# Patient Record
Sex: MALE | Race: WHITE | NOT HISPANIC OR LATINO | Employment: OTHER | URBAN - METROPOLITAN AREA
[De-identification: names, ages, dates, MRNs, and addresses within clinical notes are randomized per-mention and may not be internally consistent; named-entity substitution may affect disease eponyms.]

---

## 2017-08-30 ENCOUNTER — ALLSCRIPTS OFFICE VISIT (OUTPATIENT)
Dept: OTHER | Facility: OTHER | Age: 82
End: 2017-08-30

## 2018-01-13 VITALS
HEART RATE: 72 BPM | WEIGHT: 165 LBS | DIASTOLIC BLOOD PRESSURE: 48 MMHG | HEIGHT: 65 IN | SYSTOLIC BLOOD PRESSURE: 138 MMHG | BODY MASS INDEX: 27.49 KG/M2

## 2018-05-08 RX ORDER — MECLIZINE HYDROCHLORIDE 25 MG/1
TABLET ORAL
Status: ON HOLD | COMMUNITY
End: 2021-08-26 | Stop reason: CLARIF

## 2018-05-08 RX ORDER — FUROSEMIDE 20 MG/1
1 TABLET ORAL DAILY PRN
Status: ON HOLD | COMMUNITY
Start: 2014-05-28 | End: 2021-10-27 | Stop reason: SDUPTHER

## 2018-05-08 RX ORDER — LANSOPRAZOLE 30 MG/1
CAPSULE, DELAYED RELEASE ORAL
Status: ON HOLD | COMMUNITY
Start: 2014-05-28 | End: 2019-04-24 | Stop reason: CLARIF

## 2018-05-08 RX ORDER — MULTIVITAMIN WITH IRON
TABLET ORAL
COMMUNITY

## 2018-05-08 RX ORDER — FENOFIBRIC ACID 45 MG/1
CAPSULE, DELAYED RELEASE ORAL
COMMUNITY
Start: 2013-09-30

## 2018-05-08 RX ORDER — ATORVASTATIN CALCIUM 20 MG/1
1 TABLET, FILM COATED ORAL DAILY
COMMUNITY

## 2018-05-08 RX ORDER — POTASSIUM CHLORIDE 750 MG/1
1 CAPSULE, EXTENDED RELEASE ORAL DAILY
Status: ON HOLD | COMMUNITY
Start: 2017-08-30 | End: 2019-04-24 | Stop reason: CLARIF

## 2018-05-08 RX ORDER — NITROGLYCERIN 0.4 MG/1
1 TABLET SUBLINGUAL
COMMUNITY
End: 2020-06-03 | Stop reason: SDUPTHER

## 2018-05-08 RX ORDER — VALSARTAN 80 MG/1
1 TABLET ORAL DAILY
Status: ON HOLD | COMMUNITY
End: 2019-04-24 | Stop reason: CLARIF

## 2018-05-09 ENCOUNTER — OFFICE VISIT (OUTPATIENT)
Dept: CARDIOLOGY CLINIC | Facility: CLINIC | Age: 83
End: 2018-05-09
Payer: COMMERCIAL

## 2018-05-09 VITALS
BODY MASS INDEX: 27.64 KG/M2 | SYSTOLIC BLOOD PRESSURE: 130 MMHG | DIASTOLIC BLOOD PRESSURE: 56 MMHG | HEIGHT: 66 IN | HEART RATE: 40 BPM | WEIGHT: 172 LBS

## 2018-05-09 DIAGNOSIS — I25.10 CORONARY ARTERY DISEASE INVOLVING NATIVE CORONARY ARTERY OF NATIVE HEART WITHOUT ANGINA PECTORIS: Primary | ICD-10-CM

## 2018-05-09 DIAGNOSIS — I10 ESSENTIAL HYPERTENSION: ICD-10-CM

## 2018-05-09 DIAGNOSIS — R60.0 BILATERAL LEG EDEMA: ICD-10-CM

## 2018-05-09 DIAGNOSIS — I49.8 VENTRICULAR BIGEMINY: ICD-10-CM

## 2018-05-09 PROCEDURE — 99213 OFFICE O/P EST LOW 20 MIN: CPT | Performed by: INTERNAL MEDICINE

## 2018-05-09 NOTE — PROGRESS NOTES
Cardiology Follow Up    Tahmina Monson  10/11/1924  [id number]  [address] [facility] [id number] [address] [facility] [place], [facility] [phone]    1  Coronary artery disease involving native coronary artery of native heart without angina pectoris     2  Essential hypertension     3  Bilateral leg edema     4  Ventricular bigeminy           Discussion/Summary: All of his assessed cardiac problems appear to be stable  I have reviewed his medications and made no changes  No cardiac testing is ordered  RTO 6 months  I WILL DECREASE HIS ASA TO 81 MG DAILY  Interval History: He has seen in the ER on 12/26/2017 for atypical CP  He was not admitted  He has had problems with nose bleeds  He denies any recent CP, SOB, palpitations, dizziness  He has chronic LE edema and he takes Lasix prn  He has CAD with LAD stenting in 2002  Patient Active Problem List   Diagnosis    Coronary artery disease involving native coronary artery of native heart without angina pectoris    Essential hypertension    Bilateral leg edema    Ventricular bigeminy     Past Medical History:   Diagnosis Date    Cataract     Coronary atherosclerosis of native coronary artery     Diverticulitis of colon     Essential hypertension, benign     Hyperlipidemia     Peptic ulcer      Social History     Social History    Marital status: /Civil Union     Spouse name: N/A    Number of children: N/A    Years of education: N/A     Occupational History    Not on file       Social History Main Topics    Smoking status: Never Smoker    Smokeless tobacco: Never Used    Alcohol use No    Drug use: No    Sexual activity: Not on file     Other Topics Concern    Not on file     Social History Narrative    No narrative on file      Family History   Problem Relation Age of Onset    Heart attack Brother      Past Surgical History:   Procedure Laterality Date  CORONARY ANGIOPLASTY      CORONARY STENT PLACEMENT      TONSILLECTOMY         Current Outpatient Prescriptions:     allopurinol (ZYLOPRIM) 300 mg tablet, Take 1 tablet by mouth daily, Disp: , Rfl:     aspirin 325 mg tablet, Take 1 tablet by mouth daily, Disp: , Rfl:     atorvastatin (LIPITOR) 20 mg tablet, Take 1 tablet by mouth daily, Disp: , Rfl:     Cholecalciferol (CVS VITAMIN D) 2000 units CAPS, Take by mouth, Disp: , Rfl:     Choline Fenofibrate (FENOFIBRIC ACID) 45 MG CPDR, Take by mouth, Disp: , Rfl:     furosemide (LASIX) 20 mg tablet, Take 1 tablet by mouth daily as needed, Disp: , Rfl:     metoprolol tartrate (LOPRESSOR) 50 mg tablet, Take 1 tablet by mouth 2 (two) times a day, Disp: , Rfl:     Multiple Vitamins tablet, Take by mouth, Disp: , Rfl:     nitroglycerin (NITROSTAT) 0 4 mg SL tablet, Place 1 tablet under the tongue every 5 (five) minutes as needed, Disp: , Rfl:     potassium chloride (MICRO-K) 10 MEQ CR capsule, Take 1 capsule by mouth daily, Disp: , Rfl:     valsartan (DIOVAN) 80 mg tablet, Take 1 tablet by mouth daily, Disp: , Rfl:     lansoprazole (PREVACID) 30 mg capsule, Take by mouth, Disp: , Rfl:     meclizine (ANTIVERT) 25 mg tablet, Take by mouth, Disp: , Rfl:   Allergies   Allergen Reactions    Penicillins      Patient denies allergic reaction - said that is his wife-not him     Vitals:    05/09/18 1436   BP: 130/56   BP Location: Left arm   Pulse: (!) 40   Weight: 78 kg (172 lb)   Height: 5' 6" (1 676 m)     Weight (last 2 days)     Date/Time   Weight    05/09/18 1436  78 (172)             Blood pressure 130/56, pulse (!) 40, height 5' 6" (1 676 m), weight 78 kg (172 lb)  , Body mass index is 27 76 kg/m²  Labs:  No visits with results within 6 Month(s) from this visit  Latest known visit with results is:   No results found for any previous visit  Imaging: No results found      Review of Systems:  Review of Systems   Constitutional: Negative for diaphoresis, fatigue, fever and unexpected weight change  HENT: Negative  Respiratory: Negative for cough, shortness of breath and wheezing  Cardiovascular: Negative for chest pain, palpitations and leg swelling  Gastrointestinal: Negative for abdominal pain, diarrhea and nausea  Musculoskeletal: Negative for gait problem and myalgias  Skin: Negative for rash  Neurological: Negative for dizziness and numbness  Psychiatric/Behavioral: Negative  Physical Exam:  Physical Exam   Constitutional: He is oriented to person, place, and time  He appears well-developed and well-nourished  HENT:   Head: Normocephalic and atraumatic  Eyes: Pupils are equal, round, and reactive to light  Neck: Normal range of motion  Neck supple  No JVD present  Cardiovascular: Regular rhythm, S1 normal, S2 normal and normal pulses  Pulses:       Carotid pulses are 2+ on the right side, and 2+ on the left side  Pulmonary/Chest: Effort normal and breath sounds normal  He has no wheezes  He has no rales  Abdominal: Soft  Bowel sounds are normal  There is no tenderness  Musculoskeletal: Normal range of motion  He exhibits no edema or tenderness  Neurological: He is alert and oriented to person, place, and time  He has normal reflexes  No cranial nerve deficit  Skin: Skin is warm  Psychiatric: He has a normal mood and affect

## 2018-08-13 ENCOUNTER — APPOINTMENT (EMERGENCY)
Dept: CT IMAGING | Facility: HOSPITAL | Age: 83
End: 2018-08-13
Payer: COMMERCIAL

## 2018-08-13 ENCOUNTER — HOSPITAL ENCOUNTER (EMERGENCY)
Facility: HOSPITAL | Age: 83
Discharge: HOME | End: 2018-08-13
Attending: EMERGENCY MEDICINE | Admitting: EMERGENCY MEDICINE
Payer: COMMERCIAL

## 2018-08-13 VITALS
HEART RATE: 64 BPM | OXYGEN SATURATION: 94 % | DIASTOLIC BLOOD PRESSURE: 48 MMHG | TEMPERATURE: 98.1 F | RESPIRATION RATE: 18 BRPM | SYSTOLIC BLOOD PRESSURE: 140 MMHG

## 2018-08-13 DIAGNOSIS — R42 DIZZINESS: ICD-10-CM

## 2018-08-13 DIAGNOSIS — R42 VERTIGO: Primary | ICD-10-CM

## 2018-08-13 LAB
ALBUMIN SERPL BCP-MCNC: 4.1 G/DL (ref 3.5–5.7)
ALP SERPL-CCNC: 43 U/L (ref 55–165)
ALT SERPL W P-5'-P-CCNC: 12 U/L (ref 7–52)
ANION GAP SERPL CALCULATED.3IONS-SCNC: 9 MMOL/L (ref 4–13)
AST SERPL W P-5'-P-CCNC: 19 U/L (ref 13–39)
BACTERIA UR QL AUTO: ABNORMAL /HPF
BASOPHILS # BLD AUTO: 0.1 THOUSANDS/ΜL (ref 0–0.1)
BASOPHILS NFR BLD AUTO: 1 % (ref 0–1)
BILIRUB SERPL-MCNC: 0.7 MG/DL (ref 0.2–1)
BILIRUB UR QL STRIP: NEGATIVE
BUN SERPL-MCNC: 40 MG/DL (ref 7–25)
CALCIUM SERPL-MCNC: 8.5 MG/DL (ref 8.6–10.5)
CHLORIDE SERPL-SCNC: 104 MMOL/L (ref 98–107)
CLARITY UR: CLEAR
CO2 SERPL-SCNC: 20 MMOL/L (ref 21–31)
COLOR UR: YELLOW
CREAT SERPL-MCNC: 1.53 MG/DL (ref 0.7–1.3)
EOSINOPHIL # BLD AUTO: 0 THOUSAND/ΜL (ref 0–0.61)
EOSINOPHIL NFR BLD AUTO: 1 % (ref 0–6)
ERYTHROCYTE [DISTWIDTH] IN BLOOD BY AUTOMATED COUNT: 17 % (ref 11.6–15.1)
GFR SERPL CREATININE-BSD FRML MDRD: 39 ML/MIN/1.73SQ M
GLUCOSE SERPL-MCNC: 104 MG/DL (ref 65–140)
GLUCOSE UR STRIP-MCNC: NEGATIVE MG/DL
HCT VFR BLD AUTO: 38.8 % (ref 42–52)
HGB BLD-MCNC: 12.6 G/DL (ref 12–17)
HGB UR QL STRIP.AUTO: ABNORMAL
HOLD SPECIMEN: NORMAL
KETONES UR STRIP-MCNC: NEGATIVE MG/DL
LEUKOCYTE ESTERASE UR QL STRIP: NEGATIVE
LYMPHOCYTES # BLD AUTO: 1.3 THOUSANDS/ΜL (ref 0.6–4.47)
LYMPHOCYTES NFR BLD AUTO: 12 % (ref 14–44)
MCH RBC QN AUTO: 31.1 PG (ref 26.8–34.3)
MCHC RBC AUTO-ENTMCNC: 32.5 G/DL (ref 31.4–37.4)
MCV RBC AUTO: 96 FL (ref 82–98)
MONOCYTES # BLD AUTO: 0.6 THOUSAND/ΜL (ref 0.17–1.22)
MONOCYTES NFR BLD AUTO: 6 % (ref 4–12)
NEUTROPHILS # BLD AUTO: 8.6 THOUSANDS/ΜL (ref 1.85–7.62)
NEUTS SEG NFR BLD AUTO: 81 % (ref 43–75)
NITRITE UR QL STRIP: NEGATIVE
NON-SQ EPI CELLS URNS QL MICRO: ABNORMAL /HPF
NRBC BLD AUTO-RTO: 0 /100 WBCS
PH UR STRIP.AUTO: 6 [PH] (ref 5–8)
PLATELET # BLD AUTO: 166 THOUSANDS/UL (ref 149–390)
PMV BLD AUTO: 9.5 FL (ref 8.9–12.7)
POTASSIUM SERPL-SCNC: 4.7 MMOL/L (ref 3.5–5.5)
PROT SERPL-MCNC: 6.6 G/DL (ref 6.4–8.9)
PROT UR STRIP-MCNC: NEGATIVE MG/DL
RBC # BLD AUTO: 4.05 MILLION/UL (ref 3.88–5.62)
RBC #/AREA URNS AUTO: ABNORMAL /HPF
SODIUM SERPL-SCNC: 133 MMOL/L (ref 134–143)
SP GR UR STRIP.AUTO: 1.01 (ref 1–1.03)
TROPONIN I SERPL-MCNC: <0.03 NG/ML
UROBILINOGEN UR QL STRIP.AUTO: 0.2 E.U./DL
WBC # BLD AUTO: 10.6 THOUSAND/UL (ref 4.31–10.16)
WBC #/AREA URNS AUTO: ABNORMAL /HPF

## 2018-08-13 PROCEDURE — 81001 URINALYSIS AUTO W/SCOPE: CPT | Performed by: EMERGENCY MEDICINE

## 2018-08-13 PROCEDURE — 93005 ELECTROCARDIOGRAM TRACING: CPT

## 2018-08-13 PROCEDURE — 84484 ASSAY OF TROPONIN QUANT: CPT | Performed by: EMERGENCY MEDICINE

## 2018-08-13 PROCEDURE — 80053 COMPREHEN METABOLIC PANEL: CPT | Performed by: EMERGENCY MEDICINE

## 2018-08-13 PROCEDURE — 85025 COMPLETE CBC W/AUTO DIFF WBC: CPT | Performed by: EMERGENCY MEDICINE

## 2018-08-13 PROCEDURE — 36415 COLL VENOUS BLD VENIPUNCTURE: CPT | Performed by: EMERGENCY MEDICINE

## 2018-08-13 PROCEDURE — 81003 URINALYSIS AUTO W/O SCOPE: CPT | Performed by: EMERGENCY MEDICINE

## 2018-08-13 PROCEDURE — 70450 CT HEAD/BRAIN W/O DYE: CPT

## 2018-08-13 PROCEDURE — 99285 EMERGENCY DEPT VISIT HI MDM: CPT

## 2018-08-13 RX ORDER — MECLIZINE HYDROCHLORIDE 25 MG/1
25 TABLET ORAL ONCE
Status: COMPLETED | OUTPATIENT
Start: 2018-08-13 | End: 2018-08-13

## 2018-08-13 RX ORDER — LOSARTAN POTASSIUM 50 MG/1
50 TABLET ORAL DAILY
Status: ON HOLD | COMMUNITY
End: 2019-04-24 | Stop reason: CLARIF

## 2018-08-13 RX ORDER — MECLIZINE HCL 12.5 MG/1
12.5 TABLET ORAL 3 TIMES DAILY PRN
Qty: 15 TABLET | Refills: 0 | Status: SHIPPED | OUTPATIENT
Start: 2018-08-13 | End: 2018-08-18

## 2018-08-13 RX ADMIN — MECLIZINE HYDROCHLORIDE 25 MG: 25 TABLET ORAL at 21:02

## 2018-08-14 LAB
ATRIAL RATE: 77 BPM
P AXIS: 43 DEGREES
PR INTERVAL: 192 MS
QRS AXIS: -7 DEGREES
QRSD INTERVAL: 88 MS
QT INTERVAL: 400 MS
QTC INTERVAL: 452 MS
T WAVE AXIS: 57 DEGREES
VENTRICULAR RATE: 77 BPM

## 2018-08-14 NOTE — DISCHARGE INSTRUCTIONS
Dizziness   WHAT YOU NEED TO KNOW:   Dizziness is a feeling of being off balance or unsteady  Common causes of dizziness are an inner ear fluid imbalance or a lack of oxygen in your blood  Dizziness may be acute (lasts 3 days or less) or chronic (lasts longer than 3 days)  You may have dizzy spells that last from seconds to a few hours  DISCHARGE INSTRUCTIONS:   Return to the emergency department if:   · You have a headache and a stiff neck  · You have shaking chills and a fever  · You vomit over and over with no relief  · Your vomit or bowel movements are red or black  · You have pain in your chest, back, or abdomen  · You have numbness, especially in your face, arms, or legs  · You have trouble moving your arms or legs  · You are confused  Contact your healthcare provider if:   · You have a fever  · Your symptoms do not get better with treatment  · You have questions or concerns about your condition or care  Manage your symptoms:   · Do not drive  or operate heavy machinery when you are dizzy  · Get up slowly  from sitting or lying down  · Drink plenty of liquids  Liquids help prevent dehydration  Ask how much liquid to drink each day and which liquids are best for you  Follow up with your healthcare provider as directed:  Write down your questions so you remember to ask them during your visits  © 2017 [address] Information is for End User's use only and may not be sold, redistributed or otherwise used for commercial purposes  All illustrations and images included in CareNotes® are the copyrighted property of A D [employer] M , Inc  or [employer]  The above information is an  only  It is not intended as medical advice for individual conditions or treatments  Talk to your doctor, nurse or pharmacist before following any medical regimen to see if it is safe and effective for you     ~~~    Rest  Use medication   If not better after 1 day from dizzy feelings, then you should go into office to see your Family Doctor for a recheck and advice  If you feel worse, return to the ER  Also, ask your regular Family Doctor by phone tomorrow whether he wants you to stop (change) your new recent medication that he started

## 2018-08-14 NOTE — ED PROVIDER NOTES
History  Chief Complaint   Patient presents with    Weakness - Generalized     Since this morning  Pt started cozaar 1 week ago  Dizziness today  No trauma  No pains anywhere  No chest symptoms or SOB  No belly or back pains  No focal neuro symptoms  Has has same in past and was given medicine for dizziness by [facility] Doctor  Did not call or see Family Doctor today  History provided by:  Patient      Prior to Admission Medications   Prescriptions Last Dose Informant Patient Reported? Taking?    Cholecalciferol ([employer]S VITAMIN D) 2000 units CAPS  Self Yes Yes   Sig: Take by mouth   Choline Fenofibrate (FENOFIBRIC ACID) 45 MG CPDR  Self Yes Yes   Sig: Take by mouth   Multiple Vitamins tablet  Self Yes Yes   Sig: Take by mouth   allopurinol (ZYLOPRIM) 300 mg tablet  Self Yes Yes   Sig: Take 1 tablet by mouth daily   aspirin 325 mg tablet  Self Yes Yes   Sig: Take 1 tablet by mouth daily   atorvastatin (LIPITOR) 20 mg tablet  Self Yes Yes   Sig: Take 1 tablet by mouth daily   furosemide (LASIX) 20 mg tablet  Self Yes Yes   Sig: Take 1 tablet by mouth daily as needed   lansoprazole (PREVACID) 30 mg capsule  Self Yes Yes   Sig: Take by mouth   losartan (COZAAR) 50 mg tablet   Yes Yes   Sig: Take 50 mg by mouth daily   meclizine (ANTIVERT) 25 mg tablet  Self Yes Yes   Sig: Take by mouth   metoprolol tartrate (LOPRESSOR) 50 mg tablet  Self Yes Yes   Sig: Take 1 tablet by mouth 2 (two) times a day   nitroglycerin (NITROSTAT) 0 4 mg SL tablet  Self Yes Yes   Sig: Place 1 tablet under the tongue every 5 (five) minutes as needed   potassium chloride (MICRO-K) 10 MEQ CR capsule  Self Yes Yes   Sig: Take 1 capsule by mouth daily   valsartan (DIOVAN) 80 mg tablet  Self Yes Yes   Sig: Take 1 tablet by mouth daily      Facility-Administered Medications: None       Past Medical History:   Diagnosis Date    Cataract     Coronary atherosclerosis of native coronary artery     Diverticulitis of colon     Essential hypertension, benign     Hyperlipidemia     Peptic ulcer        Past Surgical History:   Procedure Laterality Date    CORONARY ANGIOPLASTY      CORONARY STENT PLACEMENT      TONSILLECTOMY         Family History   Problem Relation Age of Onset    Heart attack Brother      I have reviewed and agree with the history as documented  Social History   Substance Use Topics    Smoking status: Never Smoker    Smokeless tobacco: Never Used    Alcohol use No        Review of Systems   Constitutional: Negative for chills and fever  HENT: Negative for dental problem, facial swelling and sore throat  Eyes: Negative for visual disturbance  Respiratory: Negative for cough, choking, chest tightness and shortness of breath  Cardiovascular: Negative for chest pain and palpitations  Gastrointestinal: Negative for abdominal pain, nausea and vomiting  Endocrine: Negative for polydipsia  Genitourinary: Negative for difficulty urinating and hematuria  Musculoskeletal: Negative for back pain, neck pain and neck stiffness  Neurological: Positive for dizziness and light-headedness  Negative for seizures, syncope, facial asymmetry and headaches  Psychiatric/Behavioral: Negative for confusion  Physical Exam  Physical Exam   Constitutional: He is oriented to person, place, and time  He appears well-developed  No distress  HENT:   Head: Normocephalic and atraumatic  Eyes: Conjunctivae and EOM are normal  Pupils are equal, round, and reactive to light  Neck: Normal range of motion  Neck supple  Cardiovascular: Normal rate, regular rhythm and normal heart sounds  Pulmonary/Chest: Effort normal and breath sounds normal    Abdominal: Soft  There is no tenderness  Musculoskeletal: Normal range of motion  Neurological: He is alert and oriented to person, place, and time  He displays normal reflexes  No cranial nerve deficit  Skin: Skin is warm and dry  No rash noted  He is not diaphoretic  Vital Signs  ED Triage Vitals [08/13/18 2001]   Temperature Pulse Respirations Blood Pressure SpO2   99 6 °F (37 6 °C) 74 18 166/72 94 %      Temp Source Heart Rate Source Patient Position - Orthostatic VS BP Location FiO2 (%)   Tympanic Monitor Sitting Left arm --      Pain Score       No Pain           Vitals:    08/13/18 2001 08/13/18 2214   BP: 166/72 (!) 140/48   Pulse: 74 64   Patient Position - Orthostatic VS: Sitting        Visual Acuity      ED Medications  Medications   meclizine (ANTIVERT) tablet 25 mg (25 mg Oral Given 8/13/18 2102)       Diagnostic Studies  Results Reviewed     Procedure Component Value Units Date/Time    [place] draw [[id number]] Collected:  08/13/18 2052    Lab Status:  Final result Specimen:  Blood Updated:  08/13/18 2201    Narrative: The following orders were created for panel order [place] draw  Procedure                               Abnormality         Status                     ---------                               -----------         ------                     Palomino Ortega Top on WECP[[id number]]                            Final result               Gold top on OIOU[[id number]]                                  Final result               Green / Black tube on MMGV[[id number]]                        Final result                 Please view results for these tests on the individual orders      Urine Microscopic [[id number]]  (Abnormal) Collected:  08/13/18 2029    Lab Status:  Final result Specimen:  Urine from Urine, Clean Catch Updated:  08/13/18 2118     RBC, UA 4-10 (A) /hpf      WBC, UA 0-1 (A) /hpf      Epithelial Cells None Seen /hpf      Bacteria, UA None Seen /hpf     Troponin I [[id number]]  (Normal) Collected:  08/13/18 2035    Lab Status:  Final result Specimen:  Blood from Arm, Left Updated:  08/13/18 2117     Troponin I <0 03 ng/mL     Comprehensive metabolic panel [[id number]]  (Abnormal) Collected:  08/13/18 2035    Lab Status:  Final result Specimen:  Blood from Arm, Left Updated:  08/13/18 2117     Sodium 133 (L) mmol/L      Potassium 4 7 mmol/L      Chloride 104 mmol/L      CO2 20 (L) mmol/L      Anion Gap 9 mmol/L      BUN 40 (H) mg/dL      Creatinine 1 53 (H) mg/dL      Glucose 104 mg/dL      Calcium 8 5 (L) mg/dL      AST 19 U/L      ALT 12 U/L      Alkaline Phosphatase 43 (L) U/L      Total Protein 6 6 g/dL      Albumin 4 1 g/dL      Total Bilirubin 0 70 mg/dL      eGFR 39 ml/min/1 73sq m     Narrative:         National Kidney Disease Education Program recommendations are as follows:  GFR calculation is accurate only with a steady state creatinine  Chronic Kidney disease less than 60 ml/min/1 73 sq  meters  Kidney failure less than 15 ml/min/1 73 sq  meters      CBC and differential [[id number]]  (Abnormal) Collected:  08/13/18 2035    Lab Status:  Final result Specimen:  Blood from Arm, Left Updated:  08/13/18 2051     WBC 10 60 (H) Thousand/uL      RBC 4 05 Million/uL      Hemoglobin 12 6 g/dL      Hematocrit 38 8 (L) %      MCV 96 fL      MCH 31 1 pg      MCHC 32 5 g/dL      RDW 17 0 (H) %      MPV 9 5 fL      Platelets 874 Thousands/uL      nRBC 0 /100 WBCs      Neutrophils Relative 81 (H) %      Lymphocytes Relative 12 (L) %      Monocytes Relative 6 %      Eosinophils Relative 1 %      Basophils Relative 1 %      Neutrophils Absolute 8 60 (H) Thousands/µL      Lymphocytes Absolute 1 30 Thousands/µL      Monocytes Absolute 0 60 Thousand/µL      Eosinophils Absolute 0 00 Thousand/µL      Basophils Absolute 0 10 Thousands/µL     UA w Reflex to Microscopic w Reflex to Culture [[id number]]  (Abnormal) Collected:  08/13/18 2029    Lab Status:  Final result Specimen:  Urine from Urine, Clean Catch Updated:  08/13/18 2050     Color, UA Yellow     Clarity, UA Clear     Specific Gravity, UA 1 015     pH, UA 6 0     Leukocytes, UA Negative     Nitrite, UA Negative     Protein, UA Negative mg/dl      Glucose, UA Negative mg/dl      Ketones, UA Negative mg/dl      Urobilinogen, UA 0 2 E U /dl      Bilirubin, UA Negative     Blood, UA 1+ (A)                 CT head without contrast   Final Result by Miryam Santana (08/13 2135)   No acute intracranial findings  If symptoms persist or if further imaging   is clinically indicated, consider follow-up CT or MRI  Low attenuation in the white matter bilaterally, age indeterminate  This   is most commonly from small vessel ischemic disease         Signed by JOHN Perkins  Procedures  ECG 12 Lead Documentation  Date/Time: 8/13/2018 8:47 PM  Performed by: Willard Smoker by: Luis Mccormick     Indications / Diagnosis:  Dizziness  ECG reviewed by me, the ED Provider: yes    Patient location:  ED  Previous ECG:     Previous ECG:  Unavailable    Comparison to cardiac monitor: Yes    Interpretation:     Interpretation: non-specific    Rate:     ECG rate:  77    ECG rate assessment: normal    Rhythm:     Rhythm: sinus rhythm    Ectopy:     Ectopy: PVCs    QRS:     QRS axis:  Normal  Conduction:     Conduction: normal    ST segments:     ST segments:  Non-specific  Comments:      No acute ischemia evident now           Phone Contacts  ED Phone Contact    ED Course                               MDM  CritCare Time    Disposition  Final diagnoses:   Vertigo   Dizziness     Time reflects when diagnosis was documented in both MDM as applicable and the Disposition within this note     Time User Action Codes Description Comment    8/13/2018 10:21 PM Jordyn Siad Add [R42] Vertigo     8/13/2018 10:21 PM Jordyn Siad Add [R42] Dizziness       ED Disposition     ED Disposition Condition Comment    Discharge  Karthikeyan Deem Ondrasik discharge to home/self care      Condition at discharge: Stable        Follow-up Information    None         Discharge Medication List as of 8/13/2018 10:24 PM      START taking these medications    Details   !! meclizine (ANTIVERT) 12 5 MG tablet Take 1 tablet (12 5 mg total) by mouth 3 (three) times a day as needed for dizziness for up to 5 days, Starting Mon 8/13/2018, Until Sat 8/18/2018, Print       !! - Potential duplicate medications found  Please discuss with provider  CONTINUE these medications which have NOT CHANGED    Details   allopurinol (ZYLOPRIM) 300 mg tablet Take 1 tablet by mouth daily, Historical Med      aspirin 325 mg tablet Take 1 tablet by mouth daily, Historical Med      atorvastatin (LIPITOR) 20 mg tablet Take 1 tablet by mouth daily, Historical Med      Cholecalciferol (CVS VITAMIN D) 2000 units CAPS Take by mouth, Historical Med      Choline Fenofibrate (FENOFIBRIC ACID) 45 MG CPDR Take by mouth, Starting Mon 9/30/2013, Historical Med      furosemide (LASIX) 20 mg tablet Take 1 tablet by mouth daily as needed, Starting Wed 5/28/2014, Historical Med      lansoprazole (PREVACID) 30 mg capsule Take by mouth, Starting Wed 5/28/2014, Historical Med      losartan (COZAAR) 50 mg tablet Take 50 mg by mouth daily, Historical Med      !! meclizine (ANTIVERT) 25 mg tablet Take by mouth, Historical Med      metoprolol tartrate (LOPRESSOR) 50 mg tablet Take 1 tablet by mouth 2 (two) times a day, Historical Med      Multiple Vitamins tablet Take by mouth, Historical Med      nitroglycerin (NITROSTAT) 0 4 mg SL tablet Place 1 tablet under the tongue every 5 (five) minutes as needed, Historical Med      potassium chloride (MICRO-K) 10 MEQ CR capsule Take 1 capsule by mouth daily, Starting Wed 8/30/2017, Historical Med      valsartan (DIOVAN) 80 mg tablet Take 1 tablet by mouth daily, Historical Med       !! - Potential duplicate medications found  Please discuss with provider  No discharge procedures on file      ED Provider  Electronically Signed by           Luis Ybarra MD  08/13/18 240 [place] Rakesh Perkins MD  08/13/18 3778

## 2018-09-24 ENCOUNTER — APPOINTMENT (OUTPATIENT)
Dept: LAB | Facility: HOSPITAL | Age: 83
End: 2018-09-24
Attending: INTERNAL MEDICINE
Payer: COMMERCIAL

## 2018-09-24 ENCOUNTER — TRANSCRIBE ORDERS (OUTPATIENT)
Dept: ADMINISTRATIVE | Facility: HOSPITAL | Age: 83
End: 2018-09-24

## 2018-09-24 DIAGNOSIS — N18.30 STAGE 3 CHRONIC KIDNEY DISEASE (HCC): ICD-10-CM

## 2018-09-24 DIAGNOSIS — N18.30 STAGE 3 CHRONIC KIDNEY DISEASE (HCC): Primary | ICD-10-CM

## 2018-09-24 LAB
ANION GAP SERPL CALCULATED.3IONS-SCNC: 9 MMOL/L (ref 4–13)
BUN SERPL-MCNC: 30 MG/DL (ref 7–25)
CALCIUM SERPL-MCNC: 9 MG/DL (ref 8.6–10.5)
CHLORIDE SERPL-SCNC: 102 MMOL/L (ref 98–107)
CO2 SERPL-SCNC: 20 MMOL/L (ref 21–31)
CREAT SERPL-MCNC: 1.22 MG/DL (ref 0.7–1.3)
GFR SERPL CREATININE-BSD FRML MDRD: 51 ML/MIN/1.73SQ M
GLUCOSE SERPL-MCNC: 93 MG/DL (ref 65–140)
POTASSIUM SERPL-SCNC: 5 MMOL/L (ref 3.5–5.5)
SODIUM SERPL-SCNC: 131 MMOL/L (ref 134–143)

## 2018-09-24 PROCEDURE — 36415 COLL VENOUS BLD VENIPUNCTURE: CPT

## 2018-09-24 PROCEDURE — 80048 BASIC METABOLIC PNL TOTAL CA: CPT

## 2019-04-01 ENCOUNTER — APPOINTMENT (OUTPATIENT)
Dept: LAB | Facility: HOSPITAL | Age: 84
End: 2019-04-01
Attending: INTERNAL MEDICINE
Payer: COMMERCIAL

## 2019-04-01 ENCOUNTER — TRANSCRIBE ORDERS (OUTPATIENT)
Dept: ADMINISTRATIVE | Facility: HOSPITAL | Age: 84
End: 2019-04-01

## 2019-04-01 DIAGNOSIS — M10.9 GOUT, UNSPECIFIED CAUSE, UNSPECIFIED CHRONICITY, UNSPECIFIED SITE: ICD-10-CM

## 2019-04-01 DIAGNOSIS — I25.10 ATHEROSCLEROSIS OF NATIVE CORONARY ARTERY, ANGINA PRESENCE UNSPECIFIED, UNSPECIFIED WHETHER NATIVE OR TRANSPLANTED HEART: ICD-10-CM

## 2019-04-01 DIAGNOSIS — Z79.01 LONG TERM (CURRENT) USE OF ANTICOAGULANTS: ICD-10-CM

## 2019-04-01 DIAGNOSIS — Z79.01 LONG TERM (CURRENT) USE OF ANTICOAGULANTS: Primary | ICD-10-CM

## 2019-04-01 LAB
ALBUMIN SERPL BCP-MCNC: 4.2 G/DL (ref 3.5–5.7)
ALP SERPL-CCNC: 70 U/L (ref 55–165)
ALT SERPL W P-5'-P-CCNC: 11 U/L (ref 7–52)
ANION GAP SERPL CALCULATED.3IONS-SCNC: 6 MMOL/L (ref 4–13)
AST SERPL W P-5'-P-CCNC: 21 U/L (ref 13–39)
BASOPHILS # BLD AUTO: 0 THOUSANDS/ΜL (ref 0–0.1)
BASOPHILS NFR BLD AUTO: 1 % (ref 0–1)
BILIRUB SERPL-MCNC: 0.6 MG/DL (ref 0.2–1)
BUN SERPL-MCNC: 29 MG/DL (ref 7–25)
CALCIUM SERPL-MCNC: 9.2 MG/DL (ref 8.6–10.5)
CHLORIDE SERPL-SCNC: 100 MMOL/L (ref 98–107)
CHOLEST SERPL-MCNC: 157 MG/DL (ref 0–200)
CO2 SERPL-SCNC: 27 MMOL/L (ref 21–31)
CREAT SERPL-MCNC: 1.24 MG/DL (ref 0.7–1.3)
EOSINOPHIL # BLD AUTO: 0.1 THOUSAND/ΜL (ref 0–0.61)
EOSINOPHIL NFR BLD AUTO: 1 % (ref 0–6)
ERYTHROCYTE [DISTWIDTH] IN BLOOD BY AUTOMATED COUNT: 15.2 % (ref 11.6–15.1)
GFR SERPL CREATININE-BSD FRML MDRD: 49 ML/MIN/1.73SQ M
GLUCOSE SERPL-MCNC: 132 MG/DL (ref 65–140)
HCT VFR BLD AUTO: 39.8 % (ref 42–52)
HDLC SERPL-MCNC: 35 MG/DL (ref 40–60)
HGB BLD-MCNC: 13.3 G/DL (ref 12–17)
LDLC SERPL CALC-MCNC: 74 MG/DL (ref 0–100)
LYMPHOCYTES # BLD AUTO: 1.6 THOUSANDS/ΜL (ref 0.6–4.47)
LYMPHOCYTES NFR BLD AUTO: 18 % (ref 14–44)
MCH RBC QN AUTO: 31.7 PG (ref 26.8–34.3)
MCHC RBC AUTO-ENTMCNC: 33.5 G/DL (ref 31.4–37.4)
MCV RBC AUTO: 95 FL (ref 82–98)
MONOCYTES # BLD AUTO: 0.7 THOUSAND/ΜL (ref 0.17–1.22)
MONOCYTES NFR BLD AUTO: 8 % (ref 4–12)
NEUTROPHILS # BLD AUTO: 6.4 THOUSANDS/ΜL (ref 1.85–7.62)
NEUTS SEG NFR BLD AUTO: 73 % (ref 43–75)
NONHDLC SERPL-MCNC: 122 MG/DL
PLATELET # BLD AUTO: 213 THOUSANDS/UL (ref 149–390)
PMV BLD AUTO: 9.1 FL (ref 8.9–12.7)
POTASSIUM SERPL-SCNC: 4.4 MMOL/L (ref 3.5–5.5)
PROT SERPL-MCNC: 7.1 G/DL (ref 6.4–8.9)
RBC # BLD AUTO: 4.2 MILLION/UL (ref 3.88–5.62)
SODIUM SERPL-SCNC: 133 MMOL/L (ref 134–143)
TRIGL SERPL-MCNC: 242 MG/DL (ref 44–166)
URATE SERPL-MCNC: 4.4 MG/DL (ref 2.3–7.6)
WBC # BLD AUTO: 8.8 THOUSAND/UL (ref 4.31–10.16)

## 2019-04-01 PROCEDURE — 85025 COMPLETE CBC W/AUTO DIFF WBC: CPT

## 2019-04-01 PROCEDURE — 80053 COMPREHEN METABOLIC PANEL: CPT

## 2019-04-01 PROCEDURE — 84550 ASSAY OF BLOOD/URIC ACID: CPT

## 2019-04-01 PROCEDURE — 80061 LIPID PANEL: CPT

## 2019-04-01 PROCEDURE — 36415 COLL VENOUS BLD VENIPUNCTURE: CPT

## 2019-04-24 ENCOUNTER — HOSPITAL ENCOUNTER (EMERGENCY)
Facility: HOSPITAL | Age: 84
End: 2019-04-24
Attending: EMERGENCY MEDICINE | Admitting: EMERGENCY MEDICINE
Payer: COMMERCIAL

## 2019-04-24 ENCOUNTER — APPOINTMENT (EMERGENCY)
Dept: RADIOLOGY | Facility: HOSPITAL | Age: 84
End: 2019-04-24
Payer: COMMERCIAL

## 2019-04-24 ENCOUNTER — HOSPITAL ENCOUNTER (OUTPATIENT)
Facility: HOSPITAL | Age: 84
Setting detail: OBSERVATION
LOS: 1 days | Discharge: HOME | End: 2019-04-25
Attending: HOSPITALIST | Admitting: HOSPITALIST
Payer: COMMERCIAL

## 2019-04-24 VITALS
BODY MASS INDEX: 27.64 KG/M2 | DIASTOLIC BLOOD PRESSURE: 69 MMHG | TEMPERATURE: 99.9 F | OXYGEN SATURATION: 100 % | WEIGHT: 171.96 LBS | SYSTOLIC BLOOD PRESSURE: 161 MMHG | HEIGHT: 66 IN | RESPIRATION RATE: 18 BRPM | HEART RATE: 66 BPM

## 2019-04-24 DIAGNOSIS — R07.9 CHEST PAIN: ICD-10-CM

## 2019-04-24 DIAGNOSIS — I10 ESSENTIAL HYPERTENSION: Chronic | ICD-10-CM

## 2019-04-24 DIAGNOSIS — R07.9 CHEST PAIN: Primary | ICD-10-CM

## 2019-04-24 DIAGNOSIS — I25.10 CORONARY ARTERY DISEASE INVOLVING NATIVE CORONARY ARTERY OF NATIVE HEART WITHOUT ANGINA PECTORIS: Primary | ICD-10-CM

## 2019-04-24 PROBLEM — K21.9 GERD (GASTROESOPHAGEAL REFLUX DISEASE): Chronic | Status: ACTIVE | Noted: 2019-04-24

## 2019-04-24 PROBLEM — K27.9 PEPTIC ULCER: Status: ACTIVE | Noted: 2019-04-24

## 2019-04-24 PROBLEM — K27.9 PEPTIC ULCER: Chronic | Status: ACTIVE | Noted: 2019-04-24

## 2019-04-24 PROBLEM — N18.30 CHRONIC RENAL INSUFFICIENCY, STAGE III (MODERATE) (HCC): Status: ACTIVE | Noted: 2017-08-03

## 2019-04-24 PROBLEM — R60.0 BILATERAL LEG EDEMA: Chronic | Status: ACTIVE | Noted: 2018-05-09

## 2019-04-24 PROBLEM — M10.9 GOUT: Status: ACTIVE | Noted: 2017-05-10

## 2019-04-24 LAB
ALBUMIN SERPL BCP-MCNC: 4.1 G/DL (ref 3.5–5.7)
ALP SERPL-CCNC: 56 U/L (ref 55–165)
ALT SERPL W P-5'-P-CCNC: 10 U/L (ref 7–52)
ANION GAP SERPL CALCULATED.3IONS-SCNC: 9 MMOL/L (ref 4–13)
AST SERPL W P-5'-P-CCNC: 19 U/L (ref 13–39)
ATRIAL RATE: 73 BPM
BASOPHILS # BLD AUTO: 0.1 THOUSANDS/ΜL (ref 0–0.1)
BASOPHILS NFR BLD AUTO: 1 % (ref 0–1)
BILIRUB SERPL-MCNC: 0.6 MG/DL (ref 0.2–1)
BNP SERPL-MCNC: 55 PG/ML (ref 1–100)
BUN SERPL-MCNC: 24 MG/DL (ref 7–25)
CALCIUM SERPL-MCNC: 8.7 MG/DL (ref 8.6–10.5)
CHLORIDE SERPL-SCNC: 99 MMOL/L (ref 98–107)
CO2 SERPL-SCNC: 22 MMOL/L (ref 21–31)
CREAT SERPL-MCNC: 1.15 MG/DL (ref 0.7–1.3)
EOSINOPHIL # BLD AUTO: 0.1 THOUSAND/ΜL (ref 0–0.61)
EOSINOPHIL NFR BLD AUTO: 1 % (ref 0–6)
ERYTHROCYTE [DISTWIDTH] IN BLOOD BY AUTOMATED COUNT: 14.8 % (ref 11.6–15.1)
GFR SERPL CREATININE-BSD FRML MDRD: 54 ML/MIN/1.73SQ M
GLUCOSE SERPL-MCNC: 97 MG/DL (ref 65–140)
HCT VFR BLD AUTO: 37.4 % (ref 42–52)
HGB BLD-MCNC: 12.8 G/DL (ref 12–17)
LYMPHOCYTES # BLD AUTO: 1.5 THOUSANDS/ΜL (ref 0.6–4.47)
LYMPHOCYTES NFR BLD AUTO: 15 % (ref 14–44)
MCH RBC QN AUTO: 31.7 PG (ref 26.8–34.3)
MCHC RBC AUTO-ENTMCNC: 34.2 G/DL (ref 31.4–37.4)
MCV RBC AUTO: 93 FL (ref 82–98)
MONOCYTES # BLD AUTO: 0.8 THOUSAND/ΜL (ref 0.17–1.22)
MONOCYTES NFR BLD AUTO: 8 % (ref 4–12)
NEUTROPHILS # BLD AUTO: 7.7 THOUSANDS/ΜL (ref 1.85–7.62)
NEUTS SEG NFR BLD AUTO: 76 % (ref 43–75)
P AXIS: 37 DEGREES
PLATELET # BLD AUTO: 212 THOUSANDS/UL (ref 149–390)
PMV BLD AUTO: 8.5 FL (ref 8.9–12.7)
POTASSIUM SERPL-SCNC: 4.2 MMOL/L (ref 3.5–5.5)
PR INTERVAL: 196 MS
PROT SERPL-MCNC: 6.6 G/DL (ref 6.4–8.9)
QRS AXIS: -8 DEGREES
QRSD INTERVAL: 88 MS
QT INTERVAL: 404 MS
QTC INTERVAL: 445 MS
RBC # BLD AUTO: 4.04 MILLION/UL (ref 3.88–5.62)
SODIUM SERPL-SCNC: 130 MMOL/L (ref 134–143)
T WAVE AXIS: 49 DEGREES
TROPONIN I SERPL-MCNC: <0.03 NG/ML
VENTRICULAR RATE: 73 BPM
WBC # BLD AUTO: 10 THOUSAND/UL (ref 4.31–10.16)

## 2019-04-24 PROCEDURE — 84484 ASSAY OF TROPONIN QUANT: CPT | Performed by: EMERGENCY MEDICINE

## 2019-04-24 PROCEDURE — 84484 ASSAY OF TROPONIN QUANT: CPT | Performed by: PHYSICIAN ASSISTANT

## 2019-04-24 PROCEDURE — 71046 X-RAY EXAM CHEST 2 VIEWS: CPT

## 2019-04-24 PROCEDURE — 99285 EMERGENCY DEPT VISIT HI MDM: CPT

## 2019-04-24 PROCEDURE — 93010 ELECTROCARDIOGRAM REPORT: CPT | Performed by: INTERNAL MEDICINE

## 2019-04-24 PROCEDURE — 80053 COMPREHEN METABOLIC PANEL: CPT | Performed by: EMERGENCY MEDICINE

## 2019-04-24 PROCEDURE — 99220 PR INITIAL OBSERVATION CARE/DAY 70 MINUTES: CPT | Performed by: PHYSICIAN ASSISTANT

## 2019-04-24 PROCEDURE — 83880 ASSAY OF NATRIURETIC PEPTIDE: CPT | Performed by: EMERGENCY MEDICINE

## 2019-04-24 PROCEDURE — 93005 ELECTROCARDIOGRAM TRACING: CPT

## 2019-04-24 PROCEDURE — 85025 COMPLETE CBC W/AUTO DIFF WBC: CPT | Performed by: EMERGENCY MEDICINE

## 2019-04-24 PROCEDURE — 36415 COLL VENOUS BLD VENIPUNCTURE: CPT | Performed by: EMERGENCY MEDICINE

## 2019-04-24 RX ORDER — ALLOPURINOL 300 MG/1
300 TABLET ORAL DAILY
Status: DISCONTINUED | OUTPATIENT
Start: 2019-04-25 | End: 2019-04-25 | Stop reason: HOSPADM

## 2019-04-24 RX ORDER — MELATONIN
2000 DAILY
Status: DISCONTINUED | OUTPATIENT
Start: 2019-04-25 | End: 2019-04-25 | Stop reason: HOSPADM

## 2019-04-24 RX ORDER — ASPIRIN 81 MG/1
324 TABLET, CHEWABLE ORAL ONCE
Status: COMPLETED | OUTPATIENT
Start: 2019-04-24 | End: 2019-04-24

## 2019-04-24 RX ORDER — METOPROLOL TARTRATE 50 MG/1
50 TABLET, FILM COATED ORAL 2 TIMES DAILY
Status: DISCONTINUED | OUTPATIENT
Start: 2019-04-24 | End: 2019-04-25 | Stop reason: HOSPADM

## 2019-04-24 RX ORDER — MULTIVITAMIN/IRON/FOLIC ACID 18MG-0.4MG
1 TABLET ORAL DAILY
Status: DISCONTINUED | OUTPATIENT
Start: 2019-04-25 | End: 2019-04-25 | Stop reason: HOSPADM

## 2019-04-24 RX ORDER — ASPIRIN 81 MG/1
81 TABLET, CHEWABLE ORAL DAILY
Status: DISCONTINUED | OUTPATIENT
Start: 2019-04-25 | End: 2019-04-25 | Stop reason: HOSPADM

## 2019-04-24 RX ORDER — ONDANSETRON 2 MG/ML
4 INJECTION INTRAMUSCULAR; INTRAVENOUS EVERY 6 HOURS PRN
Status: DISCONTINUED | OUTPATIENT
Start: 2019-04-24 | End: 2019-04-25 | Stop reason: HOSPADM

## 2019-04-24 RX ORDER — FENOFIBRATE 48 MG/1
48 TABLET, COATED ORAL DAILY
Status: DISCONTINUED | OUTPATIENT
Start: 2019-04-25 | End: 2019-04-25 | Stop reason: HOSPADM

## 2019-04-24 RX ORDER — FUROSEMIDE 20 MG/1
20 TABLET ORAL DAILY PRN
Status: DISCONTINUED | OUTPATIENT
Start: 2019-04-24 | End: 2019-04-25 | Stop reason: HOSPADM

## 2019-04-24 RX ORDER — ATORVASTATIN CALCIUM 20 MG/1
20 TABLET, FILM COATED ORAL DAILY
Status: DISCONTINUED | OUTPATIENT
Start: 2019-04-25 | End: 2019-04-25 | Stop reason: HOSPADM

## 2019-04-24 RX ORDER — IRBESARTAN 75 MG/1
150 TABLET ORAL DAILY
COMMUNITY

## 2019-04-24 RX ORDER — ACETAMINOPHEN 325 MG/1
650 TABLET ORAL EVERY 6 HOURS PRN
Status: DISCONTINUED | OUTPATIENT
Start: 2019-04-24 | End: 2019-04-25 | Stop reason: HOSPADM

## 2019-04-24 RX ORDER — NITROGLYCERIN 0.4 MG/1
0.4 TABLET SUBLINGUAL
Status: DISCONTINUED | OUTPATIENT
Start: 2019-04-24 | End: 2019-04-25 | Stop reason: HOSPADM

## 2019-04-24 RX ORDER — LOSARTAN POTASSIUM 50 MG/1
50 TABLET ORAL DAILY
Status: DISCONTINUED | OUTPATIENT
Start: 2019-04-25 | End: 2019-04-25 | Stop reason: HOSPADM

## 2019-04-24 RX ADMIN — ASPIRIN 81 MG 324 MG: 81 TABLET ORAL at 16:04

## 2019-04-24 RX ADMIN — METOPROLOL TARTRATE 50 MG: 50 TABLET, FILM COATED ORAL at 21:41

## 2019-04-25 ENCOUNTER — APPOINTMENT (OUTPATIENT)
Dept: NON INVASIVE DIAGNOSTICS | Facility: HOSPITAL | Age: 84
End: 2019-04-25
Payer: COMMERCIAL

## 2019-04-25 VITALS
TEMPERATURE: 97.7 F | HEART RATE: 61 BPM | BODY MASS INDEX: 27.02 KG/M2 | WEIGHT: 168.1 LBS | SYSTOLIC BLOOD PRESSURE: 163 MMHG | OXYGEN SATURATION: 97 % | DIASTOLIC BLOOD PRESSURE: 78 MMHG | HEIGHT: 66 IN | RESPIRATION RATE: 18 BRPM

## 2019-04-25 LAB
ANION GAP SERPL CALCULATED.3IONS-SCNC: 6 MMOL/L (ref 4–13)
BUN SERPL-MCNC: 20 MG/DL (ref 7–25)
CALCIUM SERPL-MCNC: 8.4 MG/DL (ref 8.6–10.5)
CHLORIDE SERPL-SCNC: 102 MMOL/L (ref 98–107)
CHOLEST SERPL-MCNC: 136 MG/DL (ref 0–200)
CO2 SERPL-SCNC: 22 MMOL/L (ref 21–31)
CREAT SERPL-MCNC: 0.99 MG/DL (ref 0.7–1.3)
GFR SERPL CREATININE-BSD FRML MDRD: 65 ML/MIN/1.73SQ M
GLUCOSE P FAST SERPL-MCNC: 94 MG/DL (ref 65–99)
GLUCOSE SERPL-MCNC: 94 MG/DL (ref 65–99)
HDLC SERPL-MCNC: 28 MG/DL (ref 40–60)
LDLC SERPL CALC-MCNC: 76 MG/DL (ref 0–100)
MAGNESIUM SERPL-MCNC: 2 MG/DL (ref 1.9–2.7)
NONHDLC SERPL-MCNC: 108 MG/DL
POTASSIUM SERPL-SCNC: 3.9 MMOL/L (ref 3.5–5.5)
SODIUM SERPL-SCNC: 130 MMOL/L (ref 134–143)
TRIGL SERPL-MCNC: 162 MG/DL (ref 44–166)

## 2019-04-25 PROCEDURE — G8978 MOBILITY CURRENT STATUS: HCPCS

## 2019-04-25 PROCEDURE — 99214 OFFICE O/P EST MOD 30 MIN: CPT | Performed by: PHYSICIAN ASSISTANT

## 2019-04-25 PROCEDURE — 97163 PT EVAL HIGH COMPLEX 45 MIN: CPT

## 2019-04-25 PROCEDURE — 80048 BASIC METABOLIC PNL TOTAL CA: CPT | Performed by: PHYSICIAN ASSISTANT

## 2019-04-25 PROCEDURE — 80061 LIPID PANEL: CPT | Performed by: PHYSICIAN ASSISTANT

## 2019-04-25 PROCEDURE — 99217 PR OBSERVATION CARE DISCHARGE MANAGEMENT: CPT | Performed by: INTERNAL MEDICINE

## 2019-04-25 PROCEDURE — 97116 GAIT TRAINING THERAPY: CPT

## 2019-04-25 PROCEDURE — G8979 MOBILITY GOAL STATUS: HCPCS

## 2019-04-25 PROCEDURE — 83735 ASSAY OF MAGNESIUM: CPT | Performed by: PHYSICIAN ASSISTANT

## 2019-04-25 PROCEDURE — 93306 TTE W/DOPPLER COMPLETE: CPT | Performed by: INTERNAL MEDICINE

## 2019-04-25 PROCEDURE — 93306 TTE W/DOPPLER COMPLETE: CPT

## 2019-04-25 RX ADMIN — ENOXAPARIN SODIUM 40 MG: 40 INJECTION SUBCUTANEOUS at 08:47

## 2019-04-25 RX ADMIN — METOPROLOL TARTRATE 50 MG: 50 TABLET, FILM COATED ORAL at 08:48

## 2019-04-25 RX ADMIN — FENOFIBRATE 48 MG: 48 TABLET, FILM COATED ORAL at 08:47

## 2019-04-25 RX ADMIN — ALLOPURINOL 300 MG: 300 TABLET ORAL at 08:45

## 2019-04-25 RX ADMIN — ASPIRIN 81 MG 81 MG: 81 TABLET ORAL at 08:46

## 2019-04-25 RX ADMIN — Medication 1 TABLET: at 08:49

## 2019-04-25 RX ADMIN — ATORVASTATIN CALCIUM 20 MG: 20 TABLET, FILM COATED ORAL at 08:46

## 2019-04-25 RX ADMIN — VITAMIN D, TAB 1000IU (100/BT) 2000 UNITS: 25 TAB at 08:46

## 2019-04-25 RX ADMIN — LOSARTAN POTASSIUM 50 MG: 50 TABLET, FILM COATED ORAL at 08:48

## 2019-05-15 ENCOUNTER — OFFICE VISIT (OUTPATIENT)
Dept: CARDIOLOGY CLINIC | Facility: CLINIC | Age: 84
End: 2019-05-15
Payer: COMMERCIAL

## 2019-05-15 VITALS
WEIGHT: 168 LBS | SYSTOLIC BLOOD PRESSURE: 168 MMHG | BODY MASS INDEX: 27 KG/M2 | HEART RATE: 68 BPM | DIASTOLIC BLOOD PRESSURE: 70 MMHG | HEIGHT: 66 IN

## 2019-05-15 DIAGNOSIS — I49.8 VENTRICULAR BIGEMINY: ICD-10-CM

## 2019-05-15 DIAGNOSIS — I10 ESSENTIAL HYPERTENSION: Chronic | ICD-10-CM

## 2019-05-15 DIAGNOSIS — R07.89 ATYPICAL CHEST PAIN: ICD-10-CM

## 2019-05-15 DIAGNOSIS — I25.10 CORONARY ARTERY DISEASE INVOLVING NATIVE CORONARY ARTERY OF NATIVE HEART WITHOUT ANGINA PECTORIS: Primary | Chronic | ICD-10-CM

## 2019-05-15 PROBLEM — R07.9 CHEST PAIN: Status: RESOLVED | Noted: 2019-04-24 | Resolved: 2019-05-15

## 2019-05-15 PROCEDURE — 99213 OFFICE O/P EST LOW 20 MIN: CPT | Performed by: INTERNAL MEDICINE

## 2019-06-13 ENCOUNTER — HOSPITAL ENCOUNTER (EMERGENCY)
Facility: HOSPITAL | Age: 84
Discharge: HOME | End: 2019-06-13
Attending: EMERGENCY MEDICINE | Admitting: EMERGENCY MEDICINE
Payer: COMMERCIAL

## 2019-06-13 VITALS
HEART RATE: 68 BPM | HEIGHT: 66 IN | BODY MASS INDEX: 27 KG/M2 | DIASTOLIC BLOOD PRESSURE: 47 MMHG | RESPIRATION RATE: 16 BRPM | TEMPERATURE: 98.3 F | OXYGEN SATURATION: 99 % | WEIGHT: 168 LBS | SYSTOLIC BLOOD PRESSURE: 142 MMHG

## 2019-06-13 DIAGNOSIS — I10 ESSENTIAL HYPERTENSION: Primary | ICD-10-CM

## 2019-06-13 DIAGNOSIS — R04.0 EPISTAXIS NOT DUE TO TRAUMA: ICD-10-CM

## 2019-06-13 LAB
ATRIAL RATE: 70 BPM
P AXIS: 50 DEGREES
PR INTERVAL: 210 MS
QRS AXIS: -16 DEGREES
QRSD INTERVAL: 86 MS
QT INTERVAL: 414 MS
QTC INTERVAL: 447 MS
T WAVE AXIS: 62 DEGREES
VENTRICULAR RATE: 70 BPM

## 2019-06-13 PROCEDURE — 93005 ELECTROCARDIOGRAM TRACING: CPT

## 2019-06-13 PROCEDURE — 93010 ELECTROCARDIOGRAM REPORT: CPT | Performed by: INTERNAL MEDICINE

## 2019-06-13 PROCEDURE — 99283 EMERGENCY DEPT VISIT LOW MDM: CPT

## 2019-06-19 PROCEDURE — 93010 ELECTROCARDIOGRAM REPORT: CPT | Performed by: INTERNAL MEDICINE

## 2019-09-18 ENCOUNTER — OFFICE VISIT (OUTPATIENT)
Dept: CARDIOLOGY CLINIC | Facility: CLINIC | Age: 84
End: 2019-09-18
Payer: COMMERCIAL

## 2019-09-18 VITALS
SYSTOLIC BLOOD PRESSURE: 160 MMHG | BODY MASS INDEX: 25.71 KG/M2 | DIASTOLIC BLOOD PRESSURE: 78 MMHG | HEART RATE: 60 BPM | HEIGHT: 66 IN | WEIGHT: 160 LBS

## 2019-09-18 DIAGNOSIS — I10 ESSENTIAL HYPERTENSION: Chronic | ICD-10-CM

## 2019-09-18 DIAGNOSIS — R07.89 ATYPICAL CHEST PAIN: ICD-10-CM

## 2019-09-18 DIAGNOSIS — I49.8 VENTRICULAR BIGEMINY: ICD-10-CM

## 2019-09-18 DIAGNOSIS — I25.10 CORONARY ARTERY DISEASE INVOLVING NATIVE CORONARY ARTERY OF NATIVE HEART WITHOUT ANGINA PECTORIS: Primary | Chronic | ICD-10-CM

## 2019-09-18 PROBLEM — N18.30 CHRONIC RENAL INSUFFICIENCY, STAGE III (MODERATE) (HCC): Status: RESOLVED | Noted: 2017-08-03 | Resolved: 2019-09-18

## 2019-09-18 PROCEDURE — 99214 OFFICE O/P EST MOD 30 MIN: CPT | Performed by: INTERNAL MEDICINE

## 2019-09-18 RX ORDER — OFLOXACIN 3 MG/ML
SOLUTION/ DROPS OPHTHALMIC
COMMUNITY

## 2019-09-18 RX ORDER — PREDNISOLONE ACETATE 10 MG/ML
SUSPENSION/ DROPS OPHTHALMIC
COMMUNITY

## 2019-09-18 NOTE — PROGRESS NOTES
Cardiology Follow Up    Oral Saldivar    10/11/1924  [id number]  [facility] [place] [address] [phone]    1  Coronary artery disease involving native coronary artery of native heart without angina pectoris     2  Essential hypertension     3  Ventricular bigeminy     4  Atypical chest pain           Discussion/Summary: All of his assessed cardiac problems are stable  I have reviewed his medications and made no changes  No cardiac testing is ordered  RTO 9 months  Interval History: He has not had any cardiac problems since his last OV  He denies CP, SOB  He is monitoring his BP daily and SBPs are running around 130 mmHg  He has known CAD with LAD stenting in 2002  Patient Active Problem List   Diagnosis    Coronary artery disease involving native coronary artery of native heart without angina pectoris    Essential hypertension    Bilateral leg edema    Ventricular bigeminy    Gout    Vitamin D deficiency    Atypical chest pain     Past Medical History:   Diagnosis Date    Cataract     Coronary atherosclerosis of native coronary artery     Diverticulitis of colon     Essential hypertension, benign     Hyperlipidemia     Peptic ulcer      Social History     Socioeconomic History    Marital status:       Spouse name: Not on file    Number of children: Not on file    Years of education: Not on file    Highest education level: Not on file   Occupational History    Not on file   Social Needs    Financial resource strain: Not on file    Food insecurity:     Worry: Not on file     Inability: Not on file    Transportation needs:     Medical: Not on file     Non-medical: Not on file   Tobacco Use    Smoking status: Never Smoker    Smokeless tobacco: Never Used   Substance and Sexual Activity    Alcohol use: Not Currently    Drug use: No    Sexual activity: Not Currently   Lifestyle    Physical activity:     Days per week: Not on file     Minutes per session: Not on file    Stress: Not on file   Relationships    Social connections:     Talks on phone: Not on file     Gets together: Not on file     Attends [employer] service: Not on file     Active member of club or organization: Not on file     Attends meetings of clubs or organizations: Not on file     Relationship status: Not on file    Intimate partner violence:     Fear of current or ex partner: Not on file     Emotionally abused: Not on file     Physically abused: Not on file     Forced sexual activity: Not on file   Other Topics Concern    Not on file   Social History Narrative    Not on file      Family History   Problem Relation Age of Onset    Heart attack Brother     Heart disease Mother     Cancer Father      Past Surgical History:   Procedure Laterality Date    CATARACT EXTRACTION Right     CORONARY ANGIOPLASTY      CORONARY STENT PLACEMENT      TONSILLECTOMY         Current Outpatient Medications:     allopurinol (ZYLOPRIM) 300 mg tablet, Take 1 tablet by mouth daily, Disp: , Rfl:     aspirin 81 MG tablet, Take 1 tablet by mouth daily, Disp: , Rfl:     atorvastatin (LIPITOR) 20 mg tablet, Take 1 tablet by mouth daily, Disp: , Rfl:     Cholecalciferol (CVS VITAMIN D) 2000 units CAPS, Take by mouth, Disp: , Rfl:     Choline Fenofibrate (FENOFIBRIC ACID) 45 MG CPDR, Take by mouth, Disp: , Rfl:     irbesartan (AVAPRO) 75 mg tablet, Take 150 mg by mouth daily, Disp: , Rfl:     metoprolol tartrate (LOPRESSOR) 25 mg tablet, Take 25 mg by mouth 2 (two) times a day , Disp: , Rfl:     Multiple Vitamins tablet, Take by mouth, Disp: , Rfl:     ofloxacin (OCUFLOX) 0 3 % ophthalmic solution, ofloxacin 0 3 % eye drops, Disp: , Rfl:     pneumococcal 23-valent polysaccharide vaccine (PNEUMOVAX 23), Pneumovax 23 25 mcg/0 5 mL injection syringe, Disp: , Rfl:     prednisoLONE acetate (PRED FORTE) 1 % ophthalmic suspension, prednisolone acetate 1 % eye drops,suspension, Disp: , Rfl:     furosemide (LASIX) 20 mg tablet, Take 1 tablet by mouth daily as needed, Disp: , Rfl:     nitroglycerin (NITROSTAT) 0 4 mg SL tablet, Place 1 tablet under the tongue every 5 (five) minutes as needed, Disp: , Rfl:   No Known Allergies  Vitals:    09/18/19 1330   BP: 160/78   BP Location: Left arm   Cuff Size: Standard   Pulse: 60   Weight: 72 6 kg (160 lb)   Height: 5' 6" (1 676 m)     Weight (last 2 days)     Date/Time   Weight    09/18/19 1330   72 6 (160)             Blood pressure 160/78, pulse 60, height 5' 6" (1 676 m), weight 72 6 kg (160 lb)  , Body mass index is 25 82 kg/m²      Labs:  Admission on 06/13/2019, Discharged on 06/13/2019   Component Date Value    Ventricular Rate 06/13/2019 70     Atrial Rate 06/13/2019 70     AR Interval 06/13/2019 210     QRSD Interval 06/13/2019 86     QT Interval 06/13/2019 414     QTC Interval 06/13/2019 447     P Axis 06/13/2019 50     QRS Axis 06/13/2019 -16     T Wave Axis 06/13/2019 62     Ventricular Rate 06/13/2019 70     Atrial Rate 06/13/2019 70     AR Interval 06/13/2019 210     QRSD Interval 06/13/2019 86     QT Interval 06/13/2019 414     QTC Interval 06/13/2019 447     P Axis 06/13/2019 50     QRS Axis 06/13/2019 -16     T Wave [place] 06/13/2019 62    Admission on 04/24/2019, Discharged on 04/25/2019   Component Date Value    Troponin I 04/24/2019 <0 03     Sodium 04/25/2019 130*    Potassium 04/25/2019 3 9     Chloride 04/25/2019 102     CO2 04/25/2019 22     ANION GAP 04/25/2019 6     BUN 04/25/2019 20     Creatinine 04/25/2019 0 99     Glucose 04/25/2019 94     Glucose, Fasting 04/25/2019 94     Calcium 04/25/2019 8 4*    eGFR 04/25/2019 65     Magnesium 04/25/2019 2 0     Cholesterol 04/25/2019 136     Triglycerides 04/25/2019 162     HDL, Direct 04/25/2019 28*    LDL Calculated 04/25/2019 76     Non-HDL-Chol (CHOL-HDL) 04/25/2019 108 Admission on 04/24/2019, Discharged on 04/24/2019   Component Date Value    WBC 04/24/2019 10 00     RBC 04/24/2019 4 04     Hemoglobin 04/24/2019 12 8     Hematocrit 04/24/2019 37 4*    MCV 04/24/2019 93     MCH 04/24/2019 31 7     MCHC 04/24/2019 34 2     RDW 04/24/2019 14 8     MPV 04/24/2019 8 5*    Platelets 69/82/7598 212     Neutrophils Relative 04/24/2019 76*    Lymphocytes Relative 04/24/2019 15     Monocytes Relative 04/24/2019 8     Eosinophils Relative 04/24/2019 1     Basophils Relative 04/24/2019 1     Neutrophils Absolute 04/24/2019 7 70*    Lymphocytes Absolute 04/24/2019 1 50     Monocytes Absolute 04/24/2019 0 80     Eosinophils Absolute 04/24/2019 0 10     Basophils Absolute 04/24/2019 0 10     Sodium 04/24/2019 130*    Potassium 04/24/2019 4 2     Chloride 04/24/2019 99     CO2 04/24/2019 22     ANION GAP 04/24/2019 9     BUN 04/24/2019 24     Creatinine 04/24/2019 1 15     Glucose 04/24/2019 97     Calcium 04/24/2019 8 7     AST 04/24/2019 19     ALT 04/24/2019 10     Alkaline Phosphatase 04/24/2019 56     Total Protein 04/24/2019 6 6     Albumin 04/24/2019 4 1     Total Bilirubin 04/24/2019 0 60     eGFR 04/24/2019 54     Troponin I 04/24/2019 <0 03     BNP 04/24/2019 55     Troponin I 04/24/2019 <0 03     Ventricular Rate 04/24/2019 73     Atrial Rate 04/24/2019 73     [place] Interval 04/24/2019 196     QRSD Interval 04/24/2019 88     QT Interval 04/24/2019 404     QTC Interval 04/24/2019 445     P Axis 04/24/2019 37     QRS Axis 04/24/2019 -8     T Wave [place] 04/24/2019 49    Appointment on 04/01/2019   Component Date Value    Sodium 04/01/2019 133*    Potassium 04/01/2019 4 4     Chloride 04/01/2019 100     CO2 04/01/2019 27     ANION GAP 04/01/2019 6     BUN 04/01/2019 29*    Creatinine 04/01/2019 1 24     Glucose 04/01/2019 132     Calcium 04/01/2019 9 2     AST 04/01/2019 21     ALT 04/01/2019 11     Alkaline Phosphatase 04/01/2019 70     Total Protein 04/01/2019 7 1     Albumin 04/01/2019 4 2     Total Bilirubin 04/01/2019 0 60     eGFR 04/01/2019 49     Cholesterol 04/01/2019 157     Triglycerides 04/01/2019 242*    HDL, Direct 04/01/2019 35*    LDL Calculated 04/01/2019 74     Non-HDL-Chol (CHOL-HDL) 04/01/2019 122     WBC 04/01/2019 8 80     RBC 04/01/2019 4 20     Hemoglobin 04/01/2019 13 3     Hematocrit 04/01/2019 39 8*    MCV 04/01/2019 95     MCH 04/01/2019 31 7     MCHC 04/01/2019 33 5     RDW 04/01/2019 15 2*    MPV 04/01/2019 9 1     Platelets 34/30/5548 213     Neutrophils Relative 04/01/2019 73     Lymphocytes Relative 04/01/2019 18     Monocytes Relative 04/01/2019 8     Eosinophils Relative 04/01/2019 1     Basophils Relative 04/01/2019 1     Neutrophils Absolute 04/01/2019 6 40     Lymphocytes Absolute 04/01/2019 1 60     Monocytes Absolute 04/01/2019 0 70     Eosinophils Absolute 04/01/2019 0 10     Basophils Absolute 04/01/2019 0 00     Uric Acid 04/01/2019 4 4      Imaging: No results found  Review of Systems:  Review of Systems   Constitutional: Negative for diaphoresis, fatigue, fever and unexpected weight change  HENT: Negative  Respiratory: Negative for cough, shortness of breath and wheezing  Cardiovascular: Negative for chest pain, palpitations and leg swelling  Gastrointestinal: Negative for abdominal pain, diarrhea and nausea  Musculoskeletal: Negative for gait problem and myalgias  Skin: Negative for rash  Neurological: Negative for dizziness and numbness  Psychiatric/Behavioral: Negative  Physical Exam:  Physical Exam   Constitutional: He is oriented to person, place, and time  He appears well-developed and well-nourished  HENT:   Head: Normocephalic and atraumatic  Eyes: Pupils are equal, round, and reactive to light  Neck: Normal range of motion  Neck supple  No JVD present  Cardiovascular: Regular rhythm, S1 normal, S2 normal and normal pulses  Pulses:       Carotid pulses are 2+ on the right side, and 2+ on the left side  Pulmonary/Chest: Effort normal and breath sounds normal  He has no wheezes  He has no rales  Abdominal: Soft  Bowel sounds are normal  There is no tenderness  Musculoskeletal: Normal range of motion  He exhibits no edema or tenderness  Neurological: He is alert and oriented to person, place, and time  He has normal reflexes  No cranial nerve deficit  Skin: Skin is warm  Psychiatric: He has a normal mood and affect

## 2019-10-14 ENCOUNTER — TRANSCRIBE ORDERS (OUTPATIENT)
Dept: URGENT CARE | Facility: HOSPITAL | Age: 84
End: 2019-10-14

## 2019-10-14 ENCOUNTER — APPOINTMENT (OUTPATIENT)
Dept: LAB | Facility: HOSPITAL | Age: 84
End: 2019-10-14
Attending: INTERNAL MEDICINE
Payer: COMMERCIAL

## 2019-10-14 DIAGNOSIS — N18.30 KIDNEY DISEASE, CHRONIC, STAGE III (MODERATE, EGFR 30-59 ML/MIN) (HCC): ICD-10-CM

## 2019-10-14 DIAGNOSIS — I10 ESSENTIAL HYPERTENSION, MALIGNANT: ICD-10-CM

## 2019-10-14 DIAGNOSIS — I10 ESSENTIAL HYPERTENSION, MALIGNANT: Primary | ICD-10-CM

## 2019-10-14 LAB
ANION GAP SERPL CALCULATED.3IONS-SCNC: 4 MMOL/L (ref 4–13)
BUN SERPL-MCNC: 30 MG/DL (ref 5–25)
CALCIUM SERPL-MCNC: 8.9 MG/DL (ref 8.3–10.1)
CHLORIDE SERPL-SCNC: 103 MMOL/L (ref 100–108)
CO2 SERPL-SCNC: 25 MMOL/L (ref 21–32)
CREAT SERPL-MCNC: 1.13 MG/DL (ref 0.6–1.3)
GFR SERPL CREATININE-BSD FRML MDRD: 55 ML/MIN/1.73SQ M
GLUCOSE SERPL-MCNC: 82 MG/DL (ref 65–140)
POTASSIUM SERPL-SCNC: 4.8 MMOL/L (ref 3.5–5.3)
SODIUM SERPL-SCNC: 132 MMOL/L (ref 136–145)

## 2019-10-14 PROCEDURE — 36415 COLL VENOUS BLD VENIPUNCTURE: CPT

## 2019-10-14 PROCEDURE — 80048 BASIC METABOLIC PNL TOTAL CA: CPT

## 2019-11-09 ENCOUNTER — HOSPITAL ENCOUNTER (EMERGENCY)
Facility: HOSPITAL | Age: 84
Discharge: HOME | End: 2019-11-09
Attending: EMERGENCY MEDICINE
Payer: COMMERCIAL

## 2019-11-09 VITALS
RESPIRATION RATE: 20 BRPM | SYSTOLIC BLOOD PRESSURE: 198 MMHG | BODY MASS INDEX: 26.33 KG/M2 | WEIGHT: 158 LBS | HEIGHT: 65 IN | DIASTOLIC BLOOD PRESSURE: 80 MMHG | OXYGEN SATURATION: 99 % | TEMPERATURE: 98.8 F | HEART RATE: 97 BPM

## 2019-11-09 DIAGNOSIS — K62.5 RECTAL BLEEDING: Primary | ICD-10-CM

## 2019-11-09 LAB
ALBUMIN SERPL BCP-MCNC: 4.3 G/DL (ref 3.5–5.7)
ALP SERPL-CCNC: 48 U/L (ref 55–165)
ALT SERPL W P-5'-P-CCNC: 11 U/L (ref 7–52)
ANION GAP SERPL CALCULATED.3IONS-SCNC: 7 MMOL/L (ref 4–13)
AST SERPL W P-5'-P-CCNC: 25 U/L (ref 13–39)
BASOPHILS # BLD AUTO: 0.1 THOUSANDS/ΜL (ref 0–0.1)
BASOPHILS NFR BLD AUTO: 1 % (ref 0–2)
BILIRUB SERPL-MCNC: 0.7 MG/DL (ref 0.2–1)
BUN SERPL-MCNC: 30 MG/DL (ref 7–25)
CALCIUM SERPL-MCNC: 9.1 MG/DL (ref 8.6–10.5)
CHLORIDE SERPL-SCNC: 95 MMOL/L (ref 98–107)
CO2 SERPL-SCNC: 25 MMOL/L (ref 21–31)
CREAT SERPL-MCNC: 1.25 MG/DL (ref 0.7–1.3)
EOSINOPHIL # BLD AUTO: 0 THOUSAND/ΜL (ref 0–0.61)
EOSINOPHIL NFR BLD AUTO: 0 % (ref 0–5)
ERYTHROCYTE [DISTWIDTH] IN BLOOD BY AUTOMATED COUNT: 14.7 % (ref 11.5–14.5)
GFR SERPL CREATININE-BSD FRML MDRD: 49 ML/MIN/1.73SQ M
GLUCOSE SERPL-MCNC: 99 MG/DL (ref 65–99)
HCT VFR BLD AUTO: 38.5 % (ref 42–47)
HGB BLD-MCNC: 12.9 G/DL (ref 14–18)
LYMPHOCYTES # BLD AUTO: 0.8 THOUSANDS/ΜL (ref 0.6–4.47)
LYMPHOCYTES NFR BLD AUTO: 6 % (ref 21–51)
MCH RBC QN AUTO: 31.7 PG (ref 26–34)
MCHC RBC AUTO-ENTMCNC: 33.4 G/DL (ref 31–37)
MCV RBC AUTO: 95 FL (ref 81–99)
MONOCYTES # BLD AUTO: 0.8 THOUSAND/ΜL (ref 0.17–1.22)
MONOCYTES NFR BLD AUTO: 6 % (ref 2–12)
NEUTROPHILS # BLD AUTO: 11.9 THOUSANDS/ΜL (ref 1.4–6.5)
NEUTS SEG NFR BLD AUTO: 87 % (ref 42–75)
PLATELET # BLD AUTO: 190 THOUSANDS/UL (ref 149–390)
PMV BLD AUTO: 7.7 FL (ref 8.6–11.7)
POTASSIUM SERPL-SCNC: 4.8 MMOL/L (ref 3.5–5.5)
PROT SERPL-MCNC: 6.7 G/DL (ref 6.4–8.9)
RBC # BLD AUTO: 4.07 MILLION/UL (ref 4.3–5.9)
SODIUM SERPL-SCNC: 127 MMOL/L (ref 134–143)
WBC # BLD AUTO: 13.7 THOUSAND/UL (ref 4.8–10.8)

## 2019-11-09 PROCEDURE — 99284 EMERGENCY DEPT VISIT MOD MDM: CPT | Performed by: EMERGENCY MEDICINE

## 2019-11-09 PROCEDURE — 80053 COMPREHEN METABOLIC PANEL: CPT | Performed by: EMERGENCY MEDICINE

## 2019-11-09 PROCEDURE — 36415 COLL VENOUS BLD VENIPUNCTURE: CPT | Performed by: EMERGENCY MEDICINE

## 2019-11-09 PROCEDURE — 85025 COMPLETE CBC W/AUTO DIFF WBC: CPT | Performed by: EMERGENCY MEDICINE

## 2019-11-09 PROCEDURE — 99284 EMERGENCY DEPT VISIT MOD MDM: CPT

## 2019-11-10 NOTE — ED PROVIDER NOTES
History  Chief Complaint   Patient presents with    Rectal Bleeding     states constipated today, strained for BM  had bleeding after bm but doesnt feel like its from his hemorrhoids  This is a 79-year-old man with chief complaint bright red blood per rectum  Patient states that he has some hemorrhoids but otherwise not have bowel issues  He has had some polyps in the past   His last colonoscopy was at least 7 years ago  He states that he was straining heavily for bowel movement around 2:00 p m  Shortly afterwards he noticed a little bit of blood on the toilet paper  He reports that at around 7:00 p m  He had a larger amount of blood on his toilet paper when he had another bowel movement  He reports the bleeding has essentially stopped  He has no abdominal pain  Denies any chest pains, shortness of breath, presyncope, or weakness  Prior to Admission Medications   Prescriptions Last Dose Informant Patient Reported? Taking?    Cholecalciferol (CVS VITAMIN D) 2000 units CAPS  Self Yes Yes   Sig: Take by mouth   Choline Fenofibrate (FENOFIBRIC ACID) 45 MG CPDR  Self Yes Yes   Sig: Take by mouth   Multiple Vitamins tablet  Self Yes Yes   Sig: Take by mouth   allopurinol (ZYLOPRIM) 300 mg tablet  Self Yes Yes   Sig: Take 1 tablet by mouth daily   aspirin 81 MG tablet  Self Yes Yes   Sig: Take 1 tablet by mouth daily   atorvastatin (LIPITOR) 20 mg tablet  Self Yes Yes   Sig: Take 1 tablet by mouth daily   furosemide (LASIX) 20 mg tablet  Self Yes Yes   Sig: Take 1 tablet by mouth daily as needed   irbesartan (AVAPRO) 75 mg tablet  Self Yes Yes   Sig: Take 150 mg by mouth daily    metoprolol tartrate (LOPRESSOR) 25 mg tablet  Self Yes Yes   Sig: Take 25 mg by mouth 2 (two) times a day    nitroglycerin (NITROSTAT) 0 4 mg SL tablet  Self Yes Yes   Sig: Place 1 tablet under the tongue every 5 (five) minutes as needed   ofloxacin (OCUFLOX) 0 3 % ophthalmic solution   Yes No   Sig: ofloxacin 0 3 % eye drops pneumococcal 23-valent polysaccharide vaccine (PNEUMOVAX 23)  Self Yes No   Sig: Pneumovax 23 25 mcg/0 5 mL injection syringe   prednisoLONE acetate (PRED FORTE) 1 % ophthalmic suspension   Yes No   Sig: prednisolone acetate 1 % eye drops,suspension      Facility-Administered Medications: None       Past Medical History:   Diagnosis Date    Cataract     Coronary atherosclerosis of native coronary artery     Diverticulitis of colon     Essential hypertension, benign     Hyperlipidemia     Peptic ulcer        Past Surgical History:   Procedure Laterality Date    CATARACT EXTRACTION Right     CORONARY ANGIOPLASTY      CORONARY STENT PLACEMENT      TONSILLECTOMY         Family History   Problem Relation Age of Onset    Heart attack Brother     Heart disease Mother     Cancer Father      I have reviewed and agree with the history as documented  Social History     Tobacco Use    Smoking status: Never Smoker    Smokeless tobacco: Never Used   Substance Use Topics    Alcohol use: Not Currently    Drug use: No        Review of Systems   Constitutional: Negative for activity change, fatigue, fever and unexpected weight change  Eyes: Negative for visual disturbance  Respiratory: Negative for shortness of breath and wheezing  Cardiovascular: Negative for chest pain, palpitations and leg swelling  Gastrointestinal: Positive for blood in stool  Negative for abdominal pain, nausea and vomiting  Endocrine: Negative for polyuria  Genitourinary: Negative for dysuria  Skin: Negative for rash  Physical Exam  Physical Exam   Constitutional: He is oriented to person, place, and time  He appears well-developed and well-nourished  No distress  HENT:   Head: Normocephalic and atraumatic  Eyes: Pupils are equal, round, and reactive to light  Conjunctivae and EOM are normal    Neck: Normal range of motion  Neck supple  Cardiovascular: Normal rate, regular rhythm and normal heart sounds  Pulmonary/Chest: Effort normal and breath sounds normal  No respiratory distress  Abdominal: Soft  Bowel sounds are normal    She upper and present for rectal examination  Large hemorrhoid with some small abrasions noted  No fissures appreciated  No blood during the examination  No stool in the rectal vault  Musculoskeletal: Normal range of motion  Neurological: He is alert and oriented to person, place, and time  Skin: Skin is warm and dry  Psychiatric: He has a normal mood and affect   His behavior is normal  Judgment and thought content normal        Vital Signs  ED Triage Vitals [11/09/19 1957]   Temperature Pulse Respirations Blood Pressure SpO2   98 8 °F (37 1 °C) 97 20 (!) 198/80 99 %      Temp src Heart Rate Source Patient Position - Orthostatic VS BP Location FiO2 (%)   -- -- -- -- --      Pain Score       --           Vitals:    11/09/19 1957   BP: (!) 198/80   Pulse: 97         Visual Acuity      ED Medications  Medications - No data to display    Diagnostic Studies  Results Reviewed     Procedure Component Value Units Date/Time    Comprehensive metabolic panel [[id number]]  (Abnormal) Collected:  11/09/19 2050    Lab Status:  Final result Specimen:  Blood from Arm, Left Updated:  11/09/19 2109     Sodium 127 mmol/L      Potassium 4 8 mmol/L      Chloride 95 mmol/L      CO2 25 mmol/L      ANION GAP 7 mmol/L      BUN 30 mg/dL      Creatinine 1 25 mg/dL      Glucose 99 mg/dL      Calcium 9 1 mg/dL      AST 25 U/L      ALT 11 U/L      Alkaline Phosphatase 48 U/L      Total Protein 6 7 g/dL      Albumin 4 3 g/dL      Total Bilirubin 0 70 mg/dL      eGFR 49 ml/min/1 73sq m     Narrative:       Asad guidelines for Chronic Kidney Disease (CKD):     Stage 1 with normal or high GFR (GFR > 90 mL/min/1 73 square meters)    Stage 2 Mild CKD (GFR = 60-89 mL/min/1 73 square meters)    Stage 3A Moderate CKD (GFR = 45-59 mL/min/1 73 square meters)    Stage 3B Moderate CKD (GFR = 30-44 mL/min/1 73 square meters)    Stage 4 Severe CKD (GFR = 15-29 mL/min/1 73 square meters)    Stage 5 End Stage CKD (GFR <15 mL/min/1 73 square meters)  Note: GFR calculation is accurate only with a steady state creatinine    CBC and differential [[id number]]  (Abnormal) Collected:  11/09/19 2050    Lab Status:  Final result Specimen:  Blood from Arm, Left Updated:  11/09/19 2101     WBC 13 70 Thousand/uL      RBC 4 07 Million/uL      Hemoglobin 12 9 g/dL      Hematocrit 38 5 %      MCV 95 fL      MCH 31 7 pg      MCHC 33 4 g/dL      RDW 14 7 %      MPV 7 7 fL      Platelets 872 Thousands/uL      Neutrophils Relative 87 %      Lymphocytes Relative 6 %      Monocytes Relative 6 %      Eosinophils Relative 0 %      Basophils Relative 1 %      Neutrophils Absolute 11 90 Thousands/µL      Lymphocytes Absolute 0 80 Thousands/µL      Monocytes Absolute 0 80 Thousand/µL      Eosinophils Absolute 0 00 Thousand/µL      Basophils Absolute 0 10 Thousands/µL                  No orders to display              Procedures  Procedures       ED Course  ED Course as of Nov 09 2123   Sat Nov 09, 2019 2113 Hemoglobin(!): 12 9   2114 Hemoglobin(!): 12 9   2114 Hemoglobin(!): 12 9                               MDM  Number of Diagnoses or Management Options  Rectal bleeding: new and requires workup  Diagnosis management comments: This is a 25-year-old man with some bright red blood per rectum  Patient is not on significant blood thinners  Visual exam of the anus did not demonstrate any bright red blood  There were some mild abrasions on hemorrhoid which may explain the bleeding  Patient's anemia was at his baseline levels when comparing the most recent CBCs  Patient has no signs or symptoms of anemia  Patient states he otherwise feels well  Patient discharged home  Discussed warning signs and symptoms with the patient as well as when to return to the emergency department versus follow up with [facility]   Patient states understanding and agreement with the plan  Amount and/or Complexity of Data Reviewed  Clinical lab tests: ordered and reviewed    Risk of Complications, Morbidity, and/or Mortality  Presenting problems: high        Disposition  Final diagnoses:   Rectal bleeding     Time reflects when diagnosis was documented in both MDM as applicable and the Disposition within this note     Time User Action Codes Description Comment    11/9/2019  9:19 PM Toribio Beebe Add [K62 5] Rectal bleeding       ED Disposition     ED Disposition Condition Date/Time Comment    Discharge Stable Sat Nov 9, 2019  9:19 PM Noé Saenz Sr  discharge to home/self care  Follow-up Information     Follow up With Specialties Details Why Contact Info    Dalton Devine MD Internal Medicine In 1 day  [address] [facility] [id number] [phone]            Patient's Medications   Discharge Prescriptions    No medications on file     No discharge procedures on file      ED Provider  Electronically Signed by           Abdi Franklin MD  11/17/19 [id number]

## 2019-12-18 ENCOUNTER — TRANSCRIBE ORDERS (OUTPATIENT)
Dept: ADMINISTRATIVE | Facility: HOSPITAL | Age: 84
End: 2019-12-18

## 2019-12-18 ENCOUNTER — APPOINTMENT (OUTPATIENT)
Dept: LAB | Facility: HOSPITAL | Age: 84
End: 2019-12-18
Attending: INTERNAL MEDICINE
Payer: COMMERCIAL

## 2019-12-18 DIAGNOSIS — K62.5 RECTAL BLEED: ICD-10-CM

## 2019-12-18 DIAGNOSIS — K62.5 RECTAL BLEED: Primary | ICD-10-CM

## 2019-12-18 LAB
ALBUMIN SERPL BCP-MCNC: 3.6 G/DL (ref 3.5–5)
ALP SERPL-CCNC: 64 U/L (ref 46–116)
ALT SERPL W P-5'-P-CCNC: 22 U/L (ref 12–78)
ANION GAP SERPL CALCULATED.3IONS-SCNC: 6 MMOL/L (ref 4–13)
AST SERPL W P-5'-P-CCNC: 23 U/L (ref 5–45)
BASOPHILS # BLD AUTO: 0.04 THOUSANDS/ΜL (ref 0–0.1)
BASOPHILS NFR BLD AUTO: 1 % (ref 0–1)
BILIRUB SERPL-MCNC: 0.58 MG/DL (ref 0.2–1)
BUN SERPL-MCNC: 41 MG/DL (ref 5–25)
CALCIUM SERPL-MCNC: 8.9 MG/DL (ref 8.3–10.1)
CHLORIDE SERPL-SCNC: 104 MMOL/L (ref 100–108)
CO2 SERPL-SCNC: 24 MMOL/L (ref 21–32)
CREAT SERPL-MCNC: 1.6 MG/DL (ref 0.6–1.3)
EOSINOPHIL # BLD AUTO: 0.11 THOUSAND/ΜL (ref 0–0.61)
EOSINOPHIL NFR BLD AUTO: 1 % (ref 0–6)
ERYTHROCYTE [DISTWIDTH] IN BLOOD BY AUTOMATED COUNT: 14.1 % (ref 11.6–15.1)
FERRITIN SERPL-MCNC: 194 NG/ML (ref 8–388)
GFR SERPL CREATININE-BSD FRML MDRD: 36 ML/MIN/1.73SQ M
GLUCOSE SERPL-MCNC: 93 MG/DL (ref 65–140)
HCT VFR BLD AUTO: 36.2 % (ref 36.5–49.3)
HGB BLD-MCNC: 11.5 G/DL (ref 12–17)
IMM GRANULOCYTES # BLD AUTO: 0.04 THOUSAND/UL (ref 0–0.2)
IMM GRANULOCYTES NFR BLD AUTO: 1 % (ref 0–2)
IRON SATN MFR SERPL: 25 %
IRON SERPL-MCNC: 78 UG/DL (ref 65–175)
LYMPHOCYTES # BLD AUTO: 1.43 THOUSANDS/ΜL (ref 0.6–4.47)
LYMPHOCYTES NFR BLD AUTO: 16 % (ref 14–44)
MCH RBC QN AUTO: 31.2 PG (ref 26.8–34.3)
MCHC RBC AUTO-ENTMCNC: 31.8 G/DL (ref 31.4–37.4)
MCV RBC AUTO: 98 FL (ref 82–98)
MONOCYTES # BLD AUTO: 0.65 THOUSAND/ΜL (ref 0.17–1.22)
MONOCYTES NFR BLD AUTO: 7 % (ref 4–12)
NEUTROPHILS # BLD AUTO: 6.54 THOUSANDS/ΜL (ref 1.85–7.62)
NEUTS SEG NFR BLD AUTO: 74 % (ref 43–75)
NRBC BLD AUTO-RTO: 0 /100 WBCS
PLATELET # BLD AUTO: 188 THOUSANDS/UL (ref 149–390)
PMV BLD AUTO: 10.9 FL (ref 8.9–12.7)
POTASSIUM SERPL-SCNC: 4.8 MMOL/L (ref 3.5–5.3)
PROT SERPL-MCNC: 6.9 G/DL (ref 6.4–8.2)
RBC # BLD AUTO: 3.69 MILLION/UL (ref 3.88–5.62)
SODIUM SERPL-SCNC: 134 MMOL/L (ref 136–145)
TIBC SERPL-MCNC: 318 UG/DL (ref 250–450)
WBC # BLD AUTO: 8.81 THOUSAND/UL (ref 4.31–10.16)

## 2019-12-18 PROCEDURE — 83550 IRON BINDING TEST: CPT

## 2019-12-18 PROCEDURE — 36415 COLL VENOUS BLD VENIPUNCTURE: CPT

## 2019-12-18 PROCEDURE — 83540 ASSAY OF IRON: CPT

## 2019-12-18 PROCEDURE — 82747 ASSAY OF FOLIC ACID RBC: CPT

## 2019-12-18 PROCEDURE — 80053 COMPREHEN METABOLIC PANEL: CPT

## 2019-12-18 PROCEDURE — 85025 COMPLETE CBC W/AUTO DIFF WBC: CPT

## 2019-12-18 PROCEDURE — 82728 ASSAY OF FERRITIN: CPT

## 2020-04-16 ENCOUNTER — TRANSCRIBE ORDERS (OUTPATIENT)
Dept: URGENT CARE | Facility: CLINIC | Age: 85
End: 2020-04-16

## 2020-04-16 ENCOUNTER — APPOINTMENT (OUTPATIENT)
Dept: LAB | Facility: CLINIC | Age: 85
End: 2020-04-16
Payer: COMMERCIAL

## 2020-04-16 DIAGNOSIS — M10.9 GOUT, UNSPECIFIED CAUSE, UNSPECIFIED CHRONICITY, UNSPECIFIED SITE: ICD-10-CM

## 2020-04-16 DIAGNOSIS — N18.30 CHRONIC KIDNEY DISEASE, STAGE 3 (MODERATE): ICD-10-CM

## 2020-04-16 DIAGNOSIS — E78.2 MIXED HYPERLIPIDEMIA: ICD-10-CM

## 2020-04-16 DIAGNOSIS — E55.9 VITAMIN D DEFICIENCY: ICD-10-CM

## 2020-04-16 DIAGNOSIS — N18.30 CHRONIC KIDNEY DISEASE, STAGE 3 (MODERATE): Primary | ICD-10-CM

## 2020-04-16 LAB
25(OH)D3 SERPL-MCNC: 34.1 NG/ML (ref 30–100)
ANION GAP SERPL CALCULATED.3IONS-SCNC: 6 MMOL/L (ref 4–13)
BUN SERPL-MCNC: 25 MG/DL (ref 5–25)
CALCIUM SERPL-MCNC: 8.7 MG/DL (ref 8.3–10.1)
CHLORIDE SERPL-SCNC: 99 MMOL/L (ref 100–108)
CHOLEST SERPL-MCNC: 123 MG/DL (ref 50–200)
CO2 SERPL-SCNC: 24 MMOL/L (ref 21–32)
CREAT SERPL-MCNC: 1.19 MG/DL (ref 0.6–1.3)
GFR SERPL CREATININE-BSD FRML MDRD: 52 ML/MIN/1.73SQ M
GLUCOSE P FAST SERPL-MCNC: 100 MG/DL (ref 65–99)
HDLC SERPL-MCNC: 35 MG/DL
LDLC SERPL CALC-MCNC: 62 MG/DL (ref 0–100)
NONHDLC SERPL-MCNC: 88 MG/DL
POTASSIUM SERPL-SCNC: 4.6 MMOL/L (ref 3.5–5.3)
SODIUM SERPL-SCNC: 129 MMOL/L (ref 136–145)
TRIGL SERPL-MCNC: 130 MG/DL
URATE SERPL-MCNC: 3.3 MG/DL (ref 4.2–8)

## 2020-04-16 PROCEDURE — 80061 LIPID PANEL: CPT

## 2020-04-16 PROCEDURE — 80048 BASIC METABOLIC PNL TOTAL CA: CPT

## 2020-04-16 PROCEDURE — 82306 VITAMIN D 25 HYDROXY: CPT

## 2020-04-16 PROCEDURE — 84550 ASSAY OF BLOOD/URIC ACID: CPT

## 2020-04-16 PROCEDURE — 36415 COLL VENOUS BLD VENIPUNCTURE: CPT

## 2020-06-03 ENCOUNTER — OFFICE VISIT (OUTPATIENT)
Dept: CARDIOLOGY CLINIC | Facility: CLINIC | Age: 85
End: 2020-06-03
Payer: COMMERCIAL

## 2020-06-03 VITALS
TEMPERATURE: 97.3 F | HEART RATE: 64 BPM | BODY MASS INDEX: 24.83 KG/M2 | DIASTOLIC BLOOD PRESSURE: 60 MMHG | HEIGHT: 65 IN | SYSTOLIC BLOOD PRESSURE: 142 MMHG | WEIGHT: 149 LBS

## 2020-06-03 DIAGNOSIS — I49.8 VENTRICULAR BIGEMINY: ICD-10-CM

## 2020-06-03 DIAGNOSIS — I25.10 CORONARY ARTERY DISEASE INVOLVING NATIVE CORONARY ARTERY OF NATIVE HEART WITHOUT ANGINA PECTORIS: Primary | Chronic | ICD-10-CM

## 2020-06-03 DIAGNOSIS — I10 ESSENTIAL HYPERTENSION: Chronic | ICD-10-CM

## 2020-06-03 PROBLEM — R07.89 ATYPICAL CHEST PAIN: Status: RESOLVED | Noted: 2019-05-15 | Resolved: 2020-06-03

## 2020-06-03 PROCEDURE — 99213 OFFICE O/P EST LOW 20 MIN: CPT | Performed by: INTERNAL MEDICINE

## 2020-06-03 RX ORDER — POTASSIUM CHLORIDE 1.5 G/1.77G
20 POWDER, FOR SOLUTION ORAL AS NEEDED
COMMUNITY

## 2020-06-03 RX ORDER — NITROGLYCERIN 0.4 MG/1
0.4 TABLET SUBLINGUAL
Qty: 25 TABLET | Refills: 3 | Status: SHIPPED | OUTPATIENT
Start: 2020-06-03

## 2020-09-09 ENCOUNTER — OFFICE VISIT (OUTPATIENT)
Dept: URGENT CARE | Facility: CLINIC | Age: 85
End: 2020-09-09
Payer: COMMERCIAL

## 2020-09-09 VITALS
RESPIRATION RATE: 18 BRPM | HEART RATE: 75 BPM | HEIGHT: 66 IN | SYSTOLIC BLOOD PRESSURE: 168 MMHG | DIASTOLIC BLOOD PRESSURE: 70 MMHG | TEMPERATURE: 98.5 F | BODY MASS INDEX: 23.95 KG/M2 | WEIGHT: 149 LBS | OXYGEN SATURATION: 98 %

## 2020-09-09 DIAGNOSIS — B35.6 TINEA CRURIS: Primary | ICD-10-CM

## 2020-09-09 PROCEDURE — 99213 OFFICE O/P EST LOW 20 MIN: CPT | Performed by: NURSE PRACTITIONER

## 2020-09-09 PROCEDURE — S9083 URGENT CARE CENTER GLOBAL: HCPCS | Performed by: NURSE PRACTITIONER

## 2020-09-09 RX ORDER — NYSTATIN 100000 [USP'U]/G
POWDER TOPICAL 2 TIMES DAILY
Qty: 15 G | Refills: 0 | Status: SHIPPED | OUTPATIENT
Start: 2020-09-09 | End: 2021-10-21

## 2020-09-09 NOTE — PROGRESS NOTES
[facility] [facility]        NAME: Norma Negrete  is a 80 y o  male  : 10/11/1924    MRN: [id number]  DATE: 2020  TIME: 6:04 PM    Assessment and Plan   Tinea cruris [B35 6]  1  Tinea cruris  nystatin (MYCOSTATIN) powder         Patient Instructions     Patient Instructions   Start nystatin powder as prescribed  Avoid scratching area  Keep site dry  Follow up with PCP if no improvement  Go to ER with worsening symptoms  Chief Complaint     Chief Complaint   Patient presents with    Rash     Groin area X 1 week         History of Present Illness   Geovanna Stacy Sr  presents to the clinic c/o    This is a 51-year-old male here today with complaints of rash  He states rash in his groin region  He states the rash on the right side is moist and red  He denies any fever spikes or chills  He denies any itching  Does some heavy irritation or site  Sepsis in the past   He has been treated with nystatin cream in past   He states he has enough at home and tried several times  He has not been using it consistently  Review of Systems   Review of Systems   Constitutional: Negative  Respiratory: Negative  Cardiovascular: Negative  Skin: Positive for rash  Psychiatric/Behavioral: Negative            Current Medications     Long-Term Medications   Medication Sig Dispense Refill    allopurinol (ZYLOPRIM) 300 mg tablet Take 1 tablet by mouth daily      aspirin 81 MG tablet Take 1 tablet by mouth see administration instructions Take 1 tablet Mon-Wed & Fri      atorvastatin (LIPITOR) 20 mg tablet Take 1 tablet by mouth daily      Cholecalciferol (CVS VITAMIN D) 2000 units CAPS Take by mouth      Choline Fenofibrate (FENOFIBRIC ACID) 45 MG CPDR Take by mouth      furosemide (LASIX) 20 mg tablet Take 1 tablet by mouth daily as needed      irbesartan (AVAPRO) 75 mg tablet Take 150 mg by mouth daily       metoprolol tartrate (LOPRESSOR) 25 mg tablet Take 25 mg by mouth 2 (two) times a day       nitroglycerin (Nitrostat) 0 4 mg SL tablet Place 1 tablet (0 4 mg total) under the tongue every 5 (five) minutes as needed for chest pain 25 tablet 3    prednisoLONE acetate (PRED FORTE) 1 % ophthalmic suspension prednisolone acetate 1 % eye drops,suspension      Multiple Vitamins tablet Take by mouth      nystatin (MYCOSTATIN) powder Apply topically 2 (two) times a day 15 g 0    [DISCONTINUED] meclizine (ANTIVERT) 25 mg tablet Take by mouth         Current Allergies     Allergies as of 09/09/2020    (No Known Allergies)            The following portions of the patient's history were reviewed and updated as appropriate: allergies, current medications, past family history, past medical history, past social history, past surgical history and problem list     Objective   /70   Pulse 75   Temp 98 5 °F (36 9 °C) (Temporal)   Resp 18   Ht 5' 6" (1 676 m)   Wt 67 6 kg (149 lb)   SpO2 98%   BMI 24 05 kg/m²        Physical Exam     Physical Exam  Vitals signs and nursing note reviewed  Constitutional:       Appearance: Normal appearance  Cardiovascular:      Rate and Rhythm: Normal rate and regular rhythm  Pulmonary:      Effort: Pulmonary effort is normal       Breath sounds: Normal breath sounds  Skin:     Comments: Bilateral groins has erythemic moist rash  Right groin rash is worse  No warmth around site  No drainage  exam accompanied by LIZ Reynoso    Neurological:      Mental Status: He is alert and oriented to person, place, and time  Psychiatric:         Mood and Affect: Mood normal          Behavior: Behavior normal          Thought Content:  Thought content normal          Judgment: Judgment normal

## 2020-09-09 NOTE — PATIENT INSTRUCTIONS
Start nystatin powder as prescribed  Avoid scratching area  Keep site dry  Follow up with PCP if no improvement  Go to ER with worsening symptoms

## 2020-09-16 ENCOUNTER — HOSPITAL ENCOUNTER (INPATIENT)
Facility: HOSPITAL | Age: 85
LOS: 3 days | Discharge: HOME HEALTH | DRG: 313 | End: 2020-09-19
Attending: EMERGENCY MEDICINE | Admitting: HOSPITALIST
Payer: COMMERCIAL

## 2020-09-16 ENCOUNTER — APPOINTMENT (EMERGENCY)
Dept: RADIOLOGY | Facility: HOSPITAL | Age: 85
DRG: 313 | End: 2020-09-16
Payer: COMMERCIAL

## 2020-09-16 DIAGNOSIS — E87.1 HYPONATREMIA: ICD-10-CM

## 2020-09-16 DIAGNOSIS — E83.42 HYPOMAGNESEMIA: ICD-10-CM

## 2020-09-16 DIAGNOSIS — R07.9 CHEST PAIN: Primary | ICD-10-CM

## 2020-09-16 DIAGNOSIS — M1A.09X0 CHRONIC GOUT OF MULTIPLE SITES, UNSPECIFIED CAUSE: ICD-10-CM

## 2020-09-16 PROBLEM — E78.2 MIXED HYPERLIPIDEMIA: Status: ACTIVE | Noted: 2020-09-16

## 2020-09-16 PROBLEM — R07.89 OTHER CHEST PAIN: Status: ACTIVE | Noted: 2019-04-24

## 2020-09-16 LAB
ALBUMIN SERPL BCP-MCNC: 4.2 G/DL (ref 3.5–5.7)
ALP SERPL-CCNC: 47 U/L (ref 55–165)
ALT SERPL W P-5'-P-CCNC: 18 U/L (ref 7–52)
ANION GAP SERPL CALCULATED.3IONS-SCNC: 9 MMOL/L (ref 4–13)
AST SERPL W P-5'-P-CCNC: 43 U/L (ref 13–39)
BASOPHILS # BLD AUTO: 0 THOUSANDS/ΜL (ref 0–0.1)
BASOPHILS NFR BLD AUTO: 1 % (ref 0–2)
BILIRUB SERPL-MCNC: 0.9 MG/DL (ref 0.2–1)
BUN SERPL-MCNC: 23 MG/DL (ref 7–25)
CALCIUM SERPL-MCNC: 8.7 MG/DL (ref 8.6–10.5)
CHLORIDE SERPL-SCNC: 90 MMOL/L (ref 98–107)
CO2 SERPL-SCNC: 21 MMOL/L (ref 21–31)
CREAT SERPL-MCNC: 1 MG/DL (ref 0.7–1.3)
EOSINOPHIL # BLD AUTO: 0 THOUSAND/ΜL (ref 0–0.61)
EOSINOPHIL NFR BLD AUTO: 0 % (ref 0–5)
ERYTHROCYTE [DISTWIDTH] IN BLOOD BY AUTOMATED COUNT: 14.5 % (ref 11.5–14.5)
GFR SERPL CREATININE-BSD FRML MDRD: 64 ML/MIN/1.73SQ M
GLUCOSE SERPL-MCNC: 107 MG/DL (ref 65–99)
HCT VFR BLD AUTO: 33.5 % (ref 42–47)
HGB BLD-MCNC: 11.7 G/DL (ref 14–18)
LYMPHOCYTES # BLD AUTO: 0.9 THOUSANDS/ΜL (ref 0.6–4.47)
LYMPHOCYTES NFR BLD AUTO: 10 % (ref 21–51)
MAGNESIUM SERPL-MCNC: 1.7 MG/DL (ref 1.9–2.7)
MCH RBC QN AUTO: 33.2 PG (ref 26–34)
MCHC RBC AUTO-ENTMCNC: 35 G/DL (ref 31–37)
MCV RBC AUTO: 95 FL (ref 81–99)
MONOCYTES # BLD AUTO: 0.6 THOUSAND/ΜL (ref 0.17–1.22)
MONOCYTES NFR BLD AUTO: 7 % (ref 2–12)
NEUTROPHILS # BLD AUTO: 7.5 THOUSANDS/ΜL (ref 1.4–6.5)
NEUTS SEG NFR BLD AUTO: 83 % (ref 42–75)
PLATELET # BLD AUTO: 206 THOUSANDS/UL (ref 149–390)
PMV BLD AUTO: 8 FL (ref 8.6–11.7)
POTASSIUM SERPL-SCNC: 4.3 MMOL/L (ref 3.5–5.5)
PROT SERPL-MCNC: 6.4 G/DL (ref 6.4–8.9)
RBC # BLD AUTO: 3.53 MILLION/UL (ref 4.3–5.9)
SODIUM SERPL-SCNC: 120 MMOL/L (ref 134–143)
TROPONIN I SERPL-MCNC: <0.03 NG/ML
WBC # BLD AUTO: 9.1 THOUSAND/UL (ref 4.8–10.8)

## 2020-09-16 PROCEDURE — 84484 ASSAY OF TROPONIN QUANT: CPT | Performed by: EMERGENCY MEDICINE

## 2020-09-16 PROCEDURE — 93005 ELECTROCARDIOGRAM TRACING: CPT

## 2020-09-16 PROCEDURE — 99285 EMERGENCY DEPT VISIT HI MDM: CPT

## 2020-09-16 PROCEDURE — 85025 COMPLETE CBC W/AUTO DIFF WBC: CPT | Performed by: EMERGENCY MEDICINE

## 2020-09-16 PROCEDURE — 36415 COLL VENOUS BLD VENIPUNCTURE: CPT | Performed by: EMERGENCY MEDICINE

## 2020-09-16 PROCEDURE — 80053 COMPREHEN METABOLIC PANEL: CPT | Performed by: EMERGENCY MEDICINE

## 2020-09-16 PROCEDURE — 71045 X-RAY EXAM CHEST 1 VIEW: CPT

## 2020-09-16 PROCEDURE — 83735 ASSAY OF MAGNESIUM: CPT | Performed by: EMERGENCY MEDICINE

## 2020-09-16 PROCEDURE — 99285 EMERGENCY DEPT VISIT HI MDM: CPT | Performed by: EMERGENCY MEDICINE

## 2020-09-16 RX ORDER — ASPIRIN 81 MG/1
162 TABLET, CHEWABLE ORAL ONCE
Status: COMPLETED | OUTPATIENT
Start: 2020-09-16 | End: 2020-09-16

## 2020-09-16 RX ORDER — SODIUM CHLORIDE 9 MG/ML
3 INJECTION INTRAVENOUS
Status: DISCONTINUED | OUTPATIENT
Start: 2020-09-16 | End: 2020-09-19 | Stop reason: HOSPADM

## 2020-09-16 RX ORDER — MAGNESIUM SULFATE HEPTAHYDRATE 40 MG/ML
2 INJECTION, SOLUTION INTRAVENOUS ONCE
Status: COMPLETED | OUTPATIENT
Start: 2020-09-16 | End: 2020-09-17

## 2020-09-16 RX ADMIN — MAGNESIUM SULFATE HEPTAHYDRATE 2 G: 40 INJECTION, SOLUTION INTRAVENOUS at 23:32

## 2020-09-16 RX ADMIN — ASPIRIN 81 MG 162 MG: 81 TABLET ORAL at 23:33

## 2020-09-17 ENCOUNTER — APPOINTMENT (INPATIENT)
Dept: CT IMAGING | Facility: HOSPITAL | Age: 85
DRG: 313 | End: 2020-09-17
Payer: COMMERCIAL

## 2020-09-17 PROBLEM — M1A.09X0 CHRONIC GOUT OF MULTIPLE SITES: Status: ACTIVE | Noted: 2017-05-10

## 2020-09-17 PROBLEM — K59.00 ACUTE CONSTIPATION: Status: ACTIVE | Noted: 2020-09-17

## 2020-09-17 PROBLEM — E86.0 ACUTE DEHYDRATION: Status: ACTIVE | Noted: 2020-09-17

## 2020-09-17 LAB
ANION GAP SERPL CALCULATED.3IONS-SCNC: 4 MMOL/L (ref 4–13)
ANION GAP SERPL CALCULATED.3IONS-SCNC: 7 MMOL/L (ref 4–13)
ANION GAP SERPL CALCULATED.3IONS-SCNC: 9 MMOL/L (ref 4–13)
ATRIAL RATE: 66 BPM
ATRIAL RATE: 74 BPM
ATRIAL RATE: 80 BPM
ATRIAL RATE: 81 BPM
BASOPHILS # BLD AUTO: 0 THOUSANDS/ΜL (ref 0–0.1)
BASOPHILS NFR BLD AUTO: 0 % (ref 0–2)
BUN SERPL-MCNC: 15 MG/DL (ref 7–25)
BUN SERPL-MCNC: 18 MG/DL (ref 7–25)
BUN SERPL-MCNC: 19 MG/DL (ref 7–25)
BUN SERPL-MCNC: 19 MG/DL (ref 7–25)
BUN SERPL-MCNC: 20 MG/DL (ref 7–25)
CALCIUM SERPL-MCNC: 7.9 MG/DL (ref 8.6–10.5)
CALCIUM SERPL-MCNC: 7.9 MG/DL (ref 8.6–10.5)
CALCIUM SERPL-MCNC: 8.1 MG/DL (ref 8.6–10.5)
CALCIUM SERPL-MCNC: 8.5 MG/DL (ref 8.6–10.5)
CALCIUM SERPL-MCNC: 8.6 MG/DL (ref 8.6–10.5)
CHLORIDE SERPL-SCNC: 89 MMOL/L (ref 98–107)
CHLORIDE SERPL-SCNC: 90 MMOL/L (ref 98–107)
CHLORIDE SERPL-SCNC: 91 MMOL/L (ref 98–107)
CHOLEST SERPL-MCNC: 107 MG/DL (ref 0–200)
CO2 SERPL-SCNC: 22 MMOL/L (ref 21–31)
CO2 SERPL-SCNC: 23 MMOL/L (ref 21–31)
CO2 SERPL-SCNC: 25 MMOL/L (ref 21–31)
CO2 SERPL-SCNC: 25 MMOL/L (ref 21–31)
CO2 SERPL-SCNC: 26 MMOL/L (ref 21–31)
CREAT SERPL-MCNC: 0.9 MG/DL (ref 0.7–1.3)
CREAT SERPL-MCNC: 0.91 MG/DL (ref 0.7–1.3)
CREAT SERPL-MCNC: 0.92 MG/DL (ref 0.7–1.3)
CREAT SERPL-MCNC: 1.06 MG/DL (ref 0.7–1.3)
CREAT SERPL-MCNC: 1.06 MG/DL (ref 0.7–1.3)
CREAT UR-MCNC: 22.1 MG/DL
EOSINOPHIL # BLD AUTO: 0.1 THOUSAND/ΜL (ref 0–0.61)
EOSINOPHIL NFR BLD AUTO: 1 % (ref 0–5)
ERYTHROCYTE [DISTWIDTH] IN BLOOD BY AUTOMATED COUNT: 14.4 % (ref 11.5–14.5)
GFR SERPL CREATININE-BSD FRML MDRD: 59 ML/MIN/1.73SQ M
GFR SERPL CREATININE-BSD FRML MDRD: 59 ML/MIN/1.73SQ M
GFR SERPL CREATININE-BSD FRML MDRD: 70 ML/MIN/1.73SQ M
GFR SERPL CREATININE-BSD FRML MDRD: 71 ML/MIN/1.73SQ M
GFR SERPL CREATININE-BSD FRML MDRD: 72 ML/MIN/1.73SQ M
GLUCOSE SERPL-MCNC: 105 MG/DL (ref 65–99)
GLUCOSE SERPL-MCNC: 109 MG/DL (ref 65–99)
GLUCOSE SERPL-MCNC: 116 MG/DL (ref 65–99)
GLUCOSE SERPL-MCNC: 117 MG/DL (ref 65–99)
GLUCOSE SERPL-MCNC: 97 MG/DL (ref 65–99)
HCT VFR BLD AUTO: 34.4 % (ref 42–47)
HDLC SERPL-MCNC: 45 MG/DL
HGB BLD-MCNC: 12.3 G/DL (ref 14–18)
LDLC SERPL CALC-MCNC: 46 MG/DL (ref 0–100)
LYMPHOCYTES # BLD AUTO: 1.5 THOUSANDS/ΜL (ref 0.6–4.47)
LYMPHOCYTES NFR BLD AUTO: 19 % (ref 21–51)
MAGNESIUM SERPL-MCNC: 2.3 MG/DL (ref 1.9–2.7)
MCH RBC QN AUTO: 33.7 PG (ref 26–34)
MCHC RBC AUTO-ENTMCNC: 35.7 G/DL (ref 31–37)
MCV RBC AUTO: 94 FL (ref 81–99)
MONOCYTES # BLD AUTO: 0.7 THOUSAND/ΜL (ref 0.17–1.22)
MONOCYTES NFR BLD AUTO: 8 % (ref 2–12)
NEUTROPHILS # BLD AUTO: 5.7 THOUSANDS/ΜL (ref 1.4–6.5)
NEUTS SEG NFR BLD AUTO: 72 % (ref 42–75)
OSMOLALITY UR: 302 MMOL/KG
P AXIS: 65 DEGREES
P AXIS: 67 DEGREES
P AXIS: 70 DEGREES
P AXIS: 72 DEGREES
PLATELET # BLD AUTO: 193 THOUSANDS/UL (ref 149–390)
PLATELET # BLD AUTO: 198 THOUSANDS/UL (ref 149–390)
PMV BLD AUTO: 8.4 FL (ref 8.6–11.7)
POTASSIUM SERPL-SCNC: 3.8 MMOL/L (ref 3.5–5.5)
POTASSIUM SERPL-SCNC: 3.8 MMOL/L (ref 3.5–5.5)
POTASSIUM SERPL-SCNC: 3.9 MMOL/L (ref 3.5–5.5)
POTASSIUM SERPL-SCNC: 4 MMOL/L (ref 3.5–5.5)
POTASSIUM SERPL-SCNC: 4.6 MMOL/L (ref 3.5–5.5)
PR INTERVAL: 192 MS
PR INTERVAL: 194 MS
PR INTERVAL: 196 MS
PR INTERVAL: 214 MS
QRS AXIS: 18 DEGREES
QRS AXIS: 24 DEGREES
QRS AXIS: 43 DEGREES
QRS AXIS: 44 DEGREES
QRSD INTERVAL: 84 MS
QRSD INTERVAL: 86 MS
QRSD INTERVAL: 86 MS
QRSD INTERVAL: 88 MS
QT INTERVAL: 386 MS
QT INTERVAL: 402 MS
QT INTERVAL: 430 MS
QT INTERVAL: 436 MS
QTC INTERVAL: 448 MS
QTC INTERVAL: 457 MS
QTC INTERVAL: 463 MS
QTC INTERVAL: 477 MS
RBC # BLD AUTO: 3.65 MILLION/UL (ref 4.3–5.9)
SODIUM 24H UR-SCNC: 70 MOL/L
SODIUM SERPL-SCNC: 118 MMOL/L (ref 134–143)
SODIUM SERPL-SCNC: 119 MMOL/L (ref 134–143)
SODIUM SERPL-SCNC: 120 MMOL/L (ref 134–143)
SODIUM SERPL-SCNC: 121 MMOL/L (ref 134–143)
SODIUM SERPL-SCNC: 121 MMOL/L (ref 134–143)
T WAVE AXIS: 66 DEGREES
T WAVE AXIS: 70 DEGREES
T WAVE AXIS: 75 DEGREES
T WAVE AXIS: 94 DEGREES
TRIGL SERPL-MCNC: 78 MG/DL (ref 44–166)
TROPONIN I SERPL-MCNC: <0.03 NG/ML
TROPONIN I SERPL-MCNC: <0.03 NG/ML
TSH SERPL DL<=0.05 MIU/L-ACNC: 2.72 UIU/ML (ref 0.45–5.33)
VENTRICULAR RATE: 66 BPM
VENTRICULAR RATE: 74 BPM
VENTRICULAR RATE: 80 BPM
VENTRICULAR RATE: 81 BPM
WBC # BLD AUTO: 8 THOUSAND/UL (ref 4.8–10.8)

## 2020-09-17 PROCEDURE — 80048 BASIC METABOLIC PNL TOTAL CA: CPT | Performed by: NURSE PRACTITIONER

## 2020-09-17 PROCEDURE — 85025 COMPLETE CBC W/AUTO DIFF WBC: CPT | Performed by: NURSE PRACTITIONER

## 2020-09-17 PROCEDURE — G1004 CDSM NDSC: HCPCS

## 2020-09-17 PROCEDURE — 80048 BASIC METABOLIC PNL TOTAL CA: CPT | Performed by: INTERNAL MEDICINE

## 2020-09-17 PROCEDURE — 85049 AUTOMATED PLATELET COUNT: CPT | Performed by: NURSE PRACTITIONER

## 2020-09-17 PROCEDURE — 99223 1ST HOSP IP/OBS HIGH 75: CPT | Performed by: HOSPITALIST

## 2020-09-17 PROCEDURE — 93005 ELECTROCARDIOGRAM TRACING: CPT

## 2020-09-17 PROCEDURE — 99223 1ST HOSP IP/OBS HIGH 75: CPT | Performed by: NURSE PRACTITIONER

## 2020-09-17 PROCEDURE — 83935 ASSAY OF URINE OSMOLALITY: CPT | Performed by: NURSE PRACTITIONER

## 2020-09-17 PROCEDURE — 83735 ASSAY OF MAGNESIUM: CPT | Performed by: NURSE PRACTITIONER

## 2020-09-17 PROCEDURE — 80061 LIPID PANEL: CPT | Performed by: NURSE PRACTITIONER

## 2020-09-17 PROCEDURE — 71250 CT THORAX DX C-: CPT

## 2020-09-17 PROCEDURE — 84443 ASSAY THYROID STIM HORMONE: CPT | Performed by: NURSE PRACTITIONER

## 2020-09-17 PROCEDURE — 84484 ASSAY OF TROPONIN QUANT: CPT | Performed by: NURSE PRACTITIONER

## 2020-09-17 PROCEDURE — 82570 ASSAY OF URINE CREATININE: CPT | Performed by: NURSE PRACTITIONER

## 2020-09-17 PROCEDURE — 93010 ELECTROCARDIOGRAM REPORT: CPT | Performed by: INTERNAL MEDICINE

## 2020-09-17 PROCEDURE — 84300 ASSAY OF URINE SODIUM: CPT | Performed by: NURSE PRACTITIONER

## 2020-09-17 RX ORDER — DEXTROSE AND SODIUM CHLORIDE 5; .45 G/100ML; G/100ML
100 INJECTION, SOLUTION INTRAVENOUS CONTINUOUS
Status: DISCONTINUED | OUTPATIENT
Start: 2020-09-17 | End: 2020-09-17

## 2020-09-17 RX ORDER — SODIUM CHLORIDE 9 MG/ML
100 INJECTION, SOLUTION INTRAVENOUS CONTINUOUS
Status: DISCONTINUED | OUTPATIENT
Start: 2020-09-17 | End: 2020-09-17

## 2020-09-17 RX ORDER — LOSARTAN POTASSIUM 50 MG/1
50 TABLET ORAL DAILY
Status: DISCONTINUED | OUTPATIENT
Start: 2020-09-17 | End: 2020-09-19 | Stop reason: HOSPADM

## 2020-09-17 RX ORDER — ALLOPURINOL 300 MG/1
300 TABLET ORAL DAILY
Status: DISCONTINUED | OUTPATIENT
Start: 2020-09-17 | End: 2020-09-19 | Stop reason: HOSPADM

## 2020-09-17 RX ORDER — ASPIRIN 81 MG/1
81 TABLET, CHEWABLE ORAL 3 TIMES WEEKLY
Status: DISCONTINUED | OUTPATIENT
Start: 2020-09-17 | End: 2020-09-19 | Stop reason: HOSPADM

## 2020-09-17 RX ORDER — ATORVASTATIN CALCIUM 20 MG/1
20 TABLET, FILM COATED ORAL DAILY
Status: DISCONTINUED | OUTPATIENT
Start: 2020-09-17 | End: 2020-09-19 | Stop reason: HOSPADM

## 2020-09-17 RX ORDER — DOCUSATE SODIUM 100 MG/1
100 CAPSULE, LIQUID FILLED ORAL 2 TIMES DAILY
Status: DISCONTINUED | OUTPATIENT
Start: 2020-09-17 | End: 2020-09-19 | Stop reason: HOSPADM

## 2020-09-17 RX ORDER — FENOFIBRATE 48 MG/1
48 TABLET, COATED ORAL DAILY
Status: DISCONTINUED | OUTPATIENT
Start: 2020-09-17 | End: 2020-09-19 | Stop reason: HOSPADM

## 2020-09-17 RX ORDER — ONDANSETRON 2 MG/ML
4 INJECTION INTRAMUSCULAR; INTRAVENOUS EVERY 6 HOURS PRN
Status: DISCONTINUED | OUTPATIENT
Start: 2020-09-17 | End: 2020-09-19 | Stop reason: HOSPADM

## 2020-09-17 RX ORDER — POTASSIUM CHLORIDE 20 MEQ/1
20 TABLET, EXTENDED RELEASE ORAL ONCE
Status: COMPLETED | OUTPATIENT
Start: 2020-09-17 | End: 2020-09-17

## 2020-09-17 RX ORDER — NYSTATIN 100000 [USP'U]/G
POWDER TOPICAL 2 TIMES DAILY
Status: DISCONTINUED | OUTPATIENT
Start: 2020-09-17 | End: 2020-09-19 | Stop reason: HOSPADM

## 2020-09-17 RX ORDER — MELATONIN
2000 DAILY
Status: DISCONTINUED | OUTPATIENT
Start: 2020-09-17 | End: 2020-09-19 | Stop reason: HOSPADM

## 2020-09-17 RX ORDER — NITROGLYCERIN 0.4 MG/1
0.4 TABLET SUBLINGUAL
Status: DISCONTINUED | OUTPATIENT
Start: 2020-09-17 | End: 2020-09-19 | Stop reason: HOSPADM

## 2020-09-17 RX ADMIN — GLYCERIN, HYPROMELLOSE, POLYETHYLENE GLYCOL 1 DROP: .2; .2; 1 LIQUID OPHTHALMIC at 20:01

## 2020-09-17 RX ADMIN — ATORVASTATIN CALCIUM 20 MG: 20 TABLET, FILM COATED ORAL at 18:00

## 2020-09-17 RX ADMIN — DOCUSATE SODIUM 100 MG: 100 CAPSULE, LIQUID FILLED ORAL at 08:55

## 2020-09-17 RX ADMIN — TOLVAPTAN 15 MG: 30 TABLET ORAL at 18:00

## 2020-09-17 RX ADMIN — METOPROLOL TARTRATE 25 MG: 25 TABLET, FILM COATED ORAL at 08:55

## 2020-09-17 RX ADMIN — ALLOPURINOL 300 MG: 300 TABLET ORAL at 08:55

## 2020-09-17 RX ADMIN — SODIUM CHLORIDE 100 ML/HR: 0.9 INJECTION, SOLUTION INTRAVENOUS at 08:57

## 2020-09-17 RX ADMIN — FENOFIBRATE 48 MG: 48 TABLET, FILM COATED ORAL at 08:54

## 2020-09-17 RX ADMIN — GLYCERIN, HYPROMELLOSE, POLYETHYLENE GLYCOL 1 DROP: .2; .2; 1 LIQUID OPHTHALMIC at 11:04

## 2020-09-17 RX ADMIN — NYSTATIN: 100000 POWDER TOPICAL at 18:27

## 2020-09-17 RX ADMIN — LOSARTAN POTASSIUM 50 MG: 50 TABLET, FILM COATED ORAL at 08:55

## 2020-09-17 RX ADMIN — METOPROLOL TARTRATE 25 MG: 25 TABLET, FILM COATED ORAL at 18:00

## 2020-09-17 RX ADMIN — ASPIRIN 81 MG 81 MG: 81 TABLET ORAL at 02:14

## 2020-09-17 RX ADMIN — VITAMIN D 2000 UNITS: 25 TAB ORAL at 08:55

## 2020-09-17 RX ADMIN — ENOXAPARIN SODIUM 40 MG: 40 INJECTION SUBCUTANEOUS at 08:56

## 2020-09-17 RX ADMIN — DEXTROSE AND SODIUM CHLORIDE 100 ML/HR: 5; .45 INJECTION, SOLUTION INTRAVENOUS at 01:15

## 2020-09-17 RX ADMIN — NYSTATIN: 100000 POWDER TOPICAL at 08:55

## 2020-09-17 RX ADMIN — DOCUSATE SODIUM 100 MG: 100 CAPSULE, LIQUID FILLED ORAL at 18:00

## 2020-09-17 RX ADMIN — POTASSIUM CHLORIDE 20 MEQ: 1500 TABLET, EXTENDED RELEASE ORAL at 18:00

## 2020-09-17 NOTE — ED PROCEDURE NOTE
PROCEDURE  ECG 12 Lead Documentation Only    Date/Time: 9/16/2020 10:21 PM  Performed by: Radha Conrad MD  Authorized by: Radha Conrad MD     Indications / Diagnosis:  Cp   ECG reviewed by me, the ED Provider: yes    Patient location:  ED  Previous ECG:     Comparison to cardiac monitor: Yes    Interpretation:     Interpretation: non-specific    Rate:     ECG rate:  81    ECG rate assessment: normal    Rhythm:     Rhythm: sinus rhythm    Ectopy:     Ectopy: PVCs    QRS:     QRS axis:  Normal    QRS intervals:  Normal  Conduction:     Conduction: normal    ST segments:     ST segments:  Non-specific  T waves:     T waves: non-specific           Radha Conrad MD  09/16/20 2347

## 2020-09-17 NOTE — NURSING NOTE
Patient admitted to ICU Bed 3 from ER  Patient transported via litter accompanied by ER RN  Transported on cardiac monitoring devices and room air  Patient denying any chest pain, chest discomfort, or SOB  Asessment as noted  Hypertensive on arrival; [facility] CRNP aware of same

## 2020-09-17 NOTE — ASSESSMENT & PLAN NOTE
· Right-sided chest pain at the 2nd intercostal space  · Patient states moderate sharp twinges  · Would come and go for approximately 1 hour  · Then remained constant at 9:15 p m  On 09/16/2020  · Trend troponins:  0 03-0 03-  · DEDRA = 5  · Check lipids  · P r n   Nitroglycerin  · EKGs

## 2020-09-17 NOTE — ASSESSMENT & PLAN NOTE
· With a history of ventricular bigeminy and LAD stent in 2002  · Patient does follow with Dr Corazon Felix and saw him last on 06/03/2020  · Continue metoprolol, Avapro, aspirin, fenofibrate, Lipitor

## 2020-09-17 NOTE — ASSESSMENT & PLAN NOTE
· Patient states he is at his baseline  · Patient has p r n  20 mg Lasix    · If patient takes a Lasix patient would take a 20 mEq potassium

## 2020-09-17 NOTE — H&P
H&P- Tami Khan   10/11/1924, 80 y o  male MRN: [id number]    Unit/Bed#: ICU 03 Encounter: [id number]    Primary Care Provider: Iftikhar Fabian MD   Date and time admitted to hospital: 9/16/2020 10:11 PM        * Other chest pain  Assessment & Plan  · Right-sided chest pain at the 2nd intercostal space  · Patient states moderate sharp twinges  · Would come and go for approximately 1 hour  · Then remained constant at 9:15 p m  On 09/16/2020  · Trend troponins:  0 03-0 03-  · DEDRA = 5  · Check lipids  · P r n  Nitroglycerin  · EKGs    Acute dehydration  Assessment & Plan  · Patient states he does follow a low-sodium diet  · Patient also states that he has not had any increased amount of water as he is fearful that it will cause him to have lower extremity edema    Chronic hyponatremia  Assessment & Plan  · Patient usually runs between 127-134  · On admission, 09/16/2020 patient was: 120  · Start patient on D5 half normal saline  · Monitor BMP every 3 hours  · Check urine sodium, creatinine, Osmo  · Neuro checks q 2 hours  · Fall precautions    Hypomagnesemia  Assessment & Plan  · Level was found to be 1 7 on admission  · Patient received 2 g IV bolus  · Check labs in the a m  Coronary artery disease involving native coronary artery of native heart without angina pectoris  Assessment & Plan  · With a history of ventricular bigeminy and LAD stent in 2002  · Patient does follow with Dr Karen Granados and saw him last on 06/03/2020  · Continue metoprolol, Avapro, aspirin, fenofibrate, Lipitor    Essential hypertension  Assessment & Plan  · Continue metoprolol and Cozaar    Chronic gout of multiple sites  Assessment & Plan  · Continue allopurinol    Bilateral leg edema  Assessment & Plan  · Patient states he is at his baseline  · Patient has p r n  20 mg Lasix    · If patient takes a Lasix patient would take a 20 mEq potassium    Mixed hyperlipidemia  Assessment & Plan  · Continue Lipitor and fenofibrate    Stage 3 chronic kidney disease ([facility] [address] )  Assessment & Plan  · Currently at his baseline of 1 0  · Attempt to avoid nephrotoxic medications  · Monitor labs    Acute constipation  Assessment & Plan  · Patient states he has not had a bowel movement since Sunday, 09/13/2020  · Patient believes that it is because he is dehydrated  · Will order the patient Colace  · Monitor patient      VTE Prophylaxis: Enoxaparin (Lovenox)  Code Status:  Full code  Discussion with family:  Discussed with the patient    Anticipated Length of Stay:  Patient will be admitted on an Inpatient basis with an anticipated length of stay of  > 2 midnights  Justification for Hospital Stay:  Need for monitoring of cardiac enzymes, sodium level, magnesium level    Total Time for Visit, including Counseling / Coordination of Care: 1 hour  Greater than 50% of this total time spent on direct patient counseling and coordination of care  Chief Complaint:   Right-sided chest pain    History of Present Illness:    Krupa Vela Sr  is a 80 y o  male who presents with right-sided chest pain  This patient stated that at 8:15 p m  On 09/16/2020 he noticed he was having some right-sided chest pains that would coming go he stated that it was a very dull sensation but continued for about 1 hour  He states that around 9:15 p m  On 09/16/2020 the pain became more constant and was actually sharp  He denied any shortness of breath, diaphoresis, or radiation of pain  Upon speaking to the patient in the intensive care unit, the patient did state that he has been on a low-salt diet secondary to his bilateral lower extremity edema issues he also states that he has cut back on the amount of water he drinks in order to prevent any lower extremity edema  Patient does take Lasix for the lower extremity edema and he will take a potassium supplement on the days he takes his Lasix      Patient does have a past medical history of coronary artery disease with LAD stent in 2002, hypertension, hyperlipidemia, gout, chronic kidney disease stage 3, bilateral lower extremity edema, peptic ulcer, and ventricular bigeminy  During assessment in the intensive care unit, patient states that his chest pain has resolved completely since arriving in the in emergency department  In the emergency department the patient was given magnesium 2 g IV, aspirin 162  Review of Systems:    Review of Systems   All other systems reviewed and are negative  Past Medical and Surgical History:     Past Medical History:   Diagnosis Date    Arthritis     Cataract     Coronary artery disease     Coronary atherosclerosis of native coronary artery     Diverticulitis of colon     Essential hypertension, benign     Hyperlipidemia     Hypertension     Peptic ulcer     Renal disorder        Past Surgical History:   Procedure Laterality Date    CATARACT EXTRACTION Right     CORONARY ANGIOPLASTY      CORONARY STENT PLACEMENT      EYE SURGERY      cataract sx    SKIN BIOPSY      TONSILLECTOMY         Meds/Allergies:    Prior to Admission medications    Medication Sig Start Date End Date Taking?  Authorizing Provider   allopurinol (ZYLOPRIM) 300 mg tablet Take 1 tablet by mouth daily   Yes Historical Provider, MD   aspirin 81 MG tablet Take 1 tablet by mouth see administration instructions Take 1 tablet Mon-Wed & Fri   Yes Historical Provider, MD   Choline Fenofibrate (FENOFIBRIC ACID) 45 MG CPDR Take by mouth 9/30/13  Yes Historical Provider, MD   irbesartan (AVAPRO) 75 mg tablet Take 150 mg by mouth daily    Yes Historical Provider, MD   metoprolol tartrate (LOPRESSOR) 25 mg tablet Take 25 mg by mouth 2 (two) times a day    Yes Historical Provider, MD   Multiple Vitamins tablet Take by mouth   Yes Historical Provider, MD   atorvastatin (LIPITOR) 20 mg tablet Take 1 tablet by mouth daily    Historical Provider, MD   Cholecalciferol ([employer] VITAMIN D) 2000 units CAPS Take by mouth    Historical Provider, MD   furosemide (LASIX) 20 mg tablet Take 1 tablet by mouth daily as needed 5/28/14   Historical Provider, MD   nitroglycerin (Nitrostat) 0 4 mg SL tablet Place 1 tablet (0 4 mg total) under the tongue every 5 (five) minutes as needed for chest pain 6/3/20   Shala Godinez MD   nystatin (MYCOSTATIN) powder Apply topically 2 (two) times a day  Patient not taking: Reported on 9/17/2020 9/9/20   ANAM Meeks   ofloxacin (OCUFLOX) 0 3 % ophthalmic solution ofloxacin 0 3 % eye drops    Historical Provider, MD   pneumococcal 23-valent polysaccharide vaccine (PNEUMOVAX 23) Pneumovax 23 25 mcg/0 5 mL injection syringe    Historical Provider, MD   potassium chloride (KLOR-CON) 20 mEq packet Take 20 mEq by mouth as needed Take 1 tablet when taking Furosemide    Historical Provider, MD   prednisoLONE acetate (PRED FORTE) 1 % ophthalmic suspension prednisolone acetate 1 % eye drops,suspension    Historical Provider, MD   meclizine (ANTIVERT) 25 mg tablet Take by mouth  4/24/19  Historical Provider, MD     all medications and allergies reviewed    Allergies: No Known Allergies    Social History:     Marital Status:     Occupation:  Retired  Patient Pre-hospital Living Situation:  At home  Patient Pre-hospital Level of Mobility:  Functional  Patient Pre-hospital Diet Restrictions:  Low sodium and fluid restriction  Substance Use History:   Social History     Substance and Sexual Activity   Alcohol Use Not Currently     Social History     Tobacco Use   Smoking Status Former Smoker    Types: Pipe   Smokeless Tobacco Never Used     Social History     Substance and Sexual Activity   Drug Use No       Family History:  I have reviewed the patient's family history    Physical Exam:     Vitals:   Blood Pressure: 159/67 (09/17/20 0300)  Pulse: 70 (09/17/20 0300)  Temperature: (!) 97 4 °F (36 3 °C) (09/17/20 0007)  Temp Source: Temporal (09/17/20 0007)  Respirations: 18 (09/17/20 0300)  Height: 5' 6" (167 6 cm) (09/17/20 0007)  Weight - Scale: 62 4 kg (137 lb 9 1 oz) (09/17/20 0007)  SpO2: 98 % (09/17/20 0300)    Physical Exam  Constitutional:       General: He is awake  Appearance: Normal appearance  HENT:      Head: Normocephalic and atraumatic  Right Ear: Decreased hearing noted  Left Ear: Decreased hearing noted  Nose: Nose normal       Mouth/Throat:      Mouth: Mucous membranes are dry  Comments: Missing teeth  Eyes:      Extraocular Movements: Extraocular movements intact  Conjunctiva/sclera: Conjunctivae normal    Neck:      Musculoskeletal: Normal range of motion  Cardiovascular:      Rate and Rhythm: Normal rate and regular rhythm  Pulses: Normal pulses  Heart sounds: Normal heart sounds  Comments: No lower extremity edema at this time  Pulmonary:      Effort: Pulmonary effort is normal       Breath sounds: Normal breath sounds  Chest:       Abdominal:      General: Bowel sounds are normal    Musculoskeletal: Normal range of motion  Skin:     General: Skin is warm  Neurological:      Mental Status: He is alert and oriented to person, place, and time  Psychiatric:         Attention and Perception: Attention normal          Mood and Affect: Mood normal          Speech: Speech normal          Behavior: Behavior normal  Behavior is cooperative  Additional Data:     Lab Results: I have personally reviewed pertinent reports        Results from last 7 days   Lab Units 09/17/20  0205 09/16/20  2227   WBC Thousand/uL  --  9 10   HEMOGLOBIN g/dL  --  11 7*   HEMATOCRIT %  --  33 5*   PLATELETS Thousands/uL 193 206   NEUTROS PCT %  --  83*   LYMPHS PCT %  --  10*   MONOS PCT %  --  7   EOS PCT %  --  0     Results from last 7 days   Lab Units 09/16/20  2227   SODIUM mmol/L 120*   POTASSIUM mmol/L 4 3   CHLORIDE mmol/L 90*   CO2 mmol/L 21   BUN mg/dL 23   CREATININE mg/dL 1 00   ANION GAP mmol/L 9   CALCIUM mg/dL 8 7   ALBUMIN g/dL 4 2   TOTAL BILIRUBIN mg/dL 0 90   ALK PHOS U/L 47*   ALT U/L 18   AST U/L 43*   GLUCOSE RANDOM mg/dL 107*                       Imaging: I have personally reviewed pertinent reports  X-ray chest 1 view portable   ED Interpretation by Bernice Cordoba MD (09/16 2309)   INDICATION:   Chest Pain      COMPARISON:  4 2019     EXAM PERFORMED/VIEWS:  AP CHEST         FINDINGS:     Cardiomediastinal silhouette appears unremarkable      The lungs are clear  No pneumothorax or pleural effusion      Osseous structures appear within normal limits for patient age      IMPRESSION:     No acute abnormality in the chest             EKG, Pathology, and Other Studies Reviewed on Admission:   · EKG:  Normal sinus rhythm, heart rate 74    Epic / Care Everywhere Records Reviewed: Yes    ** Please Note: This note has been constructed using a voice recognition system   **

## 2020-09-17 NOTE — UTILIZATION REVIEW
Initial Clinical Review    Admission: Date/Time/Statement:   Admission Orders (From admission, onward)     Ordered        09/16/20 2331  Inpatient Admission  Once                   Orders Placed This Encounter   Procedures    Inpatient Admission     Standing Status:   Standing     Number of Occurrences:   1     Order Specific Question:   Admitting Physician     Answer:   Shady Reaves [[id number]]     Order Specific Question:   Level of Care     Answer:   Critical Care [15]     Order Specific Question:   Estimated length of stay     Answer:   More than 2 Midnights     Order Specific Question:   Certification     Answer:   I certify that inpatient services are medically necessary for this patient for a duration of greater than two midnights  See H&P and MD Progress Notes for additional information about the patient's course of treatment  ED Arrival Information     Expected Arrival Acuity Means of Arrival Escorted By Service Admission Type    - 9/16/2020 22:09 Emergent [facility] Emergency    Arrival Complaint    rt side chest pain        Chief Complaint   Patient presents with    Chest Pain     @ 2015 tonight , rt sided , dull, while washing dishes      Assessment/Plan:   89 yom ambulatory to er from home c/o R sided chest pain  Hx coronary artery disease with LAD stent in 2002, hypertension, hyperlipidemia, gout, chronic kidney disease stage 3, bilateral lower extremity edema, peptic ulcer, and ventricular bigeminy  Presents hypertensive with s/s as above  Admission work-up showing hyponatremia, hypomagnesemia, R PTX  Admitted to inpatient status for chest pain 2nd PTX with electrolyte abnormalities  Started on ivf, electrolyte repletion, renal consulted  9/17  Patient appears to be volume depleted, states that he has not had any increased amount of water as he is fearful that it will cause him to have lower extremity edema  IVF maintained  BMP Q 8 hour checks     Per renal:  Hyponatremia  ? Possibly hypovolemic  States he only drinks about 24 ounces a day  Denies taking lasix recently  Will stop hypotonic solution and institute NSS @ 100 mL/hr  Hold off on fluid restriction for now   ? Encourage protein intake, treat pain and nausea accordingly  ? Urine osmolality, urine sodium, and TSH + T4 pending at this time  ?  BMP q8h for hyponatremia per best practice advisory    ED Triage Vitals   Temperature Pulse Respirations Blood Pressure SpO2   09/16/20 2217 09/16/20 2217 09/16/20 2217 09/16/20 2217 09/16/20 2217   98 1 °F (36 7 °C) 80 18 (!) 193/79 98 %      Temp Source Heart Rate Source Patient Position - Orthostatic VS BP Location FiO2 (%)   09/16/20 2217 09/16/20 2217 09/17/20 0045 09/16/20 2217 --   Temporal Monitor Lying Left arm       Pain Score       09/17/20 0311       No Pain          Wt Readings from Last 1 Encounters:   09/17/20 62 3 kg (137 lb 5 6 oz)     Additional Vital Signs:   09/17/20 0045      75   17   165/77   111   94 %   None (Room air)   Lying    09/17/20 0030      73   19   185/79Abnormal     114   98 %   None (Room air)       09/17/20 0015      71   10Abnormal     185/79Abnormal     113   99 %   None (Room air)       09/17/20 0007   97 4 °F (36 3 °C)Abnormal                           09/16/20 2300      69   11Abnormal     133/63   90   98 %   None (Room air)       09/16/20 2217   98 1 °F (36 7 °C)   80   18   193/79Abnormal        98 %            Pertinent Labs/Diagnostic Test Results:   Results from last 7 days   Lab Units 09/17/20  0552 09/17/20  0205 09/16/20  2227   WBC Thousand/uL 8 00  --  9 10   HEMOGLOBIN g/dL 12 3*  --  11 7*   HEMATOCRIT % 34 4*  --  33 5*   PLATELETS Thousands/uL 198 193 206   NEUTROS ABS Thousands/µL 5 70  --  7 50*     Results from last 7 days   Lab Units 09/17/20  0552 09/17/20  0548 09/16/20  2227   SODIUM mmol/L  --  121*  121* 120*   POTASSIUM mmol/L  --  3 8  3 8 4 3   CHLORIDE mmol/L  --  91*  90* 90* CO2 mmol/L  --  23  22 21   ANION GAP mmol/L  --  7  9 9   BUN mg/dL  --  19  19 23   CREATININE mg/dL  --  0 91  0 90 1 00   EGFR ml/min/1 73sq m  --  71  72 64   CALCIUM mg/dL  --  8 6  8 5* 8 7   MAGNESIUM mg/dL 2 3  --  1 7*     Results from last 7 days   Lab Units 09/16/20  2227   AST U/L 43*   ALT U/L 18   ALK PHOS U/L 47*   TOTAL PROTEIN g/dL 6 4   ALBUMIN g/dL 4 2   TOTAL BILIRUBIN mg/dL 0 90     Results from last 7 days   Lab Units 09/17/20  0548 09/16/20  2227   GLUCOSE RANDOM mg/dL 117*  116* 107*     Results from last 7 days   Lab Units 09/17/20  0549 09/17/20  0204 09/16/20  2227   TROPONIN I ng/mL <0 03 <0 03 <0 03     9/16  Cxr= Suspected development of small right apical pneumothorax    Confirmation via dual-energy chest x-ray or CT should be considered  No additional acute cardiopulmonary disease    ED Treatment:   Medication Administration from 09/16/2020 2209 to 09/17/2020 0006       Date/Time Order Dose Route Action     09/16/2020 2332 magnesium sulfate 2 g/50 mL IVPB (premix) 2 g 2 g Intravenous New Bag     09/16/2020 2333 aspirin chewable tablet 162 mg 162 mg Oral Given        Past Medical History:   Diagnosis Date    Arthritis     Cataract     Coronary artery disease     Coronary atherosclerosis of native coronary artery     Diverticulitis of colon     Essential hypertension, benign     Hyperlipidemia     Hypertension     Peptic ulcer     Renal disorder      Present on Admission:   Other chest pain   Coronary artery disease involving native coronary artery of native heart without angina pectoris   Essential hypertension   Bilateral leg edema   Chronic gout of multiple sites   Hypomagnesemia   Chronic hyponatremia   Mixed hyperlipidemia   Stage 3 chronic kidney disease (HCC)   Acute dehydration   Acute constipation    Admitting Diagnosis: Hypomagnesemia [E83 42]  Hyponatremia [E87 1]  Chest pain [R07 9]  Age/Sex: 80 y o  male  Admission Orders:  Nursing dysphagia assessment  ekg with serial troponin  Neuro checks  Pt/ot eval & tx  scd  Consult renal    Scheduled Medications:  allopurinol, 300 mg, Oral, Daily  aspirin, 81 mg, Oral, Once per day on Mon Wed Fri  atorvastatin, 20 mg, Oral, Daily  cholecalciferol, 2,000 Units, Oral, Daily  docusate sodium, 100 mg, Oral, BID  enoxaparin, 40 mg, Subcutaneous, Daily  fenofibrate, 48 mg, Oral, Daily  losartan, 50 mg, Oral, Daily  metoprolol tartrate, 25 mg, Oral, BID  nystatin, Topical, BID    Continuous IV Infusions:  sodium chloride, 100 mL/hr, Intravenous, Continuous    PRN Meds:  nitroglycerin, 0 4 mg, Sublingual, Q5 Min PRN  ondansetron, 4 mg, Intravenous, Q6H PRN  sodium chloride (PF), 3 mL, Intravenous, Q1H PRN    Network Utilization Review Department  [email or web address]  ATTENTION: Please call with any questions or concerns to [phone] and carefully listen to the prompts so that you are directed to the right person  All voicemails are confidential   Lamin Munoz all requests for admission clinical reviews, approved or denied determinations and any other requests to dedicated fax number below belonging to the campus where the patient is receiving treatment   List of dedicated fax numbers for the Facilities:  [address] [place] [address] DENIALS (Administrative/Medical Necessity) [phone]   [address]  (Maternity/NICU/Pediatrics) [phone]   [facility] [phone]   Jamas Piggs [phone]   Regla Dress [phone]   [address]  [phone]   [address] [phone]   [facility]  [phone]   [address] [facility] [address]  [address] [facility] [address] [facility] [phone]

## 2020-09-17 NOTE — ASSESSMENT & PLAN NOTE
· Patient states he has not had a bowel movement since Sunday, 09/13/2020  · Patient believes that it is because he is dehydrated  · Will order the patient Colace  · Monitor patient

## 2020-09-17 NOTE — ASSESSMENT & PLAN NOTE
· Patient states he does follow a low-sodium diet  · Patient also states that he has not had any increased amount of water as he is fearful that it will cause him to have lower extremity edema

## 2020-09-17 NOTE — QUICK NOTE
Renal quick note:  Sodium decreased from 121 mmol/L -> 119 mmol/L despite IVF  Urine osmolality noted to be elevated  Discussed plan of care with Dr Freya Meigs  Will provide 15 mg Tolvaptan now  Do not fluid restrict  Stop IVF  Check BMP and urine osmolality 2 hours after administration  -KRISTEL Espino

## 2020-09-17 NOTE — ASSESSMENT & PLAN NOTE
· Patient usually runs between 127-134  · On admission, 09/16/2020 patient was: 120  · Start patient on D5 half normal saline  · Monitor BMP every 3 hours  · Check urine sodium, creatinine, Osmo  · Neuro checks q 2 hours  · Fall precautions

## 2020-09-17 NOTE — CONSULTS
Mita Ray y o  male MRN: [id number]  Unit/Bed#: ICU 03 Encounter: [id number]        Assessment and Plan:    1  Hyponatremia  · Possibly hypovolemic  States he only drinks about 24 ounces a day  Denies taking lasix recently  Will stop hypotonic solution and institute NSS @ 100 mL/hr  Hold off on fluid restriction for now  · Encourage protein intake, treat pain and nausea accordingly  · Urine osmolality, urine sodium, and TSH + T4 pending at this time  · BMP q8h for hyponatremia per best practice advisory  2  Right Apical Pneumothorax  · Likely etiology of chest pain on arrival  Further management per primary team  Raghav Suzy kathryn thus far on room air  Denies further chest pain this morning  3  Hypertension associated with CKD 3  · BP is elevated this AM  Can consider increasing losartan to 100 mg daily if BP does not improve  4  Stage 3 Chronic Kidney Disease with baseline creatinine around 1 0-1 2 mg/dL  5  Hypomagnesemia  · Received IV replacement per primary team  6  Bilateral lower extremity edema  · Hold lasix for now      HPI:    George Orourke Sr  is a 80 y o  male with an active problem list significant for CAD s/p stent in 2002, hypertension, ventricular bigeminy, chronic mild hyponatremia, CKD 3a, who presented to [facility] [place] [address] [place] [address] ER 9/16 with chief complaint of right-sided chest pain  Initial labs significant for sodium 120 mmol/L, magnesium 1 7 mg/dL  Troponins negative  Chest x-ray significant for suspected small right apical pneumothorax  He was admitted to the intensive care unit and initiated on hypotonic solution which is now discontinued  Urine sodium/osmolality is pending at this time  A renal consultation was requested for hyponatremia  Upon discussion with the patient, he relays HPI as above  He states he has not been eating or drinking much the last few weeks  He drinks about 24 ounces per day   He has lasix on home med list for lower extremity edema but denies taking this recently  He denies nausea, vomiting, chronic pain  He denies party drugs  He denies any history of lung issues including cancer, COPD, etc  He is a former smoker who quit many years ago  From a sodium perspective, appears he does have chronic hyponatremia with sodium ranging from 130-134 mmol/L  Unclear etiology and no previous urine studies  The medical record, including Care Everywhere and media tabs were reviewed  Reason for Consult: Hyponatremia    Review of Systems:  A complete 10-point review of systems was performed  Aside from what was mentioned in the HPI, it is otherwise negative        Historical Information   Past Medical History:   Diagnosis Date    Arthritis     Cataract     Coronary artery disease     Coronary atherosclerosis of native coronary artery     Diverticulitis of colon     Essential hypertension, benign     Hyperlipidemia     Hypertension     Peptic ulcer     Renal disorder      Past Surgical History:   Procedure Laterality Date    CATARACT EXTRACTION Right     CORONARY ANGIOPLASTY      CORONARY STENT PLACEMENT      EYE SURGERY      cataract sx    SKIN BIOPSY      TONSILLECTOMY       Social History   Social History     Substance and Sexual Activity   Alcohol Use Not Currently     Social History     Substance and Sexual Activity   Drug Use No     Social History     Tobacco Use   Smoking Status Former Smoker    Types: Pipe   Smokeless Tobacco Never Used       Family History:   Family History   Problem Relation Age of Onset    Heart attack Brother     Heart disease Mother     Cancer Father        Medications:  Pertinent medications were reviewed  Current Facility-Administered Medications   Medication Dose Route Frequency Provider Last Rate    allopurinol  300 mg Oral Daily ANAM Song      aspirin  81 mg Oral Once per day on Mon Wed Fri ANAM Song      atorvastatin  20 mg Oral Daily ANAM Brooks  cholecalciferol  2,000 Units Oral Daily Nataliya A Meckes, CRNP      docusate sodium  100 mg Oral BID Nataliya A Meckes, CRNP      enoxaparin  40 mg Subcutaneous Daily Nataliya A Meckes, CRNP      fenofibrate  48 mg Oral Daily Nataliya A Meckes, CRNP      losartan  50 mg Oral Daily Nataliya A Meckes, CRNP      metoprolol tartrate  25 mg Oral BID Gatha Mech, CRNP      nitroglycerin  0 4 mg Sublingual Q5 Min PRN Nataliya A Meckes, CRNP      nystatin   Topical BID Nataliya A Meckes, CRNP      ondansetron  4 mg Intravenous Q6H PRN Nataliya A Meckes, CRNP      sodium chloride (PF)  3 mL Intravenous Q1H PRN Nataliya A Meckes, CRNP      sodium chloride  100 mL/hr Intravenous Continuous Eric Varvarelis,  mL/hr (09/17/20 0857)         No Known Allergies      Vitals:   BP (!) 189/82 (BP Location: Left arm)   Pulse 80   Temp 98 °F (36 7 °C) (Temporal)   Resp 16   Ht 5' 6" (1 676 m)   Wt 62 3 kg (137 lb 5 6 oz)   SpO2 97%   BMI 22 17 kg/m²   Body mass index is 22 17 kg/m²  SpO2: 97 %,   SpO2 Activity: At Rest,   O2 Device: None (Room air)      Intake/Output Summary (Last 24 hours) at 9/17/2020 0905  Last data filed at 9/17/2020 0858  Gross per 24 hour   Intake 771 66 ml   Output 1700 ml   Net -928 34 ml     Invasive Devices     Peripheral Intravenous Line            Peripheral IV 09/16/20 Right Antecubital less than 1 day                Physical Exam:  General: conscious, cooperative, in no acute distress  Eyes: conjunctivae pink, anicteric sclerae  ENT: lips and mucous membranes moist  Neck: supple, no JVD, no masses  Chest: essentially clear breath sounds bilateral, no crackles, ronchus or wheezings  CVS: S1 & S2, normal rate, regular rhythm  Abdomen: soft, non-tender, non-distended, normoactive bowel sounds  Extremities: mild pitting pedal edema  Skin: no rash  Neuro: awake, alert, oriented      Diagnostic Data:  Lab: I have personally reviewed pertinent lab results  ,   CBC:  Results from last 7 days   Lab Units 09/17/20  0552   WBC Thousand/uL 8 00   HEMOGLOBIN g/dL 12 3*   HEMATOCRIT % 34 4*   PLATELETS Thousands/uL 198      CMP:   Lab Results   Component Value Date    SODIUM 121 (L) 09/17/2020    SODIUM 121 (L) 09/17/2020    K 3 8 09/17/2020    K 3 8 09/17/2020    CL 90 (L) 09/17/2020    CL 91 (L) 09/17/2020    CO2 22 09/17/2020    CO2 23 09/17/2020    BUN 19 09/17/2020    BUN 19 09/17/2020    CREATININE 0 90 09/17/2020    CREATININE 0 91 09/17/2020    CALCIUM 8 5 (L) 09/17/2020    CALCIUM 8 6 09/17/2020    AST 43 (H) 09/16/2020    ALT 18 09/16/2020    ALKPHOS 47 (L) 09/16/2020    EGFR 72 09/17/2020    EGFR 71 09/17/2020   ,   PT/INR: No results found for: PT, INR,   Magnesium: No components found for: MAG,  Phosphorous: No results found for: PHOS    Microbiology:  @LABRCNTIP,(urinecx:7)@        ANAM Randhawa    Portions of the record may have been created with voice recognition software  Occasional wrong word or "sound a like" substitutions may have occurred due to the inherent limitations of voice recognition software  Read the chart carefully and recognize, using context, where substitutions have occurred

## 2020-09-17 NOTE — ED PROVIDER NOTES
History  Chief Complaint   Patient presents with    Chest Pain     @ 2015 tonight , rt sided , dull, while washing dishes       80-YEAR-OLD MALE  PMH: CAD    Chief complaint: CP    PAIN WAS RATED AS MODERATE  DESCRIBED AS SHARP  Pain lasted about an hour and has resolved since this time    PATIENT HAS NO SHORTNESS OF BREATH  NO   COMPLAINTS OF DIAPHORESIS    HE DENIES ANY RADIATION OF PAIN TO THE JAW NECK OR THE BACK  INTERVENTIONS: NONE      PATIENT DENIES ANY COUGH, NO FEVERS OR CHILLS  NO URI SYMPTOMS      VTE  RISK FACTORS:  NONE  NO HISTORY OF PE OR DVT  NO LONG CAR RIDES OR PLANE RIDES  NO IMMOBILIZATION  NO RECENT SURGERY OR TRAUMA  NO HEMOPTYSIS  OTHER ASSOCIATED SYMPTOMS:  NO ABDOMINAL PAIN  NO NAUSEA OR VOMITING  NO STOOL CHANGES  No other complaints          History provided by:  Patient  Chest Pain   Pain location:  R chest  Pain quality: sharp    Pain radiates to:  Does not radiate  Pain radiates to the back: no    Pain severity:  No pain  Progression:  Resolved  Chronicity:  New  Relieved by:  Nothing  Worsened by:  Nothing tried  Ineffective treatments:  None tried  Associated symptoms: no abdominal pain, no altered mental status, no back pain, no cough, no diaphoresis, no dizziness, no dysphagia, no fatigue, no fever, no headache, no lower extremity edema, no nausea, no near-syncope, no palpitations, no shortness of breath, no syncope and not vomiting        Prior to Admission Medications   Prescriptions Last Dose Informant Patient Reported? Taking?    Cholecalciferol ([employer] VITAMIN D) 2000 units CAPS  Self Yes No   Sig: Take by mouth   Choline Fenofibrate (FENOFIBRIC ACID) 45 MG CPDR  Self Yes No   Sig: Take by mouth   Multiple Vitamins tablet  Self Yes No   Sig: Take by mouth   allopurinol (ZYLOPRIM) 300 mg tablet  Self Yes No   Sig: Take 1 tablet by mouth daily   aspirin 81 MG tablet  Self Yes No   Sig: Take 1 tablet by mouth see administration instructions Take 1 tablet Mon-Wed & Fri   atorvastatin (LIPITOR) 20 mg tablet  Self Yes No   Sig: Take 1 tablet by mouth daily   furosemide (LASIX) 20 mg tablet  Self Yes No   Sig: Take 1 tablet by mouth daily as needed   irbesartan (AVAPRO) 75 mg tablet  Self Yes No   Sig: Take 150 mg by mouth daily    metoprolol tartrate (LOPRESSOR) 25 mg tablet  Self Yes No   Sig: Take 25 mg by mouth 2 (two) times a day    nitroglycerin (Nitrostat) 0 4 mg SL tablet   No No   Sig: Place 1 tablet (0 4 mg total) under the tongue every 5 (five) minutes as needed for chest pain   nystatin (MYCOSTATIN) powder   No No   Sig: Apply topically 2 (two) times a day   ofloxacin (OCUFLOX) 0 3 % ophthalmic solution  Self Yes No   Sig: ofloxacin 0 3 % eye drops   pneumococcal 23-valent polysaccharide vaccine (PNEUMOVAX 23)  Self Yes No   Sig: Pneumovax 23 25 mcg/0 5 mL injection syringe   potassium chloride (KLOR-CON) 20 mEq packet   Yes No   Sig: Take 20 mEq by mouth as needed Take 1 tablet when taking Furosemide   prednisoLONE acetate (PRED FORTE) 1 % ophthalmic suspension  Self Yes No   Sig: prednisolone acetate 1 % eye drops,suspension      Facility-Administered Medications: None       Past Medical History:   Diagnosis Date    Cataract     Coronary artery disease     Coronary atherosclerosis of native coronary artery     Diverticulitis of colon     Essential hypertension, benign     Hyperlipidemia     Hypertension     Peptic ulcer        Past Surgical History:   Procedure Laterality Date    CATARACT EXTRACTION Right     CORONARY ANGIOPLASTY      CORONARY STENT PLACEMENT      TONSILLECTOMY         Family History   Problem Relation Age of Onset    Heart attack Brother     Heart disease Mother     Cancer Father      I have reviewed and agree with the history as documented      E-Cigarette/Vaping     E-Cigarette/Vaping Substances     Social History     Tobacco Use    Smoking status: Never Smoker    Smokeless tobacco: Never Used   Substance Use Topics    Alcohol use: Not Currently    Drug use: No       Review of Systems   Constitutional: Negative for chills, diaphoresis, fatigue and fever  HENT: Negative for trouble swallowing  Respiratory: Negative for cough, shortness of breath, wheezing and stridor  Cardiovascular: Positive for chest pain  Negative for palpitations, leg swelling, syncope and near-syncope  Gastrointestinal: Negative for abdominal pain, blood in stool, nausea and vomiting  Genitourinary: Negative for difficulty urinating, dysuria, flank pain and frequency  Musculoskeletal: Negative for arthralgias, back pain, gait problem, joint swelling, myalgias, neck pain and neck stiffness  Skin: Negative for rash and wound  Neurological: Negative for dizziness, light-headedness and headaches  All other systems reviewed and are negative  Physical Exam  Physical Exam  Constitutional:       General: He is not in acute distress  Appearance: He is well-developed  He is not ill-appearing, toxic-appearing or diaphoretic  HENT:      Head: Normocephalic and atraumatic  Nose: Nose normal       Mouth/Throat:      Pharynx: No oropharyngeal exudate  Eyes:      General: No scleral icterus  Right eye: No discharge  Left eye: No discharge  Conjunctiva/sclera: Conjunctivae normal       Pupils: Pupils are equal, round, and reactive to light  Neck:      Musculoskeletal: Normal range of motion and neck supple  Vascular: No JVD  Trachea: No tracheal deviation  Cardiovascular:      Rate and Rhythm: Normal rate and regular rhythm  Heart sounds: Normal heart sounds  Heart sounds not distant  No murmur  No friction rub  No gallop  Pulmonary:      Effort: Pulmonary effort is normal  No tachypnea, accessory muscle usage or respiratory distress  Breath sounds: Normal breath sounds  No stridor  No decreased breath sounds, wheezing, rhonchi or rales  Chest:      Chest wall: No tenderness     Abdominal: General: Bowel sounds are normal  There is no distension  Palpations: Abdomen is soft  There is no mass  Tenderness: There is no abdominal tenderness  There is no guarding or rebound  Hernia: No hernia is present  Musculoskeletal: Normal range of motion  General: No tenderness or deformity  Right lower leg: He exhibits no tenderness  No edema  Left lower leg: He exhibits no tenderness  No edema  Lymphadenopathy:      Cervical: No cervical adenopathy  Skin:     General: Skin is warm  Capillary Refill: Capillary refill takes less than 2 seconds  Coloration: Skin is not cyanotic or pale  Findings: No ecchymosis, erythema or rash  Neurological:      Mental Status: He is alert and oriented to person, place, and time  Cranial Nerves: No cranial nerve deficit  Sensory: No sensory deficit  Motor: No weakness or abnormal muscle tone  Coordination: Coordination normal    Psychiatric:         Mood and Affect: Mood normal  Mood is not anxious  Behavior: Behavior normal  Behavior is not agitated  Thought Content:  Thought content normal          Judgment: Judgment normal          Vital Signs  ED Triage Vitals [09/16/20 2217]   Temperature Pulse Respirations Blood Pressure SpO2   98 1 °F (36 7 °C) 80 18 (!) 193/79 98 %      Temp Source Heart Rate Source Patient Position - Orthostatic VS BP Location FiO2 (%)   Temporal Monitor -- Left arm --      Pain Score       --           Vitals:    09/16/20 2217 09/16/20 2300   BP: (!) 193/79 133/63   Pulse: 80 69         Visual Acuity      ED Medications  Medications   sodium chloride (PF) 0 9 % injection 3 mL (has no administration in time range)   magnesium sulfate 2 g/50 mL IVPB (premix) 2 g (2 g Intravenous New Bag 9/16/20 2332)   aspirin chewable tablet 162 mg (162 mg Oral Given 9/16/20 2333)       Diagnostic Studies  Results Reviewed     Procedure Component Value Units Date/Time    Magnesium [[id number]]  (Abnormal) Collected:  09/16/20 2227    Lab Status:  Final result Specimen:  Blood from Arm, Left Updated:  09/16/20 2254     Magnesium 1 7 mg/dL     Comprehensive metabolic panel [[id number]]  (Abnormal) Collected:  09/16/20 2227    Lab Status:  Final result Specimen:  Blood from Arm, Left Updated:  09/16/20 2254     Sodium 120 mmol/L      Potassium 4 3 mmol/L      Chloride 90 mmol/L      CO2 21 mmol/L      ANION GAP 9 mmol/L      BUN 23 mg/dL      Creatinine 1 00 mg/dL      Glucose 107 mg/dL      Calcium 8 7 mg/dL      AST 43 U/L      ALT 18 U/L      Alkaline Phosphatase 47 U/L      Total Protein 6 4 g/dL      Albumin 4 2 g/dL      Total Bilirubin 0 90 mg/dL      eGFR 64 ml/min/1 73sq m     Narrative:       [facility] guidelines for Chronic Kidney Disease (CKD):     Stage 1 with normal or high GFR (GFR > 90 mL/min/1 73 square meters)    Stage 2 Mild CKD (GFR = 60-89 mL/min/1 73 square meters)    Stage 3A Moderate CKD (GFR = 45-59 mL/min/1 73 square meters)    Stage 3B Moderate CKD (GFR = 30-44 mL/min/1 73 square meters)    Stage 4 Severe CKD (GFR = 15-29 mL/min/1 73 square meters)    Stage 5 End Stage CKD (GFR <15 mL/min/1 73 square meters)  Note: GFR calculation is accurate only with a steady state creatinine    Troponin I [[id number]]  (Normal) Collected:  09/16/20 2227    Lab Status:  Final result Specimen:  Blood from Arm, Left Updated:  09/16/20 2252     Troponin I <0 03 ng/mL     CBC and differential [[id number]]  (Abnormal) Collected:  09/16/20 2227    Lab Status:  Final result Specimen:  Blood from Arm, Left Updated:  09/16/20 2244     WBC 9 10 Thousand/uL      RBC 3 53 Million/uL      Hemoglobin 11 7 g/dL      Hematocrit 33 5 %      MCV 95 fL      MCH 33 2 pg      MCHC 35 0 g/dL      RDW 14 5 %      MPV 8 0 fL      Platelets 996 Thousands/uL      Neutrophils Relative 83 %      Lymphocytes Relative 10 %      Monocytes Relative 7 %      Eosinophils Relative 0 % Basophils Relative 1 %      Neutrophils Absolute 7 50 Thousands/µL      Lymphocytes Absolute 0 90 Thousands/µL      Monocytes Absolute 0 60 Thousand/µL      Eosinophils Absolute 0 00 Thousand/µL      Basophils Absolute 0 00 Thousands/µL                  X-ray chest 1 view portable   ED Interpretation by Yordy Chappell MD (09/16 2309)   INDICATION:   Chest Pain      COMPARISON:  4 2019     EXAM PERFORMED/VIEWS:  [facility] CHEST         FINDINGS:     Cardiomediastinal silhouette appears unremarkable      The lungs are clear    No pneumothorax or pleural effusion      Osseous structures appear within normal limits for patient age      IMPRESSION:     No acute abnormality in the chest                  Procedures  Procedures         ED Course  ED Course as of Sep 16 2338   Wed Sep 16, 2020   2307 Comprehensive metabolic panel(!)   8882 Magnesium(!): 1 7   2307 Troponin I: <0 03   2307 WBC: 9 10   2307 Hemoglobin(!): 11 7   2307 HCT(!): 33 5   2307 Platelet Count: 775   2307 Neutrophils %(!): 83   2307 Lymphocytes Relative(!): 10   2307 Monocytes Relative: 7   2307 Eosinophils: 0   2314 Pt understands results of work up   He is willing to stay for CP obs      2327 Dw Shavonne Tyson  Wants pt in ICU for NA monitoring   Wants full admission       2329 Bp normalized  Nitro paste discontinued               HEART Risk Score      Most Recent Value   Heart Score Risk Calculator   History  2 Filed at: 09/16/2020 2314   ECG  1 Filed at: 09/16/2020 2314   Age  2 Filed at: 09/16/2020 2314   Risk Factors  2 Filed at: 09/16/2020 2314   Troponin  0 Filed at: 09/16/2020 2314   HEART Score  7 Filed at: 09/16/2020 2314                                  MDM    Disposition  Final diagnoses:   Chest pain   Hypomagnesemia   Hyponatremia     Time reflects when diagnosis was documented in both MDM as applicable and the Disposition within this note     Time User Action Codes Description Comment    9/16/2020 11:29 PM Marycruz Moore Add [R07 9] Chest pain 9/16/2020 11:30 PM Guerrero Calvo Add [E83 42] Hypomagnesemia     9/16/2020 11:30 PM Masters Lubna Add [E87 1] Hyponatremia       ED Disposition     ED Disposition Condition Date/Time Comment    Admit Stable Wed Sep 16, 2020 11:29 PM Case was discussed with Sharri Meadows and the patient's admission status was agreed to be Admission Status: observation status to the service of Dr Flor Husbands   Follow-up Information    None         Patient's Medications   Discharge Prescriptions    No medications on file     No discharge procedures on file      PDMP Review     None          ED Provider  Electronically Signed by           Marisol Lakhani MD  09/16/20 3776

## 2020-09-17 NOTE — ED PROCEDURE NOTE
PROCEDURE  ECG 12 Lead Documentation Only    Date/Time: 9/16/2020 11:21 PM  Performed by: Alejandro Villalobos MD  Authorized by: Alejandro Villalobos MD     Indications / Diagnosis:  Cp   ECG reviewed by me, the ED Provider: yes    Patient location:  ED  Interpretation:     Interpretation: normal    Rate:     ECG rate:  74    ECG rate assessment: normal    Rhythm:     Rhythm: sinus rhythm    Ectopy:     Ectopy: none    QRS:     QRS axis:  Normal    QRS intervals:  Normal  Conduction:     Conduction: normal    ST segments:     ST segments:  Non-specific  T waves:     T waves: non-specific           Alejandro Villalobos MD  09/16/20 2348

## 2020-09-17 NOTE — CASE MANAGEMENT
Evaluated the pt at the bedside with son Shaniqua Snowden [employer] [facility]) present  Pt was admitted to the hospital for CP  Pt states he lives alone in a 2 story home with  [address]   Pt reports his bedroom and bath are on the first floor  Pt ambulates with a cane  Pt has a walker and WC at home  Pt indicated his son and daughter live a few blocks away and check on him daily  Son cooks some meals and  they both provide transportation  Pt states he gets his medications from [facility]  Pt PCP is Dr Radha Minor  Pt will need PT/OT evaluation for any care needs  Son will transport home upon discharge  Patient/caregiver received discharge checklist   Content reviewed  Patient/caregiver encouraged to participate in discharge plan of care prior to discharge home  CM reviewed d/c planning process including the following: identifying help at home, patient preference for d/c planning needs, availability of treatment team to discuss questions or concerns patient and/or family may have regarding understanding medications and recognizing signs and symptoms once discharged  CM also encouraged patient to follow up with all recommended appointments after discharge  Patient advised of importance for patient and family to participate in managing patients medical well being

## 2020-09-18 PROBLEM — J93.83 OTHER PNEUMOTHORAX: Status: ACTIVE | Noted: 2020-09-18

## 2020-09-18 LAB
ANION GAP SERPL CALCULATED.3IONS-SCNC: 10 MMOL/L (ref 4–13)
ANION GAP SERPL CALCULATED.3IONS-SCNC: 6 MMOL/L (ref 4–13)
ANION GAP SERPL CALCULATED.3IONS-SCNC: 7 MMOL/L (ref 4–13)
BASOPHILS # BLD AUTO: 0.1 THOUSANDS/ΜL (ref 0–0.1)
BASOPHILS NFR BLD AUTO: 1 % (ref 0–2)
BUN SERPL-MCNC: 19 MG/DL (ref 7–25)
BUN SERPL-MCNC: 20 MG/DL (ref 7–25)
BUN SERPL-MCNC: 23 MG/DL (ref 7–25)
CALCIUM SERPL-MCNC: 8.5 MG/DL (ref 8.6–10.5)
CALCIUM SERPL-MCNC: 8.5 MG/DL (ref 8.6–10.5)
CALCIUM SERPL-MCNC: 8.6 MG/DL (ref 8.6–10.5)
CHLORIDE SERPL-SCNC: 91 MMOL/L (ref 98–107)
CHLORIDE SERPL-SCNC: 92 MMOL/L (ref 98–107)
CHLORIDE SERPL-SCNC: 95 MMOL/L (ref 98–107)
CO2 SERPL-SCNC: 22 MMOL/L (ref 21–31)
CO2 SERPL-SCNC: 23 MMOL/L (ref 21–31)
CO2 SERPL-SCNC: 24 MMOL/L (ref 21–31)
CREAT SERPL-MCNC: 0.92 MG/DL (ref 0.7–1.3)
CREAT SERPL-MCNC: 0.94 MG/DL (ref 0.7–1.3)
CREAT SERPL-MCNC: 0.98 MG/DL (ref 0.7–1.3)
EOSINOPHIL # BLD AUTO: 0 THOUSAND/ΜL (ref 0–0.61)
EOSINOPHIL NFR BLD AUTO: 0 % (ref 0–5)
ERYTHROCYTE [DISTWIDTH] IN BLOOD BY AUTOMATED COUNT: 14.6 % (ref 11.5–14.5)
GFR SERPL CREATININE-BSD FRML MDRD: 65 ML/MIN/1.73SQ M
GFR SERPL CREATININE-BSD FRML MDRD: 69 ML/MIN/1.73SQ M
GFR SERPL CREATININE-BSD FRML MDRD: 70 ML/MIN/1.73SQ M
GLUCOSE SERPL-MCNC: 119 MG/DL (ref 65–99)
GLUCOSE SERPL-MCNC: 75 MG/DL (ref 65–99)
GLUCOSE SERPL-MCNC: 90 MG/DL (ref 65–99)
HCT VFR BLD AUTO: 37 % (ref 42–47)
HGB BLD-MCNC: 12.5 G/DL (ref 14–18)
LYMPHOCYTES # BLD AUTO: 1.7 THOUSANDS/ΜL (ref 0.6–4.47)
LYMPHOCYTES NFR BLD AUTO: 19 % (ref 21–51)
MAGNESIUM SERPL-MCNC: 2.1 MG/DL (ref 1.9–2.7)
MCH RBC QN AUTO: 32.6 PG (ref 26–34)
MCHC RBC AUTO-ENTMCNC: 33.9 G/DL (ref 31–37)
MCV RBC AUTO: 96 FL (ref 81–99)
MONOCYTES # BLD AUTO: 0.7 THOUSAND/ΜL (ref 0.17–1.22)
MONOCYTES NFR BLD AUTO: 8 % (ref 2–12)
NEUTROPHILS # BLD AUTO: 6.4 THOUSANDS/ΜL (ref 1.4–6.5)
NEUTS SEG NFR BLD AUTO: 72 % (ref 42–75)
PHOSPHATE SERPL-MCNC: 1.7 MG/DL (ref 3–5.5)
PLATELET # BLD AUTO: 229 THOUSANDS/UL (ref 149–390)
PMV BLD AUTO: 8.8 FL (ref 8.6–11.7)
POTASSIUM SERPL-SCNC: 3.6 MMOL/L (ref 3.5–5.5)
POTASSIUM SERPL-SCNC: 4.1 MMOL/L (ref 3.5–5.5)
POTASSIUM SERPL-SCNC: 4.3 MMOL/L (ref 3.5–5.5)
RBC # BLD AUTO: 3.85 MILLION/UL (ref 4.3–5.9)
SODIUM SERPL-SCNC: 122 MMOL/L (ref 134–143)
SODIUM SERPL-SCNC: 123 MMOL/L (ref 134–143)
SODIUM SERPL-SCNC: 125 MMOL/L (ref 134–143)
WBC # BLD AUTO: 9 THOUSAND/UL (ref 4.8–10.8)

## 2020-09-18 PROCEDURE — 99232 SBSQ HOSP IP/OBS MODERATE 35: CPT | Performed by: NURSE PRACTITIONER

## 2020-09-18 PROCEDURE — 85025 COMPLETE CBC W/AUTO DIFF WBC: CPT | Performed by: HOSPITALIST

## 2020-09-18 PROCEDURE — 97163 PT EVAL HIGH COMPLEX 45 MIN: CPT

## 2020-09-18 PROCEDURE — 83735 ASSAY OF MAGNESIUM: CPT | Performed by: NURSE PRACTITIONER

## 2020-09-18 PROCEDURE — 84100 ASSAY OF PHOSPHORUS: CPT | Performed by: NURSE PRACTITIONER

## 2020-09-18 PROCEDURE — 97166 OT EVAL MOD COMPLEX 45 MIN: CPT

## 2020-09-18 PROCEDURE — 80048 BASIC METABOLIC PNL TOTAL CA: CPT | Performed by: INTERNAL MEDICINE

## 2020-09-18 PROCEDURE — 80048 BASIC METABOLIC PNL TOTAL CA: CPT | Performed by: NURSE PRACTITIONER

## 2020-09-18 PROCEDURE — 99232 SBSQ HOSP IP/OBS MODERATE 35: CPT | Performed by: HOSPITALIST

## 2020-09-18 RX ADMIN — DOCUSATE SODIUM 100 MG: 100 CAPSULE, LIQUID FILLED ORAL at 17:17

## 2020-09-18 RX ADMIN — LOSARTAN POTASSIUM 50 MG: 50 TABLET, FILM COATED ORAL at 08:45

## 2020-09-18 RX ADMIN — METOPROLOL TARTRATE 25 MG: 25 TABLET, FILM COATED ORAL at 17:17

## 2020-09-18 RX ADMIN — METOPROLOL TARTRATE 25 MG: 25 TABLET, FILM COATED ORAL at 08:45

## 2020-09-18 RX ADMIN — GLYCERIN, HYPROMELLOSE, POLYETHYLENE GLYCOL 1 DROP: .2; .2; 1 LIQUID OPHTHALMIC at 20:20

## 2020-09-18 RX ADMIN — ALLOPURINOL 300 MG: 300 TABLET ORAL at 08:46

## 2020-09-18 RX ADMIN — DIBASIC SODIUM PHOSPHATE, MONOBASIC POTASSIUM PHOSPHATE AND MONOBASIC SODIUM PHOSPHATE 1 TABLET: 852; 155; 130 TABLET ORAL at 13:13

## 2020-09-18 RX ADMIN — ASPIRIN 81 MG 81 MG: 81 TABLET ORAL at 08:50

## 2020-09-18 RX ADMIN — GLYCERIN, HYPROMELLOSE, POLYETHYLENE GLYCOL 1 DROP: .2; .2; 1 LIQUID OPHTHALMIC at 13:21

## 2020-09-18 RX ADMIN — FENOFIBRATE 48 MG: 48 TABLET, FILM COATED ORAL at 08:46

## 2020-09-18 RX ADMIN — TOLVAPTAN 15 MG: 30 TABLET ORAL at 18:07

## 2020-09-18 RX ADMIN — TOLVAPTAN 15 MG: 30 TABLET ORAL at 08:46

## 2020-09-18 RX ADMIN — VITAMIN D 2000 UNITS: 25 TAB ORAL at 08:45

## 2020-09-18 RX ADMIN — ATORVASTATIN CALCIUM 20 MG: 20 TABLET, FILM COATED ORAL at 17:17

## 2020-09-18 RX ADMIN — ENOXAPARIN SODIUM 40 MG: 40 INJECTION SUBCUTANEOUS at 08:46

## 2020-09-18 RX ADMIN — NYSTATIN: 100000 POWDER TOPICAL at 08:46

## 2020-09-18 RX ADMIN — DOCUSATE SODIUM 100 MG: 100 CAPSULE, LIQUID FILLED ORAL at 08:45

## 2020-09-18 RX ADMIN — DIBASIC SODIUM PHOSPHATE, MONOBASIC POTASSIUM PHOSPHATE AND MONOBASIC SODIUM PHOSPHATE 1 TABLET: 852; 155; 130 TABLET ORAL at 17:17

## 2020-09-18 RX ADMIN — NYSTATIN: 100000 POWDER TOPICAL at 17:17

## 2020-09-18 NOTE — ASSESSMENT & PLAN NOTE
· No significant improvement with IV fluids  · Tolvaptan started by Nephrology  · Of note, this is a chronic condition, patient is otherwise asymptomatic  · Discharge planning once cleared by Nephrology

## 2020-09-18 NOTE — ASSESSMENT & PLAN NOTE
· Stable  · Continue current cardiac based medications which include aspirin, metoprolol, losartan, fenofibrate, and Lipitor

## 2020-09-18 NOTE — NURSING NOTE
Patient pleasant and talkative, denies pain or discomfort  Complex physical assessment as noted, see chart for details  Verbalizing needs  Call bell in reach

## 2020-09-18 NOTE — PROGRESS NOTES
Progress Note - Dionisio Bustamante   10/11/1924, 80 y o  male MRN: [id number]    Unit/Bed#: ICU 03 Encounter: [id number]    Primary Care Provider: Miguel Valencia MD   Date and time admitted to hospital: 9/16/2020 10:11 PM        * Other chest pain  Assessment & Plan  · Resolved  · Atypical - ACS ruled out  · Initial concerns included possibility of right sided apical pneumothorax  · Initial chest x-ray was concerning for a pneumothorax, however confirmatory CT scan testing ruled this out  · ? Anxiety related  · No further inpatient workup needed at this time    Other pneumothorax  Assessment & Plan  · See the outline above    Chronic hyponatremia  Assessment & Plan  · No significant improvement with IV fluids  · Tolvaptan started by Nephrology  · Of note, this is a chronic condition, patient is otherwise asymptomatic  · Discharge planning once cleared by Nephrology    Essential hypertension  Assessment & Plan  · Continue metoprolol and Cozaar    Coronary artery disease involving native coronary artery of native heart without angina pectoris  Assessment & Plan  · Stable  · Continue current cardiac based medications which include aspirin, metoprolol, losartan, fenofibrate, and Lipitor    Bilateral leg edema  Assessment & Plan  · Stable  · Will defer Lasix initiation to nephrology    Stage 3 chronic kidney disease ([facility] [address] )  Assessment & Plan  · Creatinine is stable and at baseline  · Avoid nephrotoxins  · Continue to monitor    Mixed hyperlipidemia  Assessment & Plan  · Continue Lipitor and fenofibrate    Chronic gout of multiple sites  Assessment & Plan  · Continue allopurinol        VTE Prophylaxis:  Enoxaparin (Lovenox)    Patient Centered Rounds: I have performed bedside rounds with nursing staff today      Discussions with Specialists or Other Care Team Provider:  Nephrology, case management, nursing, pharmacy  Education and Discussions with Family / Patient:  The patient's son was bedside at the time of my evaluation, all questions answered to his satisfaction    Current Length of Stay: 2 day(s)    Current Patient Status: Inpatient   Certification Statement: The patient will continue to require additional inpatient hospital stay due to The need for continued management of hyponatremia    Discharge Plan:  Discharge planning will commence once the patient has been cleared by Nephrology and once we have a PT OT evaluation recommendations    Code Status: Level 3 - DNAR and DNI    Subjective:   Patient seen and examined, lying in bed, no new complaints no distress  He specifically denies any chest pain and or shortness of breath  Objective:     Vitals:   Temp (24hrs), Av 6 °F (36 4 °C), Min:97 °F (36 1 °C), Max:97 9 °F (36 6 °C)    Temp:  [97 °F (36 1 °C)-97 9 °F (36 6 °C)] 97 8 °F (36 6 °C)  HR:  [60-84] 67  Resp:  [10-35] 18  BP: ()/(51-94) 122/58  SpO2:  [96 %-100 %] 98 %  Body mass index is 21 28 kg/m²  Input and Output Summary (last 24 hours): Intake/Output Summary (Last 24 hours) at 2020 1650  Last data filed at 2020 2658  Gross per 24 hour   Intake 1356 67 ml   Output 3475 ml   Net -2118 33 ml       Physical Exam:   Physical Exam  Vitals signs and nursing note reviewed  Constitutional:       General: He is not in acute distress  Appearance: Normal appearance  He is not ill-appearing  HENT:      Head: Normocephalic and atraumatic  Ears:      Comments: Hard of hearing     Nose: Nose normal    Eyes:      Extraocular Movements: Extraocular movements intact  Pupils: Pupils are equal, round, and reactive to light  Neck:      Musculoskeletal: Normal range of motion and neck supple  No neck rigidity or muscular tenderness  Cardiovascular:      Rate and Rhythm: Normal rate and regular rhythm  Pulses: Normal pulses  Heart sounds: Normal heart sounds  No murmur  No friction rub  No gallop      Pulmonary:      Effort: Pulmonary effort is normal       Breath sounds: Normal breath sounds  Abdominal:      General: There is no distension  Palpations: Abdomen is soft  There is no mass  Tenderness: There is no abdominal tenderness  There is no guarding or rebound  Musculoskeletal: Normal range of motion  General: No swelling or tenderness  Right lower leg: No edema  Left lower leg: No edema  Skin:     General: Skin is warm  Capillary Refill: Capillary refill takes less than 2 seconds  Findings: No erythema or rash  Neurological:      General: No focal deficit present  Mental Status: He is alert and oriented to person, place, and time  Mental status is at baseline  Psychiatric:         Mood and Affect: Mood normal          Behavior: Behavior normal          Additional Data:     Labs:    Results from last 7 days   Lab Units 09/18/20  0636   WBC Thousand/uL 9 00   HEMOGLOBIN g/dL 12 5*   HEMATOCRIT % 37 0*   PLATELETS Thousands/uL 229   NEUTROS PCT % 72   LYMPHS PCT % 19*   MONOS PCT % 8   EOS PCT % 0     Results from last 7 days   Lab Units 09/18/20  0025  09/16/20  2227   SODIUM mmol/L 122*   < > 120*   POTASSIUM mmol/L 4 3   < > 4 3   CHLORIDE mmol/L 92*   < > 90*   CO2 mmol/L 23   < > 21   BUN mg/dL 20   < > 23   CREATININE mg/dL 0 98   < > 1 00   CALCIUM mg/dL 8 5*   < > 8 7   ALK PHOS U/L  --   --  47*   ALT U/L  --   --  18   AST U/L  --   --  43*    < > = values in this interval not displayed  * I Have Reviewed All Lab Data Listed Above  * Additional Pertinent Lab Tests Reviewed:  [facility]  Reviewed    Imaging:  Imaging Reports Reviewed Today Include:  CT chest-no pneumothorax    Recent Cultures (last 7 days):           Last 24 Hours Medication List:   Current Facility-Administered Medications   Medication Dose Route Frequency Provider Last Rate    allopurinol  300 mg Oral Daily ANAM Song      aspirin  81 mg Oral Once per day on Mon Wed Fri Denny Leyden A Mecilda, CRNP      atorvastatin  20 mg Oral Daily Hemasavannahalmas Holstein, CRNP      cholecalciferol  2,000 Units Oral Daily Nataliya Go, CRNP      docusate sodium  100 mg Oral BID Nataliya Go, CRNP      enoxaparin  40 mg Subcutaneous Daily Nataliya KRISTEL Christiekes, CRNP      fenofibrate  48 mg Oral Daily Nataliya Go, CRNP      glycerin-hypromellose-  1 drop Both Eyes Q3H PRN Susan Andrews MD      losartan  50 mg Oral Daily Nataliya Go, CRNP      metoprolol tartrate  25 mg Oral BID Hnana Go, CRNP      nitroglycerin  0 4 mg Sublingual Q5 Min PRN Nataliya Go, CRNP      nystatin   Topical BID Nataliya Go, CRNP      ondansetron  4 mg Intravenous Q6H PRN Nataliya Go, CRNP      sodium chloride (PF)  3 mL Intravenous Q1H PRN aNtaliya Go, CRNP      tolvaptan  15 mg Oral Once Joshua Mills DO          Today, Patient Was Seen By: Susan Andrews MD    ** Please Note: Dictation voice to text software may have been used in the creation of this document   **

## 2020-09-18 NOTE — PROGRESS NOTES
NEPHROLOGY PROGRESS NOTE   Saint Cherry Sr  80 y o  male MRN: [id number]  Unit/Bed#: ICU 03 Encounter: [id number]    Assessment/Plan:    79 yo male with CKD 3, CAD admitted 9/17 for chest pain, work-up negative, and hyponatremia which did not improve with IVF  Urine osmolality elevated  Received 1 dose Tolvaptan yesterday with slight improvement in sodium and received second dose this AM  BMP pending  1  Acute on Chronic Hyponatremia  · Examines euvolemic  Sodium decreased with IVF and urine osmolality elevated  Given Tolvaptan 15 mg yesterday evening with increase in sodium from 119 mmol/L -> 122 mmol/L  Second dose 15 mg Tolvaptan given this AM and a repeat BMP is pending  No fluid restriction indicated at this time  Encourage protein intake  2  Possible SIADH  · Elevated urine osmolality noted  Denies pain, nausea  CT chest was significant for small lung nodules  3  Hypertension associated with CKD 3  · Blood pressure controlled on current regimen  4  Chest pain- resolved  · Cardiac work-up negative  Ptx not seen on CT scan  5  Stage 2 Chronic Kidney Disease with baseline creatinine around 1 1-1 2 mg/dL   6  Hypophosphatemia  · Will provide oral supplement   7  Bilateral Leg Edema  · Mostly pedal  Lasix appropriately on hold at this time  ROS  States he is constipated  A complete 10 point review of systems have been performed and are otherwise negative         Historical Information   Past Medical History:   Diagnosis Date    Arthritis     Cataract     Coronary artery disease     Coronary atherosclerosis of native coronary artery     Diverticulitis of colon     Essential hypertension, benign     Hyperlipidemia     Hypertension     Peptic ulcer     Renal disorder      Past Surgical History:   Procedure Laterality Date    CATARACT EXTRACTION Right     CORONARY ANGIOPLASTY      CORONARY STENT PLACEMENT      EYE SURGERY      cataract sx    SKIN BIOPSY      TONSILLECTOMY       Social History   Social History     Substance and Sexual Activity   Alcohol Use Not Currently     Social History     Substance and Sexual Activity   Drug Use No     Social History     Tobacco Use   Smoking Status Former Smoker    Types: Pipe   Smokeless Tobacco Never Used       Family History:   Family History   Problem Relation Age of Onset    Heart attack Brother     Heart disease Mother     Cancer Father        Medications:  Pertinent medications were reviewed  Current Facility-Administered Medications   Medication Dose Route Frequency Provider Last Rate    allopurinol  300 mg Oral Daily Natlaiya A Donavan, CRNP      aspirin  81 mg Oral Once per day on Mon Wed Fri Nataliyamiranda Christiekes, CRNP      atorvastatin  20 mg Oral Daily ANAM Valdez      cholecalciferol  2,000 Units Oral Daily Nataliya A Meckes, CRNP      docusate sodium  100 mg Oral BID Nataliya A Meckes, CRNP      enoxaparin  40 mg Subcutaneous Daily Nataliya A Meckes, CRNP      fenofibrate  48 mg Oral Daily Nataliya A Meckes, CRNP      glycerin-hypromellose-  1 drop Both Eyes Q3H PRN Karthikeyan Kidd MD      losartan  50 mg Oral Daily Nataliya A Meckes, CRNP      metoprolol tartrate  25 mg Oral BID Gerda Half Meckes, CRNP      nitroglycerin  0 4 mg Sublingual Q5 Min PRN Nataliya A Meckes, CRNP      nystatin   Topical BID Nataliya A Meckes, CRNP      ondansetron  4 mg Intravenous Q6H PRN Nataliya A Meckes, CRNP      sodium chloride (PF)  3 mL Intravenous Q1H PRN Nataliya A Meckes, CRNP           No Known Allergies      Vitals:   /76 (BP Location: Right arm)   Pulse 61   Temp 97 8 °F (36 6 °C) (Tympanic)   Resp 22   Ht 5' 6" (1 676 m)   Wt 59 8 kg (131 lb 13 4 oz)   SpO2 97%   BMI 21 28 kg/m²   Body mass index is 21 28 kg/m²    SpO2: 97 %,   SpO2 Activity: At Rest,   O2 Device: None (Room air)      Intake/Output Summary (Last 24 hours) at 9/18/2020 1148  Last data filed at 9/18/2020 1001  Gross per 24 hour   Intake 1205 ml Output 3400 ml   Net -2195 ml     Invasive Devices     Peripheral Intravenous Line            Peripheral IV 09/16/20 Right Antecubital 1 day                Physical Exam  General: conscious, cooperative, in no acute distress  Eyes: conjunctivae pink, anicteric sclerae  ENT: lips and mucous membranes moist  Neck: supple, no JVD, no masses  Chest: essentially clear breath sounds bilateral, no crackles, ronchus or wheezings  CVS: S1 & S2, normal rate, regular rhythm  Abdomen: soft, non-tender, non-distended, normoactive bowel sounds  Extremities: no edema of both legs  Pedal edema   Skin: no rash  Neuro: awake, alert, oriented      Diagnostic Data:  Lab: I have personally reviewed pertinent lab results  ,   CBC:  Results from last 7 days   Lab Units 09/18/20  0636   WBC Thousand/uL 9 00   HEMOGLOBIN g/dL 12 5*   HEMATOCRIT % 37 0*   PLATELETS Thousands/uL 229      CMP:   Lab Results   Component Value Date    SODIUM 122 (L) 09/18/2020    K 4 3 09/18/2020    CL 92 (L) 09/18/2020    CO2 23 09/18/2020    BUN 20 09/18/2020    CREATININE 0 98 09/18/2020    CALCIUM 8 5 (L) 09/18/2020    EGFR 65 09/18/2020   ,   PT/INR: No results found for: PT, INR,   Magnesium: No components found for: MAG,  Phosphorous:   Lab Results   Component Value Date    PHOS 1 7 (L) 09/18/2020       Microbiology:  @LABRCNTIP,(urinecx:7)@        ANAM Burleson    Portions of the record may have been created with voice recognition software  Occasional wrong word or "sound a like" substitutions may have occurred due to the inherent limitations of voice recognition software  Read the chart carefully and recognize, using context, where substitutions have occurred

## 2020-09-18 NOTE — OCCUPATIONAL THERAPY NOTE
Occupational Therapy Evaluation      George Orourke Sr     9/18/2020    Principal Problem:    Other chest pain  Active Problems:    Coronary artery disease involving native coronary artery of native heart without angina pectoris    Essential hypertension    Bilateral leg edema    Stage 3 chronic kidney disease (HCC)    Chronic gout of multiple sites    Hypomagnesemia    Chronic hyponatremia    Mixed hyperlipidemia    Acute dehydration    Acute constipation    Other pneumothorax      Past Medical History:   Diagnosis Date    Arthritis     Cataract     Coronary artery disease     Coronary atherosclerosis of native coronary artery     Diverticulitis of colon     Essential hypertension, benign     Hyperlipidemia     Hypertension     Peptic ulcer     Renal disorder        Past Surgical History:   Procedure Laterality Date    CATARACT EXTRACTION Right     CORONARY ANGIOPLASTY      CORONARY STENT PLACEMENT      EYE SURGERY      cataract sx    SKIN BIOPSY      TONSILLECTOMY          09/18/20 0827   OT Last Visit   OT Visit Date 09/18/20   Note Type   Note type Eval only   Restrictions/Precautions   Weight Bearing Precautions Per Order No   Other Precautions Fall Risk;Telemetry   Pain Assessment   Pain Assessment Tool Pain Assessment not indicated - pt denies pain   Pain Score No Pain   Home Living   Type of [address] [place] [address] Two level;Bed/bath upstairs;1/2 bath on main level;Stairs to enter with rails  (7+5 DORIAN, FF to 2nd floor full bath)   Bathroom Shower/Tub Tub only   Bathroom Toilet Standard   Bathroom Accessibility Accessible   Home Equipment Cane   Prior Function   Level of [place] Independent with ADLs and functional mobility; Needs assistance with IADLs   Lives With Alone   Receives Help From Family   ADL Assistance Independent   IADLs Needs assistance  (son and DIL live 3 blocks away)   Falls in the last 6 months 0   Vocational Retired   ADL   UB Bathing Assistance 5  Supervision/Setup LB Bathing Assistance 5  Supervision/Setup   UB Dressing Assistance 5  Supervision/Setup   LB Dressing Assistance 4  Minimal Assistance   Bed Mobility   Supine to Sit 6  Modified independent   Additional items HOB elevated; Bedrails   Transfers   Sit to Stand 5  Supervision   Additional items Assist x 1;Bedrails; Increased time required;Verbal cues   Stand to Sit 5  Supervision   Additional items Assist x 1; Armrests; Increased time required;Verbal cues   Functional Mobility   Functional Mobility 5  Supervision   Additional Comments 70ft   Additional items SPC   Balance   Static Sitting Good   Dynamic Sitting Fair +   Static Standing Fair   Dynamic Standing Fair -   Ambulatory Fair -   Activity Tolerance   Activity Tolerance Patient limited by fatigue   Nurse Made Aware yes, RN Sallie   RUE Assessment   RUE Assessment X  (AROM WFL)   RUE Strength   RUE Overall Strength Deficits  (3+/5)   LUE Assessment   LUE Assessment X  (AROM WFL)   LUE Strength   LUE Overall Strength Deficits  (3+/5)   Hand Function   Gross Motor Coordination Functional   Fine Motor Coordination Functional   Cognition   Overall Cognitive Status WFL   Arousal/Participation Alert; Cooperative   Attention Within functional limits   Orientation Level Oriented X4   Memory Within functional limits   Following Commands Follows one step commands without difficulty   Comments Mini-Cog assessment performed today  Version 1 was given  Patient able to recall 3/3 words  Patient able to draw a normal clock with the correct time  Total score of test was 5/5 indicating no further screening of cognition is required  Assessment   Limitation Decreased ADL status; Decreased UE strength;Decreased endurance;Decreased self-care trans;Decreased Safe judgement during ADL   Prognosis Good   Assessment Pt is a 80 y o  male seen for OT evaluation s/p admit to [address] [place] [address] on 9/16/2020 w/ Other chest pain   Comorbidities affecting pt's functional performance at time of assessment include: CAD, HTN, CKD, Hyperlipidemia, Chronic hyponatremia  Personal factors affecting pt at time of IE include:steps to enter environment, difficulty performing ADLS and difficulty performing IADLS   Prior to admission, pt was Mod I with ADLs  Upon evaluation: the following deficits impact occupational performance: decreased strength, decreased balance and decreased tolerance  Pt to benefit from continued skilled OT tx while in the hospital to address deficits as defined above and maximize level of functional independence w ADL's and functional mobility  Occupational Performance areas to address include: bathing/shower, toilet hygiene, dressing, functional mobility and clothing management  From OT standpoint, recommendation at time of d/c would be Home OT  Goals   Patient Goals To return home   Plan   Treatment Interventions ADL retraining;Functional transfer training;UE strengthening/ROM; Endurance training;Patient/family training;Neuromuscular reeducation; Energy conservation   Goal Expiration Date 09/28/20   OT Treatment Day 0   OT Frequency 3-5x/wk     GOALS:    Pt will achieve the following within specified time frame: STG  Pt will achieve the following goals within 5 days    *ADL transfers with (I) for inc'd independence with ADLs/purposeful tasks    *UB ADL with (I) for inc'd independence with self cares    *LB ADL with (I) using AE prn for inc'd independence with self cares    *Toileting with (I) for clothing management and hygiene for return to PLOF with personal care    *Increase static stand balance to F+ and dyn stand balance to F for inc'd safety with standing purposeful tasks    *Increase stand tolerance x5 m for inc'd tolerance with standing purposeful tasks    *Participate in 10m UE therex to increase overall stamina/activity tolerance for purposeful tasks    *Bed mobility- (I) for inc'd independence to manage own comfort and initiate EOB & OOB purposeful tasks    Pt will achieve the following within specified time frame: LTG  Pt will achieve the following goals within 10 days    *Increase static stand balance to G- and dyn stand balance to F+ for inc'd safety with standing purposeful tasks    *Increase stand tolerance x10 m for inc'd tolerance with standing purposeful tasks      Consuelo Schrader, MS, OTR/L

## 2020-09-18 NOTE — PLAN OF CARE
Problem: OCCUPATIONAL THERAPY ADULT  Goal: Performs self-care activities at highest level of function for planned discharge setting  See evaluation for individualized goals  Description: Treatment Interventions: ADL retraining, Functional transfer training, UE strengthening/ROM, Endurance training, Patient/family training, Neuromuscular reeducation, Energy conservation     See flowsheet documentation for full assessment, interventions and recommendations  Note: Limitation: Decreased ADL status, Decreased UE strength, Decreased endurance, Decreased self-care trans, Decreased Safe judgement during ADL  Prognosis: Good  Assessment: Pt is a 80 y o  male seen for OT evaluation s/p admit to [address] [place] [address] on 9/16/2020 w/ Other chest pain  Comorbidities affecting pt's functional performance at time of assessment include: CAD, HTN, CKD, Hyperlipidemia, Chronic hyponatremia  Personal factors affecting pt at time of IE include:steps to enter environment, difficulty performing ADLS and difficulty performing IADLS   Prior to admission, pt was Mod I with ADLs  Upon evaluation: the following deficits impact occupational performance: decreased strength, decreased balance and decreased tolerance  Pt to benefit from continued skilled OT tx while in the hospital to address deficits as defined above and maximize level of functional independence w ADL's and functional mobility  Occupational Performance areas to address include: bathing/shower, toilet hygiene, dressing, functional mobility and clothing management  From OT standpoint, recommendation at time of d/c would be Home OT       Consuelo Goldman MS, OTR/L

## 2020-09-18 NOTE — CASE MANAGEMENT
Discussed the POC with the rounding team   Pt will most likely be discharged on Saturday  A post acute care recommendation was made by your care team for [facility]  Discussed [place] of Choice with patient  List of agencies given to patient via in person  patient aware the list is custom filtered for them by preference  and that [facility] post acute providers are designated  Pt indicated he would like [facility] for services  A referral was made for same  Son will transport home

## 2020-09-18 NOTE — NURSING NOTE
Monitoring patient's sodium levels every 2 hours since administering [place]o x3  No improvement in level is note; instead a drop in sodium level is noted  Caitlin Saha made aware of same

## 2020-09-18 NOTE — PHYSICAL THERAPY NOTE
Physical Therapy Evaluation     Patient's Name: Joshua Power Sr  Admitting Diagnosis  Hypomagnesemia [E83 42]  Hyponatremia [E87 1]  Chest pain [R07 9]    Problem List  Patient Active Problem List   Diagnosis    Coronary artery disease involving native coronary artery of native heart without angina pectoris    Essential hypertension    Bilateral leg edema    Ventricular bigeminy    Stage 3 chronic kidney disease (HCC)    Chronic gout of multiple sites    Vitamin D deficiency    Other chest pain    Atypical chest pain    Hypomagnesemia    Chronic hyponatremia    Mixed hyperlipidemia    Acute dehydration    Acute constipation    Other pneumothorax       Past Medical History  Past Medical History:   Diagnosis Date    Arthritis     Cataract     Coronary artery disease     Coronary atherosclerosis of native coronary artery     Diverticulitis of colon     Essential hypertension, benign     Hyperlipidemia     Hypertension     Peptic ulcer     Renal disorder        Past Surgical History  Past Surgical History:   Procedure Laterality Date    CATARACT EXTRACTION Right     CORONARY ANGIOPLASTY      CORONARY STENT PLACEMENT      EYE SURGERY      cataract sx    SKIN BIOPSY      TONSILLECTOMY            09/18/20 0809   PT Last Visit   PT Visit Date 09/18/20   Note Type   Note type Eval/Treat   Pain Assessment   Pain Assessment Tool Pain Assessment not indicated - pt denies pain   Pain Score No Pain   Home Living   Type of [address] [place] [address] Two level;Bed/bath upstairs;1/2 bath on main level;Stairs to enter with rails  (7+5 DORIAN, FF to 2nd floor full bath)   Bathroom Shower/Tub Tub only   Bathroom Toilet Standard   Bathroom Accessibility Accessible   Home Equipment Cane   Prior Function   Level of [place] Independent with ADLs and functional mobility; Needs assistance with IADLs   Lives With Alone   Receives Help From Family  (son lives 3 blocks away)   ADL Assistance Independent IADLs Needs assistance  (son and DIL live 3 blocks away)   Falls in the last 6 months 0   Vocational Retired   Restrictions/Precautions   Wells Madyson Bearing Precautions Per Order No   Other Precautions Fall Risk;Telemetry   General   Family/Caregiver Present Yes  (son)   Cognition   Overall Cognitive Status WFL   Attention Within functional limits   Orientation Level Oriented X4   Memory Within functional limits   Following Commands Follows all commands and directions without difficulty   Comments pt agreeable to PT session   RLE Assessment   RLE Assessment WFL  (grossly 4-/5 throughout)   LLE Assessment   LLE Assessment WFL  (grossly 4-/5 throughout)   Coordination   Movements are Fluid and Coordinated 1   Sensation WFL   Light Touch   RLE Light Touch Grossly intact   LLE Light Touch Grossly intact   Bed Mobility   Supine to Sit 6  Modified independent   Additional items Assist x 1;HOB elevated; Increased time required   Transfers   Sit to Stand 5  Supervision   Additional items Assist x 1;Bedrails; Increased time required;Verbal cues   Stand to Sit 5  Supervision   Additional items Assist x 1; Increased time required;Verbal cues   Ambulation/Elevation   Gait pattern Decreased foot clearance; Short stride   Gait Assistance 5  Supervision   Additional items Assist x 1;Verbal cues; Tactile cues   Assistive Device Straight cane   Distance 70 ft   Stair Management Assistance Not tested   Balance   Static Sitting Good   Dynamic Sitting Fair +   Static Standing Fair   Dynamic Standing Fair -   Ambulatory Fair -   Endurance Deficit   Endurance Deficit Yes   Activity Tolerance   Activity Tolerance Patient limited by fatigue   Nurse Made Aware Yes, RN Sallie verbalized pt appropriate for PT session   Assessment   Prognosis Good   Problem List Decreased strength;Decreased endurance; Impaired balance;Decreased mobility; Decreased safety awareness; Impaired hearing   Assessment Pt is 80 y o  male seen for PT evaluation on 9/18/2020 s/p admit [address] on 9/16/2020 w/ Other chest pain  PT consulted to assess pt's functional mobility and d/c needs  Order placed for PT eval and tx, w/ up w/ A order  PT performed at least 2 patient identifiers during session: Name and wristband  Comorbidities affecting pt's physical performance at time of assessment include: hyponatremia, HTN, CAD, bilateral leg edema, CKD stage 3, mixed HLD, chronic gout  PTA, pt was independent w/ all functional mobility w/ SPC, ambulates community distances and elevations, ambulates household distances, has 7+5 DORIAN, lives alone in 2 level home c bedroom on 1st floor c 1/2 bath + pt sponge bathes and retired  Personal factors affecting pt at time of IE include: lives in 2 story house, ambulating w/ assistive device, stairs to enter home, inability to navigate level surfaces w/o external assistance, decreased initiation and engagement, unable to perform physical activity, limited insight into impairments and inability to perform IADLs  Please find objective findings from PT assessment regarding body systems outlined above with impairments and limitations including weakness, impaired balance, decreased endurance, decreased activity tolerance, decreased functional mobility tolerance, decreased safety awareness and fall risk, as well as mobility assessment (need for cueing for mobility technique)  The following objective measures performed on IE also reveal limitations: Barthel Index: 55/100  Pt's clinical presentation is currently unstable/unpredictable seen in pt's presentation of need for input for task focus and mobility technique, on telemetry monitoring and ongoing medical assessment  Pt to benefit from continued PT tx to address deficits as defined above and maximize level of functional independent mobility and consistency   From PT/mobility standpoint, recommendation at time of d/c would be Home PT with family support pending progress in order to facilitate return to PLOF  Barriers to Discharge Inaccessible home environment;Decreased caregiver support  (pt lives alone in [address], however sleeps on 1st floor)   Goals   Patient Goals "to go home"   STG Expiration Date 09/28/20   Short Term Goal #1 In 7-10 days: Increase bilateral LE strength 1/2 grade to facilitate independent mobility, Perform all bed mobility tasks modified independent to decrease caregiver burden, Perform all transfers modified independent to improve independence, Ambulate > 150 ft  with least restrictive assistive device modified independent w/o LOB and w/ normalized gait pattern 100% of the time, Navigate 7+5 stairs with close S with unilateral handrail to facilitate return to previous living environment, Increase all balance 1/2 grade to decrease risk for falls, Complete exercise program independently and Tolerate 4 hr OOB to faciliate upright tolerance   PT Treatment Day 0   Plan   Treatment/Interventions Functional transfer training;LE strengthening/ROM; Elevations; Therapeutic exercise; Endurance training;Patient/family training;Equipment eval/education;Gait training;Spoke to nursing;Spoke to case management;OT;Family   PT Frequency   (3-5x/wk)   Recommendation   PT Discharge Recommendation Home with skilled therapy; Return to previous environment with social support  (+ continued son support thru phone calls/check ins)   Equipment Recommended Other (Comment);Cane;Walker  (life alert system discussed c son)   PT - OK to Discharge Yes  (when medically cleared)   Modified Greenup Scale   Modified Greenup Scale 2   Barthel Index   Feeding 10   Bathing 0   Grooming Score 5   Dressing Score 5   Bladder Score 10   Bowels Score 10   Toilet Use Score 5   Transfers (Bed/Chair) Score 10   Mobility (Level Surface) Score 0   Stairs Score 0   Barthel Index Score 55         Benny Ansari, PT

## 2020-09-18 NOTE — ASSESSMENT & PLAN NOTE
· Resolved  · Atypical - ACS ruled out  · Initial concerns included possibility of right sided apical pneumothorax  · Initial chest x-ray was concerning for a pneumothorax, however confirmatory CT scan testing ruled this out  · ?   Anxiety related  · No further inpatient workup needed at this time

## 2020-09-19 VITALS
WEIGHT: 134.48 LBS | HEART RATE: 80 BPM | SYSTOLIC BLOOD PRESSURE: 122 MMHG | BODY MASS INDEX: 21.61 KG/M2 | TEMPERATURE: 96.8 F | RESPIRATION RATE: 17 BRPM | HEIGHT: 66 IN | DIASTOLIC BLOOD PRESSURE: 56 MMHG | OXYGEN SATURATION: 96 %

## 2020-09-19 LAB
ANION GAP SERPL CALCULATED.3IONS-SCNC: 6 MMOL/L (ref 4–13)
BASOPHILS # BLD AUTO: 0.1 THOUSANDS/ΜL (ref 0–0.1)
BASOPHILS NFR BLD AUTO: 1 % (ref 0–2)
BUN SERPL-MCNC: 23 MG/DL (ref 7–25)
CALCIUM SERPL-MCNC: 8.6 MG/DL (ref 8.6–10.5)
CHLORIDE SERPL-SCNC: 100 MMOL/L (ref 98–107)
CO2 SERPL-SCNC: 24 MMOL/L (ref 21–31)
CREAT SERPL-MCNC: 0.93 MG/DL (ref 0.7–1.3)
EOSINOPHIL # BLD AUTO: 0.1 THOUSAND/ΜL (ref 0–0.61)
EOSINOPHIL NFR BLD AUTO: 1 % (ref 0–5)
ERYTHROCYTE [DISTWIDTH] IN BLOOD BY AUTOMATED COUNT: 14.5 % (ref 11.5–14.5)
GFR SERPL CREATININE-BSD FRML MDRD: 70 ML/MIN/1.73SQ M
GLUCOSE SERPL-MCNC: 91 MG/DL (ref 65–99)
HCT VFR BLD AUTO: 34.1 % (ref 42–47)
HGB BLD-MCNC: 11.9 G/DL (ref 14–18)
LYMPHOCYTES # BLD AUTO: 1.4 THOUSANDS/ΜL (ref 0.6–4.47)
LYMPHOCYTES NFR BLD AUTO: 20 % (ref 21–51)
MCH RBC QN AUTO: 33 PG (ref 26–34)
MCHC RBC AUTO-ENTMCNC: 34.8 G/DL (ref 31–37)
MCV RBC AUTO: 95 FL (ref 81–99)
MONOCYTES # BLD AUTO: 0.6 THOUSAND/ΜL (ref 0.17–1.22)
MONOCYTES NFR BLD AUTO: 8 % (ref 2–12)
NEUTROPHILS # BLD AUTO: 5.2 THOUSANDS/ΜL (ref 1.4–6.5)
NEUTS SEG NFR BLD AUTO: 71 % (ref 42–75)
PLATELET # BLD AUTO: 216 THOUSANDS/UL (ref 149–390)
PMV BLD AUTO: 8.4 FL (ref 8.6–11.7)
POTASSIUM SERPL-SCNC: 4 MMOL/L (ref 3.5–5.5)
RBC # BLD AUTO: 3.6 MILLION/UL (ref 4.3–5.9)
SODIUM SERPL-SCNC: 130 MMOL/L (ref 134–143)
WBC # BLD AUTO: 7.3 THOUSAND/UL (ref 4.8–10.8)

## 2020-09-19 PROCEDURE — 99231 SBSQ HOSP IP/OBS SF/LOW 25: CPT | Performed by: INTERNAL MEDICINE

## 2020-09-19 PROCEDURE — 99239 HOSP IP/OBS DSCHRG MGMT >30: CPT | Performed by: HOSPITALIST

## 2020-09-19 PROCEDURE — 85025 COMPLETE CBC W/AUTO DIFF WBC: CPT | Performed by: HOSPITALIST

## 2020-09-19 PROCEDURE — 80048 BASIC METABOLIC PNL TOTAL CA: CPT | Performed by: HOSPITALIST

## 2020-09-19 RX ADMIN — GLYCERIN, HYPROMELLOSE, POLYETHYLENE GLYCOL 1 DROP: .2; .2; 1 LIQUID OPHTHALMIC at 08:22

## 2020-09-19 RX ADMIN — ALLOPURINOL 300 MG: 300 TABLET ORAL at 08:20

## 2020-09-19 RX ADMIN — FENOFIBRATE 48 MG: 48 TABLET, FILM COATED ORAL at 08:21

## 2020-09-19 RX ADMIN — ENOXAPARIN SODIUM 40 MG: 40 INJECTION SUBCUTANEOUS at 08:21

## 2020-09-19 RX ADMIN — NYSTATIN 1 APPLICATION: 100000 POWDER TOPICAL at 08:21

## 2020-09-19 RX ADMIN — DOCUSATE SODIUM 100 MG: 100 CAPSULE, LIQUID FILLED ORAL at 08:20

## 2020-09-19 RX ADMIN — VITAMIN D 2000 UNITS: 25 TAB ORAL at 08:21

## 2020-09-19 RX ADMIN — DIBASIC SODIUM PHOSPHATE, MONOBASIC POTASSIUM PHOSPHATE AND MONOBASIC SODIUM PHOSPHATE 1 TABLET: 852; 155; 130 TABLET ORAL at 08:20

## 2020-09-19 NOTE — NURSING NOTE
Order received for discharge home  Pt and his son made aware of same  IV removed and covered with dsd  Discharge instructions reviewed with patient and his son  Questions addressed  Copy of same provided  Pt to be transported to [facility] for discharge home

## 2020-09-19 NOTE — DISCHARGE INSTRUCTIONS
Chest Pain, Ambulatory Care   GENERAL INFORMATION:   Chest pain  can be caused by a range of conditions, from not serious to life-threatening  It may be caused by a heart attack or a blood clot in your lungs  Sometimes chest pain or pressure is caused by poor blood flow to your heart (angina)  Infection, inflammation, or a fracture in the bones or cartilage in your chest can cause pain or discomfort  Chest pain can also be a symptom of a digestive problem, such as acid reflux or a stomach ulcer  Common symptoms include the following:   · Fever or sweating     · Nausea or vomiting     · Shortness of breath     · Discomfort or pressure that spreads from your chest to your back, jaw, or arm     · A racing or slow heartbeat     · Feeling weak, tired, or faint  Seek immediate care for the following symptoms:   · Any of the following signs of a heart attack:      ¨ Squeezing, pressure, or pain in your chest that lasts longer than 5 minutes or returns    ¨ Discomfort or pain in your back, neck, jaw, stomach, or arm     ¨ Trouble breathing     ¨ Nausea or vomiting    ¨ Lightheadedness or a sudden cold sweat, especially with trouble breathing         · Chest discomfort that gets worse, even with medicine    · Coughing or vomiting blood    · Black or bloody bowel movements     · Vomiting that does not stop, or pain when you swallow  Treatment for chest pain  may include medicine to treat your symptoms while he determines the cause of your chest pain  You may also need any of the following:  · Antiplatelets , such as aspirin, help prevent blood clots  Take your antiplatelet medicine exactly as directed  These medicines make it more likely for you to bleed or bruise  If you are told to take aspirin, do not take acetaminophen or ibuprofen instead  · Prescription pain medicine  may be given  Ask how to take this medicine safely  Do not smoke: If you smoke, it is never too late to quit   Smoking increases your risk for a heart attack and other heart and lung conditions  Ask your healthcare provider for information about how to stop smoking if you need help  Follow up with your healthcare provider as directed: You may need more tests  You may be referred to a specialist, such as a cardiologist or gastroenterologist  Write down your questions so you remember to ask them during your visits  CARE AGREEMENT:   You have the right to help plan your care  Learn about your health condition and how it may be treated  Discuss treatment options with your caregivers to decide what care you want to receive  You always have the right to refuse treatment  The above information is an  only  It is not intended as medical advice for individual conditions or treatments  Talk to your doctor, nurse or pharmacist before following any medical regimen to see if it is safe and effective for you  © 2014 [address] is for End User's use only and may not be sold, redistributed or otherwise used for commercial purposes  All illustrations and images included in CareNotes® are the copyrighted property of A D [employer] M [employer]  or Lawrence Singh  Hyponatremia   WHAT YOU NEED TO KNOW:   Hyponatremia occurs when the amount of sodium (salt) in your blood is lower than normal  Sodium is an electrolyte (mineral) that helps your muscles, heart, and digestive system work properly  It helps control blood pressure and fluid balance  DISCHARGE INSTRUCTIONS:   Follow up with your healthcare provider as directed: You may need to return for more tests  Write down your questions so you remember to ask them during your visits  Nutrition:  You may need to increase your intake of sodium  Foods that are high in sodium include milk, packaged snacks such as pretzels, or processed meats (angel, sausage, and ham)  Ask your dietitian to help you create a meal plan that is right for you  Liquids:   Follow your healthcare provider's advice if you need to limit the amount of liquid you drink  Ask how much liquid to drink each day and which liquids are best for you  You may be asked to drink liquids that have water, sugar, and salt, such as juices, milk, or sports drinks  These liquids help your body hold in fluid and prevent dehydration  Contact your healthcare provider if:   · You have muscle cramps or twitching  · You feel very weak or tired  · You have nausea or are vomiting  · You have questions or concerns about your condition or care  Return to the emergency department if:   · You have a seizure  · You have an irregular heartbeat  · You have trouble breathing  · You cannot move your arms and legs  · You are confused or cannot think clearly  © 2017 [address]  Information is for End User's use only and may not be sold, redistributed or otherwise used for commercial purposes  All illustrations and images included in CareNotes® are the copyrighted property of [employer]  or Lawrence Singh  The above information is an  only  It is not intended as medical advice for individual conditions or treatments  Talk to your doctor, nurse or pharmacist before following any medical regimen to see if it is safe and effective for you

## 2020-09-19 NOTE — PROGRESS NOTES
Renal Quick Note  Patient's serum sodium is 130 today  I spoke with Dr Lars Kern about his post hospital care  I spoke with patient and son in the room They will limit fluids to 5 glasses/cups a day, call if there is a problem over the weekend  They will follow up at our office next week  Lab slip placed for a bmp on Monday

## 2020-09-19 NOTE — DISCHARGE INSTR - AVS FIRST PAGE
Dear Rubin Perkins ,     It was our pleasure to care for you here at [facility]  It is our hope that we were always able to exceed the expected standards for your care during your stay  You were hospitalized due to chest pain  You were cared for on the ICU floor by Jagdish Castillo MD with the [facility] who covers for your primary care physician (PCP), Haylie Sierra MD, while you were hospitalized  You were also seen by Dr Luis Orozco from [facility]  If you have any questions or concerns related to this hospitalization, you may contact us at [phone]  For follow up as well as any medication refills, we recommend that you follow up with your primary care physician  A registered nurse will reach out to you by phone within a few days after your discharge to answer any additional questions that you may have after going home  However, at this time we provide for you here, the most important instructions / recommendations at discharge:     · Notable Medication Adjustments -   · No new medication adjustments at time of discharge  · Testing Required after Discharge -   · Please ensure that your primary care provider and or Dr Luis Orozco monitor your sodium levels as an outpatient  · Sodium level should be monitored via blood work within the next 2-3 days  · Important follow up information -   · Please follow-up with your primary care provider and nephrology as outlined in this discharge package  · Other Instructions -   · Please follow-up with the visiting nurses that have been assigned to you by case management  · Please review this entire after visit summary as additional general instructions including medication list, appointments, activity, diet, any pertinent wound care, and other additional recommendations from your care team that may be provided for you        Sincerely,     Jagdish Castillo MD

## 2020-09-19 NOTE — NURSING NOTE
Awake, alert, and oriented x4  Denies pain, sob, n/v, dizziness, or lightheadedness  Assessment as noted in computer charting  Assisted to side of bed to brush teeth and for breakfast  VSS  Safety in place

## 2020-09-19 NOTE — ASSESSMENT & PLAN NOTE
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

## 2020-09-19 NOTE — DISCHARGE SUMMARY
Discharge- Stanislav Ring Sr  10/11/1924, 80 y o  male MRN: [id number]    Unit/Bed#: ICU 03 Encounter: [id number]    Primary Care Provider: Durga May MD   Date and time admitted to hospital: 9/16/2020 10:11 PM        * Other chest pain  Assessment & Plan  · Resolved  · Atypical - ACS ruled out  · Initial concerns included possibility of right sided apical pneumothorax  · Initial chest x-ray was concerning for a pneumothorax, however confirmatory CT scan testing ruled this out  · ? 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  · Status post therapy with tolvaptan  · Of note, this is a chronic condition, patient is otherwise asymptomatic  · DC home    Essential hypertension  Assessment & Plan  · Continue metoprolol and Cozaar    Coronary artery disease involving native coronary artery of native heart without angina pectoris  Assessment & Plan  · Stable  · DC home on pre-admit meds at pre-admit dosages which include aspirin, metoprolol, losartan, fenofibrate, and Lipitor    Bilateral leg edema  Assessment & Plan  · Stable  · Outpatient follow-up with PCP and Nephrology  · Patient has p r n   Lasix available to him at home    Stage 3 chronic kidney disease (HCC)  Assessment & Plan  · Creatinine is stable and at baseline  · Avoid nephrotoxins  · Continue to monitor    Mixed hyperlipidemia  Assessment & Plan  · Continue Lipitor and fenofibrate    Hypomagnesemia  Assessment & Plan  · Level was found to be 1 7 on admission  · Resolved with repletion    Chronic gout of multiple sites  Assessment & Plan  · Continue allopurinol          Discharging Physician / Practitioner: Eros Pacheco MD  PCP: Yandel Hernandez MD  Admission Date:   Admission Orders (From admission, onward)     Ordered        09/16/20 2331  Inpatient Admission  Once                   Discharge Date: 09/19/20    Resolved Problems  Date Reviewed: 9/17/2020    None          Consultations During Hospital Stay:  · Nephrology    Procedures Performed:   · None    Significant Findings / Test Results:   · Chest x-ray-Suspected development of small right apical pneumothorax   Confirmation via dual-energy chest x-ray or CT should be considered   No additional acute cardiopulmonary disease   · CT chest-1   No pneumothorax  2   Small lung nodules  Based on current Fleischner Society 2017 Guidelines on incidental pulmonary nodule, no routine follow-up is needed if the patient is considered low risk for lung cancer   If the patient is considered high risk for lung cancer, 12 month follow-up non-contrast chest CT is recommended  Considerations related to the patient's age and/or comorbidities may be used to alter these recommendations        Incidental Findings:   · None     Test Results Pending at Discharge (will require follow up): · None     Outpatient Tests Requested:  · None    Complications:     None    Reason for Admission:  Chest pain    Hospital Course:     Norma Negrete  is a 80 y o  male patient who originally presented to the hospital on 9/16/2020 due to chest pain  Please refer to the initial history and physical examination completed by Jazmine Ford the initial presenting features and complaints  In brief, the patient is a 51-year-old male, who is remarkably alert awake oriented x3, and independent in his activities of daily living, and is a patient that was admitted from the emergency room after he came in complaining of chest pain  He was also found to have an acute on chronic hyponatremia  He was admitted to the ICU    He ruled out for the possibility of an acute coronary event with 3 sets of troponins that were normal   The initial chest x-ray was concerning for the possibility of a small right apical pneumothorax  A follow-up CT scan of the chest ruled out pneumothorax  His chest pain was deemed to be possibly secondary to anxiety and or with a musculoskeletal component  Patient has a chronic hyponatremia with a baseline sodium ranging between 126 and 130  Patient's sodium levels were low at about 120 when he came in  He had no response to normal saline  A nephrology consultation was obtained  Patient was treated with tolvaptan  Day of discharge ultimately his sodium was up to 130  He was discharged home on all of his pre-admission medications at the preadmission dosages, and was told to follow-up with his PCP and Nephrology as an outpatient  His son was bedside at the time of my discharge evaluation, and collectively the patient and his son verbalized understanding  Patient will need repeat outpatient BMP testing within the next few days  This will be ordered by his primary care provider  Additionally, the patient was seen by PT and OT, they felt that the patient would be a good candidate to go home with some physical therapy  Case management set up visiting nurses  Please see above list of diagnoses and related plan for additional information  Condition at Discharge: good     Discharge Day Visit / Exam:     Subjective:  Patient seen and examined, no complaints no distress is excited about going home  Vitals: Blood Pressure: 97/53 (09/19/20 0600)  Pulse: 62 (09/19/20 0600)  Temperature: 97 5 °F (36 4 °C) (09/19/20 0400)  Temp Source: Tympanic (09/19/20 0400)  Respirations: 14 (09/19/20 0600)  Height: 5' 6" (167 6 cm) (09/17/20 0007)  Weight - Scale: 61 kg (134 lb 7 7 oz) (09/19/20 0530)  SpO2: 96 % (09/19/20 0600)  Exam:   Physical Exam  Vitals signs and nursing note reviewed     Constitutional:       General: He is not in acute distress  Appearance: Normal appearance  He is not ill-appearing  HENT:      Head: Normocephalic and atraumatic  Nose: Nose normal    Eyes:      Extraocular Movements: Extraocular movements intact  Pupils: Pupils are equal, round, and reactive to light  Neck:      Musculoskeletal: Normal range of motion and neck supple  No neck rigidity or muscular tenderness  Cardiovascular:      Rate and Rhythm: Normal rate and regular rhythm  Pulses: Normal pulses  Heart sounds: Normal heart sounds  No murmur  No friction rub  No gallop  Pulmonary:      Effort: Pulmonary effort is normal       Breath sounds: Normal breath sounds  Abdominal:      General: There is no distension  Palpations: Abdomen is soft  There is no mass  Tenderness: There is no abdominal tenderness  There is no guarding or rebound  Musculoskeletal: Normal range of motion  General: No swelling or tenderness  Right lower leg: No edema  Left lower leg: No edema  Skin:     General: Skin is warm  Capillary Refill: Capillary refill takes less than 2 seconds  Findings: No erythema or rash  Neurological:      General: No focal deficit present  Mental Status: He is alert and oriented to person, place, and time  Mental status is at baseline  Psychiatric:         Mood and Affect: Mood normal          Behavior: Behavior normal          Discussion with Family:  Patient's son was bedside at the time of my discharge evaluation, all questions answered to his satisfaction    Discharge instructions/Information to patient and family:   See after visit summary for information provided to patient and family  Provisions for Follow-Up Care:  See after visit summary for information related to follow-up care and any pertinent home health orders        Disposition:     Home with VNA Services (Reminder: Complete face to face encounter)    For Discharges to [facility]: · Not Applicable to this Patient - Not Applicable to this Patient    Planned Readmission:    None     Discharge Statement:  I spent 40 minutes discharging the patient  This time was spent on the day of discharge  I had direct contact with the patient on the day of discharge  Greater than 50% of the total time was spent examining patient, answering all patient questions, arranging and discussing plan of care with patient as well as directly providing post-discharge instructions  Additional time then spent on discharge activities  Discharge Medications:  See after visit summary for reconciled discharge medications provided to patient and family        ** Please Note: This note has been constructed using a voice recognition system **

## 2020-09-19 NOTE — ASSESSMENT & PLAN NOTE
· Stable  · Outpatient follow-up with PCP and Nephrology  · Patient has p r n   Lasix available to him at home

## 2020-09-19 NOTE — NURSING NOTE
Notified hospitalist of pts urine output of 2 liters since 7pm  Pt received tolvaptin at 1800   Will recheck labs in am

## 2020-09-19 NOTE — ASSESSMENT & PLAN NOTE
· Stable  · DC home on pre-admit meds at pre-admit dosages which include aspirin, metoprolol, losartan, fenofibrate, and Lipitor

## 2020-09-19 NOTE — CASE MANAGEMENT
D/c order written, I met with the pt and son in the room, pt has been accepted by [facility], [facility] was made aware of the d/c for today, pt lives alone and the son stated he is safe alone, "in Basket " message was sent to nephrology for a follow up appointment, son was reminded to [facility] on Monday to get a follow up appointment with his pcp, pt and son deny any additional d/c needs, son will transport the pt home, pt and son in agreement with the d/c an d/c plan home with [facility]

## 2020-09-21 ENCOUNTER — TRANSCRIBE ORDERS (OUTPATIENT)
Dept: URGENT CARE | Facility: CLINIC | Age: 85
End: 2020-09-21

## 2020-09-21 ENCOUNTER — TELEPHONE (OUTPATIENT)
Dept: NEPHROLOGY | Facility: CLINIC | Age: 85
End: 2020-09-21

## 2020-09-21 ENCOUNTER — APPOINTMENT (OUTPATIENT)
Dept: LAB | Facility: CLINIC | Age: 85
End: 2020-09-21
Payer: COMMERCIAL

## 2020-09-21 DIAGNOSIS — E87.1 HYPONATREMIA: ICD-10-CM

## 2020-09-21 DIAGNOSIS — E87.1 HYPONATREMIA: Primary | ICD-10-CM

## 2020-09-21 LAB
ANION GAP SERPL CALCULATED.3IONS-SCNC: 6 MMOL/L (ref 4–13)
BUN SERPL-MCNC: 40 MG/DL (ref 5–25)
CALCIUM SERPL-MCNC: 8.9 MG/DL (ref 8.3–10.1)
CHLORIDE SERPL-SCNC: 99 MMOL/L (ref 100–108)
CO2 SERPL-SCNC: 26 MMOL/L (ref 21–32)
CREAT SERPL-MCNC: 1.18 MG/DL (ref 0.6–1.3)
GFR SERPL CREATININE-BSD FRML MDRD: 52 ML/MIN/1.73SQ M
GLUCOSE P FAST SERPL-MCNC: 77 MG/DL (ref 65–99)
POTASSIUM SERPL-SCNC: 4.3 MMOL/L (ref 3.5–5.3)
SODIUM SERPL-SCNC: 131 MMOL/L (ref 136–145)

## 2020-09-21 PROCEDURE — 36415 COLL VENOUS BLD VENIPUNCTURE: CPT

## 2020-09-21 PROCEDURE — 80048 BASIC METABOLIC PNL TOTAL CA: CPT

## 2020-09-21 NOTE — TELEPHONE ENCOUNTER
----- Message from Francheska Allen RN sent at 9/19/2020  8:50 AM EDT -----  Pt is being d/c today and he needs a follow up appointment in 2 days, please call the pt with an appointment, thanks [facility]

## 2020-09-23 ENCOUNTER — OFFICE VISIT (OUTPATIENT)
Dept: NEPHROLOGY | Facility: CLINIC | Age: 85
End: 2020-09-23
Payer: COMMERCIAL

## 2020-09-23 VITALS
OXYGEN SATURATION: 99 % | TEMPERATURE: 97.4 F | HEIGHT: 66 IN | DIASTOLIC BLOOD PRESSURE: 54 MMHG | SYSTOLIC BLOOD PRESSURE: 100 MMHG | HEART RATE: 46 BPM | WEIGHT: 137 LBS | BODY MASS INDEX: 22.02 KG/M2

## 2020-09-23 DIAGNOSIS — E87.1 CHRONIC HYPONATREMIA: ICD-10-CM

## 2020-09-23 DIAGNOSIS — E22.2 SIADH (SYNDROME OF INAPPROPRIATE ADH PRODUCTION) (HCC): ICD-10-CM

## 2020-09-23 DIAGNOSIS — N18.30 STAGE 3 CHRONIC KIDNEY DISEASE (HCC): Primary | ICD-10-CM

## 2020-09-23 PROBLEM — J93.83 OTHER PNEUMOTHORAX: Status: RESOLVED | Noted: 2020-09-18 | Resolved: 2020-09-23

## 2020-09-23 PROCEDURE — 99213 OFFICE O/P EST LOW 20 MIN: CPT | Performed by: NURSE PRACTITIONER

## 2020-09-23 NOTE — PROGRESS NOTES
Nephrology   Office Follow-Up  Arpit Callahan[age] y o  male MRN: [id number]    Encounter: [id number]        Jesus Bravo was seen today for follow-up in the [facility] office  Diagnoses and all orders for this visit:    1  Stage 3 [facility]  · Record review indicates baseline creatinine around 1 1-1 2 mg/dL  He is currently within this baseline  He will continue to avoid NSAIDs and nephrotoxins  2  Chronic hyponatremia  · With recent hospitalization noted to have acute on chronic hyponatremia  Urine osmolality elevated  Received Tolvaptan x 3 doses with improvement  Most recent sodium around 131 mmol/L which is stable  · 50 ounce fluid restriction and repeat labs in 2 months  Encouraged protein intake which will help with free water excretion  Lasix prn for edema   -     Basic metabolic panel; Future  3  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[facility] )  · As evidenced by chronic hyponatremia and elevated urine sodium  There was questionable ptx noted on x-ray during hospital stay which was ruled out by CT scan  Some pulmonary nodules noted  No nausea, vomiting  No chronic pain  Continue 50 ounce fluid restriction per day  4  Hypomagnesemia-resolved      HPI: Marii Rodriguez  is a 80 y o  male with an active problem list significant for CKD 3a, hypertension, CAD s/p stent in 2002, hypertension, chronic mild hyponatremia who is here for hospital follow-up  Josh Callejas was hospitalized at [facility] from 9/16-9/19/20, initially presenting with chest pain, found to be hyponatremic with sodium level around 119 mmol/L  He received Tolvaptan x 3 with improvement of serum sodium to 130 mmol/L on day of discharge  Upon discussion with Josh Callejas he states he has been feeling well  No further incidences of chest pain  No nausea, vomiting, diarrhea, dysuria, urgency, frequency, dizziness, dyspnea  Has been fluid restricting  Have discussed attempting to increase protein intake   He will have labs in about 2 months and RTO in four months  The medical record, including Care Everywhere and media tabs were reviewed  ROS:   All the systems were reviewed and were negative except as documented on the HPI  Allergies: Patient has no known allergies      Medications:   Current Outpatient Medications:     allopurinol (ZYLOPRIM) 300 mg tablet, Take 1 tablet by mouth daily, Disp: , Rfl:     aspirin 81 MG tablet, Take 1 tablet by mouth see administration instructions Take 1 tablet Mon-Wed & Fri, Disp: , Rfl:     atorvastatin (LIPITOR) 20 mg tablet, Take 1 tablet by mouth daily, Disp: , Rfl:     Cholecalciferol (CVS VITAMIN D) 2000 units CAPS, Take by mouth, Disp: , Rfl:     Choline Fenofibrate (FENOFIBRIC ACID) 45 MG CPDR, Take by mouth, Disp: , Rfl:     furosemide (LASIX) 20 mg tablet, Take 1 tablet by mouth daily as needed, Disp: , Rfl:     irbesartan (AVAPRO) 75 mg tablet, Take 150 mg by mouth daily , Disp: , Rfl:     metoprolol tartrate (LOPRESSOR) 25 mg tablet, Take 25 mg by mouth 2 (two) times a day , Disp: , Rfl:     Multiple Vitamins tablet, Take by mouth, Disp: , Rfl:     nitroglycerin (Nitrostat) 0 4 mg SL tablet, Place 1 tablet (0 4 mg total) under the tongue every 5 (five) minutes as needed for chest pain, Disp: 25 tablet, Rfl: 3    nystatin (MYCOSTATIN) powder, Apply topically 2 (two) times a day, Disp: 15 g, Rfl: 0    ofloxacin (OCUFLOX) 0 3 % ophthalmic solution, ofloxacin 0 3 % eye drops, Disp: , Rfl:     pneumococcal 23-valent polysaccharide vaccine (PNEUMOVAX 23), Pneumovax 23 25 mcg/0 5 mL injection syringe, Disp: , Rfl:     potassium chloride (KLOR-CON) 20 mEq packet, Take 20 mEq by mouth as needed Take 1 tablet when taking Furosemide, Disp: , Rfl:     prednisoLONE acetate (PRED FORTE) 1 % ophthalmic suspension, prednisolone acetate 1 % eye drops,suspension, Disp: , Rfl:     Past Medical History:   Diagnosis Date    Arthritis     Cataract     Coronary artery disease     Coronary atherosclerosis of native coronary artery     Diverticulitis of colon     Essential hypertension, benign     Hyperlipidemia     Hypertension     Peptic ulcer     Renal disorder      Past Surgical History:   Procedure Laterality Date    CATARACT EXTRACTION Right     CORONARY ANGIOPLASTY      CORONARY STENT PLACEMENT      EYE SURGERY      cataract sx    SKIN BIOPSY      TONSILLECTOMY       Family History   Problem Relation Age of Onset    Heart attack Brother     Heart disease Mother     Cancer Father       reports that he has quit smoking  His smoking use included pipe  He has never used smokeless tobacco  He reports previous alcohol use  He reports that he does not use drugs  Physical Exam:   Vitals:    09/23/20 1047   BP: 100/54   BP Location: Left arm   Patient Position: Sitting   Cuff Size: Standard   Pulse: (!) 46   Temp: (!) 97 4 °F (36 3 °C)   TempSrc: Temporal   SpO2: 99%   Weight: 62 1 kg (137 lb)   Height: 5' 6" (1 676 m)     Body mass index is 22 11 kg/m²  General: conscious, cooperative, in no acute distress  Eyes: conjunctivae pink, anicteric sclerae  ENT: lips and mucous membranes moist  Neck: no masses, flat neck veins  Chest: clear breath sounds bilateral, no crackles, ronchus or wheezings  CVS: S1 & S2, kerry cardic rate, regular rhythm  Abdomen: soft, non-tender, non-distended, normoactive bowel sounds  Extremities: trace edema of both legs, especially feet  Skin: no rash  Neuro: awake, alert, oriented      Diagnostic Data:  Lab: I have personally reviewed pertinent lab results  ,   CBC:  Results from last 7 days   Lab Units 09/19/20  0529   WBC Thousand/uL 7 30   HEMOGLOBIN g/dL 11 9*   HEMATOCRIT % 34 1*   PLATELETS Thousands/uL 216      CMP: No results found for: SODIUM, K, CL, CO2, ANIONGAP, BUN, CREATININE, GLUCOSE, CALCIUM, AST, ALT, ALKPHOS, PROT, BILITOT, EGFR,   PT/INR: No results found for: PT, INR,   Magnesium: No components found for: MAG,  Phosphorous: No results found for: PHOS    Discussion:    Patient Instructions   50 ounce fluid restriction per day    Try to increase the protein in your diet (eggs, yogurt, peanut butter, meat)    Get blood work done in about 2 months    Continue lasix as needed      Portions of the record may have been created with voice recognition software  Occasional wrong word or "sound a like" substitutions may have occurred due to the inherent limitations of voice recognition software  Read the chart carefully and recognize, using context, where substitutions have occurred  If you have any questions, please contact the dictating provider

## 2020-09-23 NOTE — PATIENT INSTRUCTIONS
50 ounce fluid restriction per day    Try to increase the protein in your diet (eggs, yogurt, peanut butter, meat)    Get blood work done in about 2 months    Continue lasix as needed

## 2020-10-21 ENCOUNTER — TRANSCRIBE ORDERS (OUTPATIENT)
Dept: URGENT CARE | Facility: CLINIC | Age: 85
End: 2020-10-21

## 2020-10-21 ENCOUNTER — APPOINTMENT (OUTPATIENT)
Dept: LAB | Facility: CLINIC | Age: 85
End: 2020-10-21
Payer: COMMERCIAL

## 2020-10-21 DIAGNOSIS — E11.22 CKD STAGE 3 SECONDARY TO DIABETES (HCC): Primary | ICD-10-CM

## 2020-10-21 DIAGNOSIS — N18.30 CKD STAGE 3 SECONDARY TO DIABETES (HCC): Primary | ICD-10-CM

## 2020-10-21 DIAGNOSIS — E11.22 CKD STAGE 3 SECONDARY TO DIABETES (HCC): ICD-10-CM

## 2020-10-21 DIAGNOSIS — N18.30 CKD STAGE 3 SECONDARY TO DIABETES (HCC): ICD-10-CM

## 2020-10-21 LAB
ANION GAP SERPL CALCULATED.3IONS-SCNC: 6 MMOL/L (ref 4–13)
BUN SERPL-MCNC: 36 MG/DL (ref 5–25)
CALCIUM SERPL-MCNC: 8.2 MG/DL (ref 8.3–10.1)
CHLORIDE SERPL-SCNC: 101 MMOL/L (ref 100–108)
CO2 SERPL-SCNC: 24 MMOL/L (ref 21–32)
CREAT SERPL-MCNC: 1.01 MG/DL (ref 0.6–1.3)
GFR SERPL CREATININE-BSD FRML MDRD: 62 ML/MIN/1.73SQ M
GLUCOSE P FAST SERPL-MCNC: 88 MG/DL (ref 65–99)
POTASSIUM SERPL-SCNC: 4.6 MMOL/L (ref 3.5–5.3)
SODIUM SERPL-SCNC: 131 MMOL/L (ref 136–145)

## 2020-10-21 PROCEDURE — 36415 COLL VENOUS BLD VENIPUNCTURE: CPT

## 2020-10-21 PROCEDURE — 80048 BASIC METABOLIC PNL TOTAL CA: CPT

## 2020-11-02 ENCOUNTER — APPOINTMENT (OUTPATIENT)
Dept: RADIOLOGY | Facility: CLINIC | Age: 85
End: 2020-11-02
Payer: COMMERCIAL

## 2020-11-02 ENCOUNTER — TRANSCRIBE ORDERS (OUTPATIENT)
Dept: URGENT CARE | Facility: CLINIC | Age: 85
End: 2020-11-02

## 2020-11-02 DIAGNOSIS — R20.0 NUMBNESS OF LEFT HAND: ICD-10-CM

## 2020-11-02 DIAGNOSIS — R20.0 NUMBNESS OF LEFT HAND: Primary | ICD-10-CM

## 2020-11-02 PROCEDURE — 72050 X-RAY EXAM NECK SPINE 4/5VWS: CPT

## 2020-11-23 ENCOUNTER — LAB (OUTPATIENT)
Dept: LAB | Facility: CLINIC | Age: 85
End: 2020-11-23
Payer: COMMERCIAL

## 2020-11-23 DIAGNOSIS — E87.1 CHRONIC HYPONATREMIA: ICD-10-CM

## 2020-11-23 LAB
ANION GAP SERPL CALCULATED.3IONS-SCNC: 6 MMOL/L (ref 4–13)
BUN SERPL-MCNC: 39 MG/DL (ref 5–25)
CALCIUM SERPL-MCNC: 8.8 MG/DL (ref 8.3–10.1)
CHLORIDE SERPL-SCNC: 103 MMOL/L (ref 100–108)
CO2 SERPL-SCNC: 25 MMOL/L (ref 21–32)
CREAT SERPL-MCNC: 1.03 MG/DL (ref 0.6–1.3)
GFR SERPL CREATININE-BSD FRML MDRD: 61 ML/MIN/1.73SQ M
GLUCOSE P FAST SERPL-MCNC: 85 MG/DL (ref 65–99)
POTASSIUM SERPL-SCNC: 4.2 MMOL/L (ref 3.5–5.3)
SODIUM SERPL-SCNC: 134 MMOL/L (ref 136–145)

## 2020-11-23 PROCEDURE — 80048 BASIC METABOLIC PNL TOTAL CA: CPT

## 2020-11-23 PROCEDURE — 36415 COLL VENOUS BLD VENIPUNCTURE: CPT

## 2020-11-24 ENCOUNTER — TELEPHONE (OUTPATIENT)
Dept: NEPHROLOGY | Facility: CLINIC | Age: 85
End: 2020-11-24

## 2021-02-06 ENCOUNTER — APPOINTMENT (EMERGENCY)
Dept: CT IMAGING | Facility: HOSPITAL | Age: 86
End: 2021-02-06
Payer: COMMERCIAL

## 2021-02-06 ENCOUNTER — HOSPITAL ENCOUNTER (EMERGENCY)
Facility: HOSPITAL | Age: 86
Discharge: HOME | End: 2021-02-06
Attending: EMERGENCY MEDICINE
Payer: COMMERCIAL

## 2021-02-06 ENCOUNTER — APPOINTMENT (EMERGENCY)
Dept: RADIOLOGY | Facility: HOSPITAL | Age: 86
End: 2021-02-06
Payer: COMMERCIAL

## 2021-02-06 VITALS
BODY MASS INDEX: 24.34 KG/M2 | TEMPERATURE: 97.6 F | RESPIRATION RATE: 21 BRPM | WEIGHT: 150.79 LBS | HEART RATE: 79 BPM | DIASTOLIC BLOOD PRESSURE: 75 MMHG | SYSTOLIC BLOOD PRESSURE: 170 MMHG | OXYGEN SATURATION: 100 %

## 2021-02-06 DIAGNOSIS — S40.022A ARM CONTUSION, LEFT, INITIAL ENCOUNTER: ICD-10-CM

## 2021-02-06 DIAGNOSIS — S46.002A INJURY OF LEFT ROTATOR CUFF, INITIAL ENCOUNTER: ICD-10-CM

## 2021-02-06 DIAGNOSIS — S41.112A SKIN TEAR OF LEFT UPPER ARM WITHOUT COMPLICATION, INITIAL ENCOUNTER: Primary | ICD-10-CM

## 2021-02-06 PROCEDURE — 93005 ELECTROCARDIOGRAM TRACING: CPT

## 2021-02-06 PROCEDURE — 71045 X-RAY EXAM CHEST 1 VIEW: CPT

## 2021-02-06 PROCEDURE — 99285 EMERGENCY DEPT VISIT HI MDM: CPT

## 2021-02-06 PROCEDURE — 72125 CT NECK SPINE W/O DYE: CPT

## 2021-02-06 PROCEDURE — 73060 X-RAY EXAM OF HUMERUS: CPT

## 2021-02-06 PROCEDURE — 70450 CT HEAD/BRAIN W/O DYE: CPT

## 2021-02-06 PROCEDURE — 99285 EMERGENCY DEPT VISIT HI MDM: CPT | Performed by: EMERGENCY MEDICINE

## 2021-02-06 NOTE — DISCHARGE INSTRUCTIONS
Return to the ER with any new, concerning, worsening issues, especially if there are any signs of infection, such as redness, pus, swelling, fever or chills  Ice to left shoulder bruise 4 to 6 times a day for 10-15 minutes of time  Use Tylenol as needed for pain    Wear sling to the left upper extremity for the next few days to rest your arm  Consider a re-evaluation with your doctor in 2-3 days  Return to the ER for worsening issues such as intractable headache, vomiting, change in mental status, significant neck pain or upper extremity weakness  Change dressing after 24-48 hours daily with some mild soap and water and then some bacitracin ointment and a clean dressing

## 2021-02-06 NOTE — ED PROVIDER NOTES
Emergency Department Trauma Note  Earl Liu Sr  80 y o  male MRN: [id number]  Unit/Bed#: TR 05/TR 05 Encounter: [id number]      Trauma Alert: Trauma Acuity: Trauma Evaluation  Model of Arrival: Mode of Arrival: ALS via Trauma Squad Name and Number: Aurelio Townsend Team: Current Providers  Attending Provider: Jasmyn Ramos DO  Registered Nurse: Salome Bhagat RN  Consultants: None      History of Present Illness     Chief Complaint:   Chief Complaint   Patient presents with    Trauma     pt presents with fall after tripping on a rug     HPI:  Ramez Alvares  is a 80 y o  male who presents with fall status post tripping over rug in his home  Patient is on Eliquis and fell on his left shoulder  Patient does not believe he hit his head, but is not 100% sure  Patient denies headache but notes a moderate amount of pain to the proximal humerus with ecchymosis and hematoma  Mechanism:Details of Incident: Patient fell over a throw rug while wearing sandals Injury Date: 02/06/21 Injury Time: 1030      HPI  Review of Systems   Constitutional: Positive for activity change  HENT: Negative for congestion  Eyes: Negative for visual disturbance  Respiratory: Negative for chest tightness  Cardiovascular: Positive for chest pain  Musculoskeletal: Positive for arthralgias, joint swelling, myalgias and neck stiffness  Negative for gait problem and neck pain  Neurological: Negative for dizziness, syncope, weakness and headaches  Psychiatric/Behavioral: Negative for behavioral problems  Historical Information     Immunizations: There is no immunization history on file for this patient      Past Medical History:   Diagnosis Date    Arthritis     Cataract     Coronary artery disease     Coronary atherosclerosis of native coronary artery     Diverticulitis of colon     Essential hypertension, benign     Hyperlipidemia     Hypertension     Peptic ulcer     Renal disorder Family History   Problem Relation Age of Onset    Heart attack Brother     Heart disease Mother     Cancer Father      Past Surgical History:   Procedure Laterality Date    CATARACT EXTRACTION Right     CORONARY ANGIOPLASTY      CORONARY STENT PLACEMENT      EYE SURGERY      cataract sx    SKIN BIOPSY      TONSILLECTOMY       Social History     Tobacco Use    Smoking status: Former Smoker     Types: Pipe    Smokeless tobacco: Never Used   Substance Use Topics    Alcohol use: Not Currently    Drug use: No     E-Cigarette/Vaping    E-Cigarette Use Never User      E-Cigarette/Vaping Substances    Nicotine No     THC No     CBD No     Flavoring No     Other No     Unknown No        Family History: non-contributory    Meds/Allergies   Prior to Admission Medications   Prescriptions Last Dose Informant Patient Reported? Taking?    Cholecalciferol ([employer]S VITAMIN D) 2000 units CAPS  Self Yes No   Sig: Take by mouth   Choline Fenofibrate (FENOFIBRIC ACID) 45 MG CPDR  Self Yes No   Sig: Take by mouth   Multiple Vitamins tablet  Self Yes No   Sig: Take by mouth   allopurinol (ZYLOPRIM) 300 mg tablet  Self Yes No   Sig: Take 1 tablet by mouth daily   aspirin 81 MG tablet  Self Yes No   Sig: Take 1 tablet by mouth see administration instructions Take 1 tablet Mon-Wed & Fri   atorvastatin (LIPITOR) 20 mg tablet  Self Yes No   Sig: Take 1 tablet by mouth daily   furosemide (LASIX) 20 mg tablet  Self Yes No   Sig: Take 1 tablet by mouth daily as needed   irbesartan (AVAPRO) 75 mg tablet  Self Yes No   Sig: Take 150 mg by mouth daily    metoprolol tartrate (LOPRESSOR) 25 mg tablet  Self Yes No   Sig: Take 25 mg by mouth 2 (two) times a day    nitroglycerin (Nitrostat) 0 4 mg SL tablet  Self No No   Sig: Place 1 tablet (0 4 mg total) under the tongue every 5 (five) minutes as needed for chest pain   nystatin (MYCOSTATIN) powder  Self No No   Sig: Apply topically 2 (two) times a day   ofloxacin (OCUFLOX) 0 3 % ophthalmic solution  Self Yes No   Sig: ofloxacin 0 3 % eye drops   pneumococcal 23-valent polysaccharide vaccine (PNEUMOVAX 23)  Self Yes No   Sig: Pneumovax 23 25 mcg/0 5 mL injection syringe   potassium chloride (KLOR-CON) 20 mEq packet  Self Yes No   Sig: Take 20 mEq by mouth as needed Take 1 tablet when taking Furosemide   prednisoLONE acetate (PRED FORTE) 1 % ophthalmic suspension  Self Yes No   Sig: prednisolone acetate 1 % eye drops,suspension      Facility-Administered Medications: None       No Known Allergies    PHYSICAL EXAM    PE limited by:  Nothing    Objective   Vitals:   First set: Temperature: 97 6 °F (36 4 °C) (02/06/21 1205)  Pulse: 83 (02/06/21 1205)  Respirations: 18 (02/06/21 1205)  Blood Pressure: (!) 202/103 (02/06/21 1205)  SpO2: 99 % (02/06/21 1205)    Primary Survey:   (A) Airway:  Patent  (B) Breathing:  Breath sounds equal bilaterally  (C) Circulation: Pulses:   normal  (D) Disabliity:  GCS Total:  15  (E) Expose:  Completed    Secondary Survey: (Click on Physical Exam tab above)  Physical Exam  Constitutional:       General: He is not in acute distress  Appearance: Normal appearance  HENT:      Head: Normocephalic  Nose:      Comments: Right nare is within normal limits, however the left nare has some dried blood in it  There is no active bleeding and no septal hematoma noted  Mouth/Throat:      Mouth: Mucous membranes are moist       Comments: No oropharyngeal bleeding noted  Eyes:      Extraocular Movements: Extraocular movements intact  Neck:      Musculoskeletal: No muscular tenderness  Comments: Patient complains of mild neck stiffness but does not have any midline spinous process tenderness to palpation  Will image the patient due to possibility of distracting injury  Cardiovascular:      Rate and Rhythm: Normal rate and regular rhythm  Pulses: Normal pulses  Heart sounds: Normal heart sounds     Pulmonary:      Effort: Pulmonary effort is normal       Breath sounds: Normal breath sounds  Abdominal:      General: Bowel sounds are normal    Musculoskeletal:         General: Swelling and tenderness present  No deformity  Comments: Patient has decreased ability to abduct and flex at the shoulder  Positive drop-arm test is noted  There is a potential rotator cuff or deltoid injury present  Skin:     General: Skin is warm and dry  Capillary Refill: Capillary refill takes less than 2 seconds  Coloration: Skin is pale  Findings: Bruising present  Comments: There is ecchymosis with hematoma to the proximal humerus anteriorly  I do not appreciate any definitive fracture or dislocation  There is a skin tear noted on the left elbow which is currently bandaged  Examination of the skin tear shows no suturable laceration and a 1 x 1 cm flap of skin is hanging from the edge of the wound  There is intact dermis at the skin tear  No hematoma   Neurological:      General: No focal deficit present  Mental Status: He is alert and oriented to person, place, and time  Mental status is at baseline  Psychiatric:         Mood and Affect: Mood normal          Cervical spine cleared by clinical criteria? 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 which is multifactorial   This should be correlated with any acute neurologic signs    MRI could be considered if there is any worsening symptomatology  This was discussed with Dr Medina Kaufman at 1:00 PM      Workstation performed: [id number]         XR chest 1 view portable   Final Result by Nuha Parmar MD (02/06 1229)      No acute cardiopulmonary disease  Workstation performed: [id number]         XR humerus LEFT   Final Result by Nuha Parmar MD (02/06 1231)      No acute osseous abnormality  Workstation performed: [id number]               Procedures  ECG 12 Lead Documentation Only    Date/Time: 2/6/2021 12:13 PM  Performed by: Micheline Jones DO  Authorized by: Micheline Jones DO     ECG reviewed by me, the ED Provider: yes    Patient location:  ED  Comments:      EKG shows a sinus rhythm at 80 per with normal axis and no definitive acute ST or T-wave changes  There is likely nonspecific T-wave flattening in 1 and aVL, however there is a lot of baseline artifact             ED Course  ED Course as of Feb 06 1859   Sat Feb 06, 2021   1316 Patient's tetanus shot was 9 years ago  [id number] Discussed case with Radiology who notes that the changes of his cervical spine appear to be most likely due to a chronic condition  The patient shows no evidence of significant neck pain nor any weakness of the upper extremities  1318 The wound was cleansed by minutes of with some saline gauze and a Xeroform gauze with 4 x 4 dressings were placed and taped over the skin tear                MDM        Disposition  [facility] Transfer: No  Final diagnoses:   Arm contusion, left, initial encounter   Skin tear of left upper arm without complication, initial encounter   Injury of left rotator cuff, initial encounter     Time reflects when diagnosis was documented in both MDM as applicable and the Disposition within this note     Time User Action Codes Description Comment    2/6/2021  1:15 PM Rebecca Harden Add [S41 575A] Arm contusion, left, initial encounter     2/6/2021  1:16 PM Dereck Thi Garrett Add [T26 800Z] Skin tear of left upper arm without complication, initial encounter     2/6/2021  1:49 PM Thi Harden Add [S46 002A] Injury of left rotator cuff, initial encounter     2/6/2021  1:49 PM Denise Cintron Modify [S40 022A] Arm contusion, left, initial encounter     2/6/2021  1:49 PM Denise Saida Modify [Y79 552J] Skin tear of left upper arm without complication, initial encounter       ED Disposition     ED Disposition Condition Date/Time Comment    Discharge Stable Sat Feb 6, 2021  1:15 PM Klaudia Cabello Sr  discharge to home/self care              Follow-up Information     Follow up With Specialties Details Why Contact Info    Tomi Whittington, DO Orthopedic Surgery In 2 days  [address]  [address] [facility]  [phone]          Discharge Medication List as of 2/6/2021  1:56 PM      CONTINUE these medications which have NOT CHANGED    Details   allopurinol (ZYLOPRIM) 300 mg tablet Take 1 tablet by mouth daily, Historical Med      aspirin 81 MG tablet Take 1 tablet by mouth see administration instructions Take 1 tablet Mon-Wed & Fri, Historical Med      atorvastatin (LIPITOR) 20 mg tablet Take 1 tablet by mouth daily, Historical Med      Cholecalciferol (CVS VITAMIN D) 2000 units CAPS Take by mouth, Historical Med      Choline Fenofibrate (FENOFIBRIC ACID) 45 MG CPDR Take by mouth, Starting Mon 9/30/2013, Historical Med      furosemide (LASIX) 20 mg tablet Take 1 tablet by mouth daily as needed, Starting Wed 5/28/2014, Historical Med      irbesartan (AVAPRO) 75 mg tablet Take 150 mg by mouth daily , Historical Med      metoprolol tartrate (LOPRESSOR) 25 mg tablet Take 25 mg by mouth 2 (two) times a day , Historical Med      Multiple Vitamins tablet Take by mouth, Historical Med      nitroglycerin (Nitrostat) 0 4 mg SL tablet Place 1 tablet (0 4 mg total) under the tongue every 5 (five) minutes as needed for chest pain, Starting Wed 6/3/2020, Normal      nystatin (MYCOSTATIN) powder Apply topically 2 (two) times a day, Starting Wed 9/9/2020, Normal      ofloxacin (OCUFLOX) 0 3 % ophthalmic solution ofloxacin 0 3 % eye drops, Historical Med      pneumococcal 23-valent polysaccharide vaccine (PNEUMOVAX 23) Pneumovax 23 25 mcg/0 5 mL injection syringe, Historical Med      potassium chloride (KLOR-CON) 20 mEq packet Take 20 mEq by mouth as needed Take 1 tablet when taking Furosemide, Historical Med      prednisoLONE acetate (PRED FORTE) 1 % ophthalmic suspension prednisolone acetate 1 % eye drops,suspension, Historical Med           No discharge procedures on file      PDMP Review     None          ED Provider  Electronically Signed by         Narcisa Sutton DO  02/06/21 2192

## 2021-02-07 LAB
ATRIAL RATE: 80 BPM
P AXIS: 79 DEGREES
PR INTERVAL: 180 MS
QRS AXIS: 29 DEGREES
QRSD INTERVAL: 78 MS
QT INTERVAL: 408 MS
QTC INTERVAL: 470 MS
T WAVE AXIS: 73 DEGREES
VENTRICULAR RATE: 80 BPM

## 2021-02-07 PROCEDURE — 93010 ELECTROCARDIOGRAM REPORT: CPT | Performed by: INTERNAL MEDICINE

## 2021-02-08 ENCOUNTER — OFFICE VISIT (OUTPATIENT)
Dept: URGENT CARE | Facility: CLINIC | Age: 86
End: 2021-02-08
Payer: COMMERCIAL

## 2021-02-08 ENCOUNTER — OFFICE VISIT (OUTPATIENT)
Dept: OBGYN CLINIC | Facility: CLINIC | Age: 86
End: 2021-02-08
Payer: COMMERCIAL

## 2021-02-08 VITALS
WEIGHT: 150.79 LBS | BODY MASS INDEX: 24.23 KG/M2 | SYSTOLIC BLOOD PRESSURE: 127 MMHG | HEART RATE: 72 BPM | HEIGHT: 66 IN | DIASTOLIC BLOOD PRESSURE: 53 MMHG

## 2021-02-08 VITALS
RESPIRATION RATE: 20 BRPM | HEART RATE: 68 BPM | SYSTOLIC BLOOD PRESSURE: 136 MMHG | DIASTOLIC BLOOD PRESSURE: 65 MMHG | TEMPERATURE: 97.3 F | OXYGEN SATURATION: 97 %

## 2021-02-08 DIAGNOSIS — S40.012A CONTUSION OF LEFT SHOULDER, INITIAL ENCOUNTER: Primary | ICD-10-CM

## 2021-02-08 DIAGNOSIS — S50.312D: Primary | ICD-10-CM

## 2021-02-08 PROCEDURE — 99203 OFFICE O/P NEW LOW 30 MIN: CPT | Performed by: ORTHOPAEDIC SURGERY

## 2021-02-08 PROCEDURE — 99213 OFFICE O/P EST LOW 20 MIN: CPT | Performed by: NURSE PRACTITIONER

## 2021-02-08 PROCEDURE — S9083 URGENT CARE CENTER GLOBAL: HCPCS | Performed by: NURSE PRACTITIONER

## 2021-02-08 NOTE — PATIENT INSTRUCTIONS
Dressing removed and wound clean  As we discussed apply clean dressing daily  Apply triple antibiotic, nonstick and clean dressing  Monitor for signs of infection including redness, , streaking, fevers or increased swelling  Follow-up PCP for recheck in 3-5 days    Go to the ER with any worsening

## 2021-02-08 NOTE — PROGRESS NOTES
ASSESSMENT/PLAN:    Problem List Items Addressed This Visit     None      Visit Diagnoses     Contusion of left shoulder, initial encounter    -  Primary          80 YOM presented to office with acute left shoulder pain following a fall from tripping on a rug that occurred on 02/06/2021  Images taken at the ER the same day revealed no signs of fracture or bony injury  Exam and symptoms consistent with a diagnosis of left shoulder contusion  The patient was given instructions for simple home exercises to strengthen the rotator cuff muscles and improve range of motion  The patient will continue to wear the arm sling for stability  He will follow up with the ortho office in 4 weeks for recheck  Return in about 4 weeks (around 3/8/2021) for Recheck       _____________________________________________________  CHIEF COMPLAINT:  Chief Complaint   Patient presents with    Left Shoulder - Pain         SUBJECTIVE:  Socorro Castro  is a 80 y o  male who presents to the office for acute left shoulder pain  On 02/06/2021 the patient tripped over a rug in his home from standing height and fell onto his left shoulder  He experienced immediate pain and swelling  The patient does take Eliquis  He denies hitting his head or losing consciousness  Images taken in the ER the same day revealed no signs of fracture or other bony injury to the left shoulder  The patient was placed in an arm sling and told to follow up with outpatient orthopedics for further management  Today the patient continues to complain of pain in the shoulder with swelling and hematoma formation  He denies numbness or tingling of the left upper extremity  Denies fever or chills       The following portions of the patient's history were reviewed and updated as appropriate: allergies, current medications, past family history, past medical history, past social history, past surgical history and problem list     PAST MEDICAL HISTORY:  Past Medical History: Diagnosis Date    Arthritis     Cataract     Coronary artery disease     Coronary atherosclerosis of native coronary artery     Diverticulitis of colon     Essential hypertension, benign     Hyperlipidemia     Hypertension     Peptic ulcer     Renal disorder        PAST SURGICAL HISTORY:  Past Surgical History:   Procedure Laterality Date    CATARACT EXTRACTION Right     CORONARY ANGIOPLASTY      CORONARY STENT PLACEMENT      EYE SURGERY      cataract sx    SKIN BIOPSY      TONSILLECTOMY         FAMILY HISTORY:  Family History   Problem Relation Age of Onset    Heart attack Brother     Heart disease Mother     Cancer Father        SOCIAL HISTORY:  Social History     Tobacco Use    Smoking status: Former Smoker     Types: Pipe    Smokeless tobacco: Never Used   Substance Use Topics    Alcohol use: Not Currently    Drug use: No       MEDICATIONS:    Current Outpatient Medications:     allopurinol (ZYLOPRIM) 300 mg tablet, Take 1 tablet by mouth daily, Disp: , Rfl:     aspirin 81 MG tablet, Take 1 tablet by mouth see administration instructions Take 1 tablet Mon-Wed & Fri, Disp: , Rfl:     atorvastatin (LIPITOR) 20 mg tablet, Take 1 tablet by mouth daily, Disp: , Rfl:     Cholecalciferol (CVS VITAMIN D) 2000 units CAPS, Take by mouth, Disp: , Rfl:     Choline Fenofibrate (FENOFIBRIC ACID) 45 MG CPDR, Take by mouth, Disp: , Rfl:     furosemide (LASIX) 20 mg tablet, Take 1 tablet by mouth daily as needed, Disp: , Rfl:     irbesartan (AVAPRO) 75 mg tablet, Take 150 mg by mouth daily , Disp: , Rfl:     metoprolol tartrate (LOPRESSOR) 25 mg tablet, Take 25 mg by mouth 2 (two) times a day , Disp: , Rfl:     Multiple Vitamins tablet, Take by mouth, Disp: , Rfl:     nitroglycerin (Nitrostat) 0 4 mg SL tablet, Place 1 tablet (0 4 mg total) under the tongue every 5 (five) minutes as needed for chest pain, Disp: 25 tablet, Rfl: 3    nystatin (MYCOSTATIN) powder, Apply topically 2 (two) times a day, Disp: 15 g, Rfl: 0    ofloxacin (OCUFLOX) 0 3 % ophthalmic solution, ofloxacin 0 3 % eye drops, Disp: , Rfl:     pneumococcal 23-valent polysaccharide vaccine (PNEUMOVAX 23), Pneumovax 23 25 mcg/0 5 mL injection syringe, Disp: , Rfl:     potassium chloride (KLOR-CON) 20 mEq packet, Take 20 mEq by mouth as needed Take 1 tablet when taking Furosemide, Disp: , Rfl:     prednisoLONE acetate (PRED FORTE) 1 % ophthalmic suspension, prednisolone acetate 1 % eye drops,suspension, Disp: , Rfl:     ALLERGIES:  No Known Allergies    ROS:  Review of Systems     Constitutional: Negative for fatigue, fever or loss of appetite  HENT: Negative  Respiratory: Negative for shortness of breath, dyspnea  Cardiovascular: Negative for chest pain/tightness  Gastrointestinal: Negative for abdominal pain, N/V  Endocrine: Negative for cold/heat intolerance, unexplained weight loss/gain  Genitourinary: Negative for flank pain, dysuria, hematuria  Musculoskeletal: Positive for arthralgia   Skin: Negative for rash  Neurological: Negative for numbness or tingling  Psychiatric/Behavioral: Negative for agitation  _____________________________________________________  PHYSICAL EXAMINATION:    Blood pressure 127/53, pulse 72, height 5' 6" (1 676 m), weight 68 4 kg (150 lb 12 7 oz)  Constitutional: Oriented to person, place, and time  Appears well-developed and well-nourished  No distress  HENT:   Head: Normocephalic  Eyes: Conjunctivae are normal  Right eye exhibits no discharge  Left eye exhibits no discharge  No scleral icterus  Cardiovascular: Normal rate  Pulmonary/Chest: Effort normal    Neurological: Alert and oriented to person, place, and time  Skin: Skin is warm and dry  No rash noted  Not diaphoretic  No erythema  No pallor  Psychiatric: Normal mood and affect   Behavior is normal  Judgment and thought content normal       MUSCULOSKELETAL EXAMINATION:   Physical Exam  Ortho Exam    Left Shoulder- Ecchymosis and hematoma formation seen on the anterior left proximal humerus  Palpable tenderness to the glenohumeral joint  Weakness with active ROM, 0-90 degrees flexion and abduction  Palpable humeral head and ROM weakness indicative of rotator cuff degeneration  Neurologically intact to light touch  Skin warm and dry  Objective:  BP Readings from Last 1 Encounters:   02/08/21 127/53      Wt Readings from Last 1 Encounters:   02/08/21 68 4 kg (150 lb 12 7 oz)        BMI:   Estimated body mass index is 24 34 kg/m² as calculated from the following:    Height as of this encounter: 5' 6" (1 676 m)  Weight as of this encounter: 68 4 kg (150 lb 12 7 oz)  Radiographs:  _____________________________________________________  STUDIES REVIEWED:  I have personally reviewed pertinent films and reports in PACS  X rays of the left shoulder done on 02/06/2021 reveal degenerative changes of the [facility] joint  No signs of fracture or dislocation present         Helen Palma PA-C

## 2021-02-08 NOTE — PROGRESS NOTES
[facility]        NAME: Poornima Campos  is a 80 y o  male  : 10/11/1924    MRN: [id number]  DATE: 2021  TIME: 7:11 PM    Assessment and Plan   Elbow abrasion, left, subsequent encounter [V12 374W]  1  Elbow abrasion, left, subsequent encounter           Patient Instructions     Patient Instructions     Dressing removed and wound clean  As we discussed apply clean dressing daily  Apply triple antibiotic, nonstick and clean dressing  Monitor for signs of infection including redness, , streaking, fevers or increased swelling  Follow-up PCP for recheck in 3-5 days  Go to the ER with any worsening      Chief Complaint     Chief Complaint   Patient presents with    Wound Check     left elbow, fell Saterday          History of Present Illness   Alexis Vela Sr  presents to the clinic c/o      This is a 27-year-old male in here today for check of left elbow wound  He states he fell on 2021  He tripped and sustained sustained a skin tear and had dressing applied in ER  He also has a left shoulder injury but seen ortho for this today  He states he has not changed dressing since he was in ER  He had imaging done in ER of head and humerus with no abnormality  He is here today for wound care  Review of Systems   Review of Systems   Constitutional: Negative  HENT: Negative  Respiratory: Negative  Musculoskeletal: Positive for arthralgias  Skin: Positive for wound  Psychiatric/Behavioral: Negative            Current Medications     Long-Term Medications   Medication Sig Dispense Refill    allopurinol (ZYLOPRIM) 300 mg tablet Take 1 tablet by mouth daily      aspirin 81 MG tablet Take 1 tablet by mouth see administration instructions Take 1 tablet Mon-Wed & Fri      atorvastatin (LIPITOR) 20 mg tablet Take 1 tablet by mouth daily      Cholecalciferol (CVS VITAMIN D) 2000 units CAPS Take by mouth      Choline Fenofibrate (FENOFIBRIC ACID) 45 MG CPDR Take by mouth      furosemide (LASIX) 20 mg tablet Take 1 tablet by mouth daily as needed      irbesartan (AVAPRO) 75 mg tablet Take 150 mg by mouth daily       metoprolol tartrate (LOPRESSOR) 25 mg tablet Take 25 mg by mouth 2 (two) times a day       Multiple Vitamins tablet Take by mouth      nitroglycerin (Nitrostat) 0 4 mg SL tablet Place 1 tablet (0 4 mg total) under the tongue every 5 (five) minutes as needed for chest pain 25 tablet 3    nystatin (MYCOSTATIN) powder Apply topically 2 (two) times a day 15 g 0    prednisoLONE acetate (PRED FORTE) 1 % ophthalmic suspension prednisolone acetate 1 % eye drops,suspension      [DISCONTINUED] meclizine (ANTIVERT) 25 mg tablet Take by mouth         Current Allergies     Allergies as of 02/08/2021    (No Known Allergies)            The following portions of the patient's history were reviewed and updated as appropriate: allergies, current medications, past family history, past medical history, past social history, past surgical history and problem list     Objective   /65   Pulse 68   Temp (!) 97 3 °F (36 3 °C) (Temporal)   Resp 20   SpO2 97%        Physical Exam     Physical Exam  Vitals signs and nursing note reviewed  Constitutional:       Appearance: Normal appearance  Cardiovascular:      Rate and Rhythm: Normal rate and regular rhythm  Pulses: Normal pulses  Heart sounds: Normal heart sounds  Skin:     Comments: Left elbow:  There is a dressing  Normal saline used to help remove dressing  Wound cleansed  Nonstick and gauze dressing reapplied  There is a 1 5 in abrasion noted  There is no tenderness to palpate over the elbow joint  Full range of motion of the elbow  There is a hematoma above site  This appears to be resolving  Skin is not timed  Hematoma is soft  Arm put back in sling  There is no sign of infection  There is no warmth redness or drainage

## 2021-03-08 ENCOUNTER — OFFICE VISIT (OUTPATIENT)
Dept: OBGYN CLINIC | Facility: CLINIC | Age: 86
End: 2021-03-08
Payer: COMMERCIAL

## 2021-03-08 VITALS
SYSTOLIC BLOOD PRESSURE: 118 MMHG | DIASTOLIC BLOOD PRESSURE: 58 MMHG | HEART RATE: 69 BPM | WEIGHT: 150.79 LBS | BODY MASS INDEX: 24.23 KG/M2 | HEIGHT: 66 IN

## 2021-03-08 DIAGNOSIS — M12.812 ROTATOR CUFF ARTHROPATHY OF LEFT SHOULDER: ICD-10-CM

## 2021-03-08 DIAGNOSIS — S40.012A CONTUSION OF LEFT SHOULDER, INITIAL ENCOUNTER: Primary | ICD-10-CM

## 2021-03-08 PROCEDURE — 99213 OFFICE O/P EST LOW 20 MIN: CPT | Performed by: ORTHOPAEDIC SURGERY

## 2021-03-08 NOTE — PROGRESS NOTES
ASSESSMENT/PLAN:    Diagnoses and all orders for this visit:    Contusion of left shoulder, initial encounter  -     Ambulatory referral to Physical Therapy; Future    Rotator cuff arthropathy of left shoulder  -     Ambulatory referral to Physical Therapy; Future         the patient denies any significant pain at this time  However, he does have decreased range of motion and strength  He should try and wean himself from the sling  He was given a referral for physical therapy to work on strengthening, stretching and range of motion of his shoulder  He will follow up with our office in 2 months for strength and motion check  The patient is acceptable to this plan  Return in about 2 months (around 5/8/2021)  _____________________________________________________  CHIEF COMPLAINT:  Chief Complaint   Patient presents with    Left Shoulder - Follow-up         SUBJECTIVE:  Oscar Pendleton  is a 80 y o  male who presents to our office for a follow-up visit  The patient suffered a left shoulder contusion on 02/06/2021  He also has a history of a rotator cuff arthropathy of his left shoulder  He is currently still using a sling  He denies any significant pain at this time  He does complain of decreased strength and range of motion of his shoulder  He denies any numbness or tingling  He denies any fever or chills      The following portions of the patient's history were reviewed and updated as appropriate: allergies, current medications, past family history, past medical history, past social history, past surgical history and problem list     PAST MEDICAL HISTORY:  Past Medical History:   Diagnosis Date    Arthritis     Cataract     Coronary artery disease     Coronary atherosclerosis of native coronary artery     Diverticulitis of colon     Essential hypertension, benign     Hyperlipidemia     Hypertension     Peptic ulcer     Renal disorder        PAST SURGICAL HISTORY:  Past Surgical History:   Procedure Laterality Date    CATARACT EXTRACTION Right     CORONARY ANGIOPLASTY      CORONARY STENT PLACEMENT      EYE SURGERY      cataract sx    SKIN BIOPSY      TONSILLECTOMY         FAMILY HISTORY:  Family History   Problem Relation Age of Onset    Heart attack Brother     Heart disease Mother     Cancer Father        SOCIAL HISTORY:  Social History     Tobacco Use    Smoking status: Former Smoker     Types: Pipe    Smokeless tobacco: Never Used   Substance Use Topics    Alcohol use: Not Currently    Drug use: No       MEDICATIONS:    Current Outpatient Medications:     allopurinol (ZYLOPRIM) 300 mg tablet, Take 1 tablet by mouth daily, Disp: , Rfl:     aspirin 81 MG tablet, Take 1 tablet by mouth see administration instructions Take 1 tablet Mon-Wed & Fri, Disp: , Rfl:     atorvastatin (LIPITOR) 20 mg tablet, Take 1 tablet by mouth daily, Disp: , Rfl:     Cholecalciferol (CVS VITAMIN D) 2000 units CAPS, Take by mouth, Disp: , Rfl:     Choline Fenofibrate (FENOFIBRIC ACID) 45 MG CPDR, Take by mouth, Disp: , Rfl:     furosemide (LASIX) 20 mg tablet, Take 1 tablet by mouth daily as needed, Disp: , Rfl:     irbesartan (AVAPRO) 75 mg tablet, Take 150 mg by mouth daily , Disp: , Rfl:     metoprolol tartrate (LOPRESSOR) 25 mg tablet, Take 25 mg by mouth 2 (two) times a day , Disp: , Rfl:     Multiple Vitamins tablet, Take by mouth, Disp: , Rfl:     nitroglycerin (Nitrostat) 0 4 mg SL tablet, Place 1 tablet (0 4 mg total) under the tongue every 5 (five) minutes as needed for chest pain, Disp: 25 tablet, Rfl: 3    nystatin (MYCOSTATIN) powder, Apply topically 2 (two) times a day, Disp: 15 g, Rfl: 0    ofloxacin (OCUFLOX) 0 3 % ophthalmic solution, ofloxacin 0 3 % eye drops, Disp: , Rfl:     pneumococcal 23-valent polysaccharide vaccine (PNEUMOVAX 23), Pneumovax 23 25 mcg/0 5 mL injection syringe, Disp: , Rfl:     potassium chloride (KLOR-CON) 20 mEq packet, Take 20 mEq by mouth as needed Take 1 tablet when taking Furosemide, Disp: , Rfl:     prednisoLONE acetate (PRED FORTE) 1 % ophthalmic suspension, prednisolone acetate 1 % eye drops,suspension, Disp: , Rfl:     ALLERGIES:  No Known Allergies    ROS:  Review of Systems     Constitutional: Negative for fatigue, fever or loss of appetite  HENT: Negative  Respiratory: Negative for shortness of breath, dyspnea  Cardiovascular: Negative for chest pain/tightness  Gastrointestinal: Negative for abdominal pain, N/V  Endocrine: Negative for cold/heat intolerance, unexplained weight loss/gain  Genitourinary: Negative for flank pain, dysuria, hematuria  Musculoskeletal: Positive for arthralgia   Skin: Negative for rash  Neurological: Negative for numbness or tingling  Psychiatric/Behavioral: Negative for agitation  _____________________________________________________  PHYSICAL EXAMINATION:    Blood pressure 118/58, pulse 69, height 5' 6" (1 676 m), weight 68 4 kg (150 lb 12 7 oz)  Constitutional: Oriented to person, place, and time  Appears well-developed and well-nourished  No distress  HENT:   Head: Normocephalic  Eyes: Conjunctivae are normal  Right eye exhibits no discharge  Left eye exhibits no discharge  No scleral icterus  Cardiovascular: Normal rate  Pulmonary/Chest: Effort normal    Neurological: Alert and oriented to person, place, and time  Skin: Skin is warm and dry  No rash noted  Not diaphoretic  No erythema  No pallor  Psychiatric: Normal mood and affect   Behavior is normal  Judgment and thought content normal       MUSCULOSKELETAL EXAMINATION:   Physical Exam  Ortho Exam    Left upper extremity neurovascularly intact  Fingers are pink and mobile   Compartments are soft   Range of motion of the shoulder from 0-90 degrees of forward flexion and abduction   Brisk cap refill   Sensation intact  Objective:  BP Readings from Last 1 Encounters:   03/08/21 118/58      Wt Readings from Last 1 Encounters: 03/08/21 68 4 kg (150 lb 12 7 oz)        BMI:   Estimated body mass index is 24 34 kg/m² as calculated from the following:    Height as of this encounter: 5' 6" (1 676 m)  Weight as of this encounter: 68 4 kg (150 lb 12 7 oz)            Aracely Loera PA-C

## 2021-03-10 ENCOUNTER — EVALUATION (OUTPATIENT)
Dept: PHYSICAL THERAPY | Facility: CLINIC | Age: 86
End: 2021-03-10
Payer: COMMERCIAL

## 2021-03-10 DIAGNOSIS — M25.512 LEFT SHOULDER PAIN, UNSPECIFIED CHRONICITY: ICD-10-CM

## 2021-03-10 DIAGNOSIS — R29.3 POSTURE ABNORMALITY: ICD-10-CM

## 2021-03-10 DIAGNOSIS — S40.012D CONTUSION OF LEFT SHOULDER, SUBSEQUENT ENCOUNTER: Primary | ICD-10-CM

## 2021-03-10 DIAGNOSIS — M12.812 ROTATOR CUFF ARTHROPATHY OF LEFT SHOULDER: ICD-10-CM

## 2021-03-10 PROCEDURE — 97163 PT EVAL HIGH COMPLEX 45 MIN: CPT | Performed by: PHYSICAL THERAPIST

## 2021-03-10 PROCEDURE — 97110 THERAPEUTIC EXERCISES: CPT | Performed by: PHYSICAL THERAPIST

## 2021-03-10 NOTE — PROGRESS NOTES
PT Evaluation     Today's date: 3/10/2021  Patient name: Verdene Boeck Sr   : 10/11/1924  MRN: [id number]  Referring provider: [facility]  Dx:   Encounter Diagnosis     ICD-10-CM    1  Contusion of left shoulder, subsequent encounter  S40 012D    2  Rotator cuff arthropathy of left shoulder  M12 812    3  Left shoulder pain, unspecified chronicity  M25 512    4  Posture abnormality  R29 3        Start Time: 1315  Stop Time: 1415  Total time in clinic (min): 60 minutes    Assessment  Assessment details: Verdene Boeck Sr  was seen for an initial PT evaluation today  Patient is a 80 y o  male with diagnosis of left shoulder contusion/RC arthropathy and past medical history significant for CAD, HTN, ventricular bigeminy, gout, stage 3 CKD, coronary stent  High complexity evaluation  due to number of participation restrictions, functional outcome measure of 68% limitation, and unstable clinical presentation  Findings today show significant weakness of left upper extremity with postural abnormality and limited UE stability impacting ability to reach, lift, and carry  Skilled PT indicated to treat at this time to address left shoulder strength and stability deficits to improve LUE mobility and decrease reliance on caregivers  Impairments: abnormal muscle firing, abnormal or restricted ROM, abnormal movement, activity intolerance, impaired physical strength, lacks appropriate home exercise program, pain with function, scapular dyskinesis, poor posture  and poor body mechanics    Goals  STG (6 weeks)  1  Patient's left shoulder flexion AROM will increase to 90 with 0/10 pain for increased ability to perform overhead ADLs  2  Patient will have 0/10 pain in left shoulder when pushing open door  3  Patient's left shoulder strength will increase to 2+/5 for increased ability to lift  LTG (12 weeks)  1   Patient's UE AROM equal bilaterally for ability to complete hair hygiene, overhead, and behind the back ADLs  2  Patient's UE strength will be equal bilaterally for ability to lift and carry at PLOF  3  Patient will be independent with home exercise program for continued maintenance post PT discharge  Plan  Plan details: Progress note in 4 weeks  Patient would benefit from: skilled physical therapy  Planned modality interventions: cryotherapy and thermotherapy: hydrocollator packs  Planned therapy interventions: neuromuscular re-education, manual therapy, therapeutic exercise, therapeutic activities, self care and home exercise program  Frequency: 2x week  Duration in weeks: 12  Plan of Care beginning date: 3/10/2021  Plan of Care expiration date: 6/10/2021  Treatment plan discussed with: patient and PTA        Subjective Evaluation    History of Present Illness  Date of onset: 2/6/2021  Mechanism of injury: Wil Giang  is a 80 y o  male who presents to outpatient Physical Therapy today with complaints of left shoulder pain  States fall in early February onto left shoulder saw ortho, no fx on x-ray but does have old RC injury  Pain  No pain reported  Progression: no change    Social Support  Steps to enter house: yes  13  Stairs in house: no   Lives with: alone (adult son is staying temporarily )    Employment status: not working (retired)  Hand dominance: right      Diagnostic Tests  X-ray: normal  Patient Goals  Patient goals for therapy: increased motion  Patient goal: reaching overhead, dressing, be able to lift arm to shelf        Objective     Observations     Additional Observation Details  Upper crossed   Wearing sling on LUE    Tenderness     Left Shoulder   No tenderness     Cervical/Thoracic Screen   Cervical range of motion within normal limits with the following exceptions: limitation in cervical rotation and lateral flexion bilaterally, no pain    Neurological Testing     Sensation     Shoulder   Left Shoulder   Intact: light touch    Right Shoulder   Intact: light touch    Additional Neurological Details  CTS bilaterally, worse in LUE, constant N+T into left fingers    Active Range of Motion   Left Shoulder   Flexion: 15 degrees   Abduction: 45 degrees   External rotation 0°: 15 degrees   Internal rotation 0°: Left shoulder active internal rotation at 0 degrees: to belly       Right Shoulder   Flexion: 115 degrees   Abduction: 135 degrees   External rotation BTH: C5   Internal rotation BTB: L1     Passive Range of Motion   Left Shoulder   Flexion: 120 degrees   Abduction: 120 degrees   External rotation 0°: 35 degrees     Strength/Myotome Testing     Left Shoulder     Planes of Motion   Flexion: 2-   Abduction: 2-   External rotation at 0°: 2+   Internal rotation at 0°: 2+     Isolated Muscles   Biceps: 4   Triceps: 4     Right Shoulder   Normal muscle strength    Isolated Muscles   Biceps: 5   Triceps: 5     General Comments:      Shoulder Comments       FOTO: 32% function, 58% predicted function         Flowsheet Rows      Most Recent Value   PT/OT G-Codes   Current Score  32   Projected Score  58             Precautions: fall risk  Progress note: 4/10  POC: 6/10    Manuals 3/10       Stretching with active release        GH jt mobs                        Neuro Re-Ed        UBE        scap retraction        Shoulder rolls        Chin tucks        MTP/LTP        Shoulder isometrics        Ther Ex        Pulleys  *      UT stretch        LS stretch        Table slides Flex, scap :05x10       pendulums        Supine flexion ROM :05x10       Supine ER ROM :05x10       Side lying shoulder ER        Serratus punch        Prone row        Prone I, T's thumbs up and down        Standing YTI        Lat pull down        LT lift off Standing        Wall walks  *      Cane AAROM  Seated *                              Ther Activity                        Gait Training                        Modalities                        Access Code: [email or web address]   URL: [email or web address]   Date: 03/10/2021   Prepared by: Modesto Morgan     Exercises  Supine Shoulder Flexion AAROM with Hands Clasped - 10 reps - 1 sets - 5 sec hold - 2x daily - 7x weekly  Supine Shoulder External Rotation with Dowel - 10 reps - 1 sets - 5 sec hold - 2x daily - 7x weekly  Seated Shoulder Flexion Towel Slide at Table Top - 10 reps - 1 sets - 5 sec hold - 2x daily - 7x weekly  Seated Shoulder Abduction Towel Slide at Table Top - 10 reps - 1 sets - 5 sec hold - 2x daily - 7x weekly

## 2021-03-11 ENCOUNTER — OFFICE VISIT (OUTPATIENT)
Dept: PHYSICAL THERAPY | Facility: CLINIC | Age: 86
End: 2021-03-11
Payer: COMMERCIAL

## 2021-03-11 DIAGNOSIS — M12.812 ROTATOR CUFF ARTHROPATHY OF LEFT SHOULDER: ICD-10-CM

## 2021-03-11 DIAGNOSIS — M25.512 LEFT SHOULDER PAIN, UNSPECIFIED CHRONICITY: ICD-10-CM

## 2021-03-11 DIAGNOSIS — R29.3 POSTURE ABNORMALITY: ICD-10-CM

## 2021-03-11 DIAGNOSIS — S40.012D CONTUSION OF LEFT SHOULDER, SUBSEQUENT ENCOUNTER: Primary | ICD-10-CM

## 2021-03-11 PROCEDURE — 97140 MANUAL THERAPY 1/> REGIONS: CPT | Performed by: PHYSICAL THERAPIST

## 2021-03-11 PROCEDURE — 97110 THERAPEUTIC EXERCISES: CPT | Performed by: PHYSICAL THERAPIST

## 2021-03-11 PROCEDURE — 97112 NEUROMUSCULAR REEDUCATION: CPT | Performed by: PHYSICAL THERAPIST

## 2021-03-11 NOTE — PROGRESS NOTES
Daily Note     Today's date: 3/11/2021  Patient name: Mac Vaca Sr   : 10/11/1924  MRN: [id number]  Referring provider: Brock Hutchins  Dx:   Encounter Diagnosis     ICD-10-CM    1  Contusion of left shoulder, subsequent encounter  S40 012D    2  Rotator cuff arthropathy of left shoulder  M12 812    3  Left shoulder pain, unspecified chronicity  M25 512    4  Posture abnormality  R29 3        Start Time: 1400  Stop Time: 5185  Total time in clinic (min): 45 minutes    Subjective: No changes since yesterday's initial evaluation  Did attempt removing sling last night without difficulty  Objective: See treatment diary below      Assessment: Tolerated treatment well with no c/o pain or discomfort  Does have difficulty with active motion of LUE, but is able to tolerate AAROM of LUE with minimal difficulty  Patient would benefit from continued PT working on muscle activation, strength and stability of LUE and scapular region for increased AROM and ability to reach, lift, carry  Plan: Continue per plan of care  Progress treatment as tolerated         Precautions: fall risk  Progress note: 4/10  POC: 6/10    Manuals 3/10 3/11      Stretching with active release  15 min      GH jt mobs                        Neuro Re-Ed        UBE        scap retraction  Seated 20x      Shoulder rolls  Seated 20x      Chin tucks        MTP/LTP        Shoulder isometrics        Ther Ex        Pulleys  *:10x10      UT stretch        LS stretch        Table slides Flex, scap :05x10 Flex, scap :05x10      pendulums        Supine flexion ROM :05x10 :05x10      Supine ER ROM :05x10 :05x10      Side lying shoulder ER        Serratus punch        Standing YTI        Wall walks  Flex 10x      Cane AAROM  Seated flex and abd 10x ea                              Ther Activity                        Gait Training                        Modalities                        Access Code: [email or web address]   URL: [email or web address]   Date: 03/10/2021   Prepared by: Diane Lewis     Exercises  Supine Shoulder Flexion AAROM with Hands Clasped - 10 reps - 1 sets - 5 sec hold - 2x daily - 7x weekly  Supine Shoulder External Rotation with Dowel - 10 reps - 1 sets - 5 sec hold - 2x daily - 7x weekly  Seated Shoulder Flexion Towel Slide at Table Top - 10 reps - 1 sets - 5 sec hold - 2x daily - 7x weekly  Seated Shoulder Abduction Towel Slide at Table Top - 10 reps - 1 sets - 5 sec hold - 2x daily - 7x weekly

## 2021-03-16 ENCOUNTER — OFFICE VISIT (OUTPATIENT)
Dept: PHYSICAL THERAPY | Facility: CLINIC | Age: 86
End: 2021-03-16
Payer: COMMERCIAL

## 2021-03-16 DIAGNOSIS — M12.812 ROTATOR CUFF ARTHROPATHY OF LEFT SHOULDER: ICD-10-CM

## 2021-03-16 DIAGNOSIS — R29.3 POSTURE ABNORMALITY: ICD-10-CM

## 2021-03-16 DIAGNOSIS — M25.512 LEFT SHOULDER PAIN, UNSPECIFIED CHRONICITY: ICD-10-CM

## 2021-03-16 DIAGNOSIS — S40.012D CONTUSION OF LEFT SHOULDER, SUBSEQUENT ENCOUNTER: Primary | ICD-10-CM

## 2021-03-16 PROCEDURE — 97112 NEUROMUSCULAR REEDUCATION: CPT

## 2021-03-16 PROCEDURE — 97140 MANUAL THERAPY 1/> REGIONS: CPT

## 2021-03-16 PROCEDURE — 97110 THERAPEUTIC EXERCISES: CPT

## 2021-03-16 NOTE — PROGRESS NOTES
Daily Note     Today's date: 3/16/2021  Patient name: Dalton Chicas Sr   : 10/11/1924  MRN: [id number]  Referring provider: Suzette Blackburn  Dx:   Encounter Diagnosis     ICD-10-CM    1  Contusion of left shoulder, subsequent encounter  S40 012D    2  Rotator cuff arthropathy of left shoulder  M12 812    3  Left shoulder pain, unspecified chronicity  M25 512    4  Posture abnormality  R29 3                   Subjective: Patient has no pain today  Patient states he is doing his exercises at home  He reports starting to wean off his sling  Objective: See treatment diary below    Patient's home exercise program updated to include additional exercises  Handout issued and explained with demonstration  Patient entered department in his sling  Patient instructed not to use his sling at home unless he has pain  Assessment: Tolerated treatment fair+  Patient would benefit from continued PT for stretching and strengthening  Patient was able to add exercises to his program with little difficulty and no increase of pain  Patient feels weak lifting his arm  Patient seemed to understand all education during today's session  Patient felt no pain and was looser by the end of the session  Plan: Continue per plan of care  Progress treatment as tolerated         Precautions: fall risk  Progress note: 4/10  POC: 6/10    Manuals 3/10 3/11 3/16     Stretching with active release  15 min x10 min     GH jt mobs                        Neuro Re-Ed        UBE   *seated 120/30 x2 min     scap retraction  Seated 20x Seated x20     Shoulder rolls  Seated 20x Seated x20     Chin tucks   *x10     MTP/LTP        Shoulder isometrics        Ther Ex        Pulleys  *:10x10 :10x10     UT stretch   *:30x4     LS stretch   *:30x4     Table slides Flex, scap :05x10 Flex, scap :05x10 Flex, scap :05x10     pendulums        Supine flexion ROM :05x10 :05x10 :05x10     Supine ER ROM :05x10 :05x10 :05x10     Side lying shoulder ER Serratus punch        Standing YTI        Wall walks  Flex 10x Wall ladder x10 flex     Cane AAROM  Seated flex and abd 10x ea Seated x10  Flex, abd                             Ther Activity                        Gait Training                        Modalities                        Access Code: [email or web address]   URL: [email or web address]   Date: 03/10/2021; updated 3/16/21   Prepared by: Dorothy Duffy     Exercises  Supine Shoulder Flexion AAROM with Hands Clasped - 10 reps - 1 sets - 5 sec hold - 2x daily - 7x weekly  Supine Shoulder External Rotation with Dowel - 10 reps - 1 sets - 5 sec hold - 2x daily - 7x weekly  Seated Shoulder Flexion Towel Slide at Table Top - 10 reps - 1 sets - 5 sec hold - 2x daily - 7x weekly  Seated Shoulder Abduction Towel Slide at Table Top - 10 reps - 1 sets - 5 sec hold - 2x daily - 7x weekly  Chin tucks   levator stretch  Upper trap stretch

## 2021-03-17 ENCOUNTER — OFFICE VISIT (OUTPATIENT)
Dept: NEPHROLOGY | Facility: CLINIC | Age: 86
End: 2021-03-17
Payer: COMMERCIAL

## 2021-03-17 VITALS
BODY MASS INDEX: 23.46 KG/M2 | SYSTOLIC BLOOD PRESSURE: 120 MMHG | HEART RATE: 63 BPM | OXYGEN SATURATION: 93 % | WEIGHT: 146 LBS | HEIGHT: 66 IN | DIASTOLIC BLOOD PRESSURE: 58 MMHG | TEMPERATURE: 97.2 F

## 2021-03-17 DIAGNOSIS — M1A.09X0 CHRONIC GOUT OF MULTIPLE SITES, UNSPECIFIED CAUSE: ICD-10-CM

## 2021-03-17 DIAGNOSIS — E83.42 HYPOMAGNESEMIA: ICD-10-CM

## 2021-03-17 DIAGNOSIS — R60.0 BILATERAL LEG EDEMA: ICD-10-CM

## 2021-03-17 DIAGNOSIS — E22.2 SIADH (SYNDROME OF INAPPROPRIATE ADH PRODUCTION) (HCC): ICD-10-CM

## 2021-03-17 DIAGNOSIS — N18.31 STAGE 3A CHRONIC KIDNEY DISEASE (HCC): Primary | ICD-10-CM

## 2021-03-17 DIAGNOSIS — I10 ESSENTIAL HYPERTENSION: ICD-10-CM

## 2021-03-17 PROCEDURE — 99214 OFFICE O/P EST MOD 30 MIN: CPT | Performed by: INTERNAL MEDICINE

## 2021-03-17 NOTE — PROGRESS NOTES
[address] [facility] [place], [place]    Name: Evy August Sr  YOB: 1924      Assessment/Plan:    Stage 3 chronic kidney disease [facility])  Lab Results   Component Value Date    EGFR 61 11/23/2020    EGFR 62 10/21/2020    EGFR 52 09/21/2020    CREATININE 1 03 11/23/2020    CREATININE 1 01 10/21/2020    CREATININE 1 18 09/21/2020     Patient appears to have mild chronic kidney disease, stage IIIA, continue to avoid potential nephrotoxins and focus on controlling blood pressures  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[facility] [address] )    Recommended for the patient to remain under 48 oz of fluid daily  No recent labs, since November of 2020  Will check labs in the very near future  Chronic gout of multiple sites    Continue with allopurinol 300 mg once daily, patient has been doing well with control of gout  Hypomagnesemia    Will follow-up magnesium level with next set of labs  Essential hypertension    Continue current medications, blood pressure is well controlled  Bilateral leg edema    Continue furosemide on as needed basis  We discussed the need to reduce the amount of sodium in his diet, he will try to do this  In particular the patient enjoys a turkey breast sandwich every day  He will try to remove this as a daily meal          Problem List Items Addressed This Visit        Endocrine    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[facility] [address] )       Recommended for the patient to remain under 48 oz of fluid daily  No recent labs, since November of 2020  Will check labs in the very near future  Relevant Orders    Osmolality, urine       Cardiovascular and Mediastinum    Essential hypertension       Continue current medications, blood pressure is well controlled              Musculoskeletal and Integument    Chronic gout of multiple sites       Continue with allopurinol 300 mg once daily, patient has been doing well with control of gout             Genitourinary    Stage 3 chronic kidney disease - Primary     Lab Results   Component Value Date    EGFR 61 11/23/2020    EGFR 62 10/21/2020    EGFR 52 09/21/2020    CREATININE 1 03 11/23/2020    CREATININE 1 01 10/21/2020    CREATININE 1 18 09/21/2020     Patient appears to have mild chronic kidney disease, stage IIIA, continue to avoid potential nephrotoxins and focus on controlling blood pressures  Relevant Orders    Basic metabolic panel    Microalbumin / creatinine urine ratio    Urinalysis with microscopic    PTH, intact    Magnesium       Other    Bilateral leg edema       Continue furosemide on as needed basis  We discussed the need to reduce the amount of sodium in his diet, he will try to do this  In particular the patient enjoys a turkey breast sandwich every day  He will try to remove this as a daily meal          Hypomagnesemia       Will follow-up magnesium level with next set of labs  Patient is stable at this time  We will see him back in about 6 months  In the meantime, he will have blood work done, will follow-up on the blood work in given a call with the results  Further evaluations or additional labs as indicated  Subjective:      Patient ID: Klaudia aCbello   is a 80 y o  male  Patient presents for follow-up regarding chronic kidney disease stage IIIA and hyponatremia  Patient apparently has SIADH, he is trying to restrict fluids to no more than 36 oz of water and other fluids, which altogether are likely at around 48 oz  Patient has bilateral lower extremity edema, he is on diuretics for this  He is also on the high sodium diet at this time  Hypertension  This is a chronic problem  The current episode started more than 1 year ago  The problem is unchanged  The problem is controlled  Associated symptoms include peripheral edema  Pertinent negatives include no chest pain or orthopnea   There are no associated agents to hypertension  Risk factors for coronary artery disease include male gender  Past treatments include angiotensin blockers, diuretics and lifestyle changes  Compliance problems include diet  Hypertensive end-organ damage includes kidney disease  Identifiable causes of hypertension include chronic renal disease  The following portions of the patient's history were reviewed and updated as appropriate: allergies, current medications, past family history, past medical history, past social history, past surgical history and problem list     Review of Systems   Cardiovascular: Negative for chest pain and orthopnea  All other systems reviewed and are negative  Social History     Socioeconomic History    Marital status:       Spouse name: None    Number of children: None    Years of education: None    Highest education level: None   Occupational History    None   Social Needs    Financial resource strain: None    Food insecurity     Worry: None     Inability: None    Transportation needs     Medical: None     Non-medical: None   Tobacco Use    Smoking status: Former Smoker     Types: Pipe    Smokeless tobacco: Never Used   Substance and Sexual Activity    Alcohol use: Not Currently    Drug use: No    Sexual activity: Not Currently   Lifestyle    Physical activity     Days per week: None     Minutes per session: None    Stress: None   Relationships    Social connections     Talks on phone: None     Gets together: None     Attends Yarsanism service: None     Active member of club or organization: None     Attends meetings of clubs or organizations: None     Relationship status: None    Intimate partner violence     Fear of current or ex partner: None     Emotionally abused: None     Physically abused: None     Forced sexual activity: None   Other Topics Concern    None   Social History Narrative    None     Past Medical History:   Diagnosis Date    Arthritis     Cataract     Coronary artery disease     Coronary atherosclerosis of native coronary artery     Diverticulitis of colon     Essential hypertension, benign     Hyperlipidemia     Hypertension     Peptic ulcer     Renal disorder      Past Surgical History:   Procedure Laterality Date    CATARACT EXTRACTION Right     CORONARY ANGIOPLASTY      CORONARY STENT PLACEMENT      EYE SURGERY      cataract sx    SKIN BIOPSY      TONSILLECTOMY         Current Outpatient Medications:     allopurinol (ZYLOPRIM) 300 mg tablet, Take 1 tablet by mouth daily, Disp: , Rfl:     aspirin 81 MG tablet, Take 1 tablet by mouth see administration instructions Take 1 tablet Mon-Wed & Fri, Disp: , Rfl:     atorvastatin (LIPITOR) 20 mg tablet, Take 1 tablet by mouth daily, Disp: , Rfl:     Cholecalciferol (CVS VITAMIN D) 2000 units CAPS, Take by mouth, Disp: , Rfl:     Choline Fenofibrate (FENOFIBRIC ACID) 45 MG CPDR, Take by mouth, Disp: , Rfl:     furosemide (LASIX) 20 mg tablet, Take 1 tablet by mouth daily as needed, Disp: , Rfl:     irbesartan (AVAPRO) 75 mg tablet, Take 150 mg by mouth daily , Disp: , Rfl:     metoprolol tartrate (LOPRESSOR) 25 mg tablet, Take 25 mg by mouth 2 (two) times a day , Disp: , Rfl:     Multiple Vitamins tablet, Take by mouth, Disp: , Rfl:     nitroglycerin (Nitrostat) 0 4 mg SL tablet, Place 1 tablet (0 4 mg total) under the tongue every 5 (five) minutes as needed for chest pain, Disp: 25 tablet, Rfl: 3    ofloxacin (OCUFLOX) 0 3 % ophthalmic solution, ofloxacin 0 3 % eye drops, Disp: , Rfl:     pneumococcal 23-valent polysaccharide vaccine (PNEUMOVAX 23), Pneumovax 23 25 mcg/0 5 mL injection syringe, Disp: , Rfl:     prednisoLONE acetate (PRED FORTE) 1 % ophthalmic suspension, prednisolone acetate 1 % eye drops,suspension, Disp: , Rfl:     nystatin (MYCOSTATIN) powder, Apply topically 2 (two) times a day (Patient not taking: Reported on 3/17/2021), Disp: 15 g, Rfl: 0    potassium chloride (KLOR-CON) 20 mEq packet, Take 20 mEq by mouth as needed Take 1 tablet when taking Furosemide, Disp: , Rfl:     Lab Results   Component Value Date    SODIUM 134 (L) 11/23/2020    K 4 2 11/23/2020     11/23/2020    CO2 25 11/23/2020    AGAP 6 11/23/2020    BUN 39 (H) 11/23/2020    CREATININE 1 03 11/23/2020    GLUC 91 09/19/2020    GLUF 85 11/23/2020    CALCIUM 8 8 11/23/2020    AST 43 (H) 09/16/2020    ALT 18 09/16/2020    ALKPHOS 47 (L) 09/16/2020    TP 6 4 09/16/2020    TBILI 0 90 09/16/2020    EGFR 61 11/23/2020     Lab Results   Component Value Date    WBC 7 30 09/19/2020    HGB 11 9 (L) 09/19/2020    HCT 34 1 (L) 09/19/2020    MCV 95 09/19/2020     09/19/2020     Lab Results   Component Value Date    CHOLESTEROL 107 09/17/2020    CHOLESTEROL 123 04/16/2020    CHOLESTEROL 136 04/25/2019     Lab Results   Component Value Date    HDL 45 09/17/2020    HDL 35 (L) 04/16/2020    HDL 28 (L) 04/25/2019     Lab Results   Component Value Date    LDLCALC 46 09/17/2020    LDLCALC 62 04/16/2020    LDLCALC 76 04/25/2019     Lab Results   Component Value Date    TRIG 78 09/17/2020    TRIG 130 04/16/2020    TRIG 162 04/25/2019     No results found for: [place], [place]  Lab Results   Component Value Date    ADR7ZTQVYCMC 2 720 09/17/2020     Lab Results   Component Value Date    CALCIUM 8 8 11/23/2020    PHOS 1 7 (L) 09/18/2020     No results found for: SPEP, UPEP  No results found for: MICROALBUR, [id number]        Objective:      /58 (BP Location: Right arm, Patient Position: Sitting, Cuff Size: Standard)   Pulse 63   Temp (!) 97 2 °F (36 2 °C) (Temporal)   Ht 5' 6" (1 676 m)   Wt 66 2 kg (146 lb)   SpO2 93%   BMI 23 57 kg/m²          Physical Exam  Vitals signs reviewed  Constitutional:       General: He is not in acute distress  Appearance: He is well-developed  HENT:      Head: Normocephalic and atraumatic  Eyes:      Conjunctiva/sclera: Conjunctivae normal    Neck:      Musculoskeletal: Neck supple  Cardiovascular:      Rate and Rhythm: Normal rate and regular rhythm  Pulmonary:      Effort: Pulmonary effort is normal       Breath sounds: Normal breath sounds  Abdominal:      Palpations: Abdomen is soft  Musculoskeletal:         General: Swelling (  Bilateral ankle swelling with left worse than right) present  Skin:     General: Skin is warm  Findings: No rash  Neurological:      Mental Status: He is alert and oriented to person, place, and time  Cranial Nerves: No cranial nerve deficit     Psychiatric:         Behavior: Behavior normal

## 2021-03-17 NOTE — ASSESSMENT & PLAN NOTE
Lab Results   Component Value Date    EGFR 61 11/23/2020    EGFR 62 10/21/2020    EGFR 52 09/21/2020    CREATININE 1 03 11/23/2020    CREATININE 1 01 10/21/2020    CREATININE 1 18 09/21/2020     Patient appears to have mild chronic kidney disease, stage IIIA, continue to avoid potential nephrotoxins and focus on controlling blood pressures

## 2021-03-17 NOTE — PATIENT INSTRUCTIONS
Please try to not drink more than 48 oz of liquid total throughout the day (this includes water, tea, coffee, juice, etc )    In general, we do not recommend cold cuts due to the high amount of sodium, but it you enjoy this type of food, consider getting [place] of [place] ([facility]) breast, low sodium, cold cuts

## 2021-03-17 NOTE — ASSESSMENT & PLAN NOTE
Continue furosemide on as needed basis  We discussed the need to reduce the amount of sodium in his diet, he will try to do this  In particular the patient enjoys a turkey breast sandwich every day    He will try to remove this as a daily meal

## 2021-03-17 NOTE — ASSESSMENT & PLAN NOTE
Recommended for the patient to remain under 48 oz of fluid daily  No recent labs, since November of 2020  Will check labs in the very near future

## 2021-03-18 ENCOUNTER — APPOINTMENT (OUTPATIENT)
Dept: LAB | Facility: CLINIC | Age: 86
End: 2021-03-18
Payer: COMMERCIAL

## 2021-03-18 ENCOUNTER — OFFICE VISIT (OUTPATIENT)
Dept: PHYSICAL THERAPY | Facility: CLINIC | Age: 86
End: 2021-03-18
Payer: COMMERCIAL

## 2021-03-18 DIAGNOSIS — E22.2 SIADH (SYNDROME OF INAPPROPRIATE ADH PRODUCTION) (HCC): ICD-10-CM

## 2021-03-18 DIAGNOSIS — S40.012D CONTUSION OF LEFT SHOULDER, SUBSEQUENT ENCOUNTER: Primary | ICD-10-CM

## 2021-03-18 DIAGNOSIS — M25.512 LEFT SHOULDER PAIN, UNSPECIFIED CHRONICITY: ICD-10-CM

## 2021-03-18 DIAGNOSIS — R29.3 POSTURE ABNORMALITY: ICD-10-CM

## 2021-03-18 DIAGNOSIS — M12.812 ROTATOR CUFF ARTHROPATHY OF LEFT SHOULDER: ICD-10-CM

## 2021-03-18 DIAGNOSIS — N18.31 STAGE 3A CHRONIC KIDNEY DISEASE (HCC): ICD-10-CM

## 2021-03-18 LAB
ANION GAP SERPL CALCULATED.3IONS-SCNC: 6 MMOL/L (ref 4–13)
BACTERIA UR QL AUTO: ABNORMAL /HPF
BILIRUB UR QL STRIP: NEGATIVE
BUN SERPL-MCNC: 32 MG/DL (ref 5–25)
CALCIUM SERPL-MCNC: 9 MG/DL (ref 8.3–10.1)
CHLORIDE SERPL-SCNC: 98 MMOL/L (ref 100–108)
CLARITY UR: CLEAR
CO2 SERPL-SCNC: 23 MMOL/L (ref 21–32)
COLOR UR: YELLOW
CREAT SERPL-MCNC: 1.12 MG/DL (ref 0.6–1.3)
CREAT UR-MCNC: 94.3 MG/DL
GFR SERPL CREATININE-BSD FRML MDRD: 55 ML/MIN/1.73SQ M
GLUCOSE P FAST SERPL-MCNC: 94 MG/DL (ref 65–99)
GLUCOSE UR STRIP-MCNC: NEGATIVE MG/DL
HGB UR QL STRIP.AUTO: ABNORMAL
HYALINE CASTS #/AREA URNS LPF: ABNORMAL /LPF
KETONES UR STRIP-MCNC: NEGATIVE MG/DL
LEUKOCYTE ESTERASE UR QL STRIP: NEGATIVE
MAGNESIUM SERPL-MCNC: 2.1 MG/DL (ref 1.6–2.6)
MICROALBUMIN UR-MCNC: 7.5 MG/L (ref 0–20)
MICROALBUMIN/CREAT 24H UR: 8 MG/G CREATININE (ref 0–30)
NITRITE UR QL STRIP: NEGATIVE
NON-SQ EPI CELLS URNS QL MICRO: ABNORMAL /HPF
OSMOLALITY UR: 511 MMOL/KG
PH UR STRIP.AUTO: 6.5 [PH]
POTASSIUM SERPL-SCNC: 4.2 MMOL/L (ref 3.5–5.3)
PROT UR STRIP-MCNC: NEGATIVE MG/DL
PTH-INTACT SERPL-MCNC: 31.2 PG/ML (ref 18.4–80.1)
RBC #/AREA URNS AUTO: ABNORMAL /HPF
SODIUM SERPL-SCNC: 127 MMOL/L (ref 136–145)
SP GR UR STRIP.AUTO: 1.01 (ref 1–1.03)
UROBILINOGEN UR QL STRIP.AUTO: 0.2 E.U./DL
WBC #/AREA URNS AUTO: ABNORMAL /HPF

## 2021-03-18 PROCEDURE — 97140 MANUAL THERAPY 1/> REGIONS: CPT

## 2021-03-18 PROCEDURE — 82043 UR ALBUMIN QUANTITATIVE: CPT

## 2021-03-18 PROCEDURE — 83935 ASSAY OF URINE OSMOLALITY: CPT

## 2021-03-18 PROCEDURE — 36415 COLL VENOUS BLD VENIPUNCTURE: CPT

## 2021-03-18 PROCEDURE — 83970 ASSAY OF PARATHORMONE: CPT

## 2021-03-18 PROCEDURE — 97110 THERAPEUTIC EXERCISES: CPT

## 2021-03-18 PROCEDURE — 83735 ASSAY OF MAGNESIUM: CPT

## 2021-03-18 PROCEDURE — 81001 URINALYSIS AUTO W/SCOPE: CPT

## 2021-03-18 PROCEDURE — 97112 NEUROMUSCULAR REEDUCATION: CPT

## 2021-03-18 PROCEDURE — 80048 BASIC METABOLIC PNL TOTAL CA: CPT

## 2021-03-18 PROCEDURE — 82570 ASSAY OF URINE CREATININE: CPT

## 2021-03-18 NOTE — PROGRESS NOTES
Daily Note     Today's date: 3/18/2021  Patient name: Tiki Liu Sr   : 10/11/1924  MRN: [id number]  Referring provider: Jailene Prieto  Dx:   Encounter Diagnosis     ICD-10-CM    1  Contusion of left shoulder, subsequent encounter  S40 012D    2  Rotator cuff arthropathy of left shoulder  M12 812    3  Left shoulder pain, unspecified chronicity  M25 512    4  Posture abnormality  R29 3                   Subjective: Patient states he is stiff, not painful today  He reports his balance is off today because his right knee is "acting up" Encouraged walker for patient use for days like this  He has a walker that he can use  Objective: See treatment diary below      Assessment: Tolerated treatment fair  Patient would benefit from continued PT for stretching and strengthening  Patient was able to perform his exercises with a little difficulty and no pain  He had a little despitus in both shoulders during exercise  Patient felt better and looser in the left shoulder by the end of the session  Plan: Continue per plan of care  Progress treatment as tolerated         Precautions: fall risk  Progress note: 4/10  POC: 6/10    Manuals 3/10 3/11 3/16 3/18    Stretching with active release  15 min x10 min x10 min    GH jt mobs                        Neuro Re-Ed        UBE   *seated 120/30 x2 min Seated   120/30 x3 min    scap retraction  Seated 20x Seated x20 Seated x20    Shoulder rolls  Seated 20x Seated x20 Seated x20    Chin tucks   *x10 x10    MTP/LTP        Shoulder isometrics        Ther Ex        Pulleys  *:10x10 :10x10 :10x10    UT stretch   *:30x4 :30x4    LS stretch   *:30x4 :30x4    Table slides Flex, scap :05x10 Flex, scap :05x10 Flex, scap :05x10 Flex, scap :05x10    pendulums        Supine flexion ROM :05x10 :05x10 :05x10 AAROM x10    Supine ER ROM :05x10 :05x10 :05x10 :05x10    Side lying shoulder ER        Serratus punch        Standing YTI        Wall walks  Flex 10x Wall ladder x10 flex Wall ladder x10 flex    Cane AAROM  Seated flex and abd 10x ea Seated x10  Flex, abd seated x10  Flex, abd                            Ther Activity                        Gait Training                        Modalities                        Access Code: [email or web address]   URL: [email or web address]   Date: 03/10/2021; updated 3/16/21   Prepared by: Carmelo Ambrose     Exercises  Supine Shoulder Flexion AAROM with Hands Clasped - 10 reps - 1 sets - 5 sec hold - 2x daily - 7x weekly  Supine Shoulder External Rotation with Dowel - 10 reps - 1 sets - 5 sec hold - 2x daily - 7x weekly  Seated Shoulder Flexion Towel Slide at Table Top - 10 reps - 1 sets - 5 sec hold - 2x daily - 7x weekly  Seated Shoulder Abduction Towel Slide at Table Top - 10 reps - 1 sets - 5 sec hold - 2x daily - 7x weekly  Chin tucks   levator stretch  Upper trap stretch

## 2021-03-22 ENCOUNTER — OFFICE VISIT (OUTPATIENT)
Dept: PHYSICAL THERAPY | Facility: CLINIC | Age: 86
End: 2021-03-22
Payer: COMMERCIAL

## 2021-03-22 DIAGNOSIS — S40.012D CONTUSION OF LEFT SHOULDER, SUBSEQUENT ENCOUNTER: Primary | ICD-10-CM

## 2021-03-22 DIAGNOSIS — M25.512 LEFT SHOULDER PAIN, UNSPECIFIED CHRONICITY: ICD-10-CM

## 2021-03-22 DIAGNOSIS — M12.812 ROTATOR CUFF ARTHROPATHY OF LEFT SHOULDER: ICD-10-CM

## 2021-03-22 DIAGNOSIS — R29.3 POSTURE ABNORMALITY: ICD-10-CM

## 2021-03-22 PROCEDURE — 97112 NEUROMUSCULAR REEDUCATION: CPT | Performed by: PHYSICAL THERAPIST

## 2021-03-22 PROCEDURE — 97140 MANUAL THERAPY 1/> REGIONS: CPT | Performed by: PHYSICAL THERAPIST

## 2021-03-22 PROCEDURE — 97110 THERAPEUTIC EXERCISES: CPT | Performed by: PHYSICAL THERAPIST

## 2021-03-22 NOTE — PROGRESS NOTES
Daily Note     Today's date: 3/22/2021  Patient name: Mabel Gibbons Sr   : 10/11/1924  MRN: [id number]  Referring provider: Puneet Freed  Dx:   Encounter Diagnosis     ICD-10-CM    1  Contusion of left shoulder, subsequent encounter  S40 012D    2  Rotator cuff arthropathy of left shoulder  M12 812    3  Left shoulder pain, unspecified chronicity  M25 512    4  Posture abnormality  R29 3                   Subjective: The patient reports having some difficulty reaching into flexion actively with his L UE  The patient denies feeling pain  Objective: See treatment diary below      Assessment:  The patient needed verbal cues to perform his exercises properly today  The patient moves slowly through his exercises with minimal complaints of pain  The patient is lacking muscle strength and active ROM at his left shoulder  The patient will benefit from continued PT to achieve his functional goals of therapy  Plan: Continue per plan of care  Progress treatment as tolerated         Precautions: fall risk  Progress note: 4/10  POC: 6/10    Manuals  3/11 3/16 3/18 3/22   Stretching with active release  15 min x10 min x10 min    GH jt mobs                        Neuro Re-Ed        UBE   *seated 120/30 x2 min Seated   120/30 x3 min Seated 120/30 3min fwd   3 min back   scap retraction  Seated 20x Seated x20 Seated x20 Seated x20   Shoulder rolls  Seated 20x Seated x20 Seated x20 Seated x20   Chin tucks   *x10 x10 X10   Table rocks to tolerance     *   MTP/LTP        Shoulder isometrics        Ther Ex        Pulleys  *:10x10 :10x10 :10x10 10x:10   UT stretch   *:30x4 :30x4 :30X3   LS stretch   *:30x4 :30x4 :30X3   Table slides  Flex, scap :05x10 Flex, scap :05x10 Flex, scap :05x10 Flex, scap 10x:05   pendulums        Supine flexion ROM  :05x10 :05x10 AAROM x10 AAROM X10   Supine ER ROM  :05x10 :05x10 :05x10 CANE 10X:05   Side lying shoulder ER     *   Serratus punch        Standing YTI        Wall walks Flex 10x Wall ladder x10 flex Wall ladder x10 flex Wall ladder x10 flex   Cane AAROM  Seated flex and abd 10x ea Seated x10  Flex, abd seated x10  Flex, abd Seated flex x10                           Ther Activity                        Gait Training                        Modalities                        Access Code: [email or web address]   URL: [email or web address]   Date: 03/10/2021; updated 3/16/21   Prepared by: Flavia Stacy     Exercises  Supine Shoulder Flexion AAROM with Hands Clasped - 10 reps - 1 sets - 5 sec hold - 2x daily - 7x weekly  Supine Shoulder External Rotation with Dowel - 10 reps - 1 sets - 5 sec hold - 2x daily - 7x weekly  Seated Shoulder Flexion Towel Slide at Table Top - 10 reps - 1 sets - 5 sec hold - 2x daily - 7x weekly  Seated Shoulder Abduction Towel Slide at Table Top - 10 reps - 1 sets - 5 sec hold - 2x daily - 7x weekly  Chin tucks   levator stretch  Upper trap stretch

## 2021-03-24 ENCOUNTER — OFFICE VISIT (OUTPATIENT)
Dept: PHYSICAL THERAPY | Facility: CLINIC | Age: 86
End: 2021-03-24
Payer: COMMERCIAL

## 2021-03-24 DIAGNOSIS — R29.3 POSTURE ABNORMALITY: ICD-10-CM

## 2021-03-24 DIAGNOSIS — M12.812 ROTATOR CUFF ARTHROPATHY OF LEFT SHOULDER: ICD-10-CM

## 2021-03-24 DIAGNOSIS — S40.012D CONTUSION OF LEFT SHOULDER, SUBSEQUENT ENCOUNTER: Primary | ICD-10-CM

## 2021-03-24 DIAGNOSIS — M25.512 LEFT SHOULDER PAIN, UNSPECIFIED CHRONICITY: ICD-10-CM

## 2021-03-24 DIAGNOSIS — E87.1 CHRONIC HYPONATREMIA: Primary | ICD-10-CM

## 2021-03-24 PROCEDURE — 97140 MANUAL THERAPY 1/> REGIONS: CPT

## 2021-03-24 PROCEDURE — 97110 THERAPEUTIC EXERCISES: CPT

## 2021-03-24 PROCEDURE — 97112 NEUROMUSCULAR REEDUCATION: CPT

## 2021-03-24 NOTE — PROGRESS NOTES
Daily Note     Today's date: 3/24/2021  Patient name: Ced Beaulieu Sr   : 10/11/1924  MRN: [id number]  Referring provider: Baron Cheung  Dx:   Encounter Diagnosis     ICD-10-CM    1  Contusion of left shoulder, subsequent encounter  S40 012D    2  Rotator cuff arthropathy of left shoulder  M12 812    3  Left shoulder pain, unspecified chronicity  M25 512    4  Posture abnormality  R29 3                   Subjective: Patient states he has no pain in the shoulder and he feels the arm is moving better at home  Objective: See treatment diary below    Patient's home exercise program updated to include additional exercises  Handout issued and explained with demonstration  Assessment: Tolerated treatment fair+  Patient would benefit from continued PT for stretching and strengthening  Patient was able to add exercises to his program with little difficulty and no pain  He needed verbal cues throughout session to perform his exercises correctly  Patient felt that is arm became tired quickly with the exercises and he needed to help to lift his effectedarm with certain motions  He felt ok by the end of the session  Plan: Continue per plan of care  Progress treatment as tolerated         Precautions: fall risk  Progress note: 4/10  POC: 6/10    Manuals 3/24  3/16 3/18 3/22   Stretching with active release x10 min  x10 min x10 min    GH jt mobs                        Neuro Re-Ed        UBE Seated 120/30 3min fwd   3 min back  *seated 120/30 x2 min Seated   120/30 x3 min Seated 120/30 3min fwd   3 min back   scap retraction Seated x20  Seated x20 Seated x20 Seated x20   Shoulder rolls Seated x20  Seated x20 Seated x20 Seated x20   Chin tucks x10  *x10 x10 X10   Table rocks to tolerance *x10 fwd/ back     *   MTP/LTP        Shoulder isometrics *x10 sitting       Ther Ex        Pulleys :10x10  :10x10 :10x10 10x:10   UT stretch :30x3  *:30x4 :30x4 :30X3   LS stretch :30x3  *:30x4 :30x4 :30X3   Table slides Flex, scap 10x:05  Flex, scap :05x10 Flex, scap :05x10 Flex, scap 10x:05   pendulums        Supine flexion ROM Cane x10  :05x10 AAROM x10 AAROM X10   Supine ER ROM Cane x10  :05x10 :05x10 CANE 10X:05   Side lying shoulder ER  *   *   Serratus punch        Standing YTI        Wall walks Wall ladder x10 flex  Wall ladder x10 flex Wall ladder x10 flex Wall ladder x10 flex   Cane AAROM Seated flex x10  Seated x10  Flex, abd seated x10  Flex, abd Seated flex x10                           Ther Activity                        Gait Training                        Modalities                        Access Code: [email or web address]   URL: [email or web address]   Date: 03/10/2021; updated 3/16/21   Prepared by: Katy Gage     Exercises  Supine Shoulder Flexion AAROM with Hands Clasped - 10 reps - 1 sets - 5 sec hold - 2x daily - 7x weekly  Supine Shoulder External Rotation with Dowel - 10 reps - 1 sets - 5 sec hold - 2x daily - 7x weekly  Seated Shoulder Flexion Towel Slide at Table Top - 10 reps - 1 sets - 5 sec hold - 2x daily - 7x weekly  Seated Shoulder Abduction Towel Slide at Table Top - 10 reps - 1 sets - 5 sec hold - 2x daily - 7x weekly  Chin tucks   levator stretch  Upper trap stretch  Shoulder isometric

## 2021-03-29 ENCOUNTER — OFFICE VISIT (OUTPATIENT)
Dept: PHYSICAL THERAPY | Facility: CLINIC | Age: 86
End: 2021-03-29
Payer: COMMERCIAL

## 2021-03-29 DIAGNOSIS — M12.812 ROTATOR CUFF ARTHROPATHY OF LEFT SHOULDER: ICD-10-CM

## 2021-03-29 DIAGNOSIS — R29.3 POSTURE ABNORMALITY: ICD-10-CM

## 2021-03-29 DIAGNOSIS — S40.012D CONTUSION OF LEFT SHOULDER, SUBSEQUENT ENCOUNTER: Primary | ICD-10-CM

## 2021-03-29 DIAGNOSIS — M25.512 LEFT SHOULDER PAIN, UNSPECIFIED CHRONICITY: ICD-10-CM

## 2021-03-29 PROCEDURE — 97140 MANUAL THERAPY 1/> REGIONS: CPT | Performed by: PHYSICAL THERAPIST

## 2021-03-29 PROCEDURE — 97112 NEUROMUSCULAR REEDUCATION: CPT | Performed by: PHYSICAL THERAPIST

## 2021-03-29 PROCEDURE — 97110 THERAPEUTIC EXERCISES: CPT | Performed by: PHYSICAL THERAPIST

## 2021-03-31 ENCOUNTER — OFFICE VISIT (OUTPATIENT)
Dept: PHYSICAL THERAPY | Facility: CLINIC | Age: 86
End: 2021-03-31
Payer: COMMERCIAL

## 2021-03-31 DIAGNOSIS — R29.3 POSTURE ABNORMALITY: ICD-10-CM

## 2021-03-31 DIAGNOSIS — M25.512 LEFT SHOULDER PAIN, UNSPECIFIED CHRONICITY: ICD-10-CM

## 2021-03-31 DIAGNOSIS — M12.812 ROTATOR CUFF ARTHROPATHY OF LEFT SHOULDER: ICD-10-CM

## 2021-03-31 DIAGNOSIS — S40.012D CONTUSION OF LEFT SHOULDER, SUBSEQUENT ENCOUNTER: Primary | ICD-10-CM

## 2021-03-31 PROCEDURE — 97112 NEUROMUSCULAR REEDUCATION: CPT

## 2021-03-31 PROCEDURE — 97110 THERAPEUTIC EXERCISES: CPT

## 2021-03-31 PROCEDURE — 97140 MANUAL THERAPY 1/> REGIONS: CPT

## 2021-03-31 NOTE — PROGRESS NOTES
Daily Note     Today's date: 3/31/2021  Patient name: Tiki Liu Sr   : 10/11/1924  MRN: [id number]  Referring provider: Jailene Prieto  Dx:   Encounter Diagnosis     ICD-10-CM    1  Contusion of left shoulder, subsequent encounter  S40 012D    2  Rotator cuff arthropathy of left shoulder  M12 812    3  Left shoulder pain, unspecified chronicity  M25 512    4  Posture abnormality  R29 3                   Subjective: Patient states he has no pain, but he still can not lift his arm well  He has been doing his exercises, but he still is not where he wants to be with the arm  Objective: See treatment diary below    Patient's home exercise program updated to include additional exercises  Handout issued and explained with demonstration  Assessment: Tolerated treatment well  Patient would benefit from continued PT for stretching and strengthening  Patient was able to increase and add exercises to his program with little difficulty and no pain  Patient had a lot of decrepitus in shoulder during ROM above 90 degrees in flexion and abduction Patient seemed to understand all education on new exercises  Patient felt ok when he left department          Plan: Continue per plan of care  Progress treatment as tolerated         Precautions: fall risk  Progress note: 4/10  POC: 6/10    Manuals 3/24 3/29 3/31  3/22   Stretching with active release x10 min x10 mins x10 min     GH jt mobs                        Neuro Re-Ed        UBE Seated 120/30 3min fwd   3 min back Seated 120/30  - 6 mins alternate Seated 120/30  - 8 mins alternate  Seated 120/30 3min fwd   3 min back   scap retraction Seated x20 Seated x20 Seated x25  Seated x20   Shoulder rolls Seated x20 Seated x20 Seated x25  Seated x20   Chin tucks x10 x10 x10  X10   Table rocks to tolerance *x10 fwd/ back  Stand x15 ea Stand x15  *   Alternating isometrics IR/ER at side; 90 deg flexion  Gentle 4x:15 ea Gentle 4x:15 ea     MTP/LTP        Shoulder isometrics *x10 sitting x10 sitting  Flex,abd, IR, ER x10 sitting  Flex,abd, IR, ER,ext     Ther Ex        Pulleys :10x10 10x:10 :10x10  10x:10   UT stretch :30x3    :30X3   LS stretch :30x3    :30X3   Table slides Flex, scap 10x:05 Flex/scap 10x:05 Flex/scap 10x:05  Flex, scap 10x:05   pendulums        Supine flexion ROM Cane x10 x10 cane x10 cane  AAROM X10   Supine ER ROM Cane x10 Cane x10 x10 cane  CANE 10X:05   Side lying shoulder ER  *NV *x10  *   Serratus punch        Standing YTI        Wall walks Wall ladder x10 flex Wall ladder x3 Wall ladder x10 flex  Wall ladder x10 flex   Cane AAROM Seated flex x10 Seated x10 Seated x10  Seated flex x10   thoracic stretch   *05 x10 OP                     Ther Activity                        Gait Training                        Modalities                        Access Code: [email or web address]   URL: [email or web address]   Date: 03/10/2021; updated 3/16/21, 3/31/21   Prepared by: Katia Vitale     Exercises  Supine Shoulder Flexion AAROM with Hands Clasped - 10 reps - 1 sets - 5 sec hold - 2x daily - 7x weekly  Supine Shoulder External Rotation with Dowel - 10 reps - 1 sets - 5 sec hold - 2x daily - 7x weekly  Seated Shoulder Flexion Towel Slide at Table Top - 10 reps - 1 sets - 5 sec hold - 2x daily - 7x weekly  Seated Shoulder Abduction Towel Slide at Table Top - 10 reps - 1 sets - 5 sec hold - 2x daily - 7x weekly  Chin tucks   levator stretch  Upper trap stretch  Shoulder isometric  Side lying external rotation

## 2021-04-05 ENCOUNTER — OFFICE VISIT (OUTPATIENT)
Dept: PHYSICAL THERAPY | Facility: CLINIC | Age: 86
End: 2021-04-05
Payer: COMMERCIAL

## 2021-04-05 DIAGNOSIS — S40.012D CONTUSION OF LEFT SHOULDER, SUBSEQUENT ENCOUNTER: Primary | ICD-10-CM

## 2021-04-05 DIAGNOSIS — M12.812 ROTATOR CUFF ARTHROPATHY OF LEFT SHOULDER: ICD-10-CM

## 2021-04-05 DIAGNOSIS — R29.3 POSTURE ABNORMALITY: ICD-10-CM

## 2021-04-05 DIAGNOSIS — M25.512 LEFT SHOULDER PAIN, UNSPECIFIED CHRONICITY: ICD-10-CM

## 2021-04-05 PROCEDURE — 97112 NEUROMUSCULAR REEDUCATION: CPT

## 2021-04-05 PROCEDURE — 97530 THERAPEUTIC ACTIVITIES: CPT

## 2021-04-05 PROCEDURE — 97140 MANUAL THERAPY 1/> REGIONS: CPT

## 2021-04-05 PROCEDURE — 97110 THERAPEUTIC EXERCISES: CPT

## 2021-04-05 NOTE — PROGRESS NOTES
Daily Note     Today's date: 2021  Patient name: Seamus James Sr   : 10/11/1924  MRN: [id number]  Referring provider: Sloane Longoria  Dx:   Encounter Diagnosis     ICD-10-CM    1  Contusion of left shoulder, subsequent encounter  S40 012D    2  Rotator cuff arthropathy of left shoulder  M12 812    3  Left shoulder pain, unspecified chronicity  M25 512    4  Posture abnormality  R29 3                   Subjective: Patient states he has no pain in the arm  He feels that he still has trouble lifting the arm  It becomes tired quickly  Objective: See treatment diary below      Assessment: Tolerated treatment fair+  Patient would benefit from continued PT for stretching and strengthening  Patient was able to perform his exercises with little difficulty and no pain  Patient was tired by the end of the session  He  [place] it was a good session  Plan: Continue per plan of care  Progress treatment as tolerated         Precautions: fall risk  Progress note: 4/10  POC: 6/10    Manuals 3/24 3/29 3/31 4/5    Stretching with active release x10 min x10 mins x10 min x10 min    GH jt mobs                        Neuro Re-Ed        UBE Seated 120/30 3min fwd   3 min back Seated 120/30  - 6 mins alternate Seated 120/30  - 8 mins alternate Seated 120/30  - 8 mins alternate    scap retraction Seated x20 Seated x20 Seated x25 Seated x25    Shoulder rolls Seated x20 Seated x20 Seated x25 Seated x25    Chin tucks x10 x10 x10 x10    Table rocks to tolerance *x10 fwd/ back  Stand x15 ea Stand x15 Stand x15 ea    Alternating isometrics IR/ER at side; 90 deg flexion  Gentle 4x:15 ea Gentle 4x:15 ea Gentle 4x:15 ea    MTP/LTP        Shoulder isometrics *x10 sitting x10 sitting  Flex,abd, IR, ER x10 sitting  Flex,abd, IR, ER,ext x10 sitting  Flex,abd, IR, ER,ext    Ther Ex        Pulleys :10x10 10x:10 :10x10 :10x10    UT stretch :30x3       LS stretch :30x3       Table slides Flex, scap 10x:05 Flex/scap 10x:05 Flex/scap 10x:05 Flex/scap 10x:05    pendulums        Supine flexion ROM Cane x10 x10 cane x10 cane x10 cane    Supine ER ROM Cane x10 Cane x10 x10 cane x10 cane    Side lying shoulder ER  *NV *x10 x10     Serratus punch        Standing YTI        Wall walks Wall ladder x10 flex Wall ladder x3 Wall ladder x10 flex Wall ladder x10 flex    Cane AAROM Seated flex x10 Seated x10 Seated x10 Seated x10    thoracic stretch   *05 x10 OP :05 x10 OP                    Ther Activity                        Gait Training                        Modalities                        Access Code: [email or web address]   URL: [email or web address]   Date: 03/10/2021; updated 3/16/21, 3/31/21   Prepared by: Dian Angelo     Exercises  Supine Shoulder Flexion AAROM with Hands Clasped - 10 reps - 1 sets - 5 sec hold - 2x daily - 7x weekly  Supine Shoulder External Rotation with Dowel - 10 reps - 1 sets - 5 sec hold - 2x daily - 7x weekly  Seated Shoulder Flexion Towel Slide at Table Top - 10 reps - 1 sets - 5 sec hold - 2x daily - 7x weekly  Seated Shoulder Abduction Towel Slide at Table Top - 10 reps - 1 sets - 5 sec hold - 2x daily - 7x weekly  Chin tucks   levator stretch  Upper trap stretch  Shoulder isometric  Side lying external rotation

## 2021-04-06 ENCOUNTER — APPOINTMENT (OUTPATIENT)
Dept: LAB | Facility: CLINIC | Age: 86
End: 2021-04-06
Payer: COMMERCIAL

## 2021-04-06 DIAGNOSIS — E87.1 CHRONIC HYPONATREMIA: ICD-10-CM

## 2021-04-06 LAB
ANION GAP SERPL CALCULATED.3IONS-SCNC: 6 MMOL/L (ref 4–13)
BUN SERPL-MCNC: 27 MG/DL (ref 5–25)
CALCIUM SERPL-MCNC: 8.6 MG/DL (ref 8.3–10.1)
CHLORIDE SERPL-SCNC: 106 MMOL/L (ref 100–108)
CO2 SERPL-SCNC: 26 MMOL/L (ref 21–32)
CREAT SERPL-MCNC: 1.13 MG/DL (ref 0.6–1.3)
GFR SERPL CREATININE-BSD FRML MDRD: 55 ML/MIN/1.73SQ M
GLUCOSE SERPL-MCNC: 110 MG/DL (ref 65–140)
POTASSIUM SERPL-SCNC: 4.2 MMOL/L (ref 3.5–5.3)
SODIUM SERPL-SCNC: 138 MMOL/L (ref 136–145)

## 2021-04-06 PROCEDURE — 36415 COLL VENOUS BLD VENIPUNCTURE: CPT

## 2021-04-06 PROCEDURE — 80048 BASIC METABOLIC PNL TOTAL CA: CPT

## 2021-04-07 ENCOUNTER — EVALUATION (OUTPATIENT)
Dept: PHYSICAL THERAPY | Facility: CLINIC | Age: 86
End: 2021-04-07
Payer: COMMERCIAL

## 2021-04-07 DIAGNOSIS — M25.512 LEFT SHOULDER PAIN, UNSPECIFIED CHRONICITY: ICD-10-CM

## 2021-04-07 DIAGNOSIS — M12.812 ROTATOR CUFF ARTHROPATHY OF LEFT SHOULDER: ICD-10-CM

## 2021-04-07 DIAGNOSIS — S40.012D CONTUSION OF LEFT SHOULDER, SUBSEQUENT ENCOUNTER: Primary | ICD-10-CM

## 2021-04-07 DIAGNOSIS — R29.3 POSTURE ABNORMALITY: ICD-10-CM

## 2021-04-07 PROCEDURE — 97110 THERAPEUTIC EXERCISES: CPT | Performed by: PHYSICAL THERAPIST

## 2021-04-07 PROCEDURE — 97112 NEUROMUSCULAR REEDUCATION: CPT | Performed by: PHYSICAL THERAPIST

## 2021-04-07 PROCEDURE — 97140 MANUAL THERAPY 1/> REGIONS: CPT | Performed by: PHYSICAL THERAPIST

## 2021-04-07 NOTE — PROGRESS NOTES
PT Re-Evaluation     Today's date: 2021  Patient name: Chenta Spaulding Sr   : 10/11/1924  MRN: [id number]  Referring provider: Shana Márquez  Dx:   Encounter Diagnosis     ICD-10-CM    1  Contusion of left shoulder, subsequent encounter  S40 012D    2  Rotator cuff arthropathy of left shoulder  M12 812    3  Left shoulder pain, unspecified chronicity  M25 512    4  Posture abnormality  R29 3        Start Time: 1400  Stop Time: 65  Total time in clinic (min): 45 minutes    Assessment/Plan   Assessment details: Chetna Spaulding Sr  Has been seen in outpatient PT for 10 sessions beginning 3/10  Patient is a 80 y o  male with diagnosis of left shoulder contusion/RC arthropathy and past medical history significant for CAD, HTN, ventricular bigeminy, gout, stage 3 CKD, coronary stent  Findings of examination today show significant increase in passive range of motion, continues to have weakness of left shoulder and upper extremity limiting his ability to reach overhead indicating the need for continued PT services working on strength and stability for continued functional gains  Impairments: abnormal muscle firing, abnormal or restricted ROM, abnormal movement, activity intolerance, impaired physical strength, lacks appropriate home exercise program, pain with function, scapular dyskinesis, poor posture  and poor body mechanics    Goals  STG (6 weeks)  1  Patient's left shoulder flexion AROM will increase to 90 with 0/10 pain for increased ability to perform overhead ADLs  - MET  2  Patient will have 0/10 pain in left shoulder when pushing open door  - MET  3  Patient's left shoulder strength will increase to 2+/5 for increased ability to lift  - MET  LTG (12 weeks)  1  Patient's UE AROM equal bilaterally for ability to complete hair hygiene, overhead, and behind the back ADLs  - PROGRESSING  2  Patient's UE strength will be equal bilaterally for ability to lift and carry at PLOF  - PROGRESSING  3  Patient will be independent with home exercise program for continued maintenance post PT discharge  - PROGRESSING      Plan  Plan details: Progress note in 4 weeks  Patient would benefit from: skilled physical therapy  Planned modality interventions: cryotherapy and thermotherapy: hydrocollator packs  Planned therapy interventions: neuromuscular re-education, manual therapy, therapeutic exercise, therapeutic activities, self care and home exercise program  Frequency: 2x week  Duration in weeks: 12  Plan of Care beginning date: 3/10/2021  Plan of Care expiration date: 6/10/2021  Treatment plan discussed with: patient and PTA          Subjective   History of Present Illness  Date of onset: 2/6/2021  Subjective 4/7: Patient feels some improvement since the start of PT  Has DC sling, no longer uses at home or in community  Able to ambulate using SPC in LUE now  States he feels he is not done with therapy due to continued difficulty raising arm  Does also c/o N+T from carpal tunnel in am L>R  Mechanism of injury: Dante Thompson Sr  is a 80 y o  male who presents to outpatient Physical Therapy today with complaints of left shoulder pain  States fall in early February onto left shoulder saw ortho, no fx on x-ray but does have old RC injury  Pain  No pain reported  Progression: no change    Social Support  Steps to enter house: yes  13  Stairs in house: no   Lives with: alone (adult son is staying temporarily )    Employment status: not working (retired)  Hand dominance: right      Diagnostic Tests  X-ray: normal  Patient Goals  Patient goals for therapy: increased motion  Patient goal: reaching overhead, dressing, be able to lift arm to shelf    Objective   Observations     Additional Observation Details  Upper crossed   Wearing sling on LUE    Tenderness     Left Shoulder   No tenderness     Cervical/Thoracic Screen   Cervical range of motion within normal limits with the following exceptions: limitation in cervical rotation and lateral flexion bilaterally, no pain    Neurological Testing     Sensation     Shoulder   Left Shoulder   Intact: light touch    Right Shoulder   Intact: light touch    Additional Neurological Details  CTS bilaterally, worse in LUE, constant N+T into left fingers    Active Range of Motion   4/7  Left Shoulder   Flexion: 15 degrees    95  Abduction: 45 degrees   65  External rotation 0°: 15 degrees  20  Internal rotation 0°:  to belly       Right Shoulder   Flexion: 115 degrees   Abduction: 135 degrees   External rotation BTH: C5   Internal rotation BTB: L1     Passive Range of Motion   Left Shoulder   Flexion: 120 degrees    150  Abduction: 120 degrees   145  External rotation 0°: 35 degrees  35    Strength/Myotome Testing  4/7    Left Shoulder     Planes of Motion   Flexion: 2-     2+  Abduction: 2-     2+  External rotation at 0°: 2+     Internal rotation at 0°: 2+     Isolated Muscles   Biceps: 4     5  Triceps: 4     5    Right Shoulder   Normal muscle strength    Isolated Muscles   Biceps: 5   Triceps: 5     General Comments:      Shoulder Comments       FOTO: 32% function, 58% predicted function              Precautions: fall risk  Progress note: 5/7  POC: 6/10    Manuals  3/29 3/31 4/5 4/7   Stretching with active release  x10 mins x10 min x10 min 15 min   GH jt mobs                        Neuro Re-Ed        UBE * Seated 120/30  - 6 mins alternate Seated 120/30  - 8 mins alternate Seated 120/30  - 8 mins alternate Seated 120/30  - 8 mins alternate   scap retraction  Seated x20 Seated x25 Seated x25 HEP   Shoulder rolls  Seated x20 Seated x25 Seated x25 HEP   Chin tucks  x10 x10 x10 HEP   Table rocks to tolerance  Stand x15 ea Stand x15 Stand x15 ea    Alternating isometrics IR/ER at side; 90 deg flexion * Gentle 4x:15 ea Gentle 4x:15 ea Gentle 4x:15 ea    MTP/LTP        Shoulder isometrics * x10 sitting  Flex,abd, IR, ER x10 sitting  Flex,abd, IR, ER,ext x10 sitting  Flex,abd, IR, ER,ext Ther Ex        Pulleys * 10x:10 :10x10 :10x10    UT stretch        LS stretch        Table slides  Flex/scap 10x:05 Flex/scap 10x:05 Flex/scap 10x:05 HEP   pendulums        Supine flexion ROM * x10 cane x10 cane x10 cane    Supine ER ROM * Cane x10 x10 cane x10 cane    Side lying shoulder ER * *NV *x10 x10     Wall walks With Ecc Wall ladder x3 Wall ladder x10 flex Wall ladder x10 flex    Cane AAROM *flex, scap, ext Seated x10 Seated x10 Seated x10    thoracic stretch   *05 x10 OP :05 x10 OP                    Ther Activity                        Gait Training                        Modalities                        Access Code: [email or web address]   URL: [email or web address]   Date: 03/10/2021; updated 3/16/21, 3/31/21   Prepared by: Lola Escalera     Exercises  Supine Shoulder Flexion AAROM with Hands Clasped - 10 reps - 1 sets - 5 sec hold - 2x daily - 7x weekly  Supine Shoulder External Rotation with Dowel - 10 reps - 1 sets - 5 sec hold - 2x daily - 7x weekly  Seated Shoulder Flexion Towel Slide at Table Top - 10 reps - 1 sets - 5 sec hold - 2x daily - 7x weekly  Seated Shoulder Abduction Towel Slide at Table Top - 10 reps - 1 sets - 5 sec hold - 2x daily - 7x weekly  Chin tucks   levator stretch  Upper trap stretch  Shoulder isometric  Side lying external rotation

## 2021-04-13 ENCOUNTER — OFFICE VISIT (OUTPATIENT)
Dept: PHYSICAL THERAPY | Facility: CLINIC | Age: 86
End: 2021-04-13
Payer: COMMERCIAL

## 2021-04-13 DIAGNOSIS — R29.3 POSTURE ABNORMALITY: ICD-10-CM

## 2021-04-13 DIAGNOSIS — M25.512 LEFT SHOULDER PAIN, UNSPECIFIED CHRONICITY: ICD-10-CM

## 2021-04-13 DIAGNOSIS — S40.012D CONTUSION OF LEFT SHOULDER, SUBSEQUENT ENCOUNTER: Primary | ICD-10-CM

## 2021-04-13 DIAGNOSIS — M12.812 ROTATOR CUFF ARTHROPATHY OF LEFT SHOULDER: ICD-10-CM

## 2021-04-13 PROCEDURE — 97112 NEUROMUSCULAR REEDUCATION: CPT

## 2021-04-13 PROCEDURE — 97140 MANUAL THERAPY 1/> REGIONS: CPT

## 2021-04-13 PROCEDURE — 97110 THERAPEUTIC EXERCISES: CPT

## 2021-04-13 NOTE — PROGRESS NOTES
Daily Note     Today's date: 2021  Patient name: Stanislav Ring Sr   : 10/11/1924  MRN: [id number]  Referring provider: Marsha Chang  Dx:   Encounter Diagnosis     ICD-10-CM    1  Contusion of left shoulder, subsequent encounter  S40 012D    2  Rotator cuff arthropathy of left shoulder  M12 812    3  Left shoulder pain, unspecified chronicity  M25 512    4  Posture abnormality  R29 3                   Subjective: Patient states his arm feels good today  She feels the arm crack more in his scaphion motion  He wishes he could lift the arm better  Objective: See treatment diary below      Assessment: Tolerated treatment fair+  Patient would benefit from continued PT for stretching and strengthening  Patient was able to add exercises to his program  He continues with difficulty with arm eccentrics  He needed table behind him for support with cane extensions  Started a few isometric exercises in standing, but he fatigued quickly with standing exercises  Patient felt good when he left department  Plan: Continue per plan of care  Progress treatment as tolerated         Precautions: fall risk  Progress note:   POC: 6/10    Manuals 4/13  3/31 4/5 4/7   Stretching with active release x15 min  x10 min x10 min 15 min   GH jt mobs                        Neuro Re-Ed        UBE *Seated 120/30  - 8 mins alternate  Seated 120/30  - 8 mins alternate Seated 120/30  - 8 mins alternate Seated 120/30  - 8 mins alternate   scap retraction   Seated x25 Seated x25 HEP   Shoulder rolls   Seated x25 Seated x25 HEP   Chin tucks   x10 x10 HEP   Table rocks to tolerance   Stand x15 Stand x15 ea    Alternating isometrics IR/ER at side; 90 deg flexion *Gentle 4x:15 ea  Gentle 4x:15 ea Gentle 4x:15 ea    MTP/LTP        Shoulder isometrics *x10 sitting/ stand  Flex,abd, IR, ER,ext  x10 sitting  Flex,abd, IR, ER,ext x10 sitting  Flex,abd, IR, ER,ext    Ther Ex        Pulleys *:10x10  :10x10 :10x10    UT stretch LS stretch        Table slides   Flex/scap 10x:05 Flex/scap 10x:05 HEP   pendulums        Supine flexion ROM *x10 cane  x10 cane x10 cane    Supine ER ROM *x10 cane  x10 cane x10 cane    Side lying shoulder ER *x10  *x10 x10     Wall walks With Ecc  Flex x10   Wall ladder x10 flex Wall ladder x10 flex    Cane AAROM *flex, scap, ext x10 ea  Seated x10 Seated x10    thoracic stretch   *05 x10 OP :05 x10 OP                    Ther Activity                        Gait Training                        Modalities                        Access Code: [email or web address]   URL: [email or web address]   Date: 03/10/2021; updated 3/16/21, 3/31/21   Prepared by: Danna Neff     Exercises  Supine Shoulder Flexion AAROM with Hands Clasped - 10 reps - 1 sets - 5 sec hold - 2x daily - 7x weekly  Supine Shoulder External Rotation with Dowel - 10 reps - 1 sets - 5 sec hold - 2x daily - 7x weekly  Seated Shoulder Flexion Towel Slide at Table Top - 10 reps - 1 sets - 5 sec hold - 2x daily - 7x weekly  Seated Shoulder Abduction Towel Slide at Table Top - 10 reps - 1 sets - 5 sec hold - 2x daily - 7x weekly  Chin tucks   levator stretch  Upper trap stretch  Shoulder isometric  Side lying external rotation

## 2021-04-15 ENCOUNTER — OFFICE VISIT (OUTPATIENT)
Dept: PHYSICAL THERAPY | Facility: CLINIC | Age: 86
End: 2021-04-15
Payer: COMMERCIAL

## 2021-04-15 DIAGNOSIS — S40.012D CONTUSION OF LEFT SHOULDER, SUBSEQUENT ENCOUNTER: Primary | ICD-10-CM

## 2021-04-15 DIAGNOSIS — M12.812 ROTATOR CUFF ARTHROPATHY OF LEFT SHOULDER: ICD-10-CM

## 2021-04-15 DIAGNOSIS — M25.512 LEFT SHOULDER PAIN, UNSPECIFIED CHRONICITY: ICD-10-CM

## 2021-04-15 DIAGNOSIS — R29.3 POSTURE ABNORMALITY: ICD-10-CM

## 2021-04-15 PROCEDURE — 97112 NEUROMUSCULAR REEDUCATION: CPT | Performed by: PHYSICAL THERAPIST

## 2021-04-15 PROCEDURE — 97140 MANUAL THERAPY 1/> REGIONS: CPT | Performed by: PHYSICAL THERAPIST

## 2021-04-15 PROCEDURE — 97110 THERAPEUTIC EXERCISES: CPT | Performed by: PHYSICAL THERAPIST

## 2021-04-15 NOTE — PROGRESS NOTES
Daily Note     Today's date: 4/15/2021  Patient name: Stanislav Ring Sr   : 10/11/1924  MRN: [id number]  Referring provider: Marsha Chang  Dx:   Encounter Diagnosis     ICD-10-CM    1  Contusion of left shoulder, subsequent encounter  S40 012D    2  Rotator cuff arthropathy of left shoulder  M12 812    3  Left shoulder pain, unspecified chronicity  M25 512    4  Posture abnormality  R29 3        Start Time: 1615  Stop Time: 1700  Total time in clinic (min): 45 minutes    Subjective: Patient states he has had no pain, only difficulty lifting arm  Objective: See treatment diary below      Assessment: Tolerated treatment well  Passive range of motion is within functional limits, patient needs to work on generalized strength and stability for continued progress  Patient would benefit from continued PT      Plan: Continue per plan of care  Progress treatment as tolerated         Precautions: fall risk  Progress note:   POC: 6/10    Manuals 4/13 4/15  4/5 4/7   Stretching with active release x15 min 10 mn prn x10 min 15 min   GH jt mobs                        Neuro Re-Ed        UBE *Seated 120/30  - 8 mins alternate Seated 120/30  - 10 mins alternate  Seated 120/30  - 8 mins alternate Seated 120/30  - 8 mins alternate   scap retraction    Seated x25 HEP   Shoulder rolls    Seated x25 HEP   Chin tucks    x10 HEP   Table rocks to tolerance    Stand x15 ea    Alternating isometrics IR/ER at side; 90 deg flexion *Gentle 4x:15 ea nv  Gentle 4x:15 ea    MTP/LTP        Shoulder isometrics *x10 sitting/ stand  Flex,abd, IR, ER,ext Supine IR, ER, elbow flex, ext; Standing shoulder flex, ext abd  x10 sitting  Flex,abd, IR, ER,ext    Ther Ex        Pulleys *:10x10 :10x10 flex, abd  :10x10    UT stretch        LS stretch        Table slides    Flex/scap 10x:05 HEP   pendulums        Supine cane push up  10x      Supine flexion ROM *x10 cane x10 cane  x10 cane    Supine ER ROM *x10 cane x10 cane  x10 cane    Side lying shoulder ER *x10 2x10  x10     Wall walks With Ecc  Flex x10  flex 10x  Wall ladder x10 flex    Cane AAROM *flex, scap, ext x10 ea nv  Seated x10    thoracic stretch    :05 x10 OP                    Ther Activity           Red 30x      clips  1x 1st rung   up and down 5red, 5 yellow      Gait Training                        Modalities                        Access Code: [email or web address]   URL: [email or web address]   Date: 03/10/2021; updated 3/16/21, 3/31/21   Prepared by: Kusum Jimenez     Exercises  Supine Shoulder Flexion AAROM with Hands Clasped - 10 reps - 1 sets - 5 sec hold - 2x daily - 7x weekly  Supine Shoulder External Rotation with Dowel - 10 reps - 1 sets - 5 sec hold - 2x daily - 7x weekly  Seated Shoulder Flexion Towel Slide at Table Top - 10 reps - 1 sets - 5 sec hold - 2x daily - 7x weekly  Seated Shoulder Abduction Towel Slide at Table Top - 10 reps - 1 sets - 5 sec hold - 2x daily - 7x weekly  Chin tucks   levator stretch  Upper trap stretch  Shoulder isometric  Side lying external rotation

## 2021-04-19 ENCOUNTER — OFFICE VISIT (OUTPATIENT)
Dept: PHYSICAL THERAPY | Facility: CLINIC | Age: 86
End: 2021-04-19
Payer: COMMERCIAL

## 2021-04-19 ENCOUNTER — TRANSCRIBE ORDERS (OUTPATIENT)
Dept: URGENT CARE | Facility: CLINIC | Age: 86
End: 2021-04-19

## 2021-04-19 ENCOUNTER — APPOINTMENT (OUTPATIENT)
Dept: LAB | Facility: CLINIC | Age: 86
End: 2021-04-19
Payer: COMMERCIAL

## 2021-04-19 DIAGNOSIS — M10.9 GOUT, UNSPECIFIED CAUSE, UNSPECIFIED CHRONICITY, UNSPECIFIED SITE: ICD-10-CM

## 2021-04-19 DIAGNOSIS — M25.512 LEFT SHOULDER PAIN, UNSPECIFIED CHRONICITY: ICD-10-CM

## 2021-04-19 DIAGNOSIS — E55.9 VITAMIN D DEFICIENCY: ICD-10-CM

## 2021-04-19 DIAGNOSIS — Z79.01 LONG TERM (CURRENT) USE OF ANTICOAGULANTS: ICD-10-CM

## 2021-04-19 DIAGNOSIS — M12.812 ROTATOR CUFF ARTHROPATHY OF LEFT SHOULDER: ICD-10-CM

## 2021-04-19 DIAGNOSIS — R29.3 POSTURE ABNORMALITY: ICD-10-CM

## 2021-04-19 DIAGNOSIS — I25.10 ASCVD (ARTERIOSCLEROTIC CARDIOVASCULAR DISEASE): ICD-10-CM

## 2021-04-19 DIAGNOSIS — S40.012D CONTUSION OF LEFT SHOULDER, SUBSEQUENT ENCOUNTER: Primary | ICD-10-CM

## 2021-04-19 DIAGNOSIS — I25.10 ASCVD (ARTERIOSCLEROTIC CARDIOVASCULAR DISEASE): Primary | ICD-10-CM

## 2021-04-19 LAB
25(OH)D3 SERPL-MCNC: 40.9 NG/ML (ref 30–100)
ALBUMIN SERPL BCP-MCNC: 3.5 G/DL (ref 3.5–5)
ALP SERPL-CCNC: 64 U/L (ref 46–116)
ALT SERPL W P-5'-P-CCNC: 21 U/L (ref 12–78)
ANION GAP SERPL CALCULATED.3IONS-SCNC: 5 MMOL/L (ref 4–13)
AST SERPL W P-5'-P-CCNC: 26 U/L (ref 5–45)
BILIRUB SERPL-MCNC: 0.48 MG/DL (ref 0.2–1)
BUN SERPL-MCNC: 31 MG/DL (ref 5–25)
CALCIUM SERPL-MCNC: 9 MG/DL (ref 8.3–10.1)
CHLORIDE SERPL-SCNC: 105 MMOL/L (ref 100–108)
CHOLEST SERPL-MCNC: 124 MG/DL (ref 50–200)
CO2 SERPL-SCNC: 25 MMOL/L (ref 21–32)
CREAT SERPL-MCNC: 1.12 MG/DL (ref 0.6–1.3)
ERYTHROCYTE [DISTWIDTH] IN BLOOD BY AUTOMATED COUNT: 13.8 % (ref 11.6–15.1)
GFR SERPL CREATININE-BSD FRML MDRD: 55 ML/MIN/1.73SQ M
GLUCOSE P FAST SERPL-MCNC: 83 MG/DL (ref 65–99)
HCT VFR BLD AUTO: 33 % (ref 36.5–49.3)
HDLC SERPL-MCNC: 42 MG/DL
HGB BLD-MCNC: 10.6 G/DL (ref 12–17)
LDLC SERPL CALC-MCNC: 68 MG/DL (ref 0–100)
MCH RBC QN AUTO: 31.7 PG (ref 26.8–34.3)
MCHC RBC AUTO-ENTMCNC: 32.1 G/DL (ref 31.4–37.4)
MCV RBC AUTO: 99 FL (ref 82–98)
NONHDLC SERPL-MCNC: 82 MG/DL
PLATELET # BLD AUTO: 233 THOUSANDS/UL (ref 149–390)
PMV BLD AUTO: 10.2 FL (ref 8.9–12.7)
POTASSIUM SERPL-SCNC: 4.6 MMOL/L (ref 3.5–5.3)
PROT SERPL-MCNC: 6.5 G/DL (ref 6.4–8.2)
RBC # BLD AUTO: 3.34 MILLION/UL (ref 3.88–5.62)
SODIUM SERPL-SCNC: 135 MMOL/L (ref 136–145)
TRIGL SERPL-MCNC: 70 MG/DL
URATE SERPL-MCNC: 3.5 MG/DL (ref 4.2–8)
WBC # BLD AUTO: 7.73 THOUSAND/UL (ref 4.31–10.16)

## 2021-04-19 PROCEDURE — 82306 VITAMIN D 25 HYDROXY: CPT

## 2021-04-19 PROCEDURE — 84550 ASSAY OF BLOOD/URIC ACID: CPT

## 2021-04-19 PROCEDURE — 80061 LIPID PANEL: CPT

## 2021-04-19 PROCEDURE — 97112 NEUROMUSCULAR REEDUCATION: CPT | Performed by: PHYSICAL THERAPIST

## 2021-04-19 PROCEDURE — 85027 COMPLETE CBC AUTOMATED: CPT

## 2021-04-19 PROCEDURE — 97530 THERAPEUTIC ACTIVITIES: CPT | Performed by: PHYSICAL THERAPIST

## 2021-04-19 PROCEDURE — 97110 THERAPEUTIC EXERCISES: CPT | Performed by: PHYSICAL THERAPIST

## 2021-04-19 PROCEDURE — 80053 COMPREHEN METABOLIC PANEL: CPT

## 2021-04-19 PROCEDURE — 36415 COLL VENOUS BLD VENIPUNCTURE: CPT

## 2021-04-19 NOTE — PROGRESS NOTES
Daily Note     Today's date: 2021  Patient name: Dante Thompson Sr   : 10/11/1924  MRN: [id number]  Referring provider: Tito Edge  Dx:   Encounter Diagnosis     ICD-10-CM    1  Contusion of left shoulder, subsequent encounter  S40 012D    2  Rotator cuff arthropathy of left shoulder  M12 812    3  Left shoulder pain, unspecified chronicity  M25 512    4  Posture abnormality  R29 3                   Subjective: The patient reports no pain at his left shoulder today - patient is in a pleasant/agreeable mood  Objective: See treatment diary below      Assessment: The patient continues to have the most difficulty actively using his left shoulder for functional activities and reaching  The patient lacks muscle power needed to perform lifting/carrying tasks  The patient did not complain of any pain increase at the end of the treatment  The patient will benefit from continued PT to achieve his goals of therapy  Plan: Continue per plan of care  Progress treatment as tolerated         Precautions: fall risk  Progress note:   POC: 6/10    Manuals 4/13 4/15 4/19  4/7   Stretching with active release x15 min 10 mn prn  15 min   GH jt mobs                        Neuro Re-Ed        UBE *Seated 120/30  - 8 mins alternate Seated 120/30  - 10 mins alternate Seated 110/50  X 10 mins alternate  Seated 120/30  - 8 mins alternate   scap retraction     HEP   Shoulder rolls     HEP   Chin tucks     HEP   Table rocks to tolerance        Alternating isometrics IR/ER at side; 90 deg flexion *Gentle 4x:15 ea nv Shoulder elevation holds 3x :05 - 2 trials     MTP/LTP        Shoulder isometrics *x10 sitting/ stand  Flex,abd, IR, ER,ext Supine IR, ER, elbow flex, ext; Standing shoulder flex, ext abd Supine IR/ER, elbow flex/ext; Stand shoulder flex, abd, EXT     Ther Ex        Pulleys *:10x10 :10x10 flex, abd 10x:10 flex/abd     UT stretch        LS stretch        Table slides     HEP   pendulums        Supine cane push up  10x 10x     Supine flexion ROM *x10 cane x10 cane 10x     Supine ER ROM *x10 cane x10 cane 10x     Side lying shoulder ER *x10 2x10 x25 stabilze elbow     Wall walks With Ecc  Flex x10  flex 10x Flex x10     Cane AAROM *flex, scap, ext x10 ea nv Stand EXT, Scap x10 ea  Sit: Flex x10      thoracic stretch        tricep pull downs    *    Bicep curls    *    Ther Activity           Red 30x RED x30     clips  1x 1st rung   up and down 5red, 5 yellow 1x 1st rung up/down 5 red and yellow     Gait Training                        Modalities                        Access Code: [email or web address]   URL: [email or web address]   Date: 03/10/2021; updated 3/16/21, 3/31/21   Prepared by: Brenden Holly     Exercises  Supine Shoulder Flexion AAROM with Hands Clasped - 10 reps - 1 sets - 5 sec hold - 2x daily - 7x weekly  Supine Shoulder External Rotation with Dowel - 10 reps - 1 sets - 5 sec hold - 2x daily - 7x weekly  Seated Shoulder Flexion Towel Slide at Table Top - 10 reps - 1 sets - 5 sec hold - 2x daily - 7x weekly  Seated Shoulder Abduction Towel Slide at Table Top - 10 reps - 1 sets - 5 sec hold - 2x daily - 7x weekly  Chin tucks   levator stretch  Upper trap stretch  Shoulder isometric  Side lying external rotation

## 2021-04-21 ENCOUNTER — OFFICE VISIT (OUTPATIENT)
Dept: PHYSICAL THERAPY | Facility: CLINIC | Age: 86
End: 2021-04-21
Payer: COMMERCIAL

## 2021-04-21 DIAGNOSIS — S40.012D CONTUSION OF LEFT SHOULDER, SUBSEQUENT ENCOUNTER: Primary | ICD-10-CM

## 2021-04-21 DIAGNOSIS — M12.812 ROTATOR CUFF ARTHROPATHY OF LEFT SHOULDER: ICD-10-CM

## 2021-04-21 DIAGNOSIS — R29.3 POSTURE ABNORMALITY: ICD-10-CM

## 2021-04-21 DIAGNOSIS — M25.512 LEFT SHOULDER PAIN, UNSPECIFIED CHRONICITY: ICD-10-CM

## 2021-04-21 PROCEDURE — 97530 THERAPEUTIC ACTIVITIES: CPT | Performed by: PHYSICAL THERAPIST

## 2021-04-21 PROCEDURE — 97110 THERAPEUTIC EXERCISES: CPT | Performed by: PHYSICAL THERAPIST

## 2021-04-21 PROCEDURE — 97112 NEUROMUSCULAR REEDUCATION: CPT | Performed by: PHYSICAL THERAPIST

## 2021-04-21 NOTE — PROGRESS NOTES
Daily Note     Today's date: 2021  Patient name: Imelda Milian Sr   : 10/11/1924  MRN: [id number]  Referring provider: Lg Tom  Dx:   Encounter Diagnosis     ICD-10-CM    1  Contusion of left shoulder, subsequent encounter  S40 012D    2  Rotator cuff arthropathy of left shoulder  M12 812    3  Left shoulder pain, unspecified chronicity  M25 512    4  Posture abnormality  R29 3                   Subjective: No complaints of pain today      Objective: See treatment diary below      Assessment: The patient continues to use accessory muscles/movements to compensate for diffuse left shoulder muscle weakness  The patient does not complain of pain increase during or after the therapy session  The patient tolerated all new thera-band activities well today  The patient may benefit from continued PT to achieve his functional goals  Plan: Continue per plan of care  Progress treatment as tolerated  Precautions: fall risk  Progress note:   POC: 6/10    Manuals 4/13 4/15 4/19 4/21    Stretching with active release x15 min 10 mn prn     GH jt mobs                        Neuro Re-Ed        UBE *Seated 120/30  - 8 mins alternate Seated 120/30  - 10 mins alternate Seated 110/50  X 10 mins alternate 100/50  X 8 mins alternate    scap retraction        Shoulder rolls        Chin tucks        Table rocks to tolerance        Alternating isometrics IR/ER at side; 90 deg flexion *Gentle 4x:15 ea nv Shoulder elevation holds 3x :05 - 2 trials Elevation holds 4 x:10;   Alt IR/ER 4x:15    MTP/LTP        Shoulder isometrics *x10 sitting/ stand  Flex,abd, IR, ER,ext Supine IR, ER, elbow flex, ext; Standing shoulder flex, ext abd Supine IR/ER, elbow flex/ext; Stand shoulder flex, abd, EXT x10 ea Supine IR/ER, elbow flex/ext; Stand shoulder flex, abd, EXT x10 ea    Ther Ex        Pulleys *:10x10 :10x10 flex, abd 10x:10 flex/abd     UT stretch        LS stretch        Table slides        pendulums Supine cane push up  10x 10x 10x    Supine flexion ROM *x10 cane x10 cane 10x 10x    Supine ER ROM *x10 cane x10 cane 10x 10x    Side lying shoulder ER *x10 2x10 x25 stabilze elbow x25 stabilize elbow    Wall walks With Ecc  Flex x10  flex 10x Flex x10     Cane AAROM *flex, scap, ext x10 ea nv Stand EXT, Scap x10 ea  Sit: Flex x10  Stand EXT, Scap x10 ea  Sit: Flex x10     MTP/LTP    Yellow x10 ea    thoracic stretch        tricep pull downs    *yellow x10    Bicep curls    *yellw x10    Ther Activity           Red 30x RED x30 RED x40    clips  1x 1st rung   up and down 5red, 5 yellow 1x 1st rung up/down 5 red and yellow 5 red/yellow 1x1 1st rung up/down    Gait Training                        Modalities                        Access Code: [email or web address]   URL: [email or web address]   Date: 03/10/2021; updated 3/16/21, 3/31/21   Prepared by: Danna Neff     Exercises  Supine Shoulder Flexion AAROM with Hands Clasped - 10 reps - 1 sets - 5 sec hold - 2x daily - 7x weekly  Supine Shoulder External Rotation with Dowel - 10 reps - 1 sets - 5 sec hold - 2x daily - 7x weekly  Seated Shoulder Flexion Towel Slide at Table Top - 10 reps - 1 sets - 5 sec hold - 2x daily - 7x weekly  Seated Shoulder Abduction Towel Slide at Table Top - 10 reps - 1 sets - 5 sec hold - 2x daily - 7x weekly  Chin tucks   levator stretch  Upper trap stretch  Shoulder isometric  Side lying external rotation

## 2021-04-26 ENCOUNTER — OFFICE VISIT (OUTPATIENT)
Dept: PHYSICAL THERAPY | Facility: CLINIC | Age: 86
End: 2021-04-26
Payer: COMMERCIAL

## 2021-04-26 DIAGNOSIS — M25.512 LEFT SHOULDER PAIN, UNSPECIFIED CHRONICITY: ICD-10-CM

## 2021-04-26 DIAGNOSIS — R29.3 POSTURE ABNORMALITY: ICD-10-CM

## 2021-04-26 DIAGNOSIS — S40.012D CONTUSION OF LEFT SHOULDER, SUBSEQUENT ENCOUNTER: Primary | ICD-10-CM

## 2021-04-26 DIAGNOSIS — M12.812 ROTATOR CUFF ARTHROPATHY OF LEFT SHOULDER: ICD-10-CM

## 2021-04-26 PROCEDURE — 97530 THERAPEUTIC ACTIVITIES: CPT

## 2021-04-26 PROCEDURE — 97112 NEUROMUSCULAR REEDUCATION: CPT

## 2021-04-26 PROCEDURE — 97110 THERAPEUTIC EXERCISES: CPT

## 2021-04-26 NOTE — PROGRESS NOTES
Daily Note     Today's date: 2021  Patient name: Joshua Power Sr   : 10/11/1924  MRN: [id number]  Referring provider: Lisa Ware  Dx:   Encounter Diagnosis     ICD-10-CM    1  Contusion of left shoulder, subsequent encounter  S40 012D    2  Rotator cuff arthropathy of left shoulder  M12 812    3  Left shoulder pain, unspecified chronicity  M25 512    4  Posture abnormality  R29 3                   Subjective: Patient states he has no pain in the shoulder  He still can not move the arm where he wants to without helping it  Objective: See treatment diary below      Assessment: Tolerated treatment well  Patient would benefit from continued PT for to stretching and strengthening  Patient Patient continues to have difficulty with his exercises that makes him lift his left arm above his waist  He has a tendency to move the body instead of the arm with some of his exercises  Improved strength seen with holding arm up, but he has control issues when lowering it because of the decrypts  Patient felt ok when he left department  Plan: Continue per plan of care  Progress treatment as tolerated  Precautions: fall risk  Progress note:   POC: 6/10    Manuals 4/13 4/15 4/19 4/21 4/26   Stretching with active release x15 min 10 mn prn     GH jt mobs                        Neuro Re-Ed        UBE *Seated 120/30  - 8 mins alternate Seated 120/30  - 10 mins alternate Seated 110/50  X 10 mins alternate 100/50  X 8 mins alternate 100/50 x8 min alt   scap retraction        Shoulder rolls        Chin tucks        Table rocks to tolerance        Alternating isometrics IR/ER at side; 90 deg flexion *Gentle 4x:15 ea nv Shoulder elevation holds 3x :05 - 2 trials Elevation holds 4 x:10; Alt IR/ER 4x:15 Elevation holds 4 x:10;   Alt IR/ER 4x:15   MTP/LTP        Shoulder isometrics *x10 sitting/ stand  Flex,abd, IR, ER,ext Supine IR, ER, elbow flex, ext; Standing shoulder flex, ext abd Supine IR/ER, elbow flex/ext; Stand shoulder flex, abd, EXT x10 ea Supine IR/ER, elbow flex/ext; Stand shoulder flex, abd, EXT x10 ea Sit x10 ea   Ther Ex        Pulleys *:10x10 :10x10 flex, abd 10x:10 flex/abd     UT stretch        LS stretch        Table slides        pendulums        Supine cane push up  10x 10x 10x x10   Supine flexion ROM *x10 cane x10 cane 10x 10x x10   Supine ER ROM *x10 cane x10 cane 10x 10x x10   Side lying shoulder ER *x10 2x10 x25 stabilze elbow x25 stabilize elbow x25 stabilize elbow   Wall walks With Ecc  Flex x10  flex 10x Flex x10     Cane AAROM *flex, scap, ext x10 ea nv Stand EXT, Scap x10 ea  Sit: Flex x10  Stand EXT, Scap x10 ea  Sit: Flex x10  Stand EXT, Scap x10 ea  Sit: Flex x10   MTP/LTP    Yellow x10 ea Yellow x10 ea   thoracic stretch        tricep pull downs    *yellow x10 Yellow 2x10   Bicep curls    *yellow x10 Yellow 2x10   Ther Activity           Red 30x RED x30 RED x40 Red x40   clips  1x 1st rung   up and down 5red, 5 yellow 1x 1st rung up/down 5 red and yellow 5 red/yellow 1x1 1st rung up/down AAROM  5 red/yellow 1x1 1st rung up/down   Gait Training                        Modalities                        Access Code: [email or web address]   URL: [email or web address]   Date: 03/10/2021; updated 3/16/21, 3/31/21   Prepared by: Marbin Hong     Exercises  Supine Shoulder Flexion AAROM with Hands Clasped - 10 reps - 1 sets - 5 sec hold - 2x daily - 7x weekly  Supine Shoulder External Rotation with Dowel - 10 reps - 1 sets - 5 sec hold - 2x daily - 7x weekly  Seated Shoulder Flexion Towel Slide at Table Top - 10 reps - 1 sets - 5 sec hold - 2x daily - 7x weekly  Seated Shoulder Abduction Towel Slide at Table Top - 10 reps - 1 sets - 5 sec hold - 2x daily - 7x weekly  Chin tucks   levator stretch  Upper trap stretch  Shoulder isometric  Side lying external rotation

## 2021-04-28 ENCOUNTER — OFFICE VISIT (OUTPATIENT)
Dept: PHYSICAL THERAPY | Facility: CLINIC | Age: 86
End: 2021-04-28
Payer: COMMERCIAL

## 2021-04-28 DIAGNOSIS — R29.3 POSTURE ABNORMALITY: ICD-10-CM

## 2021-04-28 DIAGNOSIS — M12.812 ROTATOR CUFF ARTHROPATHY OF LEFT SHOULDER: ICD-10-CM

## 2021-04-28 DIAGNOSIS — S40.012D CONTUSION OF LEFT SHOULDER, SUBSEQUENT ENCOUNTER: Primary | ICD-10-CM

## 2021-04-28 DIAGNOSIS — M25.512 LEFT SHOULDER PAIN, UNSPECIFIED CHRONICITY: ICD-10-CM

## 2021-04-28 PROCEDURE — 97530 THERAPEUTIC ACTIVITIES: CPT | Performed by: PHYSICAL THERAPIST

## 2021-04-28 PROCEDURE — 97110 THERAPEUTIC EXERCISES: CPT | Performed by: PHYSICAL THERAPIST

## 2021-04-28 PROCEDURE — 97112 NEUROMUSCULAR REEDUCATION: CPT | Performed by: PHYSICAL THERAPIST

## 2021-04-28 NOTE — PROGRESS NOTES
Daily Note     Today's date: 2021  Patient name: Lani Carr Sr   : 10/11/1924  MRN: [id number]  Referring provider: Shira Bautista  Dx:   Encounter Diagnosis     ICD-10-CM    1  Contusion of left shoulder, subsequent encounter  S40 012D    2  Rotator cuff arthropathy of left shoulder  M12 812    3  Left shoulder pain, unspecified chronicity  M25 512    4  Posture abnormality  R29 3        Start Time:   Stop Time: 66  Total time in clinic (min): 45 minutes    Subjective: Patient states no pain, continues to have difficulty lifting LUE  Objective: See treatment diary below      Assessment: Tolerated treatment well  Weakness initiating LUE overhead movement, but displays good control when >90 degrees  Weakness with lower arm <90 degrees impacting his ability to lift and carry  Patient would benefit from continued PT      Plan: Continue per plan of care  Progress treatment as tolerated  Precautions: fall risk  Progress note:   POC: 6/10    Manuals    Stretching with active release   prn     GH jt mobs                        Neuro Re-Ed        /50 x8 min alt  Seated 110/50  X 10 mins alternate 100/50  X 8 mins alternate 100/50 x8 min alt   scap retraction        Shoulder rolls        Chin tucks        Table rocks to tolerance        Alternating isometrics IR/ER at side; 90 deg flexion Rhythmic stab :15x3  Shoulder elevation holds 3x :05 - 2 trials Elevation holds 4 x:10; Alt IR/ER 4x:15 Elevation holds 4 x:10;   Alt IR/ER 4x:15   MTP/LTP        Shoulder isometrics Supine :05x10 ea; manual resisted AROM IR and ER 5x ea  Supine IR/ER, elbow flex/ext; Stand shoulder flex, abd, EXT x10 ea Supine IR/ER, elbow flex/ext; Stand shoulder flex, abd, EXT x10 ea Sit x10 ea   Ther Ex        Pulleys   10x:10 flex/abd     UT stretch        LS stretch        Table slides        pendulums        Supine cane push up 10x  10x 10x x10   Supine flexion ROM 10x  10x 10x x10 Supine ER ROM 10x  10x 10x x10   Side lying shoulder ER x25 stabilize elbow  x25 stabilze elbow x25 stabilize elbow x25 stabilize elbow   Wall walks   Flex x10     Cane AAROM Stand EXT, Scap x10 ea  Sit: Flex x10  Stand EXT, Scap x10 ea  Sit: Flex x10  Stand EXT, Scap x10 ea  Sit: Flex x10  Stand EXT, Scap x10 ea  Sit: Flex x10   MTP/LTP Yellow x10 ea   Yellow x10 ea Yellow x10 ea   thoracic stretch        tricep pull downs    *yellow x10 Yellow 2x10   Bicep curls Yellow 2x10   *yellow x10 Yellow 2x10   Ther Activity          Green 20x  RED x30 RED x40 Red x40   clips 1x 1st rung up/down 5 red and yellow  1x 1st rung up/down 5 red and yellow 5 red/yellow 1x1 1st rung up/down AAROM  5 red/yellow 1x1 1st rung up/down   Gait Training                        Modalities                        Access Code: [email or web address]   URL: [email or web address]   Date: 03/10/2021; updated 3/16/21, 3/31/21   Prepared by: Evan Loja     Exercises  Supine Shoulder Flexion AAROM with Hands Clasped - 10 reps - 1 sets - 5 sec hold - 2x daily - 7x weekly  Supine Shoulder External Rotation with Dowel - 10 reps - 1 sets - 5 sec hold - 2x daily - 7x weekly  Seated Shoulder Flexion Towel Slide at Table Top - 10 reps - 1 sets - 5 sec hold - 2x daily - 7x weekly  Seated Shoulder Abduction Towel Slide at Table Top - 10 reps - 1 sets - 5 sec hold - 2x daily - 7x weekly  Chin tucks   levator stretch  Upper trap stretch  Shoulder isometric  Side lying external rotation

## 2021-05-03 ENCOUNTER — OFFICE VISIT (OUTPATIENT)
Dept: PHYSICAL THERAPY | Facility: CLINIC | Age: 86
End: 2021-05-03
Payer: COMMERCIAL

## 2021-05-03 DIAGNOSIS — M12.812 ROTATOR CUFF ARTHROPATHY OF LEFT SHOULDER: ICD-10-CM

## 2021-05-03 DIAGNOSIS — S40.012D CONTUSION OF LEFT SHOULDER, SUBSEQUENT ENCOUNTER: Primary | ICD-10-CM

## 2021-05-03 DIAGNOSIS — R29.3 POSTURE ABNORMALITY: ICD-10-CM

## 2021-05-03 DIAGNOSIS — M25.512 LEFT SHOULDER PAIN, UNSPECIFIED CHRONICITY: ICD-10-CM

## 2021-05-03 PROCEDURE — 97112 NEUROMUSCULAR REEDUCATION: CPT | Performed by: PHYSICAL THERAPIST

## 2021-05-03 PROCEDURE — 97110 THERAPEUTIC EXERCISES: CPT | Performed by: PHYSICAL THERAPIST

## 2021-05-03 PROCEDURE — 97530 THERAPEUTIC ACTIVITIES: CPT | Performed by: PHYSICAL THERAPIST

## 2021-05-03 NOTE — PROGRESS NOTES
Daily Note     Today's date: 5/3/2021  Patient name: Torsten Valdovinos Sr   : 10/11/1924  MRN: [id number]  Referring provider: Jaya East  Dx:   Encounter Diagnosis     ICD-10-CM    1  Contusion of left shoulder, subsequent encounter  S40 012D    2  Rotator cuff arthropathy of left shoulder  M12 812    3  Left shoulder pain, unspecified chronicity  M25 512    4  Posture abnormality  R29 3        Start Time: 1615  Stop Time: 1700  Total time in clinic (min): 45 minutes    Subjective: States he does not feel he is making as much improvement as he had initially  C/o difficulty lifting arm over head  Objective: See treatment diary below      Assessment: Tolerated treatment well  Difficulty initiating movement to lift arm, increased ease with assistance during the first 60 degrees  Noted significant amount of crepitus and visible popping of shoulder while attempting eccentric control and lowering of arm after 90 degrees  Patient would benefit from continued PT      Plan: Continue per plan of care  Progress treatment as tolerated  Precautions: fall risk  Progress note:   POC: 6/10    Manuals 4/28 5/3  4/21 4/26   Stretching with active release        GH jt mobs                        Neuro Re-Ed        /50 x8 min alt 100/50 x8 min alt  100/50  X 8 mins alternate 100/50 x8 min alt   scap retraction        Shoulder rolls        Chin tucks        Table rocks to tolerance        Alternating isometrics IR/ER at side; 90 deg flexion Rhythmic stab :15x3 Rhythmic stab :15x5  Elevation holds 4 x:10; Alt IR/ER 4x:15 Elevation holds 4 x:10;   Alt IR/ER 4x:15   MTP/LTP        Shoulder isometrics Supine :05x10 ea; manual resisted AROM IR and ER 5x ea Assisted arm flexion, hold, and slow decent 10x  Supine IR/ER, elbow flex/ext; Stand shoulder flex, abd, EXT x10 ea Sit x10 ea   Ther Ex        Pulleys        UT stretch        LS stretch        Table slides  Fwd, scap with elevation at end range 20x ea pendulums        Supine cane push up 10x 10x  10x x10   Supine flexion ROM 10x   10x x10   Supine ER ROM 10x   10x x10   Side lying shoulder ER x25 stabilize elbow   x25 stabilize elbow x25 stabilize elbow   Wall walks        Cane AAROM Stand EXT, Scap x10 ea  Sit: Flex x10   Stand EXT, Scap x10 ea  Sit: Flex x10  Stand EXT, Scap x10 ea  Sit: Flex x10   MTP/LTP Yellow x10 ea Yellow 30x  Yellow x10 ea Yellow x10 ea   thoracic stretch        tricep pull downs    *yellow x10 Yellow 2x10   Bicep curls Yellow 2x10 13# 3x10  *yellow x10 Yellow 2x10   Ther Activity          Green 20x Green 20x  RED x40 Red x40   clips 1x 1st rung up/down 5 red and yellow 1x 1st rung up/down 5 red and yellow  5 red/yellow 1x1 1st rung up/down AAROM  5 red/yellow 1x1 1st rung up/down   Gait Training                        Modalities                        Access Code: [email or web address]   URL: [email or web address]   Date: 03/10/2021; updated 3/16/21, 3/31/21   Prepared by: Yo Goss     Exercises  Supine Shoulder Flexion AAROM with Hands Clasped - 10 reps - 1 sets - 5 sec hold - 2x daily - 7x weekly  Supine Shoulder External Rotation with Dowel - 10 reps - 1 sets - 5 sec hold - 2x daily - 7x weekly  Seated Shoulder Flexion Towel Slide at Table Top - 10 reps - 1 sets - 5 sec hold - 2x daily - 7x weekly  Seated Shoulder Abduction Towel Slide at Table Top - 10 reps - 1 sets - 5 sec hold - 2x daily - 7x weekly  Chin tucks   levator stretch  Upper trap stretch  Shoulder isometric  Side lying external rotation

## 2021-05-05 ENCOUNTER — OFFICE VISIT (OUTPATIENT)
Dept: PHYSICAL THERAPY | Facility: CLINIC | Age: 86
End: 2021-05-05
Payer: COMMERCIAL

## 2021-05-05 DIAGNOSIS — S40.012D CONTUSION OF LEFT SHOULDER, SUBSEQUENT ENCOUNTER: Primary | ICD-10-CM

## 2021-05-05 DIAGNOSIS — M12.812 ROTATOR CUFF ARTHROPATHY OF LEFT SHOULDER: ICD-10-CM

## 2021-05-05 DIAGNOSIS — R29.3 POSTURE ABNORMALITY: ICD-10-CM

## 2021-05-05 DIAGNOSIS — M25.512 LEFT SHOULDER PAIN, UNSPECIFIED CHRONICITY: ICD-10-CM

## 2021-05-05 PROCEDURE — 97112 NEUROMUSCULAR REEDUCATION: CPT

## 2021-05-05 PROCEDURE — 97110 THERAPEUTIC EXERCISES: CPT

## 2021-05-05 PROCEDURE — 97530 THERAPEUTIC ACTIVITIES: CPT

## 2021-05-05 NOTE — PROGRESS NOTES
Daily Note     Today's date: 2021  Patient name: Franco Hadley Sr   : 10/11/1924  MRN: [id number]  Referring provider: Lauren Duque  Dx:   Encounter Diagnosis     ICD-10-CM    1  Contusion of left shoulder, subsequent encounter  S40 012D    2  Rotator cuff arthropathy of left shoulder  M12 812    3  Left shoulder pain, unspecified chronicity  M25 512    4  Posture abnormality  R29 3                   Subjective:  Patient states he has no pain in the arm, just weakness  Objective: See treatment diary below      Assessment: Tolerated treatment fair  Patient would benefit from continued PT for stretching and strengthening  Patient was able to perform his exercises with veerbal cues  He contines to not have the strength to lift the arm above 90 degrees in sitting position  Patient felt ok by the end of the session  Plan: Continue per plan of care  Progress note during next visit  Potential discharge next visit  Precautions: fall risk  Progress note:   POC: 6/10    Manuals 4/28 5/3 5/5  4/26   Stretching with active release        GH jt mobs                        Neuro Re-Ed        /50 x8 min alt 100/50 x8 min alt 100/50 x8 min alt  100/50 x8 min alt   scap retraction        Shoulder rolls        Chin tucks        Table rocks to tolerance        Alternating isometrics IR/ER at side; 90 deg flexion Rhythmic stab :15x3 Rhythmic stab :15x5 Rhythmic stab :15x5  Elevation holds 4 x:10;   Alt IR/ER 4x:15   MTP/LTP        Shoulder isometrics Supine :05x10 ea; manual resisted AROM IR and ER 5x ea Assisted arm flexion, hold, and slow decent 10x Assisted arm flexion, hold, and slow decent 10x  Sit x10 ea   Ther Ex        Pulleys        UT stretch        LS stretch        Table slides  Fwd, scap with elevation at end range 20x ea Fwd, scap with elevation at end range 10x ea     pendulums        Supine cane push up 10x 10x x10  x10   Supine flexion ROM 10x  x10  x10   Supine ER ROM 10x x10  x10   Side lying shoulder ER x25 stabilize elbow  x25  x25 stabilize elbow   Wall walks        Cane AAROM Stand EXT, Scap x10 ea  Sit: Flex x10    Stand EXT, Scap x10 ea  Sit: Flex x10   MTP/LTP Yellow x10 ea Yellow 30x Yellow x10 ea  Yellow x10 ea   thoracic stretch        tricep pull downs     Yellow 2x10   Bicep curls Yellow 2x10 13# 3x10   2# 1x10  1# 2x10  Yellow 2x10   Ther Activity          Green 20x Green 20x Green x30  Red x40   clips 1x 1st rung up/down 5 red and yellow 1x 1st rung up/down 5 red and yellow 1x 1st rung up/down 5 red and yellow  AAROM  5 red/yellow 1x1 1st rung up/down   Gait Training                        Modalities                        Access Code: [email or web address]   URL: [email or web address]   Date: 03/10/2021; updated 3/16/21, 3/31/21   Prepared by: Panchito Boateng     Exercises  Supine Shoulder Flexion AAROM with Hands Clasped - 10 reps - 1 sets - 5 sec hold - 2x daily - 7x weekly  Supine Shoulder External Rotation with Dowel - 10 reps - 1 sets - 5 sec hold - 2x daily - 7x weekly  Seated Shoulder Flexion Towel Slide at Table Top - 10 reps - 1 sets - 5 sec hold - 2x daily - 7x weekly  Seated Shoulder Abduction Towel Slide at Table Top - 10 reps - 1 sets - 5 sec hold - 2x daily - 7x weekly  Chin tucks   levator stretch  Upper trap stretch  Shoulder isometric  Side lying external rotation

## 2021-05-10 ENCOUNTER — APPOINTMENT (OUTPATIENT)
Dept: PHYSICAL THERAPY | Facility: CLINIC | Age: 86
End: 2021-05-10
Payer: COMMERCIAL

## 2021-05-12 ENCOUNTER — EVALUATION (OUTPATIENT)
Dept: PHYSICAL THERAPY | Facility: CLINIC | Age: 86
End: 2021-05-12
Payer: COMMERCIAL

## 2021-05-12 DIAGNOSIS — M12.812 ROTATOR CUFF ARTHROPATHY OF LEFT SHOULDER: ICD-10-CM

## 2021-05-12 DIAGNOSIS — M25.512 LEFT SHOULDER PAIN, UNSPECIFIED CHRONICITY: ICD-10-CM

## 2021-05-12 DIAGNOSIS — S40.012D CONTUSION OF LEFT SHOULDER, SUBSEQUENT ENCOUNTER: Primary | ICD-10-CM

## 2021-05-12 DIAGNOSIS — R29.3 POSTURE ABNORMALITY: ICD-10-CM

## 2021-05-12 PROCEDURE — 97110 THERAPEUTIC EXERCISES: CPT | Performed by: PHYSICAL THERAPIST

## 2021-05-12 PROCEDURE — 97112 NEUROMUSCULAR REEDUCATION: CPT | Performed by: PHYSICAL THERAPIST

## 2021-05-12 NOTE — PROGRESS NOTES
PT Re-Evaluation     Today's date: 2021  Patient name: Corby Perez Sr   : 10/11/1924  MRN: [id number]  Referring provider: Elenora Mcburney  Dx:   Encounter Diagnosis     ICD-10-CM    1  Contusion of left shoulder, subsequent encounter  S40 012D    2  Rotator cuff arthropathy of left shoulder  M12 812    3  Left shoulder pain, unspecified chronicity  M25 512    4  Posture abnormality  R29 3        Start Time:   Stop Time:   Total time in clinic (min): 45 minutes    Assessment/Plan   Assessment details: Corby Perez Sr  Has been seen in outpatient PT for 18 sessions beginning 3/10  Patient is a 80 y o  male with diagnosis of left shoulder contusion/RC arthropathy and past medical history significant for CAD, HTN, ventricular bigeminy, gout, stage 3 CKD, coronary stent  Findings of examination today show passive range of motion within functional limits  Continues to lack active motion and strength of left upper extremity with no significant change over the past month  Patient does display inconsistent findings with OH movement, able to reach out to >90 degrees but when instructed to reach [facility] has significant difficulty  Patient does report improvement in overall function evident with increase of FOTO score form 32% to 58% today  Due to lack of measurable strength progress Adam Christopher will be put on hold with PT until he sees ortho   If deemed appropriate by DO may continue with PT until POC expiration 6/10  Impairments: abnormal muscle firing, abnormal or restricted ROM, abnormal movement, activity intolerance, impaired physical strength, lacks appropriate home exercise program, pain with function, scapular dyskinesis, poor posture  and poor body mechanics    Goals  STG (6 weeks)  1  Patient's left shoulder flexion AROM will increase to 90 with 0/10 pain for increased ability to perform overhead ADLs  - MET  2  Patient will have 0/10 pain in left shoulder when pushing open door   - MET  3  Patient's left shoulder strength will increase to 2+/5 for increased ability to lift  - MET  LTG (12 weeks)  1  Patient's UE AROM equal bilaterally for ability to complete hair hygiene, overhead, and behind the back ADLs  - PROGRESSING  2  Patient's UE strength will be equal bilaterally for ability to lift and carry at PLOF  - PROGRESSING  3  Patient will be independent with home exercise program for continued maintenance post PT discharge  - PROGRESSING      Plan  Plan details: Hold until f/u with ortho  Patient would benefit from: skilled physical therapy  Planned modality interventions: cryotherapy and thermotherapy: hydrocollator packs  Planned therapy interventions: neuromuscular re-education, manual therapy, therapeutic exercise, therapeutic activities, self care and home exercise program  Frequency: 2x week  Duration in weeks: 12  Plan of Care beginning date: 3/10/2021  Plan of Care expiration date: 6/10/2021  Treatment plan discussed with: patient and PTA            Subjective   History of Present Illness  Date of onset: 2/6/2021  Subjective 5/12: Patient states he continues to have difficulty reaching arm overhead  Feels some days are better than others  Denies pain  Able to reach behind back and bring left UE overhead with assistance  Would like to continue with PT, does think he is improving  Subjective 4/7: Patient feels some improvement since the start of PT  Has DC sling, no longer uses at home or in community  Able to ambulate using SPC in LUE now  States he feels he is not done with therapy due to continued difficulty raising arm  Does also c/o N+T from carpal tunnel in am L>R  Mechanism of injury: Jarrell Holloway Sr  is a 80 y o  male who presents to outpatient Physical Therapy today with complaints of left shoulder pain  States fall in early February onto left shoulder saw ortho, no fx on x-ray but does have old RC injury       Pain  No pain reported  Progression: no change    Social Support  Steps to enter house: yes  13  Stairs in house: no   Lives with: alone (adult son is staying temporarily )    Employment status: not working (retired)  Hand dominance: right      Diagnostic Tests  X-ray: normal  Patient Goals  Patient goals for therapy: increased motion  Patient goal: reaching overhead, dressing, be able to lift arm to shelf    Objective  Observations     Additional Observation Details  Upper crossed  Wearing sling on LUE  5/12: no sling    Tenderness     Left Shoulder   No tenderness     Cervical/Thoracic Screen   Cervical range of motion within normal limits with the following exceptions: limitation in cervical rotation and lateral flexion bilaterally, no pain    Neurological Testing     Sensation     Shoulder   Left Shoulder   Intact: light touch    Right Shoulder   Intact: light touch    Additional Neurological Details  CTS bilaterally, worse in LUE, constant N+T into left fingers    Active Range of Motion   4/7 5/12  Left Shoulder   Flexion: 15 degrees    95 No change  Abduction: 45 degrees   65 No change  External rotation 0°: 15 degrees  20 45 at 45 deg abd  Internal rotation 0°:  to belly      75 at 45 deg abd    Right Shoulder   Flexion: 115 degrees   Abduction: 135 degrees   External rotation BTH: C5   Internal rotation BTB: L1     Passive Range of Motion   Left Shoulder   Flexion: 120 degrees    150  Abduction: 120 degrees   145  External rotation 0°: 35 degrees  35    Strength/Myotome Testing  4/7 5/12    Left Shoulder     Planes of Motion   Flexion: 2-     2+ 2+  Abduction: 2-     2+ 2+  External rotation at 0°: 2+    3-  Internal rotation at 0°: 2+    3-    Isolated Muscles   Biceps: 4     5  Triceps: 4     5    Right Shoulder   Normal muscle strength    Isolated Muscles   Biceps: 5   Triceps: 5     General Comments:      Shoulder Comments       FOTO: 32% function, 58% predicted function   5/12: 58%         Precautions: fall risk  Progress note: 6/10  POC: 6/10    Manuals 4/28 5/3 5/5 5/12    Stretching with active release        GH jt mobs                        Neuro Re-Ed        /50 x8 min alt 100/50 x8 min alt 100/50 x8 min alt hold    scap retraction        Shoulder rolls        Chin tucks        Table rocks to tolerance        Alternating isometrics IR/ER at side; 90 deg flexion Rhythmic stab :15x3 Rhythmic stab :15x5 Rhythmic stab :15x5     MTP/LTP        Shoulder isometrics Supine :05x10 ea; manual resisted AROM IR and ER 5x ea Assisted arm flexion, hold, and slow decent 10x Assisted arm flexion, hold, and slow decent 10x Assisted arm flexion, hold, and slow decent 10x    Ther Ex        Pulleys    :10x10 ea    UT stretch        LS stretch        Table slides  Fwd, scap with elevation at end range 20x ea Fwd, scap with elevation at end range 10x ea     pendulums        Supine cane push up 10x 10x x10     Supine flexion ROM 10x  x10     Supine ER ROM 10x  x10     Side lying shoulder ER x25 stabilize elbow  x25     Wall walks        Cane AAROM Stand EXT, Scap x10 ea  Sit: Flex x10       MTP/LTP Yellow x10 ea Yellow 30x Yellow x10 ea     thoracic stretch        tricep pull downs        Bicep curls Yellow 2x10 13# 3x10   2# 1x10  1# 2x10     Ther Activity          Green 20x Green 20x Green x30     clips 1x 1st rung up/down 5 red and yellow 1x 1st rung up/down 5 red and yellow 1x 1st rung up/down 5 red and yellow     Gait Training                        Modalities                        Access Code: [email or web address]   URL: [email or web address]   Date: 03/10/2021; updated 3/16/21, 3/31/21   Prepared by: Ramiro Magaña     Exercises  Supine Shoulder Flexion AAROM with Hands Clasped - 10 reps - 1 sets - 5 sec hold - 2x daily - 7x weekly  Supine Shoulder External Rotation with Dowel - 10 reps - 1 sets - 5 sec hold - 2x daily - 7x weekly  Seated Shoulder Flexion Towel Slide at Table Top - 10 reps - 1 sets - 5 sec hold - 2x daily - 7x weekly  Seated Shoulder Abduction Towel Slide at Table Top - 10 reps - 1 sets - 5 sec hold - 2x daily - 7x weekly  Chin tucks   levator stretch  Upper trap stretch  Shoulder isometric  Side lying external rotation

## 2021-05-17 ENCOUNTER — OFFICE VISIT (OUTPATIENT)
Dept: OBGYN CLINIC | Facility: CLINIC | Age: 86
End: 2021-05-17
Payer: COMMERCIAL

## 2021-05-17 VITALS
HEART RATE: 80 BPM | SYSTOLIC BLOOD PRESSURE: 144 MMHG | HEIGHT: 66 IN | BODY MASS INDEX: 23.46 KG/M2 | WEIGHT: 146 LBS | DIASTOLIC BLOOD PRESSURE: 62 MMHG

## 2021-05-17 DIAGNOSIS — S40.012A CONTUSION OF LEFT SHOULDER, INITIAL ENCOUNTER: Primary | ICD-10-CM

## 2021-05-17 DIAGNOSIS — M12.812 ROTATOR CUFF ARTHROPATHY OF LEFT SHOULDER: ICD-10-CM

## 2021-05-17 PROCEDURE — 99213 OFFICE O/P EST LOW 20 MIN: CPT | Performed by: ORTHOPAEDIC SURGERY

## 2021-05-17 NOTE — PROGRESS NOTES
ASSESSMENT/PLAN:    Diagnoses and all orders for this visit:    Contusion of left shoulder, initial encounter    Rotator cuff arthropathy of left shoulder         80 y o  male with left shoulder rotator cuff arthropathy  I discussed with the patient that he should continue to keep the shoulder moving  I discussed that he may continue to work with physical therapy for the next 4-6 weeks if he would like  He may try an injection in the future if the shoulder becomes painful  I will see him back in office on an as needed basis if symptoms worsen or fail to improve  the patient has a history of rotator cuff arthropathy of left shoulder  He offers no major complaints of pain  He does have some weakness as to be  Expected from a deficient rotator cuff  There is a painless arc of motion of his left shoulder  Glenohumeral crepitation is present  At this point, he is doing quite well  Continue home exercise program   Continue stretching  Continue therapy   Until he plateaus  Follow up on an as-needed basis  Return if symptoms worsen or fail to improve       _____________________________________________________  CHIEF COMPLAINT:  Chief Complaint   Patient presents with    Left Shoulder - Follow-up         SUBJECTIVE:  Jessica Landry  is a 80 y o  male who presents to the office today for a follow up of his left shoulder rotator cuff arthropathy  Patient stated that he has continued to work with physical therapy where his motion is improving but not his strength  He does not complain of pain at this time  He denied any numbness or tingling  He denied any fever or chills          The following portions of the patient's history were reviewed and updated as appropriate: allergies, current medications, past family history, past medical history, past social history, past surgical history and problem list     PAST MEDICAL HISTORY:  Past Medical History:   Diagnosis Date    Arthritis     Cataract     Coronary artery disease     Coronary atherosclerosis of native coronary artery     Diverticulitis of colon     Essential hypertension, benign     Hyperlipidemia     Hypertension     Peptic ulcer     Renal disorder        PAST SURGICAL HISTORY:  Past Surgical History:   Procedure Laterality Date    CATARACT EXTRACTION Right     Left eye 5/2021    CORONARY ANGIOPLASTY      CORONARY STENT PLACEMENT      EYE SURGERY      cataract sx    SKIN BIOPSY      TONSILLECTOMY         FAMILY HISTORY:  Family History   Problem Relation Age of Onset    Heart attack Brother     Heart disease Mother     Cancer Father        SOCIAL HISTORY:  Social History     Tobacco Use    Smoking status: Former Smoker     Types: Pipe    Smokeless tobacco: Never Used   Substance Use Topics    Alcohol use: Not Currently    Drug use: No       MEDICATIONS:    Current Outpatient Medications:     allopurinol (ZYLOPRIM) 300 mg tablet, Take 1 tablet by mouth daily, Disp: , Rfl:     aspirin 81 MG tablet, Take 1 tablet by mouth see administration instructions Take 1 tablet Mon-Wed & Fri, Disp: , Rfl:     atorvastatin (LIPITOR) 20 mg tablet, Take 1 tablet by mouth daily, Disp: , Rfl:     Cholecalciferol ([employer]S VITAMIN D) 2000 units CAPS, Take by mouth, Disp: , Rfl:     Choline Fenofibrate (FENOFIBRIC ACID) 45 MG CPDR, Take by mouth, Disp: , Rfl:     furosemide (LASIX) 20 mg tablet, Take 1 tablet by mouth daily as needed, Disp: , Rfl:     irbesartan (AVAPRO) 75 mg tablet, Take 150 mg by mouth daily , Disp: , Rfl:     metoprolol tartrate (LOPRESSOR) 25 mg tablet, Take 25 mg by mouth 2 (two) times a day , Disp: , Rfl:     Multiple Vitamins tablet, Take by mouth, Disp: , Rfl:     nitroglycerin (Nitrostat) 0 4 mg SL tablet, Place 1 tablet (0 4 mg total) under the tongue every 5 (five) minutes as needed for chest pain, Disp: 25 tablet, Rfl: 3    nystatin (MYCOSTATIN) powder, Apply topically 2 (two) times a day, Disp: 15 g, Rfl: 0   ofloxacin (OCUFLOX) 0 3 % ophthalmic solution, ofloxacin 0 3 % eye drops, Disp: , Rfl:     pneumococcal 23-valent polysaccharide vaccine (PNEUMOVAX 23), Pneumovax 23 25 mcg/0 5 mL injection syringe, Disp: , Rfl:     potassium chloride (KLOR-CON) 20 mEq packet, Take 20 mEq by mouth as needed Take 1 tablet when taking Furosemide, Disp: , Rfl:     prednisoLONE acetate (PRED FORTE) 1 % ophthalmic suspension, prednisolone acetate 1 % eye drops,suspension, Disp: , Rfl:     ALLERGIES:  No Known Allergies    ROS:  Review of Systems   Constitutional: Negative for chills and fever  HENT: Negative for ear pain and sore throat  Eyes: Negative for pain and visual disturbance  Respiratory: Negative for cough and shortness of breath  Cardiovascular: Negative for chest pain and palpitations  Gastrointestinal: Negative for abdominal pain and vomiting  Genitourinary: Negative for dysuria and hematuria  Musculoskeletal: Positive for arthralgias  Negative for back pain  Skin: Negative for color change and rash  Neurological: Negative for seizures and syncope  All other systems reviewed and are negative  Constitutional: Negative for fatigue, fever or loss of appetite  HENT: Negative  Respiratory: Negative for shortness of breath, dyspnea  Cardiovascular: Negative for chest pain/tightness  Gastrointestinal: Negative for abdominal pain, N/V  Endocrine: Negative for cold/heat intolerance, unexplained weight loss/gain  Genitourinary: Negative for flank pain, dysuria, hematuria  Musculoskeletal: Positive for arthralgia   Skin: Negative for rash  Neurological: Negative for numbness or tingling  Psychiatric/Behavioral: Negative for agitation  _____________________________________________________  PHYSICAL EXAMINATION:    Blood pressure 144/62, pulse 80, height 5' 6" (1 676 m), weight 66 2 kg (146 lb)  Constitutional: Oriented to person, place, and time   Appears well-developed and well-nourished  No distress  HENT:   Head: Normocephalic  Eyes: Conjunctivae are normal  Right eye exhibits no discharge  Left eye exhibits no discharge  No scleral icterus  Cardiovascular: Normal rate  Pulmonary/Chest: Effort normal    Neurological: Alert and oriented to person, place, and time  Skin: Skin is warm and dry  No rash noted  Not diaphoretic  No erythema  No pallor  Psychiatric: Normal mood and affect  Behavior is normal  Judgment and thought content normal       MUSCULOSKELETAL EXAMINATION:   Physical Exam  Ortho Exam    Left Shoulder:   Skin intact  No obvious swelling   No erythema or ecchymosis   Left upper extremity neurovascularly intact   ROM of shoulder 0-90 degrees forward flexion and abduction   Crepitus noted with motion   Fingers pink and mobile   Compartments soft   Sensation intact     Objective:  BP Readings from Last 1 Encounters:   05/17/21 144/62      Wt Readings from Last 1 Encounters:   05/17/21 66 2 kg (146 lb)        BMI:   Estimated body mass index is 23 57 kg/m² as calculated from the following:    Height as of this encounter: 5' 6" (1 676 m)  Weight as of this encounter: 66 2 kg (146 lb)  PROCEDURES PERFORMED:  Procedures         Scribe Attestation    I,:  Dorothy Pantoja am acting as a scribe while in the presence of the attending physician :       I,:  Doug Mcnamara, DO personally performed the services described in this documentation    as scribed in my presence  :       '

## 2021-05-19 ENCOUNTER — OFFICE VISIT (OUTPATIENT)
Dept: PHYSICAL THERAPY | Facility: CLINIC | Age: 86
End: 2021-05-19
Payer: COMMERCIAL

## 2021-05-19 DIAGNOSIS — M12.812 ROTATOR CUFF ARTHROPATHY OF LEFT SHOULDER: ICD-10-CM

## 2021-05-19 DIAGNOSIS — S40.012D CONTUSION OF LEFT SHOULDER, SUBSEQUENT ENCOUNTER: Primary | ICD-10-CM

## 2021-05-19 DIAGNOSIS — R29.3 POSTURE ABNORMALITY: ICD-10-CM

## 2021-05-19 DIAGNOSIS — M25.512 LEFT SHOULDER PAIN, UNSPECIFIED CHRONICITY: ICD-10-CM

## 2021-05-19 PROCEDURE — 97112 NEUROMUSCULAR REEDUCATION: CPT

## 2021-05-19 PROCEDURE — 97530 THERAPEUTIC ACTIVITIES: CPT

## 2021-05-19 PROCEDURE — 97110 THERAPEUTIC EXERCISES: CPT

## 2021-05-19 NOTE — PROGRESS NOTES
Daily Note     Today's date: 2021  Patient name: Ana Amos Sr   : 10/11/1924  MRN: [id number]  Referring provider: Julia Villar  Dx:   Encounter Diagnosis     ICD-10-CM    1  Contusion of left shoulder, subsequent encounter  S40 012D    2  Rotator cuff arthropathy of left shoulder  M12 812    3  Left shoulder pain, unspecified chronicity  M25 512    4  Posture abnormality  R29 3                   Subjective: Patient has no pain, he feels he just can't lift the arm  Objective: See treatment diary below      Assessment: Tolerated treatment fair+  Patient would benefit from continued PT for stretching and strengthening  Patient was able to increase a few of his exercises  He gets a lot of "cracking " in the shoulder with certain motions over his head  Patient's left arm fatigues quickly and he needed to change some of his activiites at times  Patient felt tired by the end of the session  Plan: Continue per plan of care  Progress treatment as tolerated         Precautions: fall risk  Progress note: 6/10  POC: 6/10    Manuals 4/28 5/3 5/5 5/12 5/19   Stretching with active release        GH jt mobs                        Neuro Re-Ed        /50 x8 min alt 100/50 x8 min alt 100/50 x8 min alt hold Declined due to fatigue   scap retraction        Shoulder rolls        Chin tucks        Table rocks to tolerance        Alternating isometrics IR/ER at side; 90 deg flexion Rhythmic stab :15x3 Rhythmic stab :15x5 Rhythmic stab :15x5  Rhythmic stab :15x5   MTP/LTP        Shoulder isometrics Supine :05x10 ea; manual resisted AROM IR and ER 5x ea Assisted arm flexion, hold, and slow decent 10x Assisted arm flexion, hold, and slow decent 10x Assisted arm flexion, hold, and slow decent 10x Assisted arm flexion, hold, and slow decent 10x   Ther Ex        Pulleys    :10x10 ea Flex, scap :10x10 ea   UT stretch        LS stretch        Table slides  Fwd, scap with elevation at end range 20x ea Fwd, scap with elevation at end range 10x ea Fwd, scap with elevation at end range 10x ea Fwd, scap with elevation at end range 10x ea   pendulums        Supine cane push up 10x 10x x10  x15   Supine flexion ROM 10x  x10  x15   Supine ER ROM 10x  x10  x15   Side lying shoulder ER x25 stabilize elbow  x25  x25   Wall walks        Cane AAROM Stand EXT, Scap x10 ea  Sit: Flex x10    Sit: flex, horizontal abd straight arm x10 ea   MTP/LTP Yellow x10 ea Yellow 30x Yellow x10 ea  Yellow x10 ea sitting   thoracic stretch        tricep pull downs        Bicep curls Yellow 2x10 13# 3x10   2# 1x10  1# 2x10    2# 1x10  1# 2x10   Ther Activity          Green 20x Green 20x Green x30  Green x30   clips 1x 1st rung up/down 5 red and yellow 1x 1st rung up/down 5 red and yellow 1x 1st rung up/down 5 red and yellow  1x 1st rung up/down 5 red and yellow1   Gait Training                        Modalities                        Access Code: [email or web address]   URL: [email or web address]   Date: 03/10/2021; updated 3/16/21, 3/31/21   Prepared by: Mcdaniels Gutter     Exercises  Supine Shoulder Flexion AAROM with Hands Clasped - 10 reps - 1 sets - 5 sec hold - 2x daily - 7x weekly  Supine Shoulder External Rotation with Dowel - 10 reps - 1 sets - 5 sec hold - 2x daily - 7x weekly  Seated Shoulder Flexion Towel Slide at Table Top - 10 reps - 1 sets - 5 sec hold - 2x daily - 7x weekly  Seated Shoulder Abduction Towel Slide at Table Top - 10 reps - 1 sets - 5 sec hold - 2x daily - 7x weekly  Chin tucks   levator stretch  Upper trap stretch  Shoulder isometric  Side lying external rotation

## 2021-05-24 ENCOUNTER — OFFICE VISIT (OUTPATIENT)
Dept: PHYSICAL THERAPY | Facility: CLINIC | Age: 86
End: 2021-05-24
Payer: COMMERCIAL

## 2021-05-24 DIAGNOSIS — R29.3 POSTURE ABNORMALITY: ICD-10-CM

## 2021-05-24 DIAGNOSIS — M25.512 LEFT SHOULDER PAIN, UNSPECIFIED CHRONICITY: ICD-10-CM

## 2021-05-24 DIAGNOSIS — S40.012D CONTUSION OF LEFT SHOULDER, SUBSEQUENT ENCOUNTER: Primary | ICD-10-CM

## 2021-05-24 DIAGNOSIS — M12.812 ROTATOR CUFF ARTHROPATHY OF LEFT SHOULDER: ICD-10-CM

## 2021-05-24 PROCEDURE — 97530 THERAPEUTIC ACTIVITIES: CPT

## 2021-05-24 PROCEDURE — 97112 NEUROMUSCULAR REEDUCATION: CPT

## 2021-05-24 PROCEDURE — 97110 THERAPEUTIC EXERCISES: CPT

## 2021-05-24 NOTE — PROGRESS NOTES
Daily Note     Today's date: 2021  Patient name: Seamus James Sr   : 10/11/1924  MRN: [id number]  Referring provider: Sloane Longoria  Dx:   Encounter Diagnosis     ICD-10-CM    1  Contusion of left shoulder, subsequent encounter  S40 012D    2  Rotator cuff arthropathy of left shoulder  M12 812    3  Left shoulder pain, unspecified chronicity  M25 512    4  Posture abnormality  R29 3                   Subjective: Patient states he has no pain today  Objective: See treatment diary below      Assessment: Tolerated treatment fair  Patient demonstrated fatigue post treatment and would benefit from continued PT for strengthening  Patient fatigued quickly with some of his exercises  Patient educated on energy conservation with exercises  Patient was able to do more on arm bike today, but he fatigued for the rest of his exercises  Patient shows frustration because he can not use his arm like he wants too  Patient needed verbal cues throughout session to perform his exercises correctly due to forgetfulness  Patient was tired by the end of the session  Plan: Continue per plan of care  Progress treatment as tolerated         Precautions: fall risk  Progress note: 6/10  POC: 6/10    Manuals    Stretching with active release        GH jt mobs                        Neuro Re-Ed        UBE scifit s=7   L1 x10 min   hold Declined due to fatigue   scap retraction        Shoulder rolls        Chin tucks        Table rocks to tolerance        Alternating isometrics IR/ER at side; 90 deg flexion Rhythmic stab :15x4    Rhythmic stab :15x5   MTP/LTP        Shoulder isometrics Assisted arm flexion, hold, and slow decent 10x   Assisted arm flexion, hold, and slow decent 10x Assisted arm flexion, hold, and slow decent 10x   Ther Ex        Pulleys :10x10 flex   :10x10 ea Flex, scap :10x10 ea   UT stretch        LS stretch        Table slides Fwd, scap with elevation at end range 10x ea Fwd, scap with elevation at end range 10x ea Fwd, scap with elevation at end range 10x ea Fwd, scap with elevation at end range 10x ea   pendulums        Supine cane push up x10  x10  x15   Supine flexion ROM Too fatigued  x10  x15   Supine ER ROM x10  x10  x15   Side lying shoulder ER x25  x25  x25   Wall walks        Cane AAROM Sit: flex, horizontal abd straight arm x10 ea    Sit: flex, horizontal abd straight arm x10 ea   MTP/LTP Yellow x10 ea sitting  Yellow x10 ea  Yellow x10 ea sitting   thoracic stretch        tricep pull downs        Bicep curls 2# 1x10  1# 2x10    2# 1x10  1# 2x10    2# 1x10  1# 2x10   Ther Activity          Green x30  Green x30  Green x30   clips 1x 1st rung up/down 5 red and yellow1  1x 1st rung up/down 5 red and yellow  1x 1st rung up/down 5 red and yellow1   Gait Training                        Modalities                        Access Code: [email or web address]   URL: [email or web address]   Date: 03/10/2021; updated 3/16/21, 3/31/21   Prepared by: [place]     Exercises  Supine Shoulder Flexion AAROM with Hands Clasped - 10 reps - 1 sets - 5 sec hold - 2x daily - 7x weekly  Supine Shoulder External Rotation with Dowel - 10 reps - 1 sets - 5 sec hold - 2x daily - 7x weekly  Seated Shoulder Flexion Towel Slide at Table Top - 10 reps - 1 sets - 5 sec hold - 2x daily - 7x weekly  Seated Shoulder Abduction Towel Slide at Table Top - 10 reps - 1 sets - 5 sec hold - 2x daily - 7x weekly  Chin tucks   levator stretch  Upper trap stretch  Shoulder isometric  Side lying external rotation

## 2021-06-02 ENCOUNTER — OFFICE VISIT (OUTPATIENT)
Dept: PHYSICAL THERAPY | Facility: CLINIC | Age: 86
End: 2021-06-02
Payer: COMMERCIAL

## 2021-06-02 DIAGNOSIS — S40.012D CONTUSION OF LEFT SHOULDER, SUBSEQUENT ENCOUNTER: Primary | ICD-10-CM

## 2021-06-02 DIAGNOSIS — M12.812 ROTATOR CUFF ARTHROPATHY OF LEFT SHOULDER: ICD-10-CM

## 2021-06-02 DIAGNOSIS — R29.3 POSTURE ABNORMALITY: ICD-10-CM

## 2021-06-02 DIAGNOSIS — M25.512 LEFT SHOULDER PAIN, UNSPECIFIED CHRONICITY: ICD-10-CM

## 2021-06-02 PROCEDURE — 97112 NEUROMUSCULAR REEDUCATION: CPT

## 2021-06-02 PROCEDURE — 97530 THERAPEUTIC ACTIVITIES: CPT

## 2021-06-02 PROCEDURE — 97110 THERAPEUTIC EXERCISES: CPT

## 2021-06-02 NOTE — PROGRESS NOTES
Daily Note     Today's date: 2021  Patient name: Imelda Milian Sr   : 10/11/1924  MRN: [id number]  Referring provider: Lg Tom  Dx:   Encounter Diagnosis     ICD-10-CM    1  Contusion of left shoulder, subsequent encounter  S40 012D    2  Rotator cuff arthropathy of left shoulder  M12 812    3  Left shoulder pain, unspecified chronicity  M25 512    4  Posture abnormality  R29 3                   Subjective: patient has no pain in the shoulder  He is just frustrated that it fatigues so quickly  Objective: See treatment diary below      Assessment: Tolerated treatment fair+  Patient would benefit from continued PT for stretching and strengthening  Patient has a tendency to compensate left shoulder motion with shoulder hiking  He is able to do some of his exercises, but quickly he returns to old habits of hiking  Patient was tired by the end of the session  Plan: Continue per plan of care  Progress treatment as tolerated         Precautions: fall risk  Progress note: 6/10  POC: 6/10    Manuals    Stretching with active release        GH jt mobs                        Neuro Re-Ed        UBE scifit s=7   L1 x10 min  120/30 x6 min  Declined due to fatigue   scap retraction        Shoulder rolls        Chin tucks        Table rocks to tolerance        Alternating isometrics IR/ER at side; 90 deg flexion Rhythmic stab :15x4 Rhythmic stab :15x4 Rhythmic stab :15x4  Rhythmic stab :15x5   MTP/LTP        Shoulder isometrics Assisted arm flexion, hold, and slow decent 10x    Assisted arm flexion, hold, and slow decent 10x   Ther Ex        Pulleys :10x10 flex :10x10 :10x10  Flex, scap :10x10 ea   UT stretch        LS stretch        Table slides Fwd, scap with elevation at end range 10x ea Fwd, scap with elevation at end range 10x ea Fwd, scap with elevation at end range 10x ea  Fwd, scap with elevation at end range 10x ea   pendulums        Supine cane push up x10 x10 x10 x15   Supine flexion ROM Too fatigued x10 x10  x15   Supine ER ROM x10 x10 x10  x15   Side lying shoulder ER x25 x25 x25  x25   Wall walks        Cane AAROM Sit: flex, horizontal abd straight arm x10 ea    Sit: flex, horizontal abd straight arm x10 ea   MTP/LTP Yellow x10 ea sitting Yellow x10 ea Sit yellow x10ea  Yellow x10 ea sitting   thoracic stretch        tricep pull downs        Bicep curls 2# 1x10  1# 2x10 2# 1x10  1# 2x10 2# 1x10  1# 2x10    2# 1x10  1# 2x10   Ther Activity          Green x30 Green x30 Green x30  Green x30   clips 1x 1st rung up/down 5 red and yellow1 1x 1st rung up/down 5 red and yellow 1x 1st rung up/down 5 red and yellow  1x 1st rung up/down 5 red and yellow1   Gait Training                        Modalities                        Access Code: [email or web address]   URL: [email or web address]   Date: 03/10/2021; updated 3/16/21, 3/31/21   Prepared by: Aniya Blackburn     Exercises  Supine Shoulder Flexion AAROM with Hands Clasped - 10 reps - 1 sets - 5 sec hold - 2x daily - 7x weekly  Supine Shoulder External Rotation with Dowel - 10 reps - 1 sets - 5 sec hold - 2x daily - 7x weekly  Seated Shoulder Flexion Towel Slide at Table Top - 10 reps - 1 sets - 5 sec hold - 2x daily - 7x weekly  Seated Shoulder Abduction Towel Slide at Table Top - 10 reps - 1 sets - 5 sec hold - 2x daily - 7x weekly  Chin tucks   levator stretch  Upper trap stretch  Shoulder isometric  Side lying external rotation

## 2021-06-09 ENCOUNTER — EVALUATION (OUTPATIENT)
Dept: PHYSICAL THERAPY | Facility: CLINIC | Age: 86
End: 2021-06-09
Payer: COMMERCIAL

## 2021-06-09 DIAGNOSIS — R29.3 POSTURE ABNORMALITY: ICD-10-CM

## 2021-06-09 DIAGNOSIS — M25.512 LEFT SHOULDER PAIN, UNSPECIFIED CHRONICITY: ICD-10-CM

## 2021-06-09 DIAGNOSIS — S40.012D CONTUSION OF LEFT SHOULDER, SUBSEQUENT ENCOUNTER: Primary | ICD-10-CM

## 2021-06-09 DIAGNOSIS — M12.812 ROTATOR CUFF ARTHROPATHY OF LEFT SHOULDER: ICD-10-CM

## 2021-06-09 PROCEDURE — 97112 NEUROMUSCULAR REEDUCATION: CPT | Performed by: PHYSICAL THERAPIST

## 2021-06-09 PROCEDURE — 97110 THERAPEUTIC EXERCISES: CPT | Performed by: PHYSICAL THERAPIST

## 2021-06-09 NOTE — PROGRESS NOTES
PT Re-Evaluation  and PT Discharge    Today's date: 2021  Patient name: Ana Amos Sr   : 10/11/1924  MRN: [id number]  Referring provider: Julia Villar  Dx:   Encounter Diagnosis     ICD-10-CM    1  Contusion of left shoulder, subsequent encounter  S40 012D    2  Rotator cuff arthropathy of left shoulder  M12 812    3  Left shoulder pain, unspecified chronicity  M25 512    4  Posture abnormality  R29 3        Start Time:   Stop Time: 153  Total time in clinic (min): 45 minutes    Assessment/Plan   Assessment details: Ana Amos Sr  Has been seen in outpatient PT for 18 sessions beginning 3/10  Patient is a 80 y o  male with diagnosis of left shoulder contusion/RC arthropathy and past medical history significant for CAD, HTN, ventricular bigeminy, gout, stage 3 CKD, coronary stent  FOTO functional score shows patient had previously met predicted value  At this time findings of examination show minimal functional change over the past month indicating patient has met max benefit of skilled PT at this time  Reviewed his home exercise program and due to hesitancy of DC patient was instructed to inform doctor if he notices a decline in function for possible need to return to PT in the future  Goals  STG (6 weeks)  1  Patient's left shoulder flexion AROM will increase to 90 with 0/10 pain for increased ability to perform overhead ADLs  - MET  2  Patient will have 0/10 pain in left shoulder when pushing open door  - MET  3  Patient's left shoulder strength will increase to 2+/5 for increased ability to lift  - MET  LTG (12 weeks)  1  Patient's UE AROM equal bilaterally for ability to complete hair hygiene, overhead, and behind the back ADLs  - PROGRESSING  2  Patient's UE strength will be equal bilaterally for ability to lift and carry at PLOF  - PROGRESSING  3  Patient will be independent with home exercise program for continued maintenance post PT discharge   - PROGRESSING      Plan  Plan details: DC PT  Plan of Care beginning date: 3/10/2021  Plan of Care expiration date: 6/10/2021  Treatment plan discussed with: patient and PTA      Subjective   History of Present Illness  Date of onset: 2/6/2021  Subjective 6/9: Has not been having pain in left shoulder, continues to have c/o some difficulty with lifting and reaching  Notices popping and clicking preceding sudden shoulder weakness, but no pain associated  Subjective 5/12: Patient states he continues to have difficulty reaching arm overhead  Feels some days are better than others  Denies pain  Able to reach behind back and bring left UE overhead with assistance  Would like to continue with PT, does think he is improving  Subjective 4/7: Patient feels some improvement since the start of PT  Has DC sling, no longer uses at home or in community  Able to ambulate using SPC in LUE now  States he feels he is not done with therapy due to continued difficulty raising arm  Does also c/o N+T from carpal tunnel in am L>R  Mechanism of injury: George Orourke Sr  is a 80 y o  male who presents to outpatient Physical Therapy today with complaints of left shoulder pain  States fall in early February onto left shoulder saw ortho, no fx on x-ray but does have old RC injury  Pain  No pain reported  Progression: no change    Social Support  Steps to enter house: yes  13  Stairs in house: no   Lives with: alone (adult son is staying temporarily )    Employment status: not working (retired)  Hand dominance: right      Diagnostic Tests  X-ray: normal  Patient Goals  Patient goals for therapy: increased motion  Patient goal: reaching overhead, dressing, be able to lift arm to shelf    Objective   Observations     Additional Observation Details  Upper crossed   Wearing sling on LUE  5/12: no sling    Tenderness     Left Shoulder   No tenderness     Cervical/Thoracic Screen   Cervical range of motion within normal limits with the following exceptions: limitation in cervical rotation and lateral flexion bilaterally, no pain    Neurological Testing     Sensation     Shoulder   Left Shoulder   Intact: light touch    Right Shoulder   Intact: light touch    Additional Neurological Details  CTS bilaterally, worse in LUE, constant N+T into left fingers    Active Range of Motion   4/7 5/12 6/9  Left Shoulder   Flexion: 15 degrees    95 No change  100  Abduction: 45 degrees   65 No change  90  External rotation 0°: 15 degrees  20 45 at 45 deg abd No change  Internal rotation 0°:  to belly      75 at 45 deg abd No change    Right Shoulder   Flexion: 115 degrees   Abduction: 135 degrees   External rotation BTH: C5   Internal rotation BTB: L1     Passive Range of Motion   Left Shoulder   Flexion: 120 degrees    150  Abduction: 120 degrees   145  External rotation 0°: 35 degrees  35    Strength/Myotome Testing  4/7 5/12 6/9    Left Shoulder     Planes of Motion   Flexion: 2-     2+ 2+ 3  Abduction: 2-     2+ 2+ 3-  External rotation at 0°: 2+    3- 3-  Internal rotation at 0°: 2+    3- 3-    Isolated Muscles   Biceps: 4     5  Triceps: 4     5    Right Shoulder   Normal muscle strength    Isolated Muscles   Biceps: 5   Triceps: 5     General Comments:      Shoulder Comments       FOTO: 32% function, 58% predicted function   5/12: 58%  6/9: 52%           Precautions: fall risk      Manuals 5/24 5/24 6/2 6/9    Stretching with active release        GH jt mobs                        Neuro Re-Ed        UBE scifit s=7   L1 x10 min  120/30 x6 min L2 10 min    scap retraction        Shoulder rolls        Chin tucks        Table rocks to tolerance        Alternating isometrics IR/ER at side; 90 deg flexion Rhythmic stab :15x4 Rhythmic stab :15x4 Rhythmic stab :15x4     MTP/LTP        Shoulder isometrics Assisted arm flexion, hold, and slow decent 10x       Ther Ex        Pulleys :10x10 flex :10x10 :10x10 :10x10    UT stretch        LS stretch        Table slides Fwd, scap with elevation at end range 10x ea Fwd, scap with elevation at end range 10x ea Fwd, scap with elevation at end range 10x ea Fwd, scap with elevation at end range 10x ea    pendulums        Supine cane push up x10 x10 x10     Supine flexion ROM Too fatigued x10 x10     Supine ER ROM x10 x10 x10     Side lying shoulder ER x25 x25 x25     Wall walks        Cane AAROM Sit: flex, horizontal abd straight arm x10 ea       MTP/LTP Yellow x10 ea sitting Yellow x10 ea Sit yellow x10ea Sit yellow x10MTP with difficulty    thoracic stretch        tricep pull downs        Bicep curls 2# 1x10  1# 2x10 2# 1x10  1# 2x10 2# 1x10  1# 2x10 2# 1x10  1# 2x10      Ther Activity          Green x30 Green x30 Green x30 Green x30    clips 1x 1st rung up/down 5 red and yellow1 1x 1st rung up/down 5 red and yellow 1x 1st rung up/down 5 red and yellow     Gait Training                        Modalities                        Access Code: [email or web address]   URL: [email or web address]   Date: 03/10/2021; updated 3/16/21, 3/31/21   Prepared by: Panchito Boateng     Exercises  Supine Shoulder Flexion AAROM with Hands Clasped - 10 reps - 1 sets - 5 sec hold - 2x daily - 7x weekly  Supine Shoulder External Rotation with Dowel - 10 reps - 1 sets - 5 sec hold - 2x daily - 7x weekly  Seated Shoulder Flexion Towel Slide at Table Top - 10 reps - 1 sets - 5 sec hold - 2x daily - 7x weekly  Seated Shoulder Abduction Towel Slide at Table Top - 10 reps - 1 sets - 5 sec hold - 2x daily - 7x weekly  Chin tucks   levator stretch  Upper trap stretch  Shoulder isometric  Side lying external rotation

## 2021-08-25 ENCOUNTER — HOSPITAL ENCOUNTER (EMERGENCY)
Facility: HOSPITAL | Age: 86
Discharge: HOME | End: 2021-08-26
Attending: EMERGENCY MEDICINE | Admitting: EMERGENCY MEDICINE
Payer: COMMERCIAL

## 2021-08-25 DIAGNOSIS — R04.0 LEFT-SIDED EPISTAXIS: Primary | ICD-10-CM

## 2021-08-25 PROCEDURE — 99284 EMERGENCY DEPT VISIT MOD MDM: CPT

## 2021-08-25 RX ORDER — OXYMETAZOLINE HYDROCHLORIDE 0.05 G/100ML
2 SPRAY NASAL ONCE
Status: COMPLETED | OUTPATIENT
Start: 2021-08-26 | End: 2021-08-25

## 2021-08-25 RX ORDER — TRANEXAMIC ACID 100 MG/ML
1000 INJECTION, SOLUTION INTRAVENOUS ONCE
Status: COMPLETED | OUTPATIENT
Start: 2021-08-26 | End: 2021-08-25

## 2021-08-25 RX ADMIN — TRANEXAMIC ACID 1000 MG: 1 INJECTION, SOLUTION INTRAVENOUS at 23:57

## 2021-08-25 RX ADMIN — OXYMETAZOLINE HYDROCHLORIDE 2 SPRAY: 0.05 SPRAY NASAL at 23:54

## 2021-08-26 VITALS
HEIGHT: 66 IN | WEIGHT: 143 LBS | RESPIRATION RATE: 16 BRPM | SYSTOLIC BLOOD PRESSURE: 134 MMHG | TEMPERATURE: 98.3 F | BODY MASS INDEX: 22.98 KG/M2 | HEART RATE: 69 BPM | OXYGEN SATURATION: 100 % | DIASTOLIC BLOOD PRESSURE: 61 MMHG

## 2021-08-26 LAB
ANION GAP SERPL CALCULATED.3IONS-SCNC: 5 MMOL/L (ref 4–13)
BASOPHILS # BLD AUTO: 0 THOUSANDS/ΜL (ref 0–0.1)
BASOPHILS NFR BLD AUTO: 0 % (ref 0–2)
BUN SERPL-MCNC: 36 MG/DL (ref 7–25)
CALCIUM SERPL-MCNC: 8.7 MG/DL (ref 8.6–10.5)
CHLORIDE SERPL-SCNC: 97 MMOL/L (ref 98–107)
CO2 SERPL-SCNC: 25 MMOL/L (ref 21–31)
CREAT SERPL-MCNC: 1.16 MG/DL (ref 0.7–1.3)
EOSINOPHIL # BLD AUTO: 0.1 THOUSAND/ΜL (ref 0–0.61)
EOSINOPHIL NFR BLD AUTO: 1 % (ref 0–5)
ERYTHROCYTE [DISTWIDTH] IN BLOOD BY AUTOMATED COUNT: 14.8 % (ref 11.5–14.5)
GFR SERPL CREATININE-BSD FRML MDRD: 53 ML/MIN/1.73SQ M
GLUCOSE SERPL-MCNC: 104 MG/DL (ref 65–99)
HCT VFR BLD AUTO: 30.5 % (ref 42–47)
HGB BLD-MCNC: 10.2 G/DL (ref 14–18)
INR PPP: 0.98 (ref 0.84–1.19)
LYMPHOCYTES # BLD AUTO: 1.2 THOUSANDS/ΜL (ref 0.6–4.47)
LYMPHOCYTES NFR BLD AUTO: 16 % (ref 21–51)
MCH RBC QN AUTO: 32.7 PG (ref 26–34)
MCHC RBC AUTO-ENTMCNC: 33.3 G/DL (ref 31–37)
MCV RBC AUTO: 98 FL (ref 81–99)
MONOCYTES # BLD AUTO: 0.5 THOUSAND/ΜL (ref 0.17–1.22)
MONOCYTES NFR BLD AUTO: 7 % (ref 2–12)
NEUTROPHILS # BLD AUTO: 6 THOUSANDS/ΜL (ref 1.4–6.5)
NEUTS SEG NFR BLD AUTO: 76 % (ref 42–75)
PLATELET # BLD AUTO: 201 THOUSANDS/UL (ref 149–390)
PMV BLD AUTO: 7.8 FL (ref 8.6–11.7)
POTASSIUM SERPL-SCNC: 4.3 MMOL/L (ref 3.5–5.5)
PROTHROMBIN TIME: 12.9 SECONDS (ref 11.6–14.5)
RBC # BLD AUTO: 3.1 MILLION/UL (ref 4.3–5.9)
SODIUM SERPL-SCNC: 127 MMOL/L (ref 134–143)
WBC # BLD AUTO: 7.9 THOUSAND/UL (ref 4.8–10.8)

## 2021-08-26 PROCEDURE — 85025 COMPLETE CBC W/AUTO DIFF WBC: CPT | Performed by: EMERGENCY MEDICINE

## 2021-08-26 PROCEDURE — 99282 EMERGENCY DEPT VISIT SF MDM: CPT | Performed by: EMERGENCY MEDICINE

## 2021-08-26 PROCEDURE — 36415 COLL VENOUS BLD VENIPUNCTURE: CPT | Performed by: EMERGENCY MEDICINE

## 2021-08-26 PROCEDURE — 30901 CONTROL OF NOSEBLEED: CPT | Performed by: EMERGENCY MEDICINE

## 2021-08-26 PROCEDURE — 80048 BASIC METABOLIC PNL TOTAL CA: CPT | Performed by: EMERGENCY MEDICINE

## 2021-08-26 PROCEDURE — 85610 PROTHROMBIN TIME: CPT | Performed by: EMERGENCY MEDICINE

## 2021-08-26 NOTE — ED PROVIDER NOTES
History  Chief Complaint   Patient presents with   [id number] [facility]     Patient is a 70-year-old male with history of frequent nose bleeds that presents for evaluation of nose bleed  Patient had cauterization performed 2 days ago by ENT for left anterior nasal bleeding  Procedure went well  Patient says that he was in normal state health when the patient had spontaneous bleeding from the left nare around 8:30 p m  He says the bleeding is been constant since that time  He also endorses a large blood clot the back of his throat  No direct trauma to the face or nose recently  Patient is on 81 mg aspirin but otherwise denies blood thinners  He denies signs or symptoms of anemia including lightheadedness, syncope, chest pain, dyspnea abdominal pain  Similar to previous nosebleeds  Prior to Admission Medications   Prescriptions Last Dose Informant Patient Reported? Taking?    Cholecalciferol ([employer] VITAMIN D) 2000 units CAPS  Self Yes No   Sig: Take by mouth   Choline Fenofibrate (FENOFIBRIC ACID) 45 MG CPDR  Self Yes No   Sig: Take by mouth   Multiple Vitamins tablet  Self Yes No   Sig: Take by mouth   allopurinol (ZYLOPRIM) 300 mg tablet  Self Yes No   Sig: Take 1 tablet by mouth daily   aspirin 81 MG tablet  Self Yes No   Sig: Take 1 tablet by mouth see administration instructions Take 1 tablet Mon-Wed & Fri   atorvastatin (LIPITOR) 20 mg tablet  Self Yes No   Sig: Take 1 tablet by mouth daily   furosemide (LASIX) 20 mg tablet  Self Yes No   Sig: Take 1 tablet by mouth daily as needed    irbesartan (AVAPRO) 75 mg tablet  Self Yes No   Sig: Take 150 mg by mouth daily    metoprolol tartrate (LOPRESSOR) 25 mg tablet  Self Yes No   Sig: Take 25 mg by mouth 2 (two) times a day    Patient not taking: Reported on 8/26/2021   nitroglycerin (Nitrostat) 0 4 mg SL tablet  Self No No   Sig: Place 1 tablet (0 4 mg total) under the tongue every 5 (five) minutes as needed for chest pain   nystatin (MYCOSTATIN) powder Self No No   Sig: Apply topically 2 (two) times a day   Patient not taking: Reported on 8/23/2021   ofloxacin (OCUFLOX) 0 3 % ophthalmic solution  Self Yes No   Sig: ofloxacin 0 3 % eye drops   Patient not taking: Reported on 8/23/2021   pneumococcal 23-valent polysaccharide vaccine (PNEUMOVAX 23)  Self Yes No   Sig: Pneumovax 23 25 mcg/0 5 mL injection syringe   Patient not taking: Reported on 8/23/2021   potassium chloride (KLOR-CON) 20 mEq packet  Self Yes No   Sig: Take 20 mEq by mouth as needed Take 1 tablet when taking Furosemide    prednisoLONE acetate (PRED FORTE) 1 % ophthalmic suspension  Self Yes No   Sig: prednisolone acetate 1 % eye drops,suspension   Patient not taking: Reported on 8/23/2021      Facility-Administered Medications: None       Past Medical History:   Diagnosis Date    Arthritis     Cataract     Coronary artery disease     Coronary atherosclerosis of native coronary artery     Diverticulitis of colon     Essential hypertension, benign     Hyperlipidemia     Hypertension     Peptic ulcer     Renal disorder        Past Surgical History:   Procedure Laterality Date    CATARACT EXTRACTION Right     Left eye 5/2021    CORONARY ANGIOPLASTY      CORONARY STENT PLACEMENT      EYE SURGERY      cataract sx    SKIN BIOPSY      TONSILLECTOMY         Family History   Problem Relation Age of Onset    Heart attack Brother     Heart disease Mother     Cancer Father      I have reviewed and agree with the history as documented      E-Cigarette/Vaping    E-Cigarette Use Never User      E-Cigarette/Vaping Substances    Nicotine No     THC No     CBD No     Flavoring No     Other No     Unknown No      Social History     Tobacco Use    Smoking status: Former Smoker     Types: Pipe    Smokeless tobacco: Never Used   Vaping Use    Vaping Use: Never used   Substance Use Topics    Alcohol use: Not Currently    Drug use: No       Review of Systems   Constitutional: Negative for fever    HENT: Positive for nosebleeds  Negative for sore throat  Eyes: Negative for photophobia  Respiratory: Negative for shortness of breath  Cardiovascular: Negative for chest pain  Gastrointestinal: Negative for abdominal pain  Genitourinary: Negative for dysuria  Musculoskeletal: Negative for back pain  Skin: Negative for rash  Neurological: Negative for light-headedness  Hematological: Negative for adenopathy  Psychiatric/Behavioral: Negative for agitation  All other systems reviewed and are negative  Physical Exam  Physical Exam  Vitals reviewed  Constitutional:       General: He is not in acute distress  Appearance: He is well-developed  HENT:      Head: Normocephalic  Nose:      Comments: Bleeding from bilateral nares, source of bleeding cannot be visualized  Significant clot seen in the oropharynx  Eyes:      Pupils: Pupils are equal, round, and reactive to light  Cardiovascular:      Rate and Rhythm: Normal rate and regular rhythm  Heart sounds: Normal heart sounds  No murmur heard  No friction rub  No gallop  Pulmonary:      Effort: Pulmonary effort is normal       Breath sounds: Normal breath sounds  Abdominal:      General: Bowel sounds are normal  There is no distension  Palpations: Abdomen is soft  Tenderness: There is no abdominal tenderness  There is no guarding  Musculoskeletal:         General: Normal range of motion  Cervical back: Normal range of motion and neck supple  Skin:     Capillary Refill: Capillary refill takes less than 2 seconds  Neurological:      Mental Status: He is alert and oriented to person, place, and time  Cranial Nerves: No cranial nerve deficit  Sensory: No sensory deficit  Motor: No abnormal muscle tone  Psychiatric:         Behavior: Behavior normal          Thought Content:  Thought content normal          Judgment: Judgment normal          Vital Signs  ED Triage Vitals Temperature Pulse Respirations Blood Pressure SpO2   08/25/21 2343 08/25/21 2343 08/25/21 2343 08/25/21 2343 08/26/21 0100   98 3 °F (36 8 °C) 80 18 (!) 186/81 99 %      Temp Source Heart Rate Source Patient Position - Orthostatic VS BP Location FiO2 (%)   08/25/21 2343 08/25/21 2343 08/25/21 2343 08/25/21 2343 --   Tympanic Monitor Sitting Left arm       Pain Score       --                  Vitals:    08/25/21 2343 08/26/21 0100 08/26/21 0345 08/26/21 0545   BP: (!) 186/81 150/68 136/62 134/61   Pulse: 80 70 68 69   Patient Position - Orthostatic VS: Sitting Sitting Sitting Sitting         Visual Acuity      ED Medications  Medications   oxymetazoline (AFRIN) 0 05 % nasal spray 2 spray (2 sprays Each Nare Given 8/25/21 2354)   tranexamic acid 100mg/mL (for epistaxis) 1,000 mg (1,000 mg Nasal Given 8/25/21 2357)       Diagnostic Studies  Results Reviewed     Procedure Component Value Units Date/Time    Basic metabolic panel [[id number]]  (Abnormal) Collected: 08/26/21 0006    Lab Status: Final result Specimen: Blood from Arm, Left Updated: 08/26/21 0030     Sodium 127 mmol/L      Potassium 4 3 mmol/L      Chloride 97 mmol/L      CO2 25 mmol/L      ANION GAP 5 mmol/L      BUN 36 mg/dL      Creatinine 1 16 mg/dL      Glucose 104 mg/dL      Calcium 8 7 mg/dL      eGFR 53 ml/min/1 73sq m     Narrative:      [place] guidelines for Chronic Kidney Disease (CKD):     Stage 1 with normal or high GFR (GFR > 90 mL/min/1 73 square meters)    Stage 2 Mild CKD (GFR = 60-89 mL/min/1 73 square meters)    Stage 3A Moderate CKD (GFR = 45-59 mL/min/1 73 square meters)    Stage 3B Moderate CKD (GFR = 30-44 mL/min/1 73 square meters)    Stage 4 Severe CKD (GFR = 15-29 mL/min/1 73 square meters)    Stage 5 End Stage CKD (GFR <15 mL/min/1 73 square meters)  Note: GFR calculation is accurate only with a steady state creatinine    Protime-INR [[id number]]  (Normal) Collected: 08/26/21 0006    Lab Status: Final result Specimen: Blood from Arm, Left Updated: 08/26/21 0021     Protime 12 9 seconds      INR 0 98    CBC and differential [[id number]]  (Abnormal) Collected: 08/26/21 0006    Lab Status: Final result Specimen: Blood from Arm, Left Updated: 08/26/21 0013     WBC 7 90 Thousand/uL      RBC 3 10 Million/uL      Hemoglobin 10 2 g/dL      Hematocrit 30 5 %      MCV 98 fL      MCH 32 7 pg      MCHC 33 3 g/dL      RDW 14 8 %      MPV 7 8 fL      Platelets 355 Thousands/uL      Neutrophils Relative 76 %      Lymphocytes Relative 16 %      Monocytes Relative 7 %      Eosinophils Relative 1 %      Basophils Relative 0 %      Neutrophils Absolute 6 00 Thousands/µL      Lymphocytes Absolute 1 20 Thousands/µL      Monocytes Absolute 0 50 Thousand/µL      Eosinophils Absolute 0 10 Thousand/µL      Basophils Absolute 0 00 Thousands/µL                  No orders to display              Procedures  Procedures         ED Course  ED Course as of Aug 28 0051   Thu Aug 26, 2021   0018 Roughly baseline     Hemoglobin(!): 10 2   0102 Patient with history of hyponatremia   Sodium(!): 127   0102 Anterior packing placed in left nostril, bleeding seems to have stopped at this time, will re-evaluate  0124 Patient reassessed, bleeding controlled      0222 Patient reassessed,                                SBIRT 22yo+      Most Recent Value   SBIRT (24 yo +)   In order to provide better care to our patients, we are screening all of our patients for alcohol and drug use  Would it be okay to ask you these screening questions? Yes Filed at: 08/26/2021 1080   Initial Alcohol Screen: US AUDIT-C    1  How often do you have a drink containing alcohol?  0 Filed at: 08/26/2021 0717   2  How many drinks containing alcohol do you have on a typical day you are drinking? 0 Filed at: 08/26/2021 0717   3a  Male UNDER 65: How often do you have five or more drinks on one occasion? 0 Filed at: 08/26/2021 0717   3b  FEMALE Any Age, or MALE 65+:  How often do you have 4 or more drinks on one occassion? 0 Filed at: 08/26/2021 0717   Audit-C Score  0 Filed at: 08/26/2021 3732   NISHANT: How many times in the past year have you    Used an illegal drug or used a prescription medication for non-medical reasons? Never Filed at: 08/26/2021 7636                    [facility]  Number of Diagnoses or Management Options  Left-sided epistaxis  Diagnosis management comments: Patient is a 51-year-old male who presents with epistaxis  Patient initially trialed with Afrin and compression but bleeding continued  Patient was also noted to have significant clots in the oropharynx, 5-7 cm long which I removed with later reaccumulation  Patient had packing placed in his left knee air with improvement in bleeding but still with some oozing  Secondary to the patient's significant clots in his oropharynx, patient was observed in the emergency department till he could be seen in the ENT office  I discussed the case with the on-call ENT doctor who agreed that the plan was appropriate for the patient  Hemodynamically stable  Patient was discharged to the care of his son who was to immediately take the patient to ENT for further management  Disposition  Final diagnoses:   Left-sided epistaxis     Time reflects when diagnosis was documented in both MDM as applicable and the Disposition within this note     Time User Action Codes Description Comment    8/26/2021  6:53 AM Todd Cota Add [R04 0] Left-sided epistaxis       ED Disposition     ED Disposition Condition Date/Time Comment    Discharge Stable u Aug 26, 2021  6:53 AM Hilario Clark Sr  discharge to home/self care              Follow-up Information     Follow up With Specialties Details Why [address] [facility]  Emergency Department Emergency Medicine  [facility]  [phone] [facility]    Sana Cabello DO Otolaryngology Schedule an appointment as soon as possible for a visit   [address]  [address] [place] [address]  [phone]             Discharge Medication List as of 8/26/2021  8:08 AM      CONTINUE these medications which have NOT CHANGED    Details   allopurinol (ZYLOPRIM) 300 mg tablet Take 1 tablet by mouth daily, Historical Med      aspirin 81 MG tablet Take 1 tablet by mouth see administration instructions Take 1 tablet Mon-Wed & Fri, Historical Med      atorvastatin (LIPITOR) 20 mg tablet Take 1 tablet by mouth daily, Historical Med      Cholecalciferol ([employer] VITAMIN D) 2000 units CAPS Take by mouth, Historical Med      Choline Fenofibrate (FENOFIBRIC ACID) 45 MG CPDR Take by mouth, Starting Mon 9/30/2013, Historical Med      furosemide (LASIX) 20 mg tablet Take 1 tablet by mouth daily as needed , Starting Wed 5/28/2014, Historical Med      irbesartan (AVAPRO) 75 mg tablet Take 150 mg by mouth daily , Historical Med      metoprolol tartrate (LOPRESSOR) 25 mg tablet Take 25 mg by mouth 2 (two) times a day , Historical Med      Multiple Vitamins tablet Take by mouth, Historical Med      nitroglycerin (Nitrostat) 0 4 mg SL tablet Place 1 tablet (0 4 mg total) under the tongue every 5 (five) minutes as needed for chest pain, Starting Wed 6/3/2020, Normal      nystatin (MYCOSTATIN) powder Apply topically 2 (two) times a day, Starting Wed 9/9/2020, Normal      ofloxacin (OCUFLOX) 0 3 % ophthalmic solution ofloxacin 0 3 % eye drops, Historical Med      pneumococcal 23-valent polysaccharide vaccine (PNEUMOVAX 23) Pneumovax 23 25 mcg/0 5 mL injection syringe, Historical Med      potassium chloride (KLOR-CON) 20 mEq packet Take 20 mEq by mouth as needed Take 1 tablet when taking Furosemide , Historical Med      prednisoLONE acetate (PRED FORTE) 1 % ophthalmic suspension prednisolone acetate 1 % eye drops,suspension, Historical Med           No discharge procedures on file      PDMP Review     None          ED Provider  Electronically Signed by           Kaycee Johnson MD  08/28/21 7901

## 2021-10-02 ENCOUNTER — HOSPITAL ENCOUNTER (EMERGENCY)
Facility: HOSPITAL | Age: 86
Discharge: HOME | End: 2021-10-02
Attending: EMERGENCY MEDICINE | Admitting: EMERGENCY MEDICINE
Payer: COMMERCIAL

## 2021-10-02 ENCOUNTER — APPOINTMENT (EMERGENCY)
Dept: RADIOLOGY | Facility: HOSPITAL | Age: 86
End: 2021-10-02
Payer: COMMERCIAL

## 2021-10-02 ENCOUNTER — APPOINTMENT (EMERGENCY)
Dept: CT IMAGING | Facility: HOSPITAL | Age: 86
End: 2021-10-02
Payer: COMMERCIAL

## 2021-10-02 VITALS
SYSTOLIC BLOOD PRESSURE: 188 MMHG | HEART RATE: 80 BPM | OXYGEN SATURATION: 98 % | RESPIRATION RATE: 16 BRPM | TEMPERATURE: 98.9 F | DIASTOLIC BLOOD PRESSURE: 79 MMHG
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
## 2021-10-02 DIAGNOSIS — H53.9 VISUAL DISTURBANCES: ICD-10-CM

## 2021-10-02 DIAGNOSIS — S09.90XA CLOSED HEAD INJURY, INITIAL ENCOUNTER: ICD-10-CM

## 2021-10-02 LAB
ANION GAP SERPL CALCULATED.3IONS-SCNC: 7 MMOL/L (ref 4–13)
BACTERIA UR QL AUTO: NORMAL /HPF
BASOPHILS # BLD AUTO: 0 THOUSANDS/ΜL (ref 0–0.1)
BASOPHILS NFR BLD AUTO: 0 % (ref 0–2)
BILIRUB UR QL STRIP: NEGATIVE
BUN SERPL-MCNC: 16 MG/DL (ref 7–25)
CALCIUM SERPL-MCNC: 8.3 MG/DL (ref 8.6–10.5)
CHLORIDE SERPL-SCNC: 94 MMOL/L (ref 98–107)
CLARITY UR: CLEAR
CO2 SERPL-SCNC: 24 MMOL/L (ref 21–31)
COLOR UR: YELLOW
CREAT SERPL-MCNC: 0.92 MG/DL (ref 0.7–1.3)
EOSINOPHIL # BLD AUTO: 0 THOUSAND/ΜL (ref 0–0.61)
EOSINOPHIL NFR BLD AUTO: 0 % (ref 0–5)
ERYTHROCYTE [DISTWIDTH] IN BLOOD BY AUTOMATED COUNT: 14.2 % (ref 11.5–14.5)
GFR SERPL CREATININE-BSD FRML MDRD: 70 ML/MIN/1.73SQ M
GLUCOSE SERPL-MCNC: 85 MG/DL (ref 65–99)
GLUCOSE UR STRIP-MCNC: NEGATIVE MG/DL
HCT VFR BLD AUTO: 30.8 % (ref 42–47)
HGB BLD-MCNC: 10.3 G/DL (ref 14–18)
HGB UR QL STRIP.AUTO: ABNORMAL
KETONES UR STRIP-MCNC: NEGATIVE MG/DL
LEUKOCYTE ESTERASE UR QL STRIP: NEGATIVE
LYMPHOCYTES # BLD AUTO: 0.6 THOUSANDS/ΜL (ref 0.6–4.47)
LYMPHOCYTES NFR BLD AUTO: 10 % (ref 21–51)
MCH RBC QN AUTO: 32.3 PG (ref 26–34)
MCHC RBC AUTO-ENTMCNC: 33.5 G/DL (ref 31–37)
MCV RBC AUTO: 96 FL (ref 81–99)
MONOCYTES # BLD AUTO: 0.6 THOUSAND/ΜL (ref 0.17–1.22)
MONOCYTES NFR BLD AUTO: 8 % (ref 2–12)
NEUTROPHILS # BLD AUTO: 5.5 THOUSANDS/ΜL (ref 1.4–6.5)
NEUTS SEG NFR BLD AUTO: 82 % (ref 42–75)
NITRITE UR QL STRIP: NEGATIVE
NON-SQ EPI CELLS URNS QL MICRO: NORMAL /HPF
PH UR STRIP.AUTO: 7.5 [PH]
PLATELET # BLD AUTO: 197 THOUSANDS/UL (ref 149–390)
PMV BLD AUTO: 7.7 FL (ref 8.6–11.7)
POTASSIUM SERPL-SCNC: 3.8 MMOL/L (ref 3.5–5.5)
PROT UR STRIP-MCNC: NEGATIVE MG/DL
RBC # BLD AUTO: 3.2 MILLION/UL (ref 4.3–5.9)
RBC #/AREA URNS AUTO: NORMAL /HPF
SODIUM SERPL-SCNC: 125 MMOL/L (ref 134–143)
SP GR UR STRIP.AUTO: 1.01 (ref 1–1.03)
UROBILINOGEN UR QL STRIP.AUTO: 0.2 E.U./DL
WBC # BLD AUTO: 6.8 THOUSAND/UL (ref 4.8–10.8)
WBC #/AREA URNS AUTO: NORMAL /HPF

## 2021-10-02 PROCEDURE — 36415 COLL VENOUS BLD VENIPUNCTURE: CPT | Performed by: EMERGENCY MEDICINE

## 2021-10-02 PROCEDURE — 81001 URINALYSIS AUTO W/SCOPE: CPT | Performed by: EMERGENCY MEDICINE

## 2021-10-02 PROCEDURE — 72125 CT NECK SPINE W/O DYE: CPT

## 2021-10-02 PROCEDURE — 80048 BASIC METABOLIC PNL TOTAL CA: CPT | Performed by: EMERGENCY MEDICINE

## 2021-10-02 PROCEDURE — 73502 X-RAY EXAM HIP UNI 2-3 VIEWS: CPT

## 2021-10-02 PROCEDURE — 99284 EMERGENCY DEPT VISIT MOD MDM: CPT | Performed by: EMERGENCY MEDICINE

## 2021-10-02 PROCEDURE — 70450 CT HEAD/BRAIN W/O DYE: CPT

## 2021-10-02 PROCEDURE — 85025 COMPLETE CBC W/AUTO DIFF WBC: CPT | Performed by: EMERGENCY MEDICINE

## 2021-10-02 PROCEDURE — 71045 X-RAY EXAM CHEST 1 VIEW: CPT

## 2021-10-02 PROCEDURE — 81003 URINALYSIS AUTO W/O SCOPE: CPT | Performed by: EMERGENCY MEDICINE

## 2021-10-02 PROCEDURE — 99284 EMERGENCY DEPT VISIT MOD MDM: CPT

## 2021-10-02 PROCEDURE — G1004 CDSM NDSC: HCPCS

## 2021-10-09 ENCOUNTER — APPOINTMENT (EMERGENCY)
Dept: CT IMAGING | Facility: HOSPITAL | Age: 86
End: 2021-10-09
Payer: COMMERCIAL

## 2021-10-09 ENCOUNTER — HOSPITAL ENCOUNTER (EMERGENCY)
Facility: HOSPITAL | Age: 86
Discharge: HOME | End: 2021-10-09
Attending: EMERGENCY MEDICINE | Admitting: EMERGENCY MEDICINE
Payer: COMMERCIAL

## 2021-10-09 ENCOUNTER — APPOINTMENT (EMERGENCY)
Dept: RADIOLOGY | Facility: HOSPITAL | Age: 86
End: 2021-10-09
Payer: COMMERCIAL

## 2021-10-09 VITALS
RESPIRATION RATE: 20 BRPM | BODY MASS INDEX: 23.46 KG/M2 | HEART RATE: 81 BPM | SYSTOLIC BLOOD PRESSURE: 140 MMHG | HEIGHT: 66 IN | DIASTOLIC BLOOD PRESSURE: 65 MMHG | WEIGHT: 146 LBS | OXYGEN SATURATION: 98 %
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
## 2021-10-09 DIAGNOSIS — K59.00 CONSTIPATION, UNSPECIFIED CONSTIPATION TYPE: Primary | ICD-10-CM

## 2021-10-09 LAB
ALBUMIN SERPL BCP-MCNC: 3.8 G/DL (ref 3.5–5.7)
ALP SERPL-CCNC: 48 U/L (ref 55–165)
ALT SERPL W P-5'-P-CCNC: 22 U/L (ref 7–52)
ANION GAP SERPL CALCULATED.3IONS-SCNC: 8 MMOL/L (ref 4–13)
AST SERPL W P-5'-P-CCNC: 54 U/L (ref 13–39)
BASOPHILS # BLD AUTO: 0.1 THOUSANDS/ΜL (ref 0–0.1)
BASOPHILS NFR BLD AUTO: 1 % (ref 0–2)
BILIRUB SERPL-MCNC: 0.5 MG/DL (ref 0.2–1)
BUN SERPL-MCNC: 16 MG/DL (ref 7–25)
CALCIUM SERPL-MCNC: 8.3 MG/DL (ref 8.6–10.5)
CHLORIDE SERPL-SCNC: 94 MMOL/L (ref 98–107)
CO2 SERPL-SCNC: 24 MMOL/L (ref 21–31)
CREAT SERPL-MCNC: 0.99 MG/DL (ref 0.7–1.3)
EOSINOPHIL # BLD AUTO: 0 THOUSAND/ΜL (ref 0–0.61)
EOSINOPHIL NFR BLD AUTO: 0 % (ref 0–5)
ERYTHROCYTE [DISTWIDTH] IN BLOOD BY AUTOMATED COUNT: 14 % (ref 11.5–14.5)
GFR SERPL CREATININE-BSD FRML MDRD: 64 ML/MIN/1.73SQ M
GLUCOSE SERPL-MCNC: 107 MG/DL (ref 65–99)
HCT VFR BLD AUTO: 28.8 % (ref 42–47)
HGB BLD-MCNC: 9.8 G/DL (ref 14–18)
LYMPHOCYTES # BLD AUTO: 0.6 THOUSANDS/ΜL (ref 0.6–4.47)
LYMPHOCYTES NFR BLD AUTO: 9 % (ref 21–51)
MCH RBC QN AUTO: 32.5 PG (ref 26–34)
MCHC RBC AUTO-ENTMCNC: 33.9 G/DL (ref 31–37)
MCV RBC AUTO: 96 FL (ref 81–99)
MONOCYTES # BLD AUTO: 0.5 THOUSAND/ΜL (ref 0.17–1.22)
MONOCYTES NFR BLD AUTO: 8 % (ref 2–12)
NEUTROPHILS # BLD AUTO: 5.4 THOUSANDS/ΜL (ref 1.4–6.5)
NEUTS SEG NFR BLD AUTO: 82 % (ref 42–75)
PLATELET # BLD AUTO: 197 THOUSANDS/UL (ref 149–390)
PMV BLD AUTO: 7.6 FL (ref 8.6–11.7)
POTASSIUM SERPL-SCNC: 4.2 MMOL/L (ref 3.5–5.5)
PROT SERPL-MCNC: 5.9 G/DL (ref 6.4–8.9)
RBC # BLD AUTO: 3.01 MILLION/UL (ref 4.3–5.9)
SODIUM SERPL-SCNC: 126 MMOL/L (ref 134–143)
WBC # BLD AUTO: 6.6 THOUSAND/UL (ref 4.8–10.8)

## 2021-10-09 PROCEDURE — 85025 COMPLETE CBC W/AUTO DIFF WBC: CPT | Performed by: EMERGENCY MEDICINE

## 2021-10-09 PROCEDURE — 72125 CT NECK SPINE W/O DYE: CPT

## 2021-10-09 PROCEDURE — 36415 COLL VENOUS BLD VENIPUNCTURE: CPT | Performed by: EMERGENCY MEDICINE

## 2021-10-09 PROCEDURE — 70450 CT HEAD/BRAIN W/O DYE: CPT

## 2021-10-09 PROCEDURE — 99284 EMERGENCY DEPT VISIT MOD MDM: CPT | Performed by: EMERGENCY MEDICINE

## 2021-10-09 PROCEDURE — G1004 CDSM NDSC: HCPCS

## 2021-10-09 PROCEDURE — 96360 HYDRATION IV INFUSION INIT: CPT

## 2021-10-09 PROCEDURE — 73564 X-RAY EXAM KNEE 4 OR MORE: CPT

## 2021-10-09 PROCEDURE — 99284 EMERGENCY DEPT VISIT MOD MDM: CPT

## 2021-10-09 PROCEDURE — 80053 COMPREHEN METABOLIC PANEL: CPT | Performed by: EMERGENCY MEDICINE

## 2021-10-09 RX ORDER — SODIUM PHOSPHATE, DIBASIC AND SODIUM PHOSPHATE, MONOBASIC 7; 19 G/133ML; G/133ML
1 ENEMA RECTAL ONCE
Status: COMPLETED | OUTPATIENT
Start: 2021-10-09 | End: 2021-10-09

## 2021-10-09 RX ORDER — POLYETHYLENE GLYCOL 3350 17 G/17G
17 POWDER, FOR SOLUTION ORAL DAILY
Qty: 116 G | Refills: 0 | Status: SHIPPED | OUTPATIENT
Start: 2021-10-09

## 2021-10-09 RX ADMIN — SODIUM PHOSPHATE, DIBASIC AND SODIUM PHOSPHATE, MONOBASIC 1 ENEMA: 7; 19 ENEMA RECTAL at 19:17

## 2021-10-09 RX ADMIN — SODIUM CHLORIDE 1000 ML: 0.9 INJECTION, SOLUTION INTRAVENOUS at 20:12

## 2021-10-09 RX ADMIN — SODIUM PHOSPHATE, DIBASIC AND SODIUM PHOSPHATE, MONOBASIC 1 ENEMA: 7; 19 ENEMA RECTAL at 20:11

## 2021-10-15 ENCOUNTER — APPOINTMENT (EMERGENCY)
Dept: RADIOLOGY | Facility: HOSPITAL | Age: 86
End: 2021-10-15
Payer: COMMERCIAL

## 2021-10-15 ENCOUNTER — HOSPITAL ENCOUNTER (EMERGENCY)
Facility: HOSPITAL | Age: 86
Discharge: HOME | End: 2021-10-16
Attending: EMERGENCY MEDICINE
Payer: COMMERCIAL

## 2021-10-15 DIAGNOSIS — R60.0 LOWER EXTREMITY EDEMA: Primary | ICD-10-CM

## 2021-10-15 LAB
ANION GAP SERPL CALCULATED.3IONS-SCNC: 6 MMOL/L (ref 4–13)
BASOPHILS # BLD AUTO: 0 THOUSANDS/ΜL (ref 0–0.1)
BASOPHILS NFR BLD AUTO: 1 % (ref 0–2)
BNP SERPL-MCNC: 75 PG/ML (ref 1–100)
BUN SERPL-MCNC: 19 MG/DL (ref 7–25)
CALCIUM SERPL-MCNC: 7.8 MG/DL (ref 8.6–10.5)
CHLORIDE SERPL-SCNC: 95 MMOL/L (ref 98–107)
CO2 SERPL-SCNC: 24 MMOL/L (ref 21–31)
CREAT SERPL-MCNC: 0.88 MG/DL (ref 0.7–1.3)
EOSINOPHIL # BLD AUTO: 0 THOUSAND/ΜL (ref 0–0.61)
EOSINOPHIL NFR BLD AUTO: 1 % (ref 0–5)
ERYTHROCYTE [DISTWIDTH] IN BLOOD BY AUTOMATED COUNT: 14.1 % (ref 11.5–14.5)
GFR SERPL CREATININE-BSD FRML MDRD: 72 ML/MIN/1.73SQ M
GLUCOSE SERPL-MCNC: 133 MG/DL (ref 65–99)
HCT VFR BLD AUTO: 28 % (ref 42–47)
HGB BLD-MCNC: 9.3 G/DL (ref 14–18)
LYMPHOCYTES # BLD AUTO: 0.8 THOUSANDS/ΜL (ref 0.6–4.47)
LYMPHOCYTES NFR BLD AUTO: 15 % (ref 21–51)
MCH RBC QN AUTO: 32.2 PG (ref 26–34)
MCHC RBC AUTO-ENTMCNC: 33.4 G/DL (ref 31–37)
MCV RBC AUTO: 96 FL (ref 81–99)
MONOCYTES # BLD AUTO: 0.5 THOUSAND/ΜL (ref 0.17–1.22)
MONOCYTES NFR BLD AUTO: 9 % (ref 2–12)
NEUTROPHILS # BLD AUTO: 4 THOUSANDS/ΜL (ref 1.4–6.5)
NEUTS SEG NFR BLD AUTO: 75 % (ref 42–75)
PLATELET # BLD AUTO: 211 THOUSANDS/UL (ref 149–390)
PMV BLD AUTO: 7.3 FL (ref 8.6–11.7)
POTASSIUM SERPL-SCNC: 4.2 MMOL/L (ref 3.5–5.5)
RBC # BLD AUTO: 2.91 MILLION/UL (ref 4.3–5.9)
SODIUM SERPL-SCNC: 125 MMOL/L (ref 134–143)
TROPONIN I SERPL-MCNC: <0.03 NG/ML
WBC # BLD AUTO: 5.4 THOUSAND/UL (ref 4.8–10.8)

## 2021-10-15 PROCEDURE — 85025 COMPLETE CBC W/AUTO DIFF WBC: CPT | Performed by: EMERGENCY MEDICINE

## 2021-10-15 PROCEDURE — 71045 X-RAY EXAM CHEST 1 VIEW: CPT

## 2021-10-15 PROCEDURE — 84484 ASSAY OF TROPONIN QUANT: CPT | Performed by: EMERGENCY MEDICINE

## 2021-10-15 PROCEDURE — 80048 BASIC METABOLIC PNL TOTAL CA: CPT | Performed by: EMERGENCY MEDICINE

## 2021-10-15 PROCEDURE — 99284 EMERGENCY DEPT VISIT MOD MDM: CPT

## 2021-10-15 PROCEDURE — 93005 ELECTROCARDIOGRAM TRACING: CPT

## 2021-10-15 PROCEDURE — 99285 EMERGENCY DEPT VISIT HI MDM: CPT | Performed by: EMERGENCY MEDICINE

## 2021-10-15 PROCEDURE — 83880 ASSAY OF NATRIURETIC PEPTIDE: CPT | Performed by: EMERGENCY MEDICINE

## 2021-10-15 PROCEDURE — 36415 COLL VENOUS BLD VENIPUNCTURE: CPT | Performed by: EMERGENCY MEDICINE

## 2021-10-15 RX ORDER — FUROSEMIDE 10 MG/ML
20 INJECTION INTRAMUSCULAR; INTRAVENOUS ONCE
Status: COMPLETED | OUTPATIENT
Start: 2021-10-15 | End: 2021-10-16

## 2021-10-16 VITALS
BODY MASS INDEX: 23.84 KG/M2 | WEIGHT: 147.71 LBS | RESPIRATION RATE: 15 BRPM | SYSTOLIC BLOOD PRESSURE: 188 MMHG | OXYGEN SATURATION: 98 % | HEART RATE: 70 BPM | DIASTOLIC BLOOD PRESSURE: 78 MMHG | TEMPERATURE: 98.9 F

## 2021-10-16 LAB
ATRIAL RATE: 74 BPM
P AXIS: 70 DEGREES
PR INTERVAL: 184 MS
QRS AXIS: 13 DEGREES
QRSD INTERVAL: 80 MS
QT INTERVAL: 394 MS
QTC INTERVAL: 437 MS
T WAVE AXIS: 77 DEGREES
VENTRICULAR RATE: 74 BPM

## 2021-10-16 PROCEDURE — 93010 ELECTROCARDIOGRAM REPORT: CPT | Performed by: INTERNAL MEDICINE

## 2021-10-16 PROCEDURE — 96374 THER/PROPH/DIAG INJ IV PUSH: CPT

## 2021-10-16 RX ADMIN — FUROSEMIDE 20 MG: 10 INJECTION, SOLUTION INTRAMUSCULAR; INTRAVENOUS at 01:19

## 2021-10-21 ENCOUNTER — APPOINTMENT (OUTPATIENT)
Dept: NON INVASIVE DIAGNOSTICS | Facility: HOSPITAL | Age: 86
DRG: 291 | End: 2021-10-21
Payer: COMMERCIAL

## 2021-10-21 ENCOUNTER — HOSPITAL ENCOUNTER (INPATIENT)
Facility: HOSPITAL | Age: 86
LOS: 6 days | Discharge: HOME HEALTH | DRG: 291 | End: 2021-10-27
Attending: EMERGENCY MEDICINE | Admitting: STUDENT IN AN ORGANIZED HEALTH CARE EDUCATION/TRAINING PROGRAM
Payer: COMMERCIAL

## 2021-10-21 ENCOUNTER — HOSPITAL ENCOUNTER (EMERGENCY)
Facility: HOSPITAL | Age: 86
Discharge: HOME | DRG: 291 | End: 2021-10-21
Attending: EMERGENCY MEDICINE
Payer: COMMERCIAL

## 2021-10-21 VITALS
OXYGEN SATURATION: 100 % | HEART RATE: 80 BPM | DIASTOLIC BLOOD PRESSURE: 67 MMHG | SYSTOLIC BLOOD PRESSURE: 171 MMHG | TEMPERATURE: 97.3 F | RESPIRATION RATE: 18 BRPM

## 2021-10-21 DIAGNOSIS — R60.0 BILATERAL LEG EDEMA: Primary | ICD-10-CM

## 2021-10-21 DIAGNOSIS — R60.0 BILATERAL LEG EDEMA: ICD-10-CM

## 2021-10-21 DIAGNOSIS — R06.02 SOB (SHORTNESS OF BREATH): Primary | ICD-10-CM

## 2021-10-21 DIAGNOSIS — I50.33 ACUTE ON CHRONIC DIASTOLIC CONGESTIVE HEART FAILURE (HCC): ICD-10-CM

## 2021-10-21 PROBLEM — E78.2 MIXED HYPERLIPIDEMIA: Chronic | Status: ACTIVE | Noted: 2020-09-16

## 2021-10-21 PROBLEM — R07.89 OTHER CHEST PAIN: Status: RESOLVED | Noted: 2019-04-24 | Resolved: 2021-10-21

## 2021-10-21 PROBLEM — K59.00 ACUTE CONSTIPATION: Status: RESOLVED | Noted: 2020-09-17 | Resolved: 2021-10-21

## 2021-10-21 PROBLEM — E86.0 ACUTE DEHYDRATION: Status: RESOLVED | Noted: 2020-09-17 | Resolved: 2021-10-21

## 2021-10-21 PROBLEM — E87.1 CHRONIC HYPONATREMIA: Chronic | Status: ACTIVE | Noted: 2020-09-16

## 2021-10-21 LAB
ALBUMIN SERPL BCP-MCNC: 4.1 G/DL (ref 3.5–5.7)
ALP SERPL-CCNC: 58 U/L (ref 55–165)
ALT SERPL W P-5'-P-CCNC: 20 U/L (ref 7–52)
ANION GAP SERPL CALCULATED.3IONS-SCNC: 9 MMOL/L (ref 4–13)
AORTIC ROOT: 2.9 CM
AORTIC VALVE MEAN VELOCITY: 9.5 M/S
APICAL FOUR CHAMBER EJECTION FRACTION: 61 %
APTT PPP: 36 SECONDS (ref 23–37)
AST SERPL W P-5'-P-CCNC: 43 U/L (ref 13–39)
ATRIAL RATE: 68 BPM
AV AREA BY CONTINUOUS VTI: 2.2 CM2
AV AREA PEAK VELOCITY: 2.2 CM2
AV LVOT MEAN GRADIENT: 2 MMHG
AV LVOT PEAK GRADIENT: 4 MMHG
AV MEAN GRADIENT: 4 MMHG
AV PEAK GRADIENT: 8 MMHG
AV VALVE AREA: 2.21 CM2
BASOPHILS # BLD AUTO: 0 THOUSANDS/ΜL (ref 0–0.1)
BASOPHILS NFR BLD AUTO: 1 % (ref 0–2)
BILIRUB SERPL-MCNC: 0.7 MG/DL (ref 0.2–1)
BNP SERPL-MCNC: 131 PG/ML (ref 1–100)
BUN SERPL-MCNC: 18 MG/DL (ref 7–25)
CALCIUM SERPL-MCNC: 9 MG/DL (ref 8.6–10.5)
CHLORIDE SERPL-SCNC: 93 MMOL/L (ref 98–107)
CK MB SERPL-MCNC: 13.9 NG/ML (ref 0.6–6.3)
CK MB SERPL-MCNC: 4.1 % (ref 0.6–6.3)
CK SERPL-CCNC: 337 U/L (ref 30–308)
CO2 SERPL-SCNC: 26 MMOL/L (ref 21–31)
CREAT SERPL-MCNC: 0.89 MG/DL (ref 0.7–1.3)
DOP CALC AO VTI: 36.66 CM
DOP CALC LVOT AREA: 3.14 CM2
DOP CALC LVOT DIAMETER: 2 CM
DOP CALC LVOT PEAK VEL VTI: 25.84 CM
DOP CALC LVOT PEAK VEL: 0.99 M/S
DOP CALC LVOT STROKE INDEX: 48.3 ML/M2
DOP CALC LVOT STROKE VOLUME: 81.14 CM3
E WAVE DECELERATION TIME: 358 MS
EOSINOPHIL # BLD AUTO: 0 THOUSAND/ΜL (ref 0–0.61)
EOSINOPHIL NFR BLD AUTO: 1 % (ref 0–5)
ERYTHROCYTE [DISTWIDTH] IN BLOOD BY AUTOMATED COUNT: 14.2 % (ref 11.5–14.5)
FLUAV RNA RESP QL NAA+PROBE: NEGATIVE
FLUBV RNA RESP QL NAA+PROBE: NEGATIVE
FRACTIONAL SHORTENING: 28 % (ref 28–44)
GFR SERPL CREATININE-BSD FRML MDRD: 72 ML/MIN/1.73SQ M
GLUCOSE SERPL-MCNC: 91 MG/DL (ref 65–99)
HCT VFR BLD AUTO: 34.2 % (ref 42–47)
HGB BLD-MCNC: 11.4 G/DL (ref 14–18)
INR PPP: 0.94 (ref 0.84–1.19)
INTERVENTRICULAR SEPTUM IN DIASTOLE (PARASTERNAL SHORT AXIS VIEW): 1.1 CM
LEFT INTERNAL DIMENSION IN SYSTOLE: 2.9 CM (ref 2.1–4)
LEFT VENTRICULAR INTERNAL DIMENSION IN DIASTOLE: 4 CM (ref 4.01–5.97)
LEFT VENTRICULAR POSTERIOR WALL IN END DIASTOLE: 1.1 CM
LEFT VENTRICULAR STROKE VOLUME: 39 ML
LV EF: 47 %
LYMPHOCYTES # BLD AUTO: 1.2 THOUSANDS/ΜL (ref 0.6–4.47)
LYMPHOCYTES NFR BLD AUTO: 18 % (ref 21–51)
MAGNESIUM SERPL-MCNC: 1.9 MG/DL (ref 1.9–2.7)
MCH RBC QN AUTO: 32 PG (ref 26–34)
MCHC RBC AUTO-ENTMCNC: 33.4 G/DL (ref 31–37)
MCV RBC AUTO: 96 FL (ref 81–99)
MONOCYTES # BLD AUTO: 0.5 THOUSAND/ΜL (ref 0.17–1.22)
MONOCYTES NFR BLD AUTO: 8 % (ref 2–12)
MV E'TISSUE VEL-SEP: 6 CM/S
MV PEAK A VEL: 1 M/S
MV PEAK E VEL: 62 CM/S
MV STENOSIS PRESSURE HALF TIME: 0 MS
NEUTROPHILS # BLD AUTO: 4.7 THOUSANDS/ΜL (ref 1.4–6.5)
NEUTS SEG NFR BLD AUTO: 72 % (ref 42–75)
P AXIS: 77 DEGREES
PLATELET # BLD AUTO: 249 THOUSANDS/UL (ref 149–390)
PMV BLD AUTO: 7.2 FL (ref 8.6–11.7)
POTASSIUM SERPL-SCNC: 3.9 MMOL/L (ref 3.5–5.5)
PR INTERVAL: 196 MS
PROT SERPL-MCNC: 6.3 G/DL (ref 6.4–8.9)
PROTHROMBIN TIME: 12.7 SECONDS (ref 11.6–14.5)
QRS AXIS: 55 DEGREES
QRSD INTERVAL: 88 MS
QT INTERVAL: 420 MS
QTC INTERVAL: 446 MS
RBC # BLD AUTO: 3.57 MILLION/UL (ref 4.3–5.9)
RIGHT VENTRICLE ID DIMENSION: 3.7 CM
RSV RNA RESP QL NAA+PROBE: NEGATIVE
SARS-COV-2 RNA RESP QL NAA+PROBE: NEGATIVE
SL CV LV EF: 55
SL CV PED ECHO LEFT VENTRICLE DIASTOLIC VOLUME (MOD BIPLANE) 2D: 70 ML
SL CV PED ECHO LEFT VENTRICLE SYSTOLIC VOLUME (MOD BIPLANE) 2D: 31 ML
SODIUM SERPL-SCNC: 128 MMOL/L (ref 134–143)
T WAVE AXIS: 86 DEGREES
TRICUSPID VALVE S': 1 CM/S
TROPONIN I SERPL-MCNC: <0.03 NG/ML
VENTRICULAR RATE: 68 BPM
WBC # BLD AUTO: 6.6 THOUSAND/UL (ref 4.8–10.8)
Z-SCORE OF LEFT VENTRICULAR DIMENSION IN END SYSTOLE: -2.01

## 2021-10-21 PROCEDURE — 99284 EMERGENCY DEPT VISIT MOD MDM: CPT | Performed by: EMERGENCY MEDICINE

## 2021-10-21 PROCEDURE — 97163 PT EVAL HIGH COMPLEX 45 MIN: CPT

## 2021-10-21 PROCEDURE — 99284 EMERGENCY DEPT VISIT MOD MDM: CPT

## 2021-10-21 PROCEDURE — 99220 PR INITIAL OBSERVATION CARE/DAY 70 MINUTES: CPT | Performed by: STUDENT IN AN ORGANIZED HEALTH CARE EDUCATION/TRAINING PROGRAM

## 2021-10-21 PROCEDURE — 93005 ELECTROCARDIOGRAM TRACING: CPT

## 2021-10-21 PROCEDURE — 97166 OT EVAL MOD COMPLEX 45 MIN: CPT

## 2021-10-21 PROCEDURE — 99285 EMERGENCY DEPT VISIT HI MDM: CPT | Performed by: EMERGENCY MEDICINE

## 2021-10-21 PROCEDURE — 36415 COLL VENOUS BLD VENIPUNCTURE: CPT | Performed by: EMERGENCY MEDICINE

## 2021-10-21 PROCEDURE — 93306 TTE W/DOPPLER COMPLETE: CPT

## 2021-10-21 PROCEDURE — 83735 ASSAY OF MAGNESIUM: CPT | Performed by: EMERGENCY MEDICINE

## 2021-10-21 PROCEDURE — 83880 ASSAY OF NATRIURETIC PEPTIDE: CPT | Performed by: EMERGENCY MEDICINE

## 2021-10-21 PROCEDURE — 84484 ASSAY OF TROPONIN QUANT: CPT | Performed by: EMERGENCY MEDICINE

## 2021-10-21 PROCEDURE — 85730 THROMBOPLASTIN TIME PARTIAL: CPT | Performed by: EMERGENCY MEDICINE

## 2021-10-21 PROCEDURE — 82553 CREATINE MB FRACTION: CPT | Performed by: EMERGENCY MEDICINE

## 2021-10-21 PROCEDURE — 93306 TTE W/DOPPLER COMPLETE: CPT | Performed by: INTERNAL MEDICINE

## 2021-10-21 PROCEDURE — 0241U HB NFCT DS VIR RESP RNA 4 TRGT: CPT | Performed by: EMERGENCY MEDICINE

## 2021-10-21 PROCEDURE — 82550 ASSAY OF CK (CPK): CPT | Performed by: EMERGENCY MEDICINE

## 2021-10-21 PROCEDURE — 80053 COMPREHEN METABOLIC PANEL: CPT | Performed by: EMERGENCY MEDICINE

## 2021-10-21 PROCEDURE — 85025 COMPLETE CBC W/AUTO DIFF WBC: CPT | Performed by: EMERGENCY MEDICINE

## 2021-10-21 PROCEDURE — 85610 PROTHROMBIN TIME: CPT | Performed by: EMERGENCY MEDICINE

## 2021-10-21 PROCEDURE — 93010 ELECTROCARDIOGRAM REPORT: CPT | Performed by: INTERNAL MEDICINE

## 2021-10-21 RX ORDER — ALLOPURINOL 300 MG/1
300 TABLET ORAL DAILY
Status: DISCONTINUED | OUTPATIENT
Start: 2021-10-21 | End: 2021-10-27 | Stop reason: HOSPADM

## 2021-10-21 RX ORDER — ATORVASTATIN CALCIUM 20 MG/1
20 TABLET, FILM COATED ORAL DAILY
Status: DISCONTINUED | OUTPATIENT
Start: 2021-10-21 | End: 2021-10-27 | Stop reason: HOSPADM

## 2021-10-21 RX ORDER — MELATONIN
2000 DAILY
Status: DISCONTINUED | OUTPATIENT
Start: 2021-10-21 | End: 2021-10-27 | Stop reason: HOSPADM

## 2021-10-21 RX ORDER — FENOFIBRATE 48 MG/1
48 TABLET, COATED ORAL DAILY
Status: DISCONTINUED | OUTPATIENT
Start: 2021-10-21 | End: 2021-10-27 | Stop reason: HOSPADM

## 2021-10-21 RX ORDER — ACETAMINOPHEN 325 MG/1
650 TABLET ORAL EVERY 4 HOURS PRN
Status: DISCONTINUED | OUTPATIENT
Start: 2021-10-21 | End: 2021-10-27 | Stop reason: HOSPADM

## 2021-10-21 RX ORDER — LOSARTAN POTASSIUM 50 MG/1
50 TABLET ORAL DAILY
Status: DISCONTINUED | OUTPATIENT
Start: 2021-10-21 | End: 2021-10-27 | Stop reason: HOSPADM

## 2021-10-21 RX ORDER — ASPIRIN 81 MG/1
81 TABLET ORAL DAILY
Status: DISCONTINUED | OUTPATIENT
Start: 2021-10-21 | End: 2021-10-27 | Stop reason: HOSPADM

## 2021-10-21 RX ORDER — POTASSIUM CHLORIDE 20 MEQ/1
20 TABLET, EXTENDED RELEASE ORAL ONCE
Status: COMPLETED | OUTPATIENT
Start: 2021-10-21 | End: 2021-10-21

## 2021-10-21 RX ORDER — NITROGLYCERIN 0.4 MG/1
0.4 TABLET SUBLINGUAL
Status: DISCONTINUED | OUTPATIENT
Start: 2021-10-21 | End: 2021-10-27 | Stop reason: HOSPADM

## 2021-10-21 RX ORDER — OFLOXACIN 3 MG/ML
1 SOLUTION/ DROPS OPHTHALMIC 4 TIMES DAILY
Status: DISCONTINUED | OUTPATIENT
Start: 2021-10-21 | End: 2021-10-27 | Stop reason: HOSPADM

## 2021-10-21 RX ORDER — FUROSEMIDE 10 MG/ML
40 INJECTION INTRAMUSCULAR; INTRAVENOUS
Status: DISCONTINUED | OUTPATIENT
Start: 2021-10-21 | End: 2021-10-23

## 2021-10-21 RX ORDER — ONDANSETRON 2 MG/ML
4 INJECTION INTRAMUSCULAR; INTRAVENOUS EVERY 6 HOURS PRN
Status: DISCONTINUED | OUTPATIENT
Start: 2021-10-21 | End: 2021-10-27 | Stop reason: HOSPADM

## 2021-10-21 RX ADMIN — OFLOXACIN 1 DROP: 3 SOLUTION/ DROPS OPHTHALMIC at 09:57

## 2021-10-21 RX ADMIN — FUROSEMIDE 40 MG: 10 INJECTION, SOLUTION INTRAMUSCULAR; INTRAVENOUS at 18:14

## 2021-10-21 RX ADMIN — OFLOXACIN 1 DROP: 3 SOLUTION/ DROPS OPHTHALMIC at 22:00

## 2021-10-21 RX ADMIN — METOPROLOL TARTRATE 25 MG: 25 TABLET, FILM COATED ORAL at 18:14

## 2021-10-21 RX ADMIN — FENOFIBRATE 48 MG: 48 TABLET, FILM COATED ORAL at 19:04

## 2021-10-21 RX ADMIN — NITROGLYCERIN 1 INCH: 20 OINTMENT TOPICAL at 04:48

## 2021-10-21 RX ADMIN — ALLOPURINOL 300 MG: 300 TABLET ORAL at 18:13

## 2021-10-21 RX ADMIN — OFLOXACIN 1 DROP: 3 SOLUTION/ DROPS OPHTHALMIC at 18:52

## 2021-10-21 RX ADMIN — ENOXAPARIN SODIUM 40 MG: 100 INJECTION SUBCUTANEOUS at 10:01

## 2021-10-21 RX ADMIN — METOPROLOL TARTRATE 25 MG: 25 TABLET, FILM COATED ORAL at 09:59

## 2021-10-21 RX ADMIN — Medication 2000 UNITS: at 09:50

## 2021-10-21 RX ADMIN — OFLOXACIN 1 DROP: 3 SOLUTION/ DROPS OPHTHALMIC at 13:00

## 2021-10-21 RX ADMIN — ATORVASTATIN CALCIUM 20 MG: 20 TABLET, FILM COATED ORAL at 18:13

## 2021-10-21 RX ADMIN — POTASSIUM CHLORIDE 20 MEQ: 1500 TABLET, EXTENDED RELEASE ORAL at 09:50

## 2021-10-21 RX ADMIN — ASPIRIN 81 MG: 81 TABLET, COATED ORAL at 09:51

## 2021-10-21 RX ADMIN — FUROSEMIDE 40 MG: 10 INJECTION, SOLUTION INTRAMUSCULAR; INTRAVENOUS at 10:15

## 2021-10-21 RX ADMIN — LOSARTAN POTASSIUM 50 MG: 50 TABLET, FILM COATED ORAL at 09:51

## 2021-10-22 LAB
ANION GAP SERPL CALCULATED.3IONS-SCNC: 9 MMOL/L (ref 4–13)
BUN SERPL-MCNC: 27 MG/DL (ref 7–25)
CALCIUM SERPL-MCNC: 8.5 MG/DL (ref 8.6–10.5)
CHLORIDE SERPL-SCNC: 92 MMOL/L (ref 98–107)
CO2 SERPL-SCNC: 27 MMOL/L (ref 21–31)
CREAT SERPL-MCNC: 0.98 MG/DL (ref 0.7–1.3)
ERYTHROCYTE [DISTWIDTH] IN BLOOD BY AUTOMATED COUNT: 13.7 % (ref 11.5–14.5)
GFR SERPL CREATININE-BSD FRML MDRD: 64 ML/MIN/1.73SQ M
GLUCOSE SERPL-MCNC: 84 MG/DL (ref 65–99)
HCT VFR BLD AUTO: 29.4 % (ref 42–47)
HGB BLD-MCNC: 10.1 G/DL (ref 14–18)
MAGNESIUM SERPL-MCNC: 1.8 MG/DL (ref 1.9–2.7)
MCH RBC QN AUTO: 32.6 PG (ref 26–34)
MCHC RBC AUTO-ENTMCNC: 34.5 G/DL (ref 31–37)
MCV RBC AUTO: 95 FL (ref 81–99)
PLATELET # BLD AUTO: 248 THOUSANDS/UL (ref 149–390)
PMV BLD AUTO: 8 FL (ref 8.6–11.7)
POTASSIUM SERPL-SCNC: 3.6 MMOL/L (ref 3.5–5.5)
RBC # BLD AUTO: 3.1 MILLION/UL (ref 4.3–5.9)
SODIUM SERPL-SCNC: 128 MMOL/L (ref 134–143)
WBC # BLD AUTO: 7.1 THOUSAND/UL (ref 4.8–10.8)

## 2021-10-22 PROCEDURE — 83735 ASSAY OF MAGNESIUM: CPT | Performed by: PHYSICIAN ASSISTANT

## 2021-10-22 PROCEDURE — 97530 THERAPEUTIC ACTIVITIES: CPT

## 2021-10-22 PROCEDURE — 80048 BASIC METABOLIC PNL TOTAL CA: CPT | Performed by: PHYSICIAN ASSISTANT

## 2021-10-22 PROCEDURE — 97535 SELF CARE MNGMENT TRAINING: CPT

## 2021-10-22 PROCEDURE — 85027 COMPLETE CBC AUTOMATED: CPT | Performed by: PHYSICIAN ASSISTANT

## 2021-10-22 PROCEDURE — 99232 SBSQ HOSP IP/OBS MODERATE 35: CPT | Performed by: NURSE PRACTITIONER

## 2021-10-22 RX ADMIN — FUROSEMIDE 40 MG: 10 INJECTION, SOLUTION INTRAMUSCULAR; INTRAVENOUS at 11:01

## 2021-10-22 RX ADMIN — ATORVASTATIN CALCIUM 20 MG: 20 TABLET, FILM COATED ORAL at 18:19

## 2021-10-22 RX ADMIN — OFLOXACIN 1 DROP: 3 SOLUTION/ DROPS OPHTHALMIC at 11:01

## 2021-10-22 RX ADMIN — LOSARTAN POTASSIUM 50 MG: 50 TABLET, FILM COATED ORAL at 11:01

## 2021-10-22 RX ADMIN — FENOFIBRATE 48 MG: 48 TABLET, FILM COATED ORAL at 18:19

## 2021-10-22 RX ADMIN — OFLOXACIN 1 DROP: 3 SOLUTION/ DROPS OPHTHALMIC at 18:28

## 2021-10-22 RX ADMIN — ASPIRIN 81 MG: 81 TABLET, COATED ORAL at 11:01

## 2021-10-22 RX ADMIN — METOPROLOL TARTRATE 25 MG: 25 TABLET, FILM COATED ORAL at 11:02

## 2021-10-22 RX ADMIN — ENOXAPARIN SODIUM 40 MG: 100 INJECTION SUBCUTANEOUS at 11:01

## 2021-10-22 RX ADMIN — OFLOXACIN 1 DROP: 3 SOLUTION/ DROPS OPHTHALMIC at 21:00

## 2021-10-22 RX ADMIN — METOPROLOL TARTRATE 25 MG: 25 TABLET, FILM COATED ORAL at 18:19

## 2021-10-22 RX ADMIN — ALLOPURINOL 300 MG: 300 TABLET ORAL at 18:19

## 2021-10-22 RX ADMIN — FUROSEMIDE 40 MG: 10 INJECTION, SOLUTION INTRAMUSCULAR; INTRAVENOUS at 18:19

## 2021-10-22 RX ADMIN — Medication 2000 UNITS: at 11:01

## 2021-10-22 RX ADMIN — B-COMPLEX W/ C & FOLIC ACID TAB 1 TABLET: TAB at 11:01

## 2021-10-23 PROBLEM — I50.30 DIASTOLIC CHF (HCC): Status: ACTIVE | Noted: 2021-10-23

## 2021-10-23 PROBLEM — R06.02 SHORTNESS OF BREATH: Status: RESOLVED | Noted: 2019-05-15 | Resolved: 2021-10-23

## 2021-10-23 LAB
ANION GAP SERPL CALCULATED.3IONS-SCNC: 9 MMOL/L (ref 4–13)
BUN SERPL-MCNC: 27 MG/DL (ref 7–25)
CALCIUM SERPL-MCNC: 8.4 MG/DL (ref 8.6–10.5)
CHLORIDE SERPL-SCNC: 92 MMOL/L (ref 98–107)
CO2 SERPL-SCNC: 28 MMOL/L (ref 21–31)
CREAT SERPL-MCNC: 0.81 MG/DL (ref 0.7–1.3)
ERYTHROCYTE [DISTWIDTH] IN BLOOD BY AUTOMATED COUNT: 13.9 % (ref 11.5–14.5)
GFR SERPL CREATININE-BSD FRML MDRD: 74 ML/MIN/1.73SQ M
GLUCOSE SERPL-MCNC: 85 MG/DL (ref 65–99)
HCT VFR BLD AUTO: 30.5 % (ref 42–47)
HGB BLD-MCNC: 10.4 G/DL (ref 14–18)
MCH RBC QN AUTO: 32.2 PG (ref 26–34)
MCHC RBC AUTO-ENTMCNC: 34.1 G/DL (ref 31–37)
MCV RBC AUTO: 95 FL (ref 81–99)
OSMOLALITY UR: 311 MMOL/KG
PLATELET # BLD AUTO: 237 THOUSANDS/UL (ref 149–390)
PMV BLD AUTO: 7.6 FL (ref 8.6–11.7)
POTASSIUM SERPL-SCNC: 3.5 MMOL/L (ref 3.5–5.5)
RBC # BLD AUTO: 3.22 MILLION/UL (ref 4.3–5.9)
SODIUM 24H UR-SCNC: 94 MOL/L
SODIUM SERPL-SCNC: 129 MMOL/L (ref 134–143)
WBC # BLD AUTO: 7.2 THOUSAND/UL (ref 4.8–10.8)

## 2021-10-23 PROCEDURE — 80048 BASIC METABOLIC PNL TOTAL CA: CPT | Performed by: NURSE PRACTITIONER

## 2021-10-23 PROCEDURE — 83935 ASSAY OF URINE OSMOLALITY: CPT | Performed by: PHYSICIAN ASSISTANT

## 2021-10-23 PROCEDURE — 84300 ASSAY OF URINE SODIUM: CPT | Performed by: PHYSICIAN ASSISTANT

## 2021-10-23 PROCEDURE — 99232 SBSQ HOSP IP/OBS MODERATE 35: CPT | Performed by: STUDENT IN AN ORGANIZED HEALTH CARE EDUCATION/TRAINING PROGRAM

## 2021-10-23 PROCEDURE — 85027 COMPLETE CBC AUTOMATED: CPT | Performed by: NURSE PRACTITIONER

## 2021-10-23 RX ADMIN — OFLOXACIN 1 DROP: 3 SOLUTION/ DROPS OPHTHALMIC at 12:36

## 2021-10-23 RX ADMIN — B-COMPLEX W/ C & FOLIC ACID TAB 1 TABLET: TAB at 09:48

## 2021-10-23 RX ADMIN — METOPROLOL TARTRATE 25 MG: 25 TABLET, FILM COATED ORAL at 09:48

## 2021-10-23 RX ADMIN — FENOFIBRATE 48 MG: 48 TABLET, FILM COATED ORAL at 18:02

## 2021-10-23 RX ADMIN — OFLOXACIN 1 DROP: 3 SOLUTION/ DROPS OPHTHALMIC at 21:31

## 2021-10-23 RX ADMIN — Medication 2000 UNITS: at 09:48

## 2021-10-23 RX ADMIN — ATORVASTATIN CALCIUM 20 MG: 20 TABLET, FILM COATED ORAL at 18:02

## 2021-10-23 RX ADMIN — ENOXAPARIN SODIUM 40 MG: 100 INJECTION SUBCUTANEOUS at 09:48

## 2021-10-23 RX ADMIN — OFLOXACIN 1 DROP: 3 SOLUTION/ DROPS OPHTHALMIC at 09:50

## 2021-10-23 RX ADMIN — ALLOPURINOL 300 MG: 300 TABLET ORAL at 18:02

## 2021-10-23 RX ADMIN — LOSARTAN POTASSIUM 50 MG: 50 TABLET, FILM COATED ORAL at 09:48

## 2021-10-23 RX ADMIN — ASPIRIN 81 MG: 81 TABLET, COATED ORAL at 09:48

## 2021-10-23 RX ADMIN — FUROSEMIDE 40 MG: 10 INJECTION, SOLUTION INTRAMUSCULAR; INTRAVENOUS at 09:47

## 2021-10-23 RX ADMIN — METOPROLOL TARTRATE 25 MG: 25 TABLET, FILM COATED ORAL at 18:02

## 2021-10-23 RX ADMIN — OFLOXACIN 1 DROP: 3 SOLUTION/ DROPS OPHTHALMIC at 18:02

## 2021-10-24 LAB
ANION GAP SERPL CALCULATED.3IONS-SCNC: 6 MMOL/L (ref 4–13)
BUN SERPL-MCNC: 24 MG/DL (ref 7–25)
CALCIUM SERPL-MCNC: 8.7 MG/DL (ref 8.6–10.5)
CHLORIDE SERPL-SCNC: 91 MMOL/L (ref 98–107)
CO2 SERPL-SCNC: 28 MMOL/L (ref 21–31)
CREAT SERPL-MCNC: 0.83 MG/DL (ref 0.7–1.3)
ERYTHROCYTE [DISTWIDTH] IN BLOOD BY AUTOMATED COUNT: 14.1 % (ref 11.5–14.5)
GFR SERPL CREATININE-BSD FRML MDRD: 74 ML/MIN/1.73SQ M
GLUCOSE SERPL-MCNC: 88 MG/DL (ref 65–99)
HCT VFR BLD AUTO: 34.2 % (ref 42–47)
HGB BLD-MCNC: 11.7 G/DL (ref 14–18)
MCH RBC QN AUTO: 32.2 PG (ref 26–34)
MCHC RBC AUTO-ENTMCNC: 34.2 G/DL (ref 31–37)
MCV RBC AUTO: 94 FL (ref 81–99)
PLATELET # BLD AUTO: 270 THOUSANDS/UL (ref 149–390)
PMV BLD AUTO: 7.4 FL (ref 8.6–11.7)
POTASSIUM SERPL-SCNC: 3.8 MMOL/L (ref 3.5–5.5)
RBC # BLD AUTO: 3.64 MILLION/UL (ref 4.3–5.9)
SODIUM SERPL-SCNC: 125 MMOL/L (ref 134–143)
WBC # BLD AUTO: 7.4 THOUSAND/UL (ref 4.8–10.8)

## 2021-10-24 PROCEDURE — 80048 BASIC METABOLIC PNL TOTAL CA: CPT | Performed by: PHYSICIAN ASSISTANT

## 2021-10-24 PROCEDURE — 97530 THERAPEUTIC ACTIVITIES: CPT

## 2021-10-24 PROCEDURE — 97116 GAIT TRAINING THERAPY: CPT

## 2021-10-24 PROCEDURE — 85027 COMPLETE CBC AUTOMATED: CPT | Performed by: PHYSICIAN ASSISTANT

## 2021-10-24 PROCEDURE — 99232 SBSQ HOSP IP/OBS MODERATE 35: CPT | Performed by: PHYSICIAN ASSISTANT

## 2021-10-24 RX ADMIN — ALLOPURINOL 300 MG: 300 TABLET ORAL at 18:26

## 2021-10-24 RX ADMIN — ASPIRIN 81 MG: 81 TABLET, COATED ORAL at 09:49

## 2021-10-24 RX ADMIN — LOSARTAN POTASSIUM 50 MG: 50 TABLET, FILM COATED ORAL at 09:49

## 2021-10-24 RX ADMIN — FENOFIBRATE 48 MG: 48 TABLET, FILM COATED ORAL at 17:34

## 2021-10-24 RX ADMIN — OFLOXACIN 1 DROP: 3 SOLUTION/ DROPS OPHTHALMIC at 11:16

## 2021-10-24 RX ADMIN — B-COMPLEX W/ C & FOLIC ACID TAB 1 TABLET: TAB at 09:49

## 2021-10-24 RX ADMIN — OFLOXACIN 1 DROP: 3 SOLUTION/ DROPS OPHTHALMIC at 09:48

## 2021-10-24 RX ADMIN — OFLOXACIN 1 DROP: 3 SOLUTION/ DROPS OPHTHALMIC at 17:38

## 2021-10-24 RX ADMIN — ATORVASTATIN CALCIUM 20 MG: 20 TABLET, FILM COATED ORAL at 17:34

## 2021-10-24 RX ADMIN — METOPROLOL TARTRATE 25 MG: 25 TABLET, FILM COATED ORAL at 09:49

## 2021-10-24 RX ADMIN — ENOXAPARIN SODIUM 40 MG: 100 INJECTION SUBCUTANEOUS at 09:48

## 2021-10-24 RX ADMIN — METOPROLOL TARTRATE 25 MG: 25 TABLET, FILM COATED ORAL at 17:34

## 2021-10-24 RX ADMIN — Medication 2000 UNITS: at 09:49

## 2021-10-24 RX ADMIN — OFLOXACIN 1 DROP: 3 SOLUTION/ DROPS OPHTHALMIC at 22:53

## 2021-10-25 ENCOUNTER — APPOINTMENT (INPATIENT)
Dept: NON INVASIVE DIAGNOSTICS | Facility: HOSPITAL | Age: 86
DRG: 291 | End: 2021-10-25
Payer: COMMERCIAL

## 2021-10-25 PROBLEM — N18.31 STAGE 3A CHRONIC KIDNEY DISEASE (HCC): Status: ACTIVE | Noted: 2017-08-03

## 2021-10-25 PROBLEM — I50.33 ACUTE ON CHRONIC DIASTOLIC CONGESTIVE HEART FAILURE (HCC): Status: ACTIVE | Noted: 2021-10-23

## 2021-10-25 LAB
ANION GAP SERPL CALCULATED.3IONS-SCNC: 7 MMOL/L (ref 4–13)
BUN SERPL-MCNC: 26 MG/DL (ref 7–25)
CALCIUM SERPL-MCNC: 8.2 MG/DL (ref 8.6–10.5)
CHLORIDE SERPL-SCNC: 95 MMOL/L (ref 98–107)
CO2 SERPL-SCNC: 27 MMOL/L (ref 21–31)
CREAT SERPL-MCNC: 0.87 MG/DL (ref 0.7–1.3)
GFR SERPL CREATININE-BSD FRML MDRD: 72 ML/MIN/1.73SQ M
GLUCOSE SERPL-MCNC: 90 MG/DL (ref 65–99)
POTASSIUM SERPL-SCNC: 4.2 MMOL/L (ref 3.5–5.5)
SODIUM SERPL-SCNC: 129 MMOL/L (ref 134–143)

## 2021-10-25 PROCEDURE — 99232 SBSQ HOSP IP/OBS MODERATE 35: CPT | Performed by: NURSE PRACTITIONER

## 2021-10-25 PROCEDURE — 97116 GAIT TRAINING THERAPY: CPT

## 2021-10-25 PROCEDURE — 97530 THERAPEUTIC ACTIVITIES: CPT

## 2021-10-25 PROCEDURE — 80048 BASIC METABOLIC PNL TOTAL CA: CPT | Performed by: PHYSICIAN ASSISTANT

## 2021-10-25 PROCEDURE — 93970 EXTREMITY STUDY: CPT | Performed by: SURGERY

## 2021-10-25 PROCEDURE — 97110 THERAPEUTIC EXERCISES: CPT

## 2021-10-25 PROCEDURE — 97535 SELF CARE MNGMENT TRAINING: CPT

## 2021-10-25 PROCEDURE — 93970 EXTREMITY STUDY: CPT

## 2021-10-25 RX ADMIN — Medication 2000 UNITS: at 10:35

## 2021-10-25 RX ADMIN — ALLOPURINOL 300 MG: 300 TABLET ORAL at 18:39

## 2021-10-25 RX ADMIN — OFLOXACIN 1 DROP: 3 SOLUTION/ DROPS OPHTHALMIC at 09:00

## 2021-10-25 RX ADMIN — METOPROLOL TARTRATE 25 MG: 25 TABLET, FILM COATED ORAL at 10:34

## 2021-10-25 RX ADMIN — FENOFIBRATE 48 MG: 48 TABLET, FILM COATED ORAL at 17:17

## 2021-10-25 RX ADMIN — ASPIRIN 81 MG: 81 TABLET, COATED ORAL at 10:34

## 2021-10-25 RX ADMIN — B-COMPLEX W/ C & FOLIC ACID TAB 1 TABLET: TAB at 10:35

## 2021-10-25 RX ADMIN — ENOXAPARIN SODIUM 40 MG: 100 INJECTION SUBCUTANEOUS at 10:35

## 2021-10-25 RX ADMIN — ATORVASTATIN CALCIUM 20 MG: 20 TABLET, FILM COATED ORAL at 17:19

## 2021-10-25 RX ADMIN — OFLOXACIN 1 DROP: 3 SOLUTION/ DROPS OPHTHALMIC at 12:55

## 2021-10-25 RX ADMIN — OFLOXACIN 1 DROP: 3 SOLUTION/ DROPS OPHTHALMIC at 17:19

## 2021-10-25 RX ADMIN — LOSARTAN POTASSIUM 50 MG: 50 TABLET, FILM COATED ORAL at 10:34

## 2021-10-25 RX ADMIN — METOPROLOL TARTRATE 25 MG: 25 TABLET, FILM COATED ORAL at 17:17

## 2021-10-26 LAB
ANION GAP SERPL CALCULATED.3IONS-SCNC: 6 MMOL/L (ref 4–13)
BUN SERPL-MCNC: 28 MG/DL (ref 7–25)
CALCIUM SERPL-MCNC: 8.3 MG/DL (ref 8.6–10.5)
CHLORIDE SERPL-SCNC: 96 MMOL/L (ref 98–107)
CO2 SERPL-SCNC: 26 MMOL/L (ref 21–31)
CREAT SERPL-MCNC: 0.88 MG/DL (ref 0.7–1.3)
ERYTHROCYTE [DISTWIDTH] IN BLOOD BY AUTOMATED COUNT: 13.9 % (ref 11.5–14.5)
GFR SERPL CREATININE-BSD FRML MDRD: 72 ML/MIN/1.73SQ M
GLUCOSE SERPL-MCNC: 93 MG/DL (ref 65–99)
HCT VFR BLD AUTO: 30.4 % (ref 42–47)
HGB BLD-MCNC: 10.2 G/DL (ref 14–18)
MCH RBC QN AUTO: 31.6 PG (ref 26–34)
MCHC RBC AUTO-ENTMCNC: 33.4 G/DL (ref 31–37)
MCV RBC AUTO: 94 FL (ref 81–99)
PLATELET # BLD AUTO: 233 THOUSANDS/UL (ref 149–390)
PMV BLD AUTO: 8.2 FL (ref 8.6–11.7)
POTASSIUM SERPL-SCNC: 4.3 MMOL/L (ref 3.5–5.5)
RBC # BLD AUTO: 3.22 MILLION/UL (ref 4.3–5.9)
SODIUM SERPL-SCNC: 128 MMOL/L (ref 134–143)
WBC # BLD AUTO: 7.2 THOUSAND/UL (ref 4.8–10.8)

## 2021-10-26 PROCEDURE — 85027 COMPLETE CBC AUTOMATED: CPT | Performed by: NURSE PRACTITIONER

## 2021-10-26 PROCEDURE — 80048 BASIC METABOLIC PNL TOTAL CA: CPT | Performed by: NURSE PRACTITIONER

## 2021-10-26 PROCEDURE — 97110 THERAPEUTIC EXERCISES: CPT

## 2021-10-26 PROCEDURE — 97116 GAIT TRAINING THERAPY: CPT

## 2021-10-26 PROCEDURE — 99232 SBSQ HOSP IP/OBS MODERATE 35: CPT | Performed by: NURSE PRACTITIONER

## 2021-10-26 PROCEDURE — 97530 THERAPEUTIC ACTIVITIES: CPT

## 2021-10-26 RX ADMIN — ATORVASTATIN CALCIUM 20 MG: 20 TABLET, FILM COATED ORAL at 18:00

## 2021-10-26 RX ADMIN — OFLOXACIN 1 DROP: 3 SOLUTION/ DROPS OPHTHALMIC at 12:44

## 2021-10-26 RX ADMIN — ENOXAPARIN SODIUM 40 MG: 100 INJECTION SUBCUTANEOUS at 10:14

## 2021-10-26 RX ADMIN — METOPROLOL TARTRATE 25 MG: 25 TABLET, FILM COATED ORAL at 10:14

## 2021-10-26 RX ADMIN — ALLOPURINOL 300 MG: 300 TABLET ORAL at 18:00

## 2021-10-26 RX ADMIN — B-COMPLEX W/ C & FOLIC ACID TAB 1 TABLET: TAB at 10:14

## 2021-10-26 RX ADMIN — OFLOXACIN 1 DROP: 3 SOLUTION/ DROPS OPHTHALMIC at 18:00

## 2021-10-26 RX ADMIN — OFLOXACIN 1 DROP: 3 SOLUTION/ DROPS OPHTHALMIC at 21:15

## 2021-10-26 RX ADMIN — FENOFIBRATE 48 MG: 48 TABLET, FILM COATED ORAL at 18:00

## 2021-10-26 RX ADMIN — Medication 2000 UNITS: at 10:14

## 2021-10-26 RX ADMIN — LOSARTAN POTASSIUM 50 MG: 50 TABLET, FILM COATED ORAL at 10:14

## 2021-10-26 RX ADMIN — ASPIRIN 81 MG: 81 TABLET, COATED ORAL at 10:14

## 2021-10-27 VITALS
TEMPERATURE: 97.3 F | WEIGHT: 127.87 LBS | SYSTOLIC BLOOD PRESSURE: 130 MMHG | BODY MASS INDEX: 20.55 KG/M2 | HEIGHT: 66 IN | HEART RATE: 64 BPM | RESPIRATION RATE: 18 BRPM | OXYGEN SATURATION: 100 % | DIASTOLIC BLOOD PRESSURE: 59 MMHG

## 2021-10-27 PROCEDURE — 97530 THERAPEUTIC ACTIVITIES: CPT

## 2021-10-27 PROCEDURE — 97110 THERAPEUTIC EXERCISES: CPT

## 2021-10-27 PROCEDURE — 97116 GAIT TRAINING THERAPY: CPT

## 2021-10-27 PROCEDURE — 99239 HOSP IP/OBS DSCHRG MGMT >30: CPT | Performed by: NURSE PRACTITIONER

## 2021-10-27 RX ORDER — FUROSEMIDE 20 MG/1
20 TABLET ORAL DAILY PRN
Qty: 30 TABLET | Refills: 0 | Status: SHIPPED | OUTPATIENT
Start: 2021-10-27 | End: 2022-02-02 | Stop reason: ALTCHOICE

## 2021-10-27 RX ADMIN — Medication 2000 UNITS: at 09:48

## 2021-10-27 RX ADMIN — ENOXAPARIN SODIUM 40 MG: 100 INJECTION SUBCUTANEOUS at 09:48

## 2021-10-27 RX ADMIN — ASPIRIN 81 MG: 81 TABLET, COATED ORAL at 09:48

## 2021-10-27 RX ADMIN — LOSARTAN POTASSIUM 50 MG: 50 TABLET, FILM COATED ORAL at 09:48

## 2021-10-27 RX ADMIN — B-COMPLEX W/ C & FOLIC ACID TAB 1 TABLET: TAB at 09:48

## 2021-10-27 RX ADMIN — METOPROLOL TARTRATE 25 MG: 25 TABLET, FILM COATED ORAL at 09:48

## 2021-11-05 ENCOUNTER — APPOINTMENT (OUTPATIENT)
Dept: LAB | Facility: CLINIC | Age: 86
End: 2021-11-05
Payer: COMMERCIAL

## 2021-11-05 DIAGNOSIS — N18.30 STAGE 3 CHRONIC KIDNEY DISEASE, UNSPECIFIED WHETHER STAGE 3A OR 3B CKD (HCC): ICD-10-CM

## 2021-11-05 LAB
ANION GAP SERPL CALCULATED.3IONS-SCNC: 2 MMOL/L (ref 4–13)
BUN SERPL-MCNC: 20 MG/DL (ref 5–25)
CALCIUM SERPL-MCNC: 8.9 MG/DL (ref 8.3–10.1)
CHLORIDE SERPL-SCNC: 100 MMOL/L (ref 100–108)
CO2 SERPL-SCNC: 27 MMOL/L (ref 21–32)
CREAT SERPL-MCNC: 1.01 MG/DL (ref 0.6–1.3)
GFR SERPL CREATININE-BSD FRML MDRD: 62 ML/MIN/1.73SQ M
GLUCOSE SERPL-MCNC: 97 MG/DL (ref 65–140)
POTASSIUM SERPL-SCNC: 4.1 MMOL/L (ref 3.5–5.3)
SODIUM SERPL-SCNC: 129 MMOL/L (ref 136–145)

## 2021-11-05 PROCEDURE — 36415 COLL VENOUS BLD VENIPUNCTURE: CPT

## 2021-11-05 PROCEDURE — 80048 BASIC METABOLIC PNL TOTAL CA: CPT

## 2021-12-09 ENCOUNTER — APPOINTMENT (OUTPATIENT)
Dept: LAB | Facility: CLINIC | Age: 86
End: 2021-12-09
Payer: COMMERCIAL

## 2021-12-09 ENCOUNTER — APPOINTMENT (OUTPATIENT)
Dept: RADIOLOGY | Facility: CLINIC | Age: 86
End: 2021-12-09
Payer: COMMERCIAL

## 2021-12-09 DIAGNOSIS — R10.9 STOMACH ACHE: ICD-10-CM

## 2021-12-09 DIAGNOSIS — R19.4 CHANGE IN BOWEL HABIT: ICD-10-CM

## 2021-12-09 DIAGNOSIS — R63.4 WEIGHT LOSS: ICD-10-CM

## 2021-12-09 LAB
ALBUMIN SERPL BCP-MCNC: 3.5 G/DL (ref 3.5–5)
ALP SERPL-CCNC: 76 U/L (ref 46–116)
ALT SERPL W P-5'-P-CCNC: 23 U/L (ref 12–78)
ANION GAP SERPL CALCULATED.3IONS-SCNC: 7 MMOL/L (ref 4–13)
AST SERPL W P-5'-P-CCNC: 39 U/L (ref 5–45)
BILIRUB SERPL-MCNC: 0.5 MG/DL (ref 0.2–1)
BUN SERPL-MCNC: 21 MG/DL (ref 5–25)
CALCIUM SERPL-MCNC: 9 MG/DL (ref 8.3–10.1)
CHLORIDE SERPL-SCNC: 99 MMOL/L (ref 100–108)
CO2 SERPL-SCNC: 27 MMOL/L (ref 21–32)
CREAT SERPL-MCNC: 1.02 MG/DL (ref 0.6–1.3)
ERYTHROCYTE [DISTWIDTH] IN BLOOD BY AUTOMATED COUNT: 13.9 % (ref 11.6–15.1)
GFR SERPL CREATININE-BSD FRML MDRD: 61 ML/MIN/1.73SQ M
GLUCOSE P FAST SERPL-MCNC: 114 MG/DL (ref 65–99)
HCT VFR BLD AUTO: 33.9 % (ref 36.5–49.3)
HGB BLD-MCNC: 11.2 G/DL (ref 12–17)
MCH RBC QN AUTO: 31.4 PG (ref 26.8–34.3)
MCHC RBC AUTO-ENTMCNC: 33 G/DL (ref 31.4–37.4)
MCV RBC AUTO: 95 FL (ref 82–98)
PLATELET # BLD AUTO: 195 THOUSANDS/UL (ref 149–390)
PMV BLD AUTO: 10.4 FL (ref 8.9–12.7)
POTASSIUM SERPL-SCNC: 3.8 MMOL/L (ref 3.5–5.3)
PROT SERPL-MCNC: 6.5 G/DL (ref 6.4–8.2)
RBC # BLD AUTO: 3.57 MILLION/UL (ref 3.88–5.62)
SODIUM SERPL-SCNC: 133 MMOL/L (ref 136–145)
T4 FREE SERPL-MCNC: 0.73 NG/DL (ref 0.76–1.46)
TSH SERPL DL<=0.05 MIU/L-ACNC: 3.2 UIU/ML (ref 0.36–3.74)
WBC # BLD AUTO: 7.38 THOUSAND/UL (ref 4.31–10.16)

## 2021-12-09 PROCEDURE — 85027 COMPLETE CBC AUTOMATED: CPT

## 2021-12-09 PROCEDURE — 84439 ASSAY OF FREE THYROXINE: CPT

## 2021-12-09 PROCEDURE — 80053 COMPREHEN METABOLIC PANEL: CPT

## 2021-12-09 PROCEDURE — 36415 COLL VENOUS BLD VENIPUNCTURE: CPT

## 2021-12-09 PROCEDURE — 84443 ASSAY THYROID STIM HORMONE: CPT

## 2021-12-09 PROCEDURE — 74022 RADEX COMPL AQT ABD SERIES: CPT

## 2021-12-11 ENCOUNTER — APPOINTMENT (OUTPATIENT)
Dept: LAB | Facility: CLINIC | Age: 86
End: 2021-12-11
Payer: COMMERCIAL

## 2021-12-11 PROCEDURE — 87086 URINE CULTURE/COLONY COUNT: CPT

## 2021-12-12 LAB — BACTERIA UR CULT: NORMAL

## 2022-02-02 ENCOUNTER — OFFICE VISIT (OUTPATIENT)
Dept: CARDIOLOGY CLINIC | Facility: CLINIC | Age: 87
End: 2022-02-02
Payer: COMMERCIAL

## 2022-02-02 VITALS
DIASTOLIC BLOOD PRESSURE: 72 MMHG | BODY MASS INDEX: 22.28 KG/M2 | HEIGHT: 66 IN | WEIGHT: 138.6 LBS | SYSTOLIC BLOOD PRESSURE: 138 MMHG | OXYGEN SATURATION: 100 % | HEART RATE: 75 BPM

## 2022-02-02 DIAGNOSIS — I10 ESSENTIAL HYPERTENSION: Chronic | ICD-10-CM

## 2022-02-02 DIAGNOSIS — R60.0 BILATERAL LEG EDEMA: ICD-10-CM

## 2022-02-02 DIAGNOSIS — I25.10 CORONARY ARTERY DISEASE INVOLVING NATIVE CORONARY ARTERY OF NATIVE HEART WITHOUT ANGINA PECTORIS: Primary | Chronic | ICD-10-CM

## 2022-02-02 PROBLEM — N18.31 STAGE 3A CHRONIC KIDNEY DISEASE (HCC): Status: RESOLVED | Noted: 2017-08-03 | Resolved: 2022-02-02

## 2022-02-02 PROBLEM — I50.33 ACUTE ON CHRONIC DIASTOLIC CONGESTIVE HEART FAILURE (HCC): Status: RESOLVED | Noted: 2021-10-23 | Resolved: 2022-02-02

## 2022-02-02 PROCEDURE — 99213 OFFICE O/P EST LOW 20 MIN: CPT | Performed by: INTERNAL MEDICINE

## 2022-02-02 NOTE — PROGRESS NOTES
Cardiology Follow Up    Bird Louise    10/11/1924  [id number]  [facility] [address]A [address]SELA [id number] [facility]  [phone]    1  Coronary artery disease involving native coronary artery of native heart without angina pectoris     2  Essential hypertension     3  Bilateral leg edema           Discussion/Summary: All of his assessed cardiac problems are stable  I have reviewed his medications and made no changes  He is holding onto some fluid and I advised him to take Lasix 20 mg 2x a week  No cardiac testing is ordered  RTO 9 months  Interval History: He did require IV Lasix last Fall for diastolic CHF  He is presently not taking any Lasix  He does weigh himself daily and his weight remains 136 - 137 lbs  ECHO 10/21/21 - EF 55%  Creatinine 1 02    He has CAD with LAD stenting in 2002  Patient Active Problem List   Diagnosis    Coronary artery disease involving native coronary artery of native heart without angina pectoris    Essential hypertension    Bilateral leg edema    Ventricular bigeminy    Chronic gout of multiple sites    Vitamin D deficiency    Hypomagnesemia    Chronic hyponatremia    Mixed hyperlipidemia    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[address] )     Past Medical History:   Diagnosis Date    Arthritis     Cataract     Coronary artery disease     Coronary atherosclerosis of native coronary artery     Diverticulitis of colon     Essential hypertension, benign     Hyperlipidemia     Hypertension     Peptic ulcer     Renal disorder      Social History     Socioeconomic History    Marital status:       Spouse name: Not on file    Number of children: Not on file    Years of education: Not on file    Highest education level: Not on file   Occupational History    Not on file   Tobacco Use    Smoking status: Former Smoker     Types: Pipe    Smokeless tobacco: Never Used   Vaping Use    Vaping Use: Never used   Substance and Sexual Activity    Alcohol use: Not Currently    Drug use: No    Sexual activity: Not Currently   Other Topics Concern    Not on file   Social History Narrative    Not on file     Social Determinants of Health     Financial Resource Strain: Not on file   Food Insecurity: Not on file   Transportation Needs: Not on file   Physical Activity: Not on file   Stress: Not on file   Social Connections: Not on file   Intimate Partner Violence: Not on file   Housing Stability: Not on file      Family History   Problem Relation Age of Onset    Heart attack Brother     Heart disease Mother     Cancer Father      Past Surgical History:   Procedure Laterality Date    CATARACT EXTRACTION Right     Left eye 5/2021    CORONARY STENT PLACEMENT      SKIN BIOPSY      TONSILLECTOMY         Current Outpatient Medications:     allopurinol (ZYLOPRIM) 300 mg tablet, Take 1 tablet by mouth daily, Disp: , Rfl:     aspirin 81 MG tablet, Take 1 tablet by mouth see administration instructions Take 1 tablet Mon-Wed & Fri, Disp: , Rfl:     atorvastatin (LIPITOR) 20 mg tablet, Take 1 tablet by mouth daily, Disp: , Rfl:     Cholecalciferol ([employer] VITAMIN D) 2000 units CAPS, Take by mouth, Disp: , Rfl:     Choline Fenofibrate (FENOFIBRIC ACID) 45 MG CPDR, Take by mouth, Disp: , Rfl:     furosemide (LASIX) 20 mg tablet, Take 1 tablet (20 mg total) by mouth daily as needed (leg swelling or if weight gain more than 3 lbs), Disp: 30 tablet, Rfl: 0    irbesartan (AVAPRO) 75 mg tablet, Take 150 mg by mouth daily , Disp: , Rfl:     metoprolol tartrate (LOPRESSOR) 25 mg tablet, Take 25 mg by mouth 2 (two) times a day , Disp: , Rfl:     Multiple Vitamins tablet, Take by mouth, Disp: , Rfl:     nitroglycerin (Nitrostat) 0 4 mg SL tablet, Place 1 tablet (0 4 mg total) under the tongue every 5 (five) minutes as needed for chest pain, Disp: 25 tablet, Rfl: 3    ofloxacin (OCUFLOX) 0 3 % ophthalmic solution, ofloxacin 0 3 % eye drops, Disp: , Rfl:     pneumococcal 23-valent polysaccharide vaccine (PNEUMOVAX 23), Pneumovax 23 25 mcg/0 5 mL injection syringe, Disp: , Rfl:     polyethylene glycol (GLYCOLAX) 17 GM/SCOOP powder, Take 17 g by mouth daily, Disp: 116 g, Rfl: 0    potassium chloride (KLOR-CON) 20 mEq packet, Take 20 mEq by mouth as needed Take 1 tablet when taking Furosemide, Disp: , Rfl:     prednisoLONE acetate (PRED FORTE) 1 % ophthalmic suspension, prednisolone acetate 1 % eye drops,suspension, Disp: , Rfl:   No Known Allergies  Vitals:    02/02/22 1319   BP: 138/72   BP Location: Left arm   Patient Position: Sitting   Cuff Size: Standard   Pulse: 75   SpO2: 100%   Weight: 62 9 kg (138 lb 9 6 oz)   Height: 5' 6" (1 676 m)     Weight (last 2 days)     Date/Time Weight    02/02/22 1319 62 9 (138 6)         Blood pressure 138/72, pulse 75, height 5' 6" (1 676 m), weight 62 9 kg (138 lb 9 6 oz), SpO2 100 %  , Body mass index is 22 37 kg/m²      Labs:  Appointment on 12/09/2021   Component Date Value    Sodium 12/09/2021 133*    Potassium 12/09/2021 3 8     Chloride 12/09/2021 99*    CO2 12/09/2021 27     ANION GAP 12/09/2021 7     BUN 12/09/2021 21     Creatinine 12/09/2021 1 02     Glucose, Fasting 12/09/2021 114*    Calcium 12/09/2021 9 0     AST 12/09/2021 39     ALT 12/09/2021 23     Alkaline Phosphatase 12/09/2021 76     Total Protein 12/09/2021 6 5     Albumin 12/09/2021 3 5     Total Bilirubin 12/09/2021 0 50     eGFR 12/09/2021 61     TSH 3RD GENERATON 12/09/2021 3 200     Free T4 12/09/2021 0 73*    Urine Culture 12/11/2021 >100,000 cfu/ml      WBC 12/09/2021 7 38     RBC 12/09/2021 3 57*    Hemoglobin 12/09/2021 11 2*    Hematocrit 12/09/2021 33 9*    MCV 12/09/2021 95     MCH 12/09/2021 31 4     MCHC 12/09/2021 33 0     RDW 12/09/2021 13 9     Platelets 46/58/7549 195     MPV 12/09/2021 10 4    Transcribe Orders on 12/09/2021   Component Date Value    Creatinine, Ur 12/11/2021 69 2     Microalbum  ,U,Random 12/11/2021 7 2     Microalb Creat Ratio 12/11/2021 10    Appointment on 11/05/2021   Component Date Value    Sodium 11/05/2021 129*    Potassium 11/05/2021 4 1     Chloride 11/05/2021 100     CO2 11/05/2021 27     ANION GAP 11/05/2021 2*    BUN 11/05/2021 20     Creatinine 11/05/2021 1 01     Glucose 11/05/2021 97     Calcium 11/05/2021 8 9     eGFR 11/05/2021 62    Admission on 10/21/2021, Discharged on 10/27/2021   Component Date Value    Troponin I 10/21/2021 <0 03     SARS-CoV-2 10/21/2021 Negative     INFLUENZA A PCR 10/21/2021 Negative     INFLUENZA B PCR 10/21/2021 Negative     RSV PCR 10/21/2021 Negative     Total CK 10/21/2021 337*    CK-MB Index 10/21/2021 4 1     CK-MB 10/21/2021 13 9*    A4C EF 10/21/2021 61     LVOT stroke volume 10/21/2021 81 14     LVOT SI 10/21/2021 48 30     LVIDd 10/21/2021 4 00     LVIDS 10/21/2021 2 90     IVSd 10/21/2021 1 10     LVPWd 10/21/2021 1 10     LVOT diameter 10/21/2021 2 0     FS 10/21/2021 28     MV E' Tissue Velocity Se* 10/21/2021 6     E wave deceleration time 10/21/2021 358     MV Peak E Baldev 10/21/2021 62     MV Peak A Baldev 10/21/2021 1     AV LVOT peak gradient 10/21/2021 4     LVOT peak VTI 10/21/2021 25 84     LVOT peak baldev 10/21/2021 0 99     RVID d 10/21/2021 3 7     TV S' 10/21/2021 1 0     Ao VTI 10/21/2021 36 66     AV mean gradient 10/21/2021 4     LVOT mn grad 10/21/2021 2 0     AV peak gradient 10/21/2021 8     AV area by cont VTI 10/21/2021 2 2     AV area peak baldev 10/21/2021 2 2     MV stenosis pressure 1/2* 10/21/2021 0     Ao root 10/21/2021 2 90     Aortic valve mean veloci* 10/21/2021 9 50     Left ventricular stroke * 10/21/2021 39 00     LEFT VENTRICLE SYSTOLIC * 38/88/7014 31     LV DIASTOLIC VOLUME (MOD* 46/06/6555 70     EF 10/21/2021 47     LVOT area 10/21/2021 3 14     AV valve area 10/21/2021 2 21     ZLVIDS 10/21/2021 -2 01     LV EF 10/21/2021 55     Ventricular Rate 10/21/2021 68     Atrial Rate 10/21/2021 68     MI Interval 10/21/2021 196     QRSD Interval 10/21/2021 88     QT Interval 10/21/2021 420     QTC Interval 10/21/2021 446     P Axis 10/21/2021 77     QRS Axis 10/21/2021 55     T Wave Axis 10/21/2021 86     Sodium 10/22/2021 128*    Potassium 10/22/2021 3 6     Chloride 10/22/2021 92*    CO2 10/22/2021 27     ANION GAP 10/22/2021 9     BUN 10/22/2021 27*    Creatinine 10/22/2021 0 98     Glucose 10/22/2021 84     Calcium 10/22/2021 8 5*    eGFR 10/22/2021 64     WBC 10/22/2021 7 10     RBC 10/22/2021 3 10*    Hemoglobin 10/22/2021 10 1*    Hematocrit 10/22/2021 29 4*    MCV 10/22/2021 95     MCH 10/22/2021 32 6     MCHC 10/22/2021 34 5     RDW 10/22/2021 13 7     Platelets 59/53/6492 248     MPV 10/22/2021 8 0*    Magnesium 10/22/2021 1 8*    Sodium 10/23/2021 129*    Potassium 10/23/2021 3 5     Chloride 10/23/2021 92*    CO2 10/23/2021 28     ANION GAP 10/23/2021 9     BUN 10/23/2021 27*    Creatinine 10/23/2021 0 81     Glucose 10/23/2021 85     Calcium 10/23/2021 8 4*    eGFR 10/23/2021 74     WBC 10/23/2021 7 20     RBC 10/23/2021 3 22*    Hemoglobin 10/23/2021 10 4*    Hematocrit 10/23/2021 30 5*    MCV 10/23/2021 95     MCH 10/23/2021 32 2     MCHC 10/23/2021 34 1     RDW 10/23/2021 13 9     Platelets 08/24/5579 237     MPV 10/23/2021 7 6*    Sodium, Ur 10/23/2021 94     Osmolality, Ur 10/23/2021 311     Sodium 10/24/2021 125*    Potassium 10/24/2021 3 8     Chloride 10/24/2021 91*    CO2 10/24/2021 28     ANION GAP 10/24/2021 6     BUN 10/24/2021 24     Creatinine 10/24/2021 0 83     Glucose 10/24/2021 88     Calcium 10/24/2021 8 7     eGFR 10/24/2021 74     WBC 10/24/2021 7 40     RBC 10/24/2021 3 64*    Hemoglobin 10/24/2021 11 7*    Hematocrit 10/24/2021 34 2*    MCV 10/24/2021 94     MCH 10/24/2021 32 2     MCHC 10/24/2021 34 2     RDW 10/24/2021 14 1     Platelets 64/21/9432 270     MPV 10/24/2021 7 4*    Sodium 10/25/2021 129*    Potassium 10/25/2021 4 2     Chloride 10/25/2021 95*    CO2 10/25/2021 27     ANION GAP 10/25/2021 7     BUN 10/25/2021 26*    Creatinine 10/25/2021 0 87     Glucose 10/25/2021 90     Calcium 10/25/2021 8 2*    eGFR 10/25/2021 72     WBC 10/26/2021 7 20     RBC 10/26/2021 3 22*    Hemoglobin 10/26/2021 10 2*    Hematocrit 10/26/2021 30 4*    MCV 10/26/2021 94     MCH 10/26/2021 31 6     MCHC 10/26/2021 33 4     RDW 10/26/2021 13 9     Platelets 26/15/0727 233     MPV 10/26/2021 8 2*    Sodium 10/26/2021 128*    Potassium 10/26/2021 4 3     Chloride 10/26/2021 96*    CO2 10/26/2021 26     ANION GAP 10/26/2021 6     BUN 10/26/2021 28*    Creatinine 10/26/2021 0 88     Glucose 10/26/2021 93     Calcium 10/26/2021 8 3*    eGFR 10/26/2021 72    Admission on 10/21/2021, Discharged on 10/21/2021   Component Date Value    Sodium 10/21/2021 128*    Potassium 10/21/2021 3 9     Chloride 10/21/2021 93*    CO2 10/21/2021 26     ANION GAP 10/21/2021 9     BUN 10/21/2021 18     Creatinine 10/21/2021 0 89     Glucose 10/21/2021 91     Calcium 10/21/2021 9 0     AST 10/21/2021 43*    ALT 10/21/2021 20     Alkaline Phosphatase 10/21/2021 58     Total Protein 10/21/2021 6 3*    Albumin 10/21/2021 4 1     Total Bilirubin 10/21/2021 0 70     eGFR 10/21/2021 72     WBC 10/21/2021 6 60     RBC 10/21/2021 3 57*    Hemoglobin 10/21/2021 11 4*    Hematocrit 10/21/2021 34 2*    MCV 10/21/2021 96     MCH 10/21/2021 32 0     MCHC 10/21/2021 33 4     RDW 10/21/2021 14 2     MPV 10/21/2021 7 2*    Platelets 22/79/4158 249     Neutrophils Relative 10/21/2021 72     Lymphocytes Relative 10/21/2021 18*    Monocytes Relative 10/21/2021 8     Eosinophils Relative 10/21/2021 1     Basophils Relative 10/21/2021 1     Neutrophils Absolute 10/21/2021 4 70     Lymphocytes Absolute 10/21/2021 1 20     Monocytes Absolute 10/21/2021 0 50     Eosinophils Absolute 10/21/2021 0 00     Basophils Absolute 10/21/2021 0 00     Protime 10/21/2021 12 7     INR 10/21/2021 0 94     PTT 10/21/2021 36     Magnesium 10/21/2021 1 9     BNP 10/21/2021 131*   Admission on 10/15/2021, Discharged on 10/16/2021   Component Date Value    WBC 10/15/2021 5 40     RBC 10/15/2021 2 91*    Hemoglobin 10/15/2021 9 3*    Hematocrit 10/15/2021 28 0*    MCV 10/15/2021 96     MCH 10/15/2021 32 2     MCHC 10/15/2021 33 4     RDW 10/15/2021 14 1     MPV 10/15/2021 7 3*    Platelets 58/91/9563 211     Neutrophils Relative 10/15/2021 75     Lymphocytes Relative 10/15/2021 15*    Monocytes Relative 10/15/2021 9     Eosinophils Relative 10/15/2021 1     Basophils Relative 10/15/2021 1     Neutrophils Absolute 10/15/2021 4 00     Lymphocytes Absolute 10/15/2021 0 80     Monocytes Absolute 10/15/2021 0 50     Eosinophils Absolute 10/15/2021 0 00     Basophils Absolute 10/15/2021 0 00     Sodium 10/15/2021 125*    Potassium 10/15/2021 4 2     Chloride 10/15/2021 95*    CO2 10/15/2021 24     ANION GAP 10/15/2021 6     BUN 10/15/2021 19     Creatinine 10/15/2021 0 88     Glucose 10/15/2021 133*    Calcium 10/15/2021 7 8*    eGFR 10/15/2021 72     BNP 10/15/2021 75     Troponin I 10/15/2021 <0 03     Ventricular Rate 10/15/2021 74     Atrial Rate 10/15/2021 74     RI Interval 10/15/2021 184     QRSD Interval 10/15/2021 80     QT Interval 10/15/2021 394     QTC Interval 10/15/2021 437     P Axis 10/15/2021 70     QRS Axis 10/15/2021 13     T Wave [place] 10/15/2021 77    Admission on 10/09/2021, Discharged on 10/09/2021   Component Date Value    WBC 10/09/2021 6 60     RBC 10/09/2021 3 01*    Hemoglobin 10/09/2021 9 8*    Hematocrit 10/09/2021 28 8*    MCV 10/09/2021 96     MCH 10/09/2021 32 5     MCHC 10/09/2021 33 9     RDW 10/09/2021 14 0     MPV 10/09/2021 7 6*    Platelets 17/19/3248 197     Neutrophils Relative 10/09/2021 82*    Lymphocytes Relative 10/09/2021 9*    Monocytes Relative 10/09/2021 8     Eosinophils Relative 10/09/2021 0     Basophils Relative 10/09/2021 1     Neutrophils Absolute 10/09/2021 5 40     Lymphocytes Absolute 10/09/2021 0 60     Monocytes Absolute 10/09/2021 0 50     Eosinophils Absolute 10/09/2021 0 00     Basophils Absolute 10/09/2021 0 10     Sodium 10/09/2021 126*    Potassium 10/09/2021 4 2     Chloride 10/09/2021 94*    CO2 10/09/2021 24     ANION GAP 10/09/2021 8     BUN 10/09/2021 16     Creatinine 10/09/2021 0 99     Glucose 10/09/2021 107*    Calcium 10/09/2021 8 3*    AST 10/09/2021 54*    ALT 10/09/2021 22     Alkaline Phosphatase 10/09/2021 48*    Total Protein 10/09/2021 5 9*    Albumin 10/09/2021 3 8     Total Bilirubin 10/09/2021 0 50     eGFR 10/09/2021 64    Admission on 10/02/2021, Discharged on 10/02/2021   Component Date Value    WBC 10/02/2021 6 80     RBC 10/02/2021 3 20*    Hemoglobin 10/02/2021 10 3*    Hematocrit 10/02/2021 30 8*    MCV 10/02/2021 96     MCH 10/02/2021 32 3     MCHC 10/02/2021 33 5     RDW 10/02/2021 14 2     MPV 10/02/2021 7 7*    Platelets 05/85/0409 197     Neutrophils Relative 10/02/2021 82*    Lymphocytes Relative 10/02/2021 10*    Monocytes Relative 10/02/2021 8     Eosinophils Relative 10/02/2021 0     Basophils Relative 10/02/2021 0     Neutrophils Absolute 10/02/2021 5 50     Lymphocytes Absolute 10/02/2021 0 60     Monocytes Absolute 10/02/2021 0 60     Eosinophils Absolute 10/02/2021 0 00     Basophils Absolute 10/02/2021 0 00     Sodium 10/02/2021 125*    Potassium 10/02/2021 3 8     Chloride 10/02/2021 94*    CO2 10/02/2021 24     ANION GAP 10/02/2021 7     BUN 10/02/2021 16     Creatinine 10/02/2021 0 92     Glucose 10/02/2021 85     Calcium 10/02/2021 8 3*    eGFR 10/02/2021 70     Color, UA 10/02/2021 Yellow     Clarity, UA 10/02/2021 Clear     Specific Gravity, UA 10/02/2021 1 015     pH, UA 10/02/2021 7 5     Leukocytes, UA 10/02/2021 Negative     Nitrite, UA 10/02/2021 Negative     Protein, UA 10/02/2021 Negative     Glucose, UA 10/02/2021 Negative     Ketones, UA 10/02/2021 Negative     Urobilinogen, UA 10/02/2021 0 2     Bilirubin, UA 10/02/2021 Negative     Blood, UA 10/02/2021 1+*    RBC, UA 10/02/2021 2-4     WBC, UA 10/02/2021 1-2     Epithelial Cells 10/02/2021 None Seen     Bacteria, UA 10/02/2021 Occasional    Admission on 08/25/2021, Discharged on 08/26/2021   Component Date Value    WBC 08/26/2021 7 90     RBC 08/26/2021 3 10*    Hemoglobin 08/26/2021 10 2*    Hematocrit 08/26/2021 30 5*    MCV 08/26/2021 98     MCH 08/26/2021 32 7     MCHC 08/26/2021 33 3     RDW 08/26/2021 14 8*    MPV 08/26/2021 7 8*    Platelets 54/04/6256 201     Neutrophils Relative 08/26/2021 76*    Lymphocytes Relative 08/26/2021 16*    Monocytes Relative 08/26/2021 7     Eosinophils Relative 08/26/2021 1     Basophils Relative 08/26/2021 0     Neutrophils Absolute 08/26/2021 6 00     Lymphocytes Absolute 08/26/2021 1 20     Monocytes Absolute 08/26/2021 0 50     Eosinophils Absolute 08/26/2021 0 10     Basophils Absolute 08/26/2021 0 00     Sodium 08/26/2021 127*    Potassium 08/26/2021 4 3     Chloride 08/26/2021 97*    CO2 08/26/2021 25     ANION GAP 08/26/2021 5     BUN 08/26/2021 36*    Creatinine 08/26/2021 1 16     Glucose 08/26/2021 104*    Calcium 08/26/2021 8 7     eGFR 08/26/2021 53     Protime 08/26/2021 12 9     INR 08/26/2021 0 98      Imaging: No results found  Review of Systems:  Review of Systems   Constitutional: Negative for diaphoresis, fatigue, fever and unexpected weight change  HENT: Negative  Respiratory: Negative for cough, shortness of breath and wheezing  Cardiovascular: Positive for leg swelling  Negative for chest pain and palpitations     Gastrointestinal: Negative for abdominal pain, diarrhea and nausea  Musculoskeletal: Negative for gait problem and myalgias  Skin: Negative for rash  Neurological: Negative for dizziness and numbness  Psychiatric/Behavioral: Negative  Physical Exam:  Physical Exam  Constitutional:       Appearance: He is well-developed  HENT:      Head: Normocephalic and atraumatic  Eyes:      Pupils: Pupils are equal, round, and reactive to light  Neck:      Vascular: No JVD  Cardiovascular:      Rate and Rhythm: Regular rhythm  Pulses: Normal pulses  Carotid pulses are 2+ on the right side and 2+ on the left side  Heart sounds: S1 normal and S2 normal    Pulmonary:      Effort: Pulmonary effort is normal       Breath sounds: Normal breath sounds  No wheezing or rales  Abdominal:      General: Bowel sounds are normal       Palpations: Abdomen is soft  Tenderness: There is no abdominal tenderness  Musculoskeletal:         General: Swelling present  No tenderness  Normal range of motion  Cervical back: Normal range of motion and neck supple  Comments: 1+ b/l LE edema   Skin:     General: Skin is warm  Neurological:      Mental Status: He is alert and oriented to person, place, and time  Cranial Nerves: No cranial nerve deficit  Deep Tendon Reflexes: Reflexes are normal and symmetric

## 2022-06-22 ENCOUNTER — APPOINTMENT (OUTPATIENT)
Dept: LAB | Facility: CLINIC | Age: 87
End: 2022-06-22
Payer: COMMERCIAL

## 2022-06-22 DIAGNOSIS — R53.81 MALAISE AND FATIGUE: ICD-10-CM

## 2022-06-22 DIAGNOSIS — R53.83 MALAISE AND FATIGUE: ICD-10-CM

## 2022-06-22 DIAGNOSIS — E55.9 VITAMIN D DEFICIENCY: ICD-10-CM

## 2022-06-22 DIAGNOSIS — M10.9 GOUT, UNSPECIFIED CAUSE, UNSPECIFIED CHRONICITY, UNSPECIFIED SITE: ICD-10-CM

## 2022-06-22 DIAGNOSIS — I25.10 ASCVD (ARTERIOSCLEROTIC CARDIOVASCULAR DISEASE): ICD-10-CM

## 2022-06-22 DIAGNOSIS — I10 HYPERTENSION, UNSPECIFIED TYPE: ICD-10-CM

## 2022-06-22 LAB
25(OH)D3 SERPL-MCNC: 46.8 NG/ML (ref 30–100)
ALBUMIN SERPL BCP-MCNC: 3.8 G/DL (ref 3.5–5)
ALP SERPL-CCNC: 63 U/L (ref 46–116)
ALT SERPL W P-5'-P-CCNC: 22 U/L (ref 12–78)
ANION GAP SERPL CALCULATED.3IONS-SCNC: 3 MMOL/L (ref 4–13)
AST SERPL W P-5'-P-CCNC: 29 U/L (ref 5–45)
BILIRUB SERPL-MCNC: 0.54 MG/DL (ref 0.2–1)
BUN SERPL-MCNC: 27 MG/DL (ref 5–25)
CALCIUM SERPL-MCNC: 9 MG/DL (ref 8.3–10.1)
CHLORIDE SERPL-SCNC: 103 MMOL/L (ref 100–108)
CHOLEST SERPL-MCNC: 145 MG/DL
CO2 SERPL-SCNC: 27 MMOL/L (ref 21–32)
CREAT SERPL-MCNC: 1.01 MG/DL (ref 0.6–1.3)
ERYTHROCYTE [DISTWIDTH] IN BLOOD BY AUTOMATED COUNT: 13.7 % (ref 11.6–15.1)
GFR SERPL CREATININE-BSD FRML MDRD: 62 ML/MIN/1.73SQ M
GLUCOSE P FAST SERPL-MCNC: 89 MG/DL (ref 65–99)
HCT VFR BLD AUTO: 34.4 % (ref 36.5–49.3)
HDLC SERPL-MCNC: 42 MG/DL
HGB BLD-MCNC: 11.7 G/DL (ref 12–17)
LDLC SERPL CALC-MCNC: 81 MG/DL (ref 0–100)
MCH RBC QN AUTO: 32.2 PG (ref 26.8–34.3)
MCHC RBC AUTO-ENTMCNC: 34 G/DL (ref 31.4–37.4)
MCV RBC AUTO: 95 FL (ref 82–98)
NONHDLC SERPL-MCNC: 103 MG/DL
PLATELET # BLD AUTO: 214 THOUSANDS/UL (ref 149–390)
PMV BLD AUTO: 10.9 FL (ref 8.9–12.7)
POTASSIUM SERPL-SCNC: 4.2 MMOL/L (ref 3.5–5.3)
PROT SERPL-MCNC: 7 G/DL (ref 6.4–8.2)
RBC # BLD AUTO: 3.63 MILLION/UL (ref 3.88–5.62)
SODIUM SERPL-SCNC: 133 MMOL/L (ref 136–145)
TRIGL SERPL-MCNC: 110 MG/DL
URATE SERPL-MCNC: 2.8 MG/DL (ref 4.2–8)
WBC # BLD AUTO: 8.3 THOUSAND/UL (ref 4.31–10.16)

## 2022-06-22 PROCEDURE — 84550 ASSAY OF BLOOD/URIC ACID: CPT

## 2022-06-22 PROCEDURE — 80061 LIPID PANEL: CPT

## 2022-06-22 PROCEDURE — 82306 VITAMIN D 25 HYDROXY: CPT

## 2022-06-22 PROCEDURE — 85027 COMPLETE CBC AUTOMATED: CPT

## 2022-06-22 PROCEDURE — 80053 COMPREHEN METABOLIC PANEL: CPT

## 2022-06-22 PROCEDURE — 36415 COLL VENOUS BLD VENIPUNCTURE: CPT

## 2022-07-25 ENCOUNTER — HOSPITAL ENCOUNTER (EMERGENCY)
Facility: HOSPITAL | Age: 87
Discharge: HOME | End: 2022-07-25
Attending: EMERGENCY MEDICINE
Payer: COMMERCIAL

## 2022-07-25 VITALS
DIASTOLIC BLOOD PRESSURE: 75 MMHG | SYSTOLIC BLOOD PRESSURE: 165 MMHG | OXYGEN SATURATION: 99 % | WEIGHT: 135.8 LBS | BODY MASS INDEX: 21.92 KG/M2 | TEMPERATURE: 98.9 F | RESPIRATION RATE: 18 BRPM | HEART RATE: 67 BPM

## 2022-07-25 DIAGNOSIS — R31.9 HEMATURIA: ICD-10-CM

## 2022-07-25 DIAGNOSIS — R19.7 DIARRHEA: Primary | ICD-10-CM

## 2022-07-25 LAB
ALBUMIN SERPL BCP-MCNC: 3.8 G/DL (ref 3.5–5)
ALP SERPL-CCNC: 50 U/L (ref 34–104)
ALT SERPL W P-5'-P-CCNC: 13 U/L (ref 7–52)
ANION GAP SERPL CALCULATED.3IONS-SCNC: 6 MMOL/L (ref 4–13)
AST SERPL W P-5'-P-CCNC: 36 U/L (ref 13–39)
BACTERIA UR QL AUTO: ABNORMAL /HPF
BASOPHILS # BLD AUTO: 0.02 THOUSANDS/ΜL (ref 0–0.1)
BASOPHILS NFR BLD AUTO: 0 % (ref 0–1)
BILIRUB SERPL-MCNC: 0.68 MG/DL (ref 0.2–1)
BILIRUB UR QL STRIP: NEGATIVE
BUN SERPL-MCNC: 24 MG/DL (ref 5–25)
CALCIUM SERPL-MCNC: 9.1 MG/DL (ref 8.4–10.2)
CHLORIDE SERPL-SCNC: 102 MMOL/L (ref 96–108)
CLARITY UR: ABNORMAL
CO2 SERPL-SCNC: 26 MMOL/L (ref 21–32)
COLOR UR: ABNORMAL
CREAT SERPL-MCNC: 1.23 MG/DL (ref 0.6–1.3)
EOSINOPHIL # BLD AUTO: 0.03 THOUSAND/ΜL (ref 0–0.61)
EOSINOPHIL NFR BLD AUTO: 0 % (ref 0–6)
ERYTHROCYTE [DISTWIDTH] IN BLOOD BY AUTOMATED COUNT: 13.4 % (ref 11.6–15.1)
GFR SERPL CREATININE-BSD FRML MDRD: 48 ML/MIN/1.73SQ M
GLUCOSE SERPL-MCNC: 89 MG/DL (ref 65–140)
GLUCOSE UR STRIP-MCNC: NEGATIVE MG/DL
HCT VFR BLD AUTO: 30.7 % (ref 36.5–49.3)
HGB BLD-MCNC: 10.3 G/DL (ref 12–17)
HGB UR QL STRIP.AUTO: ABNORMAL
IMM GRANULOCYTES # BLD AUTO: 0.01 THOUSAND/UL (ref 0–0.2)
IMM GRANULOCYTES NFR BLD AUTO: 0 % (ref 0–2)
KETONES UR STRIP-MCNC: NEGATIVE MG/DL
LEUKOCYTE ESTERASE UR QL STRIP: NEGATIVE
LYMPHOCYTES # BLD AUTO: 1.26 THOUSANDS/ΜL (ref 0.6–4.47)
LYMPHOCYTES NFR BLD AUTO: 19 % (ref 14–44)
MCH RBC QN AUTO: 33.3 PG (ref 26.8–34.3)
MCHC RBC AUTO-ENTMCNC: 33.6 G/DL (ref 31.4–37.4)
MCV RBC AUTO: 99 FL (ref 82–98)
MONOCYTES # BLD AUTO: 0.63 THOUSAND/ΜL (ref 0.17–1.22)
MONOCYTES NFR BLD AUTO: 9 % (ref 4–12)
NEUTROPHILS # BLD AUTO: 4.76 THOUSANDS/ΜL (ref 1.85–7.62)
NEUTS SEG NFR BLD AUTO: 72 % (ref 43–75)
NITRITE UR QL STRIP: NEGATIVE
NON-SQ EPI CELLS URNS QL MICRO: ABNORMAL /HPF
NRBC BLD AUTO-RTO: 0 /100 WBCS
PH UR STRIP.AUTO: 6 [PH]
PLATELET # BLD AUTO: 162 THOUSANDS/UL (ref 149–390)
PMV BLD AUTO: 10.5 FL (ref 8.9–12.7)
POTASSIUM SERPL-SCNC: 3.9 MMOL/L (ref 3.5–5.3)
PROT SERPL-MCNC: 5.9 G/DL (ref 6.4–8.4)
PROT UR STRIP-MCNC: ABNORMAL MG/DL
RBC # BLD AUTO: 3.09 MILLION/UL (ref 3.88–5.62)
RBC #/AREA URNS AUTO: ABNORMAL /HPF
SODIUM SERPL-SCNC: 134 MMOL/L (ref 135–147)
SP GR UR STRIP.AUTO: 1.02 (ref 1–1.03)
UROBILINOGEN UR QL STRIP.AUTO: 0.2 E.U./DL
WBC # BLD AUTO: 6.71 THOUSAND/UL (ref 4.31–10.16)
WBC #/AREA URNS AUTO: ABNORMAL /HPF

## 2022-07-25 PROCEDURE — 81001 URINALYSIS AUTO W/SCOPE: CPT | Performed by: EMERGENCY MEDICINE

## 2022-07-25 PROCEDURE — 99284 EMERGENCY DEPT VISIT MOD MDM: CPT

## 2022-07-25 PROCEDURE — 36415 COLL VENOUS BLD VENIPUNCTURE: CPT | Performed by: EMERGENCY MEDICINE

## 2022-07-25 PROCEDURE — 85025 COMPLETE CBC W/AUTO DIFF WBC: CPT | Performed by: EMERGENCY MEDICINE

## 2022-07-25 PROCEDURE — 99284 EMERGENCY DEPT VISIT MOD MDM: CPT | Performed by: EMERGENCY MEDICINE

## 2022-07-25 PROCEDURE — 81003 URINALYSIS AUTO W/O SCOPE: CPT | Performed by: EMERGENCY MEDICINE

## 2022-07-25 PROCEDURE — 80053 COMPREHEN METABOLIC PANEL: CPT | Performed by: EMERGENCY MEDICINE

## 2022-07-25 NOTE — DISCHARGE INSTRUCTIONS
RETURN IF WORSE IN ANY WAY:   WORSENING SYMPOTMS  PAIN,   FEVER OR FLU LIKE SYMPTOMS,   OR NEW AND CONCERNING SYMPTOMS SIGNS OR SYMPTOMS      PLEASE CALL YOUR PRIMARY DOCTOR IN THE MORNING TO SET UP FOLLOW UP for TOMORROW or the Next day  PLEASE REVIEW THE WORK UP RESULTS WITH YOUR DOCTOR  Please continue to drink plenty of fluids

## 2022-07-25 NOTE — ED PROVIDER NOTES
History  Chief Complaint   Patient presents with    Diarrhea         80YEAR-OLD MALE    PMH:  HTN  HLD  CAD  Hyponatremia  SIADH  CKD      Chief complaint:   Diarrhea       HPI  PATIENT IS HERE FOR Diarrhea, worsening over the last couple days      HE DOES NOT HAVE ANY ABDOMINAL PAIN      ASSOCIATED SYMPTOMS:  URINARY  SYMPTOMS: THERE IS NO DYSURIA, NO HEMATURIA, NO FREQUENCY  HE DENIES NAUSEA, VOMITING  DENIES FEVERS, CHILLS      NO BLOODY STOOLS - NOT BLACK OR BLOODY  PT DOES NOT THINK HE HAS FOOD POISONING   NO SICK CONTACTS  NO TRAVEL  NO RECENT ANTIBIOTICS      ALLEVIATING OR EXACERBATING FACTORS:  UNCERTAIN      INTERVENTIONS:   None      History provided by:  Patient  Diarrhea  Quality:  Semi-solid  Severity:  Moderate  Onset quality:  Gradual  Relieved by:  Nothing  Worsened by:  Nothing  Ineffective treatments:  None tried  Associated symptoms: no abdominal pain, no arthralgias, no chills, no recent cough, no diaphoresis, no fever, no headaches, no myalgias, no URI and no vomiting    Risk factors: no recent antibiotic use, no sick contacts, no suspicious food intake and no travel to endemic areas        Prior to Admission Medications   Prescriptions Last Dose Informant Patient Reported? Taking?    ALLOPURINOL PO  Self Yes Yes   Sig: Take 1 tablet by mouth daily 150mg   Cholecalciferol 50 MCG (2000 UT) CAPS  Self Yes Yes   Sig: Take by mouth   Choline Fenofibrate (FENOFIBRIC ACID) 45 MG CPDR  Self Yes Yes   Sig: Take by mouth   Multiple Vitamins tablet  Self Yes Yes   Sig: Take by mouth   aspirin 81 MG tablet  Self Yes Yes   Sig: Take 1 tablet by mouth see administration instructions Take 1 tablet Mon-Wed & Fri   atorvastatin (LIPITOR) 20 mg tablet  Self Yes Yes   Sig: Take 1 tablet by mouth daily   furosemide (LASIX) 20 mg tablet   No Yes   Sig: Take 1 tablet (20 mg total) by mouth 2 (two) times a week   irbesartan (AVAPRO) 75 mg tablet  Self Yes Yes   Sig: Take 150 mg by mouth daily    metoprolol tartrate (LOPRESSOR) 25 mg tablet  Self Yes No   Sig: Take 25 mg by mouth 2 (two) times a day    nitroglycerin (Nitrostat) 0 4 mg SL tablet  Self No Yes   Sig: Place 1 tablet (0 4 mg total) under the tongue every 5 (five) minutes as needed for chest pain   ofloxacin (OCUFLOX) 0 3 % ophthalmic solution  Self Yes No   Sig: ofloxacin 0 3 % eye drops   pneumococcal 23-valent polysaccharide vaccine (PNEUMOVAX 23)  Self Yes No   Sig: Pneumovax 23 25 mcg/0 5 mL injection syringe   polyethylene glycol (GLYCOLAX) 17 GM/SCOOP powder  Self No No   Sig: Take 17 g by mouth daily   potassium chloride (KLOR-CON) 20 mEq packet  Self Yes Yes   Sig: Take 20 mEq by mouth as needed Take 1 tablet when taking Furosemide   prednisoLONE acetate (PRED FORTE) 1 % ophthalmic suspension  Self Yes No   Sig: prednisolone acetate 1 % eye drops,suspension      Facility-Administered Medications: None       Past Medical History:   Diagnosis Date    Arthritis     Cataract     Coronary artery disease     Coronary atherosclerosis of native coronary artery     Diverticulitis of colon     Essential hypertension, benign     Hyperlipidemia     Hypertension     Peptic ulcer     Renal disorder        Past Surgical History:   Procedure Laterality Date    CATARACT EXTRACTION Right     Left eye 5/2021    CORONARY STENT PLACEMENT      SKIN BIOPSY      TONSILLECTOMY         Family History   Problem Relation Age of Onset    Heart attack Brother     Heart disease Mother     Cancer Father      I have reviewed and agree with the history as documented  E-Cigarette/Vaping    E-Cigarette Use Never User      E-Cigarette/Vaping Substances    Nicotine No     THC No     CBD No     Flavoring No     Other No     Unknown No      Social History     Tobacco Use    Smoking status: Former Smoker     Types: Pipe    Smokeless tobacco: Never Used   Vaping Use    Vaping Use: Never used   Substance Use Topics    Alcohol use: Not Currently    Drug use:  No Review of Systems   Constitutional: Negative for chills, diaphoresis, fatigue and fever  HENT: Negative for nosebleeds, postnasal drip, rhinorrhea, sinus pressure, sinus pain, sneezing, sore throat and trouble swallowing  Respiratory: Negative for cough, shortness of breath, wheezing and stridor  Cardiovascular: Negative for chest pain, palpitations and leg swelling  Gastrointestinal: Positive for diarrhea  Negative for abdominal pain, blood in stool, nausea and vomiting  Genitourinary: Negative for decreased urine volume, difficulty urinating, dysuria, flank pain, frequency, hematuria, penile pain, penile swelling, scrotal swelling and urgency  Dark colored urine    Musculoskeletal: Negative for arthralgias, back pain, gait problem, joint swelling, myalgias, neck pain and neck stiffness  Skin: Negative for rash and wound  Neurological: Negative for dizziness, light-headedness and headaches  All other systems reviewed and are negative  Physical Exam  Physical Exam  Constitutional:       General: He is not in acute distress  Appearance: He is well-developed  He is not ill-appearing, toxic-appearing or diaphoretic  HENT:      Head: Normocephalic and atraumatic  Right Ear: External ear normal       Left Ear: External ear normal       Nose: Nose normal    Eyes:      General: No scleral icterus  Right eye: No discharge  Left eye: No discharge  Extraocular Movements: Extraocular movements intact  Conjunctiva/sclera: Conjunctivae normal       Pupils: Pupils are equal, round, and reactive to light  Neck:      Vascular: No JVD  Trachea: No tracheal deviation  Cardiovascular:      Rate and Rhythm: Normal rate and regular rhythm  Pulses: Normal pulses  Heart sounds: Normal heart sounds  No murmur heard  No friction rub  No gallop  Pulmonary:      Effort: Pulmonary effort is normal  No respiratory distress        Breath sounds: Normal breath sounds  No stridor  No wheezing, rhonchi or rales  Chest:      Chest wall: No tenderness  Abdominal:      General: Bowel sounds are normal  There is no distension  Palpations: Abdomen is soft  There is no mass  Tenderness: There is no abdominal tenderness  There is no right CVA tenderness, left CVA tenderness, guarding or rebound  Hernia: No hernia is present  Musculoskeletal:         General: No swelling, tenderness, deformity or signs of injury  Normal range of motion  Cervical back: Normal range of motion and neck supple  No rigidity or tenderness  Right lower leg: No edema  Lymphadenopathy:      Cervical: No cervical adenopathy  Skin:     General: Skin is warm  Capillary Refill: Capillary refill takes less than 2 seconds  Coloration: Skin is not jaundiced or pale  Findings: No bruising, erythema, lesion or rash  Neurological:      General: No focal deficit present  Mental Status: He is alert and oriented to person, place, and time  Mental status is at baseline  Cranial Nerves: No cranial nerve deficit  Sensory: No sensory deficit  Motor: No weakness or abnormal muscle tone  Coordination: Coordination normal    Psychiatric:         Mood and Affect: Mood normal          Behavior: Behavior normal          Thought Content:  Thought content normal          Judgment: Judgment normal          Vital Signs  ED Triage Vitals [07/25/22 0243]   Temperature Pulse Respirations Blood Pressure SpO2   98 9 °F (37 2 °C) 71 18 109/57 99 %      Temp src Heart Rate Source Patient Position - Orthostatic VS BP Location FiO2 (%)   -- -- -- -- --      Pain Score       --           Vitals:    07/25/22 0243   BP: 109/57   Pulse: 71         Visual Acuity      ED Medications  Medications - No data to display    Diagnostic Studies  Results Reviewed     None                 No orders to display              Procedures  Procedures         ED Course  ED Course as of 07/25/22 0459   Mon Jul 25, 2022   0306 Pt seen and evaluated   Here for dark urine and diarrhea     0411 CMP(!)   0411 CBC and differential(!)   0411 Labs at baseline  UA pending   0439 UA w Reflex to Microscopic w Reflex to Culture(!)   0445 Pt feels much much better    he has no signs of sepsis, nor life or limb threatening process    I offered further tx, I offered admission, if he felt ill, or symptoms were too great, but he declined  he is very appreciative of her ED care and is ready to manage from home  he understands return precautions    he will f/u w/ PCP tomorrow, as well as Urology for microscopic hematuria     0458 Pt ambulated, w/ assistance of his cane, independently from the ED                                             MDM    Disposition  Final diagnoses:   None     ED Disposition     None      Follow-up Information    None         Patient's Medications   Discharge Prescriptions    No medications on file       No discharge procedures on file      PDMP Review     None          ED Provider  Electronically Signed by           Gemini Mcgowan MD  07/25/22 4063

## 2022-08-04 ENCOUNTER — APPOINTMENT (OUTPATIENT)
Dept: LAB | Facility: CLINIC | Age: 87
End: 2022-08-04
Payer: COMMERCIAL

## 2022-08-04 DIAGNOSIS — R19.7 ACUTE DIARRHEA: ICD-10-CM

## 2022-08-04 DIAGNOSIS — R19.7 DIARRHEA, UNSPECIFIED TYPE: ICD-10-CM

## 2022-08-04 PROCEDURE — 87177 OVA AND PARASITES SMEARS: CPT

## 2022-08-04 PROCEDURE — 87505 NFCT AGENT DETECTION GI: CPT

## 2022-08-04 PROCEDURE — 87209 SMEAR COMPLEX STAIN: CPT

## 2022-08-05 LAB
CAMPYLOBACTER DNA SPEC NAA+PROBE: NORMAL
SALMONELLA DNA SPEC QL NAA+PROBE: NORMAL
SHIGA TOXIN STX GENE SPEC NAA+PROBE: NORMAL
SHIGELLA DNA SPEC QL NAA+PROBE: NORMAL

## 2022-08-17 ENCOUNTER — HOSPITAL ENCOUNTER (INPATIENT)
Facility: HOSPITAL | Age: 87
LOS: 7 days | Discharge: NURSING, REHAB OR LONG-TERM CARE | DRG: 644 | End: 2022-08-24
Attending: EMERGENCY MEDICINE | Admitting: INTERNAL MEDICINE
Payer: COMMERCIAL

## 2022-08-17 ENCOUNTER — APPOINTMENT (EMERGENCY)
Dept: CT IMAGING | Facility: HOSPITAL | Age: 87
DRG: 644 | End: 2022-08-17
Payer: COMMERCIAL

## 2022-08-17 ENCOUNTER — APPOINTMENT (OUTPATIENT)
Dept: RADIOLOGY | Facility: HOSPITAL | Age: 87
DRG: 644 | End: 2022-08-17
Payer: COMMERCIAL

## 2022-08-17 DIAGNOSIS — S09.90XA CLOSED HEAD INJURY, INITIAL ENCOUNTER: Primary | ICD-10-CM

## 2022-08-17 DIAGNOSIS — T14.8XXA MULTIPLE SKIN TEARS: ICD-10-CM

## 2022-08-17 DIAGNOSIS — E87.1 HYPONATREMIA: ICD-10-CM

## 2022-08-17 DIAGNOSIS — R53.1 WEAKNESS: ICD-10-CM

## 2022-08-17 LAB
ABO GROUP BLD: NORMAL
ANION GAP SERPL CALCULATED.3IONS-SCNC: 5 MMOL/L (ref 4–13)
BASOPHILS # BLD AUTO: 0.02 THOUSANDS/ΜL (ref 0–0.1)
BASOPHILS NFR BLD AUTO: 0 % (ref 0–1)
BLD GP AB SCN SERPL QL: NEGATIVE
BUN SERPL-MCNC: 11 MG/DL (ref 5–25)
CALCIUM SERPL-MCNC: 9.2 MG/DL (ref 8.4–10.2)
CARDIAC TROPONIN I PNL SERPL HS: 10 NG/L
CHLORIDE SERPL-SCNC: 92 MMOL/L (ref 96–108)
CO2 SERPL-SCNC: 27 MMOL/L (ref 21–32)
CREAT SERPL-MCNC: 0.83 MG/DL (ref 0.6–1.3)
EOSINOPHIL # BLD AUTO: 0.01 THOUSAND/ΜL (ref 0–0.61)
EOSINOPHIL NFR BLD AUTO: 0 % (ref 0–6)
ERYTHROCYTE [DISTWIDTH] IN BLOOD BY AUTOMATED COUNT: 13.4 % (ref 11.6–15.1)
GFR SERPL CREATININE-BSD FRML MDRD: 73 ML/MIN/1.73SQ M
GLUCOSE SERPL-MCNC: 91 MG/DL (ref 65–140)
HCT VFR BLD AUTO: 31.7 % (ref 36.5–49.3)
HGB BLD-MCNC: 10.8 G/DL (ref 12–17)
IMM GRANULOCYTES # BLD AUTO: 0.02 THOUSAND/UL (ref 0–0.2)
IMM GRANULOCYTES NFR BLD AUTO: 0 % (ref 0–2)
LYMPHOCYTES # BLD AUTO: 0.83 THOUSANDS/ΜL (ref 0.6–4.47)
LYMPHOCYTES NFR BLD AUTO: 13 % (ref 14–44)
MCH RBC QN AUTO: 32.6 PG (ref 26.8–34.3)
MCHC RBC AUTO-ENTMCNC: 34.1 G/DL (ref 31.4–37.4)
MCV RBC AUTO: 96 FL (ref 82–98)
MONOCYTES # BLD AUTO: 0.49 THOUSAND/ΜL (ref 0.17–1.22)
MONOCYTES NFR BLD AUTO: 8 % (ref 4–12)
NEUTROPHILS # BLD AUTO: 5.12 THOUSANDS/ΜL (ref 1.85–7.62)
NEUTS SEG NFR BLD AUTO: 79 % (ref 43–75)
NRBC BLD AUTO-RTO: 0 /100 WBCS
PLATELET # BLD AUTO: 163 THOUSANDS/UL (ref 149–390)
PMV BLD AUTO: 9.7 FL (ref 8.9–12.7)
POTASSIUM SERPL-SCNC: 3.9 MMOL/L (ref 3.5–5.3)
RBC # BLD AUTO: 3.31 MILLION/UL (ref 3.88–5.62)
RH BLD: POSITIVE
SODIUM SERPL-SCNC: 124 MMOL/L (ref 135–147)
SPECIMEN EXPIRATION DATE: NORMAL
WBC # BLD AUTO: 6.49 THOUSAND/UL (ref 4.31–10.16)

## 2022-08-17 PROCEDURE — 90715 TDAP VACCINE 7 YRS/> IM: CPT | Performed by: EMERGENCY MEDICINE

## 2022-08-17 PROCEDURE — 71045 X-RAY EXAM CHEST 1 VIEW: CPT

## 2022-08-17 PROCEDURE — 99285 EMERGENCY DEPT VISIT HI MDM: CPT

## 2022-08-17 PROCEDURE — 93005 ELECTROCARDIOGRAM TRACING: CPT

## 2022-08-17 PROCEDURE — 99285 EMERGENCY DEPT VISIT HI MDM: CPT | Performed by: EMERGENCY MEDICINE

## 2022-08-17 PROCEDURE — 80048 BASIC METABOLIC PNL TOTAL CA: CPT | Performed by: INTERNAL MEDICINE

## 2022-08-17 PROCEDURE — 84484 ASSAY OF TROPONIN QUANT: CPT | Performed by: EMERGENCY MEDICINE

## 2022-08-17 PROCEDURE — 36415 COLL VENOUS BLD VENIPUNCTURE: CPT | Performed by: EMERGENCY MEDICINE

## 2022-08-17 PROCEDURE — 70486 CT MAXILLOFACIAL W/O DYE: CPT

## 2022-08-17 PROCEDURE — 84300 ASSAY OF URINE SODIUM: CPT | Performed by: PHYSICIAN ASSISTANT

## 2022-08-17 PROCEDURE — 90471 IMMUNIZATION ADMIN: CPT

## 2022-08-17 PROCEDURE — 86901 BLOOD TYPING SEROLOGIC RH(D): CPT | Performed by: EMERGENCY MEDICINE

## 2022-08-17 PROCEDURE — 85025 COMPLETE CBC W/AUTO DIFF WBC: CPT | Performed by: EMERGENCY MEDICINE

## 2022-08-17 PROCEDURE — 80048 BASIC METABOLIC PNL TOTAL CA: CPT | Performed by: EMERGENCY MEDICINE

## 2022-08-17 PROCEDURE — 72125 CT NECK SPINE W/O DYE: CPT

## 2022-08-17 PROCEDURE — 99223 1ST HOSP IP/OBS HIGH 75: CPT | Performed by: PHYSICIAN ASSISTANT

## 2022-08-17 PROCEDURE — 86850 RBC ANTIBODY SCREEN: CPT | Performed by: EMERGENCY MEDICINE

## 2022-08-17 PROCEDURE — 86900 BLOOD TYPING SEROLOGIC ABO: CPT | Performed by: EMERGENCY MEDICINE

## 2022-08-17 PROCEDURE — 83935 ASSAY OF URINE OSMOLALITY: CPT | Performed by: PHYSICIAN ASSISTANT

## 2022-08-17 PROCEDURE — 70450 CT HEAD/BRAIN W/O DYE: CPT

## 2022-08-17 RX ORDER — ACETAMINOPHEN 325 MG/1
650 TABLET ORAL EVERY 4 HOURS PRN
Status: DISCONTINUED | OUTPATIENT
Start: 2022-08-17 | End: 2022-08-24 | Stop reason: HOSPADM

## 2022-08-17 RX ORDER — ONDANSETRON 2 MG/ML
4 INJECTION INTRAMUSCULAR; INTRAVENOUS EVERY 6 HOURS PRN
Status: DISCONTINUED | OUTPATIENT
Start: 2022-08-17 | End: 2022-08-21

## 2022-08-17 RX ORDER — VANCOMYCIN HYDROCHLORIDE 125 MG/1
125 CAPSULE ORAL EVERY 6 HOURS SCHEDULED
Status: COMPLETED | OUTPATIENT
Start: 2022-08-17 | End: 2022-08-18

## 2022-08-17 RX ORDER — ENOXAPARIN SODIUM 100 MG/ML
40 INJECTION SUBCUTANEOUS DAILY
Status: DISCONTINUED | OUTPATIENT
Start: 2022-08-18 | End: 2022-08-24 | Stop reason: HOSPADM

## 2022-08-17 RX ORDER — MELATONIN
2000 DAILY
Status: DISCONTINUED | OUTPATIENT
Start: 2022-08-18 | End: 2022-08-24 | Stop reason: HOSPADM

## 2022-08-17 RX ORDER — VANCOMYCIN HYDROCHLORIDE 125 MG/1
125 CAPSULE ORAL ONCE
Status: DISCONTINUED | OUTPATIENT
Start: 2022-08-17 | End: 2022-08-17

## 2022-08-17 RX ORDER — ALLOPURINOL 300 MG/1
150 TABLET ORAL DAILY
Status: DISCONTINUED | OUTPATIENT
Start: 2022-08-18 | End: 2022-08-24 | Stop reason: HOSPADM

## 2022-08-17 RX ORDER — ATORVASTATIN CALCIUM 20 MG/1
20 TABLET, FILM COATED ORAL DAILY
Status: DISCONTINUED | OUTPATIENT
Start: 2022-08-18 | End: 2022-08-24 | Stop reason: HOSPADM

## 2022-08-17 RX ORDER — FENOFIBRATE 48 MG/1
48 TABLET, COATED ORAL DAILY
Status: DISCONTINUED | OUTPATIENT
Start: 2022-08-18 | End: 2022-08-24 | Stop reason: HOSPADM

## 2022-08-17 RX ORDER — SODIUM CHLORIDE 9 MG/ML
100 INJECTION, SOLUTION INTRAVENOUS CONTINUOUS
Status: DISCONTINUED | OUTPATIENT
Start: 2022-08-17 | End: 2022-08-18

## 2022-08-17 RX ORDER — NITROGLYCERIN 0.4 MG/1
0.4 TABLET SUBLINGUAL
Status: DISCONTINUED | OUTPATIENT
Start: 2022-08-17 | End: 2022-08-24 | Stop reason: HOSPADM

## 2022-08-17 RX ORDER — ASPIRIN 81 MG/1
81 TABLET ORAL
Status: DISCONTINUED | OUTPATIENT
Start: 2022-08-19 | End: 2022-08-24 | Stop reason: HOSPADM

## 2022-08-17 RX ORDER — LOSARTAN POTASSIUM 50 MG/1
50 TABLET ORAL DAILY
Status: DISCONTINUED | OUTPATIENT
Start: 2022-08-18 | End: 2022-08-24 | Stop reason: HOSPADM

## 2022-08-17 RX ADMIN — TETANUS TOXOID, REDUCED DIPHTHERIA TOXOID AND ACELLULAR PERTUSSIS VACCINE, ADSORBED 0.5 ML: 5; 2.5; 8; 8; 2.5 SUSPENSION INTRAMUSCULAR at 22:00

## 2022-08-17 RX ADMIN — SODIUM CHLORIDE 100 ML/HR: 9 INJECTION, SOLUTION INTRAVENOUS at 21:54

## 2022-08-17 RX ADMIN — VANCOMYCIN HYDROCHLORIDE 125 MG: 125 CAPSULE ORAL at 23:43
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
## 2022-08-18 LAB
2HR DELTA HS TROPONIN: 1 NG/L
4HR DELTA HS TROPONIN: 3 NG/L
ABO GROUP BLD: NORMAL
ANION GAP SERPL CALCULATED.3IONS-SCNC: 4 MMOL/L (ref 4–13)
ANION GAP SERPL CALCULATED.3IONS-SCNC: 5 MMOL/L (ref 4–13)
ANION GAP SERPL CALCULATED.3IONS-SCNC: 6 MMOL/L (ref 4–13)
ANION GAP SERPL CALCULATED.3IONS-SCNC: 6 MMOL/L (ref 4–13)
ATRIAL RATE: 58 BPM
ATRIAL RATE: 67 BPM
BUN SERPL-MCNC: 11 MG/DL (ref 5–25)
BUN SERPL-MCNC: 12 MG/DL (ref 5–25)
BUN SERPL-MCNC: 14 MG/DL (ref 5–25)
BUN SERPL-MCNC: 9 MG/DL (ref 5–25)
CALCIUM SERPL-MCNC: 7.7 MG/DL (ref 8.4–10.2)
CALCIUM SERPL-MCNC: 8.1 MG/DL (ref 8.4–10.2)
CALCIUM SERPL-MCNC: 8.4 MG/DL (ref 8.4–10.2)
CALCIUM SERPL-MCNC: 9 MG/DL (ref 8.4–10.2)
CARDIAC TROPONIN I PNL SERPL HS: 11 NG/L
CARDIAC TROPONIN I PNL SERPL HS: 13 NG/L
CHLORIDE SERPL-SCNC: 91 MMOL/L (ref 96–108)
CHLORIDE SERPL-SCNC: 93 MMOL/L (ref 96–108)
CHLORIDE SERPL-SCNC: 94 MMOL/L (ref 96–108)
CHLORIDE SERPL-SCNC: 95 MMOL/L (ref 96–108)
CK MB SERPL-MCNC: 3.7 % (ref 0–2.5)
CK MB SERPL-MCNC: 8.3 NG/ML (ref 0.6–6.3)
CK SERPL-CCNC: 224 U/L (ref 39–308)
CO2 SERPL-SCNC: 25 MMOL/L (ref 21–32)
CO2 SERPL-SCNC: 26 MMOL/L (ref 21–32)
CREAT SERPL-MCNC: 0.79 MG/DL (ref 0.6–1.3)
CREAT SERPL-MCNC: 0.83 MG/DL (ref 0.6–1.3)
CREAT SERPL-MCNC: 0.85 MG/DL (ref 0.6–1.3)
CREAT SERPL-MCNC: 0.9 MG/DL (ref 0.6–1.3)
ERYTHROCYTE [DISTWIDTH] IN BLOOD BY AUTOMATED COUNT: 13.4 % (ref 11.6–15.1)
GFR SERPL CREATININE-BSD FRML MDRD: 71 ML/MIN/1.73SQ M
GFR SERPL CREATININE-BSD FRML MDRD: 73 ML/MIN/1.73SQ M
GFR SERPL CREATININE-BSD FRML MDRD: 73 ML/MIN/1.73SQ M
GFR SERPL CREATININE-BSD FRML MDRD: 75 ML/MIN/1.73SQ M
GLUCOSE SERPL-MCNC: 121 MG/DL (ref 65–140)
GLUCOSE SERPL-MCNC: 77 MG/DL (ref 65–140)
GLUCOSE SERPL-MCNC: 90 MG/DL (ref 65–140)
GLUCOSE SERPL-MCNC: 92 MG/DL (ref 65–140)
HCT VFR BLD AUTO: 31.5 % (ref 36.5–49.3)
HGB BLD-MCNC: 10.8 G/DL (ref 12–17)
MCH RBC QN AUTO: 33.4 PG (ref 26.8–34.3)
MCHC RBC AUTO-ENTMCNC: 34.3 G/DL (ref 31.4–37.4)
MCV RBC AUTO: 98 FL (ref 82–98)
OSMOLALITY UR: 383 MMOL/KG
P AXIS: 68 DEGREES
P AXIS: 72 DEGREES
PLATELET # BLD AUTO: 153 THOUSANDS/UL (ref 149–390)
PMV BLD AUTO: 10.1 FL (ref 8.9–12.7)
POTASSIUM SERPL-SCNC: 3.1 MMOL/L (ref 3.5–5.3)
POTASSIUM SERPL-SCNC: 3.6 MMOL/L (ref 3.5–5.3)
POTASSIUM SERPL-SCNC: 4 MMOL/L (ref 3.5–5.3)
POTASSIUM SERPL-SCNC: 4.1 MMOL/L (ref 3.5–5.3)
PR INTERVAL: 202 MS
PR INTERVAL: 206 MS
QRS AXIS: 19 DEGREES
QRS AXIS: 25 DEGREES
QRSD INTERVAL: 80 MS
QRSD INTERVAL: 86 MS
QT INTERVAL: 422 MS
QT INTERVAL: 454 MS
QTC INTERVAL: 445 MS
QTC INTERVAL: 445 MS
RBC # BLD AUTO: 3.23 MILLION/UL (ref 3.88–5.62)
RH BLD: POSITIVE
SODIUM 24H UR-SCNC: 115 MOL/L
SODIUM SERPL-SCNC: 122 MMOL/L (ref 135–147)
SODIUM SERPL-SCNC: 124 MMOL/L (ref 135–147)
SODIUM SERPL-SCNC: 125 MMOL/L (ref 135–147)
SODIUM SERPL-SCNC: 126 MMOL/L (ref 135–147)
T WAVE AXIS: 70 DEGREES
T WAVE AXIS: 78 DEGREES
TSH SERPL DL<=0.05 MIU/L-ACNC: 3.76 UIU/ML (ref 0.45–4.5)
VENTRICULAR RATE: 58 BPM
VENTRICULAR RATE: 67 BPM
WBC # BLD AUTO: 6.61 THOUSAND/UL (ref 4.31–10.16)

## 2022-08-18 PROCEDURE — 80048 BASIC METABOLIC PNL TOTAL CA: CPT | Performed by: INTERNAL MEDICINE

## 2022-08-18 PROCEDURE — 93005 ELECTROCARDIOGRAM TRACING: CPT

## 2022-08-18 PROCEDURE — 93010 ELECTROCARDIOGRAM REPORT: CPT | Performed by: INTERNAL MEDICINE

## 2022-08-18 PROCEDURE — 84443 ASSAY THYROID STIM HORMONE: CPT | Performed by: INTERNAL MEDICINE

## 2022-08-18 PROCEDURE — 99223 1ST HOSP IP/OBS HIGH 75: CPT | Performed by: INTERNAL MEDICINE

## 2022-08-18 PROCEDURE — 82550 ASSAY OF CK (CPK): CPT | Performed by: INTERNAL MEDICINE

## 2022-08-18 PROCEDURE — 84484 ASSAY OF TROPONIN QUANT: CPT | Performed by: EMERGENCY MEDICINE

## 2022-08-18 PROCEDURE — 36415 COLL VENOUS BLD VENIPUNCTURE: CPT | Performed by: EMERGENCY MEDICINE

## 2022-08-18 PROCEDURE — 85027 COMPLETE CBC AUTOMATED: CPT | Performed by: PHYSICIAN ASSISTANT

## 2022-08-18 PROCEDURE — 97163 PT EVAL HIGH COMPLEX 45 MIN: CPT

## 2022-08-18 PROCEDURE — 82553 CREATINE MB FRACTION: CPT | Performed by: INTERNAL MEDICINE

## 2022-08-18 PROCEDURE — 99232 SBSQ HOSP IP/OBS MODERATE 35: CPT | Performed by: INTERNAL MEDICINE

## 2022-08-18 PROCEDURE — 97167 OT EVAL HIGH COMPLEX 60 MIN: CPT

## 2022-08-18 RX ORDER — ALBUTEROL SULFATE 2.5 MG/3ML
1 SOLUTION RESPIRATORY (INHALATION) ONCE
Status: DISCONTINUED | OUTPATIENT
Start: 2022-08-18 | End: 2022-08-22

## 2022-08-18 RX ORDER — POTASSIUM CHLORIDE 20 MEQ/1
40 TABLET, EXTENDED RELEASE ORAL ONCE
Status: COMPLETED | OUTPATIENT
Start: 2022-08-18 | End: 2022-08-18

## 2022-08-18 RX ADMIN — ENOXAPARIN SODIUM 40 MG: 100 INJECTION SUBCUTANEOUS at 08:00

## 2022-08-18 RX ADMIN — VANCOMYCIN HYDROCHLORIDE 125 MG: 125 CAPSULE ORAL at 05:44

## 2022-08-18 RX ADMIN — VANCOMYCIN HYDROCHLORIDE 125 MG: 125 CAPSULE ORAL at 17:33

## 2022-08-18 RX ADMIN — POTASSIUM CHLORIDE 40 MEQ: 1500 TABLET, EXTENDED RELEASE ORAL at 11:19

## 2022-08-18 RX ADMIN — Medication 2000 UNITS: at 08:00

## 2022-08-18 RX ADMIN — B-COMPLEX W/ C & FOLIC ACID TAB 1 TABLET: TAB at 08:00

## 2022-08-18 RX ADMIN — VANCOMYCIN HYDROCHLORIDE 125 MG: 125 CAPSULE ORAL at 23:20

## 2022-08-18 RX ADMIN — LOSARTAN POTASSIUM 50 MG: 50 TABLET, FILM COATED ORAL at 08:00

## 2022-08-18 RX ADMIN — GLYCERIN, HYPROMELLOSE, POLYETHYLENE GLYCOL 1 DROP: .2; .2; 1 LIQUID OPHTHALMIC at 08:00

## 2022-08-18 RX ADMIN — FENOFIBRATE 48 MG: 48 TABLET, FILM COATED ORAL at 08:00

## 2022-08-18 RX ADMIN — ALLOPURINOL 150 MG: 300 TABLET ORAL at 08:00

## 2022-08-18 RX ADMIN — SODIUM CHLORIDE 100 ML/HR: 9 INJECTION, SOLUTION INTRAVENOUS at 13:17

## 2022-08-18 RX ADMIN — VANCOMYCIN HYDROCHLORIDE 125 MG: 125 CAPSULE ORAL at 11:19

## 2022-08-18 RX ADMIN — ATORVASTATIN CALCIUM 20 MG: 20 TABLET, FILM COATED ORAL at 08:00

## 2022-08-18 RX ADMIN — SODIUM CHLORIDE 100 ML/HR: 9 INJECTION, SOLUTION INTRAVENOUS at 08:10

## 2022-08-18 NOTE — ED NOTES
eval by hospitalist pac now  Pt in no distress  Son bedside  Contact precautions sign placed on door previously  Await order for iso if needed        Aruna Olivia RN  08/17/22 1138

## 2022-08-18 NOTE — PLAN OF CARE
Problem: PHYSICAL THERAPY ADULT  Goal: Performs mobility at highest level of function for planned discharge setting  See evaluation for individualized goals  Description: Treatment/Interventions: Functional transfer training, LE strengthening/ROM, Elevations, Therapeutic exercise, Endurance training, Patient/family training, Equipment eval/education, Bed mobility, Gait training, Spoke to nursing, Spoke to case management  Equipment Recommended:  (continue RW use)       See flowsheet documentation for full assessment, interventions and recommendations  Note: Prognosis: Good  Problem List: Decreased strength, Decreased endurance, Impaired balance, Decreased mobility, Impaired judgement, Decreased safety awareness, Impaired hearing  Assessment: Pt is 80 y o  male seen for high-complexity PT evaluation on 8/18/2022 s/p admit to [facility] on 8/17/2022 w/ Hyponatremia  PT was consulted to assess pt's functional mobility and d/c needs  Order placed for PT eval and tx, w/ up and OOB as tolerated order  PTA, pt lives c son in [address] [place] [address] c 7+5 DORIAN living level, amb c SPC at baseline, receives assistance for ADLs/IADLs  At time of eval, pt requires min Ax2 for all transfers & level surface amb x 15 ft with RW use  Upon evaluation, pt presenting  with impaired functional mobility d/t decreased strength, decreased endurance, impaired balance, decreased mobility, impaired judgement, decreased safety awareness and impaired hearing  Pertinent PMHx and current co-morbidities affecting pt's physical performance at time of assessment include: CAD, HLD, HTN, CKD stage 3, h/o hyponatremia and SIADH, recent CDiff infection  Personal factors affecting pt at time of eval include: positive fall history, hearing impairments, decreased initiation and engagement and advanced age  The following objective measures performed on IE also reveal limitations: AM-PAC 6-Clicks: 23/53   Pt's clinical presentation is currently unstable/unpredictable seen in pt's presentation of abnormal lab value(s), need for input for task focus and mobility technique, ongoing medical assessment and s/p fall with generalized weakness and gross unsteadiness  Overall, pt's rehab potential and prognosis to return to PLOF is good as impacted by objective findings, warranting pt to receive further skilled PT interventions to address identified impairments, activity limitation(s), and participation restriction(s)  Goal for patient is to return home  Pt to benefit from continued PT tx to address deficits as defined above and maximize level of functional independent mobility and consistency in order for pt to improve tolerance to OOB activity  From PT/mobility standpoint, recommendation at time of d/c would be post acute rehabilitation services pending progress in order to facilitate return to PLOF  Barriers to Discharge: Inaccessible home environment     PT Discharge Recommendation: Post acute rehabilitation services    See flowsheet documentation for full assessment

## 2022-08-18 NOTE — ED NOTES
Reports today is day 10 of treatment for cdiff  Yesterday w/ formed stool  2 days prior to that watery diarrhea        Kira Rosario RN  08/17/22 4574

## 2022-08-18 NOTE — ASSESSMENT & PLAN NOTE
· With debility  · Imaging studies negative for acute fractures  · Patient was seen by PT/OT recommending

## 2022-08-18 NOTE — ASSESSMENT & PLAN NOTE
· Sodium currently 124, had previous hyponatremia and is on Lasix and has history of SIADH  · IVF ordered in ED  · Nephrology consult  · Check urine studies

## 2022-08-18 NOTE — ASSESSMENT & PLAN NOTE
· Continue home metoprolol    Losartan started in place of patient's home ARB  · Lasix currently on hold

## 2022-08-18 NOTE — H&P
[address]  H&P- Krupa Vela Sr  10/11/1924, 80 y o  male MRN: [id number]  Unit/Bed#: ED 22 Encounter: [id number]  Primary Care Provider: Preeti Gonzalez MD   Date and time admitted to hospital: 8/17/2022  8:22 PM    * Hyponatremia  Assessment & Plan  · Sodium currently 124, had previous hyponatremia and is on Lasix and has history of SIADH  · IVF ordered in ED  · Nephrology consult  · Check urine studies    Coronary artery disease involving native coronary artery of native heart without angina pectoris  Assessment & Plan  · Known CAD with LAD stenting in 2002  · Continue home metoprolol    Mixed hyperlipidemia  Assessment & Plan  · Continue home statin    Essential hypertension  Assessment & Plan  · Continue home medications with irbesartan and metoprolol  · Hold Lasix    VTE Pharmacologic Prophylaxis: VTE Score: 4 Moderate Risk (Score 3-4) - Pharmacological DVT Prophylaxis Ordered: enoxaparin (Lovenox)  Code Status: DNR/DNI per patient  Discussion with family: Updated  (son and daughter) at bedside  Anticipated Length of Stay: Patient will be admitted on an inpatient basis with an anticipated length of stay of greater than 2 midnights secondary to lab monitoring, PT/OT eval for d/c needs  Chief Complaint: fall    History of Present Illness:  Krupa Vela Sr  is a 80 y o  male with a PMH of CAD with stent in the LAD, hypertension, hyperlipidemia, chronic kidney disease stage 3, history of hyponatremia and SIADH, recent C diff infection and finishing his vanco today who presents with fall  He has had diarrhea for about 1 month and reports he is finishing up the vancomycin for c  Diff tomorrow  He was making lunch, took vanco and dropped a wrapper he bent over to pick it up and legs gave out and he fell forward  Scratched right arm on hit nose on cabinet  He was on ground for about 40 minutes        Review of Systems:  Review of Systems   Constitutional: Negative for chills and fever  HENT: Negative for rhinorrhea, sore throat and trouble swallowing  Eyes: Negative for discharge and redness  Respiratory: Negative for cough and shortness of breath  Cardiovascular: Positive for leg swelling  Negative for chest pain  Gastrointestinal: Positive for diarrhea (last episode 8/14)  Negative for abdominal pain, nausea and vomiting  Genitourinary: Negative for dysuria and hematuria  Musculoskeletal: Negative for back pain and neck pain  Skin: Positive for wound  Negative for pallor  Neurological: Negative for dizziness, weakness and headaches  Psychiatric/Behavioral: Negative for agitation and confusion  Past Medical and Surgical History:   Past Medical History:   Diagnosis Date    Arthritis     Cataract     Coronary artery disease     Coronary atherosclerosis of native coronary artery     Diverticulitis of colon     Essential hypertension, benign     Hyperlipidemia     Hypertension     Peptic ulcer     Renal disorder        Past Surgical History:   Procedure Laterality Date    CATARACT EXTRACTION Right     Left eye 5/2021    CORONARY STENT PLACEMENT      SKIN BIOPSY      TONSILLECTOMY         Meds/Allergies:  Prior to Admission medications    Medication Sig Start Date End Date Taking?  Authorizing Provider   ALLOPURINOL PO Take 1 tablet by mouth daily 150mg    Historical Provider, MD   aspirin 81 MG tablet Take 1 tablet by mouth see administration instructions Take 1 tablet Mon-Wed & Fri    Historical Provider, MD   atorvastatin (LIPITOR) 20 mg tablet Take 1 tablet by mouth daily    Historical Provider, MD   Cholecalciferol 50 MCG (2000 UT) CAPS Take by mouth    Historical Provider, MD   Choline Fenofibrate (FENOFIBRIC ACID) 45 MG CPDR Take by mouth 9/30/13   Historical Provider, MD   furosemide (LASIX) 20 mg tablet Take 1 tablet (20 mg total) by mouth 2 (two) times a week 2/3/22   Dione Lemon MD   irbesartan (AVAPRO) 75 mg tablet Take 150 mg by mouth daily     Historical Provider, MD   metoprolol tartrate (LOPRESSOR) 25 mg tablet Take 25 mg by mouth 2 (two) times a day     Historical Provider, MD   Multiple Vitamins tablet Take by mouth    Historical Provider, MD   nitroglycerin (Nitrostat) 0 4 mg SL tablet Place 1 tablet (0 4 mg total) under the tongue every 5 (five) minutes as needed for chest pain 6/3/20   Sven Mcghee MD   ofloxacin (OCUFLOX) 0 3 % ophthalmic solution ofloxacin 0 3 % eye drops    Historical Provider, MD   pneumococcal 23-valent polysaccharide vaccine (PNEUMOVAX 23) Pneumovax 23 25 mcg/0 5 mL injection syringe    Historical Provider, MD   polyethylene glycol (GLYCOLAX) 17 GM/SCOOP powder Take 17 g by mouth daily 10/9/21   Javier Giordano MD   potassium chloride (KLOR-CON) 20 mEq packet Take 20 mEq by mouth as needed Take 1 tablet when taking Furosemide    Historical Provider, MD   prednisoLONE acetate (PRED FORTE) 1 % ophthalmic suspension prednisolone acetate 1 % eye drops,suspension    Historical Provider, MD   meclizine (ANTIVERT) 25 mg tablet Take by mouth  8/26/21  Historical Provider, MD     I have reviewed home medications with patient personally  Allergies: No Known Allergies    Social History:  Marital Status:     Occupation:  Retired  Patient Pre-hospital Living Situation: With other family member: With son  Patient Pre-hospital Level of Mobility: walks with cane  Patient Pre-hospital Diet Restrictions:  None  Substance Use History:   Social History     Substance and Sexual Activity   Alcohol Use Not Currently     Social History     Tobacco Use   Smoking Status Former Smoker    Types: Pipe   Smokeless Tobacco Never Used     Social History     Substance and Sexual Activity   Drug Use No       Family History:  Family History   Problem Relation Age of Onset    Heart attack Brother     Heart disease Mother     Cancer Father        Physical Exam:     Vitals:   Blood Pressure: 156/72 (08/17/22 2200)  Pulse: 67 (08/17/22 2200)  Temperature: 99 6 °F (37 6 °C) (08/17/22 2028)  Temp Source: Oral (08/17/22 2028)  Respirations: 15 (08/17/22 2045)  Height: 5' 6" (167 6 cm) (08/17/22 2028)  Weight - Scale: 59 kg (130 lb) (08/17/22 2028)  SpO2: 99 % (08/17/22 2200)    Physical Exam  Vitals reviewed  Constitutional:       General: He is not in acute distress  Appearance: Normal appearance  Comments: Frail elderly  male with temporal wasting, prominent sternocleidomastoid is in prominent clavicles   HENT:      Head: Normocephalic and atraumatic  Right Ear: External ear normal       Left Ear: External ear normal       Ears:      Comments: Hard of hearing     Nose: Nose normal    Eyes:      General:         Right eye: No discharge  Left eye: No discharge  Conjunctiva/sclera: Conjunctivae normal    Cardiovascular:      Rate and Rhythm: Normal rate and regular rhythm  Heart sounds: Normal heart sounds  No murmur heard  Pulmonary:      Effort: Pulmonary effort is normal  No respiratory distress  Breath sounds: Normal breath sounds  No wheezing or rales  Abdominal:      General: Bowel sounds are normal  There is no distension  Palpations: Abdomen is soft  Tenderness: There is no abdominal tenderness  There is no guarding  Musculoskeletal:         General: Normal range of motion  Cervical back: Normal range of motion  Skin:     General: Skin is warm and dry  Comments: Right forearm wrapped, has known skin wounds with recent his fall   Neurological:      Mental Status: He is alert  Mental status is at baseline  Motor: Weakness present     Psychiatric:         Mood and Affect: Mood normal          Behavior: Behavior normal           Additional Data:     Lab Results:  Results from last 7 days   Lab Units 08/17/22 2054   WBC Thousand/uL 6 49   HEMOGLOBIN g/dL 10 8*   HEMATOCRIT % 31 7*   PLATELETS Thousands/uL 163   NEUTROS PCT % 79*   LYMPHS PCT % 13*   MONOS PCT % 8   EOS PCT % 0     Results from last 7 days   Lab Units 08/17/22 2059   SODIUM mmol/L 124*   POTASSIUM mmol/L 3 9   CHLORIDE mmol/L 92*   CO2 mmol/L 27   BUN mg/dL 11   CREATININE mg/dL 0 83   ANION GAP mmol/L 5   CALCIUM mg/dL 9 2   GLUCOSE RANDOM mg/dL 91       Imaging: Reviewed radiology reports from this admission including: CT head and CT Facial bones  TRAUMA - CT head wo contrast   Final Result by Maura Philip MD (08/17 2105)      No acute intracranial abnormality  Workstation performed: [id number]         TRAUMA - CT spine cervical wo contrast   Final Result by Maura Philip MD (08/17 2109)      No cervical spine fracture or traumatic malalignment  Workstation performed: [id number]         TRAUMA - CT facial bones wo contrast   Final Result by Maura Philip MD (08/17 2103)      No fracture seen               Workstation performed: [id number]         XR Trauma chest portable   ED Interpretation by Graciela Rosen DO (08/17 2111)   No acute cardio/pulmonary disease noted on my interpretation              ** Please Note: This note has been constructed using a voice recognition system   **

## 2022-08-18 NOTE — ASSESSMENT & PLAN NOTE
· Possibly secondary dehydration as well as medication side effect  · Currently on IV fluids  · Sodium level slightly improved today  · Will follow-up with nephrology  · Repeat BMP tomorrow

## 2022-08-18 NOTE — PLAN OF CARE
Problem: OCCUPATIONAL THERAPY ADULT  Goal: Performs self-care activities at highest level of function for planned discharge setting  See evaluation for individualized goals  Description: Treatment Interventions: ADL retraining, Functional transfer training, UE strengthening/ROM, Endurance training, Equipment evaluation/education, Compensatory technique education, Energy conservation, Activityengagement    See flowsheet documentation for full assessment, interventions and recommendations  Note: Limitation: Decreased ADL status, Decreased UE ROM, Decreased UE strength, Decreased Safe judgement during ADL, Decreased endurance, Decreased self-care trans, Decreased high-level ADLs  Prognosis: Good  Assessment: Pt is a 80 y o  male seen for OT evaluation s/p admit to [facility] on 8/17/[facility]22 w/ Hyponatremia  Comorbidities affecting pt's functional performance at time of assessment include: Multiple skin tears, Hyponatremia, Weakness  Personal factors affecting pt at time of IE include:difficulty performing ADLS and limited insight into deficits  Prior to admission, pt was Mod I with ADLs  Upon evaluation: the following deficits impact occupational performance: decreased ROM, decreased strength, decreased balance, decreased tolerance, impaired sequencing and impaired problem solving  Pt to benefit from continued skilled OT tx while in the hospital to address deficits as defined above and maximize level of functional independence w ADL's and functional mobility  Occupational Performance areas to address include: bathing/shower, toilet hygiene, dressing, functional mobility and clothing management  From OT standpoint, recommendation at time of d/c would be STR       OT Discharge Recommendation: Post acute rehabilitation services     [address] [place] [address] Road, MS, OTR/L

## 2022-08-18 NOTE — UTILIZATION REVIEW
Initial Clinical Review    Admission: Date/Time/Statement:   Admission Orders (From admission, onward)     Ordered        08/17/22 2213  INPATIENT ADMISSION  Once                      Orders Placed This Encounter   Procedures    INPATIENT ADMISSION     Standing Status:   Standing     Number of Occurrences:   1     Order Specific Question:   Level of Care     Answer:   Med Surg [16]     Order Specific Question:   Estimated length of stay     Answer:   More than 2 Midnights     Order Specific Question:   Certification     Answer:   I certify that inpatient services are medically necessary for this patient for a duration of greater than two midnights  See H&P and MD Progress Notes for additional information about the patient's course of treatment  ED Arrival Information     Expected   8/17/2022     Arrival   8/17/2022 20:22    Acuity   Emergent            Means of arrival   Ambulance    Escorted by   [facility]    Service   Hospitalist    Admission type   Emergency            Arrival complaint   FALL           Chief Complaint   Patient presents with   [address], [address] over to pick something up and fell  Reports ? Hit to nose  No pain, no cspine tenderness no loc  Has right elbow skin tear and broken watch on left wrist  No deformities noted  +asa  Initial Presentation: 80 y o  male to the ED from home vie EMs with complaints of fall, hit his nose  Admitted to inpatient for hyponatremia, CAD, HLD, htn  He was leaning over to pick something up and fell, hitting his nose  CAD with stent in the LAD, hypertension, hyperlipidemia, chronic kidney disease stage 3, history of hyponatremia and SIADH, recent C diff infection and finishing his vanco today  He arrives with GCS 15, right forearm wrapped with skin wound from recent fall  CT head WNL  Sodium is 124  Iv fluids given in the ED  Check urine studies  Nephrology consult  Date: 8/18   Day 2:   Continue with IV fluids  REpeat BMP   PT/OT recommends post acute rehab  Nephrology consult: Moderate chronic  hyonatremia with SIADH, hypoosmolar likely  Continue with IV saline  Most recent sodium 126  Urine Na 115 and urine osm 383  Continue with IV fluids  Low serum sodium reflects an increase in total body water relative to sodium and potassium  GCS 15  Has overall weakness     ED Triage Vitals   Temperature Pulse Respirations Blood Pressure SpO2   08/17/22 2028 08/17/22 2028 08/17/22 2028 08/17/22 2028 08/17/22 2028   99 6 °F (37 6 °C) 69 15 163/62 98 %      Temp Source Heart Rate Source Patient Position - Orthostatic VS BP Location FiO2 (%)   08/17/22 2028 08/18/22 0748 08/17/22 2028 08/17/22 2028 --   Oral Monitor Sitting Left arm       Pain Score       08/17/22 2028       No Pain          Wt Readings from Last 1 Encounters:   08/17/22 59 kg (130 lb)     Additional Vital Signs:   /Time Temp Pulse Resp BP MAP (mmHg) SpO2 O2 Device Patient Position - Orthostatic VS   08/18/22 12:36:13 97 8 °F (36 6 °C) 62 18 140/62 88 99 % -- --   08/18/22 1122 98 4 °F (36 9 °C) 65 14 125/64 -- 100 % None (Room air) Sitting   08/18/22 1100 -- 66 -- -- -- 100 % -- --   08/18/22 1000 -- 61 18 125/58 84 100 % -- --   08/18/22 0748 -- 55 14 156/66 95 100 % None (Room air) Lying   08/17/22 2200 -- 67 -- 156/72 103 99 % -- --   08/17/22 2135 -- 64 -- -- -- 100 % -- --   08/17/22 2131 -- 63 -- 156/68 98 98 % -- --   08/17/22 2130 -- 63 -- -- -- 99 % -- --   08/17/22 2125 -- 63 -- -- -- 100 % -- --   08/17/22 2120 -- 64 -- -- -- 99 % -- --   08/17/22 2116 -- 63 -- 162/67 96 99 % -- --   08/17/22 2115 -- 63 -- -- -- 99 % -- --   08/17/22 2110 -- 64 -- -- -- 99 % -- --   08/17/22 2105 -- 66 -- -- -- 99 % -- --   08/17/22 2100 -- 64 -- 165/72 -- 97 % -- --   08/17/22 2055 -- 67 -- -- -- 98 % -- --   08/17/22 2051 -- 67 -- -- -- -- -- --   08/17/22 2045 -- 70 15 160/81 -- 98 % None (Room air) --   08/17/22 2038 -- 69 -- -- -- 98 % -- --   08/17/22 2035 -- 69 -- -- -- 98 % -- --   08/17/22 2030 -- 71 -- 160/60 -- 97 % -- --   08/17/22 2028 99 6 °F (37 6 °C) 69 15 163/62 103 98 % None (Room air)      Pertinent Labs/Diagnostic Test Results:   8/17 EKG: Sinus bradycardia  Nonspecific ST and T wave abnormality  When compared with ECG of 17-AUG-2022 20:53, (unconfirmed)  No significant change was found  8/18 EKG: Sinus bradycardia  Nonspecific ST and T wave abnormality  When compared with ECG of 17-AUG-2022 20:53, (unconfirmed)  No significant change was found  TRAUMA - CT head wo contrast   Final Result by Hugh Tovar MD (08/17 2105)      No acute intracranial abnormality  Workstation performed: [id number]         TRAUMA - CT spine cervical wo contrast   Final Result by Hugh Tovar MD (08/17 2109)      No cervical spine fracture or traumatic malalignment  Workstation performed: [id number]         TRAUMA - CT facial bones wo contrast   Final Result by Hugh Tovar MD (08/17 2103)      No fracture seen               Workstation performed: [id number]         XR Trauma chest portable   ED Interpretation by Estuardo Wills DO (08/17 2111)   No acute cardio/pulmonary disease noted on my interpretation          Final Result by Maryalice Goltz, MD (08/18 0502)      No acute cardiopulmonary disease                    Workstation performed: [id number]               Results from last 7 days   Lab Units 08/18/22  0545 08/17/22 2054   WBC Thousand/uL 6 61 6 49   HEMOGLOBIN g/dL 10 8* 10 8*   HEMATOCRIT % 31 5* 31 7*   PLATELETS Thousands/uL 153 163   NEUTROS ABS Thousands/µL  --  5 12         Results from last 7 days   Lab Units 08/18/22  0752 08/17/22  2349 08/17/22 2059   SODIUM mmol/L 126* 125* 124*   POTASSIUM mmol/L 3 1* 3 6 3 9   CHLORIDE mmol/L 95* 93* 92*   CO2 mmol/L 26 26 27   ANION GAP mmol/L 5 6 5   BUN mg/dL 9 11 11   CREATININE mg/dL 0 79 0 83 0 83   EGFR ml/min/1 73sq m 75 73 73   CALCIUM mg/dL 7 7* 9 0 9 2 Results from last 7 days   Lab Units 08/18/22  0752 08/17/22  2349 08/17/22 2059   GLUCOSE RANDOM mg/dL 77 121 91         Results from last 7 days   Lab Units 08/18/22  0316 08/17/22 2349 08/17/22 2054   HS TNI 0HR ng/L  --   --  10   HS TNI 2HR ng/L  --  11  --    HSTNI D2 ng/L  --  1  --    HS TNI 4HR ng/L 13  --   --    HSTNI D4 ng/L 3  --   --        Results from last 7 days   Lab Units 08/17/22  2327   OSMO UR mmol/     Results from last 7 days   Lab Units 08/17/22  2327   SODIUM UR  115     ED Treatment:   Medication Administration from 08/17/2022 2022 to 08/18/2022 1211       Date/Time Order Dose Route Action     08/17/2022 2200 tetanus-diphtheria-acellular pertussis (BOOSTRIX) IM injection 0 5 mL 0 5 mL Intramuscular Given     08/18/2022 0810 sodium chloride 0 9 % infusion 100 mL/hr Intravenous New Bag     08/18/2022 0801 sodium chloride 0 9 % infusion 0 mL/hr Intravenous Stopped     08/17/2022 2154 sodium chloride 0 9 % infusion 100 mL/hr Intravenous New Bag     08/17/2022 2313 vancomycin (VANCOCIN) capsule 125 mg 125 mg Oral Not Given     08/18/2022 0800 enoxaparin (LOVENOX) subcutaneous injection 40 mg 40 mg Subcutaneous Given     08/18/2022 0800 allopurinol (ZYLOPRIM) tablet 150 mg 150 mg Oral Given     08/18/2022 0800 atorvastatin (LIPITOR) tablet 20 mg 20 mg Oral Given     08/18/2022 0800 cholecalciferol (VITAMIN D3) tablet 2,000 Units 2,000 Units Oral Given     08/18/2022 0800 losartan (COZAAR) tablet 50 mg 50 mg Oral Given     08/18/2022 0800 multivitamin stress formula tablet 1 tablet 1 tablet Oral Given     08/18/2022 0800 fenofibrate (TRICOR) tablet 48 mg 48 mg Oral Given     08/18/2022 0800 glycerin-hypromellose- (ARTIFICIAL TEARS) ophthalmic solution 1 drop 1 drop Both Eyes Given     08/18/2022 1119 vancomycin (VANCOCIN) capsule 125 mg 125 mg Oral Given     08/18/2022 0544 vancomycin (VANCOCIN) capsule 125 mg 125 mg Oral Given     08/17/2022 2343 vancomycin (VANCOCIN) capsule 125 mg 125 mg Oral Given     08/18/2022 1119 potassium chloride (K-DUR,KLOR-CON) CR tablet 40 mEq 40 mEq Oral Given        Past Medical History:   Diagnosis Date    Arthritis     Cataract     Coronary artery disease     Coronary atherosclerosis of native coronary artery     Diverticulitis of colon     Essential hypertension, benign     Hyperlipidemia     Hypertension     Peptic ulcer     Renal disorder      Present on Admission:   Hyponatremia   Coronary artery disease involving native coronary artery of native heart without angina pectoris   Essential hypertension   Mixed hyperlipidemia      Admitting Diagnosis: Hyponatremia [E87 1]  Weakness [R53 1]  Multiple skin tears [T14  8XXA]  Closed head injury, initial encounter [S09 90XA]  Unspecified multiple injuries, initial encounter [T07  XXXA]  Age/Sex: 80 y o  male  Admission Orders:  BMP every 8 hours  Scheduled Medications:  albuterol (FOR EMS ONLY), 1 each, Does not apply, Once  allopurinol, 150 mg, Oral, Daily  [START ON 8/19/2022] aspirin, 81 mg, Oral, Once per day on Mon Fri  atorvastatin, 20 mg, Oral, Daily  cholecalciferol, 2,000 Units, Oral, Daily  enoxaparin, 40 mg, Subcutaneous, Daily  fenofibrate, 48 mg, Oral, Daily  glycerin-hypromellose-, 1 drop, Both Eyes, Daily  losartan, 50 mg, Oral, Daily  multivitamin stress formula, 1 tablet, Oral, Daily  vancomycin, 125 mg, Oral, Q6H [facility] & half-way      Continuous IV Infusions:  sodium chloride, 100 mL/hr, Intravenous, Continuous      PRN Meds:  acetaminophen, 650 mg, Oral, Q4H PRN  nitroglycerin, 0 4 mg, Sublingual, Q5 Min PRN  ondansetron, 4 mg, Intravenous, Q6H PRN        IP CONSULT TO CASE MANAGEMENT  IP CONSULT TO NEPHROLOGY    Network Utilization Review Department  ATTENTION: Please call with any questions or concerns to [phone] and carefully listen to the prompts so that you are directed to the right person   All voicemails are confidential   Jn Santiago all requests for admission clinical reviews, approved or denied determinations and any other requests to dedicated fax number below belonging to the campus where the patient is receiving treatment   List of dedicated fax numbers for the Facilities:  [address] [place] [address] DENIALS (Administrative/Medical Necessity) [phone]   [address] (Maternity/NICU/Pediatrics) [phone] [address] [place] [address]  [address] Medical [place] [address] [address] [address] Jonathan Cuellar 1481 [address] [facility] [phone]

## 2022-08-18 NOTE — PLAN OF CARE
Problem: Potential for Falls  Goal: Patient will remain free of falls  Description: INTERVENTIONS:  - Educate patient/family on patient safety including physical limitations  - Instruct patient to call for assistance with activity   - Consult OT/PT to assist with strengthening/mobility   - Keep Call bell within reach  - Keep bed low and locked with side rails adjusted as appropriate  - Keep care items and personal belongings within reach  - Initiate and maintain comfort rounds  - Make Fall Risk Sign visible to staff  - Offer Toileting every 2 Hours, in advance of need  - Initiate/Maintain bed alarm  - Obtain necessary fall risk management equipment: walker   - Apply yellow socks and bracelet for high fall risk patients  - Consider moving patient to room near nurses station  Outcome: Progressing     Problem: Prexisting or High Potential for Compromised Skin Integrity  Goal: Skin integrity is maintained or improved  Description: INTERVENTIONS:  - Identify patients at risk for skin breakdown  - Assess and monitor skin integrity  - Assess and monitor nutrition and hydration status  - Monitor labs   - Assess for incontinence   - Turn and reposition patient  - Assist with mobility/ambulation  - Relieve pressure over bony prominences  - Avoid friction and shearing  - Provide appropriate hygiene as needed including keeping skin clean and dry  - Evaluate need for skin moisturizer/barrier cream  - Collaborate with interdisciplinary team   - Patient/family teaching  - Consider wound care consult   Outcome: Progressing     Problem: MOBILITY - ADULT  Goal: Maintain or return to baseline ADL function  Description: INTERVENTIONS:  -  Assess patient's ability to carry out ADLs; assess patient's baseline for ADL function and identify physical deficits which impact ability to perform ADLs (bathing, care of mouth/teeth, toileting, grooming, dressing, etc )  - Assess/evaluate cause of self-care deficits   - Assess range of motion  - Assess patient's mobility; develop plan if impaired  - Assess patient's need for assistive devices and provide as appropriate  - Encourage maximum independence but intervene and supervise when necessary  - Involve family in performance of ADLs  - Assess for home care needs following discharge   - Consider OT consult to assist with ADL evaluation and planning for discharge  - Provide patient education as appropriate  Outcome: Progressing  Goal: Maintains/Returns to pre admission functional level  Description: INTERVENTIONS:  - Perform BMAT or MOVE assessment daily    - Set and communicate daily mobility goal to care team and patient/family/caregiver  - Collaborate with rehabilitation services on mobility goals if consulted  - Perform Range of Motion 3 times a day  - Reposition patient every 2 hours    - Dangle patient 3 times a day  - Stand patient 3 times a day  - Ambulate patient 3 times a day  - Out of bed to chair 3 times a day   - Out of bed for meals 3 times a day  - Out of bed for toileting  - Record patient progress and toleration of activity level   Outcome: Progressing

## 2022-08-18 NOTE — PHYSICAL THERAPY NOTE
Physical Therapy Evaluation   Time in: 0921  Time out: 0942  Total evaluation time: 21 minutes    Patient's Name: Roz Boateng Sr  Admitting Diagnosis  Unspecified multiple injuries, initial encounter [T07  XXXA]    Problem List  Patient Active Problem List   Diagnosis    Coronary artery disease involving native coronary artery of native heart without angina pectoris    Essential hypertension    Bilateral leg edema    Ventricular bigeminy    Chronic gout of multiple sites    Vitamin D deficiency    Hypomagnesemia    Hyponatremia    Mixed hyperlipidemia    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[address] )       Past Medical History  Past Medical History:   Diagnosis Date    Arthritis     Cataract     Coronary artery disease     Coronary atherosclerosis of native coronary artery     Diverticulitis of colon     Essential hypertension, benign     Hyperlipidemia     Hypertension     Peptic ulcer     Renal disorder        Past Surgical History  Past Surgical History:   Procedure Laterality Date    CATARACT EXTRACTION Right     Left eye 5/2021    CORONARY STENT PLACEMENT      SKIN BIOPSY      TONSILLECTOMY         PT performed at least 2 patient identifiers during session: Name and wristband  08/18/22 0942   PT Last Visit   PT Visit Date 08/18/22   Note Type   Note type Evaluation   Pain Assessment   Pain Assessment Tool 0-10   Pain Score No Pain   Restrictions/Precautions   Weight Bearing Precautions Per Order No   Other Precautions Fall Risk;Multiple lines;Telemetry;Hard of hearing   Home Living   Type of Home House  (1/2 double)   Home Layout Two level;Bed/bath upstairs;Stairs to enter with rails  (7+5 DORIAN)   Bathroom Shower/Tub   (sponge bathes at baseline)   Marti Walker;Cane  (pt uses SPC at baseline)   Prior Function   Level of [place] Needs assistance with ADLs and functional mobility; Needs assistance with IADLs   Lives With Son   Receives Help From Family   ADL Assistance Needs assistance   IADLs Needs assistance   Falls in the last 6 months 1 to 4  (1 fall PTA; + fall history > 6 mo ago)   Vocational Retired   General   Family/Caregiver Present Yes  (pt's son)   Cognition   Arousal/Participation Alert   Orientation Level Oriented X4   Memory Decreased recall of precautions   Following Commands Follows one step commands without difficulty   Comments pt agreeable to PT session   Subjective   Subjective "I feel ok right now"   RLE Assessment   RLE Assessment X   Strength RLE   RLE Overall Strength 3+/5   LLE Assessment   LLE Assessment X   Strength LLE   LLE Overall Strength 3+/5   Vision-Basic Assessment   Current Vision Wears glasses all the time   Coordination   Movements are Fluid and Coordinated 0   Sensation X   Bed Mobility   Supine to Sit 4  Minimal assistance   Additional items Assist x 1;HOB elevated; Increased time required   Transfers   Sit to Stand 4  Minimal assistance   Additional items Assist x 2; Increased time required  (grossly unsteady)   Stand to Sit 4  Minimal assistance   Additional items Assist x 2;Armrests   Ambulation/Elevation   Gait pattern Improper Weight shift;Narrow KARINA; Decreased foot clearance;Shuffling; Step to;Excessively slow   Gait Assistance 4  Minimal assist   Additional items Assist x 2   Assistive Device Straight cane   Distance 15 ft   Stair Management Assistance Not tested   Balance   Static Sitting Fair +   Dynamic Sitting Fair -   Static Standing Fair -   Dynamic Standing Poor +   Ambulatory Poor +   Endurance Deficit   Endurance Deficit Yes   Activity Tolerance   Activity Tolerance Patient limited by fatigue   Medical Staff Made Aware coordination of care provided with OT Consuelo   Nurse Made Aware Yes, ZULLY Wright verbalized pt appropriate for PT session, made aware of outcomes/recs   Assessment   Prognosis Good   Problem List Decreased strength;Decreased endurance; Impaired balance;Decreased mobility; Impaired judgement;Decreased safety awareness; Impaired hearing   Assessment Pt is 80 y o  male seen for high-complexity PT evaluation on 8/18/2022 s/p admit to [facility] on 8/17/2022 w/ Hyponatremia  PT was consulted to assess pt's functional mobility and d/c needs  Order placed for PT eval and tx, w/ up and OOB as tolerated order  PTA, pt lives c son in [address] [place] [address] c 7+5 DORIAN living level, amb c SPC at baseline, receives assistance for ADLs/IADLs  At time of eval, pt requires min Ax2 for all transfers & level surface amb x 15 ft with RW use  Upon evaluation, pt presenting  with impaired functional mobility d/t decreased strength, decreased endurance, impaired balance, decreased mobility, impaired judgement, decreased safety awareness and impaired hearing  Pertinent PMHx and current co-morbidities affecting pt's physical performance at time of assessment include: CAD, HLD, HTN, CKD stage 3, h/o hyponatremia and SIADH, recent CDiff infection  Personal factors affecting pt at time of eval include: positive fall history, hearing impairments, decreased initiation and engagement and advanced age  The following objective measures performed on IE also reveal limitations: AM-PAC 6-Clicks: 53/45  Pt's clinical presentation is currently unstable/unpredictable seen in pt's presentation of abnormal lab value(s), need for input for task focus and mobility technique, ongoing medical assessment and s/p fall with generalized weakness and gross unsteadiness  Overall, pt's rehab potential and prognosis to return to OF is good as impacted by objective findings, warranting pt to receive further skilled PT interventions to address identified impairments, activity limitation(s), and participation restriction(s)  Goal for patient is to return home   Pt to benefit from continued PT tx to address deficits as defined above and maximize level of functional independent mobility and consistency in order for pt to improve tolerance to OOB activity  From PT/mobility standpoint, recommendation at time of d/c would be post acute rehabilitation services pending progress in order to facilitate return to PLOF  Barriers to Discharge Inaccessible home environment   Goals   Patient Goals "to go home"   [facility] Expiration Date 08/28/22   Short Term Goal #1 1 )Patient will complete bed mobility supervision of 1 for decrease need for caregiver assistance, decrease burden of care  2 ) Patient will complete transfers CGA of 1 to decrease risk of falls, facilitate upright standing posture  3 ) BLE strength to greater than/equal to 4/5 gross musculature to increase ability to safely transfer, control descent to chair  4 ) Patient will exhibit increase dynamic standing balance to Fair+  2-3 minutes without LOB CGA of 1 to improve activity tolerance  5 ) Patient will exhibit increase dynamic ambulatory balance to Fair > 50 feet w/RW min A of 1 to improve ability to mobilize to toilet, chair and decrease risk for additional medical complications  PT Treatment Day 0   Plan   Treatment/Interventions Functional transfer training;LE strengthening/ROM; Elevations; Therapeutic exercise; Endurance training;Patient/family training;Equipment eval/education; Bed mobility;Gait training;Spoke to nursing;Spoke to case management   PT Frequency 3-5x/wk   Recommendation   PT Discharge Recommendation Post acute rehabilitation services   Equipment Recommended   (continue RW use)   Additional Comments Pt's raw score on the AM-PAC Basic Mobility inpatient short form is 15, standardized score is 36 97  Patients at this level are likely to benefit from DC to [facility], however, please refer to therapist recommendation for safe DC planning     AM-PAC Basic Mobility Inpatient   Turning in Bed Without Bedrails 3   Lying on Back to Sitting on Edge of Flat Bed 3   Moving Bed to Chair 2   Standing Up From Chair 3   Walk in Room 2   Climb 3-5 Stairs 2   Basic Mobility Inpatient Raw Score 15 Basic Mobility Standardized Score 36 97   Highest Level Of Mobility   -[facility] Goal 4: Move to chair/commode   -HL Achieved 6: Walk 10 steps or more   End of Consult   Patient Position at End of Consult Bedside chair; All needs within reach       Carin Noonan, PT, DPT

## 2022-08-18 NOTE — CONSULTS
Consultation - Nephrology   Vimal Senior  80 y o  male MRN: [id number]  Unit/Bed#: -01 Encounter: [id number]      A/P:  1  Hyponatremia  Moderate, chronic with hx SIADH, asymptomatic likely, hypoosmolar likely, clinically euvolemic at present with MMM and very trace edema  Based on incline with NSS suspect hypovolemic component due to gi losses over 1 month related to C diff in combination with lasix use  Also nonsomotic ADH release related to ARB with excess free water intake   Baseline sodium appears to be 133-134 from June /July     patient was admitted with a sodium level of 124 meq  Most recent sodium level was 126 meq    Patient has a baseline Creatinine of 0 7-1 0 mg/dL  Most recent creatinine is 0 79 Mg/dL     Urine Na 115 and urine osm 383  Can repeat Na trends depending on Na correction  FeNa with relatively preserved renal function can be difficult to evaluate with profound amount of filtered sodium  Plan    Continue IVF at current rate  Target Na 130  Check TSH and cortisol to exclude an endocrine cause  Fluid restriction 1200 ml   Follow Na trend Q 8 hours  Goal correction 4-6 meq at 24 hours to avoid overcorrection  Improving nicely thus far  Fu sodium this afternoon  Check bladder scan    Low serum sodium reflects an increase in total body water relative to sodium and potassium  Encourage diet high in protein   Insufficient solute intake in excess of free water can cause and maintain hyponatremia due to reduced free water clearance  Replacing potassium will also improve serum sodium  2  CKD3a followed by Dr Jeana Milelr 3/17/21  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  Baseline previously around 1 at that time  Muscle mass can decline and may underestimate his intrinsic function  Most recent Cr 0 7  3  Essential HTN, follow on current regimen  Hold lasix for now  Under reasonable control   Goal -150 in this elderly individual  4  Hx gout multiple sites, previously on allopurinol    5  LE edema, taking furosemide as needed and advised low sodium diet     6  Hypokalemia, mild due to gi losses, kaluresis from IVF  Replenish to goal 3 5-4  History of Present Illness   Physician Requesting Consult: Penelope Sharp, *  HPI: León Bennett  is a 80y o  year old male with hx CKD3a and SIADH presented to ED sp fall with down time of 40 min to 1 hour  He reports diarrhea over the past month with multiple episodes per day  Last stool a few days ago was formed  Would be due to finish his C diff rx for vanco today  Fall details appears to be mechanical fall when fell forward when trying to pick a wrapper out and his legs gave out under him  He scratched his right arm and his nose on his cabinet  CK checked and is 224 today  History obtained from pt and son  Pt is unaware of renal or sodium disorders  Son at beside aware of pt previously being on FR for hyponatremia  Denies etoh use  Drinks 4-5 8 ounce cups of water per day, but I doubt this covers all of his beverages  Previously following FR but not more recently  Does not indulge in excess sodium  He is on lasix 20mg Biweekly  Not on sodium tablets  He is on avepro as well  BP elevated on admission and normotensive today  Pt on room air  Appears comfortable  Na 124 on admission at 2059 8/17  Na improved to 126 as of 752 AM 8/18  Urine osm 383 urine Na 115  Currently on maintenance normal saline for hyponatremia     A complete 10 point review of systems was performed and is otherwise negative    Historical Information   Past Medical History:   Diagnosis Date    Arthritis     Cataract     Coronary artery disease     Coronary atherosclerosis of native coronary artery     Diverticulitis of colon     Essential hypertension, benign     Hyperlipidemia     Hypertension     Peptic ulcer     Renal disorder      Past Surgical History:   Procedure Laterality Date    CATARACT EXTRACTION Right     Left eye 5/2021    CORONARY STENT PLACEMENT      SKIN BIOPSY      TONSILLECTOMY       Social History   Social History     Substance and Sexual Activity   Alcohol Use Not Currently     Social History     Substance and Sexual Activity   Drug Use No     Social History     Tobacco Use   Smoking Status Former Smoker    Types: Pipe   Smokeless Tobacco Never Used     Family History   Problem Relation Age of Onset    Heart attack Brother     Heart disease Mother     Cancer Father        Meds/Allergies   all current active meds have been reviewed, current meds:   Current Facility-Administered Medications   Medication Dose Route Frequency    acetaminophen (TYLENOL) tablet 650 mg  650 mg Oral Q4H PRN    albuterol (FOR EMS ONLY) (2 5 mg/3 mL) 0 083 % inhalation solution 2 5 mg  1 each Does not apply Once    allopurinol (ZYLOPRIM) tablet 150 mg  150 mg Oral Daily    [START ON 8/19/2022] aspirin (ECOTRIN LOW STRENGTH) EC tablet 81 mg  81 mg Oral Once per day on Mon Fri    atorvastatin (LIPITOR) tablet 20 mg  20 mg Oral Daily    cholecalciferol (VITAMIN D3) tablet 2,000 Units  2,000 Units Oral Daily    enoxaparin (LOVENOX) subcutaneous injection 40 mg  40 mg Subcutaneous Daily    fenofibrate (TRICOR) tablet 48 mg  48 mg Oral Daily    glycerin-hypromellose- (ARTIFICIAL TEARS) ophthalmic solution 1 drop  1 drop Both Eyes Daily    losartan (COZAAR) tablet 50 mg  50 mg Oral Daily    multivitamin stress formula tablet 1 tablet  1 tablet Oral Daily    nitroglycerin (NITROSTAT) SL tablet 0 4 mg  0 4 mg Sublingual Q5 Min PRN    ondansetron (ZOFRAN) injection 4 mg  4 mg Intravenous Q6H PRN    sodium chloride 0 9 % infusion  100 mL/hr Intravenous Continuous    vancomycin (VANCOCIN) capsule 125 mg  125 mg Oral Q6H [facility]    and PTA meds:    Medications Prior to Admission   Medication    ALLOPURINOL PO    aspirin 81 MG tablet    atorvastatin (LIPITOR) 20 mg tablet  Cholecalciferol 50 MCG (2000 UT) CAPS    Choline Fenofibrate (FENOFIBRIC ACID) 45 MG CPDR    furosemide (LASIX) 20 mg tablet    irbesartan (AVAPRO) 150 mg tablet    Multiple Vitamins tablet    nitroglycerin (Nitrostat) 0 4 mg SL tablet    ofloxacin (OCUFLOX) 0 3 % ophthalmic solution    pneumococcal 23-valent polysaccharide vaccine (PNEUMOVAX 23)    potassium chloride (KLOR-CON) 20 mEq packet    prednisoLONE acetate (PRED FORTE) 1 % ophthalmic suspension         No Known Allergies    Objective     Intake/Output Summary (Last 24 hours) at 8/18/2022 1457  Last data filed at 8/18/2022 1300  Gross per 24 hour   Intake 1191 67 ml   Output 250 ml   Net 941 67 ml              Physical Exam      No intake/output data recorded  Vitals:    08/18/22 1236   BP: 140/62   Pulse: 62   Resp: 18   Temp: 97 8 °F (36 6 °C)   SpO2: 99%       General Appearance:    No acute distress  Cooperative  Appears stated age  elderly   Head:    Normocephalic  Atraumatic      Eyes:    Lids, conjunctiva normal  No scleral icterus  Ears:    Normal external ears  Nose:   Nares normal  No drainage  Mouth:   Lips, tongue normal  Mucosa normal  Phonation normal    Neck:   Supple  Symmetrical    Back:     Symmetric  No CVA tenderness  Lungs:     Normal respiratory effort  Clear to auscultation bilaterally  Chest wall:    No tenderness or deformity  Heart:    Regular rate and rhythm  Normal S1 and S2  No murmur  No JVD  Trace edema RLE   Abdomen:     Soft  Non-tender  Bowel sounds active  Genitourinary:   No Mckenna catheter present  Extremities:   Extremities normal  Atraumatic  No cyanosis  Skin:   Warm and dry  seborrheic  keratosis    Neurologic:   Alert and oriented to person, place, time  +weakness         Current Weight: Weight - Scale: 59 kg (130 lb)  First Weight: Weight - Scale: 59 kg (130 lb)    Lab Results:  I have personally reviewed pertinent labs      CBC:   Lab Results   Component Value Date    WBC 6 61 08/18/2022    HGB 10 8 (L) 08/18/2022    HCT 31 5 (L) 08/18/2022    MCV 98 08/18/2022     08/18/2022    MCH 33 4 08/18/2022    MCHC 34 3 08/18/2022    RDW 13 4 08/18/2022    MPV 10 1 08/18/2022    NRBC 0 08/17/2022     CMP:   Lab Results   Component Value Date    K 3 1 (L) 08/18/2022    CL 95 (L) 08/18/2022    CO2 26 08/18/2022    BUN 9 08/18/2022    CREATININE 0 79 08/18/2022    CALCIUM 7 7 (L) 08/18/2022    EGFR 75 08/18/2022     Phosphorus: No results found for: PHOS  Magnesium: No results found for: MG  Urinalysis: No results found for: [place], SPECGRAV, PHUR, LEUKOCYTESUR, NITRITE, PROTEINUA, GLUCOSEU, KETONESU, BILIRUBINUR, BLOODU  Ionized Calcium: No results found for: CAION  Coagulation: No results found for: PT, INR, APTT  Troponin: No results found for: TROPONINI  ABG: No results found for: PHART, ZJW1IOQ, PO2ART, MFE4KWL, A6LUKWEK, BEART, SOURCE        Radiology review:  Procedure: XR Trauma chest portable    Result Date: 8/18/2022  Narrative: CHEST INDICATION:   TRAUMA  COMPARISON:  10/15/2021 EXAM PERFORMED/VIEWS:  XR CHEST PORTABLE FINDINGS: Cardiomediastinal silhouette appears unremarkable  The lungs are clear  No pneumothorax or pleural effusion  Osseous structures appear within normal limits for patient age  Impression: No acute cardiopulmonary disease  Workstation performed: [id number]     Procedure: TRAUMA - CT head wo contrast    Result Date: 8/17/2022  Narrative: CT BRAIN - WITHOUT CONTRAST INDICATION:   TRAUMA  COMPARISON:  10/9/2021 TECHNIQUE:  CT examination of the brain was performed  In addition to axial images, sagittal and coronal 2D reformatted images were created and submitted for interpretation  Radiation dose length product (DLP) for this visit:  841 mGy-cm     This examination, like all CT scans performed in the [facility], was performed utilizing techniques to minimize radiation dose exposure, including the use of iterative reconstruction and automated exposure control  IMAGE QUALITY:  Diagnostic  FINDINGS: PARENCHYMA:  No intracranial mass, mass effect or midline shift  No CT signs of acute infarction  No acute parenchymal hemorrhage  Age expected atrophy of the brain  VENTRICLES AND EXTRA-AXIAL SPACES:  Normal for the patient's age  VISUALIZED ORBITS AND PARANASAL SINUSES:  Mucosal thickening maxillary sinuses  CALVARIUM AND EXTRACRANIAL SOFT TISSUES:  Normal      Impression: No acute intracranial abnormality  Workstation performed: [id number]     Procedure: TRAUMA - CT facial bones wo contrast    Result Date: 8/17/2022  Narrative: CT FACIAL BONES WITHOUT INTRAVENOUS CONTRAST INDICATION:   TRAUMA  COMPARISON: None  TECHNIQUE:  Axial CT images were obtained through the facial bones with additional sagittal and coronal reconstructions  Radiation dose length product (DLP) for this visit:  315 mGy-cm   This examination, like all CT scans performed in the [facility], was performed utilizing techniques to minimize radiation dose exposure, including the use of iterative reconstruction and automated exposure control  IMAGE QUALITY:  Diagnostic  FINDINGS: FACIAL BONES:   No facial bone fracture identified  Normal alignment of the temporomandibular joints  No lytic or blastic lesion  ORBITS:  Orbital globes, optic nerves, and extraocular muscles appear symmetric and normal  There is no evidence of retrobulbar mass, abscess, or hematoma  SINUSES:  Mild mucosal thickening of maxillary sinuses  No air-fluid levels or sinus fractures  SOFT TISSUES:  Normal  Chronic dental disease  There is some artifact from dental fillings  Impression: No fracture seen Workstation performed: [id number]     Procedure: TRAUMA - CT spine cervical wo contrast    Result Date: 8/17/2022  Narrative: CT CERVICAL SPINE - WITHOUT CONTRAST INDICATION:   TRAUMA   COMPARISON:  10/9/2021 TECHNIQUE:  CT examination of the cervical spine was performed without intravenous contrast   Contiguous axial images were obtained  Sagittal and coronal reconstructions were performed  Radiation dose length product (DLP) for this visit:  416 mGy-cm   This examination, like all CT scans performed in the [facility], was performed utilizing techniques to minimize radiation dose exposure, including the use of iterative reconstruction and automated exposure control  IMAGE QUALITY:  Diagnostic  FINDINGS: ALIGNMENT:  Normal alignment of the cervical spine  No subluxation  VERTEBRAL BODIES:  No fracture  DEGENERATIVE CHANGES:  Moderate to severe multilevel disc degeneration of cervical and upper thoracic spine  There appears to be mild cervical stenosis at C6-C7  PREVERTEBRAL AND PARASPINAL SOFT TISSUES:  Unremarkable  THORACIC INLET:  Normal      Impression: No cervical spine fracture or traumatic malalignment  Workstation performed: [id number]           Counseling / Coordination of Care  Total ADDITIONAL floor / unit time spent today 35 minutes  Greater than 50% of total time was spent with the patient and / or family counseling and / or coordination of care  A description of the counseling / coordination of care: [address], DO      This consultation note was produced in part using a dictation device which may document imprecise wording from author's original intent

## 2022-08-18 NOTE — OCCUPATIONAL THERAPY NOTE
Occupational Therapy Evaluation      Jarrell Holloway Sr     8/18/2022    Principal Problem:    Hyponatremia  Active Problems:    Coronary artery disease involving native coronary artery of native heart without angina pectoris    Essential hypertension    Mixed hyperlipidemia      Past Medical History:   Diagnosis Date    Arthritis     Cataract     Coronary artery disease     Coronary atherosclerosis of native coronary artery     Diverticulitis of colon     Essential hypertension, benign     Hyperlipidemia     Hypertension     Peptic ulcer     Renal disorder        Past Surgical History:   Procedure Laterality Date    CATARACT EXTRACTION Right     Left eye 5/2021    CORONARY STENT PLACEMENT      SKIN BIOPSY      TONSILLECTOMY          08/18/22 0923   OT Last Visit   OT Visit Date 08/18/22   Note Type   Note type Evaluation   Restrictions/Precautions   Weight Bearing Precautions Per Order No   Other Precautions Fall Risk;Multiple lines;Telemetry;Hard of hearing   Pain Assessment   Pain Assessment Tool 0-10   Pain Score No Pain   Home Living   Type of Home House  (half a double)   Home Layout One level;Performs ADLs on one level; Able to live on main level with bedroom/bathroom;Stairs to enter with rails  (7+5 DORIAN)   Bathroom Shower/Tub   (sponge bathes at baseline)   [address]  (uses cane at baseline)   Prior Function   Level of [place] Needs assistance with ADLs and functional mobility; Needs assistance with IADLs   Lives With Son   Receives Help From Family   ADL Assistance Needs assistance  (compression socks)   IADLs Needs assistance   Falls in the last 6 months 1 to 4  (1 fall PTA; + fall history > 6 mo ago)   Vocational Retired   [address]  [address] [facility] 4  [address] 3  Moderate Assistance Bed Mobility   Supine to Sit 4  Minimal assistance   Additional items Assist x 1;HOB elevated; Increased time required   Transfers   Sit to Stand 4  Minimal assistance   Additional items Assist x 2; Increased time required   Stand to Sit 4  Minimal assistance   Additional items Assist x 2;Armrests   Balance   Static Sitting Fair +   Dynamic Sitting Fair -   Static Standing Fair -   Dynamic Standing Poor +   Ambulatory Poor +   Activity Tolerance   Activity Tolerance Patient limited by fatigue   Nurse Made Aware ZULLY Barth   RUE Assessment   RUE Assessment X  (AROM WFL)   RUE Strength   RUE Overall Strength Deficits  (3+/5)   LUE Assessment   LUE Assessment X  (AAROM WFL, Pt reports torn rotator cuff)   LUE Strength   LUE Overall Strength Deficits  (3-/5)   Sensation   Light Touch Partial deficits in the RUE;Partial deficits in the LUE   Vision-Basic Assessment   Current Vision Wears glasses all the time   Cognition   Overall Cognitive Status [facility]   Arousal/Participation Alert; Cooperative   Attention Attends with cues to redirect   Orientation Level Oriented X4   Memory Decreased recall of precautions   Following Commands Follows one step commands without difficulty   Assessment   Limitation Decreased ADL status; Decreased UE ROM; Decreased UE strength;Decreased Safe judgement during ADL;Decreased endurance;Decreased self-care trans;Decreased high-level ADLs   Prognosis Good   Assessment Pt is a 80 y o  male seen for OT evaluation s/p admit to [place] [address] on 8/17/2022 w/ Hyponatremia  Comorbidities affecting pt's functional performance at time of assessment include: Multiple skin tears, Hyponatremia, Weakness  Personal factors affecting pt at time of IE include:difficulty performing ADLS and limited insight into deficits  Prior to admission, pt was Mod I with ADLs   Upon evaluation: the following deficits impact occupational performance: decreased ROM, decreased strength, decreased balance, decreased tolerance, impaired sequencing and impaired problem solving  Pt to benefit from continued skilled OT tx while in the hospital to address deficits as defined above and maximize level of functional independence w ADL's and functional mobility  Occupational Performance areas to address include: bathing/shower, toilet hygiene, dressing, functional mobility and clothing management  From OT standpoint, recommendation at time of d/c would be STR  Goals   Patient Goals "to go home"   Plan   Treatment Interventions ADL retraining;Functional transfer training;UE strengthening/ROM; Endurance training;Equipment evaluation/education; Compensatory technique education; Energy conservation; Activityengagement   Goal Expiration Date 08/28/22   OT Treatment Day 0   OT Frequency 3-5x/wk   Recommendation   OT Discharge Recommendation Post acute rehabilitation services   Additional Comments  Pt seen as a co-eval with PT due to the patient's co-morbidities, clinically unstable presentation, and present impairments which are a regression from the patient's baseline  Additional Comments 2 The patient's raw score on the AM-PAC Daily Activity inpatient short form is 17, standardized score is 37 26, less than 39 4  Patients at this level are likely to benefit from discharge to post-acute rehabilitation services  Please refer to the recommendation of the Occupational Therapist for safe discharge planning     AM-PAC Daily Activity Inpatient   Lower Body Dressing 2   Bathing 2   Toileting 2   Upper Body Dressing 3   Grooming 4   Eating 4   Daily Activity Raw Score 17   Daily Activity Standardized Score (Calc for Raw Score >=11) 37 26   AM-[facility] Applied Cognition Inpatient   Following a Speech/Presentation 4   Understanding Ordinary Conversation 4   Taking Medications 3   Remembering Where Things Are Placed or Put Away 3   Remembering List of 4-5 Errands 3   Taking Care of Complicated Tasks 3   Applied Cognition Raw Score 20   Applied Cognition Standardized Score 41 76 GOALS:    Pt will achieve the following within specified time frame: STG  Pt will achieve the following goals within 5 days    *ADL transfers with Min (A) for inc'd independence with ADLs/purposeful tasks    *UB ADL with (S) for inc'd independence with self cares    *LB ADL with Min (A) using AE prn for inc'd independence with self cares    *Toileting with Min (A) for clothing management and hygiene for return to PLOF with personal care    *Increase static stand balance to F and dyn stand balance to F- for inc'd safety with standing purposeful tasks    *Increase stand tolerance x3 m for inc'd tolerance with standing purposeful tasks    *Participate in 10m UE therex to increase overall stamina/activity tolerance for purposeful tasks    *Bed mobility- CGA for inc'd independence to manage own comfort and initiate EOB & OOB purposeful tasks    Pt will achieve the following within specified time frame: LTG  Pt will achieve the following goals within 10 days    *ADL transfers with CGA for inc'd independence with ADLs/purposeful tasks    *UB ADL with (I) for inc'd independence with self cares    *LB ADL with CGA using AE prn for inc'd independence with self cares    *Toileting with CGA for clothing management and hygiene for return to PLOF with personal care    *Increase static stand balance to F+ and dyn stand balance to F for inc'd safety with standing purposeful tasks    *Increase stand tolerance x5 m for inc'd tolerance with standing purposeful tasks    *Bed mobility- (S) for inc'd independence to manage own comfort and initiate EOB & OOB purposeful tasks      Consuelo Bustillos, MS, OTR/L

## 2022-08-18 NOTE — ED PROVIDER NOTES
Emergency Department Trauma Note  Layne Alba  80 y o  male MRN: [id number]  Unit/Bed#: /-01 Encounter: [id number]      Trauma Alert: Trauma Acuity: Trauma Evaluation  Model of Arrival: Mode of Arrival: BLS via    Trauma Team: Current Providers  Attending Provider: Kaylin Leon DO  Attending Provider: Yvette Smith DO  Attending Provider: Tomas Ricketts DO  Attending Provider: Cherelle Campbell MD  Registered Nurse: Rosa Maria Brown RN  Consulting Physician: DO Brenton  Registered Nurse: Eusebia Flores RN  Registered Nurse: Franchesca Newton, ZULLY  Registered Nurse: Velvet Alan RN  Registered Nurse: Franchesca Newton RN  Registered Nurse: Tsering Arvizu RN  Patient Care Assistant: Sean England  Consultants:     None      History of Present Illness     Chief Complaint:   Chief Complaint   Patient presents with    Fall - Major     Leaning over to pick something up and fell  Reports ? Hit to nose  No pain, no cspine tenderness no loc  Has right elbow skin tear and broken watch on left wrist  No deformities noted  +asa  HPI:  Layne Alba  is a 80 y o  male who presents from home with son after a fall  States he was trying to pick something up and fell forward into his counter striking his nose  Denies LOC  Did catch his R arm on a screw giving himself a few skin tears  Denies any other pain or issues at this time  Mechanism:Details of Incident: pt dropped item on ground, went to pick it up and fell over  unsure if hit head  takes asa  ?hit to nose  denies cspine tenderness or loc  right elbow abrasion/wrapped pta  pt did break watch in fall on left wrist  no report of prolonged down time  ccollar cleared by dr Justin Reese on arrival            HPI  Review of Systems   Skin: Positive for wound (skin tears to R elbow)  All other systems reviewed and are negative        Historical Information     Immunizations:   Immunization History Administered Date(s) Administered    Tdap 08/17/2022       Past Medical History:   Diagnosis Date    Arthritis     Cataract     Coronary artery disease     Coronary atherosclerosis of native coronary artery     Diverticulitis of colon     Essential hypertension, benign     Hyperlipidemia     Hypertension     Peptic ulcer     Renal disorder        Family History   Problem Relation Age of Onset    Heart attack Brother     Heart disease Mother     Cancer Father      Past Surgical History:   Procedure Laterality Date    CATARACT EXTRACTION Right     Left eye 5/2021    CORONARY STENT PLACEMENT      SKIN BIOPSY      TONSILLECTOMY       Social History     Tobacco Use    Smoking status: Former Smoker     Types: Pipe    Smokeless tobacco: Never Used   Vaping Use    Vaping Use: Never used   Substance Use Topics    Alcohol use: Not Currently    Drug use: No     E-Cigarette/Vaping    E-Cigarette Use Never User      E-Cigarette/Vaping Substances    Nicotine No     THC No     CBD No     Flavoring No     Other No     Unknown No            Meds/Allergies   Prior to Admission Medications   Prescriptions Last Dose Informant Patient Reported? Taking?    ALLOPURINOL PO  Self Yes No   Sig: Take 150 mg by mouth daily 150mg   Cholecalciferol 50 MCG (2000 UT) CAPS  Self Yes No   Sig: Take by mouth   Choline Fenofibrate (FENOFIBRIC ACID) 45 MG CPDR  Self Yes No   Sig: Take by mouth   Multiple Vitamins tablet  Self Yes No   Sig: Take by mouth   aspirin 81 MG tablet  Self Yes No   Sig: Take 1 tablet by mouth see administration instructions Take 1 tablet Mon-Wed & Fri   atorvastatin (LIPITOR) 20 mg tablet  Self Yes No   Sig: Take 1 tablet by mouth daily   furosemide (LASIX) 20 mg tablet   No No   Sig: Take 1 tablet (20 mg total) by mouth 2 (two) times a week   irbesartan (AVAPRO) 150 mg tablet  Self Yes No   Sig: Take 150 mg by mouth daily    nitroglycerin (Nitrostat) 0 4 mg SL tablet  Self No No   Sig: Place 1 tablet (0 4 mg total) under the tongue every 5 (five) minutes as needed for chest pain   ofloxacin (OCUFLOX) 0 3 % ophthalmic solution  Self Yes No   Sig: ofloxacin 0 3 % eye drops   pneumococcal 23-valent polysaccharide vaccine (PNEUMOVAX 23)  Self Yes No   Sig: Pneumovax 23 25 mcg/0 5 mL injection syringe   potassium chloride (KLOR-CON) 20 mEq packet  Self Yes No   Sig: Take 20 mEq by mouth as needed Take 1 tablet when taking Furosemide   prednisoLONE acetate (PRED FORTE) 1 % ophthalmic suspension  Self Yes No   Sig: prednisolone acetate 1 % eye drops,suspension      Facility-Administered Medications: None       No Known Allergies    PHYSICAL EXAM      Objective   Vitals:   First set: Temperature: 99 6 °F (37 6 °C) (08/17/22 2028)  Pulse: 69 (08/17/22 2028)  Respirations: 15 (08/17/22 2028)  Blood Pressure: 163/62 (08/17/22 2028)  SpO2: 98 % (08/17/22 2028)    Primary Survey:   (A) Airway: intact  (B) Breathing: equal B/L  (C) Circulation: Pulses:   normal  (D) Disabliity:  GCS Total:  15  (E) Expose:  Completed    Secondary Survey: (Click on Physical Exam tab above)  Physical Exam  Vitals and nursing note reviewed  Constitutional:       General: He is not in acute distress  Appearance: He is well-developed  He is not diaphoretic  HENT:      Head: Normocephalic and atraumatic  Right Ear: External ear normal       Left Ear: External ear normal       Nose: Nose normal    Eyes:      General: No scleral icterus  Right eye: No discharge  Left eye: No discharge  Conjunctiva/sclera: Conjunctivae normal    Cardiovascular:      Rate and Rhythm: Normal rate and regular rhythm  Heart sounds: Normal heart sounds  No murmur heard  No friction rub  No gallop  Pulmonary:      Effort: Pulmonary effort is normal  No respiratory distress  Breath sounds: Normal breath sounds  No wheezing or rales  Abdominal:      General: Bowel sounds are normal  There is no distension  Palpations: Abdomen is soft  There is no mass  Tenderness: There is no abdominal tenderness  There is no guarding  Musculoskeletal:         General: No tenderness or deformity  Normal range of motion  Cervical back: Normal, normal range of motion and neck supple  Thoracic back: Normal       Lumbar back: Normal    Skin:     General: Skin is warm and dry  Coloration: Skin is not pale  Findings: No erythema or rash  Comments: Three separate skin tears to the right elbow  Non repairable with sutures  Patient's son requesting skin glue  Skin glue applied to the area and bandages placed  Patient with full range of motion of the right elbow without pain  Arm otherwise neurovascular intact  Neurological:      Mental Status: He is alert and oriented to person, place, and time  Psychiatric:         Behavior: Behavior normal          Thought Content: Thought content normal          Judgment: Judgment normal          Cervical spine cleared by clinical criteria? No (imaging required)      Invasive Devices  Report    Peripheral Intravenous Line  Duration           Peripheral IV 08/17/22 Left Forearm <1 day                Lab Results:   Results Reviewed     Procedure Component Value Units Date/Time    CK (with reflex to MB) [[id number]]  (Normal) Collected: 08/18/22 0752    Lab Status: Final result Specimen: Blood from Arm, Left Updated: 08/18/22 1449     Total  U/L     CKMB [[id number]] Collected: 08/18/22 0752    Lab Status:  In process Specimen: Blood from Arm, Left Updated: 08/18/22 1449    BMP Q8 hours X 3 (Hyponatremia monitoring) [[id number]]     Lab Status: No result Specimen: Blood     BMP Q8 hours X 3 (Hyponatremia monitoring) [[id number]]  (Abnormal) Collected: 08/18/22 0752    Lab Status: Final result Specimen: Blood from Arm, Left Updated: 08/18/22 0846     Sodium 126 mmol/L      Potassium 3 1 mmol/L      Chloride 95 mmol/L      CO2 26 mmol/L      ANION GAP 5 mmol/L      BUN 9 mg/dL      Creatinine 0 79 mg/dL      Glucose 77 mg/dL      Calcium 7 7 mg/dL      eGFR 75 ml/min/1 73sq m     Narrative:      [place] guidelines for Chronic Kidney Disease (CKD):     Stage 1 with normal or high GFR (GFR > 90 mL/min/1 73 square meters)    Stage 2 Mild CKD (GFR = 60-89 mL/min/1 73 square meters)    Stage 3A Moderate CKD (GFR = 45-59 mL/min/1 73 square meters)    Stage 3B Moderate CKD (GFR = 30-44 mL/min/1 73 square meters)    Stage 4 Severe CKD (GFR = 15-29 mL/min/1 73 square meters)    Stage 5 End Stage CKD (GFR <15 mL/min/1 73 square meters)  Note: GFR calculation is accurate only with a steady state creatinine    Osmolality, urine [[id number]]  (Normal) Collected: 08/17/22 2327    Lab Status: Final result Specimen: Urine, Clean Catch Updated: 08/18/22 0839     Osmolality, Ur 383 mmol/KG     Sodium, urine, random [[id number]] Collected: 08/17/22 2327    Lab Status: Final result Specimen: Urine, Clean Catch Updated: 08/18/22 0833     Sodium, Ur 115    CBC (With Platelets) [[id number]]  (Abnormal) Collected: 08/18/22 0545    Lab Status: Final result Specimen: Blood from Arm, Left Updated: 08/18/22 0603     WBC 6 61 Thousand/uL      RBC 3 23 Million/uL      Hemoglobin 10 8 g/dL      Hematocrit 31 5 %      MCV 98 fL      MCH 33 4 pg      MCHC 34 3 g/dL      RDW 13 4 %      Platelets 394 Thousands/uL      MPV 10 1 fL     HS Troponin I 4hr [[id number]]  (Normal) Collected: 08/18/22 0316    Lab Status: Final result Specimen: Blood from Arm, Left Updated: 08/18/22 0350     hs TnI 4hr 13 ng/L      Delta 4hr hsTnI 3 ng/L     HS Troponin I 2hr [[id number]]  (Normal) Collected: 08/17/22 2349    Lab Status: Final result Specimen: Blood from Arm, Left Updated: 08/18/22 0025     hs TnI 2hr 11 ng/L      Delta 2hr hsTnI 1 ng/L     BMP Q8 hours X 3 (Hyponatremia monitoring) [[id number]]  (Abnormal) Collected: 08/17/22 2506    Lab Status: Final result Specimen: Blood from Arm, Left Updated: 08/18/22 0020     Sodium 125 mmol/L      Potassium 3 6 mmol/L      Chloride 93 mmol/L      CO2 26 mmol/L      ANION GAP 6 mmol/L      BUN 11 mg/dL      Creatinine 0 83 mg/dL      Glucose 121 mg/dL      Calcium 9 0 mg/dL      eGFR 73 ml/min/1 73sq m     Narrative:      [place] guidelines for Chronic Kidney Disease (CKD):     Stage 1 with normal or high GFR (GFR > 90 mL/min/1 73 square meters)    Stage 2 Mild CKD (GFR = 60-89 mL/min/1 73 square meters)    Stage 3A Moderate CKD (GFR = 45-59 mL/min/1 73 square meters)    Stage 3B Moderate CKD (GFR = 30-44 mL/min/1 73 square meters)    Stage 4 Severe CKD (GFR = 15-29 mL/min/1 73 square meters)    Stage 5 End Stage CKD (GFR <15 mL/min/1 73 square meters)  Note: GFR calculation is accurate only with a steady state creatinine    HS Troponin 0hr (reflex protocol) [[id number]]  (Normal) Collected: 08/17/22 2054    Lab Status: Final result Specimen: Blood from Arm, Left Updated: 08/17/22 2131     hs TnI 0hr 10 ng/L     Basic metabolic panel [[id number]]  (Abnormal) Collected: 08/17/22 2059    Lab Status: Final result Specimen: Blood from Arm, Left Updated: 08/17/22 2123     Sodium 124 mmol/L      Potassium 3 9 mmol/L      Chloride 92 mmol/L      CO2 27 mmol/L      ANION GAP 5 mmol/L      BUN 11 mg/dL      Creatinine 0 83 mg/dL      Glucose 91 mg/dL      Calcium 9 2 mg/dL      eGFR 73 ml/min/1 73sq m     Narrative:      [place] guidelines for Chronic Kidney Disease (CKD):     Stage 1 with normal or high GFR (GFR > 90 mL/min/1 73 square meters)    Stage 2 Mild CKD (GFR = 60-89 mL/min/1 73 square meters)    Stage 3A Moderate CKD (GFR = 45-59 mL/min/1 73 square meters)    Stage 3B Moderate CKD (GFR = 30-44 mL/min/1 73 square meters)    Stage 4 Severe CKD (GFR = 15-29 mL/min/1 73 square meters)    Stage 5 End Stage CKD (GFR <15 mL/min/1 73 square meters)  Note: GFR calculation is accurate only with a steady state creatinine    CBC and differential [[id number]]  (Abnormal) Collected: 08/17/22 2054    Lab Status: Final result Specimen: Blood from Arm, Left Updated: 08/17/22 2106     WBC 6 49 Thousand/uL      RBC 3 31 Million/uL      Hemoglobin 10 8 g/dL      Hematocrit 31 7 %      MCV 96 fL      MCH 32 6 pg      MCHC 34 1 g/dL      RDW 13 4 %      MPV 9 7 fL      Platelets 134 Thousands/uL      nRBC 0 /100 WBCs      Neutrophils Relative 79 %      Immat GRANS % 0 %      Lymphocytes Relative 13 %      Monocytes Relative 8 %      Eosinophils Relative 0 %      Basophils Relative 0 %      Neutrophils Absolute 5 12 Thousands/µL      Immature Grans Absolute 0 02 Thousand/uL      Lymphocytes Absolute 0 83 Thousands/µL      Monocytes Absolute 0 49 Thousand/µL      Eosinophils Absolute 0 01 Thousand/µL      Basophils Absolute 0 02 Thousands/µL                  Imaging Studies:   Direct to CT: No  TRAUMA - CT head wo contrast   Final Result by Amado Marinelli MD (08/17 2105)      No acute intracranial abnormality  Workstation performed: [id number]         TRAUMA - CT spine cervical wo contrast   Final Result by Amado Marinelli MD (08/17 2109)      No cervical spine fracture or traumatic malalignment  Workstation performed: [id number]         TRAUMA - CT facial bones wo contrast   Final Result by Amado Marinelli MD (08/17 2103)      No fracture seen               Workstation performed: [id number]         XR Trauma chest portable   ED Interpretation by Emily Bhandari DO (08/17 2111)   No acute cardio/pulmonary disease noted on my interpretation          Final Result by Darvin Tellez MD (08/18 0502)      No acute cardiopulmonary disease                    Workstation performed: [id number]               Procedures  ECG 12 Lead Documentation Only    Date/Time: 8/17/2022 9:06 PM  Performed by: Emily Bhandari DO  Authorized by: Emily Bhandari DO     Interpretation: Interpretation: normal    Rate:     ECG rate:  67    ECG rate assessment: normal    Rhythm:     Rhythm: sinus rhythm    Ectopy:     Ectopy: none    QRS:     QRS axis:  Normal    QRS intervals:  Normal  Conduction:     Conduction: normal    ST segments:     ST segments:  Normal  T waves:     T waves: normal               ED Course           MDM  Number of Diagnoses or Management Options  Closed head injury, initial encounter  Hyponatremia  Multiple skin tears  Weakness  Diagnosis management comments: Skin glue placed on skin tears in the right arm  Tetanus updated  CT head, facial bones, cervical spine  Chest x-ray new showing no traumatic injuries  Abdomen nontender  Full range of motion of arms and legs without discomfort or pain  All 4 extremities neurovascularly intact  Son at bedside states that his father is acting appropriately and never lost consciousness  Patient was made a trauma evaluation as he states he struck his nose on the counter and takes aspirin daily  Patient's son states that if all testing is negative he feels comfortable taking him home as they live together  Signed out to Dr Keegan Farias pending results          Disposition  [facility] Transfer: No  Final diagnoses:   Closed head injury, initial encounter   Multiple skin tears   Hyponatremia   Weakness     Time reflects when diagnosis was documented in both MDM as applicable and the Disposition within this note     Time User Action Codes Description Comment    8/17/2022  9:17 PM Phil Barahona Add [S09 90XA] Closed head injury, initial encounter     8/17/2022  9:17 PM Phil Mitchell Riky Arms  8XXA] Multiple skin tears     8/17/2022 10:12 PM Ayaz Elizaldeon Add [E87 1] Hyponatremia     8/17/2022 10:12 PM Ayaz Dominion Add [R53 1] Weakness       ED Disposition     ED Disposition   Admit    Condition   Stable    Date/Time   Wed Aug 17, 2022 10:12 PM    Comment   Case was discussed with César and the patient's admission status was agreed to be Admission Status: inpatient status to the service of Dr Jean-Paul Ahuja   Follow-up Information     Follow up With Specialties Details Why Contact Info Additional Information    Chaim Steel MD Internal Medicine   [address] [facility] [address]  [phone]       [facility] Emergency Department Emergency Medicine Go to  As needed, If symptoms worsen [address] [place] [id number] [phone] [facility] Emergency Department, [address], [place], [address] [facility]        Current Discharge Medication List      CONTINUE these medications which have NOT CHANGED    Details   ALLOPURINOL PO Take 150 mg by mouth daily 150mg      aspirin 81 MG tablet Take 1 tablet by mouth see administration instructions Take 1 tablet Mon-Wed & Fri      atorvastatin (LIPITOR) 20 mg tablet Take 1 tablet by mouth daily      Cholecalciferol 50 MCG (2000 UT) CAPS Take by mouth      Choline Fenofibrate (FENOFIBRIC ACID) 45 MG CPDR Take by mouth      furosemide (LASIX) 20 mg tablet Take 1 tablet (20 mg total) by mouth 2 (two) times a week  Qty: 30 tablet, Refills: 4    Associated Diagnoses: Bilateral leg edema      irbesartan (AVAPRO) 150 mg tablet Take 150 mg by mouth daily       Multiple Vitamins tablet Take by mouth      nitroglycerin (Nitrostat) 0 4 mg SL tablet Place 1 tablet (0 4 mg total) under the tongue every 5 (five) minutes as needed for chest pain  Qty: 25 tablet, Refills: 3    Associated Diagnoses: Coronary artery disease involving native coronary artery of native heart without angina pectoris      ofloxacin (OCUFLOX) 0 3 % ophthalmic solution ofloxacin 0 3 % eye drops      pneumococcal 23-valent polysaccharide vaccine (PNEUMOVAX 23) Pneumovax 23 25 mcg/0 5 mL injection syringe      potassium chloride (KLOR-CON) 20 mEq packet Take 20 mEq by mouth as needed Take 1 tablet when taking Furosemide      prednisoLONE acetate (PRED FORTE) 1 % ophthalmic suspension prednisolone acetate 1 % eye drops,suspension           No discharge procedures on file      PDMP Review     None          ED Provider  Electronically Signed by         Tian Hong DO  08/18/22 5564

## 2022-08-18 NOTE — PROGRESS NOTES
Latanya 45  Progress Note - Ne Velasco Sr  10/11/1924, 80 y o  male MRN: [id number]  Unit/Bed#: KARTHIK Encounter: [id number]  Primary Care Provider: Kevon Calderón MD   Date and time admitted to hospital: 2022  8:22 PM    * Hyponatremia  Assessment & Plan  · Possibly secondary dehydration as well as medication side effect  · Currently on IV fluids  · Sodium level slightly improved today  · Will follow-up with nephrology  · Repeat BMP tomorrow    Fall  Assessment & Plan  · With debility  · Imaging studies negative for acute fractures  · Patient was seen by PT/OT recommending    Mixed hyperlipidemia  Assessment & Plan  · Continue home statin    Essential hypertension  Assessment & Plan  · Continue home metoprolol  Losartan started in place of patient's home ARB  · Lasix currently on hold    Coronary artery disease involving native coronary artery of native heart without angina pectoris  Assessment & Plan  · Known CAD with LAD stenting in   · Continue home metoprolol      VTE Prophylaxis:  Enoxaparin (Lovenox)    Patient Centered Rounds: I have performed bedside rounds with nursing staff today  Discussions with Specialists or Other Care Team Provider: yes  Education and Discussions with Family / Patient:  Spoke with patient's son at bedside regarding plan of care    Current Length of Stay: 1 day(s)    Current Patient Status: Inpatient   Certification Statement: The patient will continue to require additional inpatient hospital stay due to Hyponatremia    Discharge Plan:  Pending hospital course  PT recommending rehab  Case management for placement    Code Status: Level 3 - DNAR and DNI    Subjective:   No overnight events noted  Patient denies any pain complaints    Tolerating diet    Objective:     Vitals:   Temp (24hrs), Av °F (37 2 °C), Min:98 4 °F (36 9 °C), Max:99 6 °F (37 6 °C)    Temp:  [98 4 °F (36 9 °C)-99 6 °F (37 6 °C)] 98 4 °F (36 9 °C)  HR:  [55-71] 65  Resp: [14-18] 14  BP: (125-165)/(58-81) 125/64  SpO2:  [88 %-100 %] 100 %  Body mass index is 20 98 kg/m²  Input and Output Summary (last 24 hours): Intake/Output Summary (Last 24 hours) at 8/18/2022 1210  Last data filed at 8/18/2022 0940  Gross per 24 hour   Intake 1011 67 ml   Output 250 ml   Net 761 67 ml       Physical Exam:   Physical Exam  Constitutional:       General: He is not in acute distress  Comments: Frail elderly male   HENT:      Head: Normocephalic and atraumatic  Nose: Nose normal       Mouth/Throat:      Mouth: Mucous membranes are moist    Eyes:      Extraocular Movements: Extraocular movements intact  Conjunctiva/sclera: Conjunctivae normal    Cardiovascular:      Rate and Rhythm: Normal rate and regular rhythm  Pulmonary:      Effort: Pulmonary effort is normal  No respiratory distress  Abdominal:      Palpations: Abdomen is soft  Tenderness: There is no abdominal tenderness  Musculoskeletal:      Cervical back: Normal range of motion and neck supple  Comments: Generalized weakness   Skin:     General: Skin is warm and dry  Neurological:      General: No focal deficit present  Mental Status: He is alert  Mental status is at baseline  Cranial Nerves: No cranial nerve deficit  Psychiatric:         Mood and Affect: Mood normal          Behavior: Behavior normal          Additional Data:     Labs:    Results from last 7 days   Lab Units 08/18/22  0545 08/17/22  2054   WBC Thousand/uL 6 61 6 49   HEMOGLOBIN g/dL 10 8* 10 8*   HEMATOCRIT % 31 5* 31 7*   PLATELETS Thousands/uL 153 163   NEUTROS PCT %  --  79*   LYMPHS PCT %  --  13*   MONOS PCT %  --  8   EOS PCT %  --  0     Results from last 7 days   Lab Units 08/18/22  0752   SODIUM mmol/L 126*   POTASSIUM mmol/L 3 1*   CHLORIDE mmol/L 95*   CO2 mmol/L 26   BUN mg/dL 9   CREATININE mg/dL 0 79   CALCIUM mg/dL 7 7*                   * I Have Reviewed All Lab Data Listed Above    * Additional Pertinent Lab Tests Reviewed: Karolina 66 Admission  Reviewed    Imaging:  Imaging Reports Reviewed Today Include:  No new imaging    Recent Cultures (last 7 days):           Last 24 Hours Medication List:   Current Facility-Administered Medications   Medication Dose Route Frequency Provider Last Rate    acetaminophen  650 mg Oral Q4H PRN Dione Galindo PA-C      albuterol (FOR EMS ONLY)  1 each Does not apply Once Ronny January, DO      allopurinol  150 mg Oral Daily [place], [place]      [START ON 8/19/2022] aspirin  81 mg Oral Once per day on Mon Fri Dione Galindo PA-C      atorvastatin  20 mg Oral Daily [place], [place]      cholecalciferol  2,000 Units Oral Daily [place], [place]      enoxaparin  40 mg Subcutaneous Daily [place], [place]      fenofibrate  48 mg Oral Daily [place], [place]      glycerin-hypromellose-  1 drop Both Eyes Daily [place], [place]      losartan  50 mg Oral Daily [place], [place]      multivitamin stress formula  1 tablet Oral Daily [place], [place]      nitroglycerin  0 4 mg Sublingual Q5 Min PRN [place], [place]      ondansetron  4 mg Intravenous Q6H PRN Dione Galindo PA-C      sodium chloride  100 mL/hr Intravenous Continuous Remona Snooks,  mL/hr (08/18/22 0810)    vancomycin  125 mg Oral Q6H [facility] Shahana Patel PA-C          Today, Patient Was Seen By: Ivan Mota MD    ** Please Note: Dictation voice to text software may have been used in the creation of this document   **

## 2022-08-19 LAB
ANION GAP SERPL CALCULATED.3IONS-SCNC: 5 MMOL/L (ref 4–13)
ANION GAP SERPL CALCULATED.3IONS-SCNC: 7 MMOL/L (ref 4–13)
ANION GAP SERPL CALCULATED.3IONS-SCNC: 8 MMOL/L (ref 4–13)
BUN SERPL-MCNC: 11 MG/DL (ref 5–25)
BUN SERPL-MCNC: 12 MG/DL (ref 5–25)
BUN SERPL-MCNC: 13 MG/DL (ref 5–25)
CALCIUM SERPL-MCNC: 8.2 MG/DL (ref 8.4–10.2)
CALCIUM SERPL-MCNC: 8.3 MG/DL (ref 8.4–10.2)
CALCIUM SERPL-MCNC: 8.5 MG/DL (ref 8.4–10.2)
CHLORIDE SERPL-SCNC: 91 MMOL/L (ref 96–108)
CHLORIDE SERPL-SCNC: 92 MMOL/L (ref 96–108)
CHLORIDE SERPL-SCNC: 93 MMOL/L (ref 96–108)
CO2 SERPL-SCNC: 24 MMOL/L (ref 21–32)
CO2 SERPL-SCNC: 25 MMOL/L (ref 21–32)
CO2 SERPL-SCNC: 27 MMOL/L (ref 21–32)
CORTIS AM PEAK SERPL-MCNC: 17.7 UG/DL (ref 4.2–22.4)
CREAT SERPL-MCNC: 0.73 MG/DL (ref 0.6–1.3)
CREAT SERPL-MCNC: 0.75 MG/DL (ref 0.6–1.3)
CREAT SERPL-MCNC: 1.01 MG/DL (ref 0.6–1.3)
GFR SERPL CREATININE-BSD FRML MDRD: 62 ML/MIN/1.73SQ M
GFR SERPL CREATININE-BSD FRML MDRD: 76 ML/MIN/1.73SQ M
GFR SERPL CREATININE-BSD FRML MDRD: 77 ML/MIN/1.73SQ M
GLUCOSE SERPL-MCNC: 117 MG/DL (ref 65–140)
GLUCOSE SERPL-MCNC: 81 MG/DL (ref 65–140)
GLUCOSE SERPL-MCNC: 91 MG/DL (ref 65–140)
POTASSIUM SERPL-SCNC: 3.6 MMOL/L (ref 3.5–5.3)
POTASSIUM SERPL-SCNC: 3.8 MMOL/L (ref 3.5–5.3)
POTASSIUM SERPL-SCNC: 4.4 MMOL/L (ref 3.5–5.3)
SODIUM SERPL-SCNC: 123 MMOL/L (ref 135–147)
SODIUM SERPL-SCNC: 124 MMOL/L (ref 135–147)
SODIUM SERPL-SCNC: 125 MMOL/L (ref 135–147)

## 2022-08-19 PROCEDURE — 97530 THERAPEUTIC ACTIVITIES: CPT

## 2022-08-19 PROCEDURE — 97535 SELF CARE MNGMENT TRAINING: CPT

## 2022-08-19 PROCEDURE — 99233 SBSQ HOSP IP/OBS HIGH 50: CPT | Performed by: PHYSICIAN ASSISTANT

## 2022-08-19 PROCEDURE — 97116 GAIT TRAINING THERAPY: CPT

## 2022-08-19 PROCEDURE — 80048 BASIC METABOLIC PNL TOTAL CA: CPT | Performed by: PHYSICIAN ASSISTANT

## 2022-08-19 PROCEDURE — 80048 BASIC METABOLIC PNL TOTAL CA: CPT | Performed by: INTERNAL MEDICINE

## 2022-08-19 PROCEDURE — 99232 SBSQ HOSP IP/OBS MODERATE 35: CPT | Performed by: INTERNAL MEDICINE

## 2022-08-19 PROCEDURE — 82533 TOTAL CORTISOL: CPT | Performed by: INTERNAL MEDICINE

## 2022-08-19 RX ORDER — POTASSIUM CHLORIDE 20 MEQ/1
20 TABLET, EXTENDED RELEASE ORAL 2 TIMES DAILY
Status: DISCONTINUED | OUTPATIENT
Start: 2022-08-19 | End: 2022-08-22

## 2022-08-19 RX ADMIN — POTASSIUM CHLORIDE 20 MEQ: 1500 TABLET, EXTENDED RELEASE ORAL at 17:55

## 2022-08-19 RX ADMIN — ATORVASTATIN CALCIUM 20 MG: 20 TABLET, FILM COATED ORAL at 09:53

## 2022-08-19 RX ADMIN — Medication 2000 UNITS: at 09:53

## 2022-08-19 RX ADMIN — Medication 7.5 MG: at 13:41

## 2022-08-19 RX ADMIN — B-COMPLEX W/ C & FOLIC ACID TAB 1 TABLET: TAB at 09:53

## 2022-08-19 RX ADMIN — ENOXAPARIN SODIUM 40 MG: 100 INJECTION SUBCUTANEOUS at 09:54

## 2022-08-19 RX ADMIN — ASPIRIN 81 MG: 81 TABLET, COATED ORAL at 09:53

## 2022-08-19 RX ADMIN — LOSARTAN POTASSIUM 50 MG: 50 TABLET, FILM COATED ORAL at 09:53

## 2022-08-19 RX ADMIN — FENOFIBRATE 48 MG: 48 TABLET, FILM COATED ORAL at 09:54

## 2022-08-19 RX ADMIN — ALLOPURINOL 150 MG: 300 TABLET ORAL at 09:53

## 2022-08-19 NOTE — NURSING NOTE
AWAKE AND ALERT AND OOB IN RECLINER PER REQUEST  DENIES PAIN  R/A STATUS 02 96%  HR 68/MIN  RT TRACY DRESSING TO RT ELBOW SKIN TEAR  DWIGHT REG DIET AND FLUIDS PO  IVF CONTINUE PER ORDERS  PLUS 1 BLE PITTING EDEMA  PLUS 2 PEDAL/RADIAL PULSES  CALL FRY GIVEN AND VISITORS AT BEDSIDE  NO DISTRESS

## 2022-08-19 NOTE — ASSESSMENT & PLAN NOTE
· Continue home metoprolol    Losartan started in place of patient's home ARB  · Lasix currently on hold  · BP currently stable

## 2022-08-19 NOTE — PLAN OF CARE
Problem: PHYSICAL THERAPY ADULT  Goal: Performs mobility at highest level of function for planned discharge setting  See evaluation for individualized goals  Description: Treatment/Interventions: Functional transfer training, LE strengthening/ROM, Elevations, Therapeutic exercise, Endurance training, Patient/family training, Equipment eval/education, Bed mobility, Gait training, Spoke to nursing, Spoke to case management  Equipment Recommended:  (continue RW use)       See flowsheet documentation for full assessment, interventions and recommendations  Outcome: Progressing  Note: Prognosis: Fair  Problem List: Decreased strength, Decreased endurance, Impaired balance, Decreased mobility, Impaired judgement, Decreased safety awareness, Impaired hearing  Assessment: Pt seen for PT treatment session this date with interventions consisting of gait training to normalize gait pattern to decrease fall risk and therapeutic activity to improve transfers and increase activity tolerance with functional mobility to decrease fall risk  Pt agreeable to PT treatment session upon arrival, pt found standing EOB with staff, in no apparent distress  Since previous session, pt has made fair progress as evidenced by increased distance ambulated and improved balance  Barriers during this session include fatigue  Pt continues to be functioning below baseline level, and remains limited 2* factors listed above and including impaired activity tolerance and decreased balance  Pt prognosis for achieving goals is fair, pending pt progress with hospitalization/medical status improvements, and indicated by motivated to participate in therapy, ability to follow cues and supportive family  PT will continue to see pt during current hospitalization in order to address the deficits listed above and provide interventions consistent w/ POC in effort to achieve goals   Current goals and POC remain appropriate, pt continues to have rehab potential Continue to recommend post acute rehabilitation services at time of d/c in order to maximize pt's functional independence and safety w/ mobility  Upon conclusion pt seated OOB in recliner  The patient's AM-PAC Basic Mobility Inpatient Short Form Raw Score is 10  A Raw score of less than or equal to 16 suggests the patient may benefit from discharge to post-acute rehabilitation services  Please also refer to the recommendation of the Physical Therapist for safe discharge planning  This session, pt required and most appropriately benefited from skilled OT/PT co-treat due to extensive physical assistance of SKILLED therapists, significant regression from functional baseline and decreased activity tolerance  OT and PT goals were addressed separately as seen in documentation  Barriers to Discharge: Inaccessible home environment     PT Discharge Recommendation: Post acute rehabilitation services    See flowsheet documentation for full assessment   Jessy Chakraborty PT

## 2022-08-19 NOTE — PROGRESS NOTES
Cat [id number]  Progress Note - Jordan Magaña Sr  10/11/1924, 80 y o  male MRN: [id number]  Unit/Bed#: -01 Encounter: [id number]  Primary Care Provider: Niecy Pina MD   Date and time admitted to hospital: 2022  8:22 PM    * Hyponatremia  Assessment & Plan  · Possibly secondary dehydration as well as medication side effect  · Currently on IV fluids per Nephrology recommendation  · Sodium level still low, 124 today  · Continue management with Nephrology  · Repeat BMP tomorrow    Fall  Assessment & Plan  · With debility  · Imaging studies negative for acute fractures  · PT/OT recommending rehab    Mixed hyperlipidemia  Assessment & Plan  · Continue home statin    Essential hypertension  Assessment & Plan  · Continue home metoprolol  Losartan started in place of patient's home ARB  · Lasix currently on hold  · BP currently stable    Coronary artery disease involving native coronary artery of native heart without angina pectoris  Assessment & Plan  · Known CAD with LAD stenting in   · Continue home metoprolol      VTE Prophylaxis:  Enoxaparin (Lovenox)    Patient Centered Rounds: I have performed bedside rounds with nursing staff today  Discussions with Specialists or Other Care Team Provider: yes  Education and Discussions with Family / Patient: Will update patient's son when available at bedside    Current Length of Stay: 2 day(s)    Current Patient Status: Inpatient   Certification Statement: The patient will continue to require additional inpatient hospital stay due to Hyponatremia    Discharge Plan:  Pending hospital course  PT recommending rehab    Code Status: Level 3 - DNAR and DNI    Subjective:   No overnight events noted  Patient still with generalized weakness  Sodium level still low    Patient tolerating diet    Objective:     Vitals:   Temp (24hrs), Av 5 °F (36 4 °C), Min:96 2 °F (35 7 °C), Max:98 4 °F (36 9 °C)    Temp:  [96 2 °F (35 7 °C)-98 4 °F (36 9 °C)] 96 2 °F (35 7 °C)  HR:  [62-69] 69  Resp:  [14-18] 18  BP: (120-152)/(61-69) 152/69  SpO2:  [93 %-100 %] 99 %  Body mass index is 20 98 kg/m²  Input and Output Summary (last 24 hours): Intake/Output Summary (Last 24 hours) at 8/19/2022 1051  Last data filed at 8/19/2022 0900  Gross per 24 hour   Intake 720 ml   Output 1550 ml   Net -830 ml       Physical Exam:   Physical Exam  Constitutional:       General: He is not in acute distress  Comments: Frail elderly male   HENT:      Head: Normocephalic and atraumatic  Nose: Nose normal       Mouth/Throat:      Mouth: Mucous membranes are dry  Eyes:      Extraocular Movements: Extraocular movements intact  Conjunctiva/sclera: Conjunctivae normal    Cardiovascular:      Rate and Rhythm: Normal rate and regular rhythm  Pulmonary:      Effort: Pulmonary effort is normal  No respiratory distress  Abdominal:      Palpations: Abdomen is soft  Tenderness: There is no abdominal tenderness  Musculoskeletal:         General: Normal range of motion  Cervical back: Normal range of motion and neck supple  Comments: Generalized weakness   Skin:     General: Skin is warm and dry  Neurological:      General: No focal deficit present  Mental Status: He is alert  Mental status is at baseline  Cranial Nerves: No cranial nerve deficit     Psychiatric:         Mood and Affect: Mood normal          Behavior: Behavior normal          Additional Data:     Labs:    Results from last 7 days   Lab Units 08/18/22  0545 08/17/22  2054   WBC Thousand/uL 6 61 6 49   HEMOGLOBIN g/dL 10 8* 10 8*   HEMATOCRIT % 31 5* 31 7*   PLATELETS Thousands/uL 153 163   NEUTROS PCT %  --  79*   LYMPHS PCT %  --  13*   MONOS PCT %  --  8   EOS PCT %  --  0     Results from last 7 days   Lab Units 08/19/22  0438   SODIUM mmol/L 124*   POTASSIUM mmol/L 3 8   CHLORIDE mmol/L 92*   CO2 mmol/L 24   BUN mg/dL 13   CREATININE mg/dL 0 75   CALCIUM mg/dL 8 3* * I Have Reviewed All Lab Data Listed Above  * Additional Pertinent Lab Tests Reviewed: Karolina 66 Admission  Reviewed    Imaging:  Imaging Reports Reviewed Today Include:  No new imaging    Recent Cultures (last 7 days):           Last 24 Hours Medication List:   Current Facility-Administered Medications   Medication Dose Route Frequency Provider Last Rate    acetaminophen  650 mg Oral Q4H PRN Adis Hickman PA-C      albuterol (FOR EMS ONLY)  1 each Does not apply Once Octavia Tony,       allopurinol  150 mg Oral Daily Tomi Frank[place], PA-C      aspirin  81 mg Oral Once per day on Mon Fri Adis Hickman PA-C      atorvastatin  20 mg Oral Daily [place], [place]      cholecalciferol  2,000 Units Oral Daily Tomi Frankel [place], [place]      enoxaparin  40 mg Subcutaneous Daily Tomi Frankel [place], [place]      fenofibrate  48 mg Oral Daily Tomi Frankel [place], [place]      glycerin-hypromellose-  1 drop Both Eyes Daily Tomi Frankel [place], [place]      losartan  50 mg Oral Daily [facility] [place], [place]      multivitamin stress formula  1 tablet Oral Daily [facility] Frankel [place], [place]      nitroglycerin  0 4 mg Sublingual Q5 Min PRN Tomi Frankel [place], PA-C      ondansetron  4 mg Intravenous Q6H PRN Adis Hickman PA-C          Today, Patient Was Seen By: Tobe Kayser, MD    ** Please Note: Dictation voice to text software may have been used in the creation of this document   **

## 2022-08-19 NOTE — OCCUPATIONAL THERAPY NOTE
08/19/22 0946   OT Last Visit   OT Visit Date 08/19/22   Note Type   Note Type Treatment   Restrictions/Precautions   Weight Bearing Precautions Per Order No   Other Precautions Fall Risk;Multiple lines;Telemetry;Hard of hearing   Pain Assessment   Pain Assessment Tool 0-10   Pain Score No Pain   ADL   Where Assessed Edge of bed   Grooming Deficit   (Grooming completed prior with PCA)   UB Bathing Assistance 4  Minimal Assistance   UB Bathing Deficit Setup;Verbal cueing;Right arm;Left arm   LB Bathing Assistance 2  Maximal Assistance   LB Bathing Deficit Setup;Verbal cueing;Supervision/safety; Increased time to complete;Perineal area; Buttocks;Right upper leg;Left upper leg;Right lower leg including foot; Left lower leg including foot   UB Dressing Assistance 4  Minimal Assistance   UB Dressing Deficit Setup;Verbal cueing;Supervision/safety; Increased time to complete; Thread RUE; Thread LUE; Fasteners   LB Dressing Assistance 1  Total Assistance   LB Dressing Deficit Setup;Verbal cueing;Supervision/safety; Increased time to complete   LB Dressing Comments D doff/don socks   Toileting Assistance  3  Moderate Assistance   Toileting Deficit Setup;Verbal cueing;Supervison/safety; Increased time to complete; Bedside commode;Perineal hygiene   Transfers   Sit to Stand 4  Minimal assistance   Additional items Assist x 2;Armrests; Increased time required;Verbal cues   Stand to Sit 4  Minimal assistance   Additional items Assist x 2;Armrests; Increased time required;Verbal cues   Toilet transfer 4  Minimal assistance   Additional items Assist x 2;Armrests; Increased time required;Verbal cues; Commode   Functional Mobility   Functional Mobility 4  Minimal assistance   Additional Comments Pt ambulates 20 feet with Min A x 1  Additional items Rolling walker   Toilet Transfers   Toilet Transfer From [employer] Transfer Type To and from   Toilet Transfer to Standard bedside commode   Toilet Transfer Technique Stand pivot; To right; To left   Toilet Transfers Minimal assistance   Toilet Transfers Comments Min A x 2   Cognition   Overall Cognitive Status WFL   Arousal/Participation Alert; Responsive; Cooperative   Attention Attends with cues to redirect   Orientation Level Oriented X4   Memory Decreased recall of precautions   Following Commands Follows one step commands without difficulty   Comments Pt agreeable to OT treatment  Activity Tolerance   Activity Tolerance Patient tolerated treatment well   Assessment   Assessment Patient participated in Skilled OT session this date with interventions consisting of ADL re training with the use of correct body mechnaics and Energy Conservation techniques   Patient agreeable to OT treatment session, upon arrival patient was found seated at edge of bed, alert, responsive  and in no apparent distress  Patient transfers sit to stand from bed and pivots to/from commode with Min A x 2  Patient does require Mod-Max A with toileting for hygiene and LB clothing management  Patient then sat unsupported EOB x 25+ mins to complete ADL as detailed in flow sheet  Following ADL, patient transfers sit to stand with Min A x 2 and completes functional mobility in room x 20 feet with Min A x 1  Session ended with patient seated OOB to recliner, all needs met, and call bell within reach  In comparison to previous session, patient with improvements in functional transfers/mobility, ADL performance, and endurance  Patient requiring frequent re direction, verbal cues for safety, verbal cues for correct technique, verbal cues for pacing thru activity steps and frequent rest periods  Patient performance  demonstrated good carryover of learned techniques and strategies to facilitate safety during functional tasks  Patient continues to be functioning below baseline level, occupational performance remains limited secondary to factors listed above and increased risk for falls and injury     From OT standpoint, recommendation at time of d/c would be Short Term Rehab  Patient to benefit from continued Occupational Therapy treatment while in the hospital to address deficits as defined above and maximize level of functional independence with ADLs and functional mobility in order to return to OF     Plan   Treatment Interventions ADL retraining;Functional transfer training;Energy conservation   Goal Expiration Date 08/28/22   OT Treatment Day 1   OT Frequency 3-5x/wk   Recommendation   OT Discharge Recommendation Post acute rehabilitation services   AM-PAC Daily Activity Inpatient   Lower Body Dressing 2   Bathing 2   Toileting 2   Upper Body Dressing 3   Grooming 4   Eating 4   Daily Activity Raw Score 17   Daily Activity Standardized Score (Calc for Raw Score >=11) 37 26   AM-PAC Applied Cognition Inpatient   Following a Speech/Presentation 4   Understanding Ordinary Conversation 4   Taking Medications 3   Remembering Where Things Are Placed or Put Away 3   Remembering List of 4-5 Errands 3   Taking Care of Complicated Tasks 3   Applied Cognition Raw Score 20   Applied Cognition Standardized Score 41 76   TAY Marroquin

## 2022-08-19 NOTE — PHYSICAL THERAPY NOTE
PT Treatment Note    NAME:  Seamus James Sr  DATE: 08/19/22    AGE:   80 y o  Mrn:   [id number]  ADMIT DX:  Hyponatremia [E87 1]  Weakness [R53 1]  Multiple skin tears [T14  8XXA]  Closed head injury, initial encounter [S09 90XA]  Unspecified multiple injuries, initial encounter [T07  XXXA]  Performed at least 2 patient identifiers during session: Name and Mac Krabbe       08/19/22 0941   PT Last Visit   PT Visit Date 08/19/22   Note Type   Note Type Treatment   Pain Assessment   Pain Assessment Tool 0-10   Pain Score No Pain   Restrictions/Precautions   Weight Bearing Precautions Per Order No   Other Precautions Fall Risk;Hard of hearing   General   Chart Reviewed No   Response to Previous Treatment Patient with no complaints from previous session  Family/Caregiver Present Yes   Cognition   Overall Cognitive Status WFL   Arousal/Participation Alert; Cooperative   Attention Attends with cues to redirect   Orientation Level Oriented X4   Memory Decreased recall of precautions   Following Commands Follows one step commands with increased time or repetition   Transfers   Sit to Stand 4  Minimal assistance   Additional items Assist x 2;Armrests; Increased time required;Verbal cues   Stand to Sit 4  Minimal assistance   Additional items Assist x 2;Armrests; Increased time required;Verbal cues   Toilet transfer 4  Minimal assistance   Additional items Assist x 2;Armrests; Increased time required;Verbal cues; Commode   Additional Comments pt denied dizziness however required cues for correct hand palcement;  Pt sat EOB x 15 mins with occ Min A to maintain posture with reaching and weight shifting   Ambulation/Elevation   Gait pattern Improper Weight shift   Gait Assistance 4  Minimal assist   Additional items Assist x 2   Assistive Device Rolling walker   Distance 20 ft   Ambulation/Elevation Additional Comments initial stance required Mod A x 2 however once pt able to "get his balance' he only required Min Ax 2  (cues on proper RW usage)   Balance   Static Sitting Fair -   Dynamic Sitting Fair -   Static Standing Fair -   Dynamic Standing Fair -   Ambulatory Fair -   Endurance Deficit   Endurance Deficit Yes   Endurance Deficit Description slow movement with taks due to fatiue   Activity Tolerance   Activity Tolerance Patient limited by fatigue   Assessment   Prognosis Fair   Assessment Pt seen for PT treatment session this date with interventions consisting of gait training to normalize gait pattern to decrease fall risk and therapeutic activity to improve transfers and increase activity tolerance with functional mobility to decrease fall risk  Pt agreeable to PT treatment session upon arrival, pt found standing EOB with staff, in no apparent distress  Since previous session, pt has made fair progress as evidenced by increased distance ambulated and improved balance  Barriers during this session include fatigue  Pt continues to be functioning below baseline level, and remains limited 2* factors listed above and including impaired activity tolerance and decreased balance  Pt prognosis for achieving goals is fair, pending pt progress with hospitalization/medical status improvements, and indicated by motivated to participate in therapy, ability to follow cues and supportive family  PT will continue to see pt during current hospitalization in order to address the deficits listed above and provide interventions consistent w/ POC in effort to achieve goals  Current goals and POC remain appropriate, pt continues to have rehab potential  Continue to recommend post acute rehabilitation services at time of d/c in order to maximize pt's functional independence and safety w/ mobility  Upon conclusion pt seated OOB in recliner  The patient's AM-PAC Basic Mobility Inpatient Short Form Raw Score is 10  A Raw score of less than or equal to 16 suggests the patient may benefit from discharge to post-acute rehabilitation services   Please also refer to the recommendation of the Physical Therapist for safe discharge planning  This session, pt required and most appropriately benefited from skilled OT/PT co-treat due to extensive physical assistance of SKILLED therapists, significant regression from functional baseline and decreased activity tolerance  OT and PT goals were addressed separately as seen in documentation     Goals   Patient Goals to go to the bathroom   PT Treatment Day 1   Plan   Progress Progressing toward goals   Recommendation   PT Discharge Recommendation Post acute rehabilitation services   AM-PAC Basic Mobility Inpatient   Turning in Bed Without Bedrails 3   Lying on Back to Sitting on Edge of Flat Bed 3   Moving Bed to Chair 1   Standing Up From Chair 1   Walk in Room 1   Climb 3-5 Stairs 1   Basic Mobility Inpatient Raw Score 10   Highest Level Of Mobility   -HLM Goal 4: Move to chair/commode   JH-HLM Achieved 6: Walk 10 steps or more   End of Consult   Patient Position at End of Consult All needs within reach       Time In: 0902  Time Out: 0941  Total Treatment Minutes: 4800 RADHA Santos, PT

## 2022-08-19 NOTE — ASSESSMENT & PLAN NOTE
· Possibly secondary dehydration as well as medication side effect  · Currently on IV fluids per Nephrology recommendation  · Sodium level still low, 124 today  · Continue management with Nephrology  · Repeat BMP tomorrow

## 2022-08-19 NOTE — PLAN OF CARE
Problem: OCCUPATIONAL THERAPY ADULT  Goal: Performs self-care activities at highest level of function for planned discharge setting  See evaluation for individualized goals  Description: Treatment Interventions: ADL retraining, Functional transfer training, UE strengthening/ROM, Endurance training, Equipment evaluation/education, Compensatory technique education, Energy conservation, Activityengagement    See flowsheet documentation for full assessment, interventions and recommendations  Outcome: Progressing  Note: Limitation: Decreased ADL status, Decreased UE ROM, Decreased UE strength, Decreased Safe judgement during ADL, Decreased endurance, Decreased self-care trans, Decreased high-level ADLs  Prognosis: Good  Assessment: Patient participated in Skilled OT session this date with interventions consisting of ADL re training with the use of correct body mechnaics and Energy Conservation techniques   Patient agreeable to OT treatment session, upon arrival patient was found seated at edge of bed, alert, responsive  and in no apparent distress  Patient transfers sit to stand from bed and pivots to/from commode with Min A x 2  Patient does require Mod-Max A with toileting for hygiene and LB clothing management  Patient then sat unsupported EOB x 25+ mins to complete ADL as detailed in flow sheet  Following ADL, patient transfers sit to stand with Min A x 2 and completes functional mobility in room x 20 feet with Min A x 1  Session ended with patient seated OOB to recliner, all needs met, and call bell within reach  In comparison to previous session, patient with improvements in functional transfers/mobility, ADL performance, and endurance  Patient requiring frequent re direction, verbal cues for safety, verbal cues for correct technique, verbal cues for pacing thru activity steps and frequent rest periods   Patient performance  demonstrated good carryover of learned techniques and strategies to facilitate safety during functional tasks  Patient continues to be functioning below baseline level, occupational performance remains limited secondary to factors listed above and increased risk for falls and injury  From OT standpoint, recommendation at time of d/c would be Short Term Rehab  Patient to benefit from continued Occupational Therapy treatment while in the hospital to address deficits as defined above and maximize level of functional independence with ADLs and functional mobility in order to return to OF       OT Discharge Recommendation: Post acute rehabilitation services

## 2022-08-19 NOTE — PROGRESS NOTES
Progress Note - Nephrology   Dionisio Robby Sr  80 y o  male MRN: [id number]  Unit/Bed#: -01 Encounter: [id number]    Assessment and Plan    Patient is 59-year-old male admitted to [place] [address] on 08/17/2022 status post fall with admission sodium 124 millimole per L  Nephrology following for hyponatremia  1  Hyponatremia  · Urine sodium 115, urine osmolality 383, consistent with syndrome of inappropriate antidiuretic hormone  · Not improving satisfactorily on fluid restriction  Will trial tolvaptan 7 5 mg once  Discontinued fluid restriction  Added nursing note and discussed with patient that he should drink to thirst for 24 hours after receiving tolvaptan  · Monitor BMP Q 6 hours  · Goal rate of correction is 0 5 mEq/hour  No more than 8 mEq increase in 24 hours  Can utilize D5W as needed for over-correction, although risk of over-correction is lower with starting sodium greater than 120 millimole per L  patient's greatest risk of over-correction will be if he does not drink enough water  He says he does not drink much at his baseline, so I did emphasize to him the importance of drinking if he feels thirsty  · Monitor urine output  Consider urinary retention protocol  · Follow-up cortisol  2  Chronic kidney disease, stage III with baseline creatinine 0 7 mg/dL  If any worsening of renal function, consider holding losartan  Trend renal function  Continue to provide supportive care  Optimize hemodynamics  Maintain mean arterial pressure greater than 65 mmHg  Renally dose medications  Avoid nephrotoxins  Please discuss with renal any diuretics or possible nephrotoxic agents, such as NSAIDs or IV contrast  Recommend avoidance of PPIs in favor of H2 blocker, if appropriate for clinical scenario  Monitor for urinary retention- strict I&Os, record bladder scan PVRs and follow urinary retention protocol  3  Essential hypertension  · Blood pressure acceptable    Continue with losartan 50 mg daily with hold parameters  4  Lower extremity edema  · Fluid restriction is on hold given administration of tolvaptan  After discontinuation tolvaptan, would recommend 2 g sodium restriction and reinstitution of a fluid restriction  5  Hypokalemia  · Will give potassium chloride 20 mEq twice a day for goal potassium 4 0 millimole per L      6  Gout  · Will follow with allopurinol  Discontinue patient develops any mouth sores or total body rash  Follow up reason for today's visit:     Hyponatremia    Patient Active Problem List   Diagnosis    Coronary artery disease involving native coronary artery of native heart without angina pectoris    Essential hypertension    Bilateral leg edema    Ventricular bigeminy    Chronic gout of multiple sites    Vitamin D deficiency    Hypomagnesemia    Hyponatremia    Mixed hyperlipidemia    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[address] )    Fall         Subjective:   Denies current physical complaints  Advised patient that he may drink when he is on the tolvaptan  A complete 10 point review of systems was performed and is otherwise negative  Objective:     Vitals: Blood pressure 152/69, pulse 69, temperature (!) 96 2 °F (35 7 °C), resp  rate 18, height 5' 6" (1 676 m), weight 59 kg (130 lb), SpO2 99 %  ,Body mass index is 20 98 kg/m²  Weight (last 2 days)     Date/Time Weight    08/17/22 2028 59 (130)            Intake/Output Summary (Last 24 hours) at 8/19/2022 1436  Last data filed at 8/19/2022 0900  Gross per 24 hour   Intake 540 ml   Output 1550 ml   Net -1010 ml     I/O last 3 completed shifts: In: 1551 7 [P O :540; I V :1011 7]  Out: 1650 [Urine:1650]         Physical Exam: /69   Pulse 69   Temp (!) 96 2 °F (35 7 °C)   Resp 18   Ht 5' 6" (1 676 m)   Wt 59 kg (130 lb)   SpO2 99%   BMI 20 98 kg/m²     General Appearance:    No acute distress  Cooperative  Appears stated age  Head:    Normocephalic  Atraumatic  Normal jaw occlusion  Eyes:    Lids, conjunctiva normal  No scleral icterus  Ears:    Normal external ears  Nose:   Nares normal  No drainage  Mouth:   Lips, tongue normal  Mucosa normal  Phonation normal    Neck:   Supple  Symmetrical    Back:     Symmetric  No CVA tenderness  Lungs:     Normal respiratory effort  Clear to auscultation bilaterally  Chest wall:    No tenderness or deformity  Heart:    Regular rate and rhythm  Normal S1 and S2  No murmur  No JVD  No edema  Abdomen:     Soft  Non-tender  Bowel sounds active  Genitourinary:   No Mckenna catheter present  Extremities:   Extremities normal  Atraumatic  No cyanosis  Skin:   Warm and dry  No pallor, jaundice, rash, ecchymoses  Neurologic:   Alert and oriented to person, place, time  No focal deficit  Lab, Imaging and other studies: I have personally reviewed pertinent labs  CBC: No results found for: WBC, HGB, HCT, MCV, PLT, ADJUSTEDWBC, MCH, MCHC, RDW, MPV, NRBC  CMP:   Lab Results   Component Value Date    K 3 6 08/19/2022    CL 91 (L) 08/19/2022    CO2 25 08/19/2022    BUN 11 08/19/2022    CREATININE 0 73 08/19/2022    CALCIUM 8 2 (L) 08/19/2022    EGFR 77 08/19/2022           Results from last 7 days   Lab Units 08/19/22  1240 08/19/22  0438 08/18/22  2215   POTASSIUM mmol/L 3 6 3 8 4 1   CHLORIDE mmol/L 91* 92* 91*   CO2 mmol/L 25 24 25   BUN mg/dL 11 13 14   CREATININE mg/dL 0 73 0 75 0 90   CALCIUM mg/dL 8 2* 8 3* 8 4         Phosphorus: No results found for: PHOS  Magnesium: No results found for: MG  Urinalysis: No results found for: COLORU, CLARITYU, SPECGRAV, PHUR, LEUKOCYTESUR, NITRITE, PROTEINUA, GLUCOSEU, KETONESU, BILIRUBINUR, BLOODU  Ionized Calcium: No results found for: CAION  Coagulation: No results found for: PT, INR, APTT  Troponin: No results found for: TROPONINI  ABG: No results found for: PHART, WSA0ELL, PO2ART, KHS5TAT, V8UDUMIB, BEART, SOURCE  Radiology review:     IMAGING  Procedure: XR Trauma chest portable    Result Date: 8/18/2022  Narrative: CHEST INDICATION:   TRAUMA  COMPARISON:  10/15/2021 EXAM PERFORMED/VIEWS:  XR CHEST PORTABLE FINDINGS: Cardiomediastinal silhouette appears unremarkable  The lungs are clear  No pneumothorax or pleural effusion  Osseous structures appear within normal limits for patient age  Impression: No acute cardiopulmonary disease  Workstation performed: [id number]     Procedure: TRAUMA - CT head wo contrast    Result Date: 8/17/2022  Narrative: CT BRAIN - WITHOUT CONTRAST INDICATION:   TRAUMA  COMPARISON:  10/9/2021 TECHNIQUE:  CT examination of the brain was performed  In addition to axial images, sagittal and coronal 2D reformatted images were created and submitted for interpretation  Radiation dose length product (DLP) for this visit:  841 mGy-cm   This examination, like all CT scans performed in the [facility], was performed utilizing techniques to minimize radiation dose exposure, including the use of iterative reconstruction and automated exposure control  IMAGE QUALITY:  Diagnostic  FINDINGS: PARENCHYMA:  No intracranial mass, mass effect or midline shift  No CT signs of acute infarction  No acute parenchymal hemorrhage  Age expected atrophy of the brain  VENTRICLES AND EXTRA-AXIAL SPACES:  Normal for the patient's age  VISUALIZED ORBITS AND PARANASAL SINUSES:  Mucosal thickening maxillary sinuses  CALVARIUM AND EXTRACRANIAL SOFT TISSUES:  Normal      Impression: No acute intracranial abnormality  Workstation performed: [id number]     Procedure: TRAUMA - CT facial bones wo contrast    Result Date: 8/17/2022  Narrative: CT FACIAL BONES WITHOUT INTRAVENOUS CONTRAST INDICATION:   TRAUMA  COMPARISON: None  TECHNIQUE:  Axial CT images were obtained through the facial bones with additional sagittal and coronal reconstructions  Radiation dose length product (DLP) for this visit:  315 mGy-cm     This examination, like all CT scans performed in the [facility], was performed utilizing techniques to minimize radiation dose exposure, including the use of iterative reconstruction and automated exposure control  IMAGE QUALITY:  Diagnostic  FINDINGS: FACIAL BONES:   No facial bone fracture identified  Normal alignment of the temporomandibular joints  No lytic or blastic lesion  ORBITS:  Orbital globes, optic nerves, and extraocular muscles appear symmetric and normal  There is no evidence of retrobulbar mass, abscess, or hematoma  SINUSES:  Mild mucosal thickening of maxillary sinuses  No air-fluid levels or sinus fractures  SOFT TISSUES:  Normal  Chronic dental disease  There is some artifact from dental fillings  Impression: No fracture seen Workstation performed: [id number]     Procedure: TRAUMA - CT spine cervical wo contrast    Result Date: 8/17/2022  Narrative: CT CERVICAL SPINE - WITHOUT CONTRAST INDICATION:   TRAUMA  COMPARISON:  10/9/2021 TECHNIQUE:  CT examination of the cervical spine was performed without intravenous contrast   Contiguous axial images were obtained  Sagittal and coronal reconstructions were performed  Radiation dose length product (DLP) for this visit:  416 mGy-cm   This examination, like all CT scans performed in the [facility], was performed utilizing techniques to minimize radiation dose exposure, including the use of iterative reconstruction and automated exposure control  IMAGE QUALITY:  Diagnostic  FINDINGS: ALIGNMENT:  Normal alignment of the cervical spine  No subluxation  VERTEBRAL BODIES:  No fracture  DEGENERATIVE CHANGES:  Moderate to severe multilevel disc degeneration of cervical and upper thoracic spine  There appears to be mild cervical stenosis at C6-C7  PREVERTEBRAL AND PARASPINAL SOFT TISSUES:  Unremarkable  THORACIC INLET:  Normal      Impression: No cervical spine fracture or traumatic malalignment    Workstation performed: [id number]       Current Facility-Administered Medications   Medication Dose Route Frequency    acetaminophen (TYLENOL) tablet 650 mg  650 mg Oral Q4H PRN    albuterol (FOR EMS ONLY) (2 5 mg/3 mL) 0 083 % inhalation solution 2 5 mg  1 each Does not apply Once    allopurinol (ZYLOPRIM) tablet 150 mg  150 mg Oral Daily    aspirin (ECOTRIN LOW STRENGTH) EC tablet 81 mg  81 mg Oral Once per day on Mon Fri    atorvastatin (LIPITOR) tablet 20 mg  20 mg Oral Daily    cholecalciferol (VITAMIN D3) tablet 2,000 Units  2,000 Units Oral Daily    enoxaparin (LOVENOX) subcutaneous injection 40 mg  40 mg Subcutaneous Daily    fenofibrate (TRICOR) tablet 48 mg  48 mg Oral Daily    glycerin-hypromellose- (ARTIFICIAL TEARS) ophthalmic solution 1 drop  1 drop Both Eyes Daily    losartan (COZAAR) tablet 50 mg  50 mg Oral Daily    multivitamin stress formula tablet 1 tablet  1 tablet Oral Daily    nitroglycerin (NITROSTAT) SL tablet 0 4 mg  0 4 mg Sublingual Q5 Min PRN    ondansetron (ZOFRAN) injection 4 mg  4 mg Intravenous Q6H PRN     Medications Discontinued During This Encounter   Medication Reason    metoprolol tartrate (LOPRESSOR) 25 mg tablet     polyethylene glycol (GLYCOLAX) 17 GM/SCOOP powder     vancomycin (VANCOCIN) capsule 125 mg     sodium chloride 0 9 % infusion        Anu Leslie PA-C    Portions of the record may have been created with voice recognition software  Occasional wrong word or "sound a like" substitutions may have occurred due to the inherent limitations of voice recognition software  Read the chart carefully and recognize, using context, where substitutions have occurred

## 2022-08-19 NOTE — CASE MANAGEMENT
Case Management Assessment & Discharge Planning Note    Patient name Molly Jha  Location /-01 MRN [id number]  : 10/11/1924 Date 2022       Current Admission Date: 2022  Current Admission Diagnosis:Hyponatremia   Patient Active Problem List    Diagnosis Date Noted    Fall 2022    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 [address] ) 2020    Hypomagnesemia 2020    Hyponatremia 2020    Mixed hyperlipidemia 2020    Coronary artery disease involving native coronary artery of native heart without angina pectoris 2018    Essential hypertension 2018    Bilateral leg edema 2018    Ventricular bigeminy 2018    Chronic gout of multiple sites 05/10/2017    Vitamin D deficiency 2013      LOS (days): 2  Geometric Mean LOS (GMLOS) (days): 2 60  Days to GMLOS:0 8     OBJECTIVE:    Risk of Unplanned Readmission Score: 15 9         Current admission status: Inpatient  Referral Reason: Other (d/c planning)    Preferred Pharmacy:   [address] [place] [id number]  Phone: [phone] Fax: [phone]    Primary Care Provider: Sara Sullivan MD    Primary Insurance: [id number] [facility] REP  Secondary Insurance:     ASSESSMENT:  [address] [facility] Representative - Son   Primary Phone: [phone] (Home)  Mobile Phone: [phone]               Advance Directives  Does patient have a [address]?: Yes (so was asked to bring a copy to the hospital)  Does patient have Advance Directives?: Yes (son was asked to bring the copy to the hospital)         Readmission Root Cause  30 Day Readmission: No    Patient Information  Admitted from[de-identified] Home  Mental Status: Alert  During Assessment patient was accompanied by:  Son  Assessment information provided by[de-identified] Patient, Son  Primary Caregiver: Family (son)  Caregiver's Name[de-identified] Lexus Goddard Zaid  Caregiver's Relationship to Patient[de-identified] Family Member (son)  Caregiver's Telephone Number[de-identified] [phone] (home)  [phone] (cell)  Support Systems: [address] of Residence: [address] do you live in?: [address] entry access options   Select all that apply : Stairs  Number of steps to enter home : One Flight  Do the steps have railings?: Yes  Type of Current Residence: 2 story home (pt stays on [facility] 1st floor)  Upon entering residence, is there a bedroom on the main floor (no further steps)?: Yes (pt has a hosptial bed on the 1st floor)  Upon entering residence, is there a bathroom on the main floor (no further steps)?: Yes (1/2 bath   full bath on 2nd floor)  In the last 12 months, was there a time when you were not able to pay the mortgage or rent on time?: No  In the last 12 months, how many places have you lived?: 1  In the last 12 months, was there a time when you did not have a steady place to sleep or slept in a shelter (including now)?: No  Homeless/housing insecurity resource given?: N/A  Living Arrangements: Lives w/ Son  Is patient a ?: Yes ([employer]  no benefits)  Is patient active with [facility] [employer])?: No    Activities of Daily Living Prior to Admission  Functional Status: Assistance (cooking, driving, laundry, cleaning, help with his  compression socks)  Completes ADLs independently?: Yes (pt takes his own medication, sponge bath, dressing except for his compression socks he needs help)  Ambulates independently?: Yes  Does patient use assisted devices?: Yes  Assisted Devices (DME) used: Straight Cane  Does patient currently own DME?: Yes  What DME does the patient currently own?: Straight Cane, [address] Bed, Bedside Commode, Other (Comment) (bp cuff, pulse ox)  Does patient have a history of Outpatient Therapy (PT/OT)?: Yes  Does the patient have a history of Short-Term Rehab?: No  Does patient have a history of HHC?: Yes (agency from [place])  Does patient currently have [facility]?: No         Patient Information Continued  Income Source: Pension/penitentiary  Does patient have prescription coverage?: Yes ([facility] [place])  Within the past 12 months, you worried that your food would run out before you got the money to buy more : Never true  Within the past 12 months, the food you bought just didn't last and you didn't have money to get more : Never true  Food insecurity resource given?: N/A  Does patient receive dialysis treatments?: No  Does patient have a history of substance abuse?: No  Does patient have a history of Mental Health Diagnosis?: No         Means of Transportation  Means of Transport to Appts[de-identified] Family transport  In the past 12 months, has lack of transportation kept you from medical appointments or from getting medications?: No  In the past 12 months, has lack of transportation kept you from meetings, work, or from getting things needed for daily living?: No  Was application for public transport provided?: N/A        DISCHARGE DETAILS:    Discharge planning discussed with[de-identified] patient and placed calls to son at 12:41 and 15: 30pm left messages, met with him at [facility] bedside at 16:45 pm  Freedom of Choice: Yes  Comments - Freedom of Choice: rehab has been recommended- permission to place a blanket referral  pt will need authorization  CM contacted family/caregiver?: Yes             Contacts  Patient Contacts: Lydia Castro jr  Relationship to Patient[de-identified] Family (son)  Contact Method:  In Person  Reason/Outcome: Discharge Planning              Other Referral/Resources/Interventions Provided:  Interventions: Short Term Rehab  Referral Comments: blanket referral sent  pt will need authorization    Would you like to participate in our [address] service program?  : No - Declined    Treatment Team Recommendation:  (snf rehab auth needed- transportation tbd)

## 2022-08-19 NOTE — QUICK NOTE
Na dropped to 124  Recheck  If continue to decline will start very low dose tolvaptan 7 5mg  likely ADH release causing drop on NSS  FR for now  If tolvpatan needed then will liberalize FR   Ideally dosed in AM so pt can respond to thirst

## 2022-08-20 LAB
ANION GAP SERPL CALCULATED.3IONS-SCNC: 6 MMOL/L (ref 4–13)
ANION GAP SERPL CALCULATED.3IONS-SCNC: 8 MMOL/L (ref 4–13)
BUN SERPL-MCNC: 14 MG/DL (ref 5–25)
BUN SERPL-MCNC: 16 MG/DL (ref 5–25)
CALCIUM SERPL-MCNC: 8.7 MG/DL (ref 8.4–10.2)
CALCIUM SERPL-MCNC: 8.9 MG/DL (ref 8.4–10.2)
CHLORIDE SERPL-SCNC: 98 MMOL/L (ref 96–108)
CHLORIDE SERPL-SCNC: 98 MMOL/L (ref 96–108)
CO2 SERPL-SCNC: 24 MMOL/L (ref 21–32)
CO2 SERPL-SCNC: 24 MMOL/L (ref 21–32)
CREAT SERPL-MCNC: 0.82 MG/DL (ref 0.6–1.3)
CREAT SERPL-MCNC: 0.87 MG/DL (ref 0.6–1.3)
GFR SERPL CREATININE-BSD FRML MDRD: 72 ML/MIN/1.73SQ M
GFR SERPL CREATININE-BSD FRML MDRD: 74 ML/MIN/1.73SQ M
GLUCOSE SERPL-MCNC: 90 MG/DL (ref 65–140)
GLUCOSE SERPL-MCNC: 96 MG/DL (ref 65–140)
POTASSIUM SERPL-SCNC: 3.9 MMOL/L (ref 3.5–5.3)
POTASSIUM SERPL-SCNC: 4 MMOL/L (ref 3.5–5.3)
SODIUM SERPL-SCNC: 128 MMOL/L (ref 135–147)
SODIUM SERPL-SCNC: 130 MMOL/L (ref 135–147)

## 2022-08-20 PROCEDURE — 99232 SBSQ HOSP IP/OBS MODERATE 35: CPT | Performed by: INTERNAL MEDICINE

## 2022-08-20 PROCEDURE — 80048 BASIC METABOLIC PNL TOTAL CA: CPT | Performed by: PHYSICIAN ASSISTANT

## 2022-08-20 RX ADMIN — B-COMPLEX W/ C & FOLIC ACID TAB 1 TABLET: TAB at 09:12

## 2022-08-20 RX ADMIN — ALLOPURINOL 150 MG: 300 TABLET ORAL at 09:11

## 2022-08-20 RX ADMIN — POTASSIUM CHLORIDE 20 MEQ: 1500 TABLET, EXTENDED RELEASE ORAL at 09:12

## 2022-08-20 RX ADMIN — GLYCERIN, HYPROMELLOSE, POLYETHYLENE GLYCOL 1 DROP: .2; .2; 1 LIQUID OPHTHALMIC at 09:19

## 2022-08-20 RX ADMIN — ATORVASTATIN CALCIUM 20 MG: 20 TABLET, FILM COATED ORAL at 09:11

## 2022-08-20 RX ADMIN — LOSARTAN POTASSIUM 50 MG: 50 TABLET, FILM COATED ORAL at 09:12

## 2022-08-20 RX ADMIN — POTASSIUM CHLORIDE 20 MEQ: 1500 TABLET, EXTENDED RELEASE ORAL at 17:26

## 2022-08-20 RX ADMIN — Medication 2000 UNITS: at 09:12

## 2022-08-20 RX ADMIN — ENOXAPARIN SODIUM 40 MG: 100 INJECTION SUBCUTANEOUS at 09:12

## 2022-08-20 RX ADMIN — FENOFIBRATE 48 MG: 48 TABLET, FILM COATED ORAL at 09:12

## 2022-08-20 NOTE — PROGRESS NOTES
Allison Spann is a 44 year old female presenting to [facility] for Infusion    Regimen: JAMEEL Brown is supervising provider today.    Nursing Assessment: A focused nursing assessment  was performed and the patient reports the following: Nausea: NO  Vomiting: NO  Fever: NO  Chills: NO  Other signs of infection: NO  Bleeding: NO  Mucositis: NO  Diarrhea: NO  Constipation: NO  Anorexia: NO  Dysuria: NO  Urinary Bleeding: NO  Cough: NO  Shortness of Breath: YES, SOB with exertion  Fatigue/Weakness: YES, low energy  Numbness/Tingling: NO  Other Neuropathies: NO  Edema: NO  Rash: NO  Hand/Foot Syndrome: NO  Anxiety/Depression/Insomnia: NO  Pain: NO   slight dizziness    Pre-Treatment: - Patient has valid pre-authorization  - VS completed  - Treatment parameters verified in patient protocol  - Patient is identified by first & last name, Date of birth that has been verified by two practitioners with the patient chairside.    Treatment: Refer to [facility] and MAR for line assessment and medication administration, Blood return confirmed before, during and after treatment administered, Infusion pump used for non-vesicant drugs and Extravasation/Infiltration: None     Post Treatment: Treatment tolerated well; no adverse reaction    Education: Instructed on maintaining good hydration and PO intake    Next appointment scheduled: 10/18 RN Check/Venofer  Patient instructed to call the office with any questions or concerns.    Patient Discharged: patient discharged to home per self, ambulatory       Progress Note - Nephrology   Luis Brush Sr  80 y o  male MRN: [id number]  Unit/Bed#: -01 Encounter: [id number]    A/P:  1  Hyponatremia   Continues to improve with serum sodium level now up to 130 millimole/L  Represents an increase of about 6 millimole/L over last 24 hours  Continue supportive care at this time, IV fluids were discontinued previously on August 18th  Patient was given 1 time dose of tolvaptan 7 5 mg yesterday, August 19th   2  Chronic kidney disease stage 3A with baseline creatinine around 0 7-0 8 mg/dL  3  Hypertension setting chronic kidney disease stage 3   Blood pressures are appropriate, continue current medications  4  Mild bilateral lower extremity edema   Continue low-sodium diet, patient is not currently require loop diuretics  5  Hypokalemia   Continue to monitor potassium and add supplementation as indicated  Follow up reason for today's visit:  Hyponatremia/chronic kidney disease/hypertension    Hyponatremia    Patient Active Problem List   Diagnosis    Coronary artery disease involving native coronary artery of native heart without angina pectoris    Essential hypertension    Bilateral leg edema    Ventricular bigeminy    Chronic gout of multiple sites    Vitamin D deficiency    Hypomagnesemia    Hyponatremia    Mixed hyperlipidemia    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    Fall         Subjective:   No Acute events overnight, patient was resting when I saw him    Objective:     Vitals: Blood pressure 124/73, pulse 79, temperature 98 4 °F (36 9 °C), temperature source Temporal, resp  rate 19, height 5' 6" (1 676 m), weight 59 kg (130 lb), SpO2 99 %  ,Body mass index is 20 98 kg/m²  Weight (last 2 days)     None            Intake/Output Summary (Last 24 hours) at 8/20/2022 1428  Last data filed at 8/20/2022 0101  Gross per 24 hour   Intake --   Output 1100 ml   Net -1100 ml     I/O last 3 completed shifts:   In: 180 [P O :180]  Out: 2200 [Urine:2200]         Physical Exam: /73   Pulse 79   Temp 98 4 °F (36 9 °C) (Temporal)   Resp 19   Ht 5' 6" (1 676 m)   Wt 59 kg (130 lb)   SpO2 99%   BMI 20 98 kg/m²     General Appearance:    Alert, cooperative, no distress, appears stated age   Head:    Normocephalic, without obvious abnormality, atraumatic   Eyes:    Conjunctiva/corneas clear   Ears:    Normal external ears   Nose:   Nares normal, septum midline, mucosa normal, no drainage    or sinus tenderness   Throat:   Lips, mucosa, and tongue normal; teeth and gums normal   Neck:   Supple   Back:     Symmetric, no curvature, ROM normal, no CVA tenderness   Lungs:     Clear to auscultation bilaterally, respirations unlabored   Chest wall:    No tenderness or deformity   Heart:    Regular rate and rhythm, S1 and S2 normal, no murmur, rub   or gallop   Abdomen:     Soft, non-tender, bowel sounds active   Extremities:   Extremities normal, atraumatic, no cyanosis or edema   Skin:   Skin color, texture, turgor normal, no rashes or lesions   Lymph nodes:   Cervical normal   Neurologic:   CNII-XII intact            Lab, Imaging and other studies: I have personally reviewed pertinent labs  CBC: No results found for: WBC, HGB, HCT, MCV, PLT, ADJUSTEDWBC, MCH, MCHC, RDW, MPV, NRBC  CMP:   Lab Results   Component Value Date    K 3 9 08/20/2022    CL 98 08/20/2022    CO2 24 08/20/2022    BUN 14 08/20/2022    CREATININE 0 82 08/20/2022    CALCIUM 8 9 08/20/2022    EGFR 74 08/20/2022           Results from last 7 days   Lab Units 08/20/22  0437 08/20/22  0028 08/19/22  1808   POTASSIUM mmol/L 3 9 4 0 4 4   CHLORIDE mmol/L 98 98 93*   CO2 mmol/L 24 24 27   BUN mg/dL 14 16 12   CREATININE mg/dL 0 82 0 87 1 01   CALCIUM mg/dL 8 9 8 7 8 5         Phosphorus: No results found for: PHOS  Magnesium: No results found for: MG  Urinalysis: No results found for: Isabella Plascencia, [address], [address] [facility], [address], NITRITE, PROTEINUA, GLUCOSEU, Cathye Lips, BILIRUBINUR, BLOODU  Ionized Calcium: No results found for: CAION  Coagulation: No results found for: PT, INR, APTT  Troponin: No results found for: TROPONINI  ABG: No results found for: PHART, NHT2QQV, PO2ART, DNU3JGF, W9PFFJQR, BEART, SOURCE  Radiology review:     IMAGING  Procedure: XR Trauma chest portable    Result Date: 8/18/2022  Narrative: CHEST INDICATION:   TRAUMA  COMPARISON:  10/15/2021 EXAM PERFORMED/VIEWS:  XR CHEST PORTABLE FINDINGS: Cardiomediastinal silhouette appears unremarkable  The lungs are clear  No pneumothorax or pleural effusion  Osseous structures appear within normal limits for patient age  Impression: No acute cardiopulmonary disease  Workstation performed: [id number]     Procedure: TRAUMA - CT head wo contrast    Result Date: 8/17/2022  Narrative: CT BRAIN - WITHOUT CONTRAST INDICATION:   TRAUMA  COMPARISON:  10/9/2021 TECHNIQUE:  CT examination of the brain was performed  In addition to axial images, sagittal and coronal 2D reformatted images were created and submitted for interpretation  Radiation dose length product (DLP) for this visit:  841 mGy-cm   This examination, like all CT scans performed in the [facility], was performed utilizing techniques to minimize radiation dose exposure, including the use of iterative reconstruction and automated exposure control  IMAGE QUALITY:  Diagnostic  FINDINGS: PARENCHYMA:  No intracranial mass, mass effect or midline shift  No CT signs of acute infarction  No acute parenchymal hemorrhage  Age expected atrophy of the brain  VENTRICLES AND EXTRA-AXIAL SPACES:  Normal for the patient's age  VISUALIZED ORBITS AND PARANASAL SINUSES:  Mucosal thickening maxillary sinuses  CALVARIUM AND EXTRACRANIAL SOFT TISSUES:  Normal      Impression: No acute intracranial abnormality   Workstation performed: [id number]     Procedure: TRAUMA - CT facial bones wo contrast    Result Date: 8/17/2022  Narrative: CT FACIAL BONES WITHOUT INTRAVENOUS CONTRAST INDICATION:   TRAUMA  COMPARISON: None  TECHNIQUE:  Axial CT images were obtained through the facial bones with additional sagittal and coronal reconstructions  Radiation dose length product (DLP) for this visit:  315 mGy-cm   This examination, like all CT scans performed in the [facility], was performed utilizing techniques to minimize radiation dose exposure, including the use of iterative reconstruction and automated exposure control  IMAGE QUALITY:  Diagnostic  FINDINGS: FACIAL BONES:   No facial bone fracture identified  Normal alignment of the temporomandibular joints  No lytic or blastic lesion  ORBITS:  Orbital globes, optic nerves, and extraocular muscles appear symmetric and normal  There is no evidence of retrobulbar mass, abscess, or hematoma  SINUSES:  Mild mucosal thickening of maxillary sinuses  No air-fluid levels or sinus fractures  SOFT TISSUES:  Normal  Chronic dental disease  There is some artifact from dental fillings  Impression: No fracture seen Workstation performed: [id number]     Procedure: TRAUMA - CT spine cervical wo contrast    Result Date: 8/17/2022  Narrative: CT CERVICAL SPINE - WITHOUT CONTRAST INDICATION:   TRAUMA  COMPARISON:  10/9/2021 TECHNIQUE:  CT examination of the cervical spine was performed without intravenous contrast   Contiguous axial images were obtained  Sagittal and coronal reconstructions were performed  Radiation dose length product (DLP) for this visit:  416 mGy-cm   This examination, like all CT scans performed in the 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   There appears to be mild cervical stenosis at C6-C7  PREVERTEBRAL AND PARASPINAL SOFT TISSUES:  Unremarkable  THORACIC INLET:  Normal      Impression: No cervical spine fracture or traumatic malalignment  Workstation performed: [id number]       Current Facility-Administered Medications   Medication Dose Route Frequency    acetaminophen (TYLENOL) tablet 650 mg  650 mg Oral Q4H PRN    albuterol (FOR EMS ONLY) (2 5 mg/3 mL) 0 083 % inhalation solution 2 5 mg  1 each Does not apply Once    allopurinol (ZYLOPRIM) tablet 150 mg  150 mg Oral Daily    aspirin (ECOTRIN LOW STRENGTH) EC tablet 81 mg  81 mg Oral Once per day on Mon Fri    atorvastatin (LIPITOR) tablet 20 mg  20 mg Oral Daily    cholecalciferol (VITAMIN D3) tablet 2,000 Units  2,000 Units Oral Daily    enoxaparin (LOVENOX) subcutaneous injection 40 mg  40 mg Subcutaneous Daily    fenofibrate (TRICOR) tablet 48 mg  48 mg Oral Daily    glycerin-hypromellose- (ARTIFICIAL TEARS) ophthalmic solution 1 drop  1 drop Both Eyes Daily    losartan (COZAAR) tablet 50 mg  50 mg Oral Daily    multivitamin stress formula tablet 1 tablet  1 tablet Oral Daily    nitroglycerin (NITROSTAT) SL tablet 0 4 mg  0 4 mg Sublingual Q5 Min PRN    ondansetron (ZOFRAN) injection 4 mg  4 mg Intravenous Q6H PRN    potassium chloride (K-DUR,KLOR-CON) CR tablet 20 mEq  20 mEq Oral BID     Medications Discontinued During This Encounter   Medication Reason    metoprolol tartrate (LOPRESSOR) 25 mg tablet     polyethylene glycol (GLYCOLAX) 17 GM/SCOOP powder     vancomycin (VANCOCIN) capsule 125 mg     sodium chloride 0 9 % infusion        Arley Abdalla DO      This progress note was produced in part using a dictation device which may document imprecise wording from author's original intent

## 2022-08-20 NOTE — ASSESSMENT & PLAN NOTE
· Possibly secondary dehydration as well as medication side effect  · Tolvaptan dose given on 08/19/2022  · Sodium level 130 today  · Nephrology following  · Will repeat BMP as needed

## 2022-08-20 NOTE — NURSING NOTE
Awake and alert and oriented x4  Pleasant  R/a status  resp easy and no sob  02 sat 99% and hr 83/min  Denies pain  Voids frely with assist of urinal, and urine is clear and yellow  Trace edema  Plus 2 pedal/radial pulses  Call bell near and bed alarm on  No distress

## 2022-08-20 NOTE — PROGRESS NOTES
Latanya 45  Progress Note - Saint Cherry Sr  10/11/1924, 80 y o  male MRN: [id number]  Unit/Bed#: -01 Encounter: [id number]  Primary Care Provider: Sylvester Willoughby MD   Date and time admitted to hospital: 2022  8:22 PM    * Hyponatremia  Assessment & Plan  · Possibly secondary dehydration as well as medication side effect  · Tolvaptan dose given on 2022  · Sodium level 130 today  · Nephrology following  · Will repeat BMP as needed    [address]  · With debility  · Imaging studies negative for acute fractures  · PT/OT recommending rehab    Mixed hyperlipidemia  Assessment & Plan  · Continue home statin    Essential hypertension  Assessment & Plan  · Continue home metoprolol  Losartan started in place of patient's home ARB  · Lasix currently on hold  · BP currently stable    Coronary artery disease involving native coronary artery of native heart without angina pectoris  Assessment & Plan  · Known CAD with LAD stenting in   · Continue home metoprolol    VTE Prophylaxis:  Enoxaparin (Lovenox)    Patient Centered Rounds: I have performed bedside rounds with nursing staff today      Discussions with Specialists or Other Care Team Provider: yes  Education and Discussions with Family / Patient:  Spoke with patient's son at bedside regarding plan of care    Current Length of Stay: 3 day(s)    Current Patient Status: Inpatient   Certification Statement: The patient will continue to require additional inpatient hospital stay due to Pending placement    Discharge Plan:  Discharge planning on Monday with case management    Code Status: Level 3 - DNAR and DNI    Subjective:   No overnight events noted    Objective:     Vitals:   Temp (24hrs), Av 2 °F (36 8 °C), Min:97 9 °F (36 6 °C), Max:98 4 °F (36 9 °C)    Temp:  [97 9 °F (36 6 °C)-98 4 °F (36 9 °C)] 98 4 °F (36 9 °C)  HR:  [79-88] 79  Resp:  [18-19] 19  BP: (124-152)/(71-75) 124/73  SpO2:  [98 %-100 %] 99 %  Body mass index is 20 98 kg/m²  Input and Output Summary (last 24 hours): Intake/Output Summary (Last 24 hours) at 8/20/2022 1306  Last data filed at 8/20/2022 0101  Gross per 24 hour   Intake --   Output 1100 ml   Net -1100 ml       Physical Exam:   Physical Exam  Constitutional:       General: He is not in acute distress  Comments: Frail elderly male   HENT:      Head: Normocephalic and atraumatic  Nose: Nose normal       Mouth/Throat:      Mouth: Mucous membranes are moist    Eyes:      Extraocular Movements: Extraocular movements intact  Conjunctiva/sclera: Conjunctivae normal    Cardiovascular:      Rate and Rhythm: Normal rate and regular rhythm  Pulmonary:      Effort: Pulmonary effort is normal  No respiratory distress  Abdominal:      Palpations: Abdomen is soft  Tenderness: There is no abdominal tenderness  Musculoskeletal:         General: Normal range of motion  Cervical back: Normal range of motion and neck supple  Comments: Generalized weakness   Skin:     General: Skin is warm and dry  Neurological:      General: No focal deficit present  Mental Status: He is alert  Cranial Nerves: No cranial nerve deficit  Psychiatric:         Mood and Affect: Mood normal          Behavior: Behavior normal          Additional Data:     Labs:    Results from last 7 days   Lab Units 08/18/22  0545 08/17/22  2054   WBC Thousand/uL 6 61 6 49   HEMOGLOBIN g/dL 10 8* 10 8*   HEMATOCRIT % 31 5* 31 7*   PLATELETS Thousands/uL 153 163   NEUTROS PCT %  --  79*   LYMPHS PCT %  --  13*   MONOS PCT %  --  8   EOS PCT %  --  0     Results from last 7 days   Lab Units 08/20/22  0437   SODIUM mmol/L 130*   POTASSIUM mmol/L 3 9   CHLORIDE mmol/L 98   CO2 mmol/L 24   BUN mg/dL 14   CREATININE mg/dL 0 82   CALCIUM mg/dL 8 9                   * I Have Reviewed All Lab Data Listed Above  * Additional Pertinent Lab Tests Reviewed:  [facility] 66 Admission Reviewed    Imaging:  Imaging Reports Reviewed Today Include:  No new imaging    Recent Cultures (last 7 days):           Last 24 Hours Medication List:   Current Facility-Administered Medications   Medication Dose Route Frequency Provider Last Rate    acetaminophen  650 mg Oral Q4H PRN William Mayes PA-C      albuterol (FOR EMS ONLY)  1 each Does not apply Once Almas Kaufman,       allopurinol  150 mg Oral Daily [facility] KLARISSA Chavez      aspirin  81 mg Oral Once per day on Mon Fri William Mayes PA-C      atorvastatin  20 mg Oral Daily William Mayes [facility]      cholecalciferol  2,000 Units Oral Daily [facility]      enoxaparin  40 mg Subcutaneous Daily [facility]      fenofibrate  48 mg Oral Daily [facility]      glycerin-hypromellose-  1 drop Both Eyes Daily [facility]      losartan  50 mg Oral Daily [facility]      multivitamin stress formula  1 tablet Oral Daily [facility]      nitroglycerin  0 4 mg Sublingual Q5 Min PRN [facility]      ondansetron  4 mg Intravenous Q6H PRN William Mayes PA-C      potassium chloride  20 mEq Oral BID Jesse Karimi PA-C          Today, Patient Was Seen By: Garrick Huang MD    ** Please Note: Dictation voice to text software may have been used in the creation of this document   **

## 2022-08-21 PROCEDURE — 99232 SBSQ HOSP IP/OBS MODERATE 35: CPT | Performed by: INTERNAL MEDICINE

## 2022-08-21 RX ADMIN — Medication 2000 UNITS: at 08:44

## 2022-08-21 RX ADMIN — B-COMPLEX W/ C & FOLIC ACID TAB 1 TABLET: TAB at 08:44

## 2022-08-21 RX ADMIN — ATORVASTATIN CALCIUM 20 MG: 20 TABLET, FILM COATED ORAL at 08:44

## 2022-08-21 RX ADMIN — POTASSIUM CHLORIDE 20 MEQ: 1500 TABLET, EXTENDED RELEASE ORAL at 08:44

## 2022-08-21 RX ADMIN — FENOFIBRATE 48 MG: 48 TABLET, FILM COATED ORAL at 08:44

## 2022-08-21 RX ADMIN — GLYCERIN, HYPROMELLOSE, POLYETHYLENE GLYCOL 1 DROP: .2; .2; 1 LIQUID OPHTHALMIC at 08:45

## 2022-08-21 RX ADMIN — ALLOPURINOL 150 MG: 300 TABLET ORAL at 08:44

## 2022-08-21 RX ADMIN — LOSARTAN POTASSIUM 50 MG: 50 TABLET, FILM COATED ORAL at 08:44

## 2022-08-21 RX ADMIN — POTASSIUM CHLORIDE 20 MEQ: 1500 TABLET, EXTENDED RELEASE ORAL at 17:58

## 2022-08-21 RX ADMIN — ENOXAPARIN SODIUM 40 MG: 100 INJECTION SUBCUTANEOUS at 08:45

## 2022-08-21 NOTE — ASSESSMENT & PLAN NOTE
· Possibly secondary dehydration as well as medication side effect  · Tolvaptan dose given on 08/19/2022  · Sodium level has improved to 130  · Nephrology following  · Will repeat BMP as needed

## 2022-08-21 NOTE — PROGRESS NOTES
Progress Note - Nephrology   Dante Jay Sr  80 y o  male MRN: [id number]  Unit/Bed#: -01 Encounter: [id number]    A/P:  1  Hyponatremia              follow-up serum sodium level morning, fluid restriction of 1 5 L re-initiated  Patient is status post tolvaptan 7 5 mg on August 19th   2  Chronic kidney disease stage 3A with baseline creatinine around 0 7-0 8 mg/dL  3  Hypertension setting chronic kidney disease stage 3               continue monitor blood pressures, current systolic is around 160 mmHg, reassess on potential medication adjustments depending on how the patient progresses  4  Mild bilateral lower extremity edema               patient with only trace bilateral lower extremity edema, loop diuretics as indicated  5  Hypokalemia   Status post supplementation previously, continue monitor and add supplement as indicated    Follow up reason for today's visit:  Hyponatremia/chronic kidney disease    Hyponatremia    Patient Active Problem List   Diagnosis    Coronary artery disease involving native coronary artery of native heart without angina pectoris    Essential hypertension    Bilateral leg edema    Ventricular bigeminy    Chronic gout of multiple sites    Vitamin D deficiency    Hypomagnesemia    Hyponatremia    Mixed hyperlipidemia    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[facility] )    Fall         Subjective:   Patient denies fevers and chills, eating and drinking appropriately at this time  Objective:     Vitals: Blood pressure 152/79, pulse 74, temperature 98 4 °F (36 9 °C), resp  rate 18, height 5' 6" (1 676 m), weight 59 kg (130 lb), SpO2 98 %  ,Body mass index is 20 98 kg/m²  Weight (last 2 days)     None            Intake/Output Summary (Last 24 hours) at 8/21/2022 1325  Last data filed at 8/21/2022 0601  Gross per 24 hour   Intake 480 ml   Output --   Net 480 ml     I/O last 3 completed shifts:   In: 480 [P O :480]  Out: 1100 [Urine:1100]         Physical Exam: BP 152/79   Pulse 74   Temp 98 4 °F (36 9 °C)   Resp 18   Ht 5' 6" (1 676 m)   Wt 59 kg (130 lb)   SpO2 98%   BMI 20 98 kg/m²     General Appearance:    Alert, cooperative, no distress, appears stated age   Head:    Normocephalic, without obvious abnormality, atraumatic   Eyes:    Conjunctiva/corneas clear   Ears:    Normal external ears   Nose:   Nares normal, septum midline, mucosa normal, no drainage    or sinus tenderness   Throat:   Lips, mucosa, and tongue normal; teeth and gums normal   Neck:   Supple   Back:     Symmetric, no curvature, ROM normal, no CVA tenderness   Lungs:     Clear to auscultation bilaterally, respirations unlabored   Chest wall:    No tenderness or deformity   Heart:    Regular rate and rhythm, S1 and S2 normal, no murmur, rub   or gallop   Abdomen:     Soft, non-tender, bowel sounds active   Extremities:   Extremities normal, atraumatic, no cyanosis or edema   Skin:   Skin color, texture, turgor normal, no rashes or lesions   Lymph nodes:   Cervical normal   Neurologic:   CNII-XII intact            Lab, Imaging and other studies: I have personally reviewed pertinent labs  CBC: No results found for: WBC, HGB, HCT, MCV, PLT, ADJUSTEDWBC, MCH, MCHC, RDW, MPV, NRBC  CMP: No results found for: NA, K, CL, CO2, ANIONGAP, BUN, CREATININE, GLUCOSE, CALCIUM, AST, ALT, ALKPHOS, PROT, BILITOT, EGFR        Results from last 7 days   Lab Units 08/20/22  0437 08/20/22  0028 08/19/22  1808   POTASSIUM mmol/L 3 9 4 0 4 4   CHLORIDE mmol/L 98 98 93*   CO2 mmol/L 24 24 27   BUN mg/dL 14 16 12   CREATININE mg/dL 0 82 0 87 1 01   CALCIUM mg/dL 8 9 8 7 8 5         Phosphorus: No results found for: PHOS  Magnesium: No results found for: MG  Urinalysis: No results found for: COLORU, CLARITYU, SPECGRAV, PHUR, LEUKOCYTESUR, NITRITE, PROTEINUA, GLUCOSEU, KETONESU, BILIRUBINUR, BLOODU  Ionized Calcium: No results found for: CAION  Coagulation: No results found for: PT, INR, APTT  Troponin: No results found for: TROPONINI  ABG: No results found for: PHART, TVF2BHA, PO2ART, WNG8XHX, Z8WAOCPS, BEART, SOURCE  Radiology review:     IMAGING  No results found  Current Facility-Administered Medications   Medication Dose Route Frequency    acetaminophen (TYLENOL) tablet 650 mg  650 mg Oral Q4H PRN    albuterol (FOR EMS ONLY) (2 5 mg/3 mL) 0 083 % inhalation solution 2 5 mg  1 each Does not apply Once    allopurinol (ZYLOPRIM) tablet 150 mg  150 mg Oral Daily    aspirin (ECOTRIN LOW STRENGTH) EC tablet 81 mg  81 mg Oral Once per day on Mon Fri    atorvastatin (LIPITOR) tablet 20 mg  20 mg Oral Daily    cholecalciferol (VITAMIN D3) tablet 2,000 Units  2,000 Units Oral Daily    enoxaparin (LOVENOX) subcutaneous injection 40 mg  40 mg Subcutaneous Daily    fenofibrate (TRICOR) tablet 48 mg  48 mg Oral Daily    glycerin-hypromellose- (ARTIFICIAL TEARS) ophthalmic solution 1 drop  1 drop Both Eyes Daily    losartan (COZAAR) tablet 50 mg  50 mg Oral Daily    multivitamin stress formula tablet 1 tablet  1 tablet Oral Daily    nitroglycerin (NITROSTAT) SL tablet 0 4 mg  0 4 mg Sublingual Q5 Min PRN    ondansetron (ZOFRAN) injection 4 mg  4 mg Intravenous Q6H PRN    potassium chloride (K-DUR,KLOR-CON) CR tablet 20 mEq  20 mEq Oral BID     Medications Discontinued During This Encounter   Medication Reason    metoprolol tartrate (LOPRESSOR) 25 mg tablet     polyethylene glycol (GLYCOLAX) 17 GM/SCOOP powder     vancomycin (VANCOCIN) capsule 125 mg     sodium chloride 0 9 % infusion        Brian Rdoriguez, DO      This progress note was produced in part using a dictation device which may document imprecise wording from author's original intent

## 2022-08-21 NOTE — PROGRESS NOTES
Latanya 45  Progress Note - Corby Perez Sr  10/11/1924, 80 y o  male MRN: [id number]  Unit/Bed#: -01 Encounter: [id number]  Primary Care Provider: Nichole Patrick MD   Date and time admitted to hospital: 2022  8:22 PM    * Hyponatremia  Assessment & Plan  · Possibly secondary dehydration as well as medication side effect  · Tolvaptan dose given on 2022  · Sodium level has improved to 130  · Nephrology following  · Will repeat BMP as needed    [address]  · With debility  · Imaging studies negative for acute fractures  · PT/OT recommending rehab    Mixed hyperlipidemia  Assessment & Plan  · Continue home statin    Essential hypertension  Assessment & Plan  · Continue home metoprolol  Losartan started in place of patient's home ARB  · Lasix currently on hold  · BP currently stable    Coronary artery disease involving native coronary artery of native heart without angina pectoris  Assessment & Plan  · Known CAD with LAD stenting in   · Continue home metoprolol      VTE Prophylaxis:  Enoxaparin (Lovenox)    Patient Centered Rounds: I have performed bedside rounds with nursing staff today  Discussions with Specialists or Other Care Team Provider: yes  Education and Discussions with Family / Patient:  Spoke with patient's son at bedside yesterday regarding plan of care    Current Length of Stay: 4 day(s)    Current Patient Status: Inpatient   Certification Statement: The patient will continue to require additional inpatient hospital stay due to Pending placement    Discharge Plan:  PT recommending rehab  Case management working on placement    Code Status: Level 3 - DNAR and DNI    Subjective:   No overnight events noted  Patient tolerating diet  Denies any pain complaints    Afebrile    Objective:     Vitals:   Temp (24hrs), Av 2 °F (36 8 °C), Min:97 9 °F (36 6 °C), Max:98 4 °F (36 9 °C)    Temp:  [97 9 °F (36 6 °C)-98 4 °F (36 9 °C)] 98 4 °F (36 9 °C)  HR:  [74-85] 74  Resp:  [17-19] 18  BP: (125-152)/(60-88) 152/79  SpO2:  [97 %-100 %] 98 %  Body mass index is 20 98 kg/m²  Input and Output Summary (last 24 hours): Intake/Output Summary (Last 24 hours) at 8/21/2022 0902  Last data filed at 8/21/2022 0601  Gross per 24 hour   Intake 480 ml   Output --   Net 480 ml       Physical Exam:   Physical Exam  Constitutional:       General: He is not in acute distress  Comments: Frail elderly male   HENT:      Head: Normocephalic and atraumatic  Nose: Nose normal       Mouth/Throat:      Mouth: Mucous membranes are moist    Eyes:      Extraocular Movements: Extraocular movements intact  Conjunctiva/sclera: Conjunctivae normal    Cardiovascular:      Rate and Rhythm: Normal rate and regular rhythm  Pulmonary:      Effort: Pulmonary effort is normal  No respiratory distress  Abdominal:      Palpations: Abdomen is soft  Tenderness: There is no abdominal tenderness  Musculoskeletal:      Cervical back: Normal range of motion and neck supple  Comments: Generalized weakness   Skin:     General: Skin is warm and dry  Neurological:      General: No focal deficit present  Mental Status: He is alert  Mental status is at baseline  Cranial Nerves: No cranial nerve deficit  Psychiatric:         Mood and Affect: Mood normal          Behavior: Behavior normal          Additional Data:     Labs:    Results from last 7 days   Lab Units 08/18/22  0545 08/17/22  2054   WBC Thousand/uL 6 61 6 49   HEMOGLOBIN g/dL 10 8* 10 8*   HEMATOCRIT % 31 5* 31 7*   PLATELETS Thousands/uL 153 163   NEUTROS PCT %  --  79*   LYMPHS PCT %  --  13*   MONOS PCT %  --  8   EOS PCT %  --  0     Results from last 7 days   Lab Units 08/20/22  0437   SODIUM mmol/L 130*   POTASSIUM mmol/L 3 9   CHLORIDE mmol/L 98   CO2 mmol/L 24   BUN mg/dL 14   CREATININE mg/dL 0 82   CALCIUM mg/dL 8 9                   * I Have Reviewed All Lab Data Listed Above    * Additional Pertinent Lab Tests Reviewed: Karolina 66 Admission  Reviewed    Imaging:  Imaging Reports Reviewed Today Include:  No new imaging    Recent Cultures (last 7 days):           Last 24 Hours Medication List:   Current Facility-Administered Medications   Medication Dose Route Frequency Provider Last Rate    acetaminophen  650 mg Oral Q4H PRN Daljit Parker PA-C      albuterol (FOR EMS ONLY)  1 each Does not apply Once Winnie Mariano,       allopurinol  150 mg Oral Daily [place] KLARISSA Chavez      aspirin  81 mg Oral Once per day on Mon Fri Daljit Parker PA-C      atorvastatin  20 mg Oral Daily Crystal Cota      cholecalciferol  2,000 Units Oral Daily [place], [place]      enoxaparin  40 mg Subcutaneous Daily [place], [place]      fenofibrate  48 mg Oral Daily [place], [place]      glycerin-hypromellose-  1 drop Both Eyes Daily [place], [place]      losartan  50 mg Oral Daily [place], [place]      multivitamin stress formula  1 tablet Oral Daily [place], [place]      nitroglycerin  0 4 mg Sublingual Q5 Min PRN [place], [place]      ondansetron  4 mg Intravenous Q6H PRN Daljit Parker PA-C      potassium chloride  20 mEq Oral BID Alejandra Wolff PA-C          Today, Patient Was Seen By: Geovany Patricia MD    ** Please Note: Dictation voice to text software may have been used in the creation of this document   **

## 2022-08-22 PROBLEM — R13.10 DIFFICULTY SWALLOWING: Status: ACTIVE | Noted: 2022-08-22

## 2022-08-22 LAB
ANION GAP SERPL CALCULATED.3IONS-SCNC: 5 MMOL/L (ref 4–13)
BUN SERPL-MCNC: 15 MG/DL (ref 5–25)
CALCIUM SERPL-MCNC: 8.7 MG/DL (ref 8.4–10.2)
CHLORIDE SERPL-SCNC: 95 MMOL/L (ref 96–108)
CO2 SERPL-SCNC: 29 MMOL/L (ref 21–32)
CREAT SERPL-MCNC: 0.95 MG/DL (ref 0.6–1.3)
GFR SERPL CREATININE-BSD FRML MDRD: 66 ML/MIN/1.73SQ M
GLUCOSE SERPL-MCNC: 82 MG/DL (ref 65–140)
POTASSIUM SERPL-SCNC: 4.6 MMOL/L (ref 3.5–5.3)
SODIUM SERPL-SCNC: 129 MMOL/L (ref 135–147)

## 2022-08-22 PROCEDURE — 97110 THERAPEUTIC EXERCISES: CPT

## 2022-08-22 PROCEDURE — 99232 SBSQ HOSP IP/OBS MODERATE 35: CPT | Performed by: NURSE PRACTITIONER

## 2022-08-22 PROCEDURE — 97116 GAIT TRAINING THERAPY: CPT

## 2022-08-22 PROCEDURE — 97530 THERAPEUTIC ACTIVITIES: CPT

## 2022-08-22 PROCEDURE — 80048 BASIC METABOLIC PNL TOTAL CA: CPT | Performed by: NURSE PRACTITIONER

## 2022-08-22 PROCEDURE — 92610 EVALUATE SWALLOWING FUNCTION: CPT | Performed by: NURSE PRACTITIONER

## 2022-08-22 RX ADMIN — ENOXAPARIN SODIUM 40 MG: 100 INJECTION SUBCUTANEOUS at 09:58

## 2022-08-22 RX ADMIN — ASPIRIN 81 MG: 81 TABLET, COATED ORAL at 09:58

## 2022-08-22 RX ADMIN — FENOFIBRATE 48 MG: 48 TABLET, FILM COATED ORAL at 09:58

## 2022-08-22 RX ADMIN — ATORVASTATIN CALCIUM 20 MG: 20 TABLET, FILM COATED ORAL at 09:58

## 2022-08-22 RX ADMIN — B-COMPLEX W/ C & FOLIC ACID TAB 1 TABLET: TAB at 09:58

## 2022-08-22 RX ADMIN — POTASSIUM CHLORIDE 20 MEQ: 1500 TABLET, EXTENDED RELEASE ORAL at 09:58

## 2022-08-22 RX ADMIN — GLYCERIN, HYPROMELLOSE, POLYETHYLENE GLYCOL 1 DROP: .2; .2; 1 LIQUID OPHTHALMIC at 09:59

## 2022-08-22 RX ADMIN — Medication 2000 UNITS: at 09:58

## 2022-08-22 RX ADMIN — LOSARTAN POTASSIUM 50 MG: 50 TABLET, FILM COATED ORAL at 09:58

## 2022-08-22 RX ADMIN — ALLOPURINOL 150 MG: 300 TABLET ORAL at 09:58

## 2022-08-22 NOTE — CASE MANAGEMENT
Case Management Discharge Planning Note    Patient name Steffanie Almaraz  Location /-01 MRN [id number]  : 10/11/1924 Date 2022       Current Admission Date: 2022  Current Admission Diagnosis:Hyponatremia   Patient Active Problem List    Diagnosis Date Noted    Fall 2022    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[facility] ) 2020    Hypomagnesemia 2020    Hyponatremia 2020    Mixed hyperlipidemia 2020    Coronary artery disease involving native coronary artery of native heart without angina pectoris 2018    Essential hypertension 2018    Bilateral leg edema 2018    Ventricular bigeminy 2018    Chronic gout of multiple sites 05/10/2017    Vitamin D deficiency 2013      LOS (days): 5  Geometric Mean LOS (GMLOS) (days): 2 60  Days to GMLOS:-2     OBJECTIVE:  Risk of Unplanned Readmission Score: 13 37         Current admission status: Inpatient   Preferred Pharmacy:   [address] [place] [address] #[id number] [address] [address]  Phone: [phone] Fax: [phone]    Primary Care Provider: Víctor Alan MD    Primary Insurance: Devra Goldmann [facility] REP  Secondary Insurance:     [address] [place] [address] Number: submitted SNF auth request to [employer] via [employer]  pending ref #[id number] for [place]  NPI: [id number]  Dr Alicia Muse  NPI: [id number]  Clinicals attached via [employer]

## 2022-08-22 NOTE — PHYSICAL THERAPY NOTE
PHYSICAL THERAPY TREATMENT NOTE  NAME:  Imani Mar Sr  DATE: 08/22/22    Length Of Stay: 5  Performed at least 2 patient identifiers during session: Name and ID bracelet    TREATMENT FLOWSHEET:    08/22/22 1507   PT Last Visit   PT Visit Date 08/22/22   Note Type   Note Type Treatment   Pain Assessment   Pain Assessment Tool 0-10   Pain Score No Pain   Restrictions/Precautions   Weight Bearing Precautions Per Order No   Other Precautions Fall Risk;Hard of hearing;Multiple lines;Telemetry   General   Chart Reviewed Yes   Response to Previous Treatment Patient with no complaints from previous session  Family/Caregiver Present No   Cognition   Overall Cognitive Status WFL   Arousal/Participation Alert; Cooperative   Attention Attends with cues to redirect   Orientation Level Oriented X4   Memory Decreased recall of precautions   Following Commands Follows one step commands without difficulty   Subjective   Subjective 'I need to use the commode "   Bed Mobility   Additional Comments Pt  seated OOB in recliner   Transfers   Sit to Stand 4  Minimal assistance   Additional items Armrests; Increased time required;Verbal cues; Assist x 2   Stand to Sit 4  Minimal assistance   Additional items Armrests; Increased time required;Verbal cues; Assist x 2   Stand pivot 4  Minimal assistance   Additional items Armrests; Increased time required;Verbal cues; Assist x 2  (RW)   Toilet transfer 4  Minimal assistance   Additional items Armrests; Increased time required;Verbal cues; Commode;Assist x 2   Additional Comments Pt  required min A x2 for pt  to  perform clothing management and hygiene needs   Ambulation/Elevation   Gait pattern Improper Weight shift;Decreased foot clearance   Gait Assistance 4  Minimal assist   Additional items Verbal cues; Assist x 2   Assistive Device Rolling walker   Distance 25 ft x2   Ambulation/Elevation Additional Comments Pt  cued for proper postural slignment and to weightbear through balls off feet and not heels   Balance   Static Sitting Fair   Dynamic Sitting Fair -   Static Standing Fair -   Dynamic Standing Fair -   Ambulatory Fair -   Endurance Deficit   Endurance Deficit Yes   Endurance Deficit Description fatigues quickly   Activity Tolerance   Activity Tolerance Patient limited by fatigue   Nurse Made Aware yes   Exercises   Quad Sets Sitting;15 reps;AROM; Bilateral   Heelslides Sitting;15 reps;AROM; Bilateral   Glute Sets Sitting;15 reps;AROM; Bilateral   Hip Flexion Sitting;15 reps;AROM; Bilateral   Hip Abduction Sitting;15 reps;AROM; Bilateral   Hip Adduction Sitting;15 reps;AROM; Bilateral   Knee AROM Long Arc Quad Sitting;15 reps;AROM; Bilateral   Ankle Pumps Sitting;15 reps;AROM; Bilateral   Marching Sitting;15 reps;AROM; Bilateral   Neuro re-ed dynamic standing balacne activities performed including multidirectional weight shifting, reaching across midline, and performed 360* turns both directions   Assessment   Prognosis Fair   Problem List Decreased strength;Decreased endurance; Impaired balance;Decreased mobility; Impaired judgement;Decreased safety awareness; Impaired hearing   Goals   Patient Goals to go to the bathroom   PT Treatment Day 2   Plan   Treatment/Interventions Functional transfer training;LE strengthening/ROM; Elevations; Therapeutic exercise; Endurance training;Patient/family training;Equipment eval/education; Bed mobility;Gait training;Spoke to nursing   Progress Progressing toward goals   PT Frequency 3-5x/wk   Recommendation   PT Discharge Recommendation Post acute rehabilitation services   Additional Comments Increased time required due to frequent rest period s required and time on commode attempting to go   SkFlintoen 8 in Bed Without Bedrails 3   Lying on Back to Sitting on Edge of Flat Bed 3   Moving Bed to Chair 2   Standing Up From Chair 2   Walk in Room 2   Climb 3-5 Stairs 1   Basic Mobility Inpatient Raw Score 13   Basic Mobility Standardized Score 33 99   Highest Level Of Mobility   -[facility] Goal 4: Move to chair/commode   -HLM Achieved 7: Walk 25 feet or more       The patient's AM-PAC Basic Mobility Inpatient Short Form Raw Score is 13, Standardized Score is 33 99  A standardized score less than 42 9 suggests the patient may benefit from discharge to post-acute rehabilitation services  Please also refer to the recommendation of the Physical Therapist for safe discharge planning  Pt seen for PT treatment session this date with interventions consisting of transfer training, gait training, toilet transfers, seated TE, and education provided as needed for safety and direction to improve functional mobility and activity tolerance  Pt agreeable to PT treatment session upon arrival, pt found sitting OOB in recliner  At end of session, pt left sitting OOB in recliner with chair alarm activated and all needs in reach  In comparison to previous session, pt with improvements in ambulation distance   Continue to recommend STR at time of d/c in order to maximize pt's functional independence and safety w/ mobility  Pt continues to be functioning below baseline level  PT will continue to see pt while here in order to address the deficits listed above and provide interventions consistent w/ POC in effort to achieve STGs      Jorje Hernandez PTA

## 2022-08-22 NOTE — PROGRESS NOTES
NEPHROLOGY PROGRESS NOTE   Krupa Vela Sr  80 y o  male MRN: [id number]  Unit/Bed#: -01 Encounter: [id number]    Assessment/Plan:    24-year-old man with CKD 3a follows with Dr Shannon Figueroa, hypertension, hematuria followed by Dr Alicia Bay, recent C diff admitted 8/17 due to hyponatremia for which nephrology following  1  Hyponatremia  · Status post tolvaptan 7 5 mg August 19th  No updated lab work to review since 08/20-check BMP now  Continue fluid restriction  2  Possible SIADH  · Continue fluid restriction into outpatient setting  Blood work pending  3  Stage 3a chronic kidney disease with baseline creatinine around 0 7-0 8 mg/dL  · Follows with Dr Shannon Figueroa  4  Hypertension in the setting of CKD 3  · Avoid thiazide diuretics  5  Hematuria  · Establish care with Dr Alicia Bay and was for kidney/bladder ultrasound today as an outpatient  Asked son to reschedule  Will check PVR as surrogate for now  6  Hypokalemia  · Hold further standing potassium supplementation until blood work results      ROS  Seen and examined lying in chair  States he has no physical complaints and is happy with the care he is receiving  A complete 10 point review of systems have been performed and are otherwise negative         Historical Information   Past Medical History:   Diagnosis Date    Arthritis     Cataract     Coronary artery disease     Coronary atherosclerosis of native coronary artery     Diverticulitis of colon     Essential hypertension, benign     Hyperlipidemia     Hypertension     Peptic ulcer     Renal disorder      Past Surgical History:   Procedure Laterality Date    CATARACT EXTRACTION Right     Left eye 5/2021    CORONARY STENT PLACEMENT      SKIN BIOPSY      TONSILLECTOMY       Social History   Social History     Substance and Sexual Activity   Alcohol Use Not Currently     Social History     Substance and Sexual Activity   Drug Use No     Social History     Tobacco Use   Smoking Status Former Smoker    Types: Pipe   Smokeless Tobacco Never Used       Family History:   Family History   Problem Relation Age of Onset    Heart attack Brother     Heart disease Mother     Cancer Father        Medications:  Pertinent medications were reviewed  Current Facility-Administered Medications   Medication Dose Route Frequency Provider Last Rate    acetaminophen  650 mg Oral Q4H PRN Amina Dryer, KLARISSA      albuterol (FOR EMS ONLY)  1 each Does not apply Once Erika Narrow, DO      allopurinol  150 mg Oral Daily [place], KLARIS[place]      aspirin  81 mg Oral Once per day on Mon Fri [facility], KLARISSA      atorvastatin  20 mg Oral Daily [place], [place]      cholecalciferol  2,000 Units Oral Daily [facility] Chap[place], [place]      enoxaparin  40 mg Subcutaneous Daily [facility] [place], [place]      fenofibrate  48 mg Oral Daily [facility] [place], [place]      glycerin-hypromellose-  1 drop Both Eyes Daily [facility] Chap[place] Given, [place]      losartan  50 mg Oral Daily [place], [place]      multivitamin stress formula  1 tablet Oral Daily [facility] Juliaven [place], [place]      nitroglycerin  0 4 mg Sublingual Q5 Min PRN [facility] [place], [place]      potassium chloride  20 mEq Oral BID Sylwia Whyte PA-C           No Known Allergies      Vitals:   /68 (BP Location: Right arm)   Pulse 66   Temp 98 1 °F (36 7 °C) (Temporal)   Resp 16   Ht 5' 6" (1 676 m)   Wt 59 kg (130 lb)   SpO2 98%   BMI 20 98 kg/m²   Body mass index is 20 98 kg/m²    SpO2: 98 %,   SpO2 Activity: At Rest,   O2 Device: None (Room air)      Intake/Output Summary (Last 24 hours) at 8/22/2022 1250  Last data filed at 8/22/2022 1100  Gross per 24 hour   Intake 420 ml   Output 300 ml   Net 120 ml     Invasive Devices  Report    None                 Physical Exam  General: conscious, cooperative, in no acute distress  Eyes: conjunctivae pink, anicteric sclerae  ENT: lips and mucous membranes moist  Neck: supple, no JVD, no masses  Chest: clear breath sounds bilaterally, no crackles, ronchus or wheezings  CVS: S1 & S2, normal rate, regular rhythm  Abdomen: soft, non-tender, non-distended, normoactive bowel sounds  Extremities:  Trace edema of both legs  Skin: no rash  Neuro: awake, alert, oriented      Diagnostic Data:  Lab: I have personally reviewed pertinent lab results  ,   CBC:  Results from last 7 days   Lab Units 08/18/22  0545   WBC Thousand/uL 6 61   HEMOGLOBIN g/dL 10 8*   HEMATOCRIT % 31 5*   PLATELETS Thousands/uL 153      CMP: No results found for: SODIUM, K, CL, CO2, ANIONGAP, BUN, CREATININE, GLUCOSE, CALCIUM, AST, ALT, ALKPHOS, PROT, BILITOT, EGFR,   PT/INR: No results found for: PT, INR,   Magnesium: No components found for: MAG,  Phosphorous: No results found for: PHOS    Microbiology:  @LABRCNTIP,(urinecx:7)@        Wilhemenia Noss, CRNP    Portions of the record may have been created with voice recognition software  Occasional wrong word or "sound a like" substitutions may have occurred due to the inherent limitations of voice recognition software  Read the chart carefully and recognize, using context, where substitutions have occurred

## 2022-08-22 NOTE — PROGRESS NOTES
[address]  Progress Note - Bird Dress Sr  10/11/1924, 80 y o  male MRN: [id number]  Unit/Bed#: -01 Encounter: [id number]  Primary Care Provider: Armen Sanchez MD   Date and time admitted to hospital: 8/17/2022  8:22 PM    * Hyponatremia  Assessment & Plan  · Possibly secondary volume depletion, medication side effect, and possible SIADH  · Tolvaptan dose given on 08/19/2022  · Sodium level has improved   · Nephrology input appreciated   · Will continue with fluid restriction   · Monitor BMP     Difficulty swallowing  Assessment & Plan  · Will obtain speech evaluation   · No obvious aspiration noted    [address]  · With debility  · Imaging studies negative for acute fractures  · PT/OT recommending rehab     Mixed hyperlipidemia  Assessment & Plan  · Continue home statin     Essential hypertension  Assessment & Plan  · Continue home metoprolol  Losartan started in place of patient's home ARB  · Lasix currently on hold  · BP currently stable     Coronary artery disease involving native coronary artery of native heart without angina pectoris  Assessment & Plan  · Known CAD with LAD stenting in 2002  · Continue home metoprolol         VTE Prophylaxis:  Enoxaparin (Lovenox)    Patient Centered Rounds: I have performed bedside rounds with nursing staff today  Discussions with Specialists or Other Care Team Provider: CM   Education and Discussions with Family / Patient: patient and son at bedside     Current Length of Stay: 5 day(s)    Current Patient Status: Inpatient   Certification Statement: The patient will continue to require additional inpatient hospital stay due to hyponatremia    Discharge Plan: pending hospital course  Await on discharge disposition  Code Status: Level 3 - DNAR and DNI    Subjective:   Feeling okay  Complaining of "feeling like something is stuck in his throat"  Denies any aspiration, nausea or vomiting       Objective:     Vitals: Temp (24hrs), Av °F (36 7 °C), Min:97 9 °F (36 6 °C), Max:98 1 °F (36 7 °C)    Temp:  [97 9 °F (36 6 °C)-98 1 °F (36 7 °C)] 98 1 °F (36 7 °C)  HR:  [66-74] 66  Resp:  [16-18] 16  BP: (125-143)/(55-68) 143/68  SpO2:  [98 %-100 %] 98 %  Body mass index is 20 98 kg/m²  Input and Output Summary (last 24 hours): Intake/Output Summary (Last 24 hours) at 2022 1601  Last data filed at 2022 1400  Gross per 24 hour   Intake 660 ml   Output 375 ml   Net 285 ml       Physical Exam:   Physical Exam  Vitals and nursing note reviewed  Constitutional:       General: He is not in acute distress  Appearance: Normal appearance  Comments: Frail and elderly      HENT:      Head: Normocephalic and atraumatic  Right Ear: External ear normal       Left Ear: External ear normal       Nose: Nose normal       Mouth/Throat:      Mouth: Mucous membranes are moist       Pharynx: Oropharynx is clear  Eyes:      General:         Right eye: No discharge  Left eye: No discharge  Extraocular Movements: Extraocular movements intact  Pupils: Pupils are equal, round, and reactive to light  Cardiovascular:      Rate and Rhythm: Normal rate and regular rhythm  Pulses: Normal pulses  Heart sounds: Normal heart sounds  No murmur heard  Pulmonary:      Effort: Pulmonary effort is normal  No respiratory distress  Breath sounds: Normal breath sounds  No wheezing or rales  Abdominal:      General: Bowel sounds are normal  There is no distension  Palpations: Abdomen is soft  There is no mass  Tenderness: There is no abdominal tenderness  Musculoskeletal:         General: No swelling, tenderness or deformity  Normal range of motion  Cervical back: Normal range of motion and neck supple  No rigidity  Skin:     General: Skin is warm and dry  Capillary Refill: Capillary refill takes less than 2 seconds  Coloration: Skin is not pale        Findings: No erythema  Neurological:      General: No focal deficit present  Mental Status: He is alert and oriented to person, place, and time  Mental status is at baseline  Psychiatric:         Mood and Affect: Mood normal          Behavior: Behavior normal          Thought Content: Thought content normal          Judgment: Judgment normal          Additional Data:     Labs:    Results from last 7 days   Lab Units 08/18/22  0545 08/17/22  2054   WBC Thousand/uL 6 61 6 49   HEMOGLOBIN g/dL 10 8* 10 8*   HEMATOCRIT % 31 5* 31 7*   PLATELETS Thousands/uL 153 163   NEUTROS PCT %  --  79*   LYMPHS PCT %  --  13*   MONOS PCT %  --  8   EOS PCT %  --  0     Results from last 7 days   Lab Units 08/22/22  1317   SODIUM mmol/L 129*   POTASSIUM mmol/L 4 6   CHLORIDE mmol/L 95*   CO2 mmol/L 29   BUN mg/dL 15   CREATININE mg/dL 0 95   CALCIUM mg/dL 8 7                   * I Have Reviewed All Lab Data Listed Above  * Additional Pertinent Lab Tests Reviewed:  Karolina Keyes Admission  Reviewed    Imaging:  Imaging Reports Reviewed Today Include: n/a     Recent Cultures (last 7 days):           Last 24 Hours Medication List:   Current Facility-Administered Medications   Medication Dose Route Frequency Provider Last Rate    acetaminophen  650 mg Oral Q4H PRN Richi KLARISSA Barillas      albuterol (FOR EMS ONLY)  1 each Does not apply Once Bobbi Eason DO      allopurinol  150 mg Oral Daily Roz Chavez PA-C      aspirin  81 mg Oral Once per day on Mon Fri Richi Barillas PA-C      atorvastatin  20 mg Oral Daily [place], [place]      cholecalciferol  2,000 Units Oral Daily [place], [place]      enoxaparin  40 mg Subcutaneous Daily [place], [place]      fenofibrate  48 mg Oral Daily Roz Ch[place], [place]      glycerin-hypromellose-  1 drop Both Eyes Daily Roz Chavez [place]      losartan  50 mg Oral Daily [place] G[place] Sandra Abebe PA-C      multivitamin stress formula  1 tablet Oral Daily [place]      nitroglycerin  0 4 mg Sublingual Q5 Min PRN Adis Alicia, [place]          Today, Patient Was Seen By: ANAM Barragan    ** Please Note: Dictation voice to text software may have been used in the creation of this document   **

## 2022-08-22 NOTE — ASSESSMENT & PLAN NOTE
· Possibly secondary volume depletion, medication side effect, and possible SIADH  · Tolvaptan dose given on 08/19/2022  · Sodium level has improved   · Nephrology input appreciated   · Will continue with fluid restriction   · Monitor BMP

## 2022-08-22 NOTE — PLAN OF CARE
Problem: PHYSICAL THERAPY ADULT  Goal: Performs mobility at highest level of function for planned discharge setting  See evaluation for individualized goals  Description: Treatment/Interventions: Functional transfer training, LE strengthening/ROM, Elevations, Therapeutic exercise, Endurance training, Patient/family training, Equipment eval/education, Bed mobility, Gait training, Spoke to nursing, Spoke to case management  Equipment Recommended:  (continue RW use)       See flowsheet documentation for full assessment, interventions and recommendations  Outcome: Progressing  Note: Prognosis: Fair  Problem List: Decreased strength, Decreased endurance, Impaired balance, Decreased mobility, Impaired judgement, Decreased safety awareness, Impaired hearing  Assessment: Pt seen for PT treatment session this date with interventions consisting of transfer training, gait training, toilet transfers, seated TE, and education provided as needed for safety and direction to improve functional mobility and activity tolerance  Pt agreeable to PT treatment session upon arrival, pt found sitting OOB in recliner  At end of session, pt left sitting OOB in recliner with chair alarm activated and all needs in reach  In comparison to previous session, pt with improvements in ambulation distance   Continue to recommend STR at time of d/c in order to maximize pt's functional independence and safety w/ mobility  Pt continues to be functioning below baseline level  PT will continue to see pt while here in order to address the deficits listed above and provide interventions consistent w/ POC in effort to achieve STGs  Barriers to Discharge: Inaccessible home environment     PT Discharge Recommendation: Post acute rehabilitation services    See flowsheet documentation for full assessment

## 2022-08-22 NOTE — CASE MANAGEMENT
Case Management Discharge Planning Note    Patient name Leandro Pacheco  Location /-01 MRN [id number]  : 10/11/1924 Date 2022       Current Admission Date: 2022  Current Admission Diagnosis:Hyponatremia   Patient Active Problem List    Diagnosis Date Noted    Fall 2022    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[facility] [address] ) 2020    Hypomagnesemia 2020    Hyponatremia 2020    Mixed hyperlipidemia 2020    Coronary artery disease involving native coronary artery of native heart without angina pectoris 2018    Essential hypertension 2018    Bilateral leg edema 2018    Ventricular bigeminy 2018    Chronic gout of multiple sites 05/10/2017    Vitamin D deficiency 2013      LOS (days): 5  Geometric Mean LOS (GMLOS) (days): 2 60  Days to GMLOS:-1 9     OBJECTIVE:  Risk of Unplanned Readmission Score: 12 93         Current admission status: Inpatient   Preferred Pharmacy:   [address] [place] [id number]  Phone: [phone] Fax: [phone]    Primary Care Provider: Nichole Patrick MD    Primary Insurance: [facility]  Secondary Insurance:     DISCHARGE DETAILS:    Discharge planning discussed with[de-identified] patient and son at [facility] bedside   cm did call and leave a message on his phone  Freedom of Choice: Yes  Comments - Freedom of Choice: rehab recommended-  [facility] accepted  CM contacted family/caregiver?: Yes             Contacts  Patient Contacts: Brianna Quinones jr  Relationship to Patient[de-identified] Family (son)  Contact Method:  In Person  Reason/Outcome: Discharge [address]         Is the patient interested in [address] at discharge?: No    DME Referral Provided  Referral made for DME?: No    Other Referral/Resources/Interventions Provided:  Interventions: Short Term Rehab  Referral Comments: pt has been accepted to [facility] so gave permission to start the authorization process    Would you like to participate in our [address] service program?  : No - Declined    Treatment Team Recommendation: Short Term Rehab ([facility] Johnson started- transport needed)                                   IMM Given (Date):: 08/22/22  IMM Given to[de-identified] Family (reviewed with son   copy of Imm given)

## 2022-08-22 NOTE — SPEECH THERAPY NOTE
Speech Language/Pathology  Speech/Language Pathology  Assessment    Patient Name: León Bennett  Today's Date: 8/22/2022     Problem List  Principal Problem:    Hyponatremia  Active Problems:    Coronary artery disease involving native coronary artery of native heart without angina pectoris    Essential hypertension    Mixed hyperlipidemia    Fall    Difficulty swallowing    Past Medical History  Past Medical History:   Diagnosis Date    Arthritis     Cataract     Coronary artery disease     Coronary atherosclerosis of native coronary artery     Diverticulitis of colon     Essential hypertension, benign     Hyperlipidemia     Hypertension     Peptic ulcer     Renal disorder      Past Surgical History  Past Surgical History:   Procedure Laterality Date    CATARACT EXTRACTION Right     Left eye 5/2021    CORONARY STENT PLACEMENT      SKIN BIOPSY      TONSILLECTOMY       History of Present Illness Chart Review :  "Luis Fernando Shah Sr  is a 80 y o  male with a PMH of CAD with stent in the LAD, hypertension, hyperlipidemia, chronic kidney disease stage 3, history of hyponatremia and SIADH, recent C diff infection and finishing his vanco today who presents with fall  He has had diarrhea for about 1 month and reports he is finishing up the vancomycin for c  Diff tomorrow  He was making lunch, took vanco and dropped a wrapper he bent over to pick it up and legs gave out and he fell forward  Scratched right arm on hit nose on cabinet  He was on ground for about 40 minutes "        Bedside Swallow Evaluation:    Summary:  Pt presented w/ an oropharyngeal swallow function WFL for dysphagia level 3 solids and thin liquids  Pt's son present at bedside, difficult to obtain specific dysphagia symptoms that patient is experiencing  Initially, son and patient denied any difficulty swallowing   However, through further questioning it patient did report onset of dysphagia ~1 month PTA characterized by food feeling stuck in his throat and sometimes coughing during meals due to a "tickle in my throat" with occasional return of food particles  He reports cutting food into very small bite size pieces and chewing very well to compensate and he does well with this  He reported taking his pills whole with puree PTA; however, report from RN today that patient was crushing meds in puree PTA  He was assessed with dysphagia level 3 solids and thin liquids with adequate oral and pharyngeal phase of the swallow  Cough 1x drinking from the last sip of his cup  No difficulty reported during assessment  Pt reporting multiple times today that his "voice box feels lower than it once was but no time frame reported " He denied any vocal changes and voice was judged as slightly hoarse  Pt was seen by ENT in the past, last seen 10/4/21 due to nosebleeds  Recommendations:  Diet: dysphagia level 3 solids  Liquid: thin liquids cup/straw OK  Meds: crushed in puree  Supervision: assist with meal prep   Positioning:Upright  Strategies: alternate bites/sips  Pt to take PO/Meds only when fully alert and upright  Oral care: continue  Aspiration precautions  Recommend a video barium swallow study to further assess oropharyngeal swallow function      Therapy Prognosis: good with adherence to recommendations  Frequency: 2-3x per week     Consider consult w/:  Nutrition    Goal(s):  Pt will tolerate least restrictive diet w/out s/s aspiration or oral/pharyngeal difficulties       H&P/Admit info/ pertinent provider notes: (PMH noted above)    Special Studies:    CT head 8/17/22 "IMPRESSION:     No acute intracranial abnormality "    CXR 8/17/22  "IMPRESSION:     No acute cardiopulmonary disease "    Previous VBS: No      Patient's goal: to get better    Did the pt report pain? no    Reason for consult:  R/o aspiration  Determine safest and least restrictive diet  h/o dysphagia     Food allergies: none listed or reported by patient    Current diet: regular solids/thin liquids    Premorbid diet[de-identified] regular solids/thin liquids    O2 requirement:RA    Voice/Speech: slightly hoarse    Social:WFL    Follows commands: yes, repetition needed at times likely due to hearing difficulty                          Cognitive Status: oriented x3/3    Oral [facility] exam:  Dentition: mostly edentulous maxilla ( upper plate broke), missing partial lower plate     Labial strength and ROM: adequate  Lingual strength and ROM:adequate  Mandibular strength and ROM:adequate  Velum:adequate  Secretion management:adequate  Volitional cough:adequate  Volitional swallow:adequate      Esophageal stage:  No s/s reported    Aspiration precautions posted    Results d/w:  Pt, nursing, family, physician

## 2022-08-22 NOTE — PLAN OF CARE
Problem: Potential for Falls  Goal: Patient will remain free of falls  Description: INTERVENTIONS:  - Educate patient/family on patient safety including physical limitations  - Instruct patient to call for assistance with activity   - Consult OT/PT to assist with strengthening/mobility   - Keep Call bell within reach  - Keep bed low and locked with side rails adjusted as appropriate  - Keep care items and personal belongings within reach  - Initiate and maintain comfort rounds  - Make Fall Risk Sign visible to staff  - Offer Toileting every 2 Hours, in advance of need  - Initiate/Maintain bed alarm  - Obtain necessary fall risk management equipment: non slip socks  - Apply yellow socks and bracelet for high fall risk patients  - Consider moving patient to room near nurses station  Outcome: Progressing     Problem: Prexisting or High Potential for Compromised Skin Integrity  Goal: Skin integrity is maintained or improved  Description: INTERVENTIONS:  - Identify patients at risk for skin breakdown  - Assess and monitor skin integrity  - Assess and monitor nutrition and hydration status  - Monitor labs   - Assess for incontinence   - Turn and reposition patient  - Assist with mobility/ambulation  - Relieve pressure over bony prominences  - Avoid friction and shearing  - Provide appropriate hygiene as needed including keeping skin clean and dry  - Evaluate need for skin moisturizer/barrier cream  - Collaborate with interdisciplinary team   - Patient/family teaching  - Consider wound care consult   Outcome: Progressing     Problem: MOBILITY - ADULT  Goal: Maintain or return to baseline ADL function  Description: INTERVENTIONS:  -  Assess patient's ability to carry out ADLs; assess patient's baseline for ADL function and identify physical deficits which impact ability to perform ADLs (bathing, care of mouth/teeth, toileting, grooming, dressing, etc )  - Assess/evaluate cause of self-care deficits   - Assess range of motion  - Assess patient's mobility; develop plan if impaired  - Assess patient's need for assistive devices and provide as appropriate  - Encourage maximum independence but intervene and supervise when necessary  - Involve family in performance of ADLs  - Assess for home care needs following discharge   - Consider OT consult to assist with ADL evaluation and planning for discharge  - Provide patient education as appropriate  Outcome: Progressing  Goal: Maintains/Returns to pre admission functional level  Description: INTERVENTIONS:  - Perform BMAT or MOVE assessment daily    - Set and communicate daily mobility goal to care team and patient/family/caregiver  - Collaborate with rehabilitation services on mobility goals if consulted  - Perform Range of Motion 2 times a day  - Reposition patient every 2 hours    - Dangle patient 3 times a day  - Stand patient 3 times a day  - Ambulate patient 3 times a day  - Out of bed to chair 3 times a day   - Out of bed for meals 3 times a day  - Out of bed for toileting  - Record patient progress and toleration of activity level   Outcome: Progressing

## 2022-08-23 LAB
ANION GAP SERPL CALCULATED.3IONS-SCNC: 6 MMOL/L (ref 4–13)
BUN SERPL-MCNC: 18 MG/DL (ref 5–25)
CALCIUM SERPL-MCNC: 8.4 MG/DL (ref 8.4–10.2)
CHLORIDE SERPL-SCNC: 95 MMOL/L (ref 96–108)
CO2 SERPL-SCNC: 23 MMOL/L (ref 21–32)
CREAT SERPL-MCNC: 0.9 MG/DL (ref 0.6–1.3)
FLUAV RNA RESP QL NAA+PROBE: NEGATIVE
FLUBV RNA RESP QL NAA+PROBE: NEGATIVE
GFR SERPL CREATININE-BSD FRML MDRD: 71 ML/MIN/1.73SQ M
GLUCOSE SERPL-MCNC: 85 MG/DL (ref 65–140)
POTASSIUM SERPL-SCNC: 4 MMOL/L (ref 3.5–5.3)
RSV RNA RESP QL NAA+PROBE: NEGATIVE
SARS-COV-2 RNA RESP QL NAA+PROBE: NEGATIVE
SODIUM SERPL-SCNC: 124 MMOL/L (ref 135–147)

## 2022-08-23 PROCEDURE — 0241U HB NFCT DS VIR RESP RNA 4 TRGT: CPT | Performed by: HOSPITALIST

## 2022-08-23 PROCEDURE — 99232 SBSQ HOSP IP/OBS MODERATE 35: CPT | Performed by: HOSPITALIST

## 2022-08-23 PROCEDURE — 97530 THERAPEUTIC ACTIVITIES: CPT

## 2022-08-23 PROCEDURE — 80048 BASIC METABOLIC PNL TOTAL CA: CPT | Performed by: NURSE PRACTITIONER

## 2022-08-23 PROCEDURE — 92526 ORAL FUNCTION THERAPY: CPT

## 2022-08-23 PROCEDURE — 97535 SELF CARE MNGMENT TRAINING: CPT

## 2022-08-23 PROCEDURE — 99232 SBSQ HOSP IP/OBS MODERATE 35: CPT | Performed by: INTERNAL MEDICINE

## 2022-08-23 RX ORDER — SACCHAROMYCES BOULARDII 250 MG
250 CAPSULE ORAL 2 TIMES DAILY
Status: DISCONTINUED | OUTPATIENT
Start: 2022-08-23 | End: 2022-08-24 | Stop reason: HOSPADM

## 2022-08-23 RX ADMIN — ENOXAPARIN SODIUM 40 MG: 100 INJECTION SUBCUTANEOUS at 09:49

## 2022-08-23 RX ADMIN — Medication 250 MG: at 17:28

## 2022-08-23 RX ADMIN — LOSARTAN POTASSIUM 50 MG: 50 TABLET, FILM COATED ORAL at 09:48

## 2022-08-23 RX ADMIN — Medication 2000 UNITS: at 09:48

## 2022-08-23 RX ADMIN — ALLOPURINOL 150 MG: 300 TABLET ORAL at 09:48

## 2022-08-23 RX ADMIN — ATORVASTATIN CALCIUM 20 MG: 20 TABLET, FILM COATED ORAL at 09:48

## 2022-08-23 RX ADMIN — GLYCERIN, HYPROMELLOSE, POLYETHYLENE GLYCOL 1 DROP: .2; .2; 1 LIQUID OPHTHALMIC at 09:49

## 2022-08-23 RX ADMIN — FENOFIBRATE 48 MG: 48 TABLET, FILM COATED ORAL at 09:48

## 2022-08-23 RX ADMIN — B-COMPLEX W/ C & FOLIC ACID TAB 1 TABLET: TAB at 09:48

## 2022-08-23 NOTE — ASSESSMENT & PLAN NOTE
· Possibly secondary volume depletion, medication side effect, and possible SIADH  · Tolvaptan dose given on 08/19/2022  · Sodium level dropped to 124 today, patient remains asymptomatic  · Fluid restriction parameters adjusted by Nephrology today  · Will repeat a BMP in the a m    · In the interim, patient remains medically stable for discharge

## 2022-08-23 NOTE — CASE MANAGEMENT
Case Management Discharge Planning Note    Patient name Leandro Pacheco  Location /-01 MRN [id number]  : 10/11/1924 Date 2022       Current Admission Date: 2022  Current Admission Diagnosis:Hyponatremia   Patient Active Problem List    Diagnosis Date Noted    Difficulty swallowing 2022    Fall 2022    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[facility] [address] ) 2020    Hypomagnesemia 2020    Hyponatremia 2020    Mixed hyperlipidemia 2020    Coronary artery disease involving native coronary artery of native heart without angina pectoris 2018    Essential hypertension 2018    Bilateral leg edema 2018    Ventricular bigeminy 2018    Chronic gout of multiple sites 05/10/2017    Vitamin D deficiency 2013      LOS (days): 6  Geometric Mean LOS (GMLOS) (days): 2 60  Days to GMLOS:-2 8     OBJECTIVE:  Risk of Unplanned Readmission Score: 13 25         Current admission status: Inpatient   Preferred Pharmacy:   [address] [place], [place] [id number] [address] [address]  Phone: [phone] Fax: [phone]    Primary Care Provider: Nichole Patrick MD    Primary Insurance: [facility]  Secondary Insurance:     DISCHARGE DETAILS:                                                                                                               [facility] [address] [place] [address] Number: insurance requesting updated notes   CM notified, will submit when updated notes are available

## 2022-08-23 NOTE — PROGRESS NOTES
[address] 45  Progress Note - Ne Velasco Sr  10/11/1924, 80 y o  male MRN: [id number]  Unit/Bed#: -01 Encounter: [id number]  Primary Care Provider: Kevon Calderón MD   Date and time admitted to hospital: 8/17/2022  8:22 PM    * Hyponatremia  Assessment & Plan  · Possibly secondary volume depletion, medication side effect, and possible SIADH  · Tolvaptan dose given on 08/19/2022  · Sodium level dropped to 124 today, patient remains asymptomatic  · Fluid restriction parameters adjusted by Nephrology today  · Will repeat a BMP in the a m  · In the interim, patient remains medically stable for discharge    Difficulty swallowing  Assessment & Plan  · Status post a speech and swallow evaluation  · Okay for a dental soft diet with thin liquids  · May benefit from an eventual outpatient video swallow evaluation    [address]  · With debility  · Imaging studies negative for acute fractures  · Status post a PT and OT evaluation - they are recommending short-term rehab  · Patient has been accepted to the [facility]  · Awaiting insurance authorization  · Will check COVID-19 testing as a prerequisite prior to transfer to a skilled nursing facility    Coronary artery disease involving native coronary artery of native heart without angina pectoris  Assessment & Plan  · Known CAD with LAD stenting in 2002  · Continue current cardiac based medications which include aspirin, metoprolol, losartan, and atorvastatin  · Patient has known CKD stage IIIA    Essential hypertension  Assessment & Plan  · Blood pressure well controlled  · Continue present medication    Mixed hyperlipidemia  Assessment & Plan  · Continue atorvastatin, and fenofibrate        VTE Prophylaxis:  Enoxaparin (Lovenox)    Patient Centered Rounds: I have performed bedside rounds with nursing staff today      Discussions with Specialists or Other Care Team Provider:  Nursing, case management  Education and Discussions with Family / Patient:  The patient, and his son who was bedside at the time of my rounding evaluation were both brought up to par with the plan of care for today, all questions were answered to their collective satisfaction    Current Length of Stay: 6 day(s)    Current Patient Status: Inpatient   Certification Statement: The patient will continue to require additional inpatient hospital stay due to The need for placement     Discharge Plan:  Patient remains medically stable for discharge    Code Status: Level 3 - DNAR and DNI    Subjective:   Patient seen and examined, no new complaints, no distress    Objective:     Vitals:   Temp (24hrs), Av 2 °F (36 8 °C), Min:98 1 °F (36 7 °C), Max:98 4 °F (36 9 °C)    Temp:  [98 1 °F (36 7 °C)-98 4 °F (36 9 °C)] 98 4 °F (36 9 °C)  HR:  [73-81] 80  Resp:  [16-17] 16  BP: (123-143)/(55-66) 123/55  SpO2:  [96 %-99 %] 96 %  Body mass index is 20 98 kg/m²  Input and Output Summary (last 24 hours): Intake/Output Summary (Last 24 hours) at 2022 1628  Last data filed at 2022 1700  Gross per 24 hour   Intake 180 ml   Output --   Net 180 ml       Physical Exam:   Physical Exam  Vitals and nursing note reviewed  Constitutional:       General: He is not in acute distress  Appearance: Normal appearance  He is not ill-appearing  Comments: Hard of hearing   HENT:      Head: Normocephalic and atraumatic  Nose: Nose normal    Eyes:      Extraocular Movements: Extraocular movements intact  Pupils: Pupils are equal, round, and reactive to light  Cardiovascular:      Rate and Rhythm: Normal rate and regular rhythm  Pulses: Normal pulses  Heart sounds: Normal heart sounds  No murmur heard  No friction rub  No gallop  Pulmonary:      Effort: Pulmonary effort is normal       Breath sounds: Normal breath sounds  Abdominal:      General: There is no distension  Palpations: Abdomen is soft  There is no mass  Tenderness: There is no abdominal tenderness  There is no guarding or rebound  Musculoskeletal:         General: No swelling or tenderness  Normal range of motion  Cervical back: Normal range of motion and neck supple  No rigidity  No muscular tenderness  Right lower leg: No edema  Left lower leg: No edema  Skin:     General: Skin is warm  Capillary Refill: Capillary refill takes less than 2 seconds  Findings: No erythema or rash  Neurological:      General: No focal deficit present  Mental Status: He is alert and oriented to person, place, and time  Mental status is at baseline  Psychiatric:         Mood and Affect: Mood normal          Behavior: Behavior normal          Additional Data:     Labs:    Results from last 7 days   Lab Units 08/18/22  0545 08/17/22  2054   WBC Thousand/uL 6 61 6 49   HEMOGLOBIN g/dL 10 8* 10 8*   HEMATOCRIT % 31 5* 31 7*   PLATELETS Thousands/uL 153 163   NEUTROS PCT %  --  79*   LYMPHS PCT %  --  13*   MONOS PCT %  --  8   EOS PCT %  --  0     Results from last 7 days   Lab Units 08/23/22  0430   SODIUM mmol/L 124*   POTASSIUM mmol/L 4 0   CHLORIDE mmol/L 95*   CO2 mmol/L 23   BUN mg/dL 18   CREATININE mg/dL 0 90   CALCIUM mg/dL 8 4                   * I Have Reviewed All Lab Data Listed Above  * Additional Pertinent Lab Tests Reviewed:  [address] Admission  Reviewed    Imaging:  Imaging Reports Reviewed Today Include:  None    Recent Cultures (last 7 days):           Last 24 Hours Medication List:   Current Facility-Administered Medications   Medication Dose Route Frequency Provider Last Rate    acetaminophen  650 mg Oral Q4H PRN Madyson Zepeda PA-C      allopurinol  150 mg Oral Daily [facility] KLARISSA Chavez      aspirin  81 mg Oral Once per day on Mon Fri Madyson Zepeda PA-C      atorvastatin  20 mg Oral Daily [place], [place]      cholecalciferol  2,000 Units Oral Daily [facility] PA-C      enoxaparin  40 mg Subcutaneous Daily [place], [place]      fenofibrate  48 mg Oral Daily [place], [place]      glycerin-hypromellose-  1 drop Both Eyes Daily [place], [place]      losartan  50 mg Oral Daily [place], [place]      multivitamin stress formula  1 tablet Oral Daily [place], [place]      nitroglycerin  0 4 mg Sublingual Q5 Min PRN [place], [place]          Today, Patient Was Seen By: Winston Stoddard MD    ** Please Note: Dictation voice to text software may have been used in the creation of this document   **

## 2022-08-23 NOTE — PLAN OF CARE
Problem: Potential for Falls  Goal: Patient will remain free of falls  Description: INTERVENTIONS:  - Educate patient/family on patient safety including physical limitations  - Instruct patient to call for assistance with activity   - Consult OT/PT to assist with strengthening/mobility   - Keep Call bell within reach  - Keep bed low and locked with side rails adjusted as appropriate  - Keep care items and personal belongings within reach  - Initiate and maintain comfort rounds  - Make Fall Risk Sign visible to staff  - Apply yellow socks and bracelet for high fall risk patients  - Consider moving patient to room near nurses station  Outcome: Progressing     Problem: Prexisting or High Potential for Compromised Skin Integrity  Goal: Skin integrity is maintained or improved  Description: INTERVENTIONS:  - Identify patients at risk for skin breakdown  - Assess and monitor skin integrity  - Assess and monitor nutrition and hydration status  - Monitor labs   - Assess for incontinence   - Turn and reposition patient  - Assist with mobility/ambulation  - Relieve pressure over bony prominences  - Avoid friction and shearing  - Provide appropriate hygiene as needed including keeping skin clean and dry  - Evaluate need for skin moisturizer/barrier cream  - Collaborate with interdisciplinary team   - Patient/family teaching  - Consider wound care consult   Outcome: Progressing     Problem: MOBILITY - ADULT  Goal: Maintain or return to baseline ADL function  Description: INTERVENTIONS:  -  Assess patient's ability to carry out ADLs; assess patient's baseline for ADL function and identify physical deficits which impact ability to perform ADLs (bathing, care of mouth/teeth, toileting, grooming, dressing, etc )  - Assess/evaluate cause of self-care deficits   - Assess range of motion  - Assess patient's mobility; develop plan if impaired  - Assess patient's need for assistive devices and provide as appropriate  - Encourage maximum independence but intervene and supervise when necessary  - Involve family in performance of ADLs  - Assess for home care needs following discharge   - Consider OT consult to assist with ADL evaluation and planning for discharge  - Provide patient education as appropriate  Outcome: Progressing

## 2022-08-23 NOTE — PROGRESS NOTES
Progress Note - Nephrology   Urbano Ely  80 y o  male MRN: [id number]  Unit/Bed#: -01 Encounter: [id number]    A/P:  1  Hyponatremia               will continue fluid restriction, however will reduced total allowing us from 1 5 down to 1 2 L over 24 hours  Continue low-sodium diet  2  Chronic kidney disease stage 3A with baseline creatinine around 0 7-0 8 mg/dL  3  Hypertension setting chronic kidney disease stage 3                systolic blood pressure about 140 mmHg, continue monitor for now, continue low-sodium diet  4  Mild bilateral lower extremity edema                continue to monitor now, loop diuretics as indicated  5  Hypokalemia   Potassium levels appropriate, continue monitor    Follow up reason for today's visit:  Hyponatremia/chronic kidney disease/hypertension    Hyponatremia    Patient Active Problem List   Diagnosis    Coronary artery disease involving native coronary artery of native heart without angina pectoris    Essential hypertension    Bilateral leg edema    Ventricular bigeminy    Chronic gout of multiple sites    Vitamin D deficiency    Hypomagnesemia    Hyponatremia    Mixed hyperlipidemia    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[facility] [address] )    Fall    Difficulty swallowing         Subjective:   No acute issues overnight, no nausea/vomiting  Objective:     Vitals: Blood pressure 143/66, pulse 73, temperature 98 1 °F (36 7 °C), resp  rate 16, height 5' 6" (1 676 m), weight 59 kg (130 lb), SpO2 99 %  ,Body mass index is 20 98 kg/m²  Weight (last 2 days)     None            Intake/Output Summary (Last 24 hours) at 8/23/2022 1220  Last data filed at 8/22/2022 1700  Gross per 24 hour   Intake 660 ml   Output 75 ml   Net 585 ml     I/O last 3 completed shifts:   In: 840 [P O :840]  Out: 375 [Urine:375]         Physical Exam: /66   Pulse 73   Temp 98 1 °F (36 7 °C)   Resp 16   Ht 5' 6" (1 676 m)   Wt 59 kg (130 lb)   SpO2 99%   BMI 20 98 kg/m²     General Appearance:    Alert, cooperative, no distress, appears stated age   Head:    Normocephalic, without obvious abnormality, atraumatic   Eyes:    Conjunctiva/corneas clear   Ears:    Normal external ears   Nose:   Nares normal, septum midline, mucosa normal, no drainage    or sinus tenderness   Throat:   Lips, mucosa, and tongue normal; teeth and gums normal   Neck:   Supple   Back:     Symmetric, no curvature, ROM normal, no CVA tenderness   Lungs:     Clear to auscultation bilaterally, respirations unlabored   Chest wall:    No tenderness or deformity   Heart:    Regular rate and rhythm, S1 and S2 normal, no murmur, rub   or gallop   Abdomen:     Soft, non-tender, bowel sounds active   Extremities:   Extremities normal, atraumatic, no cyanosis or edema   Skin:   Skin color, texture, turgor normal, no rashes or lesions   Lymph nodes:   Cervical normal   Neurologic:   CNII-XII intact            Lab, Imaging and other studies: I have personally reviewed pertinent labs  CBC: No results found for: WBC, HGB, HCT, MCV, PLT, ADJUSTEDWBC, MCH, MCHC, RDW, MPV, NRBC  CMP:   Lab Results   Component Value Date    K 4 0 08/23/2022    CL 95 (L) 08/23/2022    CO2 23 08/23/2022    BUN 18 08/23/2022    CREATININE 0 90 08/23/2022    CALCIUM 8 4 08/23/2022    EGFR 71 08/23/2022           Results from last 7 days   Lab Units 08/23/22  0430 08/22/22  1317 08/20/22  0437   POTASSIUM mmol/L 4 0 4 6 3 9   CHLORIDE mmol/L 95* 95* 98   CO2 mmol/L 23 29 24   BUN mg/dL 18 15 14   CREATININE mg/dL 0 90 0 95 0 82   CALCIUM mg/dL 8 4 8 7 8 9         Phosphorus: No results found for: PHOS  Magnesium: No results found for: MG  Urinalysis: No results found for: COLORU, CLARITYU, SPECGRAV, PHUR, LEUKOCYTESUR, NITRITE, PROTEINUA, GLUCOSEU, KETONESU, BILIRUBINUR, BLOODU  Ionized Calcium: No results found for: CAION  Coagulation: No results found for: PT, INR, APTT  Troponin: No results found for: TROPONINI  ABG: No results found for: PHART, QLP7MDL, PO2ART, PRT4HTL, U1QIVOFA, BEART, SOURCE  Radiology review:     IMAGING  No results found  Current Facility-Administered Medications   Medication Dose Route Frequency    acetaminophen (TYLENOL) tablet 650 mg  650 mg Oral Q4H PRN    allopurinol (ZYLOPRIM) tablet 150 mg  150 mg Oral Daily    aspirin (ECOTRIN LOW STRENGTH) EC tablet 81 mg  81 mg Oral Once per day on Mon Fri    atorvastatin (LIPITOR) tablet 20 mg  20 mg Oral Daily    cholecalciferol (VITAMIN D3) tablet 2,000 Units  2,000 Units Oral Daily    enoxaparin (LOVENOX) subcutaneous injection 40 mg  40 mg Subcutaneous Daily    fenofibrate (TRICOR) tablet 48 mg  48 mg Oral Daily    glycerin-hypromellose- (ARTIFICIAL TEARS) ophthalmic solution 1 drop  1 drop Both Eyes Daily    losartan (COZAAR) tablet 50 mg  50 mg Oral Daily    multivitamin stress formula tablet 1 tablet  1 tablet Oral Daily    nitroglycerin (NITROSTAT) SL tablet 0 4 mg  0 4 mg Sublingual Q5 Min PRN     Medications Discontinued During This Encounter   Medication Reason    metoprolol tartrate (LOPRESSOR) 25 mg tablet     polyethylene glycol (GLYCOLAX) 17 GM/SCOOP powder     vancomycin (VANCOCIN) capsule 125 mg     sodium chloride 0 9 % infusion     ondansetron (ZOFRAN) injection 4 mg     potassium chloride (K-DUR,KLOR-CON) CR tablet 20 mEq     albuterol (FOR EMS ONLY) (2 5 mg/3 mL) 0 083 % inhalation solution 2 5 mg        Ken Bunk, DO      This progress note was produced in part using a dictation device which may document imprecise wording from author's original intent

## 2022-08-23 NOTE — CASE MANAGEMENT
Case Management Discharge Planning Note    Patient name Javy Finn  Location /-01 MRN [id number]  : 10/11/1924 Date 2022       Current Admission Date: 2022  Current Admission Diagnosis:Hyponatremia   Patient Active Problem List    Diagnosis Date Noted    Difficulty swallowing 2022    Fall 2022    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    Hypomagnesemia 2020    Hyponatremia 2020    Mixed hyperlipidemia 2020    Coronary artery disease involving native coronary artery of native heart without angina pectoris 2018    Essential hypertension 2018    Bilateral leg edema 2018    Ventricular bigeminy 2018    Chronic gout of multiple sites 05/10/2017    Vitamin D deficiency 2013      LOS (days): 6  Geometric Mean LOS (GMLOS) (days): 2 60  Days to GMLOS:-3 1     OBJECTIVE:  Risk of Unplanned Readmission Score: 13 28         Current admission status: Inpatient   Preferred Pharmacy:   [address] [place] [address] #[id number] - [place] [address] [address]  Phone: [phone] Fax: [phone]    Primary Care Provider: Marlen Abbott MD    Primary Insurance: [facility]  Secondary Insurance:     [facility] Number: updated notes faxed to [phone]

## 2022-08-23 NOTE — ASSESSMENT & PLAN NOTE
· Status post a speech and swallow evaluation  · Okay for a dental soft diet with thin liquids  · May benefit from an eventual outpatient video swallow evaluation

## 2022-08-23 NOTE — OCCUPATIONAL THERAPY NOTE
08/23/22 0943   OT Last Visit   OT Visit Date 08/23/22   Note Type   Note Type Treatment   Restrictions/Precautions   Weight Bearing Precautions Per Order No   Other Precautions Fall Risk;Hard of hearing;Multiple lines;Telemetry   Pain Assessment   Pain Assessment Tool 0-10   Pain Score No Pain   ADL   Where Assessed Edge of bed   Grooming Assistance 5  Supervision/Setup   Grooming Deficit Setup;Verbal cueing;Supervision/safety; Increased time to complete; Teeth care;Denture care   UB Bathing Assistance 4  Minimal Assistance   UB Bathing Deficit Setup;Verbal cueing;Supervision/safety; Increased time to complete;Left arm;Right arm   LB Bathing Assistance 2  Maximal Assistance   LB Bathing Deficit Setup;Verbal cueing;Supervision/safety; Increased time to complete; Buttocks;Right lower leg including foot; Left lower leg including foot   UB Dressing Assistance 4  Minimal Assistance   UB Dressing Deficit Setup;Verbal cueing;Supervision/safety; Increased time to complete; Thread RUE; Thread LUE; Fasteners   UB Dressing Comments Doff/don hospital gown   LB Dressing Deficit Don/doff R sock; Don/doff L sock   LB Dressing Comments D doff/don socks   Toileting Assistance  3  Moderate Assistance   Toileting Deficit Setup;Verbal cueing;Supervison/safety; Increased time to complete; Bedside commode;Perineal hygiene   Bed Mobility   Supine to Sit 4  Minimal assistance   Additional items Assist x 1;HOB elevated; Bedrails; Increased time required;Verbal cues;LE management   Additional Comments Pt sat EOB unsupported x 25+ min with no LOB noted  Transfers   Sit to Stand 3  Moderate assistance   Additional items Assist x 1;Bedrails; Increased time required;Verbal cues   Stand to Sit 4  Minimal assistance   Additional items Assist x 1; Armrests; Increased time required;Verbal cues   Stand pivot 4  Minimal assistance   Additional items Assist x 1; Increased time required;Verbal cues   Toilet transfer 4  Minimal assistance   Additional items Assist x 1;Armrests; Increased time required;Verbal cues; Commode   Additional Comments Pt requires Mod A with toilet hygiene to ensure cleanliness  Functional Mobility   Functional Mobility 4  Minimal assistance   Additional Comments Pt ambulated 25 feet with Min A x 1  Additional items Rolling walker   Toilet Transfers   Toilet Transfer From Other (Comment)  (chair)   Toilet Transfer Type To   Toilet Transfer to Standard bedside commode   Toilet Transfer Technique Stand pivot; To right; To left   Toilet Transfers Minimal assistance   Cognition   Overall Cognitive Status [facility]   Arousal/Participation Alert; Cooperative;Responsive   Attention Attends with cues to redirect   Orientation Level Oriented X4   Memory Decreased recall of precautions   Following Commands Follows one step commands without difficulty   Comments Pt agreeable to OT treatment  Activity Tolerance   Activity Tolerance Patient tolerated treatment well   Assessment   Assessment Patient participated in Skilled OT session this date with interventions consisting of functional transfer training, ADL re training with the use of correct body mechnaics, Energy Conservation techniques and increase dynamic sit/ stand balance during functional activity    Patient agreeable to OT treatment session, upon arrival patient was found supine in bed, alert, responsive  and in no apparent distress  Patient transfers supine to sit EOB with Min A x 1  Patient then sits unsupported x 25+ mins to complete ADL as detailed in flow sheet  Patient very slow paced with all tasks and requires additional time to complete  Following ADL, patient transfers sit to stand from bed with Mod A x 1 and completes functional mobility x 25 feet with Min A x 1  Patient then required a brief seated rest break before requesting to use commode  Patient transfers sit to stand and pivots chair > commode with Min A x 1 and VCs for safety/hand placement   Patient requires Mod A with toilet hygiene to ensure cleanliness  Patient requested to sit longer on commode as she did not feel emptied out  Session ended with patient seated OOB to commode, call bell within reach, and RN aware of patient on commode  In comparison to previous session, patient with improvements in self care ADL performance, endurance, and functional transfers  Patient requiring verbal cues for safety, verbal cues for correct technique, verbal cues for pacing thru activity steps and frequent rest periods  Patient performance  demonstrated good carryover of learned techniques and strategies to facilitate safety during functional tasks  Patient continues to be functioning below baseline level, occupational performance remains limited secondary to factors listed above and increased risk for falls and injury  From OT standpoint, recommendation at time of d/c would be Short Term Rehab  Patient to benefit from continued Occupational Therapy treatment while in the hospital to address deficits as defined above and maximize level of functional independence with ADLs and functional mobility in order to return to OF     Plan   Goal Expiration Date 08/28/22   OT Treatment Day 2   OT Frequency 3-5x/wk   Recommendation   OT Discharge Recommendation Post acute rehabilitation services   AM-PAC Daily Activity Inpatient   Lower Body Dressing 2   Bathing 2   Toileting 2   Upper Body Dressing 3   Grooming 4   Eating 4   Daily Activity Raw Score 17   Daily Activity Standardized Score (Calc for Raw Score >=11) 37 26   AM-PAC Applied Cognition Inpatient   Following a Speech/Presentation 4   Understanding Ordinary Conversation 4   Taking Medications 3   Remembering Where Things Are Placed or Put Away 3   Remembering List of 4-5 Errands 3   Taking Care of Complicated Tasks 3   Applied Cognition Raw Score 20   Applied Cognition Standardized Score 41 76   TAY Thakkar

## 2022-08-23 NOTE — PLAN OF CARE
Problem: OCCUPATIONAL THERAPY ADULT  Goal: Performs self-care activities at highest level of function for planned discharge setting  See evaluation for individualized goals  Description: Treatment Interventions: ADL retraining, Functional transfer training, UE strengthening/ROM, Endurance training, Equipment evaluation/education, Compensatory technique education, Energy conservation, Activityengagement    See flowsheet documentation for full assessment, interventions and recommendations  Outcome: Progressing  Note: Limitation: Decreased ADL status, Decreased UE ROM, Decreased UE strength, Decreased Safe judgement during ADL, Decreased endurance, Decreased self-care trans, Decreased high-level ADLs  Prognosis: Good  Assessment: Patient participated in Skilled OT session this date with interventions consisting of functional transfer training, ADL re training with the use of correct body mechnaics, Energy Conservation techniques and increase dynamic sit/ stand balance during functional activity    Patient agreeable to OT treatment session, upon arrival patient was found supine in bed, alert, responsive  and in no apparent distress  Patient transfers supine to sit EOB with Min A x 1  Patient then sits unsupported x 25+ mins to complete ADL as detailed in flow sheet  Patient very slow paced with all tasks and requires additional time to complete  Following ADL, patient transfers sit to stand from bed with Mod A x 1 and completes functional mobility x 25 feet with Min A x 1  Patient then required a brief seated rest break before requesting to use commode  Patient transfers sit to stand and pivots chair > commode with Min A x 1 and VCs for safety/hand placement  Patient requires Mod A with toilet hygiene to ensure cleanliness  Patient requested to sit longer on commode as she did not feel emptied out  Session ended with patient seated OOB to commode, call bell within reach, and RN aware of patient on commode   In comparison to previous session, patient with improvements in self care ADL performance, endurance, and functional transfers  Patient requiring verbal cues for safety, verbal cues for correct technique, verbal cues for pacing thru activity steps and frequent rest periods  Patient performance  demonstrated good carryover of learned techniques and strategies to facilitate safety during functional tasks  Patient continues to be functioning below baseline level, occupational performance remains limited secondary to factors listed above and increased risk for falls and injury  From OT standpoint, recommendation at time of d/c would be Short Term Rehab  Patient to benefit from continued Occupational Therapy treatment while in the hospital to address deficits as defined above and maximize level of functional independence with ADLs and functional mobility in order to return to OF       OT Discharge Recommendation: Post acute rehabilitation services

## 2022-08-23 NOTE — CASE MANAGEMENT
Case Management Discharge Planning Note    Patient name Robin Gilliam  Location /-01 MRN [id number]  : 10/11/1924 Date 2022       Current Admission Date: 2022  Current Admission Diagnosis:Hyponatremia   Patient Active Problem List    Diagnosis Date Noted    Difficulty swallowing 2022    Fall 2022    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[facility] [address] ) 2020    Hypomagnesemia 2020    Hyponatremia 2020    Mixed hyperlipidemia 2020    Coronary artery disease involving native coronary artery of native heart without angina pectoris 2018    Essential hypertension 2018    Bilateral leg edema 2018    Ventricular bigeminy 2018    Chronic gout of multiple sites 05/10/2017    Vitamin D deficiency 2013      LOS (days): 6  Geometric Mean LOS (GMLOS) (days): 2 60  Days to GMLOS:-3 2     OBJECTIVE:  Risk of Unplanned Readmission Score: 13 31         Current admission status: Inpatient   Preferred Pharmacy:   [address] [place] [address] #[id number] - [address] [address]  Phone: [phone] Fax: [phone]    Primary Care Provider: Bj Berman MD    Primary Insurance: [facility]  Secondary Insurance:     [address] [place] [address] Number: approved [facility] #[id number] for [facility]:   NRD:   Care Coord: Tracy Reynaga  P: [phone]   F: [phone]

## 2022-08-23 NOTE — SPEECH THERAPY NOTE
Speech Language/Pathology    Speech/Language Pathology Progress Note    Patient Name: Rubin Perkins  Today's Date: 8/23/2022     Problem List  Principal Problem:    Hyponatremia  Active Problems:    Coronary artery disease involving native coronary artery of native heart without angina pectoris    Essential hypertension    Mixed hyperlipidemia    Fall    Difficulty swallowing       Past Medical History  Past Medical History:   Diagnosis Date    Arthritis     Cataract     Coronary artery disease     Coronary atherosclerosis of native coronary artery     Diverticulitis of colon     Essential hypertension, benign     Hyperlipidemia     Hypertension     Peptic ulcer     Renal disorder         Past Surgical History  Past Surgical History:   Procedure Laterality Date    CATARACT EXTRACTION Right     Left eye 5/2021    CORONARY STENT PLACEMENT      SKIN BIOPSY      TONSILLECTOMY         Subjective:  Pt received sitting upright in bed with son, also Nina Cisneros, present  They both report that eating is "going well" and pt denies having any difficulty  Objective: Thin via straw:  Appropriate oral reception, pt verbalized understanding of safe swallowing including sitting upright and taking small sips from straw  Pt with clear vocal quality, no overt s/s of penetration or aspiration  Assessment:  Pt tolerating baseline diet of dental soft and thin liquids  VBS orders viewed and appreciated, minimal concern for aspiration at this time  VBS appropriate for OP setting with ongoing GI follow up to ensure sensations are documented as pt reports they "come and go "  Suspect possible GERD in the setting of lump sensation and unremarkable resp s/s of aspiration  Plan/Recommendations:  ST to cont f/u

## 2022-08-23 NOTE — CASE MANAGEMENT
Case Management Discharge Planning Note    Patient name Teagan Ruiz  Location /-01 MRN [id number]  : 10/11/1924 Date 2022       Current Admission Date: 2022  Current Admission Diagnosis:Hyponatremia   Patient Active Problem List    Diagnosis Date Noted    Difficulty swallowing 2022    Fall 2022    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[facility] [address] ) 2020    Hypomagnesemia 2020    Hyponatremia 2020    Mixed hyperlipidemia 2020    Coronary artery disease involving native coronary artery of native heart without angina pectoris 2018    Essential hypertension 2018    Bilateral leg edema 2018    Ventricular bigeminy 2018    Chronic gout of multiple sites 05/10/2017    Vitamin D deficiency 2013      LOS (days): 6  Geometric Mean LOS (GMLOS) (days): 2 60  Days to GMLOS:-3 1     OBJECTIVE:  Risk of Unplanned Readmission Score: 13 28         Current admission status: Inpatient   Preferred Pharmacy:   [address] [place] [id number]  Phone: [phone] Fax: [phone]    Primary Care Provider: Michelle Beatty MD    Primary Insurance: [facility]  Secondary Insurance:     DISCHARGE DETAILS:    Discharge planning discussed with[de-identified] patient and son at [facility] bedside  Freedom of Choice: Yes  Comments - Freedom of Choice: rehab recommended- authorization has been started  we have not received the autorization  CM contacted family/caregiver?: Yes             Contacts  Patient Contacts: Naila Baltazar jr  Relationship to Patient[de-identified] Family (son)  Contact Method:  In Person  Reason/Outcome: Discharge [address]         Is the patient interested in [facility] at discharge?: No    DME Referral Provided  Referral made for DME?: No    Other Referral/Resources/Interventions Provided:  Interventions: Short Term Rehab  Referral Comments: [facility] has accepted  waithing o authorization, transport needed to be cx today no auth received yet, covoid test ordered   sent for new transport for tomorrow    Would you like to participate in our [address] service program?  : No - Declined    Treatment Team Recommendation: Short Term Rehab ([facility]  auth needed- w/c [facility] sent for new transport)

## 2022-08-23 NOTE — ASSESSMENT & PLAN NOTE
· With debility  · Imaging studies negative for acute fractures  · Status post a PT and OT evaluation - they are recommending short-term rehab  · Patient has been accepted to the [address] [place] [address],[address] facility  · Awaiting insurance authorization  · Will check COVID-19 testing as a prerequisite prior to transfer to a skilled nursing facility

## 2022-08-23 NOTE — ASSESSMENT & PLAN NOTE
· Known CAD with LAD stenting in 2002  · Continue current cardiac based medications which include aspirin, metoprolol, losartan, and atorvastatin  · Patient has known CKD stage IIIA

## 2022-08-24 VITALS
TEMPERATURE: 97.8 F | SYSTOLIC BLOOD PRESSURE: 124 MMHG | DIASTOLIC BLOOD PRESSURE: 52 MMHG | WEIGHT: 130 LBS | OXYGEN SATURATION: 98 % | RESPIRATION RATE: 15 BRPM | HEIGHT: 66 IN | BODY MASS INDEX: 20.89 KG/M2 | HEART RATE: 65 BPM

## 2022-08-24 LAB
ANION GAP SERPL CALCULATED.3IONS-SCNC: 5 MMOL/L (ref 4–13)
BUN SERPL-MCNC: 18 MG/DL (ref 5–25)
CALCIUM SERPL-MCNC: 8.4 MG/DL (ref 8.4–10.2)
CHLORIDE SERPL-SCNC: 95 MMOL/L (ref 96–108)
CO2 SERPL-SCNC: 25 MMOL/L (ref 21–32)
CREAT SERPL-MCNC: 0.78 MG/DL (ref 0.6–1.3)
GFR SERPL CREATININE-BSD FRML MDRD: 75 ML/MIN/1.73SQ M
GLUCOSE SERPL-MCNC: 89 MG/DL (ref 65–140)
POTASSIUM SERPL-SCNC: 3.9 MMOL/L (ref 3.5–5.3)
SODIUM SERPL-SCNC: 125 MMOL/L (ref 135–147)

## 2022-08-24 PROCEDURE — 80048 BASIC METABOLIC PNL TOTAL CA: CPT | Performed by: HOSPITALIST

## 2022-08-24 PROCEDURE — 99239 HOSP IP/OBS DSCHRG MGMT >30: CPT | Performed by: HOSPITALIST

## 2022-08-24 PROCEDURE — 99232 SBSQ HOSP IP/OBS MODERATE 35: CPT | Performed by: NURSE PRACTITIONER

## 2022-08-24 RX ADMIN — GLYCERIN, HYPROMELLOSE, POLYETHYLENE GLYCOL 1 DROP: .2; .2; 1 LIQUID OPHTHALMIC at 10:32

## 2022-08-24 RX ADMIN — FENOFIBRATE 48 MG: 48 TABLET, FILM COATED ORAL at 10:32

## 2022-08-24 RX ADMIN — B-COMPLEX W/ C & FOLIC ACID TAB 1 TABLET: TAB at 10:32

## 2022-08-24 RX ADMIN — Medication 250 MG: at 10:32

## 2022-08-24 RX ADMIN — Medication 2000 UNITS: at 10:32

## 2022-08-24 RX ADMIN — ENOXAPARIN SODIUM 40 MG: 100 INJECTION SUBCUTANEOUS at 10:32

## 2022-08-24 RX ADMIN — ALLOPURINOL 150 MG: 300 TABLET ORAL at 10:32

## 2022-08-24 RX ADMIN — LOSARTAN POTASSIUM 50 MG: 50 TABLET, FILM COATED ORAL at 10:32

## 2022-08-24 RX ADMIN — ATORVASTATIN CALCIUM 20 MG: 20 TABLET, FILM COATED ORAL at 10:32

## 2022-08-24 NOTE — NURSING NOTE
Pt discharged to [facility], sterling at bedside  All questions answered  Report given to Little Pool RN at [facility]  Pt denies pain, denies shortness of breath  IV removed without complications and tip intact  All belongings with pt  Pt escorted by wheelchair by transporter

## 2022-08-24 NOTE — ASSESSMENT & PLAN NOTE
· With debility  · Imaging studies negative for acute fractures  · Status post a PT and OT evaluation - they are recommending short-term rehab  · Patient has been accepted to the [facility] facility  · COVID-19 testing negative  · DC to the [address] [place] [address],[address] facility today

## 2022-08-24 NOTE — PLAN OF CARE
Problem: Potential for Falls  Goal: Patient will remain free of falls  Description: INTERVENTIONS:  - Educate patient/family on patient safety including physical limitations  - Instruct patient to call for assistance with activity   - Consult OT/PT to assist with strengthening/mobility   - Keep Call bell within reach  - Keep bed low and locked with side rails adjusted as appropriate  - Keep care items and personal belongings within reach  - Initiate and maintain comfort rounds  - Make Fall Risk Sign visible to staff  - Offer Toileting every 2Hours, in advance of need  - Initiate/Maintain bed alarm  - Apply yellow socks and bracelet for high fall risk patients  - Consider moving patient to room near nurses station  Outcome: Progressing     Problem: Prexisting or High Potential for Compromised Skin Integrity  Goal: Skin integrity is maintained or improved  Description: INTERVENTIONS:  - Identify patients at risk for skin breakdown  - Assess and monitor skin integrity  - Assess and monitor nutrition and hydration status  - Monitor labs   - Assess for incontinence   - Turn and reposition patient  - Assist with mobility/ambulation  - Relieve pressure over bony prominences  - Avoid friction and shearing  - Provide appropriate hygiene as needed including keeping skin clean and dry  - Evaluate need for skin moisturizer/barrier cream  - Collaborate with interdisciplinary team   - Patient/family teaching  - Consider wound care consult   Outcome: Progressing     Problem: MOBILITY - ADULT  Goal: Maintain or return to baseline ADL function  Description: INTERVENTIONS:  -  Assess patient's ability to carry out ADLs; assess patient's baseline for ADL function and identify physical deficits which impact ability to perform ADLs (bathing, care of mouth/teeth, toileting, grooming, dressing, etc )  - Assess/evaluate cause of self-care deficits   - Assess range of motion  - Assess patient's mobility; develop plan if impaired  - Assess patient's need for assistive devices and provide as appropriate  - Encourage maximum independence but intervene and supervise when necessary  - Involve family in performance of ADLs  - Assess for home care needs following discharge   - Consider OT consult to assist with ADL evaluation and planning for discharge  - Provide patient education as appropriate  Outcome: Progressing  Goal: Maintains/Returns to pre admission functional level  Description: INTERVENTIONS:  - Perform BMAT or MOVE assessment daily    - Set and communicate daily mobility goal to care team and patient/family/caregiver     - Collaborate with rehabilitation services on mobility goals if consulted  - Ambulate patient 2-3 times a day  - Out of bed to chair 2-3times a day   - Out of bed for meals 2-3 times a day  - Out of bed for toileting  - Record patient progress and toleration of activity level   Outcome: Progressing

## 2022-08-24 NOTE — ASSESSMENT & PLAN NOTE
· Possibly secondary volume depletion, medication side effect, and possible SIADH  · Tolvaptan dose given on 08/19/2022  · Sodium level is up to 125 today, patient remains asymptomatic  · Fluid restriction parameters adjusted by Nephrology today  · Cleared by Nephrology for discharge  · Nephrology will be following on 08/26/2022 BMP testing in the outpatient setting  · Discharge to the [address] [place] [address] today  · No other changes to his pre-admission medications, and or to the preadmission doses  · Outpatient follow-up PCP also

## 2022-08-24 NOTE — DISCHARGE SUMMARY
Latanya 45  Discharge- George Zamoraers   10/11/1924, 80 y o  male MRN: [id number]  Unit/Bed#: -01 Encounter: [id number]  Primary Care Provider: Suman Winters MD   Date and time admitted to hospital: 8/17/2022  8:22 PM    * Hyponatremia  Assessment & Plan  · Possibly secondary volume depletion, medication side effect, and possible SIADH  · Tolvaptan dose given on 08/19/2022  · Sodium level is up to 125 today, patient remains asymptomatic  · Fluid restriction parameters adjusted by Nephrology today  · Cleared by Nephrology for discharge  · Nephrology will be following on 08/26/2022 BMP testing in the outpatient setting  · Discharge to the [facility] skilled nursing facility today  · No other changes to his pre-admission medications, and or to the preadmission doses  · Outpatient follow-up PCP also    Difficulty swallowing  Assessment & Plan  · Status post a speech and swallow evaluation  · Okay for a dental soft diet with thin liquids  · May benefit from an eventual outpatient video swallow evaluation    Fall  Assessment & Plan  · With debility  · Imaging studies negative for acute fractures  · Status post a PT and OT evaluation - they are recommending short-term rehab  · Patient has been accepted to the [facility]  · COVID-19 testing negative  · DC to the [facility] today    Coronary artery disease involving native coronary artery of native heart without angina pectoris  Assessment & Plan  · Known CAD with LAD stenting in 2002  · Continue current cardiac based medications which include aspirin, metoprolol, losartan, and atorvastatin post discharge  · Patient has known CKD stage IIIA    Essential hypertension  Assessment & Plan  · Blood pressure well controlled  · DC on pre-admit meds at pre-admit doses    Mixed hyperlipidemia  Assessment & Plan  · Continue atorvastatin, and fenofibrate post discharge        Discharging Physician / Practitioner: Corrie Culver MD  PCP: Nahid Donato MD  Admission Date:   Admission Orders (From admission, onward)     Ordered        08/17/22 2213  INPATIENT ADMISSION  Once                      Discharge Date: 08/24/22    Medical Problems             Resolved Problems  Date Reviewed: 8/22/2022   None                 Consultations During Hospital Stay:  · Nephrology    Procedures Performed:   · None    Significant Findings / Test Results:   · Trauma CT head-no acute intercranial abnormality  · Trauma CT cervical spine-no cervical spine fracture or traumatic malalignment  · Trauma CT facial bones-no fracture seen  · Trauma chest x-ray-no acute cardiopulmonary disease    Incidental Findings:   · None    Test Results Pending at Discharge (will require follow up): · None     Outpatient Tests Requested:  · BMP testing on 08/26/2022 by Nephrology    Complications:     None    Reason for Admission:  Fall, generalized weakness, hyponatremia    Hospital Course:     Kevin Logan  is a 80 y o  male patient who originally presented to the hospital on 8/17/2022 due to a mechanical fall  Please refer to the initial history and physical examination completed by Carvin Phoenix for the initial presenting features and complaints  In brief, the patient is a 55-year-old male, who was admitted to the hospital after he fell  He had extensive imaging performed with the results as outlined above  He was also found to be hyponatremic  Patient otherwise remained asymptomatic from a hyponatremic standpoint  He was seen in conjunction with Nephrology  At 1 point he was treated with tolvaptan  He was seen by PT and OT, they recommended short-term rehab  Patient was ultimately discharged to the [address] [place] [address] [address] facility on 08/24/2022  He will follow up in the outpatient setting in 2 days with Nephrology for repeat BMP testing  No other changes were made to his pre-admission medications, and or to the preadmission doses    Please refer to the assessment/plan portion of this discharge summary as outlined above for the remainder of the details in regard to his stay  Please see above list of diagnoses and related plan for additional information  Condition at Discharge: good     Discharge Day Visit / Exam:     Subjective:  Patient seen and examined, no complaints, no distress  Vitals: Blood Pressure: 124/52 (08/24/22 0738)  Pulse: 65 (08/24/22 0738)  Temperature: 97 8 °F (36 6 °C) (08/24/22 0738)  Temp Source: Temporal (08/23/22 1548)  Respirations: 15 (08/24/22 0738)  Height: 5' 6" (167 6 cm) (08/17/22 2028)  Weight - Scale: 59 kg (130 lb) (08/17/22 2028)  SpO2: 98 % (08/24/22 0738)  Exam:   Physical Exam  Vitals and nursing note reviewed  Constitutional:       General: He is not in acute distress  Appearance: Normal appearance  He is not ill-appearing  HENT:      Head: Normocephalic and atraumatic  Nose: Nose normal    Eyes:      Extraocular Movements: Extraocular movements intact  Pupils: Pupils are equal, round, and reactive to light  Cardiovascular:      Rate and Rhythm: Normal rate and regular rhythm  Pulses: Normal pulses  Heart sounds: Normal heart sounds  No murmur heard  No friction rub  No gallop  Pulmonary:      Effort: Pulmonary effort is normal       Breath sounds: Normal breath sounds  Abdominal:      General: There is no distension  Palpations: Abdomen is soft  There is no mass  Tenderness: There is no abdominal tenderness  There is no guarding or rebound  Musculoskeletal:         General: No swelling or tenderness  Normal range of motion  Cervical back: Normal range of motion and neck supple  No rigidity  No muscular tenderness  Right lower leg: No edema  Left lower leg: No edema  Skin:     General: Skin is warm  Capillary Refill: Capillary refill takes less than 2 seconds  Findings: No erythema or rash  Neurological:      General: No focal deficit present  Mental Status: He is alert and oriented to person, place, and time  Mental status is at baseline  Psychiatric:         Mood and Affect: Mood normal          Behavior: Behavior normal          Discussion with Family:  Patient's son was bedside at the time of my discharge evaluation, all questions answered to his satisfaction    Discharge instructions/Information to patient and family:   See after visit summary for information provided to patient and family  Provisions for Follow-Up Care:  See after visit summary for information related to follow-up care and any pertinent home health orders  Disposition:     [facility] Readmission:    None     Discharge Statement:  I spent 40 minutes discharging the patient  This time was spent on the day of discharge  I had direct contact with the patient on the day of discharge  Greater than 50% of the total time was spent examining patient, answering all patient questions, arranging and discussing plan of care with patient as well as directly providing post-discharge instructions  Additional time then spent on discharge activities  Discharge Medications:  See after visit summary for reconciled discharge medications provided to patient and family        ** Please Note: This note has been constructed using a voice recognition system **

## 2022-08-24 NOTE — DISCHARGE INSTR - AVS FIRST PAGE
Dear Sandra Banuelos ,     It was our pleasure to care for you here at [facility]  It is our hope that we were always able to exceed the expected standards for your care during your stay  You were hospitalized due to weakness, and low sodium levels  You were cared for on the medical/surgical floor by Karthikeyan Kidd MD with the [facility] who covers for your primary care physician (PCP), Iftikhar Fabian MD, while you were hospitalized  You were additionally seen by the [facility] associates  If you have any questions or concerns related to this hospitalization, you may contact us at [phone]  For follow up as well as any medication refills, we recommend that you follow up with your primary care physician  A registered nurse will reach out to you by phone within a few days after your discharge to answer any additional questions that you may have after going home  However, at this time we provide for you here, the most important instructions / recommendations at discharge:     Notable Medication Adjustments -   Okay to resume all pre-admission medications at the preadmission doses  Testing Required after Discharge -   Please ensure that a basic metabolic panel is checked no later than Friday 08/26/2022  Important follow up information -   Please follow-up with the providers as outlined in this discharge package  Other Instructions -   Please maintain a healthy low-sodium diet  Fluid restriction of less than 1200 cc per day  Please review this entire after visit summary as additional general instructions including medication list, appointments, activity, diet, any pertinent wound care, and other additional recommendations from your care team that may be provided for you        Sincerely,     Karthikeyan Kidd MD

## 2022-08-24 NOTE — PLAN OF CARE
Problem: Prexisting or High Potential for Compromised Skin Integrity  Goal: Skin integrity is maintained or improved  Description: INTERVENTIONS:  - Identify patients at risk for skin breakdown  - Assess and monitor skin integrity  - Assess and monitor nutrition and hydration status  - Monitor labs   - Assess for incontinence   - Turn and reposition patient  - Assist with mobility/ambulation  - Relieve pressure over bony prominences  - Avoid friction and shearing  - Provide appropriate hygiene as needed including keeping skin clean and dry  - Evaluate need for skin moisturizer/barrier cream  - Collaborate with interdisciplinary team   - Patient/family teaching  - Consider wound care consult     Outcome: Progressing

## 2022-08-24 NOTE — ASSESSMENT & PLAN NOTE
· Known CAD with LAD stenting in 2002  · Continue current cardiac based medications which include aspirin, metoprolol, losartan, and atorvastatin post discharge  · Patient has known CKD stage IIIA

## 2022-08-24 NOTE — CASE MANAGEMENT
Case Management Discharge Planning Note    Patient name Ashley Sensing  Location /-01 MRN [id number]  : 10/11/1924 Date 2022       Current Admission Date: 2022  Current Admission Diagnosis:Hyponatremia   Patient Active Problem List    Diagnosis Date Noted    Difficulty swallowing 2022    Fall 2022    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[facility] [address] ) 2020    Hypomagnesemia 2020    Hyponatremia 2020    Mixed hyperlipidemia 2020    Coronary artery disease involving native coronary artery of native heart without angina pectoris 2018    Essential hypertension 2018    Bilateral leg edema 2018    Ventricular bigeminy 2018    Chronic gout of multiple sites 05/10/2017    Vitamin D deficiency 2013      LOS (days): 7  Geometric Mean LOS (GMLOS) (days): 3 50  Days to GMLOS:-3 2     OBJECTIVE:  Risk of Unplanned Readmission Score: 13 63         Current admission status: Inpatient   Preferred Pharmacy:   [id number] - [place] [address] [address]  Phone: [phone] Fax: [phone]    Primary Care Provider: Suman Winters MD    Primary Insurance: [facility]  Secondary Insurance:     DISCHARGE DETAILS:    Discharge planning discussed with[de-identified] patient and son at the bedside  Freedom of Choice: Yes  Comments - Freedom of Choice: rehab recommended-  authorization received for the pt to go to the [facility] contacted family/caregiver?: Yes  Were Treatment Team discharge recommendations reviewed with patient/caregiver?: Yes  Did patient/caregiver verbalize understanding of patient care needs?: Yes  Were patient/caregiver advised of the risks associated with not following Treatment Team discharge recommendations?: Yes    Contacts  Patient Contacts: Dell Kidd jr  Relationship to Patient[de-identified] Family (son)  Contact Method:  In Person  Reason/Outcome: Discharge Planning              Other Referral/Resources/Interventions Provided:  Interventions: Short Term Rehab  Referral Comments: pt accepted to [facility] received    Would you like to participate in our [address] service program?  : No - Declined    Treatment Team Recommendation: Short Term Rehab ([address]   - 15:30pm w/c Shelby schrader)     Transport at Discharge : [address] of Transport (Date): 08/24/22  ETA of Transport (Time): [phone]      son is aware there is a fee for transport - rn and therapy are in agreement with the mode of transport, son and patient are in agreement with the d/c and d/c plan              Family notified[de-identified] son was in the room

## 2022-08-24 NOTE — PROGRESS NOTES
NEPHROLOGY PROGRESS NOTE   Dory Bush Sr  80 y o  male MRN: [id number]  Unit/Bed#: -01 Encounter: [id number]    Assessment/Plan:     80-year-old man with CKD 3a follows with Dr Pedro Lee, hypertension, hematuria followed by Dr Wisam Delacruz, recent C diff admitted 8/17 due to hyponatremia for which nephrology following  Tentative discharge today      1  Hyponatremia  ? Serum sodium stable 125 millimoles per L without symptoms  He is tentative for discharge today  Recommendations as follows:  ? 40 oz fluid restriction as outpatient-discussed with patient and son Eddie Robles  ? BMP by Friday  ? Restart home Lasix dose-twice a week  2  Possible SIADH  ? Fluid restriction as above  3  Stage 3a chronic kidney disease with baseline creatinine around 0 7-0 8 mg/dL  ? Outpatient follow-up with Dr Pedro Lee  4  Hypertension in the setting of CKD 3  ? Avoid thiazide diuretics  5  Hematuria  ? Outpatient follow-up with Dr Wisam Delacruz  6  Hypokalemia  ? Resolved           ROS  Seen examined lying in bed   States he is leaving today at 3:00 p m  No physical complaints  A complete 10 point review of systems have been performed and are otherwise negative         Historical Information   Past Medical History:   Diagnosis Date    Arthritis     Cataract     Coronary artery disease     Coronary atherosclerosis of native coronary artery     Diverticulitis of colon     Essential hypertension, benign     Hyperlipidemia     Hypertension     Peptic ulcer     Renal disorder      Past Surgical History:   Procedure Laterality Date    CATARACT EXTRACTION Right     Left eye 5/2021    CORONARY STENT PLACEMENT      SKIN BIOPSY      TONSILLECTOMY       Social History   Social History     Substance and Sexual Activity   Alcohol Use Not Currently     Social History     Substance and Sexual Activity   Drug Use No     Social History     Tobacco Use   Smoking Status Former Smoker    Types: Pipe   Smokeless Tobacco Never Used Family History:   Family History   Problem Relation Age of Onset    Heart attack Brother     Heart disease Mother     Cancer Father        Medications:  Pertinent medications were reviewed  Current Facility-Administered Medications   Medication Dose Route Frequency Provider Last Rate    acetaminophen  650 mg Oral Q4H PRN Richi KLARISSA Barillas      allopurinol  150 mg Oral Daily Roz Chavez PA-C      aspirin  81 mg Oral Once per day on Mon Fri Richi [place]      atorvastatin  20 mg Oral Daily [place], [place]      cholecalciferol  2,000 Units Oral Daily [facility] [place]      enoxaparin  40 mg Subcutaneous Daily Roz [place], [place]      fenofibrate  48 mg Oral Daily [place], [place]      glycerin-hypromellose-  1 drop Both Eyes Daily [place], [place]      losartan  50 mg Oral Daily [place], [place]      multivitamin stress formula  1 tablet Oral Daily [facility] [place]      nitroglycerin  0 4 mg Sublingual Q5 Min PRN Roz [facility] [place]      saccharomyces boulardii  250 mg Oral BID Arun Cifuentes MD           No Known Allergies      Vitals:   /52   Pulse 65   Temp 97 8 °F (36 6 °C)   Resp 15   Ht 5' 6" (1 676 m)   Wt 59 kg (130 lb)   SpO2 98%   BMI 20 98 kg/m²   Body mass index is 20 98 kg/m²    SpO2: 98 %,   SpO2 Activity: At Rest,   O2 Device: None (Room air)      Intake/Output Summary (Last 24 hours) at 8/24/2022 1019  Last data filed at 8/23/2022 2101  Gross per 24 hour   Intake 240 ml   Output 250 ml   Net -10 ml     Invasive Devices  Report    None                 Physical Exam  General: conscious, cooperative, in no acute distress  Eyes: conjunctivae pink, anicteric sclerae  ENT: lips and mucous membranes moist  Neck: supple, no JVD, no masses  Chest: clear breath sounds bilaterally, no crackles, ronchus or wheezings  CVS: S1 & S2, normal rate, regular rhythm  Abdomen: soft, non-tender, non-distended, normoactive bowel sounds  Extremities: no edema of both legs  Skin: no rash  Neuro: awake, alert, oriented      Diagnostic Data:  Lab: I have personally reviewed pertinent lab results  ,   CBC:  Results from last 7 days   Lab Units 08/18/22  0545   WBC Thousand/uL 6 61   HEMOGLOBIN g/dL 10 8*   HEMATOCRIT % 31 5*   PLATELETS Thousands/uL 153      CMP:   Lab Results   Component Value Date    SODIUM 125 (L) 08/24/2022    K 3 9 08/24/2022    CL 95 (L) 08/24/2022    CO2 25 08/24/2022    BUN 18 08/24/2022    CREATININE 0 78 08/24/2022    CALCIUM 8 4 08/24/2022    EGFR 75 08/24/2022   ,   PT/INR: No results found for: PT, INR,   Magnesium: No components found for: MAG,  Phosphorous: No results found for: PHOS    Microbiology:  @LABRCNTIP,(urinecx:7)@        ANAM Rollins    Portions of the record may have been created with voice recognition software  Occasional wrong word or "sound a like" substitutions may have occurred due to the inherent limitations of voice recognition software  Read the chart carefully and recognize, using context, where substitutions have occurred

## 2022-08-25 NOTE — UTILIZATION REVIEW
Notification of Discharge   This is a Notification of Discharge from our facility [address]  Please be advised that this patient has been discharge from our facility  Below you will find the admission and discharge date and time including the patients disposition  UTILIZATION REVIEW CONTACT:  Micky Rose  Utilization   Network Utilization Review Department  Phone: [phone] carefully listen to the prompts  All voicemails are confidential   Email: [email or web address]     PHYSICIAN ADVISORY SERVICES:  FOR PVNV-NR-FMDP REVIEW - MEDICAL NECESSITY DENIAL  Phone: [phone]  Fax: [phone]  Email: [email or web address]     PRESENTATION DATE: 8/17/2022  8:22 PM  OBERVATION ADMISSION DATE:   INPATIENT ADMISSION DATE: 8/17/22 10:13 PM   DISCHARGE DATE: 8/24/2022  3:48 PM  DISPOSITION: Non [facility] SN[facility]/SNU Non [facility] SN[facility]/SNU      IMPORTANT INFORMATION:  Send all requests for admission clinical reviews, approved or denied determinations and any other requests to dedicated fax number below belonging to the campus where the patient is receiving treatment   List of dedicated fax numbers:  [address] [place] [address] DENIALS (Administrative/Medical Necessity) [phone]   [address]  (Maternity/NICU/Pediatrics) [phone]   [facility] [phone]   [address] [facility] [phone]   [address] [place] [address] [phone]   [address] [place] [address],[address] [phone]   [facility]  [phone]   [address] [facility] [address]  [address] [facility] [address] [facility] [phone]

## 2022-09-09 ENCOUNTER — OFFICE VISIT (OUTPATIENT)
Dept: NEPHROLOGY | Facility: CLINIC | Age: 87
End: 2022-09-09
Payer: COMMERCIAL

## 2022-09-09 VITALS — SYSTOLIC BLOOD PRESSURE: 124 MMHG | DIASTOLIC BLOOD PRESSURE: 60 MMHG | BODY MASS INDEX: 20.98 KG/M2 | HEIGHT: 66 IN

## 2022-09-09 DIAGNOSIS — R60.0 BILATERAL LEG EDEMA: ICD-10-CM

## 2022-09-09 DIAGNOSIS — E22.2 SYNDROME OF INAPPROPRIATE SECRETION OF ANTIDIURETIC HORMONE (HCC): Primary | ICD-10-CM

## 2022-09-09 DIAGNOSIS — E87.1 HYPONATREMIA: ICD-10-CM

## 2022-09-09 PROCEDURE — 99213 OFFICE O/P EST LOW 20 MIN: CPT | Performed by: INTERNAL MEDICINE

## 2022-09-09 RX ORDER — LOSARTAN POTASSIUM 50 MG/1
50 TABLET ORAL DAILY
COMMUNITY

## 2022-09-09 RX ORDER — FUROSEMIDE 20 MG/1
20 TABLET ORAL 3 TIMES WEEKLY
Qty: 30 TABLET | Refills: 4
Start: 2022-09-09

## 2022-09-09 NOTE — PROGRESS NOTES
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)    Will place the patient on a 1 5L  Fluid restriction if not already on one  Recommended for a low-sodium diet  Continue avoid thiazide or thiazide like diuretics, patient also recommended to initiate furosemide 20 mg 3 times weekly  Bilateral leg edema   Continue to encourage low-sodium diet, patient initiated on furosemide 20 mg 3 times weekly which will assist with volume management as well as free water excretion  Hyponatremia    Most recent serum sodium level was 125 millimole / L, as mentioned above continue with fluid restriction down to 1 5 L, initiate furosemide  Follow-up blood work in 1-2 weeks  Problem List Items Addressed This Visit        Endocrine    Syndrome of inappropriate secretion of antidiuretic hormone ([facility] [address] ) - Primary       Will place the patient on a 1 5L  Fluid restriction if not already on one  Recommended for a low-sodium diet  Continue avoid thiazide or thiazide like diuretics, patient also recommended to initiate furosemide 20 mg 3 times weekly  Other    Bilateral leg edema      Continue to encourage low-sodium diet, patient initiated on furosemide 20 mg 3 times weekly which will assist with volume management as well as free water excretion  Relevant Medications    furosemide (LASIX) 20 mg tablet    Hyponatremia       Most recent serum sodium level was 125 millimole / L, as mentioned above continue with fluid restriction down to 1 5 L, initiate furosemide  Follow-up blood work in 1-2 weeks  Patient currently at rehabilitation, continue focus on low fluid intake, low-sodium diet, and initiation of loop diuretic  Repeat blood work in 1-2 weeks for now    Patient also encouraged to eat more protein, he appears to not eat a significant amount of protein throughout the day or over the course of the week  Subjective:      Patient ID: Ne Velasco Sr  is a 80 y o  male  Patient presents for follow-up appointment from his hospitalization  We reviewed the patient's hospitalization, patient was followed by us due to hyponatremia  While he was hospitalized, he received tolvaptan to assist with water excretion  After that the patient was placed on a fluid restriction as well as furosemide due to bilateral lower extremity edema  At this time, the patient's serum sodium level remains low, it is unclear what is fluid restriction is at the nursing home  Patient currently is not on a loop diuretic  Patient is taking other medications as prescribed with no specific side effects at this time  Hypertension  This is a chronic problem  The current episode started more than 1 year ago  The problem is unchanged  The problem is controlled  Associated symptoms include peripheral edema  Pertinent negatives include no chest pain, orthopnea or shortness of breath  There are no associated agents to hypertension  Risk factors for coronary artery disease include male gender and sedentary lifestyle  Past treatments include lifestyle changes and diuretics  There are no compliance problems  There is no history of kidney disease  There is no history of chronic renal disease  The following portions of the patient's history were reviewed and updated as appropriate: allergies, current medications, past family history, past medical history, past social history, past surgical history and problem list     Review of Systems   Respiratory: Negative for shortness of breath  Cardiovascular: Negative for chest pain and orthopnea  All other systems reviewed and are negative  Social History     Socioeconomic History    Marital status:       Spouse name: None    Number of children: None    Years of education: None  Highest education level: None   Occupational History    None   Tobacco Use    Smoking status: Former Smoker     Types: Pipe    Smokeless tobacco: Never Used   Vaping Use    Vaping Use: Never used   Substance and Sexual Activity    Alcohol use: Not Currently    Drug use: No    Sexual activity: Not Currently   Other Topics Concern    None   Social History Narrative    None     Social Determinants of Health     Financial Resource Strain: Not on file   Food Insecurity: No Food Insecurity    Worried About Running Out of Food in the Last Year: Never true    Ramesh of Food in the Last Year: Never true   Transportation Needs: No Transportation Needs    Lack of Transportation (Medical): No    Lack of Transportation (Non-Medical):  No   Physical Activity: Not on file   Stress: Not on file   Social Connections: Not on file   Intimate Partner Violence: Not on file   Housing Stability: Low Risk     Unable to Pay for Housing in the Last Year: No    Number of Places Lived in the Last Year: 1    Unstable Housing in the Last Year: No     Past Medical History:   Diagnosis Date    Arthritis     Cataract     Coronary artery disease     Coronary atherosclerosis of native coronary artery     Diverticulitis of colon     Essential hypertension, benign     Hyperlipidemia     Hypertension     Peptic ulcer     Renal disorder      Past Surgical History:   Procedure Laterality Date    CATARACT EXTRACTION Right     Left eye 5/2021    CORONARY STENT PLACEMENT      SKIN BIOPSY      TONSILLECTOMY         Current Outpatient Medications:     ALLOPURINOL PO, Take 150 mg by mouth daily 150mg, Disp: , Rfl:     aspirin 81 MG tablet, Take 1 tablet by mouth see administration instructions Take 1 tablet Mon-Wed & Fri, Disp: , Rfl:     atorvastatin (LIPITOR) 20 mg tablet, Take 1 tablet by mouth daily, Disp: , Rfl:     Cholecalciferol 50 MCG (2000 UT) CAPS, Take by mouth, Disp: , Rfl:     Choline Fenofibrate (FENOFIBRIC ACID) 45 MG CPDR, Take by mouth, Disp: , Rfl:     furosemide (LASIX) 20 mg tablet, Take 1 tablet (20 mg total) by mouth 3 (three) times a week, Disp: 30 tablet, Rfl: 4    losartan (COZAAR) 50 mg tablet, Take 50 mg by mouth daily, Disp: , Rfl:     Multiple Vitamins tablet, Take by mouth, Disp: , Rfl:     nitroglycerin (Nitrostat) 0 4 mg SL tablet, Place 1 tablet (0 4 mg total) under the tongue every 5 (five) minutes as needed for chest pain, Disp: 25 tablet, Rfl: 3    potassium chloride (KLOR-CON) 20 mEq packet, Take 20 mEq by mouth as needed Take 1 tablet when taking Furosemide (Patient not taking: Reported on 9/9/2022), Disp: , Rfl:     Lab Results   Component Value Date    SODIUM 125 (L) 08/24/2022    K 3 9 08/24/2022    CL 95 (L) 08/24/2022    CO2 25 08/24/2022    AGAP 5 08/24/2022    BUN 18 08/24/2022    CREATININE 0 78 08/24/2022    GLUC 89 08/24/2022    GLUF 89 06/22/2022    CALCIUM 8 4 08/24/2022    AST 36 07/25/2022    ALT 13 07/25/2022    ALKPHOS 50 07/25/2022    TP 5 9 (L) 07/25/2022    TBILI 0 68 07/25/2022    EGFR 75 08/24/2022     Lab Results   Component Value Date    WBC 6 61 08/18/2022    HGB 10 8 (L) 08/18/2022    HCT 31 5 (L) 08/18/2022    MCV 98 08/18/2022     08/18/2022     Lab Results   Component Value Date    CHOLESTEROL 145 06/22/2022    CHOLESTEROL 124 04/19/2021    CHOLESTEROL 107 09/17/2020     Lab Results   Component Value Date    HDL 42 06/22/2022    HDL 42 04/19/2021    HDL 45 09/17/2020     Lab Results   Component Value Date    LDLCALC 81 06/22/2022    LDLCALC 68 04/19/2021    LDLCALC 46 09/17/2020     Lab Results   Component Value Date    TRIG 110 06/22/2022    TRIG 70 04/19/2021    TRIG 78 09/17/2020     No results found for: [place], [place]  Lab Results   Component Value Date    [id number] 3 763 08/18/2022     Lab Results   Component Value Date    PTH 31 2 03/18/2021    CALCIUM 8 4 08/24/2022    PHOS 1 7 (L) 09/18/2020     No results found for: SPEP, UPEP  No results found for: Angus Panchito        Objective:      /60 (BP Location: Left arm, Patient Position: Sitting, Cuff Size: Adult)   Ht 5' 6" (1 676 m)   BMI 20 98 kg/m²          Physical Exam  Vitals reviewed  Constitutional:       General: He is not in acute distress  Appearance: He is well-developed  HENT:      Head: Normocephalic and atraumatic  Eyes:      Conjunctiva/sclera: Conjunctivae normal    Cardiovascular:      Rate and Rhythm: Normal rate and regular rhythm  Pulmonary:      Effort: Pulmonary effort is normal       Breath sounds: Normal breath sounds  Abdominal:      Palpations: Abdomen is soft  Musculoskeletal:         General: Swelling (  Trace bilateral lower extremity) present  Cervical back: Neck supple  Skin:     General: Skin is warm  Findings: No rash  Neurological:      Mental Status: He is alert and oriented to person, place, and time  Cranial Nerves: No cranial nerve deficit     Psychiatric:         Behavior: Behavior normal

## 2022-09-09 NOTE — ASSESSMENT & PLAN NOTE
Will place the patient on a 1 5L  Fluid restriction if not already on one  Recommended for a low-sodium diet  Continue avoid thiazide or thiazide like diuretics, patient also recommended to initiate furosemide 20 mg 3 times weekly

## 2022-09-12 NOTE — ASSESSMENT & PLAN NOTE
Continue to encourage low-sodium diet, patient initiated on furosemide 20 mg 3 times weekly which will assist with volume management as well as free water excretion

## 2022-09-12 NOTE — ASSESSMENT & PLAN NOTE
Most recent serum sodium level was 125 millimole / L, as mentioned above continue with fluid restriction down to 1 5 L, initiate furosemide  Follow-up blood work in 1-2 weeks

## 2022-11-15 ENCOUNTER — TELEPHONE (OUTPATIENT)
Dept: LAB | Facility: HOSPITAL | Age: 87
End: 2022-11-15

## 2022-11-21 ENCOUNTER — APPOINTMENT (OUTPATIENT)
Dept: LAB | Facility: HOSPITAL | Age: 87
End: 2022-11-21

## 2022-11-21 DIAGNOSIS — E87.1 HYPONATREMIA: ICD-10-CM

## 2022-11-21 LAB
ANION GAP SERPL CALCULATED.3IONS-SCNC: 7 MMOL/L (ref 4–13)
BUN SERPL-MCNC: 20 MG/DL (ref 5–25)
CALCIUM SERPL-MCNC: 8.8 MG/DL (ref 8.4–10.2)
CHLORIDE SERPL-SCNC: 103 MMOL/L (ref 96–108)
CO2 SERPL-SCNC: 27 MMOL/L (ref 21–32)
CREAT SERPL-MCNC: 0.74 MG/DL (ref 0.6–1.3)
GFR SERPL CREATININE-BSD FRML MDRD: 76 ML/MIN/1.73SQ M
GLUCOSE SERPL-MCNC: 91 MG/DL (ref 65–140)
POTASSIUM SERPL-SCNC: 4.4 MMOL/L (ref 3.5–5.3)
SODIUM SERPL-SCNC: 137 MMOL/L (ref 135–147)

## 2023-01-05 ENCOUNTER — HOSPITAL ENCOUNTER (OUTPATIENT)
Dept: ULTRASOUND IMAGING | Facility: HOSPITAL | Age: 88
End: 2023-01-05
Attending: UROLOGY

## 2023-01-05 DIAGNOSIS — R31.0 GROSS HEMATURIA: ICD-10-CM

## 2023-01-11 ENCOUNTER — OFFICE VISIT (OUTPATIENT)
Dept: NEPHROLOGY | Facility: CLINIC | Age: 88
End: 2023-01-11

## 2023-01-11 VITALS
HEIGHT: 66 IN | OXYGEN SATURATION: 99 % | BODY MASS INDEX: 22.18 KG/M2 | RESPIRATION RATE: 16 BRPM | SYSTOLIC BLOOD PRESSURE: 130 MMHG | DIASTOLIC BLOOD PRESSURE: 60 MMHG | WEIGHT: 138 LBS | HEART RATE: 82 BPM

## 2023-01-11 DIAGNOSIS — R60.0 BILATERAL LEG EDEMA: ICD-10-CM

## 2023-01-11 DIAGNOSIS — E22.2 SIADH (SYNDROME OF INAPPROPRIATE ADH PRODUCTION) (HCC): Primary | ICD-10-CM

## 2023-01-11 DIAGNOSIS — I10 ESSENTIAL HYPERTENSION: Chronic | ICD-10-CM

## 2023-01-11 DIAGNOSIS — E87.1 HYPONATREMIA: ICD-10-CM

## 2023-01-11 DIAGNOSIS — E22.2 SYNDROME OF INAPPROPRIATE SECRETION OF ANTIDIURETIC HORMONE (HCC): ICD-10-CM

## 2023-01-11 PROBLEM — N18.31 CHRONIC KIDNEY DISEASE, STAGE 3A (HCC): Status: ACTIVE | Noted: 2023-01-11

## 2023-01-11 NOTE — PATIENT INSTRUCTIONS
Sodium at goal  Continue current fluid restriction  You currently do not need the furosemide  If your swelling gets worse, you can take a tablet

## 2023-01-11 NOTE — ASSESSMENT & PLAN NOTE
Patient has mild lower extremity edema, which he is not bothered by  His serum sodium is at goal   He has not been taking the furosemide  I did advise that if his swelling worsens, he should take the furosemide that day

## 2023-01-11 NOTE — PROGRESS NOTES
Assessment & Plan:    1  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[facility] [address] )  -     Basic metabolic panel; Future; Expected date: 07/04/2023    2  Essential hypertension  Assessment & Plan:  Pressure is acceptable  Continue losartan and low-sodium diet  3  Bilateral leg edema  Assessment & Plan:  Patient has mild lower extremity edema, which he is not bothered by  His serum sodium is at goal   He has not been taking the furosemide  I did advise that if his swelling worsens, he should take the furosemide that day  4  Hyponatremia  Assessment & Plan:  Serum sodium at goal   Continue current fluid restriction  Patient doing well with drinking only when thirsty  Orders:  -     Basic metabolic panel; Future; Expected date: 07/04/2023    5  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)  Assessment & Plan:  Serum sodium at goal   Continue current fluid restriction  Patient reports that he is not drinking only when thirsty  He did not start the furosemide  He does not need to start at this time  He may take a tablet if he notes that his lower extremity edema worsens or his weight increases  Otherwise, continue current fluid intake  Return to clinic in 6 months with labs prior with Dr Yoana Felix  The benefits, risks and alternatives to the treatment plan were discussed at this visit  Patient was advised of common adverse effects of any medical therapies prescribed  All questions were answered and discussed with the patient and any accompanying family members or caretakers  Subjective:      Patient ID: Princess Boyd Sr  is a 80 y o  male seen in the [place] office  HPI     Today, patient presents for routine follow-up without acute complaints relating to blood pressure, edema or nephrological concerns        Patient denies any changes in health, medication changes, hospitalizations, surgeries or trip to the emergency room, falls or bone fractures since last visit  Blood pressure is 130/60  Denies headaches, lightheadedness, dizziness  Patient reports adherence with antihypertensive regimen and denies adverse effects: losartan 50 mg daily  Has not been taking the furosemide  Drinking only when thirsty  Patient is mild lower extremity swelling  Reviewed and discussed the results of labs performed 11/21/22 revealed sCr 0 74 mg/dL with eGFR 76 mL/min  Sodium 137 mmol/L  History is obtained from patient  The following portions of the patient's history were reviewed and updated as appropriate: allergies, current medications, past family history, past medical history, past social history, past surgical history, and problem list     Review of Systems   Constitutional: Negative for activity change, chills, diaphoresis, fatigue and fever  HENT: Negative for mouth sores and trouble swallowing  Respiratory: Negative for apnea, cough, chest tightness, shortness of breath and wheezing  Cardiovascular: Negative for chest pain, palpitations and leg swelling  Gastrointestinal: Negative for abdominal distention, abdominal pain, blood in stool, constipation, diarrhea and nausea  Genitourinary: Negative for decreased urine volume, difficulty urinating, dysuria, enuresis, frequency, hematuria and urgency  Musculoskeletal: Negative for arthralgias, back pain and joint swelling  Skin: Negative for pallor, rash and wound  Neurological: Negative for dizziness, seizures, light-headedness, numbness and headaches  Hematological: Does not bruise/bleed easily  Psychiatric/Behavioral: Negative for agitation, behavioral problems and confusion  The patient is not nervous/anxious  Objective:      /60 (BP Location: Left arm, Patient Position: Sitting, Cuff Size: Standard)   Pulse 82   Resp 16   Ht 5' 6" (1 676 m)   Wt 62 6 kg (138 lb)   SpO2 99%   BMI 22 27 kg/m²          Physical Exam  Vitals and nursing note reviewed     Constitutional: General: He is awake  He is not in acute distress  Appearance: Normal appearance  He is well-developed and normal weight  He is not ill-appearing, toxic-appearing or diaphoretic  HENT:      Head: Normocephalic and atraumatic  Jaw: There is normal jaw occlusion  Nose: Nose normal       Mouth/Throat:      Mouth: Mucous membranes are moist       Pharynx: Oropharynx is clear  No oropharyngeal exudate or posterior oropharyngeal erythema  Eyes:      General: Lids are normal  Vision grossly intact  Gaze aligned appropriately  No scleral icterus  Right eye: No discharge  Left eye: No discharge  Extraocular Movements: Extraocular movements intact  Conjunctiva/sclera: Conjunctivae normal       Pupils: Pupils are equal, round, and reactive to light  Neck:      Thyroid: No thyroid mass or thyromegaly  Trachea: Trachea and phonation normal    Cardiovascular:      Rate and Rhythm: Normal rate and regular rhythm  Heart sounds: Normal heart sounds, S1 normal and S2 normal  No murmur heard  No friction rub  No gallop  Pulmonary:      Effort: Pulmonary effort is normal  No respiratory distress  Breath sounds: Normal breath sounds  No stridor  No wheezing, rhonchi or rales  Abdominal:      General: Abdomen is flat  Bowel sounds are normal  There is no distension  Palpations: Abdomen is soft  There is no mass  Tenderness: There is no abdominal tenderness  There is no guarding  Hernia: No hernia is present  Musculoskeletal:         General: Normal range of motion  Cervical back: Normal range of motion and neck supple  No rigidity or tenderness  Right lower le+ Pitting Edema present  Left lower le+ Pitting Edema present  Lymphadenopathy:      Cervical: No cervical adenopathy  Skin:     General: Skin is warm and dry  Coloration: Skin is not jaundiced  Findings: No bruising  Nails: There is no clubbing  Neurological:      General: No focal deficit present  Mental Status: He is alert and oriented to person, place, and time  Mental status is at baseline  Psychiatric:         Attention and Perception: Attention and perception normal          Mood and Affect: Mood and affect normal          Speech: Speech normal          Behavior: Behavior normal  Behavior is cooperative  Thought Content:  Thought content normal          Judgment: Judgment normal              Lab Results   Component Value Date    SODIUM 137 11/21/2022    K 4 4 11/21/2022     11/21/2022    CO2 27 11/21/2022    AGAP 7 11/21/2022    BUN 20 11/21/2022    CREATININE 0 74 11/21/2022    GLUC 91 11/21/2022    GLUF 89 06/22/2022    CALCIUM 8 8 11/21/2022    AST 36 07/25/2022    ALT 13 07/25/2022    ALKPHOS 50 07/25/2022    TP 5 9 (L) 07/25/2022    TBILI 0 68 07/25/2022    EGFR 76 11/21/2022      Lab Results   Component Value Date    CREATININE 0 74 11/21/2022    CREATININE 0 78 08/24/2022    CREATININE 0 90 08/23/2022    CREATININE 0 95 08/22/2022    CREATININE 0 82 08/20/2022    CREATININE 0 87 08/20/2022    CREATININE 1 01 08/19/2022    CREATININE 0 73 08/19/2022    CREATININE 0 75 08/19/2022    CREATININE 0 90 08/18/2022    CREATININE 0 85 08/18/2022    CREATININE 0 79 08/18/2022    CREATININE 0 83 08/17/2022    CREATININE 0 83 08/17/2022    CREATININE 1 23 07/25/2022      Lab Results   Component Value Date    COLORU Brown (A) 07/25/2022    CLARITYU Slightly Cloudy (A) 07/25/2022    SPECGRAV 1 020 07/25/2022    PHUR 6 0 07/25/2022    LEUKOCYTESUR Negative 07/25/2022    NITRITE Negative 07/25/2022    PROTEIN UA 1+ (A) 07/25/2022    GLUCOSEU Negative 07/25/2022    KETONESU Negative 07/25/2022    UROBILINOGEN 0 2 07/25/2022    BILIRUBINUR Negative 07/25/2022    BLOODU 3+ (A) 07/25/2022    RBCUA Innumerable (A) 07/25/2022    WBCUA 1-2 07/25/2022    EPIS None Seen 07/25/2022    BACTERIA Occasional 07/25/2022      No results found for: LABPROT  No results found for: Shon Gardner  Lab Results   Component Value Date    WBC 6 61 08/18/2022    HGB 10 8 (L) 08/18/2022    HCT 31 5 (L) 08/18/2022    MCV 98 08/18/2022     08/18/2022      Lab Results   Component Value Date    HGB 10 8 (L) 08/18/2022    HGB 10 8 (L) 08/17/2022    HGB 10 3 (L) 07/25/2022    HGB 11 7 (L) 06/22/2022    HGB 11 2 (L) 12/09/2021      Lab Results   Component Value Date    IRON 78 12/18/2019    TIBC 318 12/18/2019    FERRITIN 194 12/18/2019      No results found for: PTHCALCIUM, [id number], PHOSPHORUS   Lab Results   Component Value Date    CHOLESTEROL 145 06/22/2022    HDL 42 06/22/2022    LDLCALC 81 06/22/2022    TRIG 110 06/22/2022      Lab Results   Component Value Date    URICACID 2 8 (L) 06/22/2022      No results found for: HGBA1C   Lab Results   Component Value Date    FREET4 0 73 (L) 12/09/2021      No results found for: BRY, DSDNAAB, RFIGM   No results found for: PROT, UPEP, IMMUNOFIX, KAPPALAMBDA, KAPPALIGHT     Portions of the record may have been created with voice recognition software  Occasional wrong word or "sound a like" substitutions may have occurred due to the inherent limitations of voice recognition software  Read the chart carefully and recognize, using context, where substitutions have occurred  If you have any questions, please contact the dictating provider

## 2023-01-11 NOTE — ASSESSMENT & PLAN NOTE
Serum sodium at goal   Continue current fluid restriction  Patient doing well with drinking only when thirsty

## 2023-01-11 NOTE — ASSESSMENT & PLAN NOTE
Serum sodium at goal   Continue current fluid restriction  Patient reports that he is not drinking only when thirsty  He did not start the furosemide  He does not need to start at this time  He may take a tablet if he notes that his lower extremity edema worsens or his weight increases  Otherwise, continue current fluid intake  Return to clinic in 6 months with labs prior with Dr Lizette Thurman

## 2023-03-08 ENCOUNTER — OFFICE VISIT (OUTPATIENT)
Dept: CARDIOLOGY CLINIC | Facility: CLINIC | Age: 88
End: 2023-03-08

## 2023-03-08 VITALS
HEART RATE: 83 BPM | DIASTOLIC BLOOD PRESSURE: 66 MMHG | BODY MASS INDEX: 23.66 KG/M2 | SYSTOLIC BLOOD PRESSURE: 130 MMHG | OXYGEN SATURATION: 100 % | WEIGHT: 146.6 LBS

## 2023-03-08 DIAGNOSIS — I25.10 CORONARY ARTERY DISEASE INVOLVING NATIVE CORONARY ARTERY OF NATIVE HEART WITHOUT ANGINA PECTORIS: Primary | Chronic | ICD-10-CM

## 2023-03-08 DIAGNOSIS — I10 ESSENTIAL HYPERTENSION: Chronic | ICD-10-CM

## 2023-03-08 NOTE — PROGRESS NOTES
Cardiology Follow Up    Linus Angel Sr   10/11/1924  [id number]  [facility] [place]  [address]  [address]  [place] [address] [phone]    1  Coronary artery disease involving native coronary artery of native heart without angina pectoris        2  Essential hypertension              Discussion/Summary: All of his assessed cardiac problems are stable  I have reviewed his medications and made no changes  No cardiac testing is ordered  RTO 1 year  Interval History: He continues to do remarkably well and still gets around well with his walker  He denies CP, SOB, dizziness  He has CAD with LAD stenting in 2002  EF is 55% by echo in 10/2021  /66      Patient Active Problem List   Diagnosis   • Coronary artery disease involving native coronary artery of native heart without angina pectoris   • Essential hypertension   • Bilateral leg edema   • Ventricular bigeminy   • Chronic gout of multiple sites   • Vitamin D deficiency   • Hypomagnesemia   • Hyponatremia   • Mixed hyperlipidemia   • 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[facility] [address] )   • Fall   • Difficulty swallowing   • 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)   • Chronic kidney disease, stage 3a ([facility] [address] )     Past Medical History:   Diagnosis Date   • Arthritis    • Cataract    • Coronary artery disease    • Coronary atherosclerosis of native coronary artery    • Diverticulitis of colon    • Essential hypertension, benign    • Hyperlipidemia    • Hypertension    • Peptic ulcer    • Renal disorder      Social History     Socioeconomic History   • Marital status:       Spouse name: Not on file   • Number of children: Not on file   • Years of education: Not on file   • Highest education level: Not on file   Occupational History   • Not on file   Tobacco Use   • Smoking status: Former     Types: Pipe   • Smokeless tobacco: Never   Vaping Use   • Vaping Use: Never used   Substance and Sexual Activity   • Alcohol use: Not Currently   • Drug use: No   • Sexual activity: Not Currently   Other Topics Concern   • Not on file   Social History Narrative   • Not on file     Social Determinants of Health     Financial Resource Strain: Not on file   Food Insecurity: No Food Insecurity   • Worried About Running Out of Food in the Last Year: Never true   • Ran Out of Food in the Last Year: Never true   Transportation Needs: No Transportation Needs   • Lack of Transportation (Medical): No   • Lack of Transportation (Non-Medical):  No   Physical Activity: Not on file   Stress: Not on file   Social Connections: Not on file   Intimate Partner Violence: Not on file   Housing Stability: Low Risk    • Unable to Pay for Housing in the Last Year: No   • Number of Places Lived in the Last Year: 1   • Unstable Housing in the Last Year: No      Family History   Problem Relation Age of Onset   • Heart attack Brother    • Heart disease Mother    • Cancer Father      Past Surgical History:   Procedure Laterality Date   • CATARACT EXTRACTION Right     Left eye 5/2021   • CORONARY STENT PLACEMENT     • SKIN BIOPSY     • TONSILLECTOMY         Current Outpatient Medications:   •  ALLOPURINOL PO, Take 150 mg by mouth daily 150mg, Disp: , Rfl:   •  aspirin 81 MG tablet, Take 1 tablet by mouth see administration instructions Take 1 tablet Mon-Wed & Fri, Disp: , Rfl:   •  atorvastatin (LIPITOR) 20 mg tablet, Take 1 tablet by mouth daily, Disp: , Rfl:   •  Cholecalciferol 50 MCG (2000 UT) CAPS, Take by mouth, Disp: , Rfl:   •  Choline Fenofibrate (FENOFIBRIC ACID) 45 MG CPDR, Take by mouth, Disp: , Rfl:   •  furosemide (LASIX) 20 mg tablet, Take 1 tablet (20 mg total) by mouth 3 (three) times a week, Disp: 30 tablet, Rfl: 4  •  Multiple Vitamins tablet, Take by mouth, Disp: , Rfl:   •  potassium chloride (KLOR-CON) 20 mEq packet, Take 20 mEq by mouth as needed Take 1 tablet when taking Furosemide, Disp: , Rfl:   •  losartan (COZAAR) 50 mg tablet, Take 50 mg by mouth daily, Disp: , Rfl:   •  nitroglycerin (Nitrostat) 0 4 mg SL tablet, Place 1 tablet (0 4 mg total) under the tongue every 5 (five) minutes as needed for chest pain (Patient not taking: Reported on 3/8/2023), Disp: 25 tablet, Rfl: 3  No Known Allergies  Vitals:    03/08/23 1335   BP: 130/66   BP Location: Right arm   Patient Position: Sitting   Cuff Size: Standard   Pulse: 83   SpO2: 100%   Weight: 66 5 kg (146 lb 9 6 oz)     Weight (last 2 days)     Date/Time Weight    03/08/23 1335 66 5 (146 6)         Blood pressure 130/66, pulse 83, weight 66 5 kg (146 lb 9 6 oz), SpO2 100 %  , Body mass index is 23 66 kg/m²  Labs:  Appointment on 11/21/2022   Component Date Value   • Sodium 11/21/2022 137    • Potassium 11/21/2022 4 4    • Chloride 11/21/2022 103    • CO2 11/21/2022 27    • ANION GAP 11/21/2022 7    • BUN 11/21/2022 20    • Creatinine 11/21/2022 0 74    • Glucose 11/21/2022 91    • Calcium 11/21/2022 8 8    • eGFR 11/21/2022 76      Imaging: No results found  Review of Systems:  Review of Systems   Constitutional: Negative for diaphoresis, fatigue, fever and unexpected weight change  HENT: Negative  Respiratory: Negative for cough, shortness of breath and wheezing  Cardiovascular: Negative for chest pain, palpitations and leg swelling  Gastrointestinal: Negative for abdominal pain, diarrhea and nausea  Musculoskeletal: Negative for gait problem and myalgias  Skin: Negative for rash  Neurological: Negative for dizziness and numbness  Psychiatric/Behavioral: Negative  Physical Exam:  Physical Exam  Constitutional:       Appearance: He is well-developed  HENT:      Head: Normocephalic and atraumatic  Eyes:      Pupils: Pupils are equal, round, and reactive to light  Neck:      Vascular: No JVD  Cardiovascular:      Rate and Rhythm: Regular rhythm  Pulses: Normal pulses  Carotid pulses are 2+ on the right side and 2+ on the left side  Heart sounds: S1 normal and S2 normal    Pulmonary:      Effort: Pulmonary effort is normal       Breath sounds: Normal breath sounds  No wheezing or rales  Abdominal:      General: Bowel sounds are normal       Palpations: Abdomen is soft  Tenderness: There is no abdominal tenderness  Musculoskeletal:         General: No tenderness  Normal range of motion  Cervical back: Normal range of motion and neck supple  Skin:     General: Skin is warm  Neurological:      Mental Status: He is alert and oriented to person, place, and time  Cranial Nerves: No cranial nerve deficit  Deep Tendon Reflexes: Reflexes are normal and symmetric

## 2023-06-04 ENCOUNTER — HOSPITAL ENCOUNTER (EMERGENCY)
Facility: HOSPITAL | Age: 88
Discharge: HOME | End: 2023-06-04
Attending: EMERGENCY MEDICINE
Payer: COMMERCIAL

## 2023-06-04 ENCOUNTER — APPOINTMENT (EMERGENCY)
Dept: RADIOLOGY | Facility: HOSPITAL | Age: 88
End: 2023-06-04
Payer: COMMERCIAL

## 2023-06-04 VITALS
BODY MASS INDEX: 24.75 KG/M2 | OXYGEN SATURATION: 96 % | HEART RATE: 80 BPM | HEIGHT: 66 IN | RESPIRATION RATE: 21 BRPM | TEMPERATURE: 97.9 F | DIASTOLIC BLOOD PRESSURE: 67 MMHG | WEIGHT: 154 LBS | SYSTOLIC BLOOD PRESSURE: 146 MMHG

## 2023-06-04 DIAGNOSIS — R79.89 ELEVATED SERUM CREATININE: ICD-10-CM

## 2023-06-04 DIAGNOSIS — R07.9 CHEST PAIN: Primary | ICD-10-CM

## 2023-06-04 LAB
2HR DELTA HS TROPONIN: 0 NG/L
ANION GAP SERPL CALCULATED.3IONS-SCNC: 7 MMOL/L (ref 4–13)
ATRIAL RATE: 80 BPM
BASOPHILS # BLD AUTO: 0.02 THOUSANDS/ÂΜL (ref 0–0.1)
BASOPHILS NFR BLD AUTO: 0 % (ref 0–1)
BUN SERPL-MCNC: 27 MG/DL (ref 5–25)
CALCIUM SERPL-MCNC: 8.7 MG/DL (ref 8.4–10.2)
CARDIAC TROPONIN I PNL SERPL HS: 9 NG/L
CARDIAC TROPONIN I PNL SERPL HS: 9 NG/L
CHLORIDE SERPL-SCNC: 106 MMOL/L (ref 96–108)
CO2 SERPL-SCNC: 26 MMOL/L (ref 21–32)
CREAT SERPL-MCNC: 1.08 MG/DL (ref 0.6–1.3)
EOSINOPHIL # BLD AUTO: 0.06 THOUSAND/ÂΜL (ref 0–0.61)
EOSINOPHIL NFR BLD AUTO: 1 % (ref 0–6)
ERYTHROCYTE [DISTWIDTH] IN BLOOD BY AUTOMATED COUNT: 14.6 % (ref 11.6–15.1)
GFR SERPL CREATININE-BSD FRML MDRD: 56 ML/MIN/1.73SQ M
GLUCOSE SERPL-MCNC: 137 MG/DL (ref 65–140)
HCT VFR BLD AUTO: 36.7 % (ref 36.5–49.3)
HGB BLD-MCNC: 11.6 G/DL (ref 12–17)
IMM GRANULOCYTES # BLD AUTO: 0.08 THOUSAND/UL (ref 0–0.2)
IMM GRANULOCYTES NFR BLD AUTO: 1 % (ref 0–2)
LYMPHOCYTES # BLD AUTO: 1.24 THOUSANDS/ÂΜL (ref 0.6–4.47)
LYMPHOCYTES NFR BLD AUTO: 15 % (ref 14–44)
MCH RBC QN AUTO: 31.7 PG (ref 26.8–34.3)
MCHC RBC AUTO-ENTMCNC: 31.6 G/DL (ref 31.4–37.4)
MCV RBC AUTO: 100 FL (ref 82–98)
MONOCYTES # BLD AUTO: 0.57 THOUSAND/ÂΜL (ref 0.17–1.22)
MONOCYTES NFR BLD AUTO: 7 % (ref 4–12)
NEUTROPHILS # BLD AUTO: 6.6 THOUSANDS/ÂΜL (ref 1.85–7.62)
NEUTS SEG NFR BLD AUTO: 76 % (ref 43–75)
NRBC BLD AUTO-RTO: 0 /100 WBCS
P AXIS: 68 DEGREES
PLATELET # BLD AUTO: 191 THOUSANDS/UL (ref 149–390)
PMV BLD AUTO: 10.6 FL (ref 8.9–12.7)
POTASSIUM SERPL-SCNC: 4.1 MMOL/L (ref 3.5–5.3)
PR INTERVAL: 178 MS
QRS AXIS: -28 DEGREES
QRSD INTERVAL: 92 MS
QT INTERVAL: 392 MS
QTC INTERVAL: 452 MS
RBC # BLD AUTO: 3.66 MILLION/UL (ref 3.88–5.62)
SODIUM SERPL-SCNC: 139 MMOL/L (ref 135–147)
T WAVE AXIS: 106 DEGREES
VENTRICULAR RATE: 80 BPM
WBC # BLD AUTO: 8.57 THOUSAND/UL (ref 4.31–10.16)

## 2023-06-04 PROCEDURE — 71045 X-RAY EXAM CHEST 1 VIEW: CPT

## 2023-06-04 PROCEDURE — 93010 ELECTROCARDIOGRAM REPORT: CPT | Performed by: INTERNAL MEDICINE

## 2023-06-04 PROCEDURE — 85025 COMPLETE CBC W/AUTO DIFF WBC: CPT | Performed by: EMERGENCY MEDICINE

## 2023-06-04 PROCEDURE — 93005 ELECTROCARDIOGRAM TRACING: CPT

## 2023-06-04 PROCEDURE — 99285 EMERGENCY DEPT VISIT HI MDM: CPT

## 2023-06-04 PROCEDURE — 73060 X-RAY EXAM OF HUMERUS: CPT

## 2023-06-04 PROCEDURE — 80048 BASIC METABOLIC PNL TOTAL CA: CPT | Performed by: EMERGENCY MEDICINE

## 2023-06-04 PROCEDURE — 84484 ASSAY OF TROPONIN QUANT: CPT | Performed by: EMERGENCY MEDICINE

## 2023-06-04 PROCEDURE — 73030 X-RAY EXAM OF SHOULDER: CPT

## 2023-06-04 PROCEDURE — 36415 COLL VENOUS BLD VENIPUNCTURE: CPT | Performed by: EMERGENCY MEDICINE

## 2023-06-04 NOTE — ED PROVIDER NOTES
History  Chief Complaint   Patient presents with   • Chest Pain     Arrives via [address] w/ son  Patient reports chest pain / left shoulder pain started around noon last for few minutes and took a nitro at 12:30 ( pain was gone before nitro)  No pain currently  Chest pain felt like duch aching/ no SOB at the time/ nonradiating     79 yo male presenting for reports of left-sided shoulder and chest pain which was in 1 discrete location approximately 2 hours prior to arrival   Patient states he was just sitting down and the pain lasted for about 5 minutes and went away on its own  States that nothing made it better or worse while it was there  States that after about 30 minutes he finally told his son that he had had chest pain but it was now resolved  Son at that point gave patient a nitro  Patient states he had no chest pain while taking the nitro and has had no recurrences of the chest pain since then  Denies shortness of breath or any other symptoms or any recent illness recently  Prior to Admission Medications   Prescriptions Last Dose Informant Patient Reported? Taking?    ALLOPURINOL PO  Self Yes No   Sig: Take 150 mg by mouth daily 150mg   Cholecalciferol 50 MCG (2000 UT) CAPS  Self Yes No   Sig: Take by mouth   Choline Fenofibrate (FENOFIBRIC ACID) 45 MG CPDR  Self Yes No   Sig: Take by mouth   Multiple Vitamins tablet  Self Yes No   Sig: Take by mouth   aspirin 81 MG tablet  Self Yes No   Sig: Take 1 tablet by mouth see administration instructions Take 1 tablet Mon-Wed & Fri   atorvastatin (LIPITOR) 20 mg tablet  Self Yes No   Sig: Take 1 tablet by mouth daily   furosemide (LASIX) 20 mg tablet  Self No No   Sig: Take 1 tablet (20 mg total) by mouth 3 (three) times a week   losartan (COZAAR) 50 mg tablet  Self Yes No   Sig: Take 50 mg by mouth daily   nitroglycerin (Nitrostat) 0 4 mg SL tablet  Self No No   Sig: Place 1 tablet (0 4 mg total) under the tongue every 5 (five) minutes as needed for chest pain   Patient not taking: Reported on 3/8/2023   potassium chloride (KLOR-CON) 20 mEq packet  Self Yes No   Sig: Take 20 mEq by mouth as needed Take 1 tablet when taking Furosemide      Facility-Administered Medications: None       Past Medical History:   Diagnosis Date   • Arthritis    • Cataract    • Coronary artery disease    • Coronary atherosclerosis of native coronary artery    • Diverticulitis of colon    • Essential hypertension, benign    • Hyperlipidemia    • Hypertension    • Peptic ulcer    • Renal disorder        Past Surgical History:   Procedure Laterality Date   • CATARACT EXTRACTION Right     Left eye 5/2021   • CORONARY STENT PLACEMENT     • SKIN BIOPSY     • TONSILLECTOMY         Family History   Problem Relation Age of Onset   • Heart attack Brother    • Heart disease Mother    • Cancer Father      I have reviewed and agree with the history as documented  E-Cigarette/Vaping   • E-Cigarette Use Never User      E-Cigarette/Vaping Substances   • Nicotine No    • THC No    • CBD No    • Flavoring No    • Other No    • Unknown No      Social History     Tobacco Use   • Smoking status: Former     Types: Pipe   • Smokeless tobacco: Never   Vaping Use   • Vaping Use: Never used   Substance Use Topics   • Alcohol use: Not Currently   • Drug use: No       Review of Systems   Cardiovascular: Positive for chest pain  Musculoskeletal: Positive for arthralgias  All other systems reviewed and are negative  Physical Exam  Physical Exam  Vitals and nursing note reviewed  Constitutional:       General: He is not in acute distress  Appearance: He is well-developed  He is not diaphoretic  HENT:      Head: Normocephalic and atraumatic  Right Ear: External ear normal       Left Ear: External ear normal       Nose: Nose normal    Eyes:      General: No scleral icterus  Right eye: No discharge  Left eye: No discharge        Conjunctiva/sclera: Conjunctivae normal  Cardiovascular:      Rate and Rhythm: Normal rate and regular rhythm  Heart sounds: Normal heart sounds  Heart sounds not distant  No murmur heard  No friction rub  No gallop  Pulmonary:      Effort: Pulmonary effort is normal  No tachypnea, accessory muscle usage or respiratory distress  Breath sounds: Normal breath sounds  No decreased breath sounds, wheezing, rhonchi or rales  Chest:      Chest wall: No mass, deformity, tenderness, crepitus or edema  There is no dullness to percussion  Abdominal:      General: Bowel sounds are normal  There is no distension  Palpations: Abdomen is soft  There is no mass  Tenderness: There is no abdominal tenderness  There is no guarding  Musculoskeletal:         General: No tenderness or deformity  Normal range of motion  Cervical back: Normal range of motion and neck supple  Skin:     General: Skin is warm and dry  Coloration: Skin is not pale  Findings: No erythema or rash  Neurological:      Mental Status: He is alert and oriented to person, place, and time  Psychiatric:         Behavior: Behavior normal          Thought Content:  Thought content normal          Judgment: Judgment normal          Vital Signs  ED Triage Vitals [06/04/23 1429]   Temperature Pulse Respirations Blood Pressure SpO2   97 9 °F (36 6 °C) 80 21 146/67 96 %      Temp Source Heart Rate Source Patient Position - Orthostatic VS BP Location FiO2 (%)   Temporal -- -- -- --      Pain Score       --           Vitals:    06/04/23 1429   BP: 146/67   Pulse: 80         Visual Acuity      ED Medications  Medications - No data to display    Diagnostic Studies  Results Reviewed     Procedure Component Value Units Date/Time    HS Troponin I 2hr [[id number]]  (Normal) Collected: 06/04/23 1639    Lab Status: Final result Specimen: Blood from Arm, Right Updated: 06/04/23 1702     hs TnI 2hr 9 ng/L      Delta 2hr hsTnI 0 ng/L     HS Troponin I 4hr [[id number]]     Lab Status: No result Specimen: Blood     Basic metabolic panel [[id number]]  (Abnormal) Collected: 06/04/23 1428    Lab Status: Final result Specimen: Blood from Arm, Right Updated: 06/04/23 1514     Sodium 139 mmol/L      Potassium 4 1 mmol/L      Chloride 106 mmol/L      CO2 26 mmol/L      ANION GAP 7 mmol/L      BUN 27 mg/dL      Creatinine 1 08 mg/dL      Glucose 137 mg/dL      Calcium 8 7 mg/dL      eGFR 56 ml/min/1 73sq m     Narrative:      [place] guidelines for Chronic Kidney Disease (CKD):   •  Stage 1 with normal or high GFR (GFR > 90 mL/min/1 73 square meters)  •  Stage 2 Mild CKD (GFR = 60-89 mL/min/1 73 square meters)  •  Stage 3A Moderate CKD (GFR = 45-59 mL/min/1 73 square meters)  •  Stage 3B Moderate CKD (GFR = 30-44 mL/min/1 73 square meters)  •  Stage 4 Severe CKD (GFR = 15-29 mL/min/1 73 square meters)  •  Stage 5 End Stage CKD (GFR <15 mL/min/1 73 square meters)  Note: GFR calculation is accurate only with a steady state creatinine    HS Troponin 0hr (reflex protocol) [[id number]]  (Normal) Collected: 06/04/23 1428    Lab Status: Final result Specimen: Blood from Arm, Right Updated: 06/04/23 1505     hs TnI 0hr 9 ng/L     CBC and differential [[id number]]  (Abnormal) Collected: 06/04/23 1428    Lab Status: Final result Specimen: Blood from Arm, Right Updated: 06/04/23 1443     WBC 8 57 Thousand/uL      RBC 3 66 Million/uL      Hemoglobin 11 6 g/dL      Hematocrit 36 7 %       fL      MCH 31 7 pg      MCHC 31 6 g/dL      RDW 14 6 %      MPV 10 6 fL      Platelets 963 Thousands/uL      nRBC 0 /100 WBCs      Neutrophils Relative 76 %      Immat GRANS % 1 %      Lymphocytes Relative 15 %      Monocytes Relative 7 %      Eosinophils Relative 1 %      Basophils Relative 0 %      Neutrophils Absolute 6 60 Thousands/µL      Immature Grans Absolute 0 08 Thousand/uL      Lymphocytes Absolute 1 24 Thousands/µL      Monocytes Absolute 0 57 Thousand/µL      Eosinophils Absolute 0 06 Thousand/µL      Basophils Absolute 0 02 Thousands/µL                  XR chest 1 view portable    (Results Pending)   XR shoulder 2+ views LEFT    (Results Pending)   XR humerus LEFT    (Results Pending)              Procedures  ECG 12 Lead Documentation Only    Date/Time: 6/4/2023 2:28 PM    Performed by: Jaimee Mckeon DO  Authorized by: Jaimee Mckeon DO    Rate:     ECG rate:  80    ECG rate assessment: normal    Rhythm:     Rhythm: sinus rhythm    Ectopy:     Ectopy: none    QRS:     QRS axis:  Normal    QRS intervals:  Normal  Conduction:     Conduction: normal    ST segments:     ST segments:  Normal  T waves:     T waves: normal               ED Course  ED Course as of 06/04/23 1721   Sun Jun 04, 2023   1713 Delta 2hr hsTnI: 0             HEART Risk Score    Flowsheet Row Most Recent Value   Heart Score Risk Calculator    History 0 Filed at: 06/04/2023 1715   ECG 0 Filed at: 06/04/2023 1715   Age 2 Filed at: 06/04/2023 1715   Risk Factors 2 Filed at: 06/04/2023 1715   Troponin 0 Filed at: 06/04/2023 1715   HEART Score 4 Filed at: 06/04/2023 1715                        SBIRT 22yo+    Flowsheet Row Most Recent Value   Initial Alcohol Screen: US AUDIT-C     1  How often do you have a drink containing alcohol? 0 Filed at: 06/04/2023 1436   2  How many drinks containing alcohol do you have on a typical day you are drinking? 0 Filed at: 06/04/2023 1436   3b  FEMALE Any Age, or MALE 65+: How often do you have 4 or more drinks on one occassion? 0 Filed at: 06/04/2023 1436   Audit-C Score 0 Filed at: 06/04/2023 1436   NISHANT: How many times in the past year have you    Used an illegal drug or used a prescription medication for non-medical reasons? Never Filed at: 06/04/2023 1436                    Medical Decision Making  Patient presenting for left-sided chest pain  No exertional component and no radiation or other symptoms    Patient took nitro however by the time he took it it had already resolved  No recurrence  Cardiac work-up showing troponin x2 negative  EKG negative  Did discuss with patient and patient's son at bedside he has mildly decreased kidney function and recommend follow-up with primary care which they state they would do  Strict return precautions discussed and provided  Amount and/or Complexity of Data Reviewed  Labs: ordered  Decision-making details documented in ED Course  Radiology: ordered  Disposition  Final diagnoses:   Chest pain   Elevated serum creatinine     Time reflects when diagnosis was documented in both MDM as applicable and the Disposition within this note     Time User Action Codes Description Comment    6/4/2023  5:13 PM Leonardo Vintondale Add [R07 9] Chest pain     6/4/2023  5:21 PM Leonardo Vintondale Add [R79 89] Elevated serum creatinine       ED Disposition     ED Disposition   Discharge    Condition   Stable    Date/Time   Sun Jun 4, 2023  5:13 PM    Comment   Adrián Hazard Sr  discharge to home/self care  Follow-up Information     Follow up With Specialties Details Why Contact Info Additional Information    Carolina El MD Internal Medicine   [address] [place] [address]  [phone]       Critical access hospital Emergency Department Emergency Medicine Go to  As needed, If symptoms worsen [address] [facility] [address] [phone] Critical access hospital Emergency Department, [address] [facility] Cassie Cheung, [address]          Patient's Medications   Discharge Prescriptions    No medications on file       No discharge procedures on file      PDMP Review     None          ED Provider  Electronically Signed by           Pablo Us DO  06/04/23 7065

## 2023-07-05 ENCOUNTER — APPOINTMENT (OUTPATIENT)
Dept: LAB | Facility: CLINIC | Age: 88
End: 2023-07-05
Payer: COMMERCIAL

## 2023-07-05 DIAGNOSIS — E22.2 SIADH (SYNDROME OF INAPPROPRIATE ADH PRODUCTION) (HCC): ICD-10-CM

## 2023-07-05 DIAGNOSIS — E87.1 HYPONATREMIA: ICD-10-CM

## 2023-07-05 LAB
ANION GAP SERPL CALCULATED.3IONS-SCNC: 7 MMOL/L
BUN SERPL-MCNC: 19 MG/DL (ref 5–25)
CALCIUM SERPL-MCNC: 9 MG/DL (ref 8.3–10.1)
CHLORIDE SERPL-SCNC: 108 MMOL/L (ref 96–108)
CO2 SERPL-SCNC: 23 MMOL/L (ref 21–32)
CREAT SERPL-MCNC: 1.11 MG/DL (ref 0.6–1.3)
GFR SERPL CREATININE-BSD FRML MDRD: 54 ML/MIN/1.73SQ M
GLUCOSE P FAST SERPL-MCNC: 152 MG/DL (ref 65–99)
POTASSIUM SERPL-SCNC: 4.3 MMOL/L (ref 3.5–5.3)
SODIUM SERPL-SCNC: 138 MMOL/L (ref 135–147)

## 2023-07-05 PROCEDURE — 80048 BASIC METABOLIC PNL TOTAL CA: CPT

## 2023-07-05 PROCEDURE — 36415 COLL VENOUS BLD VENIPUNCTURE: CPT

## 2023-07-10 ENCOUNTER — APPOINTMENT (OUTPATIENT)
Dept: LAB | Facility: CLINIC | Age: 88
End: 2023-07-10
Payer: COMMERCIAL

## 2023-07-10 DIAGNOSIS — I25.10 ASCVD (ARTERIOSCLEROTIC CARDIOVASCULAR DISEASE): ICD-10-CM

## 2023-07-10 DIAGNOSIS — Z79.01 LONG TERM (CURRENT) USE OF ANTICOAGULANTS: ICD-10-CM

## 2023-07-10 PROCEDURE — 80061 LIPID PANEL: CPT

## 2023-07-10 PROCEDURE — 36415 COLL VENOUS BLD VENIPUNCTURE: CPT

## 2023-07-10 PROCEDURE — 80053 COMPREHEN METABOLIC PANEL: CPT

## 2023-07-10 PROCEDURE — 85027 COMPLETE CBC AUTOMATED: CPT

## 2023-07-11 LAB
ALBUMIN SERPL BCP-MCNC: 3.6 G/DL (ref 3.5–5)
ALP SERPL-CCNC: 74 U/L (ref 46–116)
ALT SERPL W P-5'-P-CCNC: 18 U/L (ref 12–78)
ANION GAP SERPL CALCULATED.3IONS-SCNC: 8 MMOL/L
AST SERPL W P-5'-P-CCNC: 26 U/L (ref 5–45)
BILIRUB SERPL-MCNC: 0.57 MG/DL (ref 0.2–1)
BUN SERPL-MCNC: 25 MG/DL (ref 5–25)
CALCIUM SERPL-MCNC: 9 MG/DL (ref 8.3–10.1)
CHLORIDE SERPL-SCNC: 109 MMOL/L (ref 96–108)
CHOLEST SERPL-MCNC: 138 MG/DL
CO2 SERPL-SCNC: 24 MMOL/L (ref 21–32)
CREAT SERPL-MCNC: 1.06 MG/DL (ref 0.6–1.3)
ERYTHROCYTE [DISTWIDTH] IN BLOOD BY AUTOMATED COUNT: 14.9 % (ref 11.6–15.1)
GFR SERPL CREATININE-BSD FRML MDRD: 58 ML/MIN/1.73SQ M
GLUCOSE P FAST SERPL-MCNC: 101 MG/DL (ref 65–99)
HCT VFR BLD AUTO: 37.3 % (ref 36.5–49.3)
HDLC SERPL-MCNC: 36 MG/DL
HGB BLD-MCNC: 11.7 G/DL (ref 12–17)
LDLC SERPL CALC-MCNC: 61 MG/DL (ref 0–100)
MCH RBC QN AUTO: 31 PG (ref 26.8–34.3)
MCHC RBC AUTO-ENTMCNC: 31.4 G/DL (ref 31.4–37.4)
MCV RBC AUTO: 99 FL (ref 82–98)
NONHDLC SERPL-MCNC: 102 MG/DL
PLATELET # BLD AUTO: 200 THOUSANDS/UL (ref 149–390)
PMV BLD AUTO: 11.8 FL (ref 8.9–12.7)
POTASSIUM SERPL-SCNC: 4.2 MMOL/L (ref 3.5–5.3)
PROT SERPL-MCNC: 6.9 G/DL (ref 6.4–8.4)
RBC # BLD AUTO: 3.77 MILLION/UL (ref 3.88–5.62)
SODIUM SERPL-SCNC: 141 MMOL/L (ref 135–147)
TRIGL SERPL-MCNC: 205 MG/DL
WBC # BLD AUTO: 9.77 THOUSAND/UL (ref 4.31–10.16)

## 2023-07-22 ENCOUNTER — HOSPITAL ENCOUNTER (EMERGENCY)
Facility: HOSPITAL | Age: 88
Discharge: HOME | End: 2023-07-22
Attending: STUDENT IN AN ORGANIZED HEALTH CARE EDUCATION/TRAINING PROGRAM | Admitting: STUDENT IN AN ORGANIZED HEALTH CARE EDUCATION/TRAINING PROGRAM
Payer: COMMERCIAL

## 2023-07-22 ENCOUNTER — APPOINTMENT (EMERGENCY)
Dept: RADIOLOGY | Facility: HOSPITAL | Age: 88
End: 2023-07-22
Payer: COMMERCIAL

## 2023-07-22 VITALS
TEMPERATURE: 98.1 F | BODY MASS INDEX: 22.5 KG/M2 | RESPIRATION RATE: 18 BRPM | HEIGHT: 66 IN | HEART RATE: 78 BPM | SYSTOLIC BLOOD PRESSURE: 188 MMHG | WEIGHT: 140 LBS | DIASTOLIC BLOOD PRESSURE: 85 MMHG | OXYGEN SATURATION: 96 %

## 2023-07-22 DIAGNOSIS — I10 HYPERTENSION, UNSPECIFIED TYPE: Primary | ICD-10-CM

## 2023-07-22 LAB
ANION GAP SERPL CALCULATED.3IONS-SCNC: 9 MMOL/L
BASOPHILS # BLD AUTO: 0.03 THOUSANDS/ÂΜL (ref 0–0.1)
BASOPHILS NFR BLD AUTO: 0 % (ref 0–1)
BUN SERPL-MCNC: 19 MG/DL (ref 5–25)
CALCIUM SERPL-MCNC: 8.9 MG/DL (ref 8.4–10.2)
CARDIAC TROPONIN I PNL SERPL HS: 8 NG/L
CHLORIDE SERPL-SCNC: 104 MMOL/L (ref 96–108)
CO2 SERPL-SCNC: 23 MMOL/L (ref 21–32)
CREAT SERPL-MCNC: 0.91 MG/DL (ref 0.6–1.3)
EOSINOPHIL # BLD AUTO: 0.09 THOUSAND/ÂΜL (ref 0–0.61)
EOSINOPHIL NFR BLD AUTO: 1 % (ref 0–6)
ERYTHROCYTE [DISTWIDTH] IN BLOOD BY AUTOMATED COUNT: 15 % (ref 11.6–15.1)
GFR SERPL CREATININE-BSD FRML MDRD: 69 ML/MIN/1.73SQ M
GLUCOSE SERPL-MCNC: 98 MG/DL (ref 65–140)
HCT VFR BLD AUTO: 38.3 % (ref 36.5–49.3)
HGB BLD-MCNC: 12.3 G/DL (ref 12–17)
IMM GRANULOCYTES # BLD AUTO: 0.08 THOUSAND/UL (ref 0–0.2)
IMM GRANULOCYTES NFR BLD AUTO: 1 % (ref 0–2)
LYMPHOCYTES # BLD AUTO: 1.33 THOUSANDS/ÂΜL (ref 0.6–4.47)
LYMPHOCYTES NFR BLD AUTO: 16 % (ref 14–44)
MCH RBC QN AUTO: 31.6 PG (ref 26.8–34.3)
MCHC RBC AUTO-ENTMCNC: 32.1 G/DL (ref 31.4–37.4)
MCV RBC AUTO: 99 FL (ref 82–98)
MONOCYTES # BLD AUTO: 0.54 THOUSAND/ÂΜL (ref 0.17–1.22)
MONOCYTES NFR BLD AUTO: 6 % (ref 4–12)
NEUTROPHILS # BLD AUTO: 6.31 THOUSANDS/ÂΜL (ref 1.85–7.62)
NEUTS SEG NFR BLD AUTO: 76 % (ref 43–75)
NRBC BLD AUTO-RTO: 0 /100 WBCS
PLATELET # BLD AUTO: 165 THOUSANDS/UL (ref 149–390)
PMV BLD AUTO: 11.4 FL (ref 8.9–12.7)
POTASSIUM SERPL-SCNC: 3.9 MMOL/L (ref 3.5–5.3)
RBC # BLD AUTO: 3.89 MILLION/UL (ref 3.88–5.62)
SODIUM SERPL-SCNC: 136 MMOL/L (ref 135–147)
WBC # BLD AUTO: 8.38 THOUSAND/UL (ref 4.31–10.16)

## 2023-07-22 PROCEDURE — 93005 ELECTROCARDIOGRAM TRACING: CPT

## 2023-07-22 PROCEDURE — 80048 BASIC METABOLIC PNL TOTAL CA: CPT | Performed by: STUDENT IN AN ORGANIZED HEALTH CARE EDUCATION/TRAINING PROGRAM

## 2023-07-22 PROCEDURE — 84484 ASSAY OF TROPONIN QUANT: CPT | Performed by: STUDENT IN AN ORGANIZED HEALTH CARE EDUCATION/TRAINING PROGRAM

## 2023-07-22 PROCEDURE — 85025 COMPLETE CBC W/AUTO DIFF WBC: CPT | Performed by: STUDENT IN AN ORGANIZED HEALTH CARE EDUCATION/TRAINING PROGRAM

## 2023-07-22 PROCEDURE — 99284 EMERGENCY DEPT VISIT MOD MDM: CPT

## 2023-07-22 PROCEDURE — 36415 COLL VENOUS BLD VENIPUNCTURE: CPT | Performed by: STUDENT IN AN ORGANIZED HEALTH CARE EDUCATION/TRAINING PROGRAM

## 2023-07-22 PROCEDURE — 71045 X-RAY EXAM CHEST 1 VIEW: CPT

## 2023-07-22 RX ORDER — SODIUM CHLORIDE 9 MG/ML
3 INJECTION INTRAVENOUS
Status: DISCONTINUED | OUTPATIENT
Start: 2023-07-22 | End: 2023-07-22 | Stop reason: HOSPADM

## 2023-07-22 NOTE — ED PROVIDER NOTES
History  Chief Complaint   Patient presents with   • Hypertension     HPI this a pleasant 80-year-old male who presents to the emergency department with asymptomatic hypertension. Patient dates he has a history of hypertension but is not currently on any medications. History supplemented by son who is at bedside who states blood pressures at home are 210/110. States he was concerned has brought the patient here to the emergency department for evaluation. Prior to Admission Medications   Prescriptions Last Dose Informant Patient Reported? Taking?    ALLOPURINOL PO  Self Yes No   Sig: Take 150 mg by mouth daily 150mg   Cholecalciferol 50 MCG (2000 UT) CAPS  Self Yes No   Sig: Take by mouth   Choline Fenofibrate (FENOFIBRIC ACID) 45 MG CPDR  Self Yes No   Sig: Take by mouth   Multiple Vitamins tablet  Self Yes No   Sig: Take by mouth   aspirin 81 MG tablet  Self Yes No   Sig: Take 1 tablet by mouth see administration instructions Take 1 tablet Mon-Wed & Fri   atorvastatin (LIPITOR) 20 mg tablet  Self Yes No   Sig: Take 1 tablet by mouth daily   furosemide (LASIX) 20 mg tablet  Self No No   Sig: Take 1 tablet (20 mg total) by mouth 3 (three) times a week   losartan (COZAAR) 50 mg tablet  Self Yes No   Sig: Take 50 mg by mouth daily   nitroglycerin (Nitrostat) 0.4 mg SL tablet  Self No No   Sig: Place 1 tablet (0.4 mg total) under the tongue every 5 (five) minutes as needed for chest pain   Patient not taking: Reported on 3/8/2023   potassium chloride (KLOR-CON) 20 mEq packet  Self Yes No   Sig: Take 20 mEq by mouth as needed Take 1 tablet when taking Furosemide      Facility-Administered Medications: None       Past Medical History:   Diagnosis Date   • Arthritis    • Cataract    • Coronary artery disease    • Coronary atherosclerosis of native coronary artery    • Diverticulitis of colon    • Essential hypertension, benign    • Hyperlipidemia    • Hypertension    • Peptic ulcer    • Renal disorder        Past Surgical History:   Procedure Laterality Date   • CATARACT EXTRACTION Right     Left eye 5/2021   • CORONARY STENT PLACEMENT     • SKIN BIOPSY     • TONSILLECTOMY         Family History   Problem Relation Age of Onset   • Heart attack Brother    • Heart disease Mother    • Cancer Father      I have reviewed and agree with the history as documented. E-Cigarette/Vaping   • E-Cigarette Use Never User      E-Cigarette/Vaping Substances   • Nicotine No    • THC No    • CBD No    • Flavoring No    • Other No    • Unknown No      Social History     Tobacco Use   • Smoking status: Former     Types: Pipe   • Smokeless tobacco: Never   Vaping Use   • Vaping Use: Never used   Substance Use Topics   • Alcohol use: Not Currently   • Drug use: No       Review of Systems   Constitutional: Negative for activity change, appetite change, chills, fatigue and fever. HENT: Negative for congestion, dental problem, drooling, ear discharge, ear pain, facial swelling, postnasal drip, rhinorrhea and sinus pain. Eyes: Negative for photophobia, pain, discharge and itching. Respiratory: Negative for apnea, cough, chest tightness and shortness of breath. Cardiovascular: Negative for chest pain and leg swelling. Gastrointestinal: Negative for abdominal distention, abdominal pain, anal bleeding, constipation, diarrhea and nausea. Endocrine: Negative for cold intolerance, heat intolerance and polydipsia. Genitourinary: Negative for difficulty urinating. Musculoskeletal: Negative for arthralgias, gait problem, joint swelling and myalgias. Skin: Negative for color change and pallor. Allergic/Immunologic: Negative for immunocompromised state. Neurological: Negative for dizziness, seizures, facial asymmetry, weakness, light-headedness, numbness and headaches. Psychiatric/Behavioral: Negative for agitation, behavioral problems, confusion, decreased concentration and dysphoric mood.    All other systems reviewed and are negative. Physical Exam  Physical Exam  Constitutional:       Appearance: He is well-developed. HENT:      Head: Normocephalic. Eyes:      Pupils: Pupils are equal, round, and reactive to light. Cardiovascular:      Rate and Rhythm: Normal rate and regular rhythm. Pulmonary:      Effort: Pulmonary effort is normal.      Breath sounds: Normal breath sounds. Abdominal:      General: Bowel sounds are normal.      Palpations: Abdomen is soft. Musculoskeletal:         General: Normal range of motion. Cervical back: Normal range of motion and neck supple. Skin:     General: Skin is warm.          Vital Signs  ED Triage Vitals [07/22/23 1300]   Temperature Pulse Respirations Blood Pressure SpO2   98.1 °F (36.7 °C) 78 18 (!) 188/85 96 %      Temp Source Heart Rate Source Patient Position - Orthostatic VS BP Location FiO2 (%)   Tympanic Monitor Lying Right arm --      Pain Score       No Pain           Vitals:    07/22/23 1300   BP: (!) 188/85   Pulse: 78   Patient Position - Orthostatic VS: Lying         Visual Acuity      ED Medications  Medications   sodium chloride (PF) 0.9 % injection 3 mL (has no administration in time range)       Diagnostic Studies  Results Reviewed     Procedure Component Value Units Date/Time    HS Troponin I 2hr [[id number]]     Lab Status: No result Specimen: Blood     HS Troponin 0hr (reflex protocol) [[id number]]  (Normal) Collected: 07/22/23 1325    Lab Status: Final result Specimen: Blood from Arm, Right Updated: 07/22/23 1358     hs TnI 0hr 8 ng/L     Basic metabolic panel [[id number]] Collected: 07/22/23 1325    Lab Status: Final result Specimen: Blood from Arm, Right Updated: 07/22/23 1350     Sodium 136 mmol/L      Potassium 3.9 mmol/L      Chloride 104 mmol/L      CO2 23 mmol/L      ANION GAP 9 mmol/L      BUN 19 mg/dL      Creatinine 0.91 mg/dL      Glucose 98 mg/dL      Calcium 8.9 mg/dL      eGFR 69 ml/min/1.73sq m     Narrative:      National Kidney Disease Foundation guidelines for Chronic Kidney Disease (CKD):   •  Stage 1 with normal or high GFR (GFR > 90 mL/min/1.73 square meters)  •  Stage 2 Mild CKD (GFR = 60-89 mL/min/1.73 square meters)  •  Stage 3A Moderate CKD (GFR = 45-59 mL/min/1.73 square meters)  •  Stage 3B Moderate CKD (GFR = 30-44 mL/min/1.73 square meters)  •  Stage 4 Severe CKD (GFR = 15-29 mL/min/1.73 square meters)  •  Stage 5 End Stage CKD (GFR <15 mL/min/1.73 square meters)  Note: GFR calculation is accurate only with a steady state creatinine    CBC and differential [[id number]]  (Abnormal) Collected: 07/22/23 1325    Lab Status: Final result Specimen: Blood from Arm, Right Updated: 07/22/23 1333     WBC 8.38 Thousand/uL      RBC 3.89 Million/uL      Hemoglobin 12.3 g/dL      Hematocrit 38.3 %      MCV 99 fL      MCH 31.6 pg      MCHC 32.1 g/dL      RDW 15.0 %      MPV 11.4 fL      Platelets 462 Thousands/uL      nRBC 0 /100 WBCs      Neutrophils Relative 76 %      Immat GRANS % 1 %      Lymphocytes Relative 16 %      Monocytes Relative 6 %      Eosinophils Relative 1 %      Basophils Relative 0 %      Neutrophils Absolute 6.31 Thousands/µL      Immature Grans Absolute 0.08 Thousand/uL      Lymphocytes Absolute 1.33 Thousands/µL      Monocytes Absolute 0.54 Thousand/µL      Eosinophils Absolute 0.09 Thousand/µL      Basophils Absolute 0.03 Thousands/µL                  X-ray chest 1 view portable    (Results Pending)              Procedures  ECG 12 Lead Documentation Only    Date/Time: 7/22/2023 1:12 PM    Performed by: Yang Greco MD  Authorized by: Yang Greco MD    ECG reviewed by me, the ED Provider: yes    Patient location:  ED  Previous ECG:     Previous ECG:  Unavailable    Comparison to cardiac monitor: Yes    Interpretation:     Interpretation: abnormal    Rate:     ECG rate:  81  Rhythm:     Rhythm: sinus rhythm    Conduction:     Conduction: abnormal      Abnormal conduction: LAFB    ST segments:     ST segments: Non-specific             ED Course  ED Course as of 07/22/23 1452   Sat Jul 22, 2023   1349 X-rays interpreted by myself shows a mild widening of the mediastinum however no other acute intrathoracic abnormalities we will follow-up formal read. 1352 Creatinine: 0.91   1359 hs TnI 0hr: 8   1430 Patient reexamined resting ultimately still without any symptoms. Most recent blood pressure 188/86. Patient requesting to stay for another half hour or so to see if the blood pressure comes down. [phone] with patient and patient's son at bedside. Patient continues to deny any problems. Blood pressure still elevated 190/76. However patient and patient's son are both requesting to be discharged home with follow-up to their PCP. Strict return precautions were discussed. HEART Risk Score    Flowsheet Row Most Recent Value   Heart Score Risk Calculator    History 0 Filed at: 07/22/2023 1400   ECG 0 Filed at: 07/22/2023 1400   Age 2 Filed at: 07/22/2023 1400   Risk Factors 1 Filed at: 07/22/2023 1400   Troponin 0 Filed at: 07/22/2023 1400   HEART Score 3 Filed at: 07/22/2023 1400                        SBIRT 22yo+    Flowsheet Row Most Recent Value   Initial Alcohol Screen: US AUDIT-C     1. How often do you have a drink containing alcohol? 0 Filed at: 07/22/2023 1330   2. How many drinks containing alcohol do you have on a typical day you are drinking? 0 Filed at: 07/22/2023 1330   3a. Male UNDER 65: How often do you have five or more drinks on one occasion? 0 Filed at: 07/22/2023 1330   3b. FEMALE Any Age, or MALE 65+: How often do you have 4 or more drinks on one occassion? 0 Filed at: 07/22/2023 1330   Audit-C Score 0 Filed at: 07/22/2023 1330   NISHANT: How many times in the past year have you. .. Used an illegal drug or used a prescription medication for non-medical reasons?  Never Filed at: 07/22/2023 1330                    Medical Decision Making  Hypertension, unspecified type: acute illness or injury  Amount and/or Complexity of Data Reviewed  Independent Historian: caregiver  Labs: ordered. Decision-making details documented in ED Course. Radiology: ordered. Decision-making details documented in ED Course. ECG/medicine tests:  Decision-making details documented in ED Course. Risk  Prescription drug management. Disposition  Final diagnoses:   Hypertension, unspecified type     Time reflects when diagnosis was documented in both MDM as applicable and the Disposition within this note     Time User Action Codes Description Comment    7/22/2023  2:52 PM Elijah, [address] Hypertension, unspecified type       ED Disposition     ED Disposition   Discharge    Condition   Stable    Date/Time   Sat Jul 22, 2023  2:51 PM    Comment   Anna Faster Sr. discharge to home/self care. Follow-up Information     Follow up With Specialties Details Why Contact Info    Nilo Marmolejo MD Internal Medicine   [address] [place] [address]  [phone]            Patient's Medications   Discharge Prescriptions    No medications on file       No discharge procedures on file.     PDMP Review     None          ED Provider  Electronically Signed by           Sintia Vazquez MD  07/22/23 2096

## 2023-07-23 LAB
ATRIAL RATE: 72 BPM
ATRIAL RATE: 81 BPM
P AXIS: 53 DEGREES
P AXIS: 99 DEGREES
PR INTERVAL: 180 MS
PR INTERVAL: 192 MS
QRS AXIS: -45 DEGREES
QRS AXIS: -51 DEGREES
QRSD INTERVAL: 88 MS
QRSD INTERVAL: 96 MS
QT INTERVAL: 376 MS
QT INTERVAL: 410 MS
QTC INTERVAL: 436 MS
QTC INTERVAL: 448 MS
T WAVE AXIS: 46 DEGREES
T WAVE AXIS: 85 DEGREES
VENTRICULAR RATE: 72 BPM
VENTRICULAR RATE: 81 BPM

## 2023-07-23 PROCEDURE — 93010 ELECTROCARDIOGRAM REPORT: CPT | Performed by: INTERNAL MEDICINE

## 2023-07-24 ENCOUNTER — OFFICE VISIT (OUTPATIENT)
Dept: NEPHROLOGY | Facility: CLINIC | Age: 88
End: 2023-07-24
Payer: COMMERCIAL

## 2023-07-24 VITALS
HEART RATE: 104 BPM | BODY MASS INDEX: 24.91 KG/M2 | HEIGHT: 66 IN | WEIGHT: 155 LBS | OXYGEN SATURATION: 99 % | SYSTOLIC BLOOD PRESSURE: 122 MMHG | DIASTOLIC BLOOD PRESSURE: 60 MMHG

## 2023-07-24 DIAGNOSIS — I10 ESSENTIAL HYPERTENSION: Chronic | ICD-10-CM

## 2023-07-24 DIAGNOSIS — E87.1 HYPONATREMIA: ICD-10-CM

## 2023-07-24 DIAGNOSIS — Z01.00 DIABETIC EYE EXAM (HCC): ICD-10-CM

## 2023-07-24 DIAGNOSIS — R60.0 BILATERAL LEG EDEMA: ICD-10-CM

## 2023-07-24 DIAGNOSIS — I25.10 CORONARY ARTERY DISEASE INVOLVING NATIVE CORONARY ARTERY OF NATIVE HEART WITHOUT ANGINA PECTORIS: Chronic | ICD-10-CM

## 2023-07-24 DIAGNOSIS — E11.9 DIABETIC EYE EXAM (HCC): ICD-10-CM

## 2023-07-24 DIAGNOSIS — N18.31 CHRONIC KIDNEY DISEASE, STAGE 3A (HCC): Primary | ICD-10-CM

## 2023-07-24 DIAGNOSIS — E87.6 HYPOKALEMIA: ICD-10-CM

## 2023-07-24 DIAGNOSIS — E22.2 SIADH (SYNDROME OF INAPPROPRIATE ADH PRODUCTION) (HCC): ICD-10-CM

## 2023-07-24 DIAGNOSIS — M1A.09X0 CHRONIC GOUT OF MULTIPLE SITES, UNSPECIFIED CAUSE: ICD-10-CM

## 2023-07-24 DIAGNOSIS — H35.3120 NONEXUDATIVE AGE-RELATED MACULAR DEGENERATION OF LEFT EYE, UNSPECIFIED STAGE: ICD-10-CM

## 2023-07-24 DIAGNOSIS — I16.0 HYPERTENSIVE URGENCY: ICD-10-CM

## 2023-07-24 PROCEDURE — 99214 OFFICE O/P EST MOD 30 MIN: CPT | Performed by: INTERNAL MEDICINE

## 2023-07-24 RX ORDER — FUROSEMIDE 20 MG/1
20 TABLET ORAL DAILY PRN
Qty: 30 TABLET | Refills: 4
Start: 2023-07-24

## 2023-07-24 RX ORDER — NITROGLYCERIN 0.4 MG/1
0.4 TABLET SUBLINGUAL
Start: 2023-07-24

## 2023-07-24 NOTE — PROGRESS NOTES
[facility] of [address] [place] [address]    Name: Wilfrido Otto Sr. YOB: 1924      Assessment/Plan:    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[facility])  Serum sodium level very well controlled at this time. Continue with fluid restriction and furosemide 20 mg as needed. May increase furosemide use if lower extremity edema worsens. Continue to focus on low-sodium diet. Chronic kidney disease, stage 3a [facility])  Lab Results   Component Value Date    EGFR 69 07/22/2023    EGFR 58 07/10/2023    EGFR 54 07/05/2023    CREATININE 0.91 07/22/2023    CREATININE 1.06 07/10/2023    CREATININE 1.11 07/05/2023     Kidney function very good at this time, creatinine lower than usual, continue diuresis as needed and avoid potential nephrotoxins. Hyponatremia  Serum sodium level noted to be 136 mmol/L. Continue with low-sodium diet and fluid restriction. Chronic gout of multiple sites  Patient allopurinol dosing now at 150 mg daily, continue to avoid high purine foods. Diabetic eye exam ([address])  Continue diabetic eye exams with ophthalmologist.    Bilateral leg edema  Although the patient has bilateral lower extremity edema, it is controlled. Continue to encourage low-sodium diet, furosemide as needed. Hypokalemia  Continue potassium supplement at this time, despite the patient not taking furosemide, and on the potassium supplements his serum potassium level is only 3.9 mmol/L. Continue with current daily dosing. Problem List Items Addressed This Visit        Endocrine    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[address])     Serum sodium level very well controlled at this time. Continue with fluid restriction and furosemide 20 mg as needed. May increase furosemide use if lower extremity edema worsens. Continue to focus on low-sodium diet.          Relevant Orders    Comprehensive metabolic panel    CBC and differential    Albumin / creatinine urine ratio    Urinalysis with microscopic    Magnesium    Phosphorus    Osmolality, urine    PTH, intact       Cardiovascular and Mediastinum    Coronary artery disease involving native coronary artery of native heart without angina pectoris (Chronic)    Relevant Medications    nitroglycerin (Nitrostat) 0.4 mg SL tablet    Essential hypertension (Chronic)    Relevant Medications    furosemide (LASIX) 20 mg tablet    Hypertensive urgency    Relevant Medications    furosemide (LASIX) 20 mg tablet       Musculoskeletal and Integument    Chronic gout of multiple sites     Patient allopurinol dosing now at 150 mg daily, continue to avoid high purine foods. Genitourinary    Chronic kidney disease, stage 3a [facility]) - Primary     Lab Results   Component Value Date    EGFR 69 07/22/2023    EGFR 58 07/10/2023    EGFR 54 07/05/2023    CREATININE 0.91 07/22/2023    CREATININE 1.06 07/10/2023    CREATININE 1.11 07/05/2023     Kidney function very good at this time, creatinine lower than usual, continue diuresis as needed and avoid potential nephrotoxins. Relevant Medications    furosemide (LASIX) 20 mg tablet    Other Relevant Orders    Comprehensive metabolic panel    CBC and differential    Albumin / creatinine urine ratio    Urinalysis with microscopic    Magnesium    Phosphorus    Osmolality, urine    PTH, intact       Other    Bilateral leg edema     Although the patient has bilateral lower extremity edema, it is controlled. Continue to encourage low-sodium diet, furosemide as needed. Relevant Medications    furosemide (LASIX) 20 mg tablet    Hyponatremia     Serum sodium level noted to be 136 mmol/L. Continue with low-sodium diet and fluid restriction.          Diabetic eye exam ([address])     Continue diabetic eye exams with ophthalmologist.         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 Continue with current daily dosing. Patient stable for the renal standpoint, we will see him back for regular appointment in approximately 7-8 months. Blood work to be done prior to that appointment. Subjective:      Patient ID: Paradise Hyatt Sr. is a 80 y.o. male. Patient presents for follow-up appoint. Reviewed the patient's labs in detail, most recent creatinine 0.91 mg/dL, there were no significant electrolyte abnormalities noted. He is taking all medications as prescribed with no specific side effects at this time. Patient bilateral lower extremity edema appears to be controlled, currently on furosemide as well as potassium supplement. Hypertension  This is a chronic problem. The current episode started more than 1 year ago. The problem is unchanged. The problem is controlled. Associated symptoms include peripheral edema. Pertinent negatives include no chest pain, orthopnea or shortness of breath. There are no associated agents to hypertension. Risk factors for coronary artery disease include male gender. Past treatments include lifestyle changes and diuretics. There are no compliance problems. Hypertensive end-organ damage includes kidney disease and heart failure. Identifiable causes of hypertension include chronic renal disease. The following portions of the patient's history were reviewed and updated as appropriate: allergies, current medications, past family history, past medical history, past social history, past surgical history and problem list.    Review of Systems   Respiratory: Negative for shortness of breath. Cardiovascular: Negative for chest pain and orthopnea. All other systems reviewed and are negative. Social History     Socioeconomic History   • Marital status:       Spouse name: None   • Number of children: None   • Years of education: None   • Highest education level: None   Occupational History   • None   Tobacco Use   • Smoking status: Former     Types: Pipe   • Smokeless tobacco: Never   Vaping Use   • Vaping Use: Never used   Substance and Sexual Activity   • Alcohol use: Not Currently   • Drug use: No   • Sexual activity: Not Currently   Other Topics Concern   • None   Social History Narrative   • None     Social Determinants of Health     Financial Resource Strain: Not on file   Food Insecurity: No Food Insecurity (8/19/2022)    Hunger Vital Sign    • Worried About Running Out of Food in the Last Year: Never true    • Ran Out of Food in the Last Year: Never true   Transportation Needs: No Transportation Needs (8/19/2022)    PRAPARE - Transportation    • Lack of Transportation (Medical): No    • Lack of Transportation (Non-Medical):  No   Physical Activity: Not on file   Stress: Not on file   Social Connections: Not on file   Intimate Partner Violence: Not on file   Housing Stability: Low Risk  (8/19/2022)    Housing Stability Vital Sign    • Unable to Pay for Housing in the Last Year: No    • Number of Places Lived in the Last Year: 1    • Unstable Housing in the Last Year: No     Past Medical History:   Diagnosis Date   • Arthritis    • Cataract    • Coronary artery disease    • Coronary atherosclerosis of native coronary artery    • Diverticulitis of colon    • Essential hypertension, benign    • Hyperlipidemia    • Hypertension    • Peptic ulcer    • Renal disorder      Past Surgical History:   Procedure Laterality Date   • CATARACT EXTRACTION Right     Left eye 5/2021   • CORONARY STENT PLACEMENT     • SKIN BIOPSY     • TONSILLECTOMY         Current Outpatient Medications:   •  ALLOPURINOL PO, Take 150 mg by mouth daily 150mg, Disp: , Rfl:   •  aspirin 81 MG tablet, Take 1 tablet by mouth see administration instructions Take 1 tablet Mon-Wed & Fri, Disp: , Rfl:   •  atorvastatin (LIPITOR) 20 mg tablet, Take 1 tablet by mouth daily, Disp: , Rfl: •  Cholecalciferol 50 MCG (2000 UT) CAPS, Take by mouth, Disp: , Rfl:   •  Choline Fenofibrate (FENOFIBRIC ACID) 45 MG CPDR, Take by mouth, Disp: , Rfl:   •  furosemide (LASIX) 20 mg tablet, Take 1 tablet (20 mg total) by mouth daily as needed (swelling), Disp: 30 tablet, Rfl: 4  •  Multiple Vitamins tablet, Take by mouth, Disp: , Rfl:   •  nitroglycerin (Nitrostat) 0.4 mg SL tablet, Place 1 tablet (0.4 mg total) under the tongue every 5 (five) minutes as needed for chest pain prn, Disp: , Rfl:   •  potassium chloride (KLOR-CON) 20 mEq packet, Take 20 mEq by mouth as needed Take 1 tablet when taking Furosemide, Disp: , Rfl:     Lab Results   Component Value Date    SODIUM 136 07/22/2023    K 3.9 07/22/2023     07/22/2023    CO2 23 07/22/2023    AGAP 9 07/22/2023    BUN 19 07/22/2023    CREATININE 0.91 07/22/2023    GLUC 98 07/22/2023    GLUF 101 (H) 07/10/2023    CALCIUM 8.9 07/22/2023    AST 26 07/10/2023    ALT 18 07/10/2023    ALKPHOS 74 07/10/2023    TP 6.9 07/10/2023    TBILI 0.57 07/10/2023    EGFR 69 07/22/2023     Lab Results   Component Value Date    WBC 8.38 07/22/2023    HGB 12.3 07/22/2023    HCT 38.3 07/22/2023    MCV 99 (H) 07/22/2023     07/22/2023     Lab Results   Component Value Date    CHOLESTEROL 138 07/10/2023    CHOLESTEROL 145 06/22/2022    CHOLESTEROL 124 04/19/2021     Lab Results   Component Value Date    HDL 36 (L) 07/10/2023    HDL 42 06/22/2022    HDL 42 04/19/2021     Lab Results   Component Value Date    LDLCALC 61 07/10/2023    LDLCALC 81 06/22/2022    LDLCALC 68 04/19/2021     Lab Results   Component Value Date    TRIG 205 (H) 07/10/2023    TRIG 110 06/22/2022    TRIG 70 04/19/2021     No results found for: "CHOLHDL"  Lab Results   Component Value Date    [id number] 3.763 08/18/2022     Lab Results   Component Value Date    PTH 31.2 03/18/2021    CALCIUM 8.9 07/22/2023    PHOS 1.7 (L) 09/18/2020     No results found for: "SPEP", "UPEP"  No results found for: "Cop[place] Smaller", "[id number]"        Objective:      /60 (BP Location: Left arm, Patient Position: Sitting, Cuff Size: Standard)   Pulse 104   Ht 5' 6" (1.676 m)   Wt 70.3 kg (155 lb)   SpO2 99%   BMI 25.02 kg/m²          Physical Exam  Vitals reviewed. Constitutional:       General: He is not in acute distress. Appearance: He is well-developed. HENT:      Head: Normocephalic and atraumatic. Eyes:      Conjunctiva/sclera: Conjunctivae normal.   Cardiovascular:      Rate and Rhythm: Normal rate and regular rhythm. Pulmonary:      Effort: Pulmonary effort is normal.      Breath sounds: Normal breath sounds. Abdominal:      Palpations: Abdomen is soft. Musculoskeletal:      Cervical back: Neck supple. Skin:     General: Skin is warm. Findings: No rash. Neurological:      Mental Status: He is alert and oriented to person, place, and time. Cranial Nerves: No cranial nerve deficit.    Psychiatric:         Behavior: Behavior normal.

## 2023-07-24 NOTE — ASSESSMENT & PLAN NOTE
Serum sodium level very well controlled at this time. Continue with fluid restriction and furosemide 20 mg as needed. May increase furosemide use if lower extremity edema worsens. Continue to focus on low-sodium diet.

## 2023-07-24 NOTE — ASSESSMENT & PLAN NOTE
Although the patient has bilateral lower extremity edema, it is controlled. Continue to encourage low-sodium diet, furosemide as needed.

## 2023-07-24 NOTE — ASSESSMENT & PLAN NOTE
Continue potassium supplement at this time, despite the patient not taking furosemide, and on the potassium supplements his serum potassium level is only 3.9 mmol/L. Continue with current daily dosing.

## 2023-07-24 NOTE — ASSESSMENT & PLAN NOTE
Lab Results   Component Value Date    EGFR 69 07/22/2023    EGFR 58 07/10/2023    EGFR 54 07/05/2023    CREATININE 0.91 07/22/2023    CREATININE 1.06 07/10/2023    CREATININE 1.11 07/05/2023     Kidney function very good at this time, creatinine lower than usual, continue diuresis as needed and avoid potential nephrotoxins.

## 2024-02-12 ENCOUNTER — APPOINTMENT (OUTPATIENT)
Dept: LAB | Facility: CLINIC | Age: 89
End: 2024-02-12
Payer: COMMERCIAL

## 2024-02-12 DIAGNOSIS — M10.9 GOUT, UNSPECIFIED CAUSE, UNSPECIFIED CHRONICITY, UNSPECIFIED SITE: ICD-10-CM

## 2024-02-12 DIAGNOSIS — N28.9 KIDNEY DISEASE: ICD-10-CM

## 2024-02-12 LAB
ANION GAP SERPL CALCULATED.3IONS-SCNC: 9 MMOL/L
BUN SERPL-MCNC: 21 MG/DL (ref 5–25)
CALCIUM SERPL-MCNC: 9.1 MG/DL (ref 8.4–10.2)
CHLORIDE SERPL-SCNC: 101 MMOL/L (ref 96–108)
CO2 SERPL-SCNC: 27 MMOL/L (ref 21–32)
CREAT SERPL-MCNC: 1 MG/DL (ref 0.6–1.3)
GFR SERPL CREATININE-BSD FRML MDRD: 61 ML/MIN/1.73SQ M
GLUCOSE SERPL-MCNC: 112 MG/DL (ref 65–140)
POTASSIUM SERPL-SCNC: 4.5 MMOL/L (ref 3.5–5.3)
SODIUM SERPL-SCNC: 137 MMOL/L (ref 135–147)
URATE SERPL-MCNC: 3.6 MG/DL (ref 3.5–8.5)

## 2024-02-12 PROCEDURE — 80048 BASIC METABOLIC PNL TOTAL CA: CPT

## 2024-02-12 PROCEDURE — 36415 COLL VENOUS BLD VENIPUNCTURE: CPT

## 2024-02-12 PROCEDURE — 84550 ASSAY OF BLOOD/URIC ACID: CPT

## 2024-03-10 ENCOUNTER — APPOINTMENT (EMERGENCY)
Dept: RADIOLOGY | Facility: HOSPITAL | Age: 89
End: 2024-03-10
Payer: COMMERCIAL

## 2024-03-10 ENCOUNTER — HOSPITAL ENCOUNTER (EMERGENCY)
Facility: HOSPITAL | Age: 89
Discharge: HOME | End: 2024-03-10
Attending: EMERGENCY MEDICINE
Payer: COMMERCIAL

## 2024-03-10 VITALS
RESPIRATION RATE: 17 BRPM | BODY MASS INDEX: 24.91 KG/M2 | HEIGHT: 66 IN | HEART RATE: 76 BPM | DIASTOLIC BLOOD PRESSURE: 70 MMHG | SYSTOLIC BLOOD PRESSURE: 166 MMHG | WEIGHT: 154.98 LBS | TEMPERATURE: 98.2 F | OXYGEN SATURATION: 95 %

## 2024-03-10 DIAGNOSIS — R07.89 ATYPICAL CHEST PAIN: Primary | ICD-10-CM

## 2024-03-10 LAB
2HR DELTA HS TROPONIN: 0 NG/L
ALBUMIN SERPL BCP-MCNC: 3.9 G/DL (ref 3.5–5)
ALP SERPL-CCNC: 81 U/L (ref 34–104)
ALT SERPL W P-5'-P-CCNC: 16 U/L (ref 7–52)
ANION GAP SERPL CALCULATED.3IONS-SCNC: 10 MMOL/L
AST SERPL W P-5'-P-CCNC: 25 U/L (ref 13–39)
BASOPHILS # BLD AUTO: 0.03 THOUSANDS/ÂΜL (ref 0–0.1)
BASOPHILS NFR BLD AUTO: 0 % (ref 0–1)
BILIRUB SERPL-MCNC: 0.49 MG/DL (ref 0.2–1)
BUN SERPL-MCNC: 23 MG/DL (ref 5–25)
CALCIUM SERPL-MCNC: 8.9 MG/DL (ref 8.4–10.2)
CARDIAC TROPONIN I PNL SERPL HS: 9 NG/L
CARDIAC TROPONIN I PNL SERPL HS: 9 NG/L
CHLORIDE SERPL-SCNC: 102 MMOL/L (ref 96–108)
CO2 SERPL-SCNC: 26 MMOL/L (ref 21–32)
CREAT SERPL-MCNC: 1.02 MG/DL (ref 0.6–1.3)
EOSINOPHIL # BLD AUTO: 0.02 THOUSAND/ÂΜL (ref 0–0.61)
EOSINOPHIL NFR BLD AUTO: 0 % (ref 0–6)
ERYTHROCYTE [DISTWIDTH] IN BLOOD BY AUTOMATED COUNT: 14.6 % (ref 11.6–15.1)
GFR SERPL CREATININE-BSD FRML MDRD: 60 ML/MIN/1.73SQ M
GLUCOSE SERPL-MCNC: 133 MG/DL (ref 65–140)
HCT VFR BLD AUTO: 40.5 % (ref 36.5–49.3)
HGB BLD-MCNC: 13.1 G/DL (ref 12–17)
IMM GRANULOCYTES # BLD AUTO: 0.03 THOUSAND/UL (ref 0–0.2)
IMM GRANULOCYTES NFR BLD AUTO: 0 % (ref 0–2)
LYMPHOCYTES # BLD AUTO: 0.85 THOUSANDS/ÂΜL (ref 0.6–4.47)
LYMPHOCYTES NFR BLD AUTO: 11 % (ref 14–44)
MCH RBC QN AUTO: 31.6 PG (ref 26.8–34.3)
MCHC RBC AUTO-ENTMCNC: 32.3 G/DL (ref 31.4–37.4)
MCV RBC AUTO: 98 FL (ref 82–98)
MONOCYTES # BLD AUTO: 0.5 THOUSAND/ÂΜL (ref 0.17–1.22)
MONOCYTES NFR BLD AUTO: 7 % (ref 4–12)
NEUTROPHILS # BLD AUTO: 6.28 THOUSANDS/ÂΜL (ref 1.85–7.62)
NEUTS SEG NFR BLD AUTO: 82 % (ref 43–75)
NRBC BLD AUTO-RTO: 0 /100 WBCS
PLATELET # BLD AUTO: 172 THOUSANDS/UL (ref 149–390)
PMV BLD AUTO: 10 FL (ref 8.9–12.7)
POTASSIUM SERPL-SCNC: 3.9 MMOL/L (ref 3.5–5.3)
PROT SERPL-MCNC: 6.7 G/DL (ref 6.4–8.4)
RBC # BLD AUTO: 4.14 MILLION/UL (ref 3.88–5.62)
SODIUM SERPL-SCNC: 138 MMOL/L (ref 135–147)
WBC # BLD AUTO: 7.71 THOUSAND/UL (ref 4.31–10.16)

## 2024-03-10 PROCEDURE — 93005 ELECTROCARDIOGRAM TRACING: CPT

## 2024-03-10 PROCEDURE — 84484 ASSAY OF TROPONIN QUANT: CPT

## 2024-03-10 PROCEDURE — 99285 EMERGENCY DEPT VISIT HI MDM: CPT

## 2024-03-10 PROCEDURE — 71045 X-RAY EXAM CHEST 1 VIEW: CPT

## 2024-03-10 PROCEDURE — 85025 COMPLETE CBC W/AUTO DIFF WBC: CPT

## 2024-03-10 PROCEDURE — 36415 COLL VENOUS BLD VENIPUNCTURE: CPT

## 2024-03-10 PROCEDURE — 99284 EMERGENCY DEPT VISIT MOD MDM: CPT | Performed by: EMERGENCY MEDICINE

## 2024-03-10 PROCEDURE — 80053 COMPREHEN METABOLIC PANEL: CPT

## 2024-03-10 NOTE — DISCHARGE INSTRUCTIONS
Return to the ER for any new, concerning, or worsening issues.  Recommend you contact your family doctor for repeat evaluation this coming week.  If your symptoms continue to worsen or you get pain that is not resolving on its own, or other concerning issues, please call 911 or come back to the hospital.    Otherwise, follow-up with your family doctor or cardiologist this week for recheck.

## 2024-03-10 NOTE — ED PROVIDER NOTES
History  Chief Complaint   Patient presents with    Chest Pain     Started this am around 7Am     89-year-old male presents emergency department complaining of chest pain.  Patient has a history of high cholesterol, coronary artery disease, as well as hypertension hyperlipidemia.  The patient noted he had 3-4 episodes of tightness in his chest which lasted 1 to 2 seconds this morning.  The patient denies any complaints since then.  Due to his history, he went to the urgent care where he was promptly sent to the ER.          Prior to Admission Medications   Prescriptions Last Dose Informant Patient Reported? Taking?   ALLOPURINOL PO  Self Yes No   Sig: Take 150 mg by mouth daily 150mg   Cholecalciferol 50 MCG (2000 UT) CAPS  Self Yes No   Sig: Take by mouth   Choline Fenofibrate (FENOFIBRIC ACID) 45 MG CPDR  Self Yes No   Sig: Take by mouth   Multiple Vitamins tablet  Self Yes No   Sig: Take by mouth   aspirin 81 MG tablet  Self Yes No   Sig: Take 1 tablet by mouth see administration instructions Take 1 tablet Mon-Wed & Fri   atorvastatin (LIPITOR) 20 mg tablet  Self Yes No   Sig: Take 1 tablet by mouth daily   furosemide (LASIX) 20 mg tablet   No No   Sig: Take 1 tablet (20 mg total) by mouth daily as needed (swelling)   nitroglycerin (Nitrostat) 0.4 mg SL tablet   No No   Sig: Place 1 tablet (0.4 mg total) under the tongue every 5 (five) minutes as needed for chest pain prn   potassium chloride (KLOR-CON) 20 mEq packet  Self Yes No   Sig: Take 20 mEq by mouth as needed Take 1 tablet when taking Furosemide      Facility-Administered Medications: None       Past Medical History:   Diagnosis Date    Arthritis     Cataract     Coronary artery disease     Coronary atherosclerosis of native coronary artery     Diverticulitis of colon     Essential hypertension, benign     Hyperlipidemia     Hypertension     Peptic ulcer     Renal disorder        Past Surgical History:   Procedure Laterality Date    CATARACT EXTRACTION  Right     Left eye 5/2021    CORONARY STENT PLACEMENT      SKIN BIOPSY      TONSILLECTOMY         Family History   Problem Relation Age of Onset    Heart attack Brother     Heart disease Mother     Cancer Father      I have reviewed and agree with the history as documented.    E-Cigarette/Vaping    E-Cigarette Use Never User      E-Cigarette/Vaping Substances    Nicotine No     THC No     CBD No     Flavoring No     Other No     Unknown No      Social History     Tobacco Use    Smoking status: Former     Types: Pipe    Smokeless tobacco: Never   Vaping Use    Vaping status: Never Used   Substance Use Topics    Alcohol use: Not Currently    Drug use: No       Review of Systems   Constitutional:  Positive for activity change. Negative for chills and fever.   HENT:  Negative for ear pain and sore throat.    Eyes:  Negative for pain and visual disturbance.   Respiratory:  Negative for cough and shortness of breath.    Cardiovascular:  Positive for chest pain. Negative for palpitations.   Gastrointestinal:  Negative for abdominal pain and vomiting.   Genitourinary:  Negative for dysuria and hematuria.   Musculoskeletal:  Negative for arthralgias and back pain.   Skin:  Negative for color change and rash.   Neurological:  Negative for seizures and syncope.   All other systems reviewed and are negative.      Physical Exam  Physical Exam  Constitutional:       General: He is not in acute distress.     Appearance: Normal appearance. He is normal weight. He is not ill-appearing.   HENT:      Head: Normocephalic and atraumatic.      Right Ear: External ear normal.      Left Ear: External ear normal.      Nose: Nose normal.      Mouth/Throat:      Mouth: Mucous membranes are moist.   Eyes:      Conjunctiva/sclera: Conjunctivae normal.   Cardiovascular:      Rate and Rhythm: Normal rate and regular rhythm.      Pulses: Normal pulses.      Heart sounds: Normal heart sounds.   Pulmonary:      Effort: Pulmonary effort is normal.       Breath sounds: Normal breath sounds. No decreased breath sounds, wheezing, rhonchi or rales.      Comments: Coarse breath sounds but otherwise clear.  Chest:      Chest wall: No mass or deformity.   Abdominal:      General: Abdomen is flat. There is no distension.      Palpations: Abdomen is soft. There is no mass.   Musculoskeletal:         General: No swelling, tenderness or deformity. Normal range of motion.      Cervical back: Normal range of motion.   Skin:     General: Skin is warm and dry.      Capillary Refill: Capillary refill takes 2 to 3 seconds.      Coloration: Skin is not pale.   Neurological:      General: No focal deficit present.      Mental Status: He is alert and oriented to person, place, and time. Mental status is at baseline.   Psychiatric:         Mood and Affect: Mood normal.         Vital Signs  ED Triage Vitals   Temperature Pulse Respirations Blood Pressure SpO2   03/10/24 1608 03/10/24 1607 03/10/24 1607 03/10/24 1607 03/10/24 1607   98.2 °F (36.8 °C) 84 18 (!) 185/81 96 %      Temp Source Heart Rate Source Patient Position - Orthostatic VS BP Location FiO2 (%)   03/10/24 1608 03/10/24 1607 03/10/24 1607 03/10/24 1607 --   Oral Monitor Sitting Left arm       Pain Score       03/10/24 1607       No Pain           Vitals:    03/10/24 1700 03/10/24 1730 03/10/24 1800 03/10/24 1830   BP: 152/65 146/65 162/67 166/70   Pulse: 80 78 76 76   Patient Position - Orthostatic VS: Sitting Sitting Sitting Sitting         Visual Acuity      ED Medications  Medications - No data to display    Diagnostic Studies  Results Reviewed       Procedure Component Value Units Date/Time    HS Troponin I 2hr [[id number]]  (Normal) Collected: 03/10/24 1800    Lab Status: Final result Specimen: Blood from Arm, Right Updated: 03/10/24 1834     hs TnI 2hr 9 ng/L      Delta 2hr hsTnI 0 ng/L     Comprehensive metabolic panel [[id number]] Collected: 03/10/24 1613    Lab Status: Final result Specimen: Blood from Arm, Right  Updated: 03/10/24 1647     Sodium 138 mmol/L      Potassium 3.9 mmol/L      Chloride 102 mmol/L      CO2 26 mmol/L      ANION GAP 10 mmol/L      BUN 23 mg/dL      Creatinine 1.02 mg/dL      Glucose 133 mg/dL      Calcium 8.9 mg/dL      AST 25 U/L      ALT 16 U/L      Alkaline Phosphatase 81 U/L      Total Protein 6.7 g/dL      Albumin 3.9 g/dL      Total Bilirubin 0.49 mg/dL      eGFR 60 ml/min/1.73sq m     Narrative:      National Kid[employer] Foundation guidelines for Chronic Kidney Disease (CKD):     Stage 1 with normal or high GFR (GFR > 90 mL/min/1.73 square meters)    Stage 2 Mild CKD (GFR = 60-89 mL/min/1.73 square meters)    Stage 3A Moderate CKD (GFR = 45-59 mL/min/1.73 square meters)    Stage 3B Moderate CKD (GFR = 30-44 mL/min/1.73 square meters)    Stage 4 Severe CKD (GFR = 15-29 mL/min/1.73 square meters)    Stage 5 End Stage CKD (GFR <15 mL/min/1.73 square meters)  Note: GFR calculation is accurate only with a steady state creatinine    HS Troponin 0hr (reflex protocol) [[id number]]  (Normal) Collected: 03/10/24 1613    Lab Status: Final result Specimen: Blood from Arm, Right Updated: 03/10/24 1643     hs TnI 0hr 9 ng/L     CBC and differential [[id number]]  (Abnormal) Collected: 03/10/24 1613    Lab Status: Final result Specimen: Blood from Arm, Right Updated: 03/10/24 1620     WBC 7.71 Thousand/uL      RBC 4.14 Million/uL      Hemoglobin 13.1 g/dL      Hematocrit 40.5 %      MCV 98 fL      MCH 31.6 pg      MCHC 32.3 g/dL      RDW 14.6 %      MPV 10.0 fL      Platelets 172 Thousands/uL      nRBC 0 /100 WBCs      Neutrophils Relative 82 %      Immat GRANS % 0 %      Lymphocytes Relative 11 %      Monocytes Relative 7 %      Eosinophils Relative 0 %      Basophils Relative 0 %      Neutrophils Absolute 6.28 Thousands/µL      Immature Grans Absolute 0.03 Thousand/uL      Lymphocytes Absolute 0.85 Thousands/µL      Monocytes Absolute 0.50 Thousand/µL      Eosinophils Absolute 0.02 Thousand/µL       Basophils Absolute 0.03 Thousands/µL                    XR chest 1 view portable   ED Interpretation by Otis Harden Jr., DO (03/10 1717)   No acute pulmonary pathology.                 Procedures  ECG 12 Lead Documentation Only    Date/Time: 3/10/2024 4:16 PM    Performed by: Otis Harden Jr., DO  Authorized by: Otis Harden Jr., DO    ECG reviewed by me, the ED Provider: yes    Patient location:  ED  Comments:      EKG is a sinus rhythm at 85 beats a minute with a left axis deviation.  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  There is no other definitive acute ST or T wave changes noted the exception of some mild downsloping high lateral ST segment changes.  These ST changes however were seen on previous tracings.           ED Course  ED Course as of 03/10/24 2056   Sun Mar 10, 2024   1740 On reevaluation, patient still without any chest pain.  Will hold for second troponin.             HEART Risk Score      Flowsheet Row Most Recent Value   Heart Score Risk Calculator    History 0 Filed at: 03/10/2024 1835   ECG 0 Filed at: 03/10/2024 1835   Age 2 Filed at: 03/10/2024 1835   Risk Factors 2 Filed at: 03/10/2024 1835   Troponin 0 Filed at: 03/10/2024 1835   HEART Score 4 Filed at: 03/10/2024 1835                          SBIRT 20yo+      Flowsheet Row Most Recent Value   Initial Alcohol Screen: US AUDIT-C     1. How often do you have a drink containing alcohol? 0 Filed at: 03/10/2024 1602   2. How many drinks containing alcohol do you have on a typical day you are drinking?  0 Filed at: 03/10/2024 1602   3a. Male UNDER 65: How often do you have five or more drinks on one occasion? 0 Filed at: 03/10/2024 1602   3b. FEMALE Any Age, or MALE 65+: How often do you have 4 or more drinks on one occassion? 0 Filed at: 03/10/2024 1602   Audit-C Score 0 Filed at: 03/10/2024 1602   NISHANT: How many times in the past year have you...    Used an illegal drug or used a prescription medication for  non-medical reasons? Never Filed at: 03/10/2024 1602                      Medical Decision Making  89-year-old male presents emergency department complaining of chest discomfort which she described as mild pressure which occurred for just a few seconds followed by complete resolution.  He stated he had 3-5 episodes early this morning and has not had any since.  The patient denied any diaphoresis or acute shortness of breath.  The patient has not had any chest symptoms since early this morning.  The patient had 2 troponins done in the emergency department without any significant change.  The patient also had EKGs which are nonacute.  The patient is doing good spirits and without discomfort.  Vital signs are stable.  Patient may be discharged to home with instructions to follow-up with his family doctor this coming week or possibly his cardiologist.  The patient should also return if symptoms become more consistent.  Patient discharged.    Amount and/or Complexity of Data Reviewed  Radiology: independent interpretation performed.             Disposition  Final diagnoses:   Atypical chest pain     Time reflects when diagnosis was documented in both MDM as applicable and the Disposition within this note       Time User Action Codes Description Comment    3/10/2024  6:35 PM Otis Harden Add [R07.89] Atypical chest pain           ED Disposition       ED Disposition   Discharge    Condition   Stable    Date/Time   Sun Mar 10, 2024 1835    Comment   Prem Mar Sr. discharge to home/self care.                   Follow-up Information       Follow up With Specialties Details Why Contact Info    Leonard Schreiber MD Internal Medicine On 3/14/2024  [address] [place] [address] [place] [address]  [phone]              Discharge Medication List as of 3/10/2024  6:36 PM        CONTINUE these medications which have NOT CHANGED    Details   ALLOPURINOL PO Take 150 mg by mouth daily 150mg, Historical Med      aspirin 81 MG tablet  Take 1 tablet by mouth see administration instructions Take 1 tablet Mon-Wed & Fri, Historical Med      atorvastatin (LIPITOR) 20 mg tablet Take 1 tablet by mouth daily, Historical Med      Cholecalciferol 50 MCG (2000 UT) CAPS Take by mouth, Historical Med      Choline Fenofibrate (FENOFIBRIC ACID) 45 MG CPDR Take by mouth, Starting Mon 9/30/2013, Historical Med      furosemide (LASIX) 20 mg tablet Take 1 tablet (20 mg total) by mouth daily as needed (swelling), Starting Mon 7/24/2023, No Print      Multiple Vitamins tablet Take by mouth, Historical Med      nitroglycerin (Nitrostat) 0.4 mg SL tablet Place 1 tablet (0.4 mg total) under the tongue every 5 (five) minutes as needed for chest pain prn, Starting Mon 7/24/2023, No Print      potassium chloride (KLOR-CON) 20 mEq packet Take 20 mEq by mouth as needed Take 1 tablet when taking Furosemide, Historical Med             No discharge procedures on file.    PDMP Review       None            ED Provider  Electronically Signed by             Otis Harden Jr.,   03/10/24 2056

## 2024-03-11 LAB
ATRIAL RATE: 79 BPM
ATRIAL RATE: 85 BPM
P AXIS: 60 DEGREES
P AXIS: 63 DEGREES
PR INTERVAL: 186 MS
PR INTERVAL: 192 MS
QRS AXIS: -49 DEGREES
QRS AXIS: -52 DEGREES
QRSD INTERVAL: 94 MS
QRSD INTERVAL: 96 MS
QT INTERVAL: 384 MS
QT INTERVAL: 402 MS
QTC INTERVAL: 456 MS
QTC INTERVAL: 460 MS
T WAVE AXIS: 116 DEGREES
T WAVE AXIS: 95 DEGREES
VENTRICULAR RATE: 79 BPM
VENTRICULAR RATE: 85 BPM

## 2024-03-11 PROCEDURE — 93010 ELECTROCARDIOGRAM REPORT: CPT | Performed by: INTERNAL MEDICINE

## 2024-04-08 ENCOUNTER — TELEPHONE (OUTPATIENT)
Dept: NEPHROLOGY | Facility: CLINIC | Age: 89
End: 2024-04-08

## 2024-04-08 ENCOUNTER — APPOINTMENT (OUTPATIENT)
Dept: LAB | Facility: CLINIC | Age: 89
End: 2024-04-08
Payer: COMMERCIAL

## 2024-04-08 DIAGNOSIS — N18.31 CHRONIC KIDNEY DISEASE, STAGE 3A (HCC): ICD-10-CM

## 2024-04-08 DIAGNOSIS — E22.2 SIADH (SYNDROME OF INAPPROPRIATE ADH PRODUCTION) (HCC): ICD-10-CM

## 2024-04-08 LAB
ALBUMIN SERPL BCP-MCNC: 4 G/DL (ref 3.5–5)
ALP SERPL-CCNC: 74 U/L (ref 34–104)
ALT SERPL W P-5'-P-CCNC: 17 U/L (ref 7–52)
ANION GAP SERPL CALCULATED.3IONS-SCNC: 12 MMOL/L (ref 4–13)
AST SERPL W P-5'-P-CCNC: 27 U/L (ref 13–39)
BACTERIA UR QL AUTO: ABNORMAL /HPF
BASOPHILS # BLD AUTO: 0.04 THOUSANDS/ÂΜL (ref 0–0.1)
BASOPHILS NFR BLD AUTO: 1 % (ref 0–1)
BILIRUB SERPL-MCNC: 0.58 MG/DL (ref 0.2–1)
BILIRUB UR QL STRIP: NEGATIVE
BUN SERPL-MCNC: 30 MG/DL (ref 5–25)
CALCIUM SERPL-MCNC: 9.1 MG/DL (ref 8.4–10.2)
CHLORIDE SERPL-SCNC: 102 MMOL/L (ref 96–108)
CLARITY UR: CLEAR
CO2 SERPL-SCNC: 24 MMOL/L (ref 21–32)
COLOR UR: ABNORMAL
CREAT SERPL-MCNC: 1.1 MG/DL (ref 0.6–1.3)
CREAT UR-MCNC: 89.4 MG/DL
EOSINOPHIL # BLD AUTO: 0.12 THOUSAND/ÂΜL (ref 0–0.61)
EOSINOPHIL NFR BLD AUTO: 2 % (ref 0–6)
ERYTHROCYTE [DISTWIDTH] IN BLOOD BY AUTOMATED COUNT: 15 % (ref 11.6–15.1)
GFR SERPL CREATININE-BSD FRML MDRD: 55 ML/MIN/1.73SQ M
GLUCOSE P FAST SERPL-MCNC: 104 MG/DL (ref 65–99)
GLUCOSE UR STRIP-MCNC: NEGATIVE MG/DL
HCT VFR BLD AUTO: 40.6 % (ref 36.5–49.3)
HGB BLD-MCNC: 13.1 G/DL (ref 12–17)
HGB UR QL STRIP.AUTO: NEGATIVE
IMM GRANULOCYTES # BLD AUTO: 0.07 THOUSAND/UL (ref 0–0.2)
IMM GRANULOCYTES NFR BLD AUTO: 1 % (ref 0–2)
KETONES UR STRIP-MCNC: NEGATIVE MG/DL
LEUKOCYTE ESTERASE UR QL STRIP: NEGATIVE
LYMPHOCYTES # BLD AUTO: 1.56 THOUSANDS/ÂΜL (ref 0.6–4.47)
LYMPHOCYTES NFR BLD AUTO: 19 % (ref 14–44)
MAGNESIUM SERPL-MCNC: 1.9 MG/DL (ref 1.9–2.7)
MCH RBC QN AUTO: 32 PG (ref 26.8–34.3)
MCHC RBC AUTO-ENTMCNC: 32.3 G/DL (ref 31.4–37.4)
MCV RBC AUTO: 99 FL (ref 82–98)
MICROALBUMIN UR-MCNC: 24.5 MG/L
MICROALBUMIN/CREAT 24H UR: 27 MG/G CREATININE (ref 0–30)
MONOCYTES # BLD AUTO: 0.6 THOUSAND/ÂΜL (ref 0.17–1.22)
MONOCYTES NFR BLD AUTO: 7 % (ref 4–12)
NEUTROPHILS # BLD AUTO: 5.68 THOUSANDS/ÂΜL (ref 1.85–7.62)
NEUTS SEG NFR BLD AUTO: 70 % (ref 43–75)
NITRITE UR QL STRIP: NEGATIVE
NON-SQ EPI CELLS URNS QL MICRO: ABNORMAL /HPF
NRBC BLD AUTO-RTO: 0 /100 WBCS
OSMOLALITY UR: 674 MMOL/KG
PH UR STRIP.AUTO: 6.5 [PH]
PHOSPHATE SERPL-MCNC: 2.9 MG/DL (ref 2.3–4.1)
PLATELET # BLD AUTO: 184 THOUSANDS/UL (ref 149–390)
PMV BLD AUTO: 11.1 FL (ref 8.9–12.7)
POTASSIUM SERPL-SCNC: 3.9 MMOL/L (ref 3.5–5.3)
PROT SERPL-MCNC: 6.8 G/DL (ref 6.4–8.4)
PROT UR STRIP-MCNC: ABNORMAL MG/DL
PTH-INTACT SERPL-MCNC: 29.9 PG/ML (ref 12–88)
RBC # BLD AUTO: 4.1 MILLION/UL (ref 3.88–5.62)
RBC #/AREA URNS AUTO: ABNORMAL /HPF
SODIUM SERPL-SCNC: 138 MMOL/L (ref 135–147)
SP GR UR STRIP.AUTO: 1.02 (ref 1–1.03)
UROBILINOGEN UR STRIP-ACNC: <2 MG/DL
WBC # BLD AUTO: 8.07 THOUSAND/UL (ref 4.31–10.16)
WBC #/AREA URNS AUTO: ABNORMAL /HPF

## 2024-04-08 PROCEDURE — 83970 ASSAY OF PARATHORMONE: CPT

## 2024-04-08 PROCEDURE — 85025 COMPLETE CBC W/AUTO DIFF WBC: CPT

## 2024-04-08 PROCEDURE — 84100 ASSAY OF PHOSPHORUS: CPT

## 2024-04-08 PROCEDURE — 82043 UR ALBUMIN QUANTITATIVE: CPT

## 2024-04-08 PROCEDURE — 80053 COMPREHEN METABOLIC PANEL: CPT

## 2024-04-08 PROCEDURE — 82570 ASSAY OF URINE CREATININE: CPT

## 2024-04-08 PROCEDURE — 81001 URINALYSIS AUTO W/SCOPE: CPT

## 2024-04-08 PROCEDURE — 83735 ASSAY OF MAGNESIUM: CPT

## 2024-04-08 PROCEDURE — 36415 COLL VENOUS BLD VENIPUNCTURE: CPT

## 2024-04-08 PROCEDURE — 83935 ASSAY OF URINE OSMOLALITY: CPT

## 2024-04-08 NOTE — TELEPHONE ENCOUNTER
I called and left a message for patient's son Prem regarding labs needing to be done prior to appointment.

## 2024-04-16 ENCOUNTER — OFFICE VISIT (OUTPATIENT)
Dept: URGENT CARE | Facility: CLINIC | Age: 89
End: 2024-04-16
Payer: COMMERCIAL

## 2024-04-16 VITALS
WEIGHT: 154 LBS | HEART RATE: 78 BPM | SYSTOLIC BLOOD PRESSURE: 150 MMHG | RESPIRATION RATE: 18 BRPM | DIASTOLIC BLOOD PRESSURE: 86 MMHG | BODY MASS INDEX: 24.75 KG/M2 | OXYGEN SATURATION: 98 % | HEIGHT: 66 IN | TEMPERATURE: 98.3 F

## 2024-04-16 DIAGNOSIS — M25.551 PAIN OF RIGHT HIP: Primary | ICD-10-CM

## 2024-04-16 PROCEDURE — S9083 URGENT CARE CENTER GLOBAL: HCPCS | Performed by: NURSE PRACTITIONER

## 2024-04-16 PROCEDURE — 99213 OFFICE O/P EST LOW 20 MIN: CPT | Performed by: NURSE PRACTITIONER

## 2024-04-16 RX ORDER — GABAPENTIN 100 MG/1
1 CAPSULE ORAL EVERY 12 HOURS
COMMUNITY
Start: 2024-02-12

## 2024-04-16 NOTE — PROGRESS NOTES
[facility]        NAME: Prem Mar Sr. is a 89 y.o. male  : 10/11/1924    MRN: [id number]  DATE: 2024  TIME: 5:53 PM    Assessment and Plan   Pain of right hip [M25.551]  1. Pain of right hip              Patient Instructions       Patient Instructions   Rest and ice. Voltaren gel and Tylenol as needed for pain. If you develop any increased pain, swelling, numbness, tingling, or any new or concerning symptoms please return or proceed to ER. Follow up with pcp in 3-5 days.    Follow up with orthopedics if symptoms persist.    If tests have been performed at [facility], our office will contact you with results if changes need to be made to the care plan discussed with you at the visit.  [facility]    Chief Complaint     Chief Complaint   Patient presents with    Hip Pain     Patient c/o right pain that has been bothering him on and off that past few months but has gotten worse in the past week.         History of Present Illness       Hip Pain   The incident occurred more than 1 week ago. The incident occurred at home. There was no injury mechanism. The pain is present in the right hip. The quality of the pain is described as aching. The pain is at a severity of 0/10. The patient is experiencing no pain. The pain has been Intermittent since onset. Pertinent negatives include no inability to bear weight, loss of motion, loss of sensation, muscle weakness, numbness or tingling. He reports no foreign bodies present. The symptoms are aggravated by weight bearing and movement. He has tried nothing for the symptoms. The treatment provided no relief.     Notes an episode of right hip pain today when he stood up using his walker. States that it quickly resolved. Currently denies any pain. Denies any injury or trauma.    Review of Systems   Review of Systems   Constitutional:  Negative for chills, diaphoresis, fatigue and fever.   Respiratory:  Negative  for cough, chest tightness, shortness of breath, wheezing and stridor.    Cardiovascular:  Negative for chest pain and palpitations.   Genitourinary:  Negative for difficulty urinating.   Musculoskeletal:  Positive for arthralgias. Negative for back pain, gait problem, joint swelling, myalgias, neck pain and neck stiffness.   Skin: Negative.    Neurological:  Negative for tingling, weakness and numbness.         Current Medications       Current Outpatient Medications:     ALLOPURINOL PO, Take 150 mg by mouth daily 150mg, Disp: , Rfl:     aspirin 81 MG tablet, Take 1 tablet by mouth see administration instructions Take 1 tablet Mon-Wed & Fri, Disp: , Rfl:     atorvastatin (LIPITOR) 20 mg tablet, Take 1 tablet by mouth daily, Disp: , Rfl:     Cholecalciferol 50 MCG (2000 UT) CAPS, Take by mouth, Disp: , Rfl:     Choline Fenofibrate (FENOFIBRIC ACID) 45 MG CPDR, Take by mouth, Disp: , Rfl:     gabapentin (NEURONTIN) 100 mg capsule, Take 1 capsule by mouth every 12 (twelve) hours, Disp: , Rfl:     Multiple Vitamins tablet, Take by mouth, Disp: , Rfl:     potassium chloride (KLOR-CON) 20 mEq packet, Take 20 mEq by mouth as needed Take 1 tablet when taking Furosemide, Disp: , Rfl:     furosemide (LASIX) 20 mg tablet, Take 1 tablet (20 mg total) by mouth daily as needed (swelling) (Patient not taking: Reported on 4/16/2024), Disp: 30 tablet, Rfl: 4    nitroglycerin (Nitrostat) 0.4 mg SL tablet, Place 1 tablet (0.4 mg total) under the tongue every 5 (five) minutes as needed for chest pain prn (Patient not taking: Reported on 4/16/2024), Disp: , Rfl:     Current Allergies     Allergies as of 04/16/2024    (No Known Allergies)            The following portions of the patient's history were reviewed and updated as appropriate: allergies, current medications, past family history, past medical history, past social history, past surgical history and problem list.     Past Medical History:   Diagnosis Date    Arthritis      "Cataract     Coronary artery disease     Coronary atherosclerosis of native coronary artery     Diverticulitis of colon     Essential hypertension, benign     Hyperlipidemia     Hypertension     Peptic ulcer     Renal disorder        Past Surgical History:   Procedure Laterality Date    CATARACT EXTRACTION Right     Left eye 5/2021    CORONARY STENT PLACEMENT      SKIN BIOPSY      TONSILLECTOMY         Family History   Problem Relation Age of Onset    Heart attack Brother     Heart disease Mother     Cancer Father          Medications have been verified.        Objective   /86   Pulse 78   Temp 98.3 °F (36.8 °C) (Temporal)   Resp 18   Ht 5' 6\" (1.676 m)   Wt 69.9 kg (154 lb)   SpO2 98%   BMI 24.86 kg/m²   No LMP for male patient.       Physical Exam     Physical Exam  Constitutional:       General: He is not in acute distress.     Appearance: Normal appearance. He is not diaphoretic.   HENT:      Head: Atraumatic.   Cardiovascular:      Rate and Rhythm: Normal rate and regular rhythm.      Heart sounds: Normal heart sounds, S1 normal and S2 normal.   Pulmonary:      Effort: Pulmonary effort is normal.      Breath sounds: Normal breath sounds and air entry.   Musculoskeletal:      Cervical back: Full passive range of motion without pain.      Right hip: Normal. No deformity, lacerations, tenderness, bony tenderness or crepitus. Normal range of motion. Normal strength.      Left hip: Normal.   Skin:     General: Skin is warm and dry.      Capillary Refill: Capillary refill takes less than 2 seconds.   Neurological:      Mental Status: He is alert.                   "

## 2024-04-16 NOTE — PATIENT INSTRUCTIONS
Rest and ice. Voltaren gel and Tylenol as needed for pain. If you develop any increased pain, swelling, numbness, tingling, or any new or concerning symptoms please return or proceed to ER. Follow up with pcp in 3-5 days.    Follow up with orthopedics if symptoms persist.

## 2024-04-17 ENCOUNTER — OFFICE VISIT (OUTPATIENT)
Dept: NEPHROLOGY | Facility: CLINIC | Age: 89
End: 2024-04-17
Payer: COMMERCIAL

## 2024-04-17 VITALS
BODY MASS INDEX: 25.88 KG/M2 | SYSTOLIC BLOOD PRESSURE: 128 MMHG | HEIGHT: 66 IN | DIASTOLIC BLOOD PRESSURE: 60 MMHG | WEIGHT: 161 LBS | HEART RATE: 81 BPM | OXYGEN SATURATION: 96 %

## 2024-04-17 DIAGNOSIS — N18.31 CHRONIC KIDNEY DISEASE, STAGE 3A (HCC): Primary | ICD-10-CM

## 2024-04-17 PROCEDURE — 99213 OFFICE O/P EST LOW 20 MIN: CPT

## 2024-04-17 RX ORDER — ALLOPURINOL 300 MG/1
TABLET ORAL
COMMUNITY
Start: 2024-01-21

## 2024-04-17 NOTE — PATIENT INSTRUCTIONS
It was nice seeing you today. Your kidney function is stable. Please follow up with us in 12 months. I have ordered lab work for you to get done before then. In the meantime, please avoid NSAIDs and have a healthy diet and exercise. Please restrict fluid intake to 40 ounces per day. Continue to eat protein in your diet.    injectedTotal Volume (mL) 1  1cc hand injection MAPCA in straight AP/LAT

## 2024-04-17 NOTE — PROGRESS NOTES
OFFICE FOLLOW UP - Nephrology   Prem Mar Sr. 89 y.o. male MRN: [id number]         Interim HPI:   I had the pleasure of seeing Prem Mar Sr. today in the renal clinic for the continued management of CKD stage 3a. Patient was last seen on 7/24/23 with Dr Valencia and was stable from renal standpoint. Since the last visit, there has been no ER visits or hospitilizations. Patient has no complaints at this time and is feeling well. Patient denies any chest pain, shortness of breath or swelling. The last blood work was done on 4/8/24  which we have reviewed together.        ASSESSMENT and PLAN:  Prem was seen today for follow-up.    Diagnoses and all orders for this visit:    Chronic kidney disease, stage 3a (HCC)  -     Basic metabolic panel; Future  -     CBC; Future  -     Magnesium; Future  -     Phosphorus; Future  -     Urinalysis with microscopic; Future  -     Vitamin D 25 hydroxy; Future        Chronic kidney disease stage 3a:  Etiology: Likely due to hypertension and age-related nephron loss  Baseline creatinine 0.8-1.0 mg/dL  Current creatinine 1.10 mg/dL  Avoid NSAIDs    Hypertension:  Current /60  Currently on no medications, blood pressure well controlled   Home BP monitoring recommended. BP log reviewed and he averages 110-120 SBP and 50-70 DBP    SIADH  Serum sodium is appropriate at this time. Continue with 40 ounce fluid restriction. Encourage patient to increase solute intake to increase urinary output. May take lasix 20 mg as needed for worsening leg swelling     Chronic gout  Continue current allopurinol dosing, educated patient on low purine diet     Hypokalemia  Takes potassium chloride 10 meq daily, potassium levels stable    Lower extremity edema  Take lasix as needed for worsening leg swelling, educated on low salt diet      Patient Instructions   It was nice seeing you today. Your kidney function is stable. Please follow up with us in 12 months. I have ordered lab work for  you to get done before then. In the meantime, please avoid NSAIDs and have a healthy diet and exercise. Please restrict fluid intake to 40 ounces per day. Continue to eat protein in your diet.           ROS:   Review of Systems   Constitutional:  Negative for chills and fever.   HENT:  Negative for rhinorrhea and sore throat.    Respiratory:  Negative for cough and shortness of breath.    Cardiovascular:  Negative for chest pain and palpitations.   Gastrointestinal:  Negative for abdominal pain and nausea.   Genitourinary:  Negative for dysuria and hematuria.   Neurological:  Negative for dizziness and headaches.        Allergies: Patient has no known allergies.    Medications:   Current Outpatient Medications:     allopurinol (ZYLOPRIM) 300 mg tablet, , Disp: , Rfl:     aspirin 81 MG tablet, Take 1 tablet by mouth see administration instructions Take 1 tablet Mon-Wed & Fri, Disp: , Rfl:     atorvastatin (LIPITOR) 20 mg tablet, Take 1 tablet by mouth daily, Disp: , Rfl:     Cholecalciferol 50 MCG (2000 UT) CAPS, Take by mouth, Disp: , Rfl:     Choline Fenofibrate (FENOFIBRIC ACID) 45 MG CPDR, Take by mouth, Disp: , Rfl:     gabapentin (NEURONTIN) 100 mg capsule, Take 1 capsule by mouth every 12 (twelve) hours, Disp: , Rfl:     Multiple Vitamins tablet, Take by mouth, Disp: , Rfl:     potassium chloride (KLOR-CON) 20 mEq packet, Take 20 mEq by mouth as needed Take 1 tablet when taking Furosemide, Disp: , Rfl:     ALLOPURINOL PO, Take 150 mg by mouth daily 150mg (Patient not taking: Reported on 4/17/2024), Disp: , Rfl:     furosemide (LASIX) 20 mg tablet, Take 1 tablet (20 mg total) by mouth daily as needed (swelling) (Patient not taking: Reported on 4/16/2024), Disp: 30 tablet, Rfl: 4    nitroglycerin (Nitrostat) 0.4 mg SL tablet, Place 1 tablet (0.4 mg total) under the tongue every 5 (five) minutes as needed for chest pain prn (Patient not taking: Reported on 4/16/2024), Disp: , Rfl:     Past Medical History:  "  Diagnosis Date    Arthritis     Cataract     Coronary artery disease     Coronary atherosclerosis of native coronary artery     Diverticulitis of colon     Essential hypertension, benign     Hyperlipidemia     Hypertension     Peptic ulcer     Renal disorder      Past Surgical History:   Procedure Laterality Date    CATARACT EXTRACTION Right     Left eye 2021    CORONARY STENT PLACEMENT      SKIN BIOPSY      TONSILLECTOMY       Family History   Problem Relation Age of Onset    Heart attack Brother     Heart disease Mother     Cancer Father       reports that he has quit smoking. His smoking use included pipe. He has never used smokeless tobacco. He reports that he does not currently use alcohol. He reports that he does not use drugs.      Physical Exam:   Vitals:    24 1500   BP: 128/60   BP Location: Left arm   Patient Position: Sitting   Cuff Size: Standard   Pulse: 81   SpO2: 96%   Weight: 73 kg (161 lb)   Height: 5' 6\" (1.676 m)     Body mass index is 25.99 kg/m².  Physical Exam  Constitutional:       General: He is not in acute distress.  HENT:      Head: Normocephalic and atraumatic.   Cardiovascular:      Rate and Rhythm: Normal rate and regular rhythm.      Pulses: Normal pulses.      Heart sounds: No murmur heard.  Pulmonary:      Effort: Pulmonary effort is normal. No respiratory distress.      Breath sounds: Normal breath sounds.   Abdominal:      General: Bowel sounds are normal.      Palpations: Abdomen is soft.      Tenderness: There is no abdominal tenderness.   Musculoskeletal:      Right lower le+ Edema present.      Left lower le+ Edema present.   Skin:     General: Skin is warm and dry.   Neurological:      General: No focal deficit present.      Mental Status: He is alert.   Psychiatric:         Mood and Affect: Mood normal.         Behavior: Behavior normal.            Lab Results:  Results for orders placed or performed in visit on 24   Comprehensive metabolic panel "   Result Value Ref Range    Sodium 138 135 - 147 mmol/L    Potassium 3.9 3.5 - 5.3 mmol/L    Chloride 102 96 - 108 mmol/L    CO2 24 21 - 32 mmol/L    ANION GAP 12 4 - 13 mmol/L    BUN 30 (H) 5 - 25 mg/dL    Creatinine 1.10 0.60 - 1.30 mg/dL    Glucose, Fasting 104 (H) 65 - 99 mg/dL    Calcium 9.1 8.4 - 10.2 mg/dL    AST 27 13 - 39 U/L    ALT 17 7 - 52 U/L    Alkaline Phosphatase 74 34 - 104 U/L    Total Protein 6.8 6.4 - 8.4 g/dL    Albumin 4.0 3.5 - 5.0 g/dL    Total Bilirubin 0.58 0.20 - 1.00 mg/dL    eGFR 55 ml/min/1.73sq m   CBC and differential   Result Value Ref Range    WBC 8.07 4.31 - 10.16 Thousand/uL    RBC 4.10 3.88 - 5.62 Million/uL    Hemoglobin 13.1 12.0 - 17.0 g/dL    Hematocrit 40.6 36.5 - 49.3 %    MCV 99 (H) 82 - 98 fL    MCH 32.0 26.8 - 34.3 pg    MCHC 32.3 31.4 - 37.4 g/dL    RDW 15.0 11.6 - 15.1 %    MPV 11.1 8.9 - 12.7 fL    Platelets 184 149 - 390 Thousands/uL    nRBC 0 /100 WBCs    Segmented % 70 43 - 75 %    Immature Grans % 1 0 - 2 %    Lymphocytes % 19 14 - 44 %    Monocytes % 7 4 - 12 %    Eosinophils Relative 2 0 - 6 %    Basophils Relative 1 0 - 1 %    Absolute Neutrophils 5.68 1.85 - 7.62 Thousands/µL    Absolute Immature Grans 0.07 0.00 - 0.20 Thousand/uL    Absolute Lymphocytes 1.56 0.60 - 4.47 Thousands/µL    Absolute Monocytes 0.60 0.17 - 1.22 Thousand/µL    Eosinophils Absolute 0.12 0.00 - 0.61 Thousand/µL    Basophils Absolute 0.04 0.00 - 0.10 Thousands/µL   Albumin / creatinine urine ratio   Result Value Ref Range    Creatinine, Ur 89.4 Reference range not established. mg/dL    Albumin,U,Random 24.5 (H) <20.0 mg/L    Albumin Creat Ratio 27 0 - 30 mg/g creatinine   Urinalysis with microscopic   Result Value Ref Range    Color, UA Light Yellow     Clarity, UA Clear     Specific Gravity, UA 1.018 1.003 - 1.030    pH, UA 6.5 4.5, 5.0, 5.5, 6.0, 6.5, 7.0, 7.5, 8.0    Leukocytes, UA Negative Negative    Nitrite, UA Negative Negative    Protein, UA Trace (A) Negative mg/dl    Glucose,  "UA Negative Negative mg/dl    Ketones, UA Negative Negative mg/dl    Urobilinogen, UA <2.0 <2.0 mg/dl mg/dl    Bilirubin, UA Negative Negative    Occult Blood, UA Negative Negative    RBC, UA 4-10 (A) None Seen, 1-2 /hpf    WBC, UA 2-4 (A) None Seen, 1-2 /hpf    Epithelial Cells Occasional None Seen, Occasional /hpf    Bacteria, UA None Seen None Seen, Occasional /hpf   Magnesium   Result Value Ref Range    Magnesium 1.9 1.9 - 2.7 mg/dL   Phosphorus   Result Value Ref Range    Phosphorus 2.9 2.3 - 4.1 mg/dL   Osmolality, urine   Result Value Ref Range    Osmolality, Ur 674 250 - 900 mmol/KG   PTH, intact   Result Value Ref Range    PTH 29.9 12.0 - 88.0 pg/mL             Invalid input(s): \"ALBUMIN\"        Portions of the record may have been created with voice recognition software. Occasional wrong word or \"sound a like\" substitutions may have occurred due to the inherent limitations of voice recognition software. Read the chart carefully and recognize,    "

## 2024-04-22 ENCOUNTER — OFFICE VISIT (OUTPATIENT)
Dept: OBGYN CLINIC | Facility: CLINIC | Age: 89
End: 2024-04-22
Payer: COMMERCIAL

## 2024-04-22 VITALS
SYSTOLIC BLOOD PRESSURE: 166 MMHG | HEART RATE: 98 BPM | HEIGHT: 66 IN | DIASTOLIC BLOOD PRESSURE: 63 MMHG | BODY MASS INDEX: 25.88 KG/M2 | WEIGHT: 161 LBS

## 2024-04-22 DIAGNOSIS — M54.50 CHRONIC MIDLINE LOW BACK PAIN WITHOUT SCIATICA: Primary | ICD-10-CM

## 2024-04-22 DIAGNOSIS — M25.551 RIGHT HIP PAIN: ICD-10-CM

## 2024-04-22 DIAGNOSIS — G89.29 CHRONIC MIDLINE LOW BACK PAIN WITHOUT SCIATICA: Primary | ICD-10-CM

## 2024-04-22 PROCEDURE — 99203 OFFICE O/P NEW LOW 30 MIN: CPT | Performed by: ORTHOPAEDIC SURGERY

## 2024-04-22 NOTE — PROGRESS NOTES
Assessment/Plan:   Diagnoses and all orders for this visit:    Chronic midline low back pain without sciatica  -     Ambulatory referral to Spine & Pain Management; Future    Right hip pain  -     XR hip/pelv 2-3 vws right if performed; Future         Discussed with patient that his imaging shows degenerative changes of his lumbar spine and hips. 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 A referral was placed for further evaluation and treatment with pain management. Continue weightbearing activities. He will follow-up on an as needed basis. The patient expresses understanding and is in agreement with today's treatment plan.     The patient has degenerative joint disease of his right hip and more pronounced over his lumbar spine.  His pain is mostly coming from his lumbar spine.  Treatment options were discussed.  Would recommend following up with pain management for possible injection therapy.  I look forward to hearing back once this is obtained      Subjective:   Patient ID: Prem Mar Sr.  10/11/1924     HPI  Patient is a 89 y.o. male who presents for initial evaluation of his lower back. He reports chronic pain in the right side and midline of his lower back. He denies groin pain. He denies any recent falls, mechanisms of injury, or trauma. He denies radicular pain, numbness, or tingling. He reports pain with sitting activity which is exacerbated with activity. He reports stiffness.     The following portions of the patient's history were reviewed and updated as appropriate:  Past medical history, past surgical history, Family history, social history, current medications and allergies    Past Medical History:   Diagnosis Date    Arthritis     Cataract     Coronary artery disease     Coronary atherosclerosis of native coronary artery     Diverticulitis of colon     Essential hypertension, benign     Hyperlipidemia     Hypertension     Peptic ulcer     Renal disorder         Past Surgical History:   Procedure Laterality Date    CATARACT EXTRACTION Right     Left eye 5/2021    CORONARY STENT PLACEMENT      SKIN BIOPSY      TONSILLECTOMY         Family History   Problem Relation Age of Onset    Heart attack Brother     Heart disease Mother     Cancer Father        Social History     Socioeconomic History    Marital status:      Spouse name: None    Number of children: None    Years of education: None    Highest education level: None   Occupational History    None   Tobacco Use    Smoking status: Former     Types: Pipe    Smokeless tobacco: Never   Vaping Use    Vaping status: Never Used   Substance and Sexual Activity    Alcohol use: Not Currently    Drug use: No    Sexual activity: Not Currently   Other Topics Concern    None   Social History Narrative    None     Social Determinants of Health     Financial Resource Strain: Not on file   Food Insecurity: No Food Insecurity (8/19/2022)    Hunger Vital Sign     Worried About Running Out of Food in the Last Year: Never true     Ran Out of Food in the Last Year: Never true   Transportation Needs: No Transportation Needs (8/19/2022)    PRAPARE - Transportation     Lack of Transportation (Medical): No     Lack of Transportation (Non-Medical): No   Physical Activity: Not on file   Stress: Not on file   Social Connections: Not on file   Intimate Partner Violence: Not on file   Housing Stability: Low Risk  (8/19/2022)    Housing Stability Vital Sign     Unable to Pay for Housing in the Last Year: No     Number of Places Lived in the Last Year: 1     Unstable Housing in the Last Year: No         Current Outpatient Medications:     allopurinol (ZYLOPRIM) 300 mg tablet, , Disp: , Rfl:     aspirin 81 MG tablet, Take 1 tablet by mouth see administration instructions Take 1 tablet Mon-Wed & Fri, Disp: , Rfl:     atorvastatin (LIPITOR) 20 mg tablet, Take 1 tablet by mouth daily, Disp: , Rfl:     Cholecalciferol 50 MCG (2000 UT) CAPS, Take  "by mouth, Disp: , Rfl:     Choline Fenofibrate (FENOFIBRIC ACID) 45 MG CPDR, Take by mouth, Disp: , Rfl:     gabapentin (NEURONTIN) 100 mg capsule, Take 1 capsule by mouth every 12 (twelve) hours, Disp: , Rfl:     Multiple Vitamins tablet, Take by mouth, Disp: , Rfl:     potassium chloride (KLOR-CON) 20 mEq packet, Take 20 mEq by mouth as needed Take 1 tablet when taking Furosemide, Disp: , Rfl:     ALLOPURINOL PO, Take 150 mg by mouth daily 150mg (Patient not taking: Reported on 4/17/2024), Disp: , Rfl:     furosemide (LASIX) 20 mg tablet, Take 1 tablet (20 mg total) by mouth daily as needed (swelling) (Patient not taking: Reported on 4/16/2024), Disp: 30 tablet, Rfl: 4    nitroglycerin (Nitrostat) 0.4 mg SL tablet, Place 1 tablet (0.4 mg total) under the tongue every 5 (five) minutes as needed for chest pain prn (Patient not taking: Reported on 4/16/2024), Disp: , Rfl:     No Known Allergies    Review of Systems   Constitutional:  Negative for chills and fever.   HENT:  Negative for ear pain and sore throat.    Eyes:  Negative for pain and visual disturbance.   Respiratory:  Negative for cough and shortness of breath.    Cardiovascular:  Negative for chest pain and palpitations.   Gastrointestinal:  Negative for abdominal pain and vomiting.   Genitourinary:  Negative for dysuria and hematuria.   Musculoskeletal:  Negative for arthralgias and back pain.   Skin:  Negative for color change and rash.   Neurological:  Negative for seizures and syncope.   All other systems reviewed and are negative.       Objective:  /63   Pulse 98   Ht 5' 6\" (1.676 m)   Wt 73 kg (161 lb)   BMI 25.99 kg/m²     Ortho Exam  Lumbar Spine  Patient presents with no obvious anatomical deformity  Ambulates with steady gait pattern  Uses no assistive device  No tenderness over lumbar midline  No tenderness over paraspinal musculature  TTP over right SI joint  5/5 Hip Abd and Add  5/5 Knee Flexion and Extension  5/5 Strength Ankle DF " and PF  - Straight Leg Raise Test  - sitting root sign (Slump/Tripod sign)  L2-S1 dermatomes and myotomes intact  2+ TP and DP pulses with brisk capillary refill to the toes  Sural, saphenous, tibial, superficial and deep peroneal motor and sensory distributions intact  Sensation to light touch intact distally      Physical Exam  HENT:      Head: Normocephalic and atraumatic.      Nose: Nose normal.   Eyes:      Conjunctiva/sclera: Conjunctivae normal.   Cardiovascular:      Rate and Rhythm: Normal rate.   Pulmonary:      Effort: Pulmonary effort is normal.   Musculoskeletal:      Cervical back: Neck supple.   Skin:     General: Skin is warm and dry.      Capillary Refill: Capillary refill takes less than 2 seconds.   Neurological:      Mental Status: He is alert and oriented to person, place, and time.   Psychiatric:         Mood and Affect: Mood normal.         Behavior: Behavior normal.          Diagnostic Test Review:    X-Ray of right hip and pelvis obtained on 4/22/2024 were reviewed and demonstrate moderate osteoarthritis of his right hip. Degeneration of visualized lumbar spine.       Procedures   None performed.     Scribe Attestation      I,:  Brea Gallardo am acting as a scribe while in the presence of the attending physician.:       I,:  Rashel Garces, DO personally performed the services described in this documentation    as scribed in my presence.:

## 2024-05-23 ENCOUNTER — APPOINTMENT (OUTPATIENT)
Dept: RADIOLOGY | Facility: CLINIC | Age: 89
End: 2024-05-23
Payer: COMMERCIAL

## 2024-05-23 ENCOUNTER — CONSULT (OUTPATIENT)
Age: 89
End: 2024-05-23
Payer: COMMERCIAL

## 2024-05-23 VITALS
HEART RATE: 86 BPM | BODY MASS INDEX: 24.91 KG/M2 | WEIGHT: 155 LBS | HEIGHT: 66 IN | DIASTOLIC BLOOD PRESSURE: 60 MMHG | SYSTOLIC BLOOD PRESSURE: 144 MMHG

## 2024-05-23 DIAGNOSIS — M54.50 CHRONIC MIDLINE LOW BACK PAIN WITHOUT SCIATICA: ICD-10-CM

## 2024-05-23 DIAGNOSIS — G89.29 CHRONIC MIDLINE LOW BACK PAIN WITHOUT SCIATICA: ICD-10-CM

## 2024-05-23 DIAGNOSIS — G89.4 CHRONIC PAIN SYNDROME: ICD-10-CM

## 2024-05-23 DIAGNOSIS — M47.816 LUMBAR SPONDYLOSIS: Primary | ICD-10-CM

## 2024-05-23 PROCEDURE — 72110 X-RAY EXAM L-2 SPINE 4/>VWS: CPT

## 2024-05-23 PROCEDURE — 99204 OFFICE O/P NEW MOD 45 MIN: CPT | Performed by: ANESTHESIOLOGY

## 2024-05-23 PROCEDURE — G2211 COMPLEX E/M VISIT ADD ON: HCPCS | Performed by: ANESTHESIOLOGY

## 2024-05-23 NOTE — PROGRESS NOTES
Assessment:  1. Lumbar spondylosis    2. Chronic midline low back pain without sciatica    3. Chronic pain syndrome        Plan:  Patient is a 89-year-old male with complaints of low back pain with chronic pain syndrome secondary to lumbar degenerative disease, lumbar spondylosis presents to office for follow-up visit.  Patient reports majority of his symptoms have resolved at this time.  He does still use a walker for gait assistance but has adequate strength in bilateral lower extremities.  At this time we will obtain some more diagnostic imaging and encourage patient to continue walking and movement.  1.  We will order an x-ray of the lumbar spine to better assess the degenerative changes  2.  We will follow-up in 2 months to gauge any changes in symptoms        History of Present Illness:    The patient is a 89 y.o. male who presents for consultation in regards to Back Pain.  Symptoms have been present for several months. Symptoms began without any precipitating injury or trauma. Pain is reported to be 1 on the numeric rating scale.  Symptoms are felt nearly constantly and worst in the no typical pattern.  Symptoms are characterized as cramping, sharp, and dull/aching.  Symptoms are associated with bilateral leg weakness.  Aggravating factors include nothing.  Relieving factors include nothing.  No change in symptoms with kneeling, lying down, standing, bending, leaning forward, leaning bckward, sitting, walking, exercise, turning the head, relaxation, coughing/sneezing, and bowel movements.  Patient has not tried any pain relieving modalities at this time.  Medications to relieve symptoms include aspirin, gabapentin.    Review of Systems:    Review of Systems   Constitutional:  Negative for chills, fatigue and fever.   HENT:  Negative for hearing loss, sinus pain, sore throat and trouble swallowing.    Eyes:  Negative for pain and visual disturbance.   Respiratory:  Negative for shortness of breath and  wheezing.    Cardiovascular:  Negative for chest pain and palpitations.   Gastrointestinal:  Negative for abdominal pain, constipation and nausea.   Endocrine: Negative for polydipsia and polyuria.   Genitourinary:  Negative for difficulty urinating.   Musculoskeletal:  Negative for arthralgias, gait problem, joint swelling and myalgias.   Skin:  Negative for rash.   Neurological:  Negative for dizziness, weakness and headaches.   Hematological:  Does not bruise/bleed easily.   Psychiatric/Behavioral:  Negative for dysphoric mood. The patient is not nervous/anxious.    All other systems reviewed and are negative.      Past Medical History:   Diagnosis Date    Arthritis     Cataract     Coronary artery disease     Coronary atherosclerosis of native coronary artery     Diverticulitis of colon     Essential hypertension, benign     Hyperlipidemia     Hypertension     Peptic ulcer     Renal disorder        Past Surgical History:   Procedure Laterality Date    CATARACT EXTRACTION Right     Left eye 5/2021    CORONARY STENT PLACEMENT      SKIN BIOPSY      TONSILLECTOMY         Family History   Problem Relation Age of Onset    Heart attack Brother     Heart disease Mother     Cancer Father        Social History     Occupational History    Not on file   Tobacco Use    Smoking status: Former     Types: Pipe    Smokeless tobacco: Never   Vaping Use    Vaping status: Never Used   Substance and Sexual Activity    Alcohol use: Not Currently    Drug use: No    Sexual activity: Not Currently         Current Outpatient Medications:     allopurinol (ZYLOPRIM) 300 mg tablet, , Disp: , Rfl:     aspirin 81 MG tablet, Take 1 tablet by mouth see administration instructions Take 1 tablet Mon-Wed & Fri, Disp: , Rfl:     atorvastatin (LIPITOR) 20 mg tablet, Take 1 tablet by mouth daily, Disp: , Rfl:     Cholecalciferol 50 MCG (2000 UT) CAPS, Take by mouth, Disp: , Rfl:     Choline Fenofibrate (FENOFIBRIC ACID) 45 MG CPDR, Take by mouth,  "Disp: , Rfl:     gabapentin (NEURONTIN) 100 mg capsule, Take 1 capsule by mouth every 12 (twelve) hours, Disp: , Rfl:     Multiple Vitamins tablet, Take by mouth, Disp: , Rfl:     potassium chloride (KLOR-CON) 20 mEq packet, Take 20 mEq by mouth as needed Take 1 tablet when taking Furosemide, Disp: , Rfl:     ALLOPURINOL PO, Take 150 mg by mouth daily 150mg (Patient not taking: Reported on 4/17/2024), Disp: , Rfl:     furosemide (LASIX) 20 mg tablet, Take 1 tablet (20 mg total) by mouth daily as needed (swelling) (Patient not taking: Reported on 4/16/2024), Disp: 30 tablet, Rfl: 4    nitroglycerin (Nitrostat) 0.4 mg SL tablet, Place 1 tablet (0.4 mg total) under the tongue every 5 (five) minutes as needed for chest pain prn (Patient not taking: Reported on 4/16/2024), Disp: , Rfl:     No Known Allergies    Physical Exam:    /60   Pulse 86   Ht 5' 6\" (1.676 m)   Wt 70.3 kg (155 lb)   BMI 25.02 kg/m²     Constitutional: normal, well developed, well nourished, alert, in no distress and non-toxic and no overt pain behavior.  Eyes: anicteric  HEENT: grossly intact  Neck: supple, symmetric, trachea midline and no masses   Pulmonary:even and unlabored  Cardiovascular:No edema or pitting edema present  Skin:Normal without rashes or lesions and well hydrated  Psychiatric:Mood and affect appropriate  Neurologic:Cranial Nerves II-XII grossly intact  Musculoskeletal:antalgic and ambulates in a walker    Imaging  No orders to display       No orders of the defined types were placed in this encounter.    "

## 2024-05-28 ENCOUNTER — HOSPITAL ENCOUNTER (EMERGENCY)
Facility: HOSPITAL | Age: 89
Discharge: HOME | End: 2024-05-28
Attending: EMERGENCY MEDICINE
Payer: COMMERCIAL

## 2024-05-28 VITALS
WEIGHT: 155 LBS | OXYGEN SATURATION: 97 % | BODY MASS INDEX: 24.91 KG/M2 | RESPIRATION RATE: 18 BRPM | TEMPERATURE: 97.8 F | HEART RATE: 98 BPM | HEIGHT: 66 IN | DIASTOLIC BLOOD PRESSURE: 59 MMHG | SYSTOLIC BLOOD PRESSURE: 123 MMHG

## 2024-05-28 DIAGNOSIS — I48.0 PAROXYSMAL A-FIB (HCC): Primary | ICD-10-CM

## 2024-05-28 LAB
ANION GAP SERPL CALCULATED.3IONS-SCNC: 9 MMOL/L (ref 4–13)
BASOPHILS # BLD AUTO: 0.02 THOUSANDS/ÂΜL (ref 0–0.1)
BASOPHILS NFR BLD AUTO: 0 % (ref 0–1)
BUN SERPL-MCNC: 26 MG/DL (ref 5–25)
CALCIUM SERPL-MCNC: 8.8 MG/DL (ref 8.4–10.2)
CHLORIDE SERPL-SCNC: 104 MMOL/L (ref 96–108)
CO2 SERPL-SCNC: 24 MMOL/L (ref 21–32)
CREAT SERPL-MCNC: 0.99 MG/DL (ref 0.6–1.3)
EOSINOPHIL # BLD AUTO: 0.08 THOUSAND/ÂΜL (ref 0–0.61)
EOSINOPHIL NFR BLD AUTO: 1 % (ref 0–6)
ERYTHROCYTE [DISTWIDTH] IN BLOOD BY AUTOMATED COUNT: 14.9 % (ref 11.6–15.1)
GFR SERPL CREATININE-BSD FRML MDRD: 62 ML/MIN/1.73SQ M
GLUCOSE SERPL-MCNC: 181 MG/DL (ref 65–140)
HCT VFR BLD AUTO: 40 % (ref 36.5–49.3)
HGB BLD-MCNC: 13 G/DL (ref 12–17)
IMM GRANULOCYTES # BLD AUTO: 0.05 THOUSAND/UL (ref 0–0.2)
IMM GRANULOCYTES NFR BLD AUTO: 1 % (ref 0–2)
LYMPHOCYTES # BLD AUTO: 1.25 THOUSANDS/ÂΜL (ref 0.6–4.47)
LYMPHOCYTES NFR BLD AUTO: 14 % (ref 14–44)
MAGNESIUM SERPL-MCNC: 1.9 MG/DL (ref 1.9–2.7)
MCH RBC QN AUTO: 31.8 PG (ref 26.8–34.3)
MCHC RBC AUTO-ENTMCNC: 32.5 G/DL (ref 31.4–37.4)
MCV RBC AUTO: 98 FL (ref 82–98)
MONOCYTES # BLD AUTO: 0.56 THOUSAND/ÂΜL (ref 0.17–1.22)
MONOCYTES NFR BLD AUTO: 6 % (ref 4–12)
NEUTROPHILS # BLD AUTO: 7.05 THOUSANDS/ÂΜL (ref 1.85–7.62)
NEUTS SEG NFR BLD AUTO: 78 % (ref 43–75)
NRBC BLD AUTO-RTO: 0 /100 WBCS
PLATELET # BLD AUTO: 181 THOUSANDS/UL (ref 149–390)
PMV BLD AUTO: 11 FL (ref 8.9–12.7)
POTASSIUM SERPL-SCNC: 4.1 MMOL/L (ref 3.5–5.3)
RBC # BLD AUTO: 4.09 MILLION/UL (ref 3.88–5.62)
SODIUM SERPL-SCNC: 137 MMOL/L (ref 135–147)
WBC # BLD AUTO: 9.01 THOUSAND/UL (ref 4.31–10.16)

## 2024-05-28 PROCEDURE — 36415 COLL VENOUS BLD VENIPUNCTURE: CPT | Performed by: EMERGENCY MEDICINE

## 2024-05-28 PROCEDURE — 99285 EMERGENCY DEPT VISIT HI MDM: CPT

## 2024-05-28 PROCEDURE — 99285 EMERGENCY DEPT VISIT HI MDM: CPT | Performed by: EMERGENCY MEDICINE

## 2024-05-28 PROCEDURE — 80048 BASIC METABOLIC PNL TOTAL CA: CPT | Performed by: EMERGENCY MEDICINE

## 2024-05-28 PROCEDURE — 93005 ELECTROCARDIOGRAM TRACING: CPT

## 2024-05-28 PROCEDURE — 83735 ASSAY OF MAGNESIUM: CPT | Performed by: EMERGENCY MEDICINE

## 2024-05-28 PROCEDURE — 85025 COMPLETE CBC W/AUTO DIFF WBC: CPT | Performed by: EMERGENCY MEDICINE

## 2024-05-28 NOTE — DISCHARGE INSTRUCTIONS
Follow-up with Dr. Delgado let him know about your ER visit.  You had episodes of A-fib/flutter however they resolved and you remained in normal rhythm in the emergency department throughout your stay.  Discuss risks and benefits of anticoagulation or medication for this condition.

## 2024-05-28 NOTE — ED PROVIDER NOTES
History  Chief Complaint   Patient presents with    Rapid Heart Rate     Per ems and family pt was having high high rate     89-year-old male history of CAD, hypertension hyperlipidemia presents with elevated heart rate in the low 100s at home.  EMS noted irregular rhythm.  He denies any chest pain shortness of breath, dyspnea, lightheadedness, or any other symptoms.      Rapid Heart Rate      Prior to Admission Medications   Prescriptions Last Dose Informant Patient Reported? Taking?   ALLOPURINOL PO  Self, Child Yes No   Sig: Take 150 mg by mouth daily 150mg   Patient not taking: Reported on 4/17/2024   Cholecalciferol 50 MCG (2000 UT) CAPS  Self, Child Yes No   Sig: Take by mouth   Choline Fenofibrate (FENOFIBRIC ACID) 45 MG CPDR  Self, Child Yes No   Sig: Take by mouth   Multiple Vitamins tablet  Self, Child Yes No   Sig: Take by mouth   allopurinol (ZYLOPRIM) 300 mg tablet  Self, Child Yes No   aspirin 81 MG tablet  Self, Child Yes No   Sig: Take 1 tablet by mouth see administration instructions Take 1 tablet Mon-Wed & Fri   atorvastatin (LIPITOR) 20 mg tablet  Self, Child Yes No   Sig: Take 1 tablet by mouth daily   furosemide (LASIX) 20 mg tablet  Self, Child No No   Sig: Take 1 tablet (20 mg total) by mouth daily as needed (swelling)   Patient not taking: Reported on 4/16/2024   gabapentin (NEURONTIN) 100 mg capsule  Self, Child Yes No   Sig: Take 1 capsule by mouth every 12 (twelve) hours   nitroglycerin (Nitrostat) 0.4 mg SL tablet  Self, Child No No   Sig: Place 1 tablet (0.4 mg total) under the tongue every 5 (five) minutes as needed for chest pain prn   Patient not taking: Reported on 4/16/2024   potassium chloride (KLOR-CON) 20 mEq packet  Self, Child Yes No   Sig: Take 20 mEq by mouth as needed Take 1 tablet when taking Furosemide      Facility-Administered Medications: None       Past Medical History:   Diagnosis Date    Arthritis     Cataract     Coronary artery disease     Coronary atherosclerosis  of native coronary artery     Diverticulitis of colon     Essential hypertension, benign     Hyperlipidemia     Hypertension     Peptic ulcer     Renal disorder        Past Surgical History:   Procedure Laterality Date    CATARACT EXTRACTION Right     Left eye 5/2021    CORONARY STENT PLACEMENT      SKIN BIOPSY      TONSILLECTOMY         Family History   Problem Relation Age of Onset    Heart attack Brother     Heart disease Mother     Cancer Father      I have reviewed and agree with the history as documented.    E-Cigarette/Vaping    E-Cigarette Use Never User      E-Cigarette/Vaping Substances    Nicotine No     THC No     CBD No     Flavoring No     Other No     Unknown No      Social History     Tobacco Use    Smoking status: Former     Types: Pipe    Smokeless tobacco: Never   Vaping Use    Vaping status: Never Used   Substance Use Topics    Alcohol use: Not Currently    Drug use: No       Review of Systems   Cardiovascular:  Positive for palpitations.       Physical Exam  Physical Exam  Vitals and nursing note reviewed.   Constitutional:       Appearance: Normal appearance. He is well-developed.   HENT:      Head: Normocephalic and atraumatic.   Eyes:      Conjunctiva/sclera: Conjunctivae normal.      Pupils: Pupils are equal, round, and reactive to light.   Neck:      Trachea: No tracheal deviation.   Cardiovascular:      Rate and Rhythm: Normal rate and regular rhythm.      Heart sounds: Normal heart sounds. No murmur heard.  Pulmonary:      Effort: Pulmonary effort is normal. No respiratory distress.      Breath sounds: Normal breath sounds. No wheezing or rales.   Abdominal:      General: Bowel sounds are normal. There is no distension.      Palpations: Abdomen is soft.      Tenderness: There is no abdominal tenderness.   Musculoskeletal:         General: No deformity.      Cervical back: Normal range of motion and neck supple.   Skin:     General: Skin is warm and dry.      Capillary Refill: Capillary  refill takes less than 2 seconds.   Neurological:      General: No focal deficit present.      Mental Status: He is alert and oriented to person, place, and time.      Sensory: No sensory deficit.   Psychiatric:         Mood and Affect: Mood normal.         Judgment: Judgment normal.         Vital Signs  ED Triage Vitals   Temperature Pulse Respirations Blood Pressure SpO2   05/28/24 1620 05/28/24 1620 05/28/24 1621 05/28/24 1620 05/28/24 1620   97.8 °F (36.6 °C) 98 18 123/59 97 %      Temp Source Heart Rate Source Patient Position - Orthostatic VS BP Location FiO2 (%)   05/28/24 1620 -- 05/28/24 1620 05/28/24 1620 --   Temporal  Lying Right arm       Pain Score       05/28/24 1620       No Pain           Vitals:    05/28/24 1620   BP: 123/59   Pulse: 98   Patient Position - Orthostatic VS: Lying         Visual Acuity      ED Medications  Medications - No data to display    Diagnostic Studies  Results Reviewed       Procedure Component Value Units Date/Time    Basic metabolic panel [[id number]]  (Abnormal) Collected: 05/28/24 1642    Lab Status: Final result Specimen: Blood from Arm, Left Updated: 05/28/24 1706     Sodium 137 mmol/L      Potassium 4.1 mmol/L      Chloride 104 mmol/L      CO2 24 mmol/L      ANION GAP 9 mmol/L      BUN 26 mg/dL      Creatinine 0.99 mg/dL      Glucose 181 mg/dL      Calcium 8.8 mg/dL      eGFR 62 ml/min/1.73sq m     Narrative:      [employer] guidelines for Chronic Kidney Disease (CKD):     Stage 1 with normal or high GFR (GFR > 90 mL/min/1.73 square meters)    Stage 2 Mild CKD (GFR = 60-89 mL/min/1.73 square meters)    Stage 3A Moderate CKD (GFR = 45-59 mL/min/1.73 square meters)    Stage 3B Moderate CKD (GFR = 30-44 mL/min/1.73 square meters)    Stage 4 Severe CKD (GFR = 15-29 mL/min/1.73 square meters)    Stage 5 End Stage CKD (GFR <15 mL/min/1.73 square meters)  Note: GFR calculation is accurate only with a steady state creatinine    Magnesium [[id number]]   (Normal) Collected: 05/28/24 1642    Lab Status: Final result Specimen: Blood from Arm, Left Updated: 05/28/24 1706     Magnesium 1.9 mg/dL     CBC and differential [[id number]]  (Abnormal) Collected: 05/28/24 1642    Lab Status: Final result Specimen: Blood from Arm, Left Updated: 05/28/24 1649     WBC 9.01 Thousand/uL      RBC 4.09 Million/uL      Hemoglobin 13.0 g/dL      Hematocrit 40.0 %      MCV 98 fL      MCH 31.8 pg      MCHC 32.5 g/dL      RDW 14.9 %      MPV 11.0 fL      Platelets 181 Thousands/uL      nRBC 0 /100 WBCs      Segmented % 78 %      Immature Grans % 1 %      Lymphocytes % 14 %      Monocytes % 6 %      Eosinophils Relative 1 %      Basophils Relative 0 %      Absolute Neutrophils 7.05 Thousands/µL      Absolute Immature Grans 0.05 Thousand/uL      Absolute Lymphocytes 1.25 Thousands/µL      Absolute Monocytes 0.56 Thousand/µL      Eosinophils Absolute 0.08 Thousand/µL      Basophils Absolute 0.02 Thousands/µL                    No orders to display              Procedures  ECG 12 Lead Documentation Only    Date/Time: 5/28/2024 4:56 PM    Performed by: Chavez Valdez DO  Authorized by: Chavez Valdez DO    Indications / Diagnosis:  Tachycardia  ECG reviewed by me, the ED Provider: yes    Patient location:  ED  Previous ECG:     Previous ECG:  Compared to current    Similarity:  No change    Comparison to cardiac monitor: Yes    Interpretation:     Interpretation: normal    Rate:     ECG rate:  85    ECG rate assessment: normal    Rhythm:     Rhythm: sinus rhythm    Ectopy:     Ectopy: none    QRS:     QRS axis:  Normal  Conduction:     Conduction: abnormal      Abnormal conduction: LAFB    ST segments:     ST segments:  Normal  T waves:     T waves: normal             ED Course  ED Course as of 05/28/24 1931   Tue May 28, 2024   1659 Cardiac monitor reads normal sinus rhythm as interpreted by me. Cardiac monitor was ordered secondary to the patients history of tachycarida and to  "monitor the patient for dysrhythmia\"     1734 High chadsvasc. Will discuss with patient. He has been NSR since my evaluation and likely has low probably of acute CVA because of this despite the CHADSVASC.       [id number] SCORE      Flowsheet Row Most Recent Value   [id number]    Age 2 Filed at: 05/28/2024 1725   Sex 0 Filed at: 05/28/2024 1725   CHF History 0 Filed at: 05/28/2024 1725   HTN History 0 Filed at: 05/28/2024 1725   Stroke or TIA Symptoms Previously 0 Filed at: 05/28/2024 1725   Vascular Disease History 1 Filed at: 05/28/2024 1725   Diabetes History 0 Filed at: 05/28/2024 1725   [id number] Score 3 Filed at: 05/28/2024 1725                                SBIRT 22yo+      Flowsheet Row Most Recent Value   Initial Alcohol Screen: US AUDIT-C     1. How often do you have a drink containing alcohol? 0 Filed at: 05/28/2024 1622   2. How many drinks containing alcohol do you have on a typical day you are drinking?  0 Filed at: 05/28/2024 1622   3a. Male UNDER 65: How often do you have five or more drinks on one occasion? 0 Filed at: 05/28/2024 1622   Audit-C Score 0 Filed at: 05/28/2024 1622   NISHANT: How many times in the past year have you...    Used an illegal drug or used a prescription medication for non-medical reasons? Never Filed at: 05/28/2024 1622                      Medical Decision Making  89-year-old male presents with asymptomatic A-fib that was rate controlled.  Initial rhythm on EKG appears to be a flutter however quickly became a normal sinus rhythm with a rate of 80s.    Reports has a cardiology follow-up tomorrow.  No history of A-fib or a flutter.  Chart review also says no history.    Look for electrolyte abnormalities, anemia, leukocytosis.  Patient has no symptoms to suggest ACS.    EMS rhythm reviewed by me shows irregularly irregular rhythm.  EKG on arrival shows a flutter.  Shortly after his rhythm became normal sinus rhythm and remained soft for approximately 3 hours while in the " emergency department.  Patient was ambulated and he did not have any tachycardia or irregular rhythm.    Incidentally patient has follow-up with cardiology tomorrow.  His [id number] is elevated I discussed anticoagulation with patient and his son. I will defer to cardiology; patient and son are in agreement with this plan he is already on aspirin.     Problems Addressed:  Paroxysmal A-fib (HCC): acute illness or injury    Amount and/or Complexity of Data Reviewed  Independent Historian: EMS  Labs: ordered. Decision-making details documented in ED Course.    Risk  Decision regarding hospitalization.             Disposition  Final diagnoses:   Paroxysmal A-fib (HCC)     Time reflects when diagnosis was documented in both MDM as applicable and the Disposition within this note       Time User Action Codes Description Comment    5/28/2024  6:04 PM Chavez Valdez Add [I48.0] Paroxysmal A-fib (HCC)           ED Disposition       ED Disposition   Discharge    Condition   Stable    Date/Time   Tue May 28, 2024 1635    Comment   Prem Mar Sr. discharge to home/self care.                   Follow-up Information       Follow up With Specialties Details Why Contact Info    Deshaun Delgado MD Cardiology   [address] [place] [address]  [phone]              Discharge Medication List as of 5/28/2024  6:59 PM        CONTINUE these medications which have NOT CHANGED    Details   !! allopurinol (ZYLOPRIM) 300 mg tablet Historical Med      !! ALLOPURINOL PO Take 150 mg by mouth daily 150mg, Historical Med      aspirin 81 MG tablet Take 1 tablet by mouth see administration instructions Take 1 tablet Mon-Wed & Fri, Historical Med      atorvastatin (LIPITOR) 20 mg tablet Take 1 tablet by mouth daily, Historical Med      Cholecalciferol 50 MCG (2000 UT) CAPS Take by mouth, Historical Med      Choline Fenofibrate (FENOFIBRIC ACID) 45 MG CPDR Take by mouth, Starting Mon 9/30/2013, Historical Med      furosemide  (LASIX) 20 mg tablet Take 1 tablet (20 mg total) by mouth daily as needed (swelling), Starting Mon 7/24/2023, No Print      gabapentin (NEURONTIN) 100 mg capsule Take 1 capsule by mouth every 12 (twelve) hours, Starting Mon 2/12/2024, Historical Med      Multiple Vitamins tablet Take by mouth, Historical Med      nitroglycerin (Nitrostat) 0.4 mg SL tablet Place 1 tablet (0.4 mg total) under the tongue every 5 (five) minutes as needed for chest pain prn, Starting Mon 7/24/2023, No Print      potassium chloride (KLOR-CON) 20 mEq packet Take 20 mEq by mouth as needed Take 1 tablet when taking Furosemide, Historical Med       !! - Potential duplicate medications found. Please discuss with provider.          No discharge procedures on file.    PDMP Review       None            ED Provider  Electronically Signed by             Chavez Valdez DO  05/28/24 1931

## 2024-05-29 ENCOUNTER — OFFICE VISIT (OUTPATIENT)
Dept: CARDIOLOGY CLINIC | Facility: CLINIC | Age: 89
End: 2024-05-29
Payer: COMMERCIAL

## 2024-05-29 VITALS
OXYGEN SATURATION: 96 % | HEIGHT: 66 IN | HEART RATE: 80 BPM | SYSTOLIC BLOOD PRESSURE: 118 MMHG | WEIGHT: 155 LBS | DIASTOLIC BLOOD PRESSURE: 58 MMHG | BODY MASS INDEX: 24.91 KG/M2

## 2024-05-29 DIAGNOSIS — I10 ESSENTIAL HYPERTENSION: Chronic | ICD-10-CM

## 2024-05-29 DIAGNOSIS — I25.10 CORONARY ARTERY DISEASE INVOLVING NATIVE CORONARY ARTERY OF NATIVE HEART WITHOUT ANGINA PECTORIS: Primary | Chronic | ICD-10-CM

## 2024-05-29 DIAGNOSIS — I48.3 TYPICAL ATRIAL FLUTTER (HCC): ICD-10-CM

## 2024-05-29 PROBLEM — I48.92 ATRIAL FLUTTER (HCC): Status: ACTIVE | Noted: 2024-05-29

## 2024-05-29 PROBLEM — I16.0 HYPERTENSIVE URGENCY: Status: RESOLVED | Noted: 2023-07-24 | Resolved: 2024-05-29

## 2024-05-29 LAB
ATRIAL RATE: 300 BPM
ATRIAL RATE: 85 BPM
P AXIS: 60 DEGREES
P AXIS: 70 DEGREES
PR INTERVAL: 186 MS
QRS AXIS: -41 DEGREES
QRS AXIS: -54 DEGREES
QRSD INTERVAL: 86 MS
QRSD INTERVAL: 92 MS
QT INTERVAL: 370 MS
QT INTERVAL: 382 MS
QTC INTERVAL: 454 MS
QTC INTERVAL: 477 MS
T WAVE AXIS: 80 DEGREES
T WAVE AXIS: 89 DEGREES
VENTRICULAR RATE: 100 BPM
VENTRICULAR RATE: 85 BPM

## 2024-05-29 PROCEDURE — 99214 OFFICE O/P EST MOD 30 MIN: CPT | Performed by: INTERNAL MEDICINE

## 2024-05-29 PROCEDURE — 93010 ELECTROCARDIOGRAM REPORT: CPT | Performed by: INTERNAL MEDICINE

## 2024-05-29 RX ORDER — METOPROLOL SUCCINATE 25 MG/1
25 TABLET, EXTENDED RELEASE ORAL DAILY
Qty: 90 TABLET | Refills: 4 | Status: SHIPPED | OUTPATIENT
Start: 2024-05-29

## 2024-05-29 NOTE — PROGRESS NOTES
Cardiology Follow Up    Prem Phippstisha .  10/11/1924  [id number]  [facility] [place]  [address]  [place] [address] [phone]    1. Coronary artery disease involving native coronary artery of native heart without angina pectoris  metoprolol succinate (TOPROL-XL) 25 mg 24 hr tablet      2. Essential hypertension  metoprolol succinate (TOPROL-XL) 25 mg 24 hr tablet      3. Typical atrial flutter (HCC)              Discussion/Summary:I will start Toprol XL 25 mg daily. He was previously on Lopressor 25 mg BID which was stopped about 2 years ago, not for bradycardia.  His son will continue to monitor his HR and BP closely.  I feel that the risks of AC outweigh the benefits give his age and frailty.  RTO 4 months..      Interval History: He has been doing well. He denies CP, SOB, palpitations, dizziness.   His son monitor his HR and BP daily. HR is usually in the 60s. Yesterday it was 100 and he called EMS. He was in atrial flutter but converted spontaneously to SR.  He has CAD with LAD stenting in 2002.   EF is 55% by echo in 10/2021.    He feels well. He is very frail but still walks 220 feet daily in his home with his walker.  He is on ASA 81 mg daily.    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)    Chronic kidney disease, stage 3a (HCC)    Diabetic eye exam ([facility])    Nonexudative age-related macular degeneration of left eye    Hypokalemia    Chronic pain syndrome    Lumbar spondylosis     Chronic midline low back pain without sciatica    Atrial flutter (HCC)     Past Medical History:   Diagnosis Date    Arthritis     Cataract     Coronary artery disease     Coronary atherosclerosis of native coronary artery     Diverticulitis of colon     Essential hypertension, benign     Hyperlipidemia     Hypertension     Peptic ulcer     Renal disorder      Social History     Socioeconomic History    Marital status:      Spouse name: Not on file    Number of children: Not on file    Years of education: Not on file    Highest education level: Not on file   Occupational History    Not on file   Tobacco Use    Smoking status: Former     Types: Pipe    Smokeless tobacco: Never   Vaping Use    Vaping status: Never Used   Substance and Sexual Activity    Alcohol use: Not Currently    Drug use: No    Sexual activity: Not Currently   Other Topics Concern    Not on file   Social History Narrative    Not on file     Social Determinants of Health     Financial Resource Strain: Not on file   Food Insecurity: No Food Insecurity (8/19/2022)    Hunger Vital Sign     Worried About Running Out of Food in the Last Year: Never true     Ran Out of Food in the Last Year: Never true   Transportation Needs: No Transportation Needs (8/19/2022)    PRAPARE - Transportation     Lack of Transportation (Medical): No     Lack of Transportation (Non-Medical): No   Physical Activity: Not on file   Stress: Not on file   Social Connections: Not on file   Intimate Partner Violence: Not on file   Housing Stability: Low Risk  (8/19/2022)    Housing Stability Vital Sign     Unable to Pay for Housing in the Last Year: No     Number of Places Lived in the Last Year: 1     Unstable Housing in the Last Year: No      Family History   Problem Relation Age of Onset    Heart attack Brother     Heart disease Mother     Cancer Father      Past Surgical History:   Procedure Laterality Date    CATARACT EXTRACTION Right     Left eye 5/2021    CORONARY  "STENT PLACEMENT      SKIN BIOPSY      TONSILLECTOMY         Current Outpatient Medications:     allopurinol (ZYLOPRIM) 300 mg tablet, , Disp: , Rfl:     aspirin 81 MG tablet, Take 1 tablet by mouth see administration instructions Take 1 tablet twice weekly, Disp: , Rfl:     Cholecalciferol 50 MCG (2000 UT) CAPS, Take by mouth, Disp: , Rfl:     Choline Fenofibrate (FENOFIBRIC ACID) 45 MG CPDR, Take by mouth, Disp: , Rfl:     gabapentin (NEURONTIN) 100 mg capsule, Take 1 capsule by mouth every 12 (twelve) hours, Disp: , Rfl:     metoprolol succinate (TOPROL-XL) 25 mg 24 hr tablet, Take 1 tablet (25 mg total) by mouth daily, Disp: 90 tablet, Rfl: 4    Multiple Vitamins tablet, Take by mouth, Disp: , Rfl:     nitroglycerin (Nitrostat) 0.4 mg SL tablet, Place 1 tablet (0.4 mg total) under the tongue every 5 (five) minutes as needed for chest pain prn, Disp: , Rfl:     potassium chloride (KLOR-CON) 20 mEq packet, Take 20 mEq by mouth as needed Take 1 tablet when taking Furosemide, Disp: , Rfl:     ALLOPURINOL PO, Take 150 mg by mouth daily 150mg (Patient not taking: Reported on 4/17/2024), Disp: , Rfl:     atorvastatin (LIPITOR) 20 mg tablet, Take 1 tablet by mouth daily (Patient not taking: Reported on 5/29/2024), Disp: , Rfl:     furosemide (LASIX) 20 mg tablet, Take 1 tablet (20 mg total) by mouth daily as needed (swelling) (Patient not taking: Reported on 4/16/2024), Disp: 30 tablet, Rfl: 4  No Known Allergies  Vitals:    05/29/24 1424   BP: 118/58   BP Location: Left arm   Patient Position: Sitting   Cuff Size: Standard   Pulse: 80   SpO2: 96%   Weight: 70.3 kg (155 lb)   Height: 5' 6\" (1.676 m)     Weight (last 2 days)       Date/Time Weight    05/29/24 1424 70.3 (155)           Blood pressure 118/58, pulse 80, height 5' 6\" (1.676 m), weight 70.3 kg (155 lb), SpO2 96%., Body mass index is 25.02 kg/m².    Labs:  Admission on 05/28/2024, Discharged on 05/28/2024   Component Date Value    WBC 05/28/2024 9.01     RBC " 05/28/2024 4.09     Hemoglobin 05/28/2024 13.0     Hematocrit 05/28/2024 40.0     MCV 05/28/2024 98     MCH 05/28/2024 31.8     MCHC 05/28/2024 32.5     RDW 05/28/2024 14.9     MPV 05/28/2024 11.0     Platelets 05/28/2024 181     nRBC 05/28/2024 0     Segmented % 05/28/2024 78 (H)     Immature Grans % 05/28/2024 1     Lymphocytes % 05/28/2024 14     Monocytes % 05/28/2024 6     Eosinophils Relative 05/28/2024 1     Basophils Relative 05/28/2024 0     Absolute Neutrophils 05/28/2024 7.05     Absolute Immature Grans 05/28/2024 0.05     Absolute Lymphocytes 05/28/2024 1.25     Absolute Monocytes 05/28/2024 0.56     Eosinophils Absolute 05/28/2024 0.08     Basophils Absolute 05/28/2024 0.02     Sodium 05/28/2024 137     Potassium 05/28/2024 4.1     Chloride 05/28/2024 104     CO2 05/28/2024 24     ANION GAP 05/28/2024 9     BUN 05/28/2024 26 (H)     Creatinine 05/28/2024 0.99     Glucose 05/28/2024 181 (H)     Calcium 05/28/2024 8.8     eGFR 05/28/2024 62     Magnesium 05/28/2024 1.9     Ventricular Rate 05/28/2024 100     Atrial Rate 05/28/2024 300     QRSD Interval 05/28/2024 86     QT Interval 05/28/2024 370     QTC Interval 05/28/2024 477     P Axis 05/28/2024 70     QRS Axis 05/28/2024 -41     T Wave Axis 05/28/2024 80     Ventricular Rate 05/28/2024 85     Atrial Rate 05/28/2024 85     [place] Interval 05/28/2024 186     QRSD Interval 05/28/2024 92     QT Interval 05/28/2024 382     QTC Interval 05/28/2024 454     P Axis 05/28/2024 60     QRS Axis 05/28/2024 -54     T Wave [place] 05/28/2024 89    Appointment on 04/08/2024   Component Date Value    Sodium 04/08/2024 138     Potassium 04/08/2024 3.9     Chloride 04/08/2024 102     CO2 04/08/2024 24     ANION GAP 04/08/2024 12     BUN 04/08/2024 30 (H)     Creatinine 04/08/2024 1.10     Glucose, Fasting 04/08/2024 104 (H)     Calcium 04/08/2024 9.1     AST 04/08/2024 27     ALT 04/08/2024 17     Alkaline Phosphatase 04/08/2024 74     Total Protein 04/08/2024 6.8     Albumin  04/08/2024 4.0     Total Bilirubin 04/08/2024 0.58     eGFR 04/08/2024 55     WBC 04/08/2024 8.07     RBC 04/08/2024 4.10     Hemoglobin 04/08/2024 13.1     Hematocrit 04/08/2024 40.6     MCV 04/08/2024 99 (H)     MCH 04/08/2024 32.0     MCHC 04/08/2024 32.3     RDW 04/08/2024 15.0     MPV 04/08/2024 11.1     Platelets 04/08/2024 184     nRBC 04/08/2024 0     Segmented % 04/08/2024 70     Immature Grans % 04/08/2024 1     Lymphocytes % 04/08/2024 19     Monocytes % 04/08/2024 7     Eosinophils Relative 04/08/2024 2     Basophils Relative 04/08/2024 1     Absolute Neutrophils 04/08/2024 5.68     Absolute Immature Grans 04/08/2024 0.07     Absolute Lymphocytes 04/08/2024 1.56     Absolute Monocytes 04/08/2024 0.60     Eosinophils Absolute 04/08/2024 0.12     Basophils Absolute 04/08/2024 0.04     Creatinine, Ur 04/08/2024 89.4     Albumin,U,Random 04/08/2024 24.5 (H)     Albumin Creat Ratio 04/08/2024 27     Color, UA 04/08/2024 Light Yellow     Clarity, UA 04/08/2024 Clear     Specific Gravity, UA 04/08/2024 1.018     pH, UA 04/08/2024 6.5     Leukocytes, UA 04/08/2024 Negative     Nitrite, UA 04/08/2024 Negative     Protein, UA 04/08/2024 Trace (A)     Glucose, UA 04/08/2024 Negative     Ketones, UA 04/08/2024 Negative     Urobilinogen, UA 04/08/2024 <2.0     Bilirubin, UA 04/08/2024 Negative     Occult Blood, UA 04/08/2024 Negative     RBC, UA 04/08/2024 4-10 (A)     WBC, UA 04/08/2024 2-4 (A)     Epithelial Cells 04/08/2024 Occasional     Bacteria, UA 04/08/2024 None Seen     Magnesium 04/08/2024 1.9     Phosphorus 04/08/2024 2.9     Osmolality, Ur 04/08/2024 674     PTH 04/08/2024 29.9    Admission on 03/10/2024, Discharged on 03/10/2024   Component Date Value    Ventricular Rate 03/10/2024 85     Atrial Rate 03/10/2024 85     [place] Interval 03/10/2024 186     QRSD Interval 03/10/2024 96     QT Interval 03/10/2024 384     QTC Interval 03/10/2024 456     P Axis 03/10/2024 63     QRS [place] 03/10/2024 -52     T Wave  Axis 03/10/2024 116     WBC 03/10/2024 7.71     RBC 03/10/2024 4.14     Hemoglobin 03/10/2024 13.1     Hematocrit 03/10/2024 40.5     MCV 03/10/2024 98     MCH 03/10/2024 31.6     MCHC 03/10/2024 32.3     RDW 03/10/2024 14.6     MPV 03/10/2024 10.0     Platelets 03/10/2024 172     nRBC 03/10/2024 0     Segmented % 03/10/2024 82 (H)     Immature Grans % 03/10/2024 0     Lymphocytes % 03/10/2024 11 (L)     Monocytes % 03/10/2024 7     Eosinophils Relative 03/10/2024 0     Basophils Relative 03/10/2024 0     Absolute Neutrophils 03/10/2024 6.28     Absolute Immature Grans 03/10/2024 0.03     Absolute Lymphocytes 03/10/2024 0.85     Absolute Monocytes 03/10/2024 0.50     Eosinophils Absolute 03/10/2024 0.02     Basophils Absolute 03/10/2024 0.03     Sodium 03/10/2024 138     Potassium 03/10/2024 3.9     Chloride 03/10/2024 102     CO2 03/10/2024 26     ANION GAP 03/10/2024 10     BUN 03/10/2024 23     Creatinine 03/10/2024 1.02     Glucose 03/10/2024 133     Calcium 03/10/2024 8.9     AST 03/10/2024 25     ALT 03/10/2024 16     Alkaline Phosphatase 03/10/2024 81     Total Protein 03/10/2024 6.7     Albumin 03/10/2024 3.9     Total Bilirubin 03/10/2024 0.49     eGFR 03/10/2024 60     hs TnI 0hr 03/10/2024 9     hs TnI 2hr 03/10/2024 9     Delta 2hr hsTnI 03/10/2024 0     Ventricular Rate 03/10/2024 79     Atrial Rate 03/10/2024 79     SD Interval 03/10/2024 192     QRSD Interval 03/10/2024 94     QT Interval 03/10/2024 402     QTC Interval 03/10/2024 460     P Axis 03/10/2024 60     QRS Axis 03/10/2024 -49     T Wave [place] 03/10/2024 95    Appointment on 02/12/2024   Component Date Value    Sodium 02/12/2024 137     Potassium 02/12/2024 4.5     Chloride 02/12/2024 101     CO2 02/12/2024 27     ANION GAP 02/12/2024 9     BUN 02/12/2024 21     Creatinine 02/12/2024 1.00     Glucose 02/12/2024 112     Calcium 02/12/2024 9.1     eGFR 02/12/2024 61     Uric Acid 02/12/2024 3.6      Imaging: XR spine lumbar minimum 4 views non  injury    Result Date: 5/23/2024  Narrative: LUMBAR SPINE INDICATION:   Low back pain, unspecified. Other chronic pain. COMPARISON:  None. VIEWS:  XR SPINE LUMBAR MINIMUM 4 VIEWS NON INJURY FINDINGS: There are 5 non rib bearing lumbar vertebral bodies. There is no evidence of acute fracture or destructive osseous lesion. Alignment is unremarkable. Multilevel mild discogenic degenerative disease with large anterior osteophytes The pedicles appear intact. No pars defects. There are atherosclerotic calcifications. Soft tissues are otherwise unremarkable.     Impression: Normal examination. Electronically signed: 05/23/2024 08:47 PM Adolfo Wynn MD      Review of Systems:  Review of Systems   Constitutional:  Negative for diaphoresis, fatigue, fever and unexpected weight change.   HENT: Negative.     Respiratory:  Negative for cough, shortness of breath and wheezing.    Cardiovascular:  Negative for chest pain, palpitations and leg swelling.   Gastrointestinal:  Negative for abdominal pain, diarrhea and nausea.   Musculoskeletal:  Negative for gait problem and myalgias.   Skin:  Negative for rash.   Neurological:  Negative for dizziness and numbness.   Psychiatric/Behavioral: Negative.         Physical Exam:  Physical Exam  Constitutional:       Appearance: He is well-developed.   HENT:      Head: Normocephalic and atraumatic.   Eyes:      Pupils: Pupils are equal, round, and reactive to light.   Neck:      Vascular: No JVD.   Cardiovascular:      Rate and Rhythm: Regular rhythm.      Pulses: Normal pulses.           Carotid pulses are 2+ on the right side and 2+ on the left side.     Heart sounds: S1 normal and S2 normal.   Pulmonary:      Effort: Pulmonary effort is normal.      Breath sounds: Normal breath sounds. No wheezing or rales.   Abdominal:      General: Bowel sounds are normal.      Palpations: Abdomen is soft.   Musculoskeletal:         General: No tenderness. Normal range of motion.      Cervical back:  Normal range of motion and neck supple.   Skin:     General: Skin is warm.   Neurological:      Mental Status: He is alert and oriented to person, place, and time.      Cranial Nerves: No cranial nerve deficit.      Deep Tendon Reflexes: Reflexes are normal and symmetric.

## 2024-10-07 ENCOUNTER — APPOINTMENT (EMERGENCY)
Dept: RADIOLOGY | Facility: HOSPITAL | Age: 89
DRG: 603 | End: 2024-10-07
Payer: COMMERCIAL

## 2024-10-07 ENCOUNTER — HOSPITAL ENCOUNTER (EMERGENCY)
Facility: HOSPITAL | Age: 89
Discharge: HOME | DRG: 603 | End: 2024-10-08
Attending: INTERNAL MEDICINE
Payer: COMMERCIAL

## 2024-10-07 VITALS
DIASTOLIC BLOOD PRESSURE: 74 MMHG | WEIGHT: 157 LBS | SYSTOLIC BLOOD PRESSURE: 184 MMHG | OXYGEN SATURATION: 97 % | HEART RATE: 70 BPM | HEIGHT: 66 IN | RESPIRATION RATE: 14 BRPM | TEMPERATURE: 97.3 F | BODY MASS INDEX: 25.23 KG/M2

## 2024-10-07 DIAGNOSIS — M54.10 RADICULOPATHY, UNSPECIFIED SPINAL REGION: ICD-10-CM

## 2024-10-07 DIAGNOSIS — M79.602 PAIN IN BOTH UPPER EXTREMITIES: Primary | ICD-10-CM

## 2024-10-07 DIAGNOSIS — M79.601 PAIN IN BOTH UPPER EXTREMITIES: Primary | ICD-10-CM

## 2024-10-07 LAB
2HR DELTA HS TROPONIN: 1 NG/L
ALBUMIN SERPL BCG-MCNC: 3.9 G/DL (ref 3.5–5)
ALP SERPL-CCNC: 68 U/L (ref 34–104)
ALT SERPL W P-5'-P-CCNC: 28 U/L (ref 7–52)
ANION GAP SERPL CALCULATED.3IONS-SCNC: 6 MMOL/L (ref 4–13)
AST SERPL W P-5'-P-CCNC: 36 U/L (ref 13–39)
BASOPHILS # BLD AUTO: 0.04 THOUSANDS/ΜL (ref 0–0.1)
BASOPHILS NFR BLD AUTO: 1 % (ref 0–1)
BILIRUB SERPL-MCNC: 0.39 MG/DL (ref 0.2–1)
BUN SERPL-MCNC: 26 MG/DL (ref 5–25)
CALCIUM SERPL-MCNC: 9.1 MG/DL (ref 8.4–10.2)
CARDIAC TROPONIN I PNL SERPL HS: 10 NG/L
CARDIAC TROPONIN I PNL SERPL HS: 9 NG/L
CHLORIDE SERPL-SCNC: 104 MMOL/L (ref 96–108)
CO2 SERPL-SCNC: 27 MMOL/L (ref 21–32)
CREAT SERPL-MCNC: 0.92 MG/DL (ref 0.6–1.3)
EOSINOPHIL # BLD AUTO: 0.1 THOUSAND/ΜL (ref 0–0.61)
EOSINOPHIL NFR BLD AUTO: 1 % (ref 0–6)
ERYTHROCYTE [DISTWIDTH] IN BLOOD BY AUTOMATED COUNT: 14.2 % (ref 11.6–15.1)
GFR SERPL CREATININE-BSD FRML MDRD: 68 ML/MIN/1.73SQ M
GLUCOSE SERPL-MCNC: 85 MG/DL (ref 65–140)
HCT VFR BLD AUTO: 40.1 % (ref 36.5–49.3)
HGB BLD-MCNC: 12.9 G/DL (ref 12–17)
IMM GRANULOCYTES # BLD AUTO: 0.08 THOUSAND/UL (ref 0–0.2)
IMM GRANULOCYTES NFR BLD AUTO: 1 % (ref 0–2)
LYMPHOCYTES # BLD AUTO: 1.3 THOUSANDS/ΜL (ref 0.6–4.47)
LYMPHOCYTES NFR BLD AUTO: 16 % (ref 14–44)
MCH RBC QN AUTO: 32.4 PG (ref 26.8–34.3)
MCHC RBC AUTO-ENTMCNC: 32.2 G/DL (ref 31.4–37.4)
MCV RBC AUTO: 101 FL (ref 82–98)
MONOCYTES # BLD AUTO: 0.76 THOUSAND/ΜL (ref 0.17–1.22)
MONOCYTES NFR BLD AUTO: 9 % (ref 4–12)
NEUTROPHILS # BLD AUTO: 6.08 THOUSANDS/ΜL (ref 1.85–7.62)
NEUTS SEG NFR BLD AUTO: 72 % (ref 43–75)
NRBC BLD AUTO-RTO: 0 /100 WBCS
PLATELET # BLD AUTO: 178 THOUSANDS/UL (ref 149–390)
PMV BLD AUTO: 10.4 FL (ref 8.9–12.7)
POTASSIUM SERPL-SCNC: 4.2 MMOL/L (ref 3.5–5.3)
PROT SERPL-MCNC: 6.8 G/DL (ref 6.4–8.4)
RBC # BLD AUTO: 3.98 MILLION/UL (ref 3.88–5.62)
SODIUM SERPL-SCNC: 137 MMOL/L (ref 135–147)
WBC # BLD AUTO: 8.36 THOUSAND/UL (ref 4.31–10.16)

## 2024-10-07 PROCEDURE — 99284 EMERGENCY DEPT VISIT MOD MDM: CPT | Performed by: INTERNAL MEDICINE

## 2024-10-07 PROCEDURE — 93005 ELECTROCARDIOGRAM TRACING: CPT

## 2024-10-07 PROCEDURE — 99284 EMERGENCY DEPT VISIT MOD MDM: CPT

## 2024-10-07 PROCEDURE — 71045 X-RAY EXAM CHEST 1 VIEW: CPT

## 2024-10-07 PROCEDURE — 84484 ASSAY OF TROPONIN QUANT: CPT | Performed by: INTERNAL MEDICINE

## 2024-10-07 PROCEDURE — 85025 COMPLETE CBC W/AUTO DIFF WBC: CPT | Performed by: INTERNAL MEDICINE

## 2024-10-07 PROCEDURE — 36415 COLL VENOUS BLD VENIPUNCTURE: CPT | Performed by: INTERNAL MEDICINE

## 2024-10-07 PROCEDURE — 80053 COMPREHEN METABOLIC PANEL: CPT | Performed by: INTERNAL MEDICINE

## 2024-10-07 RX ORDER — SODIUM CHLORIDE 9 MG/ML
3 INJECTION INTRAVENOUS
Status: DISCONTINUED | OUTPATIENT
Start: 2024-10-07 | End: 2024-10-08 | Stop reason: HOSPADM

## 2024-10-07 NOTE — ED PROVIDER NOTES
Final diagnoses:   Pain in both upper extremities   Radiculopathy, unspecified spinal region     ED Disposition       ED Disposition   Discharge    Condition   Stable    Date/Time   Mon Oct 7, 2024 11:12 PM    Comment   Prem Mar Sr. discharge to home/self care.                   Assessment & Plan       Medical Decision Making  89-year-old male accompanied by multiple family members presents with bilateral arm weakness.  Weakness is nonspecific bilateral arms he feels his just does not have the strength in his arms anymore longer.  Patient actually believes this is from his carpal tunnel his physical exam is completely unremarkable.  He feels that the gabapentin does help him at 100 mg twice daily I explained to him that he can take it 3 times daily and follow-up with his PCP.    Amount and/or Complexity of Data Reviewed  Labs: ordered.  Radiology: ordered and independent interpretation performed.    Risk  Prescription drug management.             Medications   sodium chloride (PF) 0.9 % injection 3 mL (has no administration in time range)       ED Risk Strat Scores                           SBIRT 20yo+      Flowsheet Row Most Recent Value   Initial Alcohol Screen: US AUDIT-C     1. How often do you have a drink containing alcohol? 0 Filed at: 10/07/2024 1942   2. How many drinks containing alcohol do you have on a typical day you are drinking?  0 Filed at: 10/07/2024 1942   3a. Male UNDER 65: How often do you have five or more drinks on one occasion? 0 Filed at: 10/07/2024 1942   3b. FEMALE Any Age, or MALE 65+: How often do you have 4 or more drinks on one occassion? 0 Filed at: 10/07/2024 1942   Audit-C Score 0 Filed at: 10/07/2024 1942   NISHANT: How many times in the past year have you...    Used an illegal drug or used a prescription medication for non-medical reasons? Never Filed at: 10/07/2024 1942                            History of Present Illness       Chief Complaint   Patient presents with    Arm  Pain     Left, started this morning, non-radiating, arm up to elbow       Past Medical History:   Diagnosis Date    Arthritis     Cataract     Coronary artery disease     Coronary atherosclerosis of native coronary artery     Diverticulitis of colon     Essential hypertension, benign     Hyperlipidemia     Hypertension     Peptic ulcer     Renal disorder       Past Surgical History:   Procedure Laterality Date    CATARACT EXTRACTION Right     Left eye 5/2021    CORONARY STENT PLACEMENT      SKIN BIOPSY      TONSILLECTOMY        Family History   Problem Relation Age of Onset    Heart attack Brother     Heart disease Mother     Cancer Father       Social History     Tobacco Use    Smoking status: Former     Types: Pipe    Smokeless tobacco: Never   Vaping Use    Vaping status: Never Used   Substance Use Topics    Alcohol use: Not Currently    Drug use: No      E-Cigarette/Vaping    E-Cigarette Use Never User       E-Cigarette/Vaping Substances    Nicotine No     THC No     CBD No     Flavoring No     Other No     Unknown No       I have reviewed and agree with the history as documented.     89-year-old male brought in by EMS complaining of bilateral arm weakness.  Patient states this has been going on for several weeks, he feels his just does not have the strength in his arms he used to have.  He has no leg complaints, he denies any facial droop slurred speech hemiparesis.  Patient's son called his PCP earlier today to see if he can make a housecall for his carpal tunnel.  The patient denies any chest pain pressure heaviness tightness over the chest or shortness of breath.        Review of Systems   Constitutional: Negative.    HENT: Negative.     Respiratory: Negative.     Cardiovascular:  Positive for chest pain.   Gastrointestinal: Negative.    Genitourinary: Negative.    Hematological: Negative.    Psychiatric/Behavioral: Negative.             Objective       ED Triage Vitals [10/07/24 1942]   Temperature Pulse  Blood Pressure Respirations SpO2 Patient Position - Orthostatic VS   (!) 97.3 °F (36.3 °C) 66 163/74 18 97 % Sitting      Temp Source Heart Rate Source BP Location FiO2 (%) Pain Score    Temporal Monitor Right arm -- 3      Vitals      Date and Time Temp Pulse SpO2 Resp BP Pain Score FACES Pain Rating User   10/07/24 2300 -- 70 97 % 14 184/74 -- -- JR   10/07/24 1942 97.3 °F (36.3 °C) 66 97 % 18 163/74 3 -- JR            Physical Exam  Vitals and nursing note reviewed.   Constitutional:       General: He is not in acute distress.     Appearance: Normal appearance. He is not ill-appearing.   HENT:      Head: Normocephalic and atraumatic.      Nose: Nose normal.   Eyes:      Extraocular Movements: Extraocular movements intact.      Conjunctiva/sclera: Conjunctivae normal.   Cardiovascular:      Rate and Rhythm: Normal rate and regular rhythm.      Pulses: Normal pulses.      Heart sounds: Normal heart sounds.   Pulmonary:      Effort: Pulmonary effort is normal.      Breath sounds: Normal breath sounds.   Abdominal:      General: Abdomen is flat. Bowel sounds are normal.      Palpations: Abdomen is soft.   Musculoskeletal:         General: No swelling, tenderness, deformity or signs of injury. Normal range of motion.      Cervical back: Normal range of motion and neck supple.   Skin:     General: Skin is warm and dry.      Capillary Refill: Capillary refill takes less than 2 seconds.   Neurological:      General: No focal deficit present.      Mental Status: He is alert and oriented to person, place, and time. Mental status is at baseline.   Psychiatric:         Mood and Affect: Mood normal.         Behavior: Behavior normal.         Thought Content: Thought content normal.         Judgment: Judgment normal.         Results Reviewed       Procedure Component Value Units Date/Time    HS Troponin I 2hr [[id number]]  (Normal) Collected: 10/07/24 2237    Lab Status: Final result Specimen: Blood from Arm, Right Updated:  10/07/24 2303     hs TnI 2hr 10 ng/L      Delta 2hr hsTnI 1 ng/L     HS Troponin I 4hr [[id number]]     Lab Status: No result Specimen: Blood     HS Troponin 0hr (reflex protocol) [[id number]]  (Normal) Collected: 10/07/24 2010    Lab Status: Final result Specimen: Blood from Arm, Right Updated: 10/07/24 2036     hs TnI 0hr 9 ng/L     Comprehensive metabolic panel [[id number]]  (Abnormal) Collected: 10/07/24 2010    Lab Status: Final result Specimen: Blood from Arm, Right Updated: 10/07/24 2032     Sodium 137 mmol/L      Potassium 4.2 mmol/L      Chloride 104 mmol/L      CO2 27 mmol/L      ANION GAP 6 mmol/L      BUN 26 mg/dL      Creatinine 0.92 mg/dL      Glucose 85 mg/dL      Calcium 9.1 mg/dL      AST 36 U/L      ALT 28 U/L      Alkaline Phosphatase 68 U/L      Total Protein 6.8 g/dL      Albumin 3.9 g/dL      Total Bilirubin 0.39 mg/dL      eGFR 68 ml/min/1.73sq m     Narrative:      National Kidney Disease Foundation guidelines for Chronic Kidney Disease (CKD):     Stage 1 with normal or high GFR (GFR > 90 mL/min/1.73 square meters)    Stage 2 Mild CKD (GFR = 60-89 mL/min/1.73 square meters)    Stage 3A Moderate CKD (GFR = 45-59 mL/min/1.73 square meters)    Stage 3B Moderate CKD (GFR = 30-44 mL/min/1.73 square meters)    Stage 4 Severe CKD (GFR = 15-29 mL/min/1.73 square meters)    Stage 5 End Stage CKD (GFR <15 mL/min/1.73 square meters)  Note: GFR calculation is accurate only with a steady state creatinine    CBC and differential [[id number]]  (Abnormal) Collected: 10/07/24 2010    Lab Status: Final result Specimen: Blood from Arm, Right Updated: 10/07/24 2015     WBC 8.36 Thousand/uL      RBC 3.98 Million/uL      Hemoglobin 12.9 g/dL      Hematocrit 40.1 %       fL      MCH 32.4 pg      MCHC 32.2 g/dL      RDW 14.2 %      MPV 10.4 fL      Platelets 178 Thousands/uL      nRBC 0 /100 WBCs      Segmented % 72 %      Immature Grans % 1 %      Lymphocytes % 16 %      Monocytes % 9 %      Eosinophils Relative  1 %      Basophils Relative 1 %      Absolute Neutrophils 6.08 Thousands/µL      Absolute Immature Grans 0.08 Thousand/uL      Absolute Lymphocytes 1.30 Thousands/µL      Absolute Monocytes 0.76 Thousand/µL      Eosinophils Absolute 0.10 Thousand/µL      Basophils Absolute 0.04 Thousands/µL             X-ray chest 1 view portable   ED Interpretation by Tavon Schreiber MD (10/07 2006)   Portable chest x-ray no infiltrate effusion or pneumothorax appreciated.      Final Interpretation by Sina Vitale MD (10/07 2110)      No acute cardiopulmonary disease.            Workstation performed: [id number]             ECG 12 Lead Documentation Only    Date/Time: 10/7/2024 8:14 PM    Performed by: Tavon Schreiber MD  Authorized by: Tavon Schreiber MD    Indications / Diagnosis:  Atypical chest pain  ECG reviewed by me, the ED Provider: yes    Patient location:  ED  Previous ECG:     Previous ECG:  Compared to current    Similarity:  No change    Comparison to cardiac monitor: Yes    Interpretation:     Interpretation: non-specific    Rate:     ECG rate:  70    ECG rate assessment: normal    Rhythm:     Rhythm: sinus rhythm    Ectopy:     Ectopy: PVCs    QRS:     QRS axis:  Normal  Conduction:     Conduction: normal    ST segments:     ST segments:  Normal  T waves:     T waves: non-specific        ED Medication and Procedure Management   Prior to Admission Medications   Prescriptions Last Dose Informant Patient Reported? Taking?   ALLOPURINOL PO  Self, Child Yes No   Sig: Take 150 mg by mouth daily 150mg   Patient not taking: Reported on 4/17/2024   Cholecalciferol 50 MCG (2000 UT) CAPS  Self, Child Yes No   Sig: Take by mouth   Choline Fenofibrate (FENOFIBRIC ACID) 45 MG CPDR  Self, Child Yes No   Sig: Take by mouth   Multiple Vitamins tablet  Self, Child Yes No   Sig: Take by mouth   allopurinol (ZYLOPRIM) 300 mg tablet  Self, Child Yes No   aspirin 81 MG tablet  Self, Child Yes No   Sig: Take 1 tablet by mouth  see administration instructions Take 1 tablet twice weekly   atorvastatin (LIPITOR) 20 mg tablet  Self, Child Yes No   Sig: Take 1 tablet by mouth daily   Patient not taking: Reported on 5/29/2024   furosemide (LASIX) 20 mg tablet  Self, Child No No   Sig: Take 1 tablet (20 mg total) by mouth daily as needed (swelling)   Patient not taking: Reported on 4/16/2024   gabapentin (NEURONTIN) 100 mg capsule  Self, Child Yes No   Sig: Take 1 capsule by mouth every 12 (twelve) hours   metoprolol succinate (TOPROL-XL) 25 mg 24 hr tablet   No No   Sig: Take 1 tablet (25 mg total) by mouth daily   nitroglycerin (Nitrostat) 0.4 mg SL tablet  Self, Child No No   Sig: Place 1 tablet (0.4 mg total) under the tongue every 5 (five) minutes as needed for chest pain prn   potassium chloride (KLOR-CON) 20 mEq packet  Self, Child Yes No   Sig: Take 20 mEq by mouth as needed Take 1 tablet when taking Furosemide      Facility-Administered Medications: None     Patient's Medications   Discharge Prescriptions    No medications on file     No discharge procedures on file.  ED SEPSIS DOCUMENTATION   Time reflects when diagnosis was documented in both MDM as applicable and the Disposition within this note       Time User Action Codes Description Comment    10/7/2024 11:12 PM Tavon Schreiber [M79.601,  M79.602] Pain in both upper extremities     10/7/2024 11:12 PM Tavon Schreiber [M54.10] Radiculopathy, unspecified spinal region                  Tavon Schreiber MD  10/07/24 7500

## 2024-10-08 LAB
ATRIAL RATE: 71 BPM
ATRIAL RATE: 71 BPM
P AXIS: 86 DEGREES
P AXIS: 90 DEGREES
PR INTERVAL: 184 MS
PR INTERVAL: 202 MS
QRS AXIS: -52 DEGREES
QRS AXIS: -53 DEGREES
QRSD INTERVAL: 88 MS
QRSD INTERVAL: 90 MS
QT INTERVAL: 370 MS
QT INTERVAL: 438 MS
QTC INTERVAL: 402 MS
QTC INTERVAL: 475 MS
T WAVE AXIS: 105 DEGREES
T WAVE AXIS: 128 DEGREES
VENTRICULAR RATE: 71 BPM
VENTRICULAR RATE: 71 BPM

## 2024-10-08 PROCEDURE — 93010 ELECTROCARDIOGRAM REPORT: CPT | Performed by: INTERNAL MEDICINE

## 2024-10-10 ENCOUNTER — HOSPITAL ENCOUNTER (INPATIENT)
Facility: HOSPITAL | Age: 89
LOS: 4 days | Discharge: NURSING, REHAB OR LONG-TERM CARE | DRG: 603 | End: 2024-10-15
Attending: EMERGENCY MEDICINE | Admitting: INTERNAL MEDICINE
Payer: COMMERCIAL

## 2024-10-10 ENCOUNTER — HOSPITAL ENCOUNTER (EMERGENCY)
Facility: HOSPITAL | Age: 89
Discharge: HOME | DRG: 603 | End: 2024-10-10
Attending: EMERGENCY MEDICINE
Payer: COMMERCIAL

## 2024-10-10 ENCOUNTER — APPOINTMENT (EMERGENCY)
Dept: CT IMAGING | Facility: HOSPITAL | Age: 89
DRG: 603 | End: 2024-10-10
Payer: COMMERCIAL

## 2024-10-10 VITALS
BODY MASS INDEX: 25.23 KG/M2 | RESPIRATION RATE: 20 BRPM | TEMPERATURE: 97.7 F | OXYGEN SATURATION: 98 % | HEIGHT: 66 IN | WEIGHT: 157 LBS | DIASTOLIC BLOOD PRESSURE: 66 MMHG | HEART RATE: 72 BPM | SYSTOLIC BLOOD PRESSURE: 143 MMHG

## 2024-10-10 DIAGNOSIS — B35.6 TINEA CRURIS: ICD-10-CM

## 2024-10-10 DIAGNOSIS — R53.1 GENERALIZED WEAKNESS: Primary | ICD-10-CM

## 2024-10-10 DIAGNOSIS — L03.90 CELLULITIS: Primary | ICD-10-CM

## 2024-10-10 LAB
ANION GAP SERPL CALCULATED.3IONS-SCNC: 7 MMOL/L (ref 4–13)
ANION GAP SERPL CALCULATED.3IONS-SCNC: 8 MMOL/L (ref 4–13)
BASOPHILS # BLD AUTO: 0.04 THOUSANDS/ΜL (ref 0–0.1)
BASOPHILS # BLD AUTO: 0.04 THOUSANDS/ΜL (ref 0–0.1)
BASOPHILS NFR BLD AUTO: 0 % (ref 0–1)
BASOPHILS NFR BLD AUTO: 0 % (ref 0–1)
BUN SERPL-MCNC: 23 MG/DL (ref 5–25)
BUN SERPL-MCNC: 25 MG/DL (ref 5–25)
CALCIUM SERPL-MCNC: 8.7 MG/DL (ref 8.4–10.2)
CALCIUM SERPL-MCNC: 9 MG/DL (ref 8.4–10.2)
CHLORIDE SERPL-SCNC: 101 MMOL/L (ref 96–108)
CHLORIDE SERPL-SCNC: 103 MMOL/L (ref 96–108)
CO2 SERPL-SCNC: 24 MMOL/L (ref 21–32)
CO2 SERPL-SCNC: 26 MMOL/L (ref 21–32)
CREAT SERPL-MCNC: 0.92 MG/DL (ref 0.6–1.3)
CREAT SERPL-MCNC: 0.92 MG/DL (ref 0.6–1.3)
EOSINOPHIL # BLD AUTO: 0.13 THOUSAND/ΜL (ref 0–0.61)
EOSINOPHIL # BLD AUTO: 0.17 THOUSAND/ΜL (ref 0–0.61)
EOSINOPHIL NFR BLD AUTO: 1 % (ref 0–6)
EOSINOPHIL NFR BLD AUTO: 2 % (ref 0–6)
ERYTHROCYTE [DISTWIDTH] IN BLOOD BY AUTOMATED COUNT: 14.1 % (ref 11.6–15.1)
ERYTHROCYTE [DISTWIDTH] IN BLOOD BY AUTOMATED COUNT: 14.3 % (ref 11.6–15.1)
GFR SERPL CREATININE-BSD FRML MDRD: 68 ML/MIN/1.73SQ M
GFR SERPL CREATININE-BSD FRML MDRD: 68 ML/MIN/1.73SQ M
GLUCOSE SERPL-MCNC: 142 MG/DL (ref 65–140)
GLUCOSE SERPL-MCNC: 145 MG/DL (ref 65–140)
GLUCOSE SERPL-MCNC: 160 MG/DL (ref 65–140)
HCT VFR BLD AUTO: 40.4 % (ref 36.5–49.3)
HCT VFR BLD AUTO: 41.2 % (ref 36.5–49.3)
HGB BLD-MCNC: 13 G/DL (ref 12–17)
HGB BLD-MCNC: 13.3 G/DL (ref 12–17)
IMM GRANULOCYTES # BLD AUTO: 0.09 THOUSAND/UL (ref 0–0.2)
IMM GRANULOCYTES # BLD AUTO: 0.09 THOUSAND/UL (ref 0–0.2)
IMM GRANULOCYTES NFR BLD AUTO: 1 % (ref 0–2)
IMM GRANULOCYTES NFR BLD AUTO: 1 % (ref 0–2)
LYMPHOCYTES # BLD AUTO: 1.19 THOUSANDS/ΜL (ref 0.6–4.47)
LYMPHOCYTES # BLD AUTO: 1.28 THOUSANDS/ΜL (ref 0.6–4.47)
LYMPHOCYTES NFR BLD AUTO: 13 % (ref 14–44)
LYMPHOCYTES NFR BLD AUTO: 13 % (ref 14–44)
MCH RBC QN AUTO: 32.2 PG (ref 26.8–34.3)
MCH RBC QN AUTO: 32.4 PG (ref 26.8–34.3)
MCHC RBC AUTO-ENTMCNC: 32.2 G/DL (ref 31.4–37.4)
MCHC RBC AUTO-ENTMCNC: 32.3 G/DL (ref 31.4–37.4)
MCV RBC AUTO: 100 FL (ref 82–98)
MCV RBC AUTO: 101 FL (ref 82–98)
MONOCYTES # BLD AUTO: 0.54 THOUSAND/ΜL (ref 0.17–1.22)
MONOCYTES # BLD AUTO: 0.67 THOUSAND/ΜL (ref 0.17–1.22)
MONOCYTES NFR BLD AUTO: 6 % (ref 4–12)
MONOCYTES NFR BLD AUTO: 7 % (ref 4–12)
NEUTROPHILS # BLD AUTO: 7.35 THOUSANDS/ΜL (ref 1.85–7.62)
NEUTROPHILS # BLD AUTO: 7.5 THOUSANDS/ΜL (ref 1.85–7.62)
NEUTS SEG NFR BLD AUTO: 77 % (ref 43–75)
NEUTS SEG NFR BLD AUTO: 79 % (ref 43–75)
NRBC BLD AUTO-RTO: 0 /100 WBCS
NRBC BLD AUTO-RTO: 0 /100 WBCS
PLATELET # BLD AUTO: 182 THOUSANDS/UL (ref 149–390)
PLATELET # BLD AUTO: 182 THOUSANDS/UL (ref 149–390)
PMV BLD AUTO: 10.6 FL (ref 8.9–12.7)
PMV BLD AUTO: 10.7 FL (ref 8.9–12.7)
POTASSIUM SERPL-SCNC: 4.1 MMOL/L (ref 3.5–5.3)
POTASSIUM SERPL-SCNC: 4.5 MMOL/L (ref 3.5–5.3)
RBC # BLD AUTO: 4.04 MILLION/UL (ref 3.88–5.62)
RBC # BLD AUTO: 4.1 MILLION/UL (ref 3.88–5.62)
SODIUM SERPL-SCNC: 133 MMOL/L (ref 135–147)
SODIUM SERPL-SCNC: 136 MMOL/L (ref 135–147)
WBC # BLD AUTO: 9.34 THOUSAND/UL (ref 4.31–10.16)
WBC # BLD AUTO: 9.75 THOUSAND/UL (ref 4.31–10.16)

## 2024-10-10 PROCEDURE — 85025 COMPLETE CBC W/AUTO DIFF WBC: CPT | Performed by: EMERGENCY MEDICINE

## 2024-10-10 PROCEDURE — 80048 BASIC METABOLIC PNL TOTAL CA: CPT | Performed by: EMERGENCY MEDICINE

## 2024-10-10 PROCEDURE — 99284 EMERGENCY DEPT VISIT MOD MDM: CPT

## 2024-10-10 PROCEDURE — 82948 REAGENT STRIP/BLOOD GLUCOSE: CPT

## 2024-10-10 PROCEDURE — 36415 COLL VENOUS BLD VENIPUNCTURE: CPT | Performed by: EMERGENCY MEDICINE

## 2024-10-10 PROCEDURE — 99284 EMERGENCY DEPT VISIT MOD MDM: CPT | Performed by: EMERGENCY MEDICINE

## 2024-10-10 PROCEDURE — 99285 EMERGENCY DEPT VISIT HI MDM: CPT

## 2024-10-10 PROCEDURE — 99285 EMERGENCY DEPT VISIT HI MDM: CPT | Performed by: EMERGENCY MEDICINE

## 2024-10-10 PROCEDURE — 93005 ELECTROCARDIOGRAM TRACING: CPT

## 2024-10-10 PROCEDURE — 83036 HEMOGLOBIN GLYCOSYLATED A1C: CPT | Performed by: PHYSICIAN ASSISTANT

## 2024-10-10 PROCEDURE — 72193 CT PELVIS W/DYE: CPT

## 2024-10-10 RX ORDER — INSULIN LISPRO 100 [IU]/ML
1-5 INJECTION, SOLUTION INTRAVENOUS; SUBCUTANEOUS
Status: DISCONTINUED | OUTPATIENT
Start: 2024-10-11 | End: 2024-10-11

## 2024-10-10 RX ORDER — NYSTATIN 100000 U/G
CREAM TOPICAL 2 TIMES DAILY
Status: DISCONTINUED | OUTPATIENT
Start: 2024-10-10 | End: 2024-10-12

## 2024-10-10 RX ORDER — CEFUROXIME AXETIL 500 MG/1
500 TABLET ORAL EVERY 12 HOURS SCHEDULED
Qty: 20 TABLET | Refills: 0 | Status: SHIPPED | OUTPATIENT
Start: 2024-10-10 | End: 2024-10-15

## 2024-10-10 RX ORDER — INSULIN LISPRO 100 [IU]/ML
1-5 INJECTION, SOLUTION INTRAVENOUS; SUBCUTANEOUS
Status: DISCONTINUED | OUTPATIENT
Start: 2024-10-10 | End: 2024-10-11

## 2024-10-10 RX ORDER — POTASSIUM CHLORIDE 1500 MG/1
20 TABLET, EXTENDED RELEASE ORAL DAILY
Status: DISCONTINUED | OUTPATIENT
Start: 2024-10-10 | End: 2024-10-15 | Stop reason: HOSPADM

## 2024-10-10 RX ORDER — FENOFIBRATE 48 MG/1
48 TABLET, COATED ORAL DAILY
Status: DISCONTINUED | OUTPATIENT
Start: 2024-10-11 | End: 2024-10-15 | Stop reason: HOSPADM

## 2024-10-10 RX ORDER — METOPROLOL SUCCINATE 25 MG/1
25 TABLET, EXTENDED RELEASE ORAL DAILY
Status: DISCONTINUED | OUTPATIENT
Start: 2024-10-11 | End: 2024-10-15 | Stop reason: HOSPADM

## 2024-10-10 RX ORDER — NYSTATIN 100000 U/G
CREAM TOPICAL
Qty: 30 G | Refills: 0 | Status: SHIPPED | OUTPATIENT
Start: 2024-10-10

## 2024-10-10 RX ORDER — GABAPENTIN 100 MG/1
100 CAPSULE ORAL EVERY 12 HOURS
Status: DISCONTINUED | OUTPATIENT
Start: 2024-10-10 | End: 2024-10-15 | Stop reason: HOSPADM

## 2024-10-10 RX ORDER — CEFUROXIME AXETIL 250 MG/1
500 TABLET ORAL ONCE
Status: COMPLETED | OUTPATIENT
Start: 2024-10-10 | End: 2024-10-10

## 2024-10-10 RX ORDER — NYSTATIN 100000 [USP'U]/G
POWDER TOPICAL 2 TIMES DAILY
Status: DISCONTINUED | OUTPATIENT
Start: 2024-10-10 | End: 2024-10-10 | Stop reason: HOSPADM

## 2024-10-10 RX ORDER — CEFUROXIME AXETIL 250 MG/1
500 TABLET ORAL EVERY 12 HOURS SCHEDULED
Status: DISCONTINUED | OUTPATIENT
Start: 2024-10-10 | End: 2024-10-11

## 2024-10-10 RX ORDER — NITROGLYCERIN 0.4 MG/1
0.4 TABLET SUBLINGUAL
Status: DISCONTINUED | OUTPATIENT
Start: 2024-10-10 | End: 2024-10-15 | Stop reason: HOSPADM

## 2024-10-10 RX ORDER — NYSTATIN 100000 [USP'U]/G
POWDER TOPICAL 2 TIMES DAILY
Status: DISCONTINUED | OUTPATIENT
Start: 2024-10-10 | End: 2024-10-10

## 2024-10-10 RX ADMIN — NYSTATIN 1 APPLICATION: 100000 CREAM TOPICAL at 21:05

## 2024-10-10 RX ADMIN — CEFUROXIME AXETIL 500 MG: 250 TABLET ORAL at 16:17

## 2024-10-10 RX ADMIN — GABAPENTIN 100 MG: 100 CAPSULE ORAL at 21:04

## 2024-10-10 RX ADMIN — POTASSIUM CHLORIDE 20 MEQ: 1500 TABLET, EXTENDED RELEASE ORAL at 21:04

## 2024-10-10 RX ADMIN — IOHEXOL 100 ML: 350 INJECTION, SOLUTION INTRAVENOUS at 14:15

## 2024-10-10 RX ADMIN — CEFUROXIME AXETIL 500 MG: 250 TABLET ORAL at 21:04

## 2024-10-10 RX ADMIN — Medication 2000 UNITS: at 21:04

## 2024-10-10 RX ADMIN — NYSTATIN 1 APPLICATION: 100000 POWDER TOPICAL at 13:44

## 2024-10-10 NOTE — ED PROVIDER NOTES
Time reflects when diagnosis was documented in both MDM as applicable and the Disposition within this note       Time User Action Codes Description Comment    10/10/2024  4:09 PM Roque Sheppard [L03.90] Cellulitis     10/10/2024  4:09 PM Roque Sheppard [B35.6] Tinea cruris           ED Disposition       ED Disposition   Discharge    Condition   Stable    Date/Time   Thu Oct 10, 2024  4:09 PM    Comment   Prem Mar Sr. discharge to home/self care.                   Assessment & Plan       Medical Decision Making  89-year-old male coming in for possible right inguinal wound with reported drainage per family.  Patient has no complaints.  Wound is not actively draining however does appear to have dried drainage around it.  Minimal to no tenderness on exam.  There is a foul odor to the area.  Will check CBC, BMP, CT pelvis to eval for the possible deepening of infection however likely overlying fungal infection with possible cellulitis.  Blood work unremarkable.  CT scan showing cellulitis.  Discussed the importance of keeping the area dry and placed on nystatin powder along with Ceftin antibiotics.  Strict return precautions discussed and provided.    Amount and/or Complexity of Data Reviewed  Labs: ordered.  Radiology: ordered.    Risk  Prescription drug management.             Medications   nystatin (MYCOSTATIN) powder (1 Application Topical Given 10/10/24 1344)   iohexol (OMNIPAQUE) 350 MG/ML injection (MULTI-DOSE) 100 mL (100 mL Intravenous Given 10/10/24 1415)   cefuroxime (CEFTIN) tablet 500 mg (500 mg Oral Given 10/10/24 1617)       ED Risk Strat Scores                           SBIRT 22yo+      Flowsheet Row Most Recent Value   Initial Alcohol Screen: US AUDIT-C     1. How often do you have a drink containing alcohol? 0 Filed at: 10/10/2024 1304   2. How many drinks containing alcohol do you have on a typical day you are drinking?  0 Filed at: 10/10/2024 1304   3a. Male UNDER 65: How often  do you have five or more drinks on one occasion? 0 Filed at: 10/10/2024 1304   3b. FEMALE Any Age, or MALE 65+: How often do you have 4 or more drinks on one occassion? 0 Filed at: 10/10/2024 1304   Audit-C Score 0 Filed at: 10/10/2024 1304   NISHANT: How many times in the past year have you...    Used an illegal drug or used a prescription medication for non-medical reasons? Never Filed at: 10/10/2024 1304                            History of Present Illness       Chief Complaint   Patient presents with    Wound Check     Patient has wound in his right groin that now has odor and they believe its infected.         Past Medical History:   Diagnosis Date    Arthritis     Cataract     Coronary artery disease     Coronary atherosclerosis of native coronary artery     Diverticulitis of colon     Essential hypertension, benign     Hyperlipidemia     Hypertension     Peptic ulcer     Renal disorder       Past Surgical History:   Procedure Laterality Date    CATARACT EXTRACTION Right     Left eye 5/2021    CORONARY STENT PLACEMENT      SKIN BIOPSY      TONSILLECTOMY        Family History   Problem Relation Age of Onset    Heart attack Brother     Heart disease Mother     Cancer Father       Social History     Tobacco Use    Smoking status: Former     Types: Pipe    Smokeless tobacco: Never   Vaping Use    Vaping status: Never Used   Substance Use Topics    Alcohol use: Not Currently    Drug use: No      E-Cigarette/Vaping    E-Cigarette Use Never User       E-Cigarette/Vaping Substances    Nicotine No     THC No     CBD No     Flavoring No     Other No     Unknown No       I have reviewed and agree with the history as documented.     89 yo male presenting with reports of concern of possible skin infection to the R groin with concern that there maybe have been drainage from the area. No noted trauma or complaints per the patient. No reported fevers or other symptoms beyond long standing weakness for which he has been worked  up for.       Wound Check         Review of Systems   Skin:  Positive for wound.   All other systems reviewed and are negative.          Objective       ED Triage Vitals   Temperature Pulse Blood Pressure Respirations SpO2 Patient Position - Orthostatic VS   10/10/24 1304 10/10/24 1310 10/10/24 1304 10/10/24 1304 10/10/24 1310 10/10/24 1304   97.7 °F (36.5 °C) 72 143/66 20 98 % Sitting      Temp Source Heart Rate Source BP Location FiO2 (%) Pain Score    10/10/24 1304 10/10/24 1304 10/10/24 1304 -- --    Tympanic Monitor Left arm        Vitals      Date and Time Temp Pulse SpO2 Resp BP Pain Score FACES Pain Rating User   10/10/24 1310 -- 72 98 % -- -- -- -- DL   10/10/24 1304 97.7 °F (36.5 °C) -- -- 20 143/66 -- -- KB            Physical Exam  Vitals and nursing note reviewed.   Constitutional:       General: He is not in acute distress.     Appearance: He is well-developed. He is not diaphoretic.   HENT:      Head: Normocephalic and atraumatic.      Right Ear: External ear normal.      Left Ear: External ear normal.      Nose: Nose normal.   Eyes:      General: No scleral icterus.        Right eye: No discharge.         Left eye: No discharge.      Conjunctiva/sclera: Conjunctivae normal.   Cardiovascular:      Rate and Rhythm: Normal rate and regular rhythm.      Heart sounds: Normal heart sounds. No murmur heard.     No friction rub. No gallop.   Pulmonary:      Effort: Pulmonary effort is normal. No respiratory distress.      Breath sounds: Normal breath sounds. No wheezing or rales.   Abdominal:      General: Bowel sounds are normal. There is no distension.      Palpations: Abdomen is soft. There is no mass.      Tenderness: There is no abdominal tenderness. There is no guarding.   Musculoskeletal:         General: No tenderness or deformity. Normal range of motion.      Cervical back: Normal range of motion and neck supple.   Skin:     General: Skin is warm and dry.      Coloration: Skin is not pale.       Findings: Erythema present. No rash.      Comments: Small wound with surrounding erythema/induration to the R inguinal region, no noted tenderness/crepitus/fluctuance or active drainage. There is dried what appears to be wound drainage noted in the area.    Neurological:      Mental Status: He is alert and oriented to person, place, and time.   Psychiatric:         Behavior: Behavior normal.         Thought Content: Thought content normal.         Judgment: Judgment normal.         Results Reviewed       Procedure Component Value Units Date/Time    Basic metabolic panel [[id number]]  (Abnormal) Collected: 10/10/24 1343    Lab Status: Final result Specimen: Blood from Arm, Left Updated: 10/10/24 1416     Sodium 136 mmol/L      Potassium 4.1 mmol/L      Chloride 103 mmol/L      CO2 26 mmol/L      ANION GAP 7 mmol/L      BUN 25 mg/dL      Creatinine 0.92 mg/dL      Glucose 145 mg/dL      Calcium 9.0 mg/dL      eGFR 68 ml/min/1.73sq m     Narrative:      National Kidney Disease Foundation guidelines for Chronic Kidney Disease (CKD):     Stage 1 with normal or high GFR (GFR > 90 mL/min/1.73 square meters)    Stage 2 Mild CKD (GFR = 60-89 mL/min/1.73 square meters)    Stage 3A Moderate CKD (GFR = 45-59 mL/min/1.73 square meters)    Stage 3B Moderate CKD (GFR = 30-44 mL/min/1.73 square meters)    Stage 4 Severe CKD (GFR = 15-29 mL/min/1.73 square meters)    Stage 5 End Stage CKD (GFR <15 mL/min/1.73 square meters)  Note: GFR calculation is accurate only with a steady state creatinine    CBC and differential [[id number]]  (Abnormal) Collected: 10/10/24 1343    Lab Status: Final result Specimen: Blood from Arm, Left Updated: 10/10/24 1349     WBC 9.75 Thousand/uL      RBC 4.04 Million/uL      Hemoglobin 13.0 g/dL      Hematocrit 40.4 %       fL      MCH 32.2 pg      MCHC 32.2 g/dL      RDW 14.1 %      MPV 10.6 fL      Platelets 182 Thousands/uL      nRBC 0 /100 WBCs      Segmented % 77 %      Immature Grans % 1 %       Lymphocytes % 13 %      Monocytes % 7 %      Eosinophils Relative 2 %      Basophils Relative 0 %      Absolute Neutrophils 7.50 Thousands/µL      Absolute Immature Grans 0.09 Thousand/uL      Absolute Lymphocytes 1.28 Thousands/µL      Absolute Monocytes 0.67 Thousand/µL      Eosinophils Absolute 0.17 Thousand/µL      Basophils Absolute 0.04 Thousands/µL             CT pelvis w contrast   Final Interpretation by Cristopher Miramontes MD (10/10 6303)      No abscess.      Mild induration in the right inguinal soft tissues with overlying skin thickening which may reflect cellulitis.      Bladder wall thickening with bladder diverticula could be due to bladder outlet obstruction, clinical correlation advised. Mild cystitis not excluded, consider correlation with urinalysis.      Workstation performed: [id number]             Procedures    ED Medication and Procedure Management   Prior to Admission Medications   Prescriptions Last Dose Informant Patient Reported? Taking?   ALLOPURINOL PO  Self, Child Yes No   Sig: Take 150 mg by mouth daily 150mg   Patient not taking: Reported on 4/17/2024   Cholecalciferol 50 MCG (2000 UT) CAPS  Self, Child Yes No   Sig: Take by mouth   Choline Fenofibrate (FENOFIBRIC ACID) 45 MG CPDR  Self, Child Yes No   Sig: Take by mouth   Multiple Vitamins tablet  Self, Child Yes No   Sig: Take by mouth   allopurinol (ZYLOPRIM) 300 mg tablet  Self, Child Yes No   aspirin 81 MG tablet  Self, Child Yes No   Sig: Take 1 tablet by mouth see administration instructions Take 1 tablet twice weekly   atorvastatin (LIPITOR) 20 mg tablet  Self, Child Yes No   Sig: Take 1 tablet by mouth daily   Patient not taking: Reported on 5/29/2024   furosemide (LASIX) 20 mg tablet  Self, Child No No   Sig: Take 1 tablet (20 mg total) by mouth daily as needed (swelling)   Patient not taking: Reported on 4/16/2024   gabapentin (NEURONTIN) 100 mg capsule  Self, Child Yes No   Sig: Take 1 capsule by mouth every 12 (twelve)  hours   metoprolol succinate (TOPROL-XL) 25 mg 24 hr tablet   No No   Sig: Take 1 tablet (25 mg total) by mouth daily   nitroglycerin (Nitrostat) 0.4 mg SL tablet  Self, Child No No   Sig: Place 1 tablet (0.4 mg total) under the tongue every 5 (five) minutes as needed for chest pain prn   potassium chloride (KLOR-CON) 20 mEq packet  Self, Child Yes No   Sig: Take 20 mEq by mouth as needed Take 1 tablet when taking Furosemide      Facility-Administered Medications: None     Discharge Medication List as of 10/10/2024  5:34 PM        START taking these medications    Details   cefuroxime (CEFTIN) 500 mg tablet Take 1 tablet (500 mg total) by mouth every 12 (twelve) hours for 10 days, Starting Thu 10/10/2024, Until Sun 10/20/2024, Normal      nystatin (MYCOSTATIN) cream Apply to affected area 2 times daily, Normal           CONTINUE these medications which have NOT CHANGED    Details   !! allopurinol (ZYLOPRIM) 300 mg tablet Historical Med      !! ALLOPURINOL PO Take 150 mg by mouth daily 150mg, Historical Med      aspirin 81 MG tablet Take 1 tablet by mouth see administration instructions Take 1 tablet twice weekly, Historical Med      atorvastatin (LIPITOR) 20 mg tablet Take 1 tablet by mouth daily, Historical Med      Cholecalciferol 50 MCG (2000 UT) CAPS Take by mouth, Historical Med      Choline Fenofibrate (FENOFIBRIC ACID) 45 MG CPDR Take by mouth, Starting Mon 9/30/2013, Historical Med      furosemide (LASIX) 20 mg tablet Take 1 tablet (20 mg total) by mouth daily as needed (swelling), Starting Mon 7/24/2023, No Print      gabapentin (NEURONTIN) 100 mg capsule Take 1 capsule by mouth every 12 (twelve) hours, Starting Mon 2/12/2024, Historical Med      metoprolol succinate (TOPROL-XL) 25 mg 24 hr tablet Take 1 tablet (25 mg total) by mouth daily, Starting Wed 5/29/2024, Normal      Multiple Vitamins tablet Take by mouth, Historical Med      nitroglycerin (Nitrostat) 0.4 mg SL tablet Place 1 tablet (0.4 mg total)  under the tongue every 5 (five) minutes as needed for chest pain prn, Starting Mon 7/24/2023, No Print      potassium chloride (KLOR-CON) 20 mEq packet Take 20 mEq by mouth as needed Take 1 tablet when taking Furosemide, Historical Med       !! - Potential duplicate medications found. Please discuss with provider.        No discharge procedures on file.  ED SEPSIS DOCUMENTATION   Time reflects when diagnosis was documented in both MDM as applicable and the Disposition within this note       Time User Action Codes Description Comment    10/10/2024  4:09 PM Roque Sheppard [L03.90] Cellulitis     10/10/2024  4:09 PM Roque Sheppard [B35.6] Tinea cruris                  Roque Sheppard,   10/10/24 1929

## 2024-10-11 PROBLEM — R53.1 WEAKNESS: Status: ACTIVE | Noted: 2024-10-11

## 2024-10-11 LAB
ATRIAL RATE: 67 BPM
BILIRUB UR QL STRIP: NEGATIVE
CLARITY UR: CLEAR
COLOR UR: YELLOW
EST. AVERAGE GLUCOSE BLD GHB EST-MCNC: 128 MG/DL
GLUCOSE SERPL-MCNC: 104 MG/DL (ref 65–140)
GLUCOSE SERPL-MCNC: 136 MG/DL (ref 65–140)
GLUCOSE SERPL-MCNC: 150 MG/DL (ref 65–140)
GLUCOSE UR STRIP-MCNC: NEGATIVE MG/DL
HBA1C MFR BLD: 6.1 %
HGB UR QL STRIP.AUTO: NEGATIVE
KETONES UR STRIP-MCNC: NEGATIVE MG/DL
LEUKOCYTE ESTERASE UR QL STRIP: NEGATIVE
NITRITE UR QL STRIP: NEGATIVE
P AXIS: 56 DEGREES
PH UR STRIP.AUTO: 6 [PH]
PR INTERVAL: 172 MS
PROT UR STRIP-MCNC: NEGATIVE MG/DL
QRS AXIS: -47 DEGREES
QRSD INTERVAL: 102 MS
QT INTERVAL: 432 MS
QTC INTERVAL: 456 MS
SP GR UR STRIP.AUTO: 1.01
T WAVE AXIS: 111 DEGREES
UROBILINOGEN UR QL STRIP.AUTO: 0.2 E.U./DL
VENTRICULAR RATE: 67 BPM

## 2024-10-11 PROCEDURE — 90662 IIV NO PRSV INCREASED AG IM: CPT | Performed by: INTERNAL MEDICINE

## 2024-10-11 PROCEDURE — 99222 1ST HOSP IP/OBS MODERATE 55: CPT | Performed by: NURSE PRACTITIONER

## 2024-10-11 PROCEDURE — 81003 URINALYSIS AUTO W/O SCOPE: CPT | Performed by: NURSE PRACTITIONER

## 2024-10-11 PROCEDURE — 97163 PT EVAL HIGH COMPLEX 45 MIN: CPT

## 2024-10-11 PROCEDURE — 93010 ELECTROCARDIOGRAM REPORT: CPT | Performed by: INTERNAL MEDICINE

## 2024-10-11 PROCEDURE — G0008 ADMIN INFLUENZA VIRUS VAC: HCPCS | Performed by: INTERNAL MEDICINE

## 2024-10-11 PROCEDURE — 97167 OT EVAL HIGH COMPLEX 60 MIN: CPT

## 2024-10-11 PROCEDURE — 82948 REAGENT STRIP/BLOOD GLUCOSE: CPT

## 2024-10-11 RX ORDER — ALLOPURINOL 300 MG/1
300 TABLET ORAL DAILY
Status: DISCONTINUED | OUTPATIENT
Start: 2024-10-11 | End: 2024-10-15 | Stop reason: HOSPADM

## 2024-10-11 RX ORDER — FUROSEMIDE 20 MG/1
20 TABLET ORAL 3 TIMES WEEKLY
Status: DISCONTINUED | OUTPATIENT
Start: 2024-10-11 | End: 2024-10-15 | Stop reason: HOSPADM

## 2024-10-11 RX ORDER — MICONAZOLE NITRATE 20 MG/G
CREAM TOPICAL 2 TIMES DAILY
Status: DISCONTINUED | OUTPATIENT
Start: 2024-10-11 | End: 2024-10-15 | Stop reason: HOSPADM

## 2024-10-11 RX ORDER — HEPARIN SODIUM 5000 [USP'U]/ML
5000 INJECTION, SOLUTION INTRAVENOUS; SUBCUTANEOUS EVERY 8 HOURS SCHEDULED
Status: DISCONTINUED | OUTPATIENT
Start: 2024-10-11 | End: 2024-10-15 | Stop reason: HOSPADM

## 2024-10-11 RX ORDER — ACETAMINOPHEN 325 MG/1
650 TABLET ORAL EVERY 6 HOURS PRN
Status: DISCONTINUED | OUTPATIENT
Start: 2024-10-11 | End: 2024-10-15 | Stop reason: HOSPADM

## 2024-10-11 RX ORDER — CEPHALEXIN 500 MG/1
500 CAPSULE ORAL EVERY 6 HOURS SCHEDULED
Status: DISCONTINUED | OUTPATIENT
Start: 2024-10-11 | End: 2024-10-15 | Stop reason: HOSPADM

## 2024-10-11 RX ADMIN — HEPARIN SODIUM 5000 UNITS: 5000 INJECTION, SOLUTION INTRAVENOUS; SUBCUTANEOUS at 14:23

## 2024-10-11 RX ADMIN — FUROSEMIDE 20 MG: 20 TABLET ORAL at 15:54

## 2024-10-11 RX ADMIN — MICONAZOLE NITRATE 1 APPLICATION: 20 CREAM TOPICAL at 17:47

## 2024-10-11 RX ADMIN — INFLUENZA A VIRUS A/VICTORIA/4897/2022 IVR-238 (H1N1) ANTIGEN (FORMALDEHYDE INACTIVATED), INFLUENZA A VIRUS A/CALIFORNIA/122/2022 SAN-022 (H3N2) ANTIGEN (FORMALDEHYDE INACTIVATED), AND INFLUENZA B VIRUS B/MICHIGAN/01/2021 ANTIGEN (FORMALDEHYDE INACTIVATED) 0.5 ML: 60; 60; 60 INJECTION, SUSPENSION INTRAMUSCULAR at 13:10

## 2024-10-11 RX ADMIN — ACETAMINOPHEN 650 MG: 325 TABLET ORAL at 17:53

## 2024-10-11 RX ADMIN — B-COMPLEX W/ C & FOLIC ACID TAB 1 TABLET: TAB at 08:31

## 2024-10-11 RX ADMIN — CEFUROXIME AXETIL 500 MG: 250 TABLET ORAL at 08:31

## 2024-10-11 RX ADMIN — METOPROLOL SUCCINATE 25 MG: 50 TABLET, EXTENDED RELEASE ORAL at 08:31

## 2024-10-11 RX ADMIN — ALLOPURINOL 300 MG: 100 TABLET ORAL at 11:58

## 2024-10-11 RX ADMIN — NYSTATIN 1 APPLICATION: 100000 CREAM TOPICAL at 17:46

## 2024-10-11 RX ADMIN — MICONAZOLE NITRATE 1 APPLICATION: 20 CREAM TOPICAL at 17:46

## 2024-10-11 RX ADMIN — FENOFIBRATE 48 MG: 48 TABLET, FILM COATED ORAL at 08:31

## 2024-10-11 RX ADMIN — GABAPENTIN 100 MG: 100 CAPSULE ORAL at 08:31

## 2024-10-11 RX ADMIN — ASPIRIN 81 MG: 81 TABLET, COATED ORAL at 08:31

## 2024-10-11 RX ADMIN — CEPHALEXIN 500 MG: 500 CAPSULE ORAL at 17:19

## 2024-10-11 RX ADMIN — POTASSIUM CHLORIDE 20 MEQ: 1500 TABLET, EXTENDED RELEASE ORAL at 08:31

## 2024-10-11 RX ADMIN — CEPHALEXIN 500 MG: 500 CAPSULE ORAL at 23:31

## 2024-10-11 RX ADMIN — NYSTATIN: 100000 CREAM TOPICAL at 08:31

## 2024-10-11 RX ADMIN — Medication 2000 UNITS: at 08:31

## 2024-10-11 RX ADMIN — HEPARIN SODIUM 5000 UNITS: 5000 INJECTION, SOLUTION INTRAVENOUS; SUBCUTANEOUS at 21:25

## 2024-10-11 RX ADMIN — GABAPENTIN 100 MG: 100 CAPSULE ORAL at 21:26

## 2024-10-11 NOTE — PLAN OF CARE
Problem: PHYSICAL THERAPY ADULT  Goal: Performs mobility at highest level of function for planned discharge setting.  See evaluation for individualized goals.  Description: Treatment/Interventions: Functional transfer training, LE strengthening/ROM, Elevations, Therapeutic exercise, Endurance training, Gait training, Bed mobility  Equipment Recommended: Walker, Cane       See flowsheet documentation for full assessment, interventions and recommendations.  Outcome: Progressing  Note: Prognosis: Fair  Problem List: Decreased strength, Decreased endurance, Impaired balance, Decreased mobility, Pain  Assessment: Pt is 89 y.o. male seen for PT evaluation s/p admit to [facility] on 10/10/2024 w/ <principal problem not specified>. PT consulted to assess pt's functional mobility and d/c needs. Order placed for PT eval and tx, w/ up and OOB as tolerated order. Pt agreeable to PT  session upon arrival, pt found supine in bed.  PTA, pt was requiring A for mobility and lives w/ son in 2  level home .  Pt to benefit from continued PT tx to address deficits and maximize level of functional independent mobility and consistency. Upon conclusion pt  supine in bed. Complexity: Comorbidities affecting pt's physical performance at time of assessment include: htn, CAD, hard of hearing, and weakness . Personal factors affecting pt at time of IE include: advanced age, limited mobility, unable to perform physical activity, ambulating with assistive device, steps to enter home, and hearing impairments. Please find objective findings from PT assessment regarding body systems outlined above with impairments and limitations including weakness, impaired balance, decreased endurance, decreased activity tolerance, and fall risk.  Pt's clinical presentation is currently unstable/unpredictable seen in pt's presentation of abnormal renal values, abnormal sodium levels, abnormal blood sugar levels, and telemetry use. The patient's AM-PAC  Basic Mobility Inpatient Short Form Raw Score is 6.  Based on patient presentations and impairments, pt would most appropriately benefit from Level 2 resource intensity upon discharge. Please also refer to the recommendation of the Physical Therapist for safe discharge planning. RN verbalized pt appropriate for PT session.        Rehab Resource Intensity Level, PT: II (Moderate Resource Intensity)    See flowsheet documentation for full assessment.

## 2024-10-11 NOTE — CASE MANAGEMENT
[facility] received request for authorization from Care Manager.  Authorization request submitted for: [facility]  Facility Name: [facility]  NPI:[id number]   Facility MD: Dr Leonard Marmolejo    NPI: [id number]  Authorization initiated by contacting insurance:  [employer]  Via: [employer] Portal   Clinicals submitted via [employer] attachment   Pending Reference #:[id number]     Care Manager notified: Darlene Perez      Updates to authorization status will be noted in chart. Please reach out to CM for updates on any clinical information.

## 2024-10-11 NOTE — OCCUPATIONAL THERAPY NOTE
Occupational Therapy Evaluation     Patient Name: Prem Mar Sr.  Today's Date: 10/11/2024  Problem List  Active Problems:  There are no active Hospital Problems.    Past Medical History  Past Medical History:   Diagnosis Date    Arthritis     Cataract     Coronary artery disease     Coronary atherosclerosis of native coronary artery     Diverticulitis of colon     Essential hypertension, benign     Hyperlipidemia     Hypertension     Peptic ulcer     Renal disorder      Past Surgical History  Past Surgical History:   Procedure Laterality Date    CATARACT EXTRACTION Right     Left eye 5/2021    CORONARY STENT PLACEMENT      SKIN BIOPSY      TONSILLECTOMY           10/11/24 0847   OT Last Visit   OT Visit Date 10/11/24   Note Type   Note type Evaluation   Additional Comments Pt received  supine in bed c HOB semi-elevated on this date + is agreeable to OT session @ this time. @ exit, pt remains in  supine in bed c HOB semi-elevated c all lines intact, all needs met, call bell in hand + nsg aware of location/disposition.  (During evaluation, son present in room.)   Pain Assessment   Pain Assessment Tool 0-10   Pain Score No Pain   Restrictions/Precautions   Weight Bearing Precautions Per Order No   Other Precautions Telemetry;Fall Risk;Hard of hearing   Home Living   Type of Home House  ((half a double))   Home Layout One level;Performs ADLs on one level;Able to live on main level with bedroom/bathroom;Stairs to enter with rails  ((7+5 DORIAN))   Bathroom Shower/Tub   ((sponge bathes at baseline))   Bathroom Toilet Standard   Bathroom Equipment Commode  (Used @ all times)   Bathroom Accessibility Accessible   Home Equipment Cane;Hemiwalker  ((uses RW at baseline), has rollator)   Prior Function   Level of [place] Needs assistance with ADLs;Needs assistance with functional mobility;Needs assistance with IADLS   Lives With Son   Receives Help From Family   IADLs Family/Friend/Other provides  "transportation;Family/Friend/Other provides meals;Family/Friend/Other provides medication management   Falls in the last 6 months 1 to 4  (1x; weak + slowly lowered to floor)   Vocational Retired  ()   Lifestyle   Autonomy Pt lives in  1 story home c son + @ baseline, performs ADLs  with A, IADLs with A + fxl mobility with A with RW. (-) . Pt is retired.   Reciprocal Relationships Son \"Re-Vasu\" (Jr.), Grandson \"Re-Peter\" (III) + family   Service to Others Retired    Intrinsic Gratification 89th birthday today   General   Additional General Comments Recently weak vs baseline; increased stiffness d/t more sedentary daily routine (bed>recliner>commode). Reports improved mobility/ function with increased exercise.   ADL   Eating Assistance 4  Minimal Assistance   Grooming Assistance 4  Minimal Assistance   UB Bathing Assistance 3  Moderate Assistance   LB Bathing Assistance 1  Total Assistance   UB Dressing Assistance 3  Moderate Assistance   LB Dressing Assistance 1  Total Assistance   Toileting Assistance  1  Total Assistance   Bed Mobility   Supine to Sit 2  Maximal assistance   Additional items Assist x 2;Verbal cues;LE management;Increased time required   Sit to Supine 2  Maximal assistance   Additional items Assist x 2;HOB elevated;Increased time required;LE management;Verbal cues   Additional Comments Sustained ~5 min seated supported EOB. Encouraged unsupported sit for improved core musculature; difficulty c fxl reach > bed rail d/t rotator cuff injury (chronic).   Transfers   Sit to Stand 3  Moderate assistance   Additional items Assist x 2;Verbal cues;Increased time required   Stand to Sit 4  Minimal assistance   Additional items Assist x 2;Verbal cues   Additional Comments Attempted SPT to BSC; pt demonstrates retropulsion c spontaneous return to seated position in bed. 4-5 attempts; pt unable + verbalizes FOF. Nsg / DO notified.  (RW utilized)   Balance   Static Sitting Fair - "   Dynamic Sitting Poor +   Static Standing Poor   Activity Tolerance   Activity Tolerance   (Limited by weakness)   Medical Staff Made Aware PT/OT co-eval on this date d/t medical complexity, ambulatory dysfunction c high fall risk, various impeding lines + requirement for skilled interdisciplinary analysis of appropriate d/c recommendations. PT/OT POC/goals I'ly determined per respective discipline. (Hardik)   Nurse Made Aware Medical staff made aware of current fxn, recommendations for d/c planning, fall risk + pt's location upon exit. (Roque Sheppard,  + ZULLY Alberto)   RUE Assessment   RUE Assessment   (Impaired @ baseline)   LUE Assessment   LUE Assessment   (Impaired @ baseline)   Hand Function   Fine Motor Coordination Impaired   Cognition   Orientation Level Oriented X4   Following Commands Follows all commands and directions without difficulty   Assessment   Limitation Decreased ADL status;Decreased UE strength;Decreased endurance;Decreased self-care trans;Decreased high-level ADLs;Decreased UE ROM   Prognosis Fair   Assessment Patient is a 89 y.o. male seen for OT evaluation s/p admit to  [facility] on 10/10/2024 w/<principal problem not specified> + commorbidities/PMHx (as listed in medical record) affecting patient's functional performance c ADL tasks at time of assessment. OT orders placed for evaluation and treatment to assess pt's ADLs, cognitive status + performance during functional tasks in order to formulate appropriate d/c recommendations.     Therapist performed at least two patient identifiers during session including name and wristband. Personal factors affecting patient at time of initial evaluation include: inaccessible home environment, step(s) to enter environment, limited home support, inability to perform ADLs, inability to ambulate household distances, inability to navigate community distances, and decreased initiation and engagement.   Pt's clinical presentation is  currently unstable/unpredictable given new onset deficits that effect pt's occupational performance and ability to safely return to OF including decrease activity tolerance, decrease standing balance, decrease sitting balance, decrease performance during ADL tasks, decrease generalized strength, decrease activity engagement, decrease performance during functional transfers, steps to enter home, and high fall risk combined with medical complications of hypertension , abnormal renal lab values, abnormal CBC, abnormal blood sugars, and need for input for mobility technique/safety. This evaluation required an extensive review of medical and/or therapy records and additional review of physical, cognitive and psychosocial history related to functional performance. Based upon functional performance deficits and assessments, this evaluation has been identified as a  high complexity evaluation.      Patient to benefit from continued Occupational Therapy treatment while in the hospital to address aforementioned deficits and maximize level of functional independence with ADLs and functional mobility.   Plan   Treatment Interventions ADL retraining;Functional transfer training;UE strengthening/ROM;Endurance training;Patient/family training;Equipment evaluation/education;Compensatory technique education;Energy conservation;Activityengagement   Goal Expiration Date 10/21/24   OT Treatment Day 0   OT Frequency 3-5x/wk   Discharge Recommendation   Rehab Resource Intensity Level, OT II (Moderate Resource Intensity)   AM-PAC Daily Activity Inpatient   Lower Body Dressing 1   Bathing 2   Toileting 1   Upper Body Dressing 2   Grooming 2   Eating 2   Daily Activity Raw Score 10   Turning Head Towards Sound 4   Follow Simple Instructions 4   Low Function Daily Activity Raw Score 18   Low Function Daily Activity Standardized Score  30.17   AM-PAC Applied Cognition Inpatient   Following a Speech/Presentation 4   Understanding Ordinary  Conversation 4   Taking Medications 2   Remembering Where Things Are Placed or Put Away 3   Remembering List of 4-5 Errands 3   Taking Care of Complicated Tasks 3   Applied Cognition Raw Score 19   Applied Cognition Standardized Score 39.77   End of Consult   Patient Position at End of Consult Supine;All needs within reach   Nurse Communication Nurse aware of consult     The patient's raw score on the AM-PAC Daily Activity inpatient short form is 10, standardized score is  , less than 39.4. Please refer to the recommendation of the Occupational Therapist for safe DC planning.    Resource Intensity Recommendation: Level II (Moderate Resource Intensity)        GOALS:    *ADL transfers with Min (A) for inc'd independence with ADLs/purposeful tasks    *UB ADL with (S) for inc'd independence with self cares    *LB ADL with Mod (A) using AE prn for inc'd independence with self cares    *Toileting with Mod (A) for clothing management and hygiene for return to PLOF with personal care    *Increase stand tolerance x 1  m for inc'd tolerance with standing purposeful tasks    *Participate in 10m UE therex to increase overall stamina/activity tolerance for purposeful tasks    *Bed mobility- Mod (A) for inc'd independence to manage own comfort and initiate EOB & OOB purposeful tasks    *Patient will verbalize 3 safety awareness/ principles to prevent falls in the home setting.     *Patient will verbalize and demonstrate use of energy conservation/deep breathing techniques and work simplification skills during functional activities with no verbal cues.     *Patient will increase OOB/sitting tolerance to 2-4 hours per day to increase participation in self-care and leisure tasks with no s/s of exertion.     *Patient will engage in ongoing cognitive assessment to assist with safe discharge planning/recommendations.     *Pt will verbalize and demonstrate understanding of post-op movement precautions 100% (if applicable)  in tx sessions for increased safety and functional mobility.      *Pt will demonstrate use of long handled AE (if appropriate) during 100% of tx sessions for increased ADL safety and independence following D/C     Vinita Ruvalcaba OTR/RAYA

## 2024-10-11 NOTE — ASSESSMENT & PLAN NOTE
The patient noted to have some elevated blood pressure  Continue toprol from home   He uses lasix 20 mg daily PRN for swelling; will start lasix 3 times a week for lower extremity edema   Will continue to monitor for now and adjust medication as indicated

## 2024-10-11 NOTE — PROGRESS NOTES
Patient:    MRN:  [id number]    Aidin Request ID:  [id number]    Level of care reserved:  Skilled Nursing Facility    Partner Reserved:  [facility], [place] [id number] [phone]    Clinical needs requested:    Geography searched:  20 miles around [id number]    Start of Service:    Request sent:  10:19am EDT on 10/11/2024 by Darlene Perez    Partner reserved:  1:30pm EDT on 10/11/2024 by Darlene Perez    Choice list shared:  1:21pm EDT on 10/11/2024 by Darlene Perez

## 2024-10-11 NOTE — ED PROVIDER NOTES
Time reflects when diagnosis was documented in both MDM as applicable and the Disposition within this note       Time User Action Codes Description Comment    10/10/2024  8:59 PM Roque Sheppard Add [R53.1] Generalized weakness           ED Disposition       ED Disposition   Admit    Condition   Stable    Date/Time   u Oct 10, 2024  8:16 PM    Comment   Case was discussed with TBD.             Assessment & Plan       Medical Decision Making  89-year-old male coming in for reports of weakness at home.  Will check baseline labs and place PT OT and case management consults.  Able to move all extremities in the bed without any difficulty.  No slurring of speech, aphasia, cranial nerve deficits or sensory deficits noted on examination.    Amount and/or Complexity of Data Reviewed  Labs: ordered.    Risk  Decision regarding hospitalization.             Medications   aspirin (ECOTRIN LOW STRENGTH) EC tablet 81 mg (has no administration in time range)   cefuroxime (CEFTIN) tablet 500 mg (500 mg Oral Given 10/10/24 2104)   Cholecalciferol (VITAMIN D3) tablet 2,000 Units (2,000 Units Oral Given 10/10/24 2104)   fenofibrate (TRICOR) tablet 48 mg (has no administration in time range)   gabapentin (NEURONTIN) capsule 100 mg (100 mg Oral Given 10/10/24 2104)   metoprolol succinate (TOPROL-XL) 24 hr tablet 25 mg (has no administration in time range)   multivitamin stress formula tablet 1 tablet (has no administration in time range)   nitroglycerin (NITROSTAT) SL tablet 0.4 mg (has no administration in time range)   nystatin (MYCOSTATIN) cream (1 Application Topical Given 10/10/24 2105)   potassium chloride (Klor-Con M20) CR tablet 20 mEq (20 mEq Oral Given 10/10/24 2104)   insulin lispro (HumALOG/ADMELOG) 100 units/mL subcutaneous injection 1-5 Units (has no administration in time range)   insulin lispro (HumALOG/ADMELOG) 100 units/mL subcutaneous injection 1-5 Units ( Subcutaneous Not Given 10/10/24 2111)       ED Risk Strat  Scores                                               History of Present Illness       Chief Complaint   Patient presents with    Weakness - Generalized     Pt reports generalized weakness noting he was seen earlier today and told her has an infection to his groin        Past Medical History:   Diagnosis Date    Arthritis     Cataract     Coronary artery disease     Coronary atherosclerosis of native coronary artery     Diverticulitis of colon     Essential hypertension, benign     Hyperlipidemia     Hypertension     Peptic ulcer     Renal disorder       Past Surgical History:   Procedure Laterality Date    CATARACT EXTRACTION Right     Left eye 5/2021    CORONARY STENT PLACEMENT      SKIN BIOPSY      TONSILLECTOMY        Family History   Problem Relation Age of Onset    Heart attack Brother     Heart disease Mother     Cancer Father       Social History     Tobacco Use    Smoking status: Former     Types: Pipe    Smokeless tobacco: Never   Vaping Use    Vaping status: Never Used   Substance Use Topics    Alcohol use: Not Currently    Drug use: No      E-Cigarette/Vaping    E-Cigarette Use Never User       E-Cigarette/Vaping Substances    Nicotine No     THC No     CBD No     Flavoring No     Other No     Unknown No       I have reviewed and agree with the history as documented.     89 yo male presenting to the ed for reports of generalized weakness. Was seen earlier today and then went home and per son he didn't fall or hit his head but he was very weak and really struggling to get around so they were hoping to get him placed for rehab.           Review of Systems   Neurological:  Positive for weakness.   All other systems reviewed and are negative.          Objective       ED Triage Vitals [10/10/24 2007]   Temp Pulse Blood Pressure Respirations SpO2 Patient Position - Orthostatic VS   -- 71 165/68 20 97 % --      Temp src Heart Rate Source BP Location FiO2 (%) Pain Score    -- Monitor Left arm -- No Pain       Vitals      Date and Time Temp Pulse SpO2 Resp BP Pain Score FACES Pain Rating User   10/11/24 0415 -- 77 95 % 18 197/78 -- -- MD   10/10/24 2330 -- -- -- -- -- No Pain -- MD   10/10/24 2007 -- 71 97 % 20 165/68 No Pain -- MD            Physical Exam  Vitals and nursing note reviewed.   Constitutional:       General: He is not in acute distress.     Appearance: He is well-developed. He is not diaphoretic.   HENT:      Head: Normocephalic and atraumatic.      Right Ear: External ear normal.      Left Ear: External ear normal.      Nose: Nose normal.   Eyes:      General: No scleral icterus.        Right eye: No discharge.         Left eye: No discharge.      Conjunctiva/sclera: Conjunctivae normal.   Cardiovascular:      Rate and Rhythm: Normal rate and regular rhythm.      Heart sounds: Normal heart sounds. No murmur heard.     No friction rub. No gallop.   Pulmonary:      Effort: Pulmonary effort is normal. No respiratory distress.      Breath sounds: Normal breath sounds. No wheezing or rales.   Abdominal:      General: Bowel sounds are normal. There is no distension.      Palpations: Abdomen is soft. There is no mass.      Tenderness: There is no abdominal tenderness. There is no guarding.   Musculoskeletal:         General: No tenderness or deformity. Normal range of motion.      Cervical back: Normal range of motion and neck supple.   Skin:     General: Skin is warm and dry.      Coloration: Skin is not pale.      Findings: No erythema or rash.   Neurological:      General: No focal deficit present.      Mental Status: He is alert and oriented to person, place, and time. Mental status is at baseline.      Cranial Nerves: No cranial nerve deficit.      Sensory: No sensory deficit.      Motor: No weakness.      Coordination: Coordination normal.   Psychiatric:         Behavior: Behavior normal.         Thought Content: Thought content normal.         Judgment: Judgment normal.         Results Reviewed        Procedure Component Value Units Date/Time    Hemoglobin A1c w/EAG Estimation (Prechecked if no A1C within 90 days) [[id number]] Collected: 10/10/24 1343    Lab Status: In process Specimen: Blood from Arm, Left Updated: 10/10/24 2054    Basic metabolic panel [[id number]]  (Abnormal) Collected: 10/10/24 2024    Lab Status: Final result Specimen: Blood from Hand, Left Updated: 10/10/24 2050     Sodium 133 mmol/L      Potassium 4.5 mmol/L      Chloride 101 mmol/L      CO2 24 mmol/L      ANION GAP 8 mmol/L      BUN 23 mg/dL      Creatinine 0.92 mg/dL      Glucose 142 mg/dL      Calcium 8.7 mg/dL      eGFR 68 ml/min/1.73sq m     Narrative:      National Kidney Disease Foundation guidelines for Chronic Kidney Disease (CKD):     Stage 1 with normal or high GFR (GFR > 90 mL/min/1.73 square meters)    Stage 2 Mild CKD (GFR = 60-89 mL/min/1.73 square meters)    Stage 3A Moderate CKD (GFR = 45-59 mL/min/1.73 square meters)    Stage 3B Moderate CKD (GFR = 30-44 mL/min/1.73 square meters)    Stage 4 Severe CKD (GFR = 15-29 mL/min/1.73 square meters)    Stage 5 End Stage CKD (GFR <15 mL/min/1.73 square meters)  Note: GFR calculation is accurate only with a steady state creatinine    CBC and differential [[id number]]  (Abnormal) Collected: 10/10/24 2024    Lab Status: Final result Specimen: Blood from Hand, Left Updated: 10/10/24 2031     WBC 9.34 Thousand/uL      RBC 4.10 Million/uL      Hemoglobin 13.3 g/dL      Hematocrit 41.2 %       fL      MCH 32.4 pg      MCHC 32.3 g/dL      RDW 14.3 %      MPV 10.7 fL      Platelets 182 Thousands/uL      nRBC 0 /100 WBCs      Segmented % 79 %      Immature Grans % 1 %      Lymphocytes % 13 %      Monocytes % 6 %      Eosinophils Relative 1 %      Basophils Relative 0 %      Absolute Neutrophils 7.35 Thousands/µL      Absolute Immature Grans 0.09 Thousand/uL      Absolute Lymphocytes 1.19 Thousands/µL      Absolute Monocytes 0.54 Thousand/µL      Eosinophils Absolute 0.13 Thousand/µL       Basophils Absolute 0.04 Thousands/µL     Fingerstick Glucose (POCT) [[id number]]  (Abnormal) Collected: 10/10/24 2010    Lab Status: Final result Specimen: Blood Updated: 10/10/24 2012     POC Glucose 160 mg/dl             No orders to display       Procedures    ED Medication and Procedure Management   Prior to Admission Medications   Prescriptions Last Dose Informant Patient Reported? Taking?   ALLOPURINOL PO  Self, Child Yes No   Sig: Take 150 mg by mouth daily 150mg   Patient not taking: Reported on 4/17/2024   Cholecalciferol 50 MCG (2000 UT) CAPS  Self, Child Yes No   Sig: Take by mouth   Choline Fenofibrate (FENOFIBRIC ACID) 45 MG CPDR  Self, Child Yes No   Sig: Take by mouth   Multiple Vitamins tablet  Self, Child Yes No   Sig: Take by mouth   allopurinol (ZYLOPRIM) 300 mg tablet  Self, Child Yes No   aspirin 81 MG tablet  Self, Child Yes No   Sig: Take 1 tablet by mouth see administration instructions Take 1 tablet twice weekly   atorvastatin (LIPITOR) 20 mg tablet  Self, Child Yes No   Sig: Take 1 tablet by mouth daily   Patient not taking: Reported on 5/29/2024   cefuroxime (CEFTIN) 500 mg tablet   No No   Sig: Take 1 tablet (500 mg total) by mouth every 12 (twelve) hours for 10 days   furosemide (LASIX) 20 mg tablet  Self, Child No No   Sig: Take 1 tablet (20 mg total) by mouth daily as needed (swelling)   Patient not taking: Reported on 4/16/2024   gabapentin (NEURONTIN) 100 mg capsule  Self, Child Yes No   Sig: Take 1 capsule by mouth every 12 (twelve) hours   metoprolol succinate (TOPROL-XL) 25 mg 24 hr tablet   No No   Sig: Take 1 tablet (25 mg total) by mouth daily   nitroglycerin (Nitrostat) 0.4 mg SL tablet  Self, Child No No   Sig: Place 1 tablet (0.4 mg total) under the tongue every 5 (five) minutes as needed for chest pain prn   nystatin (MYCOSTATIN) cream   No No   Sig: Apply to affected area 2 times daily   potassium chloride (KLOR-CON) 20 mEq packet  Self, Child Yes No   Sig: Take 20 mEq by  mouth as needed Take 1 tablet when taking Furosemide      Facility-Administered Medications: None     Patient's Medications   Discharge Prescriptions    No medications on file     No discharge procedures on file.  ED SEPSIS DOCUMENTATION   Time reflects when diagnosis was documented in both MDM as applicable and the Disposition within this note       Time User Action Codes Description Comment    10/10/2024  8:59 PM Roque Sheppard Add [R53.1] Generalized weakness                  Roque Sheppard, DO  10/11/24 0703

## 2024-10-11 NOTE — ASSESSMENT & PLAN NOTE
Chronic   Will start furosemide 3 times a week- Monday, Wednesday, and Friday instead of as needed daily.  Elevate lower extremities

## 2024-10-11 NOTE — CASE MANAGEMENT
Case Management Assessment & Discharge Planning Note    Patient name Prem Mar Sr.  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     Difficulty swallowing 2022     Fall 2022     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[facility]) 2020     Hypomagnesemia 2020     Hyponatremia 2020     Mixed hyperlipidemia 2020     Coronary artery disease involving native coronary artery of native heart without angina pectoris 2018     Essential hypertension 2018     Bilateral leg edema 2018     Ventricular bigeminy 2018     Chronic gout of multiple sites 05/10/2017     Vitamin D deficiency 2013       LOS (days): 0  Geometric Mean LOS (GMLOS) (days):   Days to GMLOS:     OBJECTIVE:              Current admission status: Inpatient  Referral Reason: Other (D/C planning)    Preferred Pharmacy:   [id number] - [place] - [address] [facility]  [address] [place] [id number]  Phone: [phone] Fax: [phone]    Primary Care Provider: Leonard Schreiber MD    Primary Insurance: [id number]  REP  Secondary Insurance:     ASSESSMENT:  Active Health Care Proxies       Prem Mar Jr Health Care Representative - Son   Primary Phone: [phone] (Home)  Mobile Phone: [phone]                 Advance Directives  Does patient have a  Health Care POA?: Yes  Does patient have Advance Directives?: Yes  Primary Contact: Prem Mar Jr         Readmission Root Cause  30 Day Readmission: No    Patient Information  Admitted from:: Home  Mental Status: Alert  During Assessment patient was accompanied by: Son  Assessment information provided by:: Patient, Son  Primary Caregiver: Child  Caregiver's Name:: Prem Mar  Caregiver's Relationship to Patient:: Family Member  Caregiver's Telephone Number:: [phone]  Support Systems: Son  County of Residence: [facility] city do you live in?: [place]  Home entry access options. Select all that apply.: Stairs  Number of steps to enter home.: One Flight  Do the steps have railings?: Yes  Type of Current Residence: [address] [place] home  Upon entering residence, is there a bedroom on the main floor (no further steps)?: Yes  Upon entering residence, is there a bathroom on the main floor (no further steps)?: Yes  Living Arrangements: Lives w/ Son  Is patient a ?: Yes  Is patient active with VA ([facility] [facility])?: No    Activities of Daily Living Prior to Admission  Functional Status: Assistance  Completes ADLs independently?: No  Level of ADL dependence: Assistance  Ambulates independently?: No  Level of ambulatory dependence: Assistance  Does patient use assisted devices?: Yes  Assisted Devices (DME) used: Walker, Bedside Commode  Does patient currently own DME?: Yes  What DME does the patient currently own?: Bedside Commode, Walker  Does patient have a history of Outpatient Therapy (PT/OT)?: Yes  Does the patient have a history of Short-Term Rehab?: Yes ([facility])  Does patient have a history of HHC?: Yes ([facility])  Does patient currently have HHC?: No         Patient Information Continued  Income Source: Pension/[employer]  Does patient have prescription coverage?: Yes  Does patient receive dialysis treatments?: No  Does patient have a history of substance abuse?: No  Does patient have a history of Mental  Health Diagnosis?: No         Means of Transportation  Means of Transport to \[facility]\"":: Family transport      Social Determinants of Health (SDOH)      Flowsheet Row Most Recent Value   Housing Stability    In the last 12 months, was there a time when you were not able to pay the mortgage or rent on time? N   In the past 12 months, how many times have you moved where you were living? 0   At any time in the past 12 months, were you homeless or living in a shelter (including now)? N   Transportation Needs    In the past 12 months, has lack of transportation kept you from medical appointments or from getting medications? no   In the past 12 months, has lack of transportation kept you from meetings, work, or from getting things needed for daily living? No   Food Insecurity    Within the past 12 months, you worried that your food would run out before you got the money to buy more. Never true   Within the past 12 months, the food you bought just didn't last and you didn't have money to get more. Never true   Utilities    In the past 12 months has the electric, gas, oil, or water company threatened to shut off services in your home? No            DISCHARGE DETAILS:    Discharge planning discussed with:: Pt, son  Freedom of Choice: Yes     CM contacted family/caregiver?: Yes  Were Treatment Team discharge recommendations reviewed with patient/caregiver?: Yes  Did patient/caregiver verbalize understanding of patient care needs?: Yes  Were patient/caregiver advised of the risks associated with not following Treatment Team discharge recommendations?: Yes    Contacts  Patient Contacts: Prem Mar jr  Relationship to Patient:: Family  Contact Method: In Person  Reason/Outcome: Continuity of Care, Discharge Planning    Requested Home Health Care         Is the patient interested in HHC at discharge?: No    DME Referral Provided  Referral made for DME?: No    Other Referral/Resources/Interventions Provided:  Interventions: Short  Term Rehab  Referral Comments: CM met with pt and son at bedside to conduct assessment. Pt's son reported that he lives with pt, and is agreeable to STR referrals being submitted. CM submitted referrals for ARU & SNF via [facility]. CM will provide son with choice list, and continue to follow.    Would you like to participate in our [facility] service program?  : No - Declined    Treatment Team Recommendation: Short Term Rehab  Discharge Destination Plan:: Short Term Rehab  Transport at Discharge : [facility] van

## 2024-10-11 NOTE — ASSESSMENT & PLAN NOTE
The patient was brought in by his family due to worsening generalized weakness, concerning for possible cellulitis of the groin, and difficulty with his gait  CT a/p No abscess. Mild induration in the right inguinal soft tissues with overlying skin thickening which may reflect cellulitis. Bladder wall thickening with bladder diverticula could be due to bladder outlet obstruction, clinical correlation advised. Mild cystitis not excluded, consider correlation with urinalysis.  Blood work is very reassuring-no leukocytosis and remains afebrile  Started on PO antibiotic in the ED, will continue with Keflex 500 mg every 6 hours for cellulitis x 5 days  Continue local care/keep moisture off with Fadia BID  Check UA   PT/OT input appreciated, recommend STR.   CM assisting with discharge disposition

## 2024-10-11 NOTE — H&P
H&P - Hospitalist   Name: Prem Mar Sr. 89 y.o. male I MRN: [id number]  Unit/Bed#: ED 26 I Date of Admission: 10/10/2024   Date of Service: 10/11/2024 I Hospital Day: 0     Assessment & Plan  Weakness  The patient was brought in by his family due to worsening generalized weakness, concerning for possible cellulitis of the groin, and difficulty with his gait  CT a/p No abscess. Mild induration in the right inguinal soft tissues with overlying skin thickening which may reflect cellulitis. Bladder wall thickening with bladder diverticula could be due to bladder outlet obstruction, clinical correlation advised. Mild cystitis not excluded, consider correlation with urinalysis.  Blood work is very reassuring-no leukocytosis and remains afebrile  Started on PO antibiotic in the ED, will continue with Keflex 500 mg every 6 hours for cellulitis x 5 days  Continue local care/keep moisture off with Fadia BID  Check UA   PT/OT input appreciated, recommend STR.   CM assisting with discharge disposition   Coronary artery disease involving native coronary artery of native heart without angina pectoris  Asymptomatic  Continue with medical management  Essential hypertension  The patient noted to have some elevated blood pressure  Continue toprol from home   He uses lasix 20 mg daily PRN for swelling; will start lasix 3 times a week for lower extremity edema   Will continue to monitor for now and adjust medication as indicated   Bilateral leg edema  Chronic   Will start furosemide 3 times a week- Monday, Wednesday, and Friday instead of as needed daily.  Elevate lower extremities  Chronic gout of multiple sites  Continue allopurinol   Mixed hyperlipidemia  Continue fenofibrate       VTE Pharmacologic Prophylaxis: VTE Score: 4 Moderate Risk (Score 3-4) - Pharmacological DVT Prophylaxis Ordered: heparin.  Code Status: Level 3 - DNAR and DNI   Discussion with family: Attempted to update  (son) via phone. Unable to  contact.no voicemail set up.     Anticipated Length of Stay: Patient will be admitted on an inpatient basis with an anticipated length of stay of greater than 2 midnights secondary to generalized weakness.    History of Present Illness   Chief Complaint: worsening generalized weakness with concerning for cellulitis by family     Prem RUVALCABA Zaid Bonner is a 89 y.o. male with a PMH of hypertension, hyperlipidemia, and CKD who presents with worsening generalized weakness and redness in his groin.  The patient is somewhat a poor historian and most of the information was obtained from weakness and cellulitis of his groin.  The family felt that there is some drainage on the patient's skin which appears to be progressively worse.  Additionally, the patient has been feeling weak with issue ambulating.  In the ED, the patient was started on antibiotics for cellulitis.  CT of abdomen and pelvis noted some possible cellulitis of the groin and possible UTI.  Patient was evaluated by PT/OT in the ED and the recommendation is for short-term rehab. The patient will need 3 midnight stay in the hospital prior to that and subsequently being admitted.    Review of Systems   Constitutional:  Negative for chills, fatigue and fever.   HENT:  Negative for congestion, ear pain, postnasal drip and sore throat.    Eyes:  Negative for discharge, itching and visual disturbance.   Respiratory:  Negative for cough, chest tightness and shortness of breath.    Cardiovascular:  Negative for chest pain, palpitations and leg swelling.   Gastrointestinal:  Negative for abdominal pain, nausea and vomiting.   Endocrine: Negative for cold intolerance and heat intolerance.   Genitourinary:  Negative for difficulty urinating, dysuria and hematuria.   Musculoskeletal:  Positive for gait problem. Negative for back pain and neck pain.   Skin:  Positive for color change and rash. Negative for wound.   Neurological:  Positive for weakness. Negative for  dizziness, speech difficulty, light-headedness and headaches.   Psychiatric/Behavioral:  Negative for confusion, decreased concentration and dysphoric mood.        Historical Information   Past Medical History:   Diagnosis Date    Arthritis     Cataract     Coronary artery disease     Coronary atherosclerosis of native coronary artery     Diverticulitis of colon     Essential hypertension, benign     Hyperlipidemia     Hypertension     Peptic ulcer     Renal disorder      Past Surgical History:   Procedure Laterality Date    CATARACT EXTRACTION Right     Left eye 5/2021    CORONARY STENT PLACEMENT      SKIN BIOPSY      TONSILLECTOMY       Social History     Tobacco Use    Smoking status: Former     Types: Pipe    Smokeless tobacco: Never   Vaping Use    Vaping status: Never Used   Substance and Sexual Activity    Alcohol use: Not Currently    Drug use: No    Sexual activity: Not Currently     E-Cigarette/Vaping    E-Cigarette Use Never User      E-Cigarette/Vaping Substances    Nicotine No     THC No     CBD No     Flavoring No     Other No     Unknown No      Family History   Problem Relation Age of Onset    Heart attack Brother     Heart disease Mother     Cancer Father      Social History:  Marital Status:    Occupation: retired   Patient Pre-hospital Living Situation: Home  Patient Pre-hospital Level of Mobility: walks  Patient Pre-hospital Diet Restrictions: none     Meds/Allergies   (Not in a hospital admission)    No Known Allergies    Objective :  HR:  [71-77] 77  BP: (165-197)/(68-78) 197/78  Resp:  [18-20] 18  SpO2:  [95 %-97 %] 95 %  O2 Device: None (Room air)    Physical Exam  Vitals and nursing note reviewed.   Constitutional:       General: He is not in acute distress.     Appearance: Normal appearance.      Comments: Frail and elderly    HENT:      Head: Normocephalic and atraumatic.      Right Ear: External ear normal.      Left Ear: External ear normal.      Nose: Nose normal.       Mouth/Throat:      Mouth: Mucous membranes are moist.      Pharynx: Oropharynx is clear.   Eyes:      General:         Right eye: No discharge.         Left eye: No discharge.      Extraocular Movements: Extraocular movements intact.      Pupils: Pupils are equal, round, and reactive to light.   Cardiovascular:      Rate and Rhythm: Normal rate and regular rhythm.      Pulses: Normal pulses.      Heart sounds: Normal heart sounds. No murmur heard.  Pulmonary:      Effort: Pulmonary effort is normal. No respiratory distress.      Breath sounds: Rales (fine in bases) present. No wheezing.   Abdominal:      General: Bowel sounds are normal. There is no distension.      Palpations: Abdomen is soft. There is no mass.      Tenderness: There is no abdominal tenderness.   Musculoskeletal:         General: No swelling, tenderness or deformity. Normal range of motion.      Cervical back: Normal range of motion and neck supple. No rigidity.   Skin:     General: Skin is warm and dry.      Capillary Refill: Capillary refill takes less than 2 seconds.      Coloration: Skin is not pale.      Findings: No erythema.   Neurological:      General: No focal deficit present.      Mental Status: He is alert. Mental status is at baseline.      Comments: Hard of hearing with period of forgetfulness     Psychiatric:         Mood and Affect: Mood normal.         Behavior: Behavior normal.         Thought Content: Thought content normal.         Lines/Drains:            Lab Results: I have reviewed the following results:  Results from last 7 days   Lab Units 10/10/24  2024   WBC Thousand/uL 9.34   HEMOGLOBIN g/dL 13.3   HEMATOCRIT % 41.2   PLATELETS Thousands/uL 182   SEGS PCT % 79*   LYMPHO PCT % 13*   MONO PCT % 6   EOS PCT % 1     Results from last 7 days   Lab Units 10/10/24  2024 10/10/24  1343 10/07/24  2010   SODIUM mmol/L 133*   < > 137   POTASSIUM mmol/L 4.5   < > 4.2   CHLORIDE mmol/L 101   < > 104   CO2 mmol/L 24   < > 27   BUN  mg/dL 23   < > 26*   CREATININE mg/dL 0.92   < > 0.92   ANION GAP mmol/L 8   < > 6   CALCIUM mg/dL 8.7   < > 9.1   ALBUMIN g/dL  --   --  3.9   TOTAL BILIRUBIN mg/dL  --   --  0.39   ALK PHOS U/L  --   --  68   ALT U/L  --   --  28   AST U/L  --   --  36   GLUCOSE RANDOM mg/dL 142*   < > 85    < > = values in this interval not displayed.         Results from last 7 days   Lab Units 10/11/24  1128 10/11/24  0746 10/10/24  2010   POC GLUCOSE mg/dl 136 104 160*     Lab Results   Component Value Date    HGBA1C 6.1 (H) 10/10/2024           Imaging Results Review: I reviewed radiology reports from this admission including: CT abdomen/pelvis.  Other Study Results Review: EKG was reviewed.     Administrative Statements   I have spent a total time of 45 minutes in caring for this patient on the day of the visit/encounter including Diagnostic results, Prognosis, Risks and benefits of tx options, Instructions for management, Patient and family education, Importance of tx compliance, Risk factor reductions, Impressions, Counseling / Coordination of care, Documenting in the medical record, Reviewing / ordering tests, medicine, procedures  , Obtaining or reviewing history  , and Communicating with other healthcare professionals .    ** Please Note: This note has been constructed using a voice recognition system. **

## 2024-10-11 NOTE — PLAN OF CARE
Problem: PAIN - ADULT  Goal: Verbalizes/displays adequate comfort level or baseline comfort level  Description: Interventions:  - Encourage patient to monitor pain and request assistance  - Assess pain using appropriate pain scale  - Administer analgesics based on type and severity of pain and evaluate response  - Implement non-pharmacological measures as appropriate and evaluate response  - Consider cultural and social influences on pain and pain management  - Notify physician/advanced practitioner if interventions unsuccessful or patient reports new pain  Outcome: Progressing     Problem: INFECTION - ADULT  Goal: Absence or prevention of progression during hospitalization  Description: INTERVENTIONS:  - Assess and monitor for signs and symptoms of infection  - Monitor lab/diagnostic results  - Monitor all insertion sites, i.e. indwelling lines, tubes, and drains  - Monitor endotracheal if appropriate and nasal secretions for changes in amount and color  - Register appropriate cooling/warming therapies per order  - Administer medications as ordered  - Instruct and encourage patient and family to use good hand hygiene technique  - Identify and instruct in appropriate isolation precautions for identified infection/condition  Outcome: Progressing  Goal: Absence of fever/infection during neutropenic period  Description: INTERVENTIONS:  - Monitor WBC    Outcome: Progressing     Problem: SAFETY ADULT  Goal: Patient will remain free of falls  Description: INTERVENTIONS:  - Educate patient/family on patient safety including physical limitations  - Instruct patient to call for assistance with activity   - Consult OT/PT to assist with strengthening/mobility   - Keep Call bell within reach  - Keep bed low and locked with side rails adjusted as appropriate  - Keep care items and personal belongings within reach  - Initiate and maintain comfort rounds  - Make Fall Risk Sign visible to staff  - Offer Toileting every 2 Hours,  in advance of need  - Initiate/Maintain bed alarm  - Obtain necessary fall risk management equipment: nonslip socks  - Apply yellow socks and bracelet for high fall risk patients  - Consider moving patient to room near nurses station  Outcome: Progressing  Goal: Maintain or return to baseline ADL function  Description: INTERVENTIONS:  -  Assess patient's ability to carry out ADLs; assess patient's baseline for ADL function and identify physical deficits which impact ability to perform ADLs (bathing, care of mouth/teeth, toileting, grooming, dressing, etc.)  - Assess/evaluate cause of self-care deficits   - Assess range of motion  - Assess patient's mobility; develop plan if impaired  - Assess patient's need for assistive devices and provide as appropriate  - Encourage maximum independence but intervene and supervise when necessary  - Involve family in performance of ADLs  - Assess for home care needs following discharge   - Consider OT consult to assist with ADL evaluation and planning for discharge  - Provide patient education as appropriate  Outcome: Progressing  Goal: Maintains/Returns to pre admission functional level  Description: INTERVENTIONS:  - Perform AM-PAC 6 Click Basic Mobility/ Daily Activity assessment daily.  - Set and communicate daily mobility goal to care team and patient/family/caregiver.   - Collaborate with rehabilitation services on mobility goals if consulted  - Perform Range of Motion 3 times a day.  - Reposition patient every 2 hours.  - Dangle patient 3 times a day  - Stand patient 3 times a day  - Ambulate patient 3 times a day  - Out of bed to chair 3 times a day   - Out of bed for meals 3 times a day  - Out of bed for toileting  - Record patient progress and toleration of activity level   Outcome: Progressing     Problem: DISCHARGE PLANNING  Goal: Discharge to home or other facility with appropriate resources  Description: INTERVENTIONS:  - Identify barriers to discharge w/patient and  caregiver  - Arrange for needed discharge resources and transportation as appropriate  - Identify discharge learning needs (meds, wound care, etc.)  - Arrange for interpretive services to assist at discharge as needed  - Refer to Case Management Department for coordinating discharge planning if the patient needs post-hospital services based on physician/advanced practitioner order or complex needs related to functional status, cognitive ability, or social support system  Outcome: Progressing     Problem: Knowledge Deficit  Goal: Patient/family/caregiver demonstrates understanding of disease process, treatment plan, medications, and discharge instructions  Description: Complete learning assessment and assess knowledge base.  Interventions:  - Provide teaching at level of understanding  - Provide teaching via preferred learning methods  Outcome: Progressing

## 2024-10-11 NOTE — CASE MANAGEMENT
Case Management Discharge Planning Note    Patient name Prem Mar Sr.  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     Difficulty swallowing 2022     Fall 2022     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     Hypomagnesemia 2020     Hyponatremia 2020     Mixed hyperlipidemia 2020     Coronary artery disease involving native coronary artery of native heart without angina pectoris 2018     Essential hypertension 2018     Bilateral leg edema 2018     Ventricular bigeminy 2018     Chronic gout of multiple sites 05/10/2017     Vitamin D deficiency 2013       LOS (days): 0  Geometric Mean LOS (GMLOS) (days): 3.1  Days to GMLOS:2.9     OBJECTIVE:  Risk of Unplanned Readmission Score: 14.54         Current admission status: Inpatient   Preferred Pharmacy:   [id number]  [place] [address] [address]  Phone: [phone] Fax: [phone]    Primary Care Provider: Leonard Schreiber MD    Primary Insurance: JENNIFER  REP  Secondary Insurance:     DISCHARGE DETAILS:                                          Other Referral/Resources/Interventions Provided:  Interventions: SNF  Referral Comments: CM contacted pt's son to review SNF choice list. Son chose  [facility]. CM reserved via [employer], and submitted for auth. CM will continue to follow.         Treatment Team Recommendation: SNF  Discharge Destination Plan:: SNF

## 2024-10-11 NOTE — ED NOTES
Medications crushed up and fed to patient with applesauce and milk     Dolly Black RN  10/11/24 [id number]

## 2024-10-11 NOTE — PLAN OF CARE
Problem: OCCUPATIONAL THERAPY ADULT  Goal: Performs self-care activities at highest level of function for planned discharge setting.  See evaluation for individualized goals.  Description: Treatment Interventions: ADL retraining, Functional transfer training, UE strengthening/ROM, Endurance training, Patient/family training, Equipment evaluation/education, Compensatory technique education, Energy conservation, Activityengagement          See flowsheet documentation for full assessment, interventions and recommendations.   Note: Limitation: Decreased ADL status, Decreased UE strength, Decreased endurance, Decreased self-care trans, Decreased high-level ADLs, Decreased UE ROM  Prognosis: Fair  Assessment: Patient is a 89 y.o. male seen for OT evaluation s/p admit to  [facility] on 10/10/2024 w/<principal problem not specified> + commorbidities/PMHx (as listed in medical record) affecting patient's functional performance c ADL tasks at time of assessment. OT orders placed for evaluation and treatment to assess pt's ADLs, cognitive status + performance during functional tasks in order to formulate appropriate d/c recommendations.     Therapist performed at least two patient identifiers during session including name and wristband. Personal factors affecting patient at time of initial evaluation include: inaccessible home environment, step(s) to enter environment, limited home support, inability to perform ADLs, inability to ambulate household distances, inability to navigate community distances, and decreased initiation and engagement.   Pt's clinical presentation is currently unstable/unpredictable given new onset deficits that effect pt's occupational performance and ability to safely return to PLOF including decrease activity tolerance, decrease standing balance, decrease sitting balance, decrease performance during ADL tasks, decrease generalized strength, decrease activity engagement, decrease performance  during functional transfers, steps to enter home, and high fall risk combined with medical complications of hypertension , abnormal renal lab values, abnormal CBC, abnormal blood sugars, and need for input for mobility technique/safety. This evaluation required an extensive review of medical and/or therapy records and additional review of physical, cognitive and psychosocial history related to functional performance. Based upon functional performance deficits and assessments, this evaluation has been identified as a  high complexity evaluation.      Patient to benefit from continued Occupational Therapy treatment while in the hospital to address aforementioned deficits and maximize level of functional independence with ADLs and functional mobility.     Rehab Resource Intensity Level, OT: II (Moderate Resource Intensity)

## 2024-10-11 NOTE — PHYSICAL THERAPY NOTE
PHYSICAL THERAPY EVALUATION  NAME:  Perm Mar Sr.  DATE: 10/11/24    AGE:   89 y.o.  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)    Chronic kidney disease, stage 3a (HCC)    Diabetic eye exam (HCC)    Nonexudative age-related macular degeneration of left eye    Hypokalemia    Chronic pain syndrome    Lumbar spondylosis    Chronic midline low back pain without sciatica    Atrial flutter (HCC)       Past Medical History  Past Medical History:   Diagnosis Date    Arthritis     Cataract     Coronary artery disease     Coronary atherosclerosis of native coronary artery     Diverticulitis of colon     Essential hypertension, benign     Hyperlipidemia     Hypertension     Peptic ulcer     Renal disorder        Past Surgical History  Past Surgical History:   Procedure Laterality Date    CATARACT EXTRACTION Right     Left eye 5/2021    CORONARY STENT PLACEMENT      SKIN BIOPSY      TONSILLECTOMY       Pt seen as a co-eval with OT due to the patient's co-morbidities, clinically unstable presentation, and present impairments which are a regression from the patient's baseline.     Length Of Stay: 0  Performed at least 2 patient identifiers during session: Name and Epic photo       10/11/24 0900   PT Last Visit   PT Visit Date 10/11/24   Note Type   Note type Evaluation   Pain Assessment   Pain Assessment Tool 0-10   Pain Score No Pain   Restrictions/Precautions   Other Precautions Telemetry   Home Living   Type of Home  House   Home Layout Two level;Performs ADLs on one level;Able to live on main level with bedroom/bathroom;Stairs to enter with rails  (12 jim)   Bathroom Shower/Tub   (sponge bath)   Bathroom Toilet Standard   Bathroom Equipment Commode  (all the time)   Bathroom Accessibility Accessible   Home Equipment Walker  (rw, transport chair)   Prior Function   Level of [place] Needs assistance with ADLs;Needs assistance with functional mobility;Needs assistance with IADLS   Lives With Son   Receives Help From Family   IADLs Family/Friend/Other provides transportation;Family/Friend/Other provides meals;Family/Friend/Other provides medication management   Falls in the last 6 months 1 to 4  (1)   Vocational Retired   Cognition   Overall Cognitive Status WFL   Arousal/Participation Alert   Orientation Level Oriented X4   Memory Within functional limits   Following Commands Follows all commands and directions without difficulty   Subjective   Subjective PT agreeable to evaluation   RLE Assessment   RLE Assessment   (Generalized weakness)   LLE Assessment   LLE Assessment   (generalized weakness)   Vision-Basic Assessment   Current Vision Wears glasses all the time   Coordination   Sensation WFL   Bed Mobility   Rolling R 3  Moderate assistance   Additional items Assist x 2;Increased time required;LE management;Verbal cues   Rolling L 3  Moderate assistance   Additional items Assist x 2;Increased time required;LE management;Verbal cues   Supine to Sit 2  Maximal assistance   Additional items Assist x 2;Increased time required;LE management   Sit to Supine 2  Maximal assistance   Additional items Assist x 2;HOB elevated;Increased time required   Additional Comments Pt was able to maintain sitting upright for approximately 5 min, pt communicated that he has a chronic Rotator cuff tear that has been limiting him with reaching and grasping   Transfers   Sit to Stand 4  Minimal assistance   Additional items Armrests;Increased  time required;Verbal cues;Assist x 2  (5 attempts, 4/5 were min assist, 1/5 was mod assist, when patient had proper feet positioning, he managed much better)   Stand to Sit 4  Minimal assistance   Additional items Assist x 2;HOB elevated;Increased time required   Ambulation/Elevation   Ambulation/Elevation Additional Comments Pt unable to ambulate at this time due to fear and nervousness   Balance   Static Sitting Fair -   Dynamic Sitting Fair -   Static Standing Poor +   Dynamic Standing Poor +   Ambulatory Zero   Activity Tolerance   Activity Tolerance Patient limited by fatigue   Medical Staff Made Aware PT/OT co eval   Nurse Made Aware medical staff made aware   Assessment   Prognosis Fair   Problem List Decreased strength;Decreased endurance;Impaired balance;Decreased mobility;Pain   Assessment Pt is 89 y.o. male seen for PT evaluation s/p admit to [facility] on 10/10/2024 w/ <principal problem not specified>. PT consulted to assess pt's functional mobility and d/c needs. Order placed for PT eval and tx, w/ up and OOB as tolerated order. Pt agreeable to PT  session upon arrival, pt found supine in bed.  PTA, pt was requiring A for mobility and lives w/ son in 2  level home .  Pt to benefit from continued PT tx to address deficits and maximize level of functional independent mobility and consistency. Upon conclusion pt  supine in bed. Complexity: Comorbidities affecting pt's physical performance at time of assessment include: htn, CAD, hard of hearing, and weakness . Personal factors affecting pt at time of IE include: advanced age, limited mobility, unable to perform physical activity, ambulating with assistive device, steps to enter home, and hearing impairments. Please find objective findings from PT assessment regarding body systems outlined above with impairments and limitations including weakness, impaired balance, decreased endurance, decreased activity tolerance, and fall risk.  Pt's clinical  presentation is currently unstable/unpredictable seen in pt's presentation of abnormal renal values, abnormal sodium levels, abnormal blood sugar levels, and telemetry use. The patient's AM-PAC Basic Mobility Inpatient Short Form Raw Score is 6.  Based on patient presentations and impairments, pt would most appropriately benefit from Level 2 resource intensity upon discharge. Please also refer to the recommendation of the Physical Therapist for safe discharge planning. RN verbalized pt appropriate for PT session.   Goals   Patient Goals To walk   LTG Expiration Date 10/21/24   Long Term Goal #1 Pt will: Perform bed mobility tasks to no more than min A to decrease burden of care and improve ease of bed mobility. Perform transfers to no more than min A to increase Indep in home environment, decrease burden of care, decrease risk for falls, and improve ease of transfers. Perform ambulation with rw for 25 ft to  increase Indep in home environment, decrease risk for falls, and improve activity tolerance. Increase dynamic standing balance to F+ to decrease fall risk.   Increase OOB activity tolerance to 10 minutes without s/s of exertion to decrease fall risk.   Plan   Treatment/Interventions Functional transfer training;LE strengthening/ROM;Elevations;Therapeutic exercise;Endurance training;Gait training;Bed mobility   PT Frequency 3-5x/wk   Discharge Recommendation   Rehab Resource Intensity Level, PT II (Moderate Resource Intensity)   Equipment Recommended Walker;Cane   AM-PAC Basic Mobility Inpatient   Turning in Flat Bed Without Bedrails 1   Lying on Back to Sitting on Edge of Flat Bed Without Bedrails 1   Moving Bed to Chair 1   Standing Up From Chair Using Arms 1   Walk in Room 1   Climb 3-5 Stairs With Railing 1   Basic Mobility Inpatient Raw Score 6   [facility] Highest Level Of Mobility   -[facility] Goal 2: Bed activities/Dependent transfer   -HL Achieved 5: Stand (1 or more minutes)       Time In: 0913  Time  Out: 0939  Total Evaluation Minutes: 26    Hardik Keller, PT

## 2024-10-12 PROBLEM — B35.6 TINEA CRURIS: Status: ACTIVE | Noted: 2024-10-12

## 2024-10-12 LAB
ANION GAP SERPL CALCULATED.3IONS-SCNC: 8 MMOL/L (ref 4–13)
BUN SERPL-MCNC: 24 MG/DL (ref 5–25)
CALCIUM SERPL-MCNC: 8.3 MG/DL (ref 8.4–10.2)
CHLORIDE SERPL-SCNC: 103 MMOL/L (ref 96–108)
CO2 SERPL-SCNC: 24 MMOL/L (ref 21–32)
CREAT SERPL-MCNC: 1.08 MG/DL (ref 0.6–1.3)
GFR SERPL CREATININE-BSD FRML MDRD: 56 ML/MIN/1.73SQ M
GLUCOSE SERPL-MCNC: 101 MG/DL (ref 65–140)
POTASSIUM SERPL-SCNC: 4 MMOL/L (ref 3.5–5.3)
SODIUM SERPL-SCNC: 135 MMOL/L (ref 135–147)

## 2024-10-12 PROCEDURE — 80048 BASIC METABOLIC PNL TOTAL CA: CPT | Performed by: NURSE PRACTITIONER

## 2024-10-12 PROCEDURE — 99232 SBSQ HOSP IP/OBS MODERATE 35: CPT | Performed by: INTERNAL MEDICINE

## 2024-10-12 RX ORDER — NYSTATIN 100000 [USP'U]/G
POWDER TOPICAL 2 TIMES DAILY
Status: DISCONTINUED | OUTPATIENT
Start: 2024-10-12 | End: 2024-10-15 | Stop reason: HOSPADM

## 2024-10-12 RX ORDER — DOCUSATE SODIUM 100 MG/1
100 CAPSULE, LIQUID FILLED ORAL 2 TIMES DAILY
Status: DISCONTINUED | OUTPATIENT
Start: 2024-10-12 | End: 2024-10-15 | Stop reason: HOSPADM

## 2024-10-12 RX ORDER — SENNOSIDES 8.6 MG
1 TABLET ORAL
Status: DISCONTINUED | OUTPATIENT
Start: 2024-10-12 | End: 2024-10-15 | Stop reason: HOSPADM

## 2024-10-12 RX ADMIN — HEPARIN SODIUM 5000 UNITS: 5000 INJECTION, SOLUTION INTRAVENOUS; SUBCUTANEOUS at 21:50

## 2024-10-12 RX ADMIN — ALLOPURINOL 300 MG: 100 TABLET ORAL at 08:48

## 2024-10-12 RX ADMIN — FENOFIBRATE 48 MG: 48 TABLET, FILM COATED ORAL at 08:48

## 2024-10-12 RX ADMIN — POTASSIUM CHLORIDE 20 MEQ: 1500 TABLET, EXTENDED RELEASE ORAL at 08:49

## 2024-10-12 RX ADMIN — MICONAZOLE NITRATE: 20 CREAM TOPICAL at 17:18

## 2024-10-12 RX ADMIN — HEPARIN SODIUM 5000 UNITS: 5000 INJECTION, SOLUTION INTRAVENOUS; SUBCUTANEOUS at 14:19

## 2024-10-12 RX ADMIN — CEPHALEXIN 500 MG: 500 CAPSULE ORAL at 12:12

## 2024-10-12 RX ADMIN — GABAPENTIN 100 MG: 100 CAPSULE ORAL at 08:48

## 2024-10-12 RX ADMIN — DOCUSATE SODIUM 100 MG: 100 CAPSULE, LIQUID FILLED ORAL at 12:12

## 2024-10-12 RX ADMIN — MICONAZOLE NITRATE: 20 CREAM TOPICAL at 08:57

## 2024-10-12 RX ADMIN — ASPIRIN 81 MG: 81 TABLET, COATED ORAL at 08:48

## 2024-10-12 RX ADMIN — METOPROLOL SUCCINATE 25 MG: 50 TABLET, EXTENDED RELEASE ORAL at 08:49

## 2024-10-12 RX ADMIN — B-COMPLEX W/ C & FOLIC ACID TAB 1 TABLET: TAB at 08:48

## 2024-10-12 RX ADMIN — NYSTATIN: 100000 POWDER TOPICAL at 12:15

## 2024-10-12 RX ADMIN — NYSTATIN: 100000 POWDER TOPICAL at 17:18

## 2024-10-12 RX ADMIN — SENNOSIDES 8.6 MG: 8.6 TABLET, FILM COATED ORAL at 12:12

## 2024-10-12 RX ADMIN — CEPHALEXIN 500 MG: 500 CAPSULE ORAL at 17:17

## 2024-10-12 RX ADMIN — CEPHALEXIN 500 MG: 500 CAPSULE ORAL at 08:48

## 2024-10-12 RX ADMIN — SENNOSIDES 8.6 MG: 8.6 TABLET, FILM COATED ORAL at 21:50

## 2024-10-12 RX ADMIN — HEPARIN SODIUM 5000 UNITS: 5000 INJECTION, SOLUTION INTRAVENOUS; SUBCUTANEOUS at 06:41

## 2024-10-12 RX ADMIN — DOCUSATE SODIUM 100 MG: 100 CAPSULE, LIQUID FILLED ORAL at 17:17

## 2024-10-12 RX ADMIN — Medication 2000 UNITS: at 08:48

## 2024-10-12 RX ADMIN — GABAPENTIN 100 MG: 100 CAPSULE ORAL at 21:50

## 2024-10-12 RX ADMIN — NYSTATIN: 100000 CREAM TOPICAL at 08:57

## 2024-10-12 NOTE — PLAN OF CARE
Problem: PAIN - ADULT  Goal: Verbalizes/displays adequate comfort level or baseline comfort level  Description: Interventions:  - Encourage patient to monitor pain and request assistance  - Assess pain using appropriate pain scale  - Administer analgesics based on type and severity of pain and evaluate response  - Implement non-pharmacological measures as appropriate and evaluate response  - Consider cultural and social influences on pain and pain management  - Notify physician/advanced practitioner if interventions unsuccessful or patient reports new pain  Outcome: Progressing     Problem: INFECTION - ADULT  Goal: Absence or prevention of progression during hospitalization  Description: INTERVENTIONS:  - Assess and monitor for signs and symptoms of infection  - Monitor lab/diagnostic results  - Monitor all insertion sites, i.e. indwelling lines, tubes, and drains  - Monitor endotracheal if appropriate and nasal secretions for changes in amount and color  - [place] appropriate cooling/warming therapies per order  - Administer medications as ordered  - Instruct and encourage patient and family to use good hand hygiene technique  - Identify and instruct in appropriate isolation precautions for identified infection/condition  Outcome: Progressing  Goal: Absence of fever/infection during neutropenic period  Description: INTERVENTIONS:  - Monitor WBC    Outcome: Progressing     Problem: SAFETY ADULT  Goal: Patient will remain free of falls  Description: INTERVENTIONS:  - Educate patient/family on patient safety including physical limitations  - Instruct patient to call for assistance with activity   - Consult OT/PT to assist with strengthening/mobility   - Keep Call bell within reach  - Keep bed low and locked with side rails adjusted as appropriate  - Keep care items and personal belongings within reach  - Initiate and maintain comfort rounds  - Make Fall Risk Sign visible to staff  - Offer Toileting every 2 Hours,  in advance of need  - Initiate/Maintain bed alarm  - Obtain necessary fall risk management equipment: bed alarm  - Apply yellow socks and bracelet for high fall risk patients  - Consider moving patient to room near nurses station  Outcome: Progressing  Goal: Maintain or return to baseline ADL function  Description: INTERVENTIONS:  -  Assess patient's ability to carry out ADLs; assess patient's baseline for ADL function and identify physical deficits which impact ability to perform ADLs (bathing, care of mouth/teeth, toileting, grooming, dressing, etc.)  - Assess/evaluate cause of self-care deficits   - Assess range of motion  - Assess patient's mobility; develop plan if impaired  - Assess patient's need for assistive devices and provide as appropriate  - Encourage maximum independence but intervene and supervise when necessary  - Involve family in performance of ADLs  - Assess for home care needs following discharge   - Consider OT consult to assist with ADL evaluation and planning for discharge  - Provide patient education as appropriate  Outcome: Progressing  Goal: Maintains/Returns to pre admission functional level  Description: INTERVENTIONS:  - Perform AM-PAC 6 Click Basic Mobility/ Daily Activity assessment daily.  - Set and communicate daily mobility goal to care team and patient/family/caregiver.   - Collaborate with rehabilitation services on mobility goals if consulted  - Perform Range of Motion 3 times a day.  - Reposition patient every 2 hours.  - Dangle patient 3 times a day  - Stand patient 3 times a day  - Ambulate patient 3 times a day  - Out of bed to chair 3 times a day   - Out of bed for meals 3 times a day  - Out of bed for toileting  - Record patient progress and toleration of activity level   Outcome: Progressing     Problem: DISCHARGE PLANNING  Goal: Discharge to home or other facility with appropriate resources  Description: INTERVENTIONS:  - Identify barriers to discharge w/patient and  caregiver  - Arrange for needed discharge resources and transportation as appropriate  - Identify discharge learning needs (meds, wound care, etc.)  - Arrange for interpretive services to assist at discharge as needed  - Refer to Case Management Department for coordinating discharge planning if the patient needs post-hospital services based on physician/advanced practitioner order or complex needs related to functional status, cognitive ability, or social support system  Outcome: Progressing     Problem: Knowledge Deficit  Goal: Patient/family/caregiver demonstrates understanding of disease process, treatment plan, medications, and discharge instructions  Description: Complete learning assessment and assess knowledge base.  Interventions:  - Provide teaching at level of understanding  - Provide teaching via preferred learning methods  Outcome: Progressing

## 2024-10-12 NOTE — PLAN OF CARE
Problem: PAIN - ADULT  Goal: Verbalizes/displays adequate comfort level or baseline comfort level  Description: Interventions:  - Encourage patient to monitor pain and request assistance  - Assess pain using appropriate pain scale  - Administer analgesics based on type and severity of pain and evaluate response  - Implement non-pharmacological measures as appropriate and evaluate response  - Consider cultural and social influences on pain and pain management  - Notify physician/advanced practitioner if interventions unsuccessful or patient reports new pain  Outcome: Progressing     Problem: INFECTION - ADULT  Goal: Absence or prevention of progression during hospitalization  Description: INTERVENTIONS:  - Assess and monitor for signs and symptoms of infection  - Monitor lab/diagnostic results  - Monitor all insertion sites, i.e. indwelling lines, tubes, and drains  - Monitor endotracheal if appropriate and nasal secretions for changes in amount and color  - [place] appropriate cooling/warming therapies per order  - Administer medications as ordered  - Instruct and encourage patient and family to use good hand hygiene technique  - Identify and instruct in appropriate isolation precautions for identified infection/condition  Outcome: Progressing  Goal: Absence of fever/infection during neutropenic period  Description: INTERVENTIONS:  - Monitor WBC    Outcome: Progressing     Problem: SAFETY ADULT  Goal: Patient will remain free of falls  Description: INTERVENTIONS:  - Educate patient/family on patient safety including physical limitations  - Instruct patient to call for assistance with activity   - Consult OT/PT to assist with strengthening/mobility   - Keep Call bell within reach  - Keep bed low and locked with side rails adjusted as appropriate  - Keep care items and personal belongings within reach  - Initiate and maintain comfort rounds  - Make Fall Risk Sign visible to staff  - Offer Toileting every  Hours,  in advance of need  - Initiate/Maintain alarm  - Obtain necessary fall risk management equipment:   - Apply yellow socks and bracelet for high fall risk patients  - Consider moving patient to room near nurses station  Outcome: Progressing  Goal: Maintain or return to baseline ADL function  Description: INTERVENTIONS:  -  Assess patient's ability to carry out ADLs; assess patient's baseline for ADL function and identify physical deficits which impact ability to perform ADLs (bathing, care of mouth/teeth, toileting, grooming, dressing, etc.)  - Assess/evaluate cause of self-care deficits   - Assess range of motion  - Assess patient's mobility; develop plan if impaired  - Assess patient's need for assistive devices and provide as appropriate  - Encourage maximum independence but intervene and supervise when necessary  - Involve family in performance of ADLs  - Assess for home care needs following discharge   - Consider OT consult to assist with ADL evaluation and planning for discharge  - Provide patient education as appropriate  Outcome: Progressing  Goal: Maintains/Returns to pre admission functional level  Description: INTERVENTIONS:  - Perform AM-PAC 6 Click Basic Mobility/ Daily Activity assessment daily.  - Set and communicate daily mobility goal to care team and patient/family/caregiver.   - Collaborate with rehabilitation services on mobility goals if consulted  - Perform Range of Motion  times a day.  - Reposition patient every  hours.  - Dangle patient  times a day  - Stand patient  times a day  - Ambulate patient  times a day  - Out of bed to chair  times a day   - Out of bed for meals  times a day  - Out of bed for toileting  - Record patient progress and toleration of activity level   Outcome: Progressing     Problem: DISCHARGE PLANNING  Goal: Discharge to home or other facility with appropriate resources  Description: INTERVENTIONS:  - Identify barriers to discharge w/patient and caregiver  - Arrange for  needed discharge resources and transportation as appropriate  - Identify discharge learning needs (meds, wound care, etc.)  - Arrange for interpretive services to assist at discharge as needed  - Refer to Case Management Department for coordinating discharge planning if the patient needs post-hospital services based on physician/advanced practitioner order or complex needs related to functional status, cognitive ability, or social support system  Outcome: Progressing     Problem: Knowledge Deficit  Goal: Patient/family/caregiver demonstrates understanding of disease process, treatment plan, medications, and discharge instructions  Description: Complete learning assessment and assess knowledge base.  Interventions:  - Provide teaching at level of understanding  - Provide teaching via preferred learning methods  Outcome: Progressing

## 2024-10-12 NOTE — ASSESSMENT & PLAN NOTE
Continue nystatin and moisture barrier.  Try to keep area dry  Concern for possible bacterial component.  Will continue Keflex x 5 days

## 2024-10-12 NOTE — ASSESSMENT & PLAN NOTE
The patient was brought in by his family due to worsening generalized weakness, concerning for possible cellulitis of the groin, and difficulty with his gait  CT a/p No abscess. Mild induration in the right inguinal soft tissues with overlying skin thickening which may reflect cellulitis. Bladder wall thickening with bladder diverticula could be due to bladder outlet obstruction, clinical correlation advised. Mild cystitis not excluded, consider correlation with urinalysis.  Blood work is very reassuring-no leukocytosis and remains afebrile  Started on PO antibiotic in the ED, will continue with Keflex 500 mg every 6 hours for cellulitis x 5 days  Continue local care/keep moisture off with Fadia BID  UA without obvious infection  PT/OT input appreciated, recommend STR.   CM assisting with discharge disposition

## 2024-10-12 NOTE — PROGRESS NOTES
Progress Note - Hospitalist   Name: Prem Mar Sr. 89 y.o. male I MRN: [id number]  Unit/Bed#: -[facility] I Date of Admission: 10/10/2024   Date of Service: 10/12/2024 I Hospital Day: 1    Assessment & Plan  Weakness  The patient was brought in by his family due to worsening generalized weakness, concerning for possible cellulitis of the groin, and difficulty with his gait  CT a/p No abscess. Mild induration in the right inguinal soft tissues with overlying skin thickening which may reflect cellulitis. Bladder wall thickening with bladder diverticula could be due to bladder outlet obstruction, clinical correlation advised. Mild cystitis not excluded, consider correlation with urinalysis.  Blood work is very reassuring-no leukocytosis and remains afebrile  Started on PO antibiotic in the ED, will continue with Keflex 500 mg every 6 hours for cellulitis x 5 days  Continue local care/keep moisture off with Fadia BID  UA without obvious infection  PT/OT input appreciated, recommend STR.   CM assisting with discharge disposition   Coronary artery disease involving native coronary artery of native heart without angina pectoris  Asymptomatic  Continue with medical management  Essential hypertension  The patient noted to have some elevated blood pressure  Continue toprol from home   He uses lasix 20 mg daily PRN for swelling; will start lasix 3 times a week for lower extremity edema   Will continue to monitor for now and adjust medication as indicated   Bilateral leg edema  Chronic   Will start furosemide 3 times a week- Monday, Wednesday, and Friday instead of as needed daily.  Elevate lower extremities  Idiopathic chronic gout of multiple sites without tophus  Continue allopurinol   Mixed hyperlipidemia  Continue fenofibrate   Tinea cruris  Continue nystatin and moisture barrier.  Try to keep area dry  Concern for possible bacterial component.  Will continue Keflex x 5 days    VTE Pharmacologic Prophylaxis: VTE Score:  4 Moderate Risk (Score 3-4) - Pharmacological DVT Prophylaxis Ordered: heparin.    Mobility:   Basic Mobility Inpatient Raw Score: 7  JH-HLM Goal: 2: Bed activities/Dependent transfer  JH-HLM Achieved: 2: Bed activities/Dependent transfer  JH-HLM Goal achieved. Continue to encourage appropriate mobility.    Patient Centered Rounds: I performed bedside rounds with nursing staff today.   Discussions with Specialists or Other Care Team Provider: yes    Education and Discussions with Family / Patient: Updated  (son) at bedside.    Current Length of Stay: 1 day(s)  Current Patient Status: Inpatient   Certification Statement: The patient will continue to require additional inpatient hospital stay due to weakness  Discharge Plan: Anticipate discharge in 24-48 hrs to rehab facility.    Code Status: Level 3 - DNAR and DNI    Subjective   No overnight events noted    Objective :  Temp:  [97.1 °F (36.2 °C)-98.4 °F (36.9 °C)] 97.9 °F (36.6 °C)  HR:  [71-78] 71  BP: (113-147)/(65-71) 127/68  Resp:  [16-17] 17  SpO2:  [94 %-96 %] 94 %  O2 Device: None (Room air)    Body mass index is 25.34 kg/m².     Input and Output Summary (last 24 hours):     Intake/Output Summary (Last 24 hours) at 10/12/2024 0930  Last data filed at 10/12/2024 0700  Gross per 24 hour   Intake 650 ml   Output 1375 ml   Net -725 ml       Physical Exam  Constitutional:       General: He is not in acute distress.  HENT:      Head: Normocephalic and atraumatic.      Nose: Nose normal.      Mouth/Throat:      Mouth: Mucous membranes are moist.   Eyes:      Extraocular Movements: Extraocular movements intact.      Conjunctiva/sclera: Conjunctivae normal.   Cardiovascular:      Rate and Rhythm: Normal rate and regular rhythm.   Pulmonary:      Effort: Pulmonary effort is normal. No respiratory distress.   Abdominal:      Palpations: Abdomen is soft.      Tenderness: There is no abdominal tenderness.   Musculoskeletal:         General: Normal range of  motion.      Cervical back: Normal range of motion and neck supple.      Comments: Generalized weakness   Skin:     General: Skin is warm and dry.      Comments: Mild erythema noted in bilateral groin area   Neurological:      General: No focal deficit present.      Mental Status: He is alert. Mental status is at baseline.      Cranial Nerves: No cranial nerve deficit.   Psychiatric:         Mood and Affect: Mood normal.         Behavior: Behavior normal.           Lines/Drains:              Lab Results: I have reviewed the following results:   Results from last 7 days   Lab Units 10/10/24  2024   WBC Thousand/uL 9.34   HEMOGLOBIN g/dL 13.3   HEMATOCRIT % 41.2   PLATELETS Thousands/uL 182   SEGS PCT % 79*   LYMPHO PCT % 13*   MONO PCT % 6   EOS PCT % 1     Results from last 7 days   Lab Units 10/12/24  0450 10/10/24  1343 10/07/24  2010   SODIUM mmol/L 135   < > 137   POTASSIUM mmol/L 4.0   < > 4.2   CHLORIDE mmol/L 103   < > 104   CO2 mmol/L 24   < > 27   BUN mg/dL 24   < > 26*   CREATININE mg/dL 1.08   < > 0.92   ANION GAP mmol/L 8   < > 6   CALCIUM mg/dL 8.3*   < > 9.1   ALBUMIN g/dL  --   --  3.9   TOTAL BILIRUBIN mg/dL  --   --  0.39   ALK PHOS U/L  --   --  68   ALT U/L  --   --  28   AST U/L  --   --  36   GLUCOSE RANDOM mg/dL 101   < > 85    < > = values in this interval not displayed.         Results from last 7 days   Lab Units 10/11/24  1550 10/11/24  1128 10/11/24  0746 10/10/24  2010   POC GLUCOSE mg/dl 150* 136 104 160*     Results from last 7 days   Lab Units 10/10/24  1343   HEMOGLOBIN A1C % 6.1*           Recent Cultures (last 7 days):         Imaging Results Review: No pertinent imaging studies reviewed.  Other Study Results Review: No additional pertinent studies reviewed.    Last 24 Hours Medication List:     Current Facility-Administered Medications:     acetaminophen (TYLENOL) tablet 650 mg, Q6H PRN    allopurinol (ZYLOPRIM) tablet 300 mg, Daily    aspirin (ECOTRIN LOW STRENGTH) EC tablet 81  mg, Daily    cephalexin (KEFLEX) capsule 500 mg, Q6H ANKIT    Cholecalciferol (VITAMIN D3) tablet 2,000 Units, Daily    fenofibrate (TRICOR) tablet 48 mg, Daily    furosemide (LASIX) tablet 20 mg, Once per day on Monday Wednesday Friday    gabapentin (NEURONTIN) capsule 100 mg, Q12H    heparin (porcine) subcutaneous injection 5,000 Units, Q8H ANKIT    metoprolol succinate (TOPROL-XL) 24 hr tablet 25 mg, Daily    moisture barrier miconazole 2% cream (aka REJI MOISTURE BARRIER ANTIFUNGAL CREAM), BID    moisture barrier miconazole 2% cream (aka REJI MOISTURE BARRIER ANTIFUNGAL CREAM), BID    multivitamin stress formula tablet 1 tablet, Daily    nitroglycerin (NITROSTAT) SL tablet 0.4 mg, Q5 Min PRN    nystatin (MYCOSTATIN) powder, BID    potassium chloride (Klor-Con M20) CR tablet 20 mEq, Daily    Administrative Statements   Today, Patient Was Seen By: Claritza Carbajal MD      **Please Note: This note may have been constructed using a voice recognition system.**

## 2024-10-12 NOTE — ASSESSMENT & PLAN NOTE
Nathen JANICE Gage   7/6/2017 10:00 AM   Anticoagulation Therapy Visit    Description:  80 year old male   Provider:  [facility]   Department:  [facility]           INR as of 7/6/2017     Today's INR 2.4      Anticoagulation Summary as of 7/6/2017     INR goal 2.0-2.5   Today's INR 2.4   Full instructions 2.5 mg every day   Next INR check 8/8/2017    Indications   Long-term (current) use of anticoagulants [Z79.01] [Z79.01]  Atrial fibrillation (H) [I48.91]         Your next Anticoagulation Clinic appointment(s)     Aug 08, 2017  9:00 AM CDT   Anticoagulation Visit with [facility]   [facility]    [address] [address] [facility] [address] [phone]              Contact Numbers     Clinic Number:         July 2017 Details    Sun Mon Tue Wed Thu Fri Sat           1                 2               3               4               5               6      2.5 mg   See details      7      2.5 mg         8      2.5 mg           9      2.5 mg         10      2.5 mg         11      2.5 mg         12      2.5 mg         13      2.5 mg         14      2.5 mg         15      2.5 mg           16      2.5 mg         17      2.5 mg         18      2.5 mg         19      2.5 mg         20      2.5 mg         21      2.5 mg         22      2.5 mg           23      2.5 mg         24      2.5 mg         25      2.5 mg         26      2.5 mg         27      2.5 mg         28      2.5 mg         29      2.5 mg           30      2.5 mg         31      2.5 mg               Date Details   07/06 This INR check               How to take your warfarin dose     To take:  2.5 mg Take 1 of the 2.5 mg tablets.           August 2017 Details    Sun Mon Tue Wed Thu Fri Sat       1      2.5 mg         2      2.5 mg         3      2.5 mg         4      2.5 mg         5      2.5 mg           6      2.5 mg         7      2.5 mg         8            9               The patient noted to have some elevated blood pressure  Continue toprol from home   He uses lasix 20 mg daily PRN for swelling; will start lasix 3 times a week for lower extremity edema   Will continue to monitor for now and adjust medication as indicated     10               11               12                 13               14               15               16               17               18               19                 20               21               22               23               24               25               26                 27               28               29               30               31                  Date Details   No additional details    Date of next INR:  8/8/2017         How to take your warfarin dose     To take:  2.5 mg Take 1 of the 2.5 mg tablets.

## 2024-10-13 PROCEDURE — 99232 SBSQ HOSP IP/OBS MODERATE 35: CPT | Performed by: INTERNAL MEDICINE

## 2024-10-13 RX ADMIN — HEPARIN SODIUM 5000 UNITS: 5000 INJECTION, SOLUTION INTRAVENOUS; SUBCUTANEOUS at 06:08

## 2024-10-13 RX ADMIN — NYSTATIN: 100000 POWDER TOPICAL at 08:32

## 2024-10-13 RX ADMIN — ASPIRIN 81 MG: 81 TABLET, COATED ORAL at 08:30

## 2024-10-13 RX ADMIN — POTASSIUM CHLORIDE 20 MEQ: 1500 TABLET, EXTENDED RELEASE ORAL at 08:30

## 2024-10-13 RX ADMIN — CEPHALEXIN 500 MG: 500 CAPSULE ORAL at 14:52

## 2024-10-13 RX ADMIN — GABAPENTIN 100 MG: 100 CAPSULE ORAL at 08:30

## 2024-10-13 RX ADMIN — SENNOSIDES 8.6 MG: 8.6 TABLET, FILM COATED ORAL at 21:48

## 2024-10-13 RX ADMIN — CEPHALEXIN 500 MG: 500 CAPSULE ORAL at 06:08

## 2024-10-13 RX ADMIN — MICONAZOLE NITRATE: 20 CREAM TOPICAL at 17:02

## 2024-10-13 RX ADMIN — MICONAZOLE NITRATE: 20 CREAM TOPICAL at 17:01

## 2024-10-13 RX ADMIN — GABAPENTIN 100 MG: 100 CAPSULE ORAL at 21:48

## 2024-10-13 RX ADMIN — NYSTATIN: 100000 POWDER TOPICAL at 17:02

## 2024-10-13 RX ADMIN — ALLOPURINOL 300 MG: 100 TABLET ORAL at 08:30

## 2024-10-13 RX ADMIN — MICONAZOLE NITRATE: 20 CREAM TOPICAL at 08:32

## 2024-10-13 RX ADMIN — HEPARIN SODIUM 5000 UNITS: 5000 INJECTION, SOLUTION INTRAVENOUS; SUBCUTANEOUS at 21:48

## 2024-10-13 RX ADMIN — FENOFIBRATE 48 MG: 48 TABLET, FILM COATED ORAL at 08:31

## 2024-10-13 RX ADMIN — DOCUSATE SODIUM 100 MG: 100 CAPSULE, LIQUID FILLED ORAL at 17:01

## 2024-10-13 RX ADMIN — HEPARIN SODIUM 5000 UNITS: 5000 INJECTION, SOLUTION INTRAVENOUS; SUBCUTANEOUS at 14:52

## 2024-10-13 RX ADMIN — CEPHALEXIN 500 MG: 500 CAPSULE ORAL at 17:01

## 2024-10-13 RX ADMIN — B-COMPLEX W/ C & FOLIC ACID TAB 1 TABLET: TAB at 08:30

## 2024-10-13 RX ADMIN — MICONAZOLE NITRATE: 20 CREAM TOPICAL at 08:31

## 2024-10-13 RX ADMIN — CEPHALEXIN 500 MG: 500 CAPSULE ORAL at 00:11

## 2024-10-13 RX ADMIN — Medication 2000 UNITS: at 08:30

## 2024-10-13 RX ADMIN — METOPROLOL SUCCINATE 25 MG: 50 TABLET, EXTENDED RELEASE ORAL at 08:30

## 2024-10-13 RX ADMIN — DOCUSATE SODIUM 100 MG: 100 CAPSULE, LIQUID FILLED ORAL at 08:30

## 2024-10-13 NOTE — PROGRESS NOTES
Progress Note - Hospitalist   Name: Prem Mar Sr. 89 y.o. male I MRN: [id number]  Unit/Bed#: [facility] I Date of Admission: 10/10/2024   Date of Service: 10/13/2024 I Hospital Day: 2    Assessment & Plan  Weakness  The patient was brought in by his family due to worsening generalized weakness, concerning for possible cellulitis of the groin, and difficulty with his gait  CT a/p No abscess. Mild induration in the right inguinal soft tissues with overlying skin thickening which may reflect cellulitis. Bladder wall thickening with bladder diverticula could be due to bladder outlet obstruction, clinical correlation advised. Mild cystitis not excluded, consider correlation with urinalysis.  Blood work is very reassuring-no leukocytosis and remains afebrile  Started on PO antibiotic in the ED, will continue with Keflex 500 mg every 6 hours for cellulitis x 5 days  Continue local care/keep moisture off with Fadia BID  UA without obvious infection  PT/OT input appreciated, recommend STR.   CM assisting with discharge disposition   Coronary artery disease involving native coronary artery of native heart without angina pectoris  Asymptomatic  Continue with medical management  Essential hypertension  The patient noted to have some elevated blood pressure  Continue toprol from home   He uses lasix 20 mg daily PRN for swelling; will start lasix 3 times a week for lower extremity edema   Will continue to monitor for now and adjust medication as indicated   Bilateral leg edema  Chronic   Will start furosemide 3 times a week- Monday, Wednesday, and Friday instead of as needed daily.  Elevate lower extremities  Idiopathic chronic gout of multiple sites without tophus  Continue allopurinol   Mixed hyperlipidemia  Continue fenofibrate   Tinea cruris  Continue nystatin and moisture barrier.  Try to keep area dry  Concern for possible bacterial component.  Will continue Keflex x 5 days    VTE Pharmacologic Prophylaxis: VTE Score:  4 Moderate Risk (Score 3-4) - Pharmacological DVT Prophylaxis Ordered: heparin.    Mobility:   Basic Mobility Inpatient Raw Score: 7  JH-HLM Goal: 2: Bed activities/Dependent transfer  JH-HLM Achieved: 2: Bed activities/Dependent transfer  JH-HLM Goal achieved. Continue to encourage appropriate mobility.    Patient Centered Rounds: I performed bedside rounds with nursing staff today.   Discussions with Specialists or Other Care Team Provider: yes    Education and Discussions with Family / Patient: Updated  (son) at bedside.    Current Length of Stay: 2 day(s)  Current Patient Status: Inpatient   Certification Statement: The patient will continue to require additional inpatient hospital stay due to cellulitis  Discharge Plan: Anticipate discharge in 24-48 hrs to rehab facility.    Code Status: Level 3 - DNAR and DNI    Subjective   No overnight events noted    Objective :  Temp:  [98 °F (36.7 °C)-98.4 °F (36.9 °C)] 98.4 °F (36.9 °C)  HR:  [71-73] 71  BP: (128-141)/(59-63) 128/59  Resp:  [16-20] 16  SpO2:  [95 %-96 %] 96 %  O2 Device: None (Room air)    Body mass index is 25.34 kg/m².     Input and Output Summary (last 24 hours):     Intake/Output Summary (Last 24 hours) at 10/13/2024 1308  Last data filed at 10/13/2024 0900  Gross per 24 hour   Intake 360 ml   Output 502 ml   Net -142 ml       Physical Exam  Constitutional:       General: He is not in acute distress.  HENT:      Head: Normocephalic and atraumatic.      Nose: Nose normal.      Mouth/Throat:      Mouth: Mucous membranes are moist.   Eyes:      Extraocular Movements: Extraocular movements intact.      Conjunctiva/sclera: Conjunctivae normal.   Cardiovascular:      Rate and Rhythm: Normal rate and regular rhythm.   Pulmonary:      Effort: Pulmonary effort is normal. No respiratory distress.   Abdominal:      Palpations: Abdomen is soft.      Tenderness: There is no abdominal tenderness.   Musculoskeletal:         General: Normal range of  motion.      Cervical back: Normal range of motion and neck supple.      Comments: Generalized weakness   Skin:     General: Skin is warm and dry.   Neurological:      General: No focal deficit present.      Mental Status: He is alert. Mental status is at baseline.   Psychiatric:         Mood and Affect: Mood normal.         Behavior: Behavior normal.           Lines/Drains:              Lab Results: I have reviewed the following results:   Results from last 7 days   Lab Units 10/10/24  2024   WBC Thousand/uL 9.34   HEMOGLOBIN g/dL 13.3   HEMATOCRIT % 41.2   PLATELETS Thousands/uL 182   SEGS PCT % 79*   LYMPHO PCT % 13*   MONO PCT % 6   EOS PCT % 1     Results from last 7 days   Lab Units 10/12/24  0450 10/10/24  1343 10/07/24  2010   SODIUM mmol/L 135   < > 137   POTASSIUM mmol/L 4.0   < > 4.2   CHLORIDE mmol/L 103   < > 104   CO2 mmol/L 24   < > 27   BUN mg/dL 24   < > 26*   CREATININE mg/dL 1.08   < > 0.92   ANION GAP mmol/L 8   < > 6   CALCIUM mg/dL 8.3*   < > 9.1   ALBUMIN g/dL  --   --  3.9   TOTAL BILIRUBIN mg/dL  --   --  0.39   ALK PHOS U/L  --   --  68   ALT U/L  --   --  28   AST U/L  --   --  36   GLUCOSE RANDOM mg/dL 101   < > 85    < > = values in this interval not displayed.         Results from last 7 days   Lab Units 10/11/24  1550 10/11/24  1128 10/11/24  0746 10/10/24  2010   POC GLUCOSE mg/dl 150* 136 104 160*     Results from last 7 days   Lab Units 10/10/24  1343   HEMOGLOBIN A1C % 6.1*           Recent Cultures (last 7 days):         Imaging Results Review: No pertinent imaging studies reviewed.  Other Study Results Review: No additional pertinent studies reviewed.    Last 24 Hours Medication List:     Current Facility-Administered Medications:     acetaminophen (TYLENOL) tablet 650 mg, Q6H PRN    allopurinol (ZYLOPRIM) tablet 300 mg, Daily    aspirin (ECOTRIN LOW STRENGTH) EC tablet 81 mg, Daily    cephalexin (KEFLEX) capsule 500 mg, Q6H ANKIT    Cholecalciferol (VITAMIN D3) tablet 2,000 Units,  Daily    docusate sodium (COLACE) capsule 100 mg, BID    fenofibrate (TRICOR) tablet 48 mg, Daily    furosemide (LASIX) tablet 20 mg, Once per day on Monday Wednesday Friday    gabapentin (NEURONTIN) capsule 100 mg, Q12H    heparin (porcine) subcutaneous injection 5,000 Units, Q8H ANKIT    metoprolol succinate (TOPROL-XL) 24 hr tablet 25 mg, Daily    moisture barrier miconazole 2% cream (aka REJI MOISTURE BARRIER ANTIFUNGAL CREAM), BID    moisture barrier miconazole 2% cream (aka REJI MOISTURE BARRIER ANTIFUNGAL CREAM), BID    multivitamin stress formula tablet 1 tablet, Daily    nitroglycerin (NITROSTAT) SL tablet 0.4 mg, Q5 Min PRN    nystatin (MYCOSTATIN) powder, BID    potassium chloride (Klor-Con M20) CR tablet 20 mEq, Daily    senna (SENOKOT) tablet 8.6 mg, HS    Administrative Statements   Today, Patient Was Seen By: Claritza Carbajal MD      **Please Note: This note may have been constructed using a voice recognition system.**

## 2024-10-13 NOTE — PLAN OF CARE
Problem: PAIN - ADULT  Goal: Verbalizes/displays adequate comfort level or baseline comfort level  Description: Interventions:  - Encourage patient to monitor pain and request assistance  - Assess pain using appropriate pain scale  - Administer analgesics based on type and severity of pain and evaluate response  - Implement non-pharmacological measures as appropriate and evaluate response  - Consider cultural and social influences on pain and pain management  - Notify physician/advanced practitioner if interventions unsuccessful or patient reports new pain  Outcome: Progressing     Problem: INFECTION - ADULT  Goal: Absence or prevention of progression during hospitalization  Description: INTERVENTIONS:  - Assess and monitor for signs and symptoms of infection  - Monitor lab/diagnostic results  - Monitor all insertion sites, i.e. indwelling lines, tubes, and drains  - Monitor endotracheal if appropriate and nasal secretions for changes in amount and color  - Dyess appropriate cooling/warming therapies per order  - Administer medications as ordered  - Instruct and encourage patient and family to use good hand hygiene technique  - Identify and instruct in appropriate isolation precautions for identified infection/condition  Outcome: Progressing  Goal: Absence of fever/infection during neutropenic period  Description: INTERVENTIONS:  - Monitor WBC    Outcome: Progressing     Problem: SAFETY ADULT  Goal: Patient will remain free of falls  Description: INTERVENTIONS:  - Educate patient/family on patient safety including physical limitations  - Instruct patient to call for assistance with activity   - Consult OT/PT to assist with strengthening/mobility   - Keep Call bell within reach  - Keep bed low and locked with side rails adjusted as appropriate  - Keep care items and personal belongings within reach  - Initiate and maintain comfort rounds  - Make Fall Risk Sign visible to staff  - Offer Toileting every 2 Hours,  in advance of need  - Initiate/Maintain bed alarm  - Obtain necessary fall risk management equipment: bed alarm  - Apply yellow socks and bracelet for high fall risk patients  - Consider moving patient to room near nurses station  Outcome: Progressing  Goal: Maintain or return to baseline ADL function  Description: INTERVENTIONS:  -  Assess patient's ability to carry out ADLs; assess patient's baseline for ADL function and identify physical deficits which impact ability to perform ADLs (bathing, care of mouth/teeth, toileting, grooming, dressing, etc.)  - Assess/evaluate cause of self-care deficits   - Assess range of motion  - Assess patient's mobility; develop plan if impaired  - Assess patient's need for assistive devices and provide as appropriate  - Encourage maximum independence but intervene and supervise when necessary  - Involve family in performance of ADLs  - Assess for home care needs following discharge   - Consider OT consult to assist with ADL evaluation and planning for discharge  - Provide patient education as appropriate  Outcome: Progressing  Goal: Maintains/Returns to pre admission functional level  Description: INTERVENTIONS:  - Perform AM-PAC 6 Click Basic Mobility/ Daily Activity assessment daily.  - Set and communicate daily mobility goal to care team and patient/family/caregiver.   - Collaborate with rehabilitation services on mobility goals if consulted  - Perform Range of Motion 3 times a day.  - Reposition patient every 2 hours.  - Dangle patient 3 times a day  - Stand patient 3 times a day  - Ambulate patient 3 times a day  - Out of bed to chair 3 times a day   - Out of bed for meals 3 times a day  - Out of bed for toileting  - Record patient progress and toleration of activity level   Outcome: Progressing     Problem: DISCHARGE PLANNING  Goal: Discharge to home or other facility with appropriate resources  Description: INTERVENTIONS:  - Identify barriers to discharge w/patient and  caregiver  - Arrange for needed discharge resources and transportation as appropriate  - Identify discharge learning needs (meds, wound care, etc.)  - Arrange for interpretive services to assist at discharge as needed  - Refer to Case Management Department for coordinating discharge planning if the patient needs post-hospital services based on physician/advanced practitioner order or complex needs related to functional status, cognitive ability, or social support system  Outcome: Progressing     Problem: Knowledge Deficit  Goal: Patient/family/caregiver demonstrates understanding of disease process, treatment plan, medications, and discharge instructions  Description: Complete learning assessment and assess knowledge base.  Interventions:  - Provide teaching at level of understanding  - Provide teaching via preferred learning methods  Outcome: Progressing     Problem: Prexisting or High Potential for Compromised Skin Integrity  Goal: Skin integrity is maintained or improved  Description: INTERVENTIONS:  - Identify patients at risk for skin breakdown  - Assess and monitor skin integrity  - Assess and monitor nutrition and hydration status  - Monitor labs   - Assess for incontinence   - Turn and reposition patient  - Assist with mobility/ambulation  - Relieve pressure over bony prominences  - Avoid friction and shearing  - Provide appropriate hygiene as needed including keeping skin clean and dry  - Evaluate need for skin moisturizer/barrier cream  - Collaborate with interdisciplinary team   - Patient/family teaching  - Consider wound care consult   Outcome: Progressing

## 2024-10-13 NOTE — UTILIZATION REVIEW
Initial Clinical Review    10/10 Treatment/Care started - meeting for 1 MN stay    Admission: Date/Time/Statement:   Admission Orders (From admission, onward)       Ordered        10/11/24 0958  INPATIENT ADMISSION  Once                          Orders Placed This Encounter   Procedures    INPATIENT ADMISSION     Standing Status:   Standing     Number of Occurrences:   1     Order Specific Question:   Level of Care     Answer:   Med Surg [16]     Order Specific Question:   Estimated length of stay     Answer:   More than 2 Midnights     Order Specific Question:   Certification     Answer:   I certify that inpatient services are medically necessary for this patient for a duration of greater than two midnights. See H&P and MD Progress Notes for additional information about the patient's course of treatment.     ED Arrival Information       Expected   -    Arrival   10/10/2024 20:03    Acuity   Urgent              Means of arrival   Ambulance    Escorted by   [facility]    Service   Hospitalist    Admission type   Emergency              Arrival complaint   -             Chief Complaint   Patient presents with    Weakness - Generalized     Pt reports generalized weakness noting he was seen earlier today and told her has an infection to his groin        Initial Presentation: 89 y.o. male to ED presents for worsening generalized weakness and redness in his groin. Family felt that there is some drainage on the patient's skin which appears to be progressively worse. Pt also has been feeling weak with issue ambulating. In ED, started on antibiotics for cellulitis. CT Abd and Pelvis noted some possible cellulitis of the groin and possible UTI. PMH for HTN, HLD and CKD. Chronic gout of multiple sites.   Admit to Inpatient Dx; Weakness. Bilateral leg edema, chronic  Plan; Po antibiotics. UA. Local wound care. Start furosemide 3 times a week- Monday, Wednesday, and Friday instead of as needed daily.   Continue home  meds.   On exam; Rales. Frail and elderly  CT A/P; No abscess. Mild induration in the right inguinal soft tissues with overlying skin thickening which may reflect cellulitis. Bladder wall thickening with bladder diverticula could be due to bladder outlet obstruction, clinical correlation advised. Mild cystitis not excluded, consider correlation with urinalysis.     Anticipated Length of Stay/Certification Statement: Patient will be admitted on an inpatient basis with an anticipated length of stay of greater than 2 midnights secondary to generalized weakness.     Date: 10/12  Day 3: Has surpassed a 2nd midnight with active treatments and services.  Antibiotics. Continue local care/keep moisture off with Fadia bid. PT/OT recommending STR.  Continue home meds.   Uses lasix 20 mg daily PRN for swelling; will start lasix 3 times a week for lower extremity edema   Continue to monitor for now and adjust medication as indicated     ED Treatment-Medication Administration from 10/10/2024 2003 to 10/11/2024 1607         Date/Time Order Dose Route Action     10/11/2024 0831 aspirin (ECOTRIN LOW STRENGTH) EC tablet 81 mg 81 mg Oral Given     10/10/2024 2104 cefuroxime (CEFTIN) tablet 500 mg 500 mg Oral Given     10/11/2024 0831 cefuroxime (CEFTIN) tablet 500 mg 500 mg Oral Given     10/10/2024 2104 Cholecalciferol (VITAMIN D3) tablet 2,000 Units 2,000 Units Oral Given     10/11/2024 0831 Cholecalciferol (VITAMIN D3) tablet 2,000 Units 2,000 Units Oral Given     10/11/2024 0831 fenofibrate (TRICOR) tablet 48 mg 48 mg Oral Given     10/10/2024 2104 gabapentin (NEURONTIN) capsule 100 mg 100 mg Oral Given     10/11/2024 0831 gabapentin (NEURONTIN) capsule 100 mg 100 mg Oral Given     10/11/2024 0831 metoprolol succinate (TOPROL-XL) 24 hr tablet 25 mg 25 mg Oral Given     10/11/2024 0831 multivitamin stress formula tablet 1 tablet 1 tablet Oral Given     10/10/2024 2105 nystatin (MYCOSTATIN) cream 1 Application Topical Given      10/11/2024 0831 nystatin (MYCOSTATIN) cream -- Topical Given     10/10/2024 2104 potassium chloride (Klor-Con M20) CR tablet 20 mEq 20 mEq Oral Given     10/11/2024 0831 potassium chloride (Klor-Con M20) CR tablet 20 mEq 20 mEq Oral Given     10/11/2024 1158 allopurinol (ZYLOPRIM) tablet 300 mg 300 mg Oral Given     10/11/2024 1423 heparin (porcine) subcutaneous injection 5,000 Units 5,000 Units Subcutaneous Given     10/11/2024 1310 influenza vaccine, high-dose (Fluzone High-Dose) IM injection 0.5 mL 0.5 mL Intramuscular Given     10/11/2024 1554 furosemide (LASIX) tablet 20 mg 20 mg Oral Given            Scheduled Medications:  allopurinol, 300 mg, Oral, Daily  aspirin, 81 mg, Oral, Daily  cephalexin, 500 mg, Oral, Q6H ANKIT  Cholecalciferol, 2,000 Units, Oral, Daily  docusate sodium, 100 mg, Oral, BID  fenofibrate, 48 mg, Oral, Daily  furosemide, 20 mg, Oral, Once per day on Monday Wednesday Friday  gabapentin, 100 mg, Oral, Q12H  heparin (porcine), 5,000 Units, Subcutaneous, Q8H ANKIT  metoprolol succinate, 25 mg, Oral, Daily  REJI ANTIFUNGAL, , Topical, BID  REJI ANTIFUNGAL, , Topical, BID  multivitamin stress formula, 1 tablet, Oral, Daily  nystatin, , Topical, BID  potassium chloride, 20 mEq, Oral, Daily  senna, 1 tablet, Oral, HS      Continuous IV Infusions: None     PRN Meds:  acetaminophen, 650 mg, Oral, Q6H PRN  nitroglycerin, 0.4 mg, Sublingual, Q5 Min PRN      ED Triage Vitals   Temperature Pulse Respirations Blood Pressure SpO2 Pain Score   10/11/24 1614 10/10/24 2007 10/10/24 2007 10/10/24 2007 10/10/24 2007 10/10/24 2007   (!) 97.1 °F (36.2 °C) 71 20 165/68 97 % No Pain     Weight (last 2 days)       Date/Time Weight    10/11/24 1702 71.2 (156.97)            Vital Signs (last 3 days)       Date/Time Temp Pulse Resp BP MAP (mmHg) SpO2 O2 Device Patient Position - Orthostatic VS [place]itt Coma Scale Score Pain    10/13/24 07:26:36 98.4 °F (36.9 °C) 71 16 128/59 82 96 % None (Room air) Lying 14 No Pain     10/12/24 22:21:08 98.4 °F (36.9 °C) -- 20 130/63 85 -- None (Room air) Lying -- --    10/12/24 1915 -- -- -- -- -- -- None (Room air) -- 15 No Pain    10/12/24 15:00:55 98 °F (36.7 °C) 73 17 141/62 88 95 % -- -- -- --    10/12/24 07:36:30 97.9 °F (36.6 °C) 71 17 127/68 88 94 % None (Room air) -- 14 No Pain    10/11/24 22:51:24 98.4 °F (36.9 °C) 78 16 113/65 81 96 % -- -- -- --    10/11/24 2100 -- -- -- -- -- -- -- -- 14 2    10/11/24 1753 -- -- -- -- -- -- -- -- -- 3    10/11/24 1702 97.1 °F (36.2 °C) 77 16 147/71 -- -- -- -- -- --    10/11/24 16:14:45 97.1 °F (36.2 °C) 77 16 147/71 96 95 % None (Room air) -- 14 No Pain    10/11/24 0900 -- -- -- -- -- -- -- -- -- No Pain    10/11/24 0847 -- -- -- -- -- -- -- -- -- No Pain    10/11/24 0415 -- 77 18 197/78 -- 95 % None (Room air) -- -- --    10/10/24 2330 -- -- -- -- -- -- -- -- -- No Pain    10/10/24 2025 -- -- -- -- -- -- -- -- 15 --    10/10/24 2007 -- 71 20 165/68 -- 97 % None (Room air) -- -- No Pain            Pertinent Labs/Diagnostic Test Results:   Radiology:  No orders to display     Cardiology:  ECG 12 lead   Final Result by Bharath Perez MD (10/11 1631)   Normal sinus rhythm with sinus arrhythmia   Left anterior fascicular block   Nonspecific T wave changes   When compared with ECG of 07-OCT-2024 22:37,   No significant change was found   Confirmed by Bharath Perez ([id number]) on 10/11/2024 4:31:10 PM        GI:  No orders to display           Results from last 7 days   Lab Units 10/10/24  2024 10/10/24  1343 10/07/24  2010   WBC Thousand/uL 9.34 9.75 8.36   HEMOGLOBIN g/dL 13.3 13.0 12.9   HEMATOCRIT % 41.2 40.4 40.1   PLATELETS Thousands/uL 182 182 178   TOTAL NEUT ABS Thousands/µL 7.35 7.50 6.08         Results from last 7 days   Lab Units 10/12/24  0450 10/10/24  2024 10/10/24  1343 10/07/24  2010   SODIUM mmol/L 135 133* 136 137   POTASSIUM mmol/L 4.0 4.5 4.1 4.2   CHLORIDE mmol/L 103 101 103 104   CO2 mmol/L 24 24 26 27   ANION GAP mmol/L 8 8 7 6    BUN mg/dL 24 23 25 26*   CREATININE mg/dL 1.08 0.92 0.92 0.92   EGFR ml/min/1.73sq m 56 68 68 68   CALCIUM mg/dL 8.3* 8.7 9.0 9.1     Results from last 7 days   Lab Units 10/07/24  2010   AST U/L 36   ALT U/L 28   ALK PHOS U/L 68   TOTAL PROTEIN g/dL 6.8   ALBUMIN g/dL 3.9   TOTAL BILIRUBIN mg/dL 0.39     Results from last 7 days   Lab Units 10/11/24  1550 10/11/24  1128 10/11/24  0746 10/10/24  2010   POC GLUCOSE mg/dl 150* 136 104 160*     Results from last 7 days   Lab Units 10/12/24  0450 10/10/24  2024 10/10/24  1343 10/07/24  2010   GLUCOSE RANDOM mg/dL 101 142* 145* 85         Results from last 7 days   Lab Units 10/10/24  1343   HEMOGLOBIN A1C % 6.1*   EAG mg/dl 128       Results from last 7 days   Lab Units 10/07/24  2237 10/07/24  2010   HS TNI 0HR ng/L  --  9   HS TNI 2HR ng/L 10  --    HSTNI D2 ng/L 1  --        Results from last 7 days   Lab Units 10/11/24  1836   CLARITY UA  Clear   COLOR UA  Yellow   SPEC GRAV UA  1.010   PH UA  6.0   GLUCOSE UA mg/dl Negative   KETONES UA mg/dl Negative   BLOOD UA  Negative   PROTEIN UA mg/dl Negative   NITRITE UA  Negative   BILIRUBIN UA  Negative   UROBILINOGEN UA E.U./dl 0.2   LEUKOCYTES UA  Negative       Past Medical History:   Diagnosis Date    Arthritis     Cataract     Coronary artery disease     Coronary atherosclerosis of native coronary artery     Diverticulitis of colon     Essential hypertension, benign     Hyperlipidemia     Hypertension     Peptic ulcer     Renal disorder      Present on Admission:   Mixed hyperlipidemia   Essential hypertension   Coronary artery disease involving native coronary artery of native heart without angina pectoris   Idiopathic chronic gout of multiple sites without tophus   Bilateral leg edema      Admitting Diagnosis: Weakness [R53.1]  Generalized weakness [R53.1]  Age/Sex: 89 y.o. male    Network Utilization Review Department  ATTENTION: Please call with any questions or concerns to [phone] and carefully listen to  the prompts so that you are directed to the right person. All voicemails are confidential.   For Discharge needs, contact Care Management DC Support Team at [phone] opt. 2  Send all requests for admission clinical reviews, approved or denied determinations and any other requests to dedicated fax number below belonging to the campus where the patient is receiving treatment. List of dedicated fax numbers for the Facilities:  FACILITY NAME UR FAX NUMBER   ADMISSION DENIALS (Administrative/Medical Necessity) [phone]   DISCHARGE SUPPORT TEAM (NETWORK) [phone]   PARENT CHILD HEALTH (Maternity/NICU/Pediatrics) [phone]   [facility] [phone]   [facility] [phone]   [facility] [phone]   [facility] [phone]   [facility] [phone]   [facility] [phone]   [facility] [phone]   [facility] [phone]   [facility] [phone]   [facility] [phone]   [facility] [phone]   [facility] [phone]

## 2024-10-14 LAB
SARS-COV-2 AG UPPER RESP QL IA: ABNORMAL
SARS-COV-2 AG UPPER RESP QL IA: NEGATIVE

## 2024-10-14 PROCEDURE — 92610 EVALUATE SWALLOWING FUNCTION: CPT

## 2024-10-14 PROCEDURE — 97110 THERAPEUTIC EXERCISES: CPT

## 2024-10-14 PROCEDURE — 99232 SBSQ HOSP IP/OBS MODERATE 35: CPT | Performed by: INTERNAL MEDICINE

## 2024-10-14 PROCEDURE — 97530 THERAPEUTIC ACTIVITIES: CPT

## 2024-10-14 RX ADMIN — MICONAZOLE NITRATE: 20 CREAM TOPICAL at 08:51

## 2024-10-14 RX ADMIN — FUROSEMIDE 20 MG: 20 TABLET ORAL at 08:50

## 2024-10-14 RX ADMIN — HEPARIN SODIUM 5000 UNITS: 5000 INJECTION, SOLUTION INTRAVENOUS; SUBCUTANEOUS at 22:02

## 2024-10-14 RX ADMIN — CEPHALEXIN 500 MG: 500 CAPSULE ORAL at 17:01

## 2024-10-14 RX ADMIN — Medication 2000 UNITS: at 08:50

## 2024-10-14 RX ADMIN — FENOFIBRATE 48 MG: 48 TABLET, FILM COATED ORAL at 08:50

## 2024-10-14 RX ADMIN — DOCUSATE SODIUM 100 MG: 100 CAPSULE, LIQUID FILLED ORAL at 08:50

## 2024-10-14 RX ADMIN — HEPARIN SODIUM 5000 UNITS: 5000 INJECTION, SOLUTION INTRAVENOUS; SUBCUTANEOUS at 06:18

## 2024-10-14 RX ADMIN — DOCUSATE SODIUM 100 MG: 100 CAPSULE, LIQUID FILLED ORAL at 17:01

## 2024-10-14 RX ADMIN — POTASSIUM CHLORIDE 20 MEQ: 1500 TABLET, EXTENDED RELEASE ORAL at 08:50

## 2024-10-14 RX ADMIN — GABAPENTIN 100 MG: 100 CAPSULE ORAL at 08:50

## 2024-10-14 RX ADMIN — HEPARIN SODIUM 5000 UNITS: 5000 INJECTION, SOLUTION INTRAVENOUS; SUBCUTANEOUS at 14:07

## 2024-10-14 RX ADMIN — METOPROLOL SUCCINATE 25 MG: 50 TABLET, EXTENDED RELEASE ORAL at 08:50

## 2024-10-14 RX ADMIN — NYSTATIN: 100000 POWDER TOPICAL at 17:01

## 2024-10-14 RX ADMIN — ASPIRIN 81 MG: 81 TABLET, COATED ORAL at 08:50

## 2024-10-14 RX ADMIN — ALLOPURINOL 300 MG: 100 TABLET ORAL at 08:50

## 2024-10-14 RX ADMIN — SENNOSIDES 8.6 MG: 8.6 TABLET, FILM COATED ORAL at 21:59

## 2024-10-14 RX ADMIN — CEPHALEXIN 500 MG: 500 CAPSULE ORAL at 06:19

## 2024-10-14 RX ADMIN — ACETAMINOPHEN 650 MG: 325 TABLET ORAL at 15:08

## 2024-10-14 RX ADMIN — MICONAZOLE NITRATE: 20 CREAM TOPICAL at 17:01

## 2024-10-14 RX ADMIN — NYSTATIN: 100000 POWDER TOPICAL at 08:51

## 2024-10-14 RX ADMIN — B-COMPLEX W/ C & FOLIC ACID TAB 1 TABLET: TAB at 08:50

## 2024-10-14 RX ADMIN — CEPHALEXIN 500 MG: 500 CAPSULE ORAL at 14:07

## 2024-10-14 RX ADMIN — CEPHALEXIN 500 MG: 500 CAPSULE ORAL at 00:42

## 2024-10-14 RX ADMIN — GABAPENTIN 100 MG: 100 CAPSULE ORAL at 21:59

## 2024-10-14 NOTE — CASE MANAGEMENT
[place] [employer] [place] has received APPROVED authorization.  Insurance:   [employer]  Called in by Rep:Lacie  Authorization received for: [facility]  Facility: [facility]    Authorization #:[id number]   Start of Care: 10/14  Next Review Date: 10/20  Continued Stay Care Coordinator: Lacie RUVALCABA#: [phone]  Submit next review to: [phone]     Care Manager notified: Darlene Perez     Please reach out to  for updates on any clinical information.

## 2024-10-14 NOTE — OCCUPATIONAL THERAPY NOTE
10/14/24 1401   OT Last Visit   OT Visit Date 10/14/24   Note Type   Note Type Treatment   Pain Assessment   Pain Assessment Tool 0-10   Pain Score No Pain   Restrictions/Precautions   Weight Bearing Precautions Per Order No   Other Precautions Telemetry   Therapeutic Excerise-Strength   UE Strength Yes   Right Upper Extremity- Strength   R Shoulder Flexion;Extension;Horizontal ABduction  (horiz. add, scap pro/ret)   R Elbow Elbow flexion;Elbow extension   R Wrist   (wrist rotation)   R Position Seated   Equipment Dumbbell   R Weight/Reps/Sets 1# weight x 10 reps   Left Upper Extremity-Strength   L Weights/Reps/Sets TE same as RUE above   Activity Tolerance   Activity Tolerance Patient tolerated treatment well;Patient limited by fatigue   Assessment   Assessment Patient participated in Skilled OT session this date with interventions consisting of Energy Conservation techniques and therapeutic exercise to: increase functional use of BUEs, increase BUE muscle strength  . Patient agreeable to OT treatment session, upon arrival patient was found seated OOB to Recliner, alert, responsive , and in no apparent distress.  Patient performs UB TE using 1# weight x 10 reps all directions as detailed in flow sheet. Following TE, session ended with patient seated OOB to recliner, all needs met, and call bell within reach. In comparison to previous session, patient with improvements in UB strength and endurance. Patient requiring verbal cues for safety, verbal cues for correct technique, verbal cues for pacing thru activity steps, and frequent rest periods. Patient performance  demonstrated good carryover of learned techniques and strategies to facilitate safety during functional tasks. Patient continues to be functioning below baseline level, occupational performance remains limited secondary to factors listed above and increased risk for falls and injury.   From OT standpoint, recommendation at time of d/c would be Level II  (Moderate Resource Intensity). Patient to benefit from continued Occupational Therapy treatment while in the hospital to address deficits as defined above and maximize level of functional independence with ADLs and functional mobility in order to return to OF.   Plan   Treatment Interventions UE strengthening/ROM;Energy conservation   Goal Expiration Date 10/21/24   OT Treatment Day 1   OT Frequency 3-5x/wk   Discharge Recommendation   Rehab Resource Intensity Level, OT II (Moderate Resource Intensity)   Additional Comments  The patient's raw score on the AM-PAC Daily Activity Inpatient Short Form is 9. A raw score of less than 19 suggests the patient may benefit from discharge to post-acute rehabilitation services. Please refer to the recommendation of the Occupational Therapist for safe discharge planning.   AM-PAC Daily Activity Inpatient   Lower Body Dressing 1   Bathing 1   Toileting 1   Upper Body Dressing 1   Grooming 2   Eating 3   Daily Activity Raw Score 9   Turning Head Towards Sound 4   Follow Simple Instructions 4   Low Function Daily Activity Raw Score 17   Low Function Daily Activity Standardized Score  28.95   AM-PAC Applied Cognition Inpatient   Following a Speech/Presentation 4   Understanding Ordinary Conversation 4   Taking Medications 2   Remembering Where Things Are Placed or Put Away 3   Remembering List of 4-5 Errands 3   Taking Care of Complicated Tasks 3   Applied Cognition Raw Score 19   Applied Cognition Standardized Score 39.77       Jessica Trinidad

## 2024-10-14 NOTE — PLAN OF CARE
Problem: PAIN - ADULT  Goal: Verbalizes/displays adequate comfort level or baseline comfort level  Description: Interventions:  - Encourage patient to monitor pain and request assistance  - Assess pain using appropriate pain scale  - Administer analgesics based on type and severity of pain and evaluate response  - Implement non-pharmacological measures as appropriate and evaluate response  - Consider cultural and social influences on pain and pain management  - Notify physician/advanced practitioner if interventions unsuccessful or patient reports new pain  Outcome: Progressing     Problem: INFECTION - ADULT  Goal: Absence or prevention of progression during hospitalization  Description: INTERVENTIONS:  - Assess and monitor for signs and symptoms of infection  - Monitor lab/diagnostic results  - Monitor all insertion sites, i.e. indwelling lines, tubes, and drains  - [place] appropriate cooling/warming therapies per order  - Administer medications as ordered  - Instruct and encourage patient and family to use good hand hygiene technique  - Identify and instruct in appropriate isolation precautions for identified infection/condition  Outcome: Progressing     Problem: SAFETY ADULT  Goal: Patient will remain free of falls  Description: INTERVENTIONS:  - Educate patient/family on patient safety including physical limitations  - Instruct patient to call for assistance with activity   - Consult OT/PT to assist with strengthening/mobility   - Keep Call bell within reach  - Keep bed low and locked with side rails adjusted as appropriate  - Keep care items and personal belongings within reach  - Initiate and maintain comfort rounds  - Make Fall Risk Sign visible to staff  - Offer Toileting every 2 Hours, in advance of need  - Initiate/Maintain  bed alarm  - Apply yellow socks and bracelet for high fall risk patients  - Consider moving patient to room near nurses station  Outcome: Progressing

## 2024-10-14 NOTE — UTILIZATION REVIEW
Continued Stay Review    Date: 10/13/24 and 10/14/24                           Current Patient Class: Inpatient  Current Level of Care: Med Surg     HPI:89 y.o. male initially admitted on 10/10/24 due to cellulitis of the groin.     Assessment/Plan: 10/13/24 Hospitalist: The patient was brought in by his family due to worsening generalized weakness, concerning for possible cellulitis of the groin, and difficulty with his gait. will continue with Keflex 500 mg every 6 hours for cellulitis x 5 days as no leukocytosis and remains afebrile. PT/OT rec STR, CM assisting with dc planning to STR. On exam: pt has generalized weakness. Needs to go to  SNF for STR due to weakness. Continue local care to groin redness/cellulitis of nystatin and moisture barrier.     10/14/24 Assessment/Plan: On exam no overnight events. PT/OT recs for SNF/STR for level 2 moderate resource intensity. As of today per CM, no auth received and pending for STR. Has been escalated to the insurance Escalation Team by CM. Pt awaits approval for STR for safe dc planning. Continue on Keflex for total 5 days. Continue groin care.     Medications:   Scheduled Medications:  allopurinol, 300 mg, Oral, Daily  aspirin, 81 mg, Oral, Daily  cephalexin, 500 mg, Oral, Q6H ANKIT  Cholecalciferol, 2,000 Units, Oral, Daily  docusate sodium, 100 mg, Oral, BID  fenofibrate, 48 mg, Oral, Daily  furosemide, 20 mg, Oral, Once per day on Monday Wednesday Friday  gabapentin, 100 mg, Oral, Q12H  heparin (porcine), 5,000 Units, Subcutaneous, Q8H ANKIT  metoprolol succinate, 25 mg, Oral, Daily  REJI ANTIFUNGAL, , Topical, BID  REJI ANTIFUNGAL, , Topical, BID  multivitamin stress formula, 1 tablet, Oral, Daily  nystatin, , Topical, BID  potassium chloride, 20 mEq, Oral, Daily  senna, 1 tablet, Oral, HS      Continuous IV Infusions: none     PRN Meds:  acetaminophen, 650 mg, Oral, Q6H PRN  nitroglycerin, 0.4 mg, Sublingual, Q5 Min PRN      Discharge Plan: TBD    Vital Signs (last 3  days)       Date/Time Temp Pulse Resp BP MAP (mmHg) SpO2 O2 Device Patient Position - Orthostatic VS [place] Coma Scale Score Pain    10/14/24 0740 -- -- -- -- -- -- None (Room air) -- 14 No Pain    10/14/24 07:05:27 98.4 °F (36.9 °C) 74 19 127/57 80 96 % -- Lying -- --    10/13/24 22:49:45 98.6 °F (37 °C) 78 -- 140/54 83 94 % -- -- -- --    10/13/24 2015 -- -- -- -- -- -- None (Room air) -- 14 No Pain    10/13/24 1423 97.5 °F (36.4 °C) -- 17 -- -- -- -- -- -- --    10/13/24 14:22:59 -- 77 -- 120/55 77 94 % -- -- -- --    10/13/24 07:26:36 98.4 °F (36.9 °C) 71 16 128/59 82 96 % None (Room air) Lying 14 No Pain    10/12/24 22:21:08 98.4 °F (36.9 °C) -- 20 130/63 85 -- None (Room air) Lying -- --    10/12/24 1915 -- -- -- -- -- -- None (Room air) -- 15 No Pain    10/12/24 15:00:55 98 °F (36.7 °C) 73 17 141/62 88 95 % -- -- -- --    10/12/24 07:36:30 97.9 °F (36.6 °C) 71 17 127/68 88 94 % None (Room air) -- 14 No Pain    10/11/24 22:51:24 98.4 °F (36.9 °C) 78 16 113/65 81 96 % -- -- -- --    10/11/24 2100 -- -- -- -- -- -- -- -- 14 2    10/11/24 1753 -- -- -- -- -- -- -- -- -- 3    10/11/24 1702 97.1 °F (36.2 °C) 77 16 147/71 -- -- -- -- -- --    10/11/24 16:14:45 97.1 °F (36.2 °C) 77 16 147/71 96 95 % None (Room air) -- 14 No Pain    10/11/24 0900 -- -- -- -- -- -- -- -- -- No Pain    10/11/24 0847 -- -- -- -- -- -- -- -- -- No Pain    10/11/24 0415 -- 77 18 197/78 -- 95 % None (Room air) -- -- --          Weight (last 2 days)       None            Pertinent Labs/Diagnostic Results:   Radiology:  No orders to display     Cardiology:  ECG 12 lead   Final Result by Bharath Perez MD (10/11 7314)   Normal sinus rhythm with sinus arrhythmia   Left anterior fascicular block   Nonspecific T wave changes   When compared with ECG of 07-OCT-2024 22:37,   No significant change was found   Confirmed by Bharath Perez ([id number]) on 10/11/2024 4:31:10 PM                  Results from last 7 days   Lab Units 10/10/24  2024  10/10/24  1343 10/07/24  2010   WBC Thousand/uL 9.34 9.75 8.36   HEMOGLOBIN g/dL 13.3 13.0 12.9   HEMATOCRIT % 41.2 40.4 40.1   PLATELETS Thousands/uL 182 182 178   TOTAL NEUT ABS Thousands/µL 7.35 7.50 6.08         Results from last 7 days   Lab Units 10/12/24  0450 10/10/24  2024 10/10/24  1343 10/07/24  2010   SODIUM mmol/L 135 133* 136 137   POTASSIUM mmol/L 4.0 4.5 4.1 4.2   CHLORIDE mmol/L 103 101 103 104   CO2 mmol/L 24 24 26 27   ANION GAP mmol/L 8 8 7 6   BUN mg/dL 24 23 25 26*   CREATININE mg/dL 1.08 0.92 0.92 0.92   EGFR ml/min/1.73sq m 56 68 68 68   CALCIUM mg/dL 8.3* 8.7 9.0 9.1     Results from last 7 days   Lab Units 10/07/24  2010   AST U/L 36   ALT U/L 28   ALK PHOS U/L 68   TOTAL PROTEIN g/dL 6.8   ALBUMIN g/dL 3.9   TOTAL BILIRUBIN mg/dL 0.39     Results from last 7 days   Lab Units 10/11/24  1550 10/11/24  1128 10/11/24  0746 10/10/24  2010   POC GLUCOSE mg/dl 150* 136 104 160*     Results from last 7 days   Lab Units 10/12/24  0450 10/10/24  2024 10/10/24  1343 10/07/24  2010   GLUCOSE RANDOM mg/dL 101 142* 145* 85         Results from last 7 days   Lab Units 10/10/24  1343   HEMOGLOBIN A1C % 6.1*   EAG mg/dl 128                   Results from last 7 days   Lab Units 10/07/24  2237 10/07/24  2010   HS TNI 0HR ng/L  --  9   HS TNI 2HR ng/L 10  --    HSTNI D2 ng/L 1  --                        Results from last 7 days   Lab Units 10/11/24  1836   CLARITY UA  Clear   COLOR UA  Yellow   SPEC GRAV UA  1.010   PH UA  6.0   GLUCOSE UA mg/dl Negative   KETONES UA mg/dl Negative   BLOOD UA  Negative   PROTEIN UA mg/dl Negative   NITRITE UA  Negative   BILIRUBIN UA  Negative   UROBILINOGEN UA E.U./dl 0.2   LEUKOCYTES UA  Negative                           Network Utilization Review Department  ATTENTION: Please call with any questions or concerns to [phone] and carefully listen to the prompts so that you are directed to the right person. All voicemails are confidential.   For Discharge needs, contact  Care Management DC Support Team at [phone] opt. [phone]  Send all requests for admission clinical reviews, approved or denied determinations and any other requests to dedicated fax number below belonging to the campus where the patient is receiving treatment. List of dedicated fax numbers for the Facilities:  FACILITY NAME UR FAX NUMBER   ADMISSION DENIALS (Administrative/Medical Necessity) [phone]   DISCHARGE SUPPORT TEAM (NETWORK) [phone]   PARENT CHILD HEALTH (Maternity/NICU/Pediatrics) [phone]   [facility] [phone]   [facility] [phone]   [facility] [phone]   [facility] [phone]   [facility] [phone]   [facility] [phone]   [facility] [phone]   [facility] [phone]   [facility] [phone]   [facility] [phone]   [facility] [phone]   [facility] [phone]

## 2024-10-14 NOTE — PHYSICAL THERAPY NOTE
PT Treatment Note    NAME:  Prem Mar Sr.  DATE: 10/14/24    AGE:   89 y.o.  Mrn:   [id number]  ADMIT DX:  Weakness [R53.1]  Generalized weakness [R53.1]  Performed at least 2 patient identifiers during session: Name and Epic photo       10/14/24 9507   PT Last Visit   PT Visit Date 10/14/24   Note Type   Note Type Treatment   Pain Assessment   Pain Assessment Tool 0-10   Pain Score 5   Pain Location/Orientation Orientation: Right;Location: Arm   Patient's Stated Pain Goal No pain   Hospital Pain Intervention(s) Repositioned;Medication (See MAR)   Restrictions/Precautions   Weight Bearing Precautions Per Order No   Other Precautions Chair Alarm;Bed Alarm;Fall Risk   General   Chart Reviewed Yes   Cognition   Overall Cognitive Status WFL   Arousal/Participation Alert;Responsive   Attention Within functional limits   Orientation Level Oriented X4   Memory Within functional limits   Following Commands Follows all commands and directions without difficulty   Subjective   Subjective pt agreeable to treatment   Bed Mobility   Additional Comments pt received out of bed for session   Transfers   Sit to Stand 2  Maximal assistance   Additional items Assist x 2;Increased time required;Verbal cues   Stand to Sit 3  Moderate assistance   Additional items Assist x 2;Increased time required;Impulsive   Additional Comments To perform sit to stand, pt required encouragement to increase confidence in himself to perform the action   Balance   Static Sitting Fair +   Dynamic Sitting Fair -   Static Standing Poor -   Dynamic Standing Poor   Activity Tolerance   Activity Tolerance Patient limited by fatigue   Exercises   Hip Abduction Sitting;10 reps;Bilateral   Hip Adduction Sitting;10 reps;Bilateral   Knee AROM Long Arc Quad Sitting;20 reps;Bilateral   Marching Sitting;10 reps;Bilateral   Assessment   Prognosis Fair   Problem List Decreased strength;Decreased endurance;Impaired balance;Decreased mobility;Pain   Assessment Pt  seen for PT treatment session this date with interventions consisting of therapeutic activity to educate patient on safe transfers to progress as able and therapeutic exercise to improve strength to improve functional mobility. Pt agreeable to PT treatment session upon arrival, pt found seated OOB in recliner, in no apparent distress, A&O x 4, and responsive. Since previous session, pt has made fair progress as evidenced by increased activity tolerance and improved safety awareness with mobitliy  Barriers during this session include pain and fatigue.  Pt continues to be functioning below baseline level, and remains limited 2* factors listed above and including decreased strength, impaired activity tolerance, and impaired  balance.  Pt prognosis for achieving goals is fair, pending pt progress with hospitalization/medical status improvements, and indicated by motivated to participate in therapy, ability to follow cues, and supportive family. PT will continue to see pt during current hospitalization in order to address the deficits listed above and provide interventions consistent w/ POC in effort to achieve goals. Current goals and POC remain appropriate, pt continues to have rehab potential  Upon conclusion pt seated OOB in recliner. The patient's AM-PAC Basic Mobility Inpatient Short Form Raw Score is 6.  Based on patient presentations and impairments, pt would most appropriately benefit from Level 2 resource intensity upon discharge.  Please also refer to the recommendation of the Physical Therapist for safe discharge planning. RN verbalized pt appropriate for PT session.   Goals   Patient Goals To get stronger   LTG Expiration Date 10/21/24   PT Treatment Day 1   Plan   Treatment/Interventions Functional transfer training;LE strengthening/ROM;Elevations;Therapeutic exercise;Endurance training;Gait training   Progress Progressing toward goals   PT Frequency 3-5x/wk   Discharge Recommendation   Rehab Resource  Intensity Level, PT II (Moderate Resource Intensity)   Equipment Recommended Walker   AM-PAC Basic Mobility Inpatient   Turning in Flat Bed Without Bedrails 1   Lying on Back to Sitting on Edge of Flat Bed Without Bedrails 1   Moving Bed to Chair 1   Standing Up From Chair Using Arms 1   Walk in Room 1   Climb 3-5 Stairs With Railing 1   Basic Mobility Inpatient Raw Score 6   [facility] Highest Level Of Mobility   JH-HLM Goal 2: Bed activities/Dependent transfer   JH-HLM Achieved 3: Sit at edge of bed         Time In: 1448  Time Out: 1515  Total Treatment Minutes: 28    Hardik Keller, PT

## 2024-10-14 NOTE — PLAN OF CARE
Problem: OCCUPATIONAL THERAPY ADULT  Goal: Performs self-care activities at highest level of function for planned discharge setting.  See evaluation for individualized goals.  Description: Treatment Interventions: ADL retraining, Functional transfer training, UE strengthening/ROM, Endurance training, Patient/family training, Equipment evaluation/education, Compensatory technique education, Energy conservation, Activityengagement          See flowsheet documentation for full assessment, interventions and recommendations.   Outcome: Progressing  Note: Limitation: Decreased ADL status, Decreased UE strength, Decreased endurance, Decreased self-care trans, Decreased high-level ADLs, Decreased UE ROM  Prognosis: Fair  Assessment: Patient participated in Skilled OT session this date with interventions consisting of Energy Conservation techniques and therapeutic exercise to: increase functional use of BUEs, increase BUE muscle strength  . Patient agreeable to OT treatment session, upon arrival patient was found seated OOB to Recliner, alert, responsive , and in no apparent distress.  Patient performs UB TE using 1# weight x 10 reps all directions as detailed in flow sheet. Following TE, session ended with patient seated OOB to recliner, all needs met, and call bell within reach. In comparison to previous session, patient with improvements in UB strength and endurance. Patient requiring verbal cues for safety, verbal cues for correct technique, verbal cues for pacing thru activity steps, and frequent rest periods. Patient performance  demonstrated good carryover of learned techniques and strategies to facilitate safety during functional tasks. Patient continues to be functioning below baseline level, occupational performance remains limited secondary to factors listed above and increased risk for falls and injury.   From OT standpoint, recommendation at time of d/c would be Level II (Moderate Resource Intensity). Patient  to benefit from continued Occupational Therapy treatment while in the hospital to address deficits as defined above and maximize level of functional independence with ADLs and functional mobility in order to return to PLOF.     Rehab Resource Intensity Level, OT: II (Moderate Resource Intensity)

## 2024-10-14 NOTE — CASE MANAGEMENT
Case Management Discharge Planning Note    Patient name Prem Mar Sr.  Location /-01 MRN [id number]  : 10/11/1924 Date 10/14/2024       Current Admission Date: 10/10/2024  Current Admission Diagnosis:Weakness   Patient Active Problem List    Diagnosis Date Noted Date Diagnosed    Tinea cruris 10/12/2024     Weakness 10/11/2024     Atrial flutter (HCC) 2024     Chronic pain syndrome 2024     Lumbar spondylosis 2024     Chronic midline low back pain without sciatica 2024     Diabetic eye exam ([facility]) 2023     Nonexudative age-related macular degeneration of left eye 2023     Hypokalemia 2023     Chronic kidney disease, stage 3a ([facility]) 2023     Syndrome of inappropriate secretion of antidiuretic hormone ([facility]) 2022     Difficulty swallowing 2022     Fall 2022     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[facility]) 2020     Hypomagnesemia 2020     Hyponatremia 2020     Mixed hyperlipidemia 2020     Coronary artery disease involving native coronary artery of native heart without angina pectoris 2018     Essential hypertension 2018     Bilateral leg edema 2018     Ventricular bigeminy 2018     Idiopathic chronic gout of multiple sites without tophus 05/10/2017     Vitamin D deficiency 2013       LOS (days): 3  Geometric Mean LOS (GMLOS) (days): 3.1  Days to GMLOS:0     OBJECTIVE:  Risk of Unplanned Readmission Score: 17.65         Current admission status: Inpatient   Preferred Pharmacy:   [id number] [place] [address] [facility]  [address] [place] [id number]  Phone: [phone] Fax: [phone]    Primary Care Provider: Leonard Schreiber MD    Primary Insurance: JENNIFER ZENG  Secondary Insurance:     DISCHARGE DETAILS:                                                                                                               Facility Insurance Auth  Number: [id number]

## 2024-10-14 NOTE — PLAN OF CARE
Problem: PAIN - ADULT  Goal: Verbalizes/displays adequate comfort level or baseline comfort level  Description: Interventions:  - Encourage patient to monitor pain and request assistance  - Assess pain using appropriate pain scale  - Administer analgesics based on type and severity of pain and evaluate response  - Implement non-pharmacological measures as appropriate and evaluate response  - Consider cultural and social influences on pain and pain management  - Notify physician/advanced practitioner if interventions unsuccessful or patient reports new pain  Outcome: Progressing     Problem: INFECTION - ADULT  Goal: Absence or prevention of progression during hospitalization  Description: INTERVENTIONS:  - Assess and monitor for signs and symptoms of infection  - Monitor lab/diagnostic results  - Monitor all insertion sites, i.e. indwelling lines, tubes, and drains  - [place] appropriate cooling/warming therapies per order  - Administer medications as ordered  - Instruct and encourage patient and family to use good hand hygiene technique  - Identify and instruct in appropriate isolation precautions for identified infection/condition  Outcome: Progressing  Goal: Absence of fever/infection during neutropenic period  Description: INTERVENTIONS:  - Monitor WBC    Outcome: Progressing     Problem: SAFETY ADULT  Goal: Patient will remain free of falls  Description: INTERVENTIONS:  - Educate patient/family on patient safety including physical limitations  - Instruct patient to call for assistance with activity   - Consult OT/PT to assist with strengthening/mobility   - Keep Call bell within reach  - Keep bed low and locked with side rails adjusted as appropriate  - Keep care items and personal belongings within reach  - Initiate and maintain comfort rounds  - Make Fall Risk Sign visible to staff  - Offer Toileting every 2 Hours, in advance of need  - Initiate/Maintain  bed alarm  - Apply yellow socks and bracelet for  high fall risk patients  - Consider moving patient to room near nurses station  Outcome: Progressing  Goal: Maintain or return to baseline ADL function  Description: INTERVENTIONS:  -  Assess patient's ability to carry out ADLs; assess patient's baseline for ADL function and identify physical deficits which impact ability to perform ADLs (bathing, care of mouth/teeth, toileting, grooming, dressing, etc.)  - Assess/evaluate cause of self-care deficits   - Assess range of motion  - Assess patient's mobility; develop plan if impaired  - Assess patient's need for assistive devices and provide as appropriate  - Encourage maximum independence but intervene and supervise when necessary  - Involve family in performance of ADLs  - Assess for home care needs following discharge   - Consider OT consult to assist with ADL evaluation and planning for discharge  - Provide patient education as appropriate  Outcome: Progressing  Goal: Maintains/Returns to pre admission functional level  Description: INTERVENTIONS:  - Perform AM-PAC 6 Click Basic Mobility/ Daily Activity assessment daily.  - Set and communicate daily mobility goal to care team and patient/family/caregiver.   - Collaborate with rehabilitation services on mobility goals if consulted  - Perform Range of Motion 3 times a day.  - Reposition patient every 2 hours.  - Dangle patient 2 times a day  - Record patient progress and toleration of activity level   Outcome: Progressing     Problem: DISCHARGE PLANNING  Goal: Discharge to home or other facility with appropriate resources  Description: INTERVENTIONS:  - Identify barriers to discharge w/patient and caregiver  - Arrange for needed discharge resources and transportation as appropriate  - Identify discharge learning needs (meds, wound care, etc.)  - Arrange for interpretive services to assist at discharge as needed  - Refer to Case Management Department for coordinating discharge planning if the patient needs  post-hospital services based on physician/advanced practitioner order or complex needs related to functional status, cognitive ability, or social support system  Outcome: Progressing     Problem: Knowledge Deficit  Goal: Patient/family/caregiver demonstrates understanding of disease process, treatment plan, medications, and discharge instructions  Description: Complete learning assessment and assess knowledge base.  Interventions:  - Provide teaching at level of understanding  - Provide teaching via preferred learning methods  Outcome: Progressing     Problem: Prexisting or High Potential for Compromised Skin Integrity  Goal: Skin integrity is maintained or improved  Description: INTERVENTIONS:  - Identify patients at risk for skin breakdown  - Assess and monitor skin integrity  - Assess and monitor nutrition and hydration status  - Monitor labs   - Assess for incontinence   - Turn and reposition patient  - Assist with mobility/ambulation  - Relieve pressure over bony prominences  - Avoid friction and shearing  - Provide appropriate hygiene as needed including keeping skin clean and dry  - Evaluate need for skin moisturizer/barrier cream  - Collaborate with interdisciplinary team   - Patient/family teaching  - Consider wound care consult   Outcome: Progressing

## 2024-10-14 NOTE — CASE MANAGEMENT
Per [employer], SNF auth still pending.     Escalation email sent to [employer] Team requesting expedite of determination.     CM notified:  Darlene Perez

## 2024-10-14 NOTE — PROGRESS NOTES
Progress Note - Hospitalist   Name: Prem Mar Sr. 89 y.o. male I MRN: [id number]  Unit/Bed#: [facility] I Date of Admission: 10/10/2024   Date of Service: 10/14/2024 I Hospital Day: 3    Assessment & Plan  Weakness  The patient was brought in by his family due to worsening generalized weakness, concerning for possible cellulitis of the groin, and difficulty with his gait  CT a/p No abscess. Mild induration in the right inguinal soft tissues with overlying skin thickening which may reflect cellulitis. Bladder wall thickening with bladder diverticula could be due to bladder outlet obstruction, clinical correlation advised. Mild cystitis not excluded, consider correlation with urinalysis.  Blood work is very reassuring-no leukocytosis and remains afebrile  Started on PO antibiotic in the ED, will continue with Keflex 500 mg every 6 hours for cellulitis x 5 days  Continue local care/keep moisture off with Fadia BID  UA without obvious infection  PT/OT input appreciated, recommend STR.   CM assisting with discharge disposition   Coronary artery disease involving native coronary artery of native heart without angina pectoris  Asymptomatic  Continue with medical management  Essential hypertension  The patient noted to have some elevated blood pressure  Continue toprol from home   He uses lasix 20 mg daily PRN for swelling; will start lasix 3 times a week for lower extremity edema   Will continue to monitor for now and adjust medication as indicated   Bilateral leg edema  Chronic   Will start furosemide 3 times a week- Monday, Wednesday, and Friday instead of as needed daily.  Elevate lower extremities  Idiopathic chronic gout of multiple sites without tophus  Continue allopurinol   Mixed hyperlipidemia  Continue fenofibrate   Tinea cruris  Continue nystatin and moisture barrier.  Try to keep area dry  Concern for possible bacterial component.  Will continue Keflex x 5 days    VTE Pharmacologic Prophylaxis: VTE Score:  4 Moderate Risk (Score 3-4) - Pharmacological DVT Prophylaxis Ordered: heparin.    Mobility:   Basic Mobility Inpatient Raw Score: 7  -HLM Goal: 2: Bed activities/Dependent transfer  JH-HLM Achieved: 4: Move to chair/commode  JH-HLM Goal achieved. Continue to encourage appropriate mobility.    Patient Centered Rounds: I performed bedside rounds with nursing staff today.     Discussions with Specialists or Other Care Team Provider: Case management    Education and Discussions with Family / Patient: Updated  (son) via phone.    Current Length of Stay: 3 day(s)  Current Patient Status: Inpatient   Certification Statement: The patient will continue to require additional inpatient hospital stay due to generalized weakness  Discharge Plan: Anticipate discharge later today or tomorrow to rehab facility.    Code Status: Level 3 - DNAR and DNI    Subjective   Patient seen and examined at bedside. No acute events overnight. Denies chest pain, SOB, diaphoresis, nausea/vomiting/diarrhea, fevers/chills.     Objective :  Temp:  [97.5 °F (36.4 °C)-98.6 °F (37 °C)] 98.4 °F (36.9 °C)  HR:  [74-78] 74  BP: (120-140)/(54-57) 127/57  Resp:  [17-19] 19  SpO2:  [94 %-96 %] 96 %  O2 Device: None (Room air)    Body mass index is 25.34 kg/m².     Input and Output Summary (last 24 hours):     Intake/Output Summary (Last 24 hours) at 10/14/2024 0821  Last data filed at 10/13/2024 1601  Gross per 24 hour   Intake 240 ml   Output 100 ml   Net 140 ml       Physical Exam  Vitals and nursing note reviewed.   Constitutional:       General: He is not in acute distress.     Appearance: He is well-developed.   HENT:      Head: Normocephalic and atraumatic.   Eyes:      Conjunctiva/sclera: Conjunctivae normal.   Cardiovascular:      Rate and Rhythm: Normal rate and regular rhythm.      Heart sounds: No murmur heard.  Pulmonary:      Effort: Pulmonary effort is normal. No respiratory distress.      Breath sounds: Normal breath sounds.    Abdominal:      Palpations: Abdomen is soft.      Tenderness: There is no abdominal tenderness.   Musculoskeletal:         General: No swelling.      Cervical back: Neck supple.   Skin:     General: Skin is warm and dry.      Capillary Refill: Capillary refill takes less than 2 seconds.   Neurological:      Mental Status: He is alert.   Psychiatric:         Mood and Affect: Mood normal.         Lab Results: I have reviewed the following results:   Results from last 7 days   Lab Units 10/10/24  2024   WBC Thousand/uL 9.34   HEMOGLOBIN g/dL 13.3   HEMATOCRIT % 41.2   PLATELETS Thousands/uL 182   SEGS PCT % 79*   LYMPHO PCT % 13*   MONO PCT % 6   EOS PCT % 1     Results from last 7 days   Lab Units 10/12/24  0450 10/10/24  1343 10/07/24  2010   SODIUM mmol/L 135   < > 137   POTASSIUM mmol/L 4.0   < > 4.2   CHLORIDE mmol/L 103   < > 104   CO2 mmol/L 24   < > 27   BUN mg/dL 24   < > 26*   CREATININE mg/dL 1.08   < > 0.92   ANION GAP mmol/L 8   < > 6   CALCIUM mg/dL 8.3*   < > 9.1   ALBUMIN g/dL  --   --  3.9   TOTAL BILIRUBIN mg/dL  --   --  0.39   ALK PHOS U/L  --   --  68   ALT U/L  --   --  28   AST U/L  --   --  36   GLUCOSE RANDOM mg/dL 101   < > 85    < > = values in this interval not displayed.         Results from last 7 days   Lab Units 10/11/24  1550 10/11/24  1128 10/11/24  0746 10/10/24  2010   POC GLUCOSE mg/dl 150* 136 104 160*     Results from last 7 days   Lab Units 10/10/24  1343   HEMOGLOBIN A1C % 6.1*           Recent Cultures (last 7 days):         Imaging Results Review: No pertinent imaging studies reviewed.  Other Study Results Review: No additional pertinent studies reviewed.    Last 24 Hours Medication List:     Current Facility-Administered Medications:     acetaminophen (TYLENOL) tablet 650 mg, Q6H PRN    allopurinol (ZYLOPRIM) tablet 300 mg, Daily    aspirin (ECOTRIN LOW STRENGTH) EC tablet 81 mg, Daily    cephalexin (KEFLEX) capsule 500 mg, Q6H ANKIT    Cholecalciferol (VITAMIN D3) tablet  2,000 Units, Daily    docusate sodium (COLACE) capsule 100 mg, BID    fenofibrate (TRICOR) tablet 48 mg, Daily    furosemide (LASIX) tablet 20 mg, Once per day on Monday Wednesday Friday    gabapentin (NEURONTIN) capsule 100 mg, Q12H    heparin (porcine) subcutaneous injection 5,000 Units, Q8H ANKIT    metoprolol succinate (TOPROL-XL) 24 hr tablet 25 mg, Daily    moisture barrier miconazole 2% cream (aka REJI MOISTURE BARRIER ANTIFUNGAL CREAM), BID    moisture barrier miconazole 2% cream (aka REJI MOISTURE BARRIER ANTIFUNGAL CREAM), BID    multivitamin stress formula tablet 1 tablet, Daily    nitroglycerin (NITROSTAT) SL tablet 0.4 mg, Q5 Min PRN    nystatin (MYCOSTATIN) powder, BID    potassium chloride (Klor-Con M20) CR tablet 20 mEq, Daily    senna (SENOKOT) tablet 8.6 mg, HS    Administrative Statements   Today, Patient Was Seen By: Nhan Carter, DO  I have spent a total time of 35 minutes in caring for this patient on the day of the visit/encounter including Diagnostic results, Prognosis, Risks and benefits of tx options, Instructions for management, Patient and family education, Importance of tx compliance, Risk factor reductions, Impressions, Counseling / Coordination of care, Documenting in the medical record, Reviewing / ordering tests, medicine, procedures  , Obtaining or reviewing history  , and Communicating with other healthcare professionals .    **Please Note: This note may have been constructed using a voice recognition system.**

## 2024-10-14 NOTE — CASE MANAGEMENT
Case Management Discharge Planning Note    Patient name Prem Mar Sr.  Location /-01 MRN [id number]  : 10/11/1924 Date 10/14/2024       Current Admission Date: 10/10/2024  Current Admission Diagnosis:Weakness   Patient Active Problem List    Diagnosis Date Noted Date Diagnosed    Tinea cruris 10/12/2024     Weakness 10/11/2024     Atrial flutter (HCC) 2024     Chronic pain syndrome 2024     Lumbar spondylosis 2024     Chronic midline low back pain without sciatica 2024     Diabetic eye exam ([facility]) 2023     Nonexudative age-related macular degeneration of left eye 2023     Hypokalemia 2023     Chronic kidney disease, stage 3a ([facility]) 2023     Syndrome of inappropriate secretion of antidiuretic hormone ([facility]) 2022     Difficulty swallowing 2022     Fall 2022     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[facility]) 2020     Hypomagnesemia 2020     Hyponatremia 2020     Mixed hyperlipidemia 2020     Coronary artery disease involving native coronary artery of native heart without angina pectoris 2018     Essential hypertension 2018     Bilateral leg edema 2018     Ventricular bigeminy 2018     Idiopathic chronic gout of multiple sites without tophus 05/10/2017     Vitamin D deficiency 2013       LOS (days): 3  Geometric Mean LOS (GMLOS) (days): 3.1  Days to GMLOS:-0.1     OBJECTIVE:  Risk of Unplanned Readmission Score: 17.65         Current admission status: Inpatient   Preferred Pharmacy:   [id number] [place] [facility] [address]  [address] [facility] [facility] [place] [address]  Phone: [phone] Fax: [phone]    Primary Care Provider: Leonard Schreiber MD    Primary Insurance: JENNIFER ZENG  Secondary Insurance:     DISCHARGE DETAILS:                                          Other Referral/Resources/Interventions Provided:  Interventions: SNF,  Transportation  Referral Comments: CM received auth approval from D/C support. CM scheduled [facility] transport for tomorrow 1000 via [facility]. Pt will need COVID test before D/C. CM notified SLIM, nurse, facility, and LM for pts son. CM will continue to attempt contact with son to notify of transport time.                     Number/Name of Dispatcher: [facility] / [phone]     ETA of Transport (Date): 10/15/24  ETA of Transport (Time): 1000              IMM Given (Date):: 10/14/24  IMM Given to:: Patient          Accepting Facility Name, City & State : [facility]  [address] [place] [address]  Receiving Facility/Agency Phone Number: [phone]  Facility/Agency Fax Number: [phone]

## 2024-10-14 NOTE — SPEECH THERAPY NOTE
Speech Language/Pathology  Speech/Language Pathology  Assessment    Patient Name: Prem Mar Sr.  Today's Date: 10/14/2024     Problem List  Principal Problem:    Weakness  Active Problems:    Coronary artery disease involving native coronary artery of native heart without angina pectoris    Essential hypertension    Bilateral leg edema    Idiopathic chronic gout of multiple sites without tophus    Mixed hyperlipidemia    Tinea cruris    Past Medical History  Past Medical History:   Diagnosis Date    Arthritis     Cataract     Coronary artery disease     Coronary atherosclerosis of native coronary artery     Diverticulitis of colon     Essential hypertension, benign     Hyperlipidemia     Hypertension     Peptic ulcer     Renal disorder      Past Surgical History  Past Surgical History:   Procedure Laterality Date    CATARACT EXTRACTION Right     Left eye 5/2021    CORONARY STENT PLACEMENT      SKIN BIOPSY      TONSILLECTOMY      Bedside Swallow Evaluation:    Summary:  Pt presented w/  mild oral dysphagia and suspect WFL pharyngeal stage of swallow. Positioned upright OOB in recliner and fully alert. Son at bedside feeding pt upon ST arrival. Noted MOD prolonged mastication on chicken, ST cued pt to expectorate. Pt stated my teeth are not so great. Trialed soft chopped carrot cut by ST continue with prolonged mastication however eventual functional breakdown. Bolus control, formation, and transfer were WNL. Swallows appeared prompt. No overt s/s of aspiration. Pt reported good appetite overall and denied use of dietary supplements. Stated he typically his medications whole with puree. Spoke with nursing reported increased difficulty with medications. Trialed medications whole with puree. Large medications provided individually with puree pt requested additional sips of thin following then stated its down. ST reviewed diet recommendations with son at bedside as pt poor historian, son understood.      Recommendations:  Diet: Dysphagia 3 dental soft   Liquid:Thin   Meds: if small whole with puree, break or crush if large.   Supervision:Assist   Positioning:Upright  Strategies: Slow rate, alternate liquids with solids, swallow prior to additional po   Pt to take PO/Meds only when fully alert and upright.   Oral care  Aspiration precautions  Reflux precautions  Therapy Prognosis:fair   Prognosis considerations:age, medical status   Frequency:1-3 times weekly as indicated.     Consider consult w/:  Rehab  Nutrition    Goal(s):  Dysphagia LTG  -Patient will demonstrate safe and effective oral intake (without overt s/s significant oral/pharyngeal dysphagia including s/s penetration or aspiration) for the highest appropriate diet level.     1.Pt will tolerate least restrictive diet w/out s/s aspiration or oral/pharyngeal difficulties.   2.Pt will will effectively manipulate/masticate and transfer solids w/out s/s dysphagia/aspiration.   3.Pt will tolerate thin liquids w/out s/s aspiration.   -If indicated, patient will comply with a Video/Modified Barium Swallow study for more complete assessment of swallowing anatomy/physiology/aspiration risk and to assess efficacy of treatment techniques so as to best guide treatment plan     H&P/Admit info/ pertinent provider notes: (PMH noted above)  Prem Mar Sr. is a 89 y.o. male with a PMH of hypertension, hyperlipidemia, and CKD who presents with worsening generalized weakness and redness in his groin.  The patient is somewhat a poor historian and most of the information was obtained from weakness and cellulitis of his groin.  The family felt that there is some drainage on the patient's skin which appears to be progressively worse.  Additionally, the patient has been feeling weak with issue ambulating.  In the ED, the patient was started on antibiotics for cellulitis.  CT of abdomen and pelvis noted some possible cellulitis of the groin and possible UTI.  Patient  "was evaluated by PT/OT in the ED and the recommendation is for short-term rehab. The patient will need 3 midnight stay in the hospital prior to that and subsequently being admitted.     Special Studies:  CT PELVIS WITH IV CONTRAST 10/10/2024  Impression   No abscess.  Mild induration in the right inguinal soft tissues with overlying skin thickening which may reflect cellulitis.  Bladder wall thickening with bladder diverticula could be due to bladder outlet obstruction, clinical correlation advised. Mild cystitis not excluded, consider correlation with urinalysis.  XR CHEST PORTABLE 10/07/2024  IMPRESSION:  No acute cardiopulmonary disease.    Procalcitonin:    WBC: 9.34              10/10/2024     Code Status  Level 3 DNR    Previous MBS: none     Patient's goal: \"Maybe I shouldn't have ordered the chicken\"    Did the pt report pain? no  If yes, was nursing notified/was it addressed? N/a    Reason for consult:  R/o aspiration  Determine safest and least restrictive diet  h/o dysphagia   C/o pill dysphagia  C/o solid food dysphagia    Precautions:  Fall     Food Allergies: No known    Current Diet: Regular and thin    Premorbid diet: Regular and thin    O2 requirement: Room air    Social/Prior living Lives with son    Voice/Speech: WNL, [place]   Follows commands: Basic    Cognitive status: Alert      Oral [facility] exam:  Dentition: Missing most, lower ill fitting partial   Lips (VII):WNL  Tongue (XII): midline   Mandible (V):adequate ROM   Face/oral sensation (V):symmetrical   Velum (X):WNL    Items administered:  Chicken, carrots, mashed potatoes, and thin liquids via straw. Pills whole with puree.     Oral stage:  Lip closure:WNL  Mastication: mod prolonged   Bolus formation:WNl  Bolus control:WNL  Transfer:WNL    Pharyngeal stage:  Swallow promptness: prompt   Laryngeal rise:adequate   No overt s/s aspiration    Esophageal stage:  No s/s reported  H/o GERD    Aspiration precautions posted    Results d/w:  Pt, nursing, " family, physician,

## 2024-10-14 NOTE — PLAN OF CARE
Problem: PHYSICAL THERAPY ADULT  Goal: Performs mobility at highest level of function for planned discharge setting.  See evaluation for individualized goals.  Description: Treatment/Interventions: Functional transfer training, LE strengthening/ROM, Elevations, Therapeutic exercise, Endurance training, Gait training, Bed mobility  Equipment Recommended: Walker, Cane       See flowsheet documentation for full assessment, interventions and recommendations.  Outcome: Progressing  Note: Prognosis: Fair  Problem List: Decreased strength, Decreased endurance, Impaired balance, Decreased mobility, Pain  Assessment: Pt seen for PT treatment session this date with interventions consisting of therapeutic activity to educate patient on safe transfers to progress as able and therapeutic exercise to improve strength to improve functional mobility. Pt agreeable to PT treatment session upon arrival, pt found seated OOB in recliner, in no apparent distress, A&O x 4, and responsive. Since previous session, pt has made fair progress as evidenced by increased activity tolerance and improved safety awareness with mobitliy  Barriers during this session include pain and fatigue.  Pt continues to be functioning below baseline level, and remains limited 2* factors listed above and including decreased strength, impaired activity tolerance, and impaired  balance.  Pt prognosis for achieving goals is fair, pending pt progress with hospitalization/medical status improvements, and indicated by motivated to participate in therapy, ability to follow cues, and supportive family. PT will continue to see pt during current hospitalization in order to address the deficits listed above and provide interventions consistent w/ POC in effort to achieve goals. Current goals and POC remain appropriate, pt continues to have rehab potential  Upon conclusion pt seated OOB in recliner. The patient's AM-PAC Basic Mobility Inpatient Short Form Raw Score is 6.   Based on patient presentations and impairments, pt would most appropriately benefit from Level 2 resource intensity upon discharge.  Please also refer to the recommendation of the Physical Therapist for safe discharge planning. RN verbalized pt appropriate for PT session.        Rehab Resource Intensity Level, PT: II (Moderate Resource Intensity)    See flowsheet documentation for full assessment.

## 2024-10-15 VITALS
SYSTOLIC BLOOD PRESSURE: 123 MMHG | TEMPERATURE: 97.5 F | RESPIRATION RATE: 16 BRPM | WEIGHT: 156.97 LBS | BODY MASS INDEX: 25.23 KG/M2 | HEART RATE: 69 BPM | HEIGHT: 66 IN | OXYGEN SATURATION: 94 % | DIASTOLIC BLOOD PRESSURE: 52 MMHG

## 2024-10-15 LAB
ANION GAP SERPL CALCULATED.3IONS-SCNC: 8 MMOL/L (ref 4–13)
BASOPHILS # BLD AUTO: 0.03 THOUSANDS/ΜL (ref 0–0.1)
BASOPHILS NFR BLD AUTO: 0 % (ref 0–1)
BUN SERPL-MCNC: 31 MG/DL (ref 5–25)
CALCIUM SERPL-MCNC: 8.4 MG/DL (ref 8.4–10.2)
CHLORIDE SERPL-SCNC: 104 MMOL/L (ref 96–108)
CO2 SERPL-SCNC: 22 MMOL/L (ref 21–32)
CREAT SERPL-MCNC: 1.05 MG/DL (ref 0.6–1.3)
EOSINOPHIL # BLD AUTO: 0.15 THOUSAND/ΜL (ref 0–0.61)
EOSINOPHIL NFR BLD AUTO: 1 % (ref 0–6)
ERYTHROCYTE [DISTWIDTH] IN BLOOD BY AUTOMATED COUNT: 14.3 % (ref 11.6–15.1)
GFR SERPL CREATININE-BSD FRML MDRD: 57 ML/MIN/1.73SQ M
GLUCOSE SERPL-MCNC: 113 MG/DL (ref 65–140)
HCT VFR BLD AUTO: 36.7 % (ref 36.5–49.3)
HGB BLD-MCNC: 12 G/DL (ref 12–17)
IMM GRANULOCYTES # BLD AUTO: 0.06 THOUSAND/UL (ref 0–0.2)
IMM GRANULOCYTES NFR BLD AUTO: 1 % (ref 0–2)
LYMPHOCYTES # BLD AUTO: 1.51 THOUSANDS/ΜL (ref 0.6–4.47)
LYMPHOCYTES NFR BLD AUTO: 13 % (ref 14–44)
MAGNESIUM SERPL-MCNC: 1.9 MG/DL (ref 1.9–2.7)
MCH RBC QN AUTO: 32.3 PG (ref 26.8–34.3)
MCHC RBC AUTO-ENTMCNC: 32.7 G/DL (ref 31.4–37.4)
MCV RBC AUTO: 99 FL (ref 82–98)
MONOCYTES # BLD AUTO: 0.73 THOUSAND/ΜL (ref 0.17–1.22)
MONOCYTES NFR BLD AUTO: 6 % (ref 4–12)
NEUTROPHILS # BLD AUTO: 9.45 THOUSANDS/ΜL (ref 1.85–7.62)
NEUTS SEG NFR BLD AUTO: 79 % (ref 43–75)
NRBC BLD AUTO-RTO: 0 /100 WBCS
PHOSPHATE SERPL-MCNC: 2.2 MG/DL (ref 2.3–4.1)
PLATELET # BLD AUTO: 162 THOUSANDS/UL (ref 149–390)
PMV BLD AUTO: 10.5 FL (ref 8.9–12.7)
POTASSIUM SERPL-SCNC: 4 MMOL/L (ref 3.5–5.3)
RBC # BLD AUTO: 3.71 MILLION/UL (ref 3.88–5.62)
SODIUM SERPL-SCNC: 134 MMOL/L (ref 135–147)
WBC # BLD AUTO: 11.93 THOUSAND/UL (ref 4.31–10.16)

## 2024-10-15 PROCEDURE — 83735 ASSAY OF MAGNESIUM: CPT | Performed by: INTERNAL MEDICINE

## 2024-10-15 PROCEDURE — 85025 COMPLETE CBC W/AUTO DIFF WBC: CPT | Performed by: INTERNAL MEDICINE

## 2024-10-15 PROCEDURE — 84100 ASSAY OF PHOSPHORUS: CPT | Performed by: INTERNAL MEDICINE

## 2024-10-15 PROCEDURE — 99239 HOSP IP/OBS DSCHRG MGMT >30: CPT | Performed by: INTERNAL MEDICINE

## 2024-10-15 PROCEDURE — 80048 BASIC METABOLIC PNL TOTAL CA: CPT | Performed by: INTERNAL MEDICINE

## 2024-10-15 RX ORDER — DOCUSATE SODIUM 100 MG/1
100 CAPSULE, LIQUID FILLED ORAL 2 TIMES DAILY
Start: 2024-10-15

## 2024-10-15 RX ORDER — FUROSEMIDE 20 MG/1
20 TABLET ORAL 3 TIMES WEEKLY
Start: 2024-10-16

## 2024-10-15 RX ORDER — CEPHALEXIN 500 MG/1
500 CAPSULE ORAL EVERY 6 HOURS SCHEDULED
Qty: 5 CAPSULE | Refills: 0
Start: 2024-10-15 | End: 2024-10-17

## 2024-10-15 RX ADMIN — CEPHALEXIN 500 MG: 500 CAPSULE ORAL at 00:12

## 2024-10-15 RX ADMIN — HEPARIN SODIUM 5000 UNITS: 5000 INJECTION, SOLUTION INTRAVENOUS; SUBCUTANEOUS at 06:27

## 2024-10-15 RX ADMIN — METOPROLOL SUCCINATE 25 MG: 50 TABLET, EXTENDED RELEASE ORAL at 08:30

## 2024-10-15 RX ADMIN — POTASSIUM CHLORIDE 20 MEQ: 1500 TABLET, EXTENDED RELEASE ORAL at 08:30

## 2024-10-15 RX ADMIN — ALLOPURINOL 300 MG: 100 TABLET ORAL at 08:31

## 2024-10-15 RX ADMIN — Medication 2000 UNITS: at 08:31

## 2024-10-15 RX ADMIN — GABAPENTIN 100 MG: 100 CAPSULE ORAL at 08:30

## 2024-10-15 RX ADMIN — FENOFIBRATE 48 MG: 48 TABLET, FILM COATED ORAL at 08:32

## 2024-10-15 RX ADMIN — B-COMPLEX W/ C & FOLIC ACID TAB 1 TABLET: TAB at 08:31

## 2024-10-15 RX ADMIN — CEPHALEXIN 500 MG: 500 CAPSULE ORAL at 06:27

## 2024-10-15 RX ADMIN — DOCUSATE SODIUM 100 MG: 100 CAPSULE, LIQUID FILLED ORAL at 08:30

## 2024-10-15 RX ADMIN — ASPIRIN 81 MG: 81 TABLET, COATED ORAL at 08:31

## 2024-10-15 NOTE — CASE MANAGEMENT
Case Management Discharge Planning Note    Patient name Prem Mar Sr.  Location /-01 MRN [id number]  : 10/11/1924 Date 10/15/2024       Current Admission Date: 10/10/2024  Current Admission Diagnosis:Weakness   Patient Active Problem List    Diagnosis Date Noted Date Diagnosed    Tinea cruris 10/12/2024     Weakness 10/11/2024     Atrial flutter (HCC) 2024     Chronic pain syndrome 2024     Lumbar spondylosis 2024     Chronic midline low back pain without sciatica 2024     Diabetic eye exam ([facility]) 2023     Nonexudative age-related macular degeneration of left eye 2023     Hypokalemia 2023     Chronic kidney disease, stage 3a ([facility]) 2023     Syndrome of inappropriate secretion of antidiuretic hormone ([facility]) 2022     Difficulty swallowing 2022     Fall 2022     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[facility]) 2020     Hypomagnesemia 2020     Hyponatremia 2020     Mixed hyperlipidemia 2020     Coronary artery disease involving native coronary artery of native heart without angina pectoris 2018     Essential hypertension 2018     Bilateral leg edema 2018     Ventricular bigeminy 2018     Idiopathic chronic gout of multiple sites without tophus 05/10/2017     Vitamin D deficiency 2013       LOS (days): 4  Geometric Mean LOS (GMLOS) (days): 3.1  Days to GMLOS:-0.8     OBJECTIVE:  Risk of Unplanned Readmission Score: 19.54         Current admission status: Inpatient   [facility]   [id number] [address]  [address] [address]  Phone: [phone] Fax: [phone]    Primary Care Provider: Leonard Schreiber MD    Primary Insurance: JENNIFER  REP  Secondary Insurance:     DISCHARGE DETAILS:          Comments - Freedom of Choice: Pt stable for discharge today per Dr. Carter. Pt has been accepted to [facility].  [facility] and pt nurse Sam martin. [facility] aware. COVID test completed as requested and was negative.         Treatment Team Recommendation: Short Term Rehab  Discharge Destination Plan:: Short Term Rehab  Transport at Discharge : [facility]     Number/Name of Dispatcher: [facility]     ETA of Transport (Date): 10/15/24  ETA of Transport (Time): 1000         Accepting Facility Name, City & State : [facility]  [address] [place] [address]  Receiving Facility/Agency Phone Number: [phone]  Facility/Agency Fax Number: [phone]

## 2024-10-15 NOTE — DISCHARGE SUMMARY
Discharge Summary - Hospitalist   Name: Prem Mar Sr. 89 y.o. male I MRN: [id number]  Unit/Bed#: [facility] Date of Admission: 10/10/2024   Date of Service: 10/15/2024 I Hospital Day: 4     Assessment & Plan  Weakness  The patient was brought in by his family due to worsening generalized weakness, concerning for possible cellulitis of the groin, and difficulty with his gait  CT a/p No abscess. Mild induration in the right inguinal soft tissues with overlying skin thickening which may reflect cellulitis. Bladder wall thickening with bladder diverticula could be due to bladder outlet obstruction, clinical correlation advised. Mild cystitis not excluded, consider correlation with urinalysis.  Blood work is very reassuring-no leukocytosis and remains afebrile  Started on PO antibiotic in the ED, will continue with Keflex 500 mg every 6 hours for cellulitis x 5 days  Continue local care/keep moisture off with Fadia BID  UA without obvious infection  PT/OT input appreciated, recommend STR  CM assisting with discharge disposition   Coronary artery disease involving native coronary artery of native heart without angina pectoris  Asymptomatic  Continue with medical management  Essential hypertension  The patient noted to have some elevated blood pressure  Continue toprol from home   He uses lasix 20 mg daily PRN for swelling; will start lasix 3 times a week for lower extremity edema   Will continue to monitor for now and adjust medication as indicated   Bilateral leg edema  Chronic   Will start furosemide 3 times a week- Monday, Wednesday, and Friday instead of as needed daily.  Elevate lower extremities  Idiopathic chronic gout of multiple sites without tophus  Continue allopurinol   Mixed hyperlipidemia  Continue fenofibrate   Tinea cruris  Continue nystatin and moisture barrier.  Try to keep area dry  Concern for possible bacterial component.  Will continue Keflex x 5 days     Medical Problems       Resolved  Problems  Date Reviewed: 10/11/2024   None       Discharging Physician / Practitioner: Nhan Carter DO  PCP: Leonard Schreiber MD  Admission Date:   Admission Orders (From admission, onward)       Ordered        10/11/24 0958  INPATIENT ADMISSION  Once                          Discharge Date: 10/15/24    Consultations During Hospital Stay:  Case management    Procedures Performed:   Not applicable    Significant Findings / Test Results:   Not applicable    Incidental Findings:   Not applicable    Test Results Pending at Discharge (will require follow up):   Not applicable     Outpatient Tests Requested:  Not applicable    Complications: Not applicable    Reason for Admission: Generalized weakness    Hospital Course:   Prem Mar Sr. is a 89 y.o. male with a PMH of hypertension, hyperlipidemia, and CKD who presents with worsening generalized weakness and redness in his groin.  The patient is somewhat a poor historian and most of the information was obtained from weakness and cellulitis of his groin.  The family felt that there is some drainage on the patient's skin which appears to be progressively worse.  Additionally, the patient has been feeling weak with issue ambulating.  In the ED, the patient was started on antibiotics for cellulitis.  CT of abdomen and pelvis noted some possible cellulitis of the groin and possible UTI.  Patient was evaluated by PT/OT in the ED and the recommendation is for short-term rehab. The patient will need 3 midnight stay in the hospital prior to that and subsequently being admitted.     The patient remained hemodynamically stable and saturating well on room air throughout his hospital course.  PT/OT recommended short-term rehab.  Case management arranged for safe disposition planning.  The patient was strongly encouraged to resume all medications as per AVS.  This serves as a brief discharge summary.  Please see full medical record for more details.    Condition at  "Discharge: stable    Discharge Day Visit / Exam:   Subjective:  Patient seen and examined at bedside. No acute events overnight. Denies chest pain, SOB, diaphoresis, nausea/vomiting/diarrhea, fevers/chills.     Vitals: Blood Pressure: 123/52 (10/15/24 0714)  Pulse: 69 (10/15/24 0714)  Temperature: 97.5 °F (36.4 °C) (10/15/24 0714)  Temp Source: Oral (10/14/24 1511)  Respirations: 16 (10/15/24 0714)  Height: 5' 6\" (167.6 cm) (10/11/24 1702)  Weight - Scale: 71.2 kg (156 lb 15.5 oz) (10/11/24 1702)  SpO2: 94 % (10/15/24 0714)    Physical Exam  Vitals and nursing note reviewed.   Constitutional:       General: He is not in acute distress.     Appearance: He is well-developed.   HENT:      Head: Normocephalic and atraumatic.   Eyes:      Conjunctiva/sclera: Conjunctivae normal.   Cardiovascular:      Rate and Rhythm: Normal rate and regular rhythm.      Heart sounds: No murmur heard.  Pulmonary:      Effort: Pulmonary effort is normal. No respiratory distress.      Breath sounds: Normal breath sounds.   Abdominal:      Palpations: Abdomen is soft.      Tenderness: There is no abdominal tenderness.   Musculoskeletal:         General: No swelling.      Cervical back: Neck supple.   Skin:     General: Skin is warm and dry.      Capillary Refill: Capillary refill takes less than 2 seconds.   Neurological:      Mental Status: He is alert.   Psychiatric:         Mood and Affect: Mood normal.       Discussion with Family: Patient declined call to .     Discharge instructions/Information to patient and family:   See after visit summary for information provided to patient and family.      Provisions for Follow-Up Care:  See after visit summary for information related to follow-up care and any pertinent home health orders.      Mobility at time of Discharge:   Basic Mobility Inpatient Raw Score: 6  JH-HLM Goal: 2: Bed activities/Dependent transfer  JH-HLM Achieved: 4: Move to chair/commode  HLM Goal achieved. " Continue to encourage appropriate mobility.     Disposition:   Other: Short-term rehab    Planned Readmission: Not applicable    Discharge Medications:  See after visit summary for reconciled discharge medications provided to patient and/or family.      Administrative Statements   Discharge Statement:  I have spent a total time of 75 minutes in caring for this patient on the day of the visit/encounter. >30 minutes of time was spent on: Diagnostic results, Prognosis, Risks and benefits of tx options, Instructions for management, Patient and family education, Importance of tx compliance, Risk factor reductions, Impressions, Counseling / Coordination of care, Documenting in the medical record, Reviewing / ordering tests, medicine, procedures  , and Communicating with other healthcare professionals .    **Please Note: This note may have been constructed using a voice recognition system**

## 2024-10-15 NOTE — ASSESSMENT & PLAN NOTE
The patient was brought in by his family due to worsening generalized weakness, concerning for possible cellulitis of the groin, and difficulty with his gait  CT a/p No abscess. Mild induration in the right inguinal soft tissues with overlying skin thickening which may reflect cellulitis. Bladder wall thickening with bladder diverticula could be due to bladder outlet obstruction, clinical correlation advised. Mild cystitis not excluded, consider correlation with urinalysis.  Blood work is very reassuring-no leukocytosis and remains afebrile  Started on PO antibiotic in the ED, will continue with Keflex 500 mg every 6 hours for cellulitis x 5 days  Continue local care/keep moisture off with Fadia BID  UA without obvious infection  PT/OT input appreciated, recommend STR  CM assisting with discharge disposition

## 2024-10-15 NOTE — PLAN OF CARE
Problem: PAIN - ADULT  Goal: Verbalizes/displays adequate comfort level or baseline comfort level  Description: Interventions:  - Encourage patient to monitor pain and request assistance  - Assess pain using appropriate pain scale  - Administer analgesics based on type and severity of pain and evaluate response  - Implement non-pharmacological measures as appropriate and evaluate response  - Consider cultural and social influences on pain and pain management  - Notify physician/advanced practitioner if interventions unsuccessful or patient reports new pain  Outcome: Adequate for Discharge     Problem: INFECTION - ADULT  Goal: Absence or prevention of progression during hospitalization  Description: INTERVENTIONS:  - Assess and monitor for signs and symptoms of infection  - Monitor lab/diagnostic results  - Monitor all insertion sites, i.e. indwelling lines, tubes, and drains  - Warren appropriate cooling/warming therapies per order  - Administer medications as ordered  - Instruct and encourage patient and family to use good hand hygiene technique  - Identify and instruct in appropriate isolation precautions for identified infection/condition  Outcome: Adequate for Discharge  Goal: Absence of fever/infection during neutropenic period  Description: INTERVENTIONS:  - Monitor WBC    Outcome: Adequate for Discharge     Problem: SAFETY ADULT  Goal: Patient will remain free of falls  Description: INTERVENTIONS:  - Educate patient/family on patient safety including physical limitations  - Instruct patient to call for assistance with activity   - Consult OT/PT to assist with strengthening/mobility   - Keep Call bell within reach  - Keep bed low and locked with side rails adjusted as appropriate  - Keep care items and personal belongings within reach  - Initiate and maintain comfort rounds  - Make Fall Risk Sign visible to staff  - Offer Toileting every 2 Hours, in advance of need  - Initiate/Maintain  bed alarm  - Apply  yellow socks and bracelet for high fall risk patients  - Consider moving patient to room near nurses station  Outcome: Adequate for Discharge  Goal: Maintain or return to baseline ADL function  Description: INTERVENTIONS:  -  Assess patient's ability to carry out ADLs; assess patient's baseline for ADL function and identify physical deficits which impact ability to perform ADLs (bathing, care of mouth/teeth, toileting, grooming, dressing, etc.)  - Assess/evaluate cause of self-care deficits   - Assess range of motion  - Assess patient's mobility; develop plan if impaired  - Assess patient's need for assistive devices and provide as appropriate  - Encourage maximum independence but intervene and supervise when necessary  - Involve family in performance of ADLs  - Assess for home care needs following discharge   - Consider OT consult to assist with ADL evaluation and planning for discharge  - Provide patient education as appropriate  Outcome: Adequate for Discharge  Goal: Maintains/Returns to pre admission functional level  Description: INTERVENTIONS:  - Perform AM-PAC 6 Click Basic Mobility/ Daily Activity assessment daily.  - Set and communicate daily mobility goal to care team and patient/family/caregiver.   - Collaborate with rehabilitation services on mobility goals if consulted  - Perform Range of Motion 3 times a day.  - Reposition patient every 2 hours.  - Dangle patient 2 times a day  - Record patient progress and toleration of activity level   Outcome: Adequate for Discharge     Problem: DISCHARGE PLANNING  Goal: Discharge to home or other facility with appropriate resources  Description: INTERVENTIONS:  - Identify barriers to discharge w/patient and caregiver  - Arrange for needed discharge resources and transportation as appropriate  - Identify discharge learning needs (meds, wound care, etc.)  - Arrange for interpretive services to assist at discharge as needed  - Refer to Case Management Department for  coordinating discharge planning if the patient needs post-hospital services based on physician/advanced practitioner order or complex needs related to functional status, cognitive ability, or social support system  Outcome: Adequate for Discharge     Problem: Knowledge Deficit  Goal: Patient/family/caregiver demonstrates understanding of disease process, treatment plan, medications, and discharge instructions  Description: Complete learning assessment and assess knowledge base.  Interventions:  - Provide teaching at level of understanding  - Provide teaching via preferred learning methods  Outcome: Adequate for Discharge     Problem: Prexisting or High Potential for Compromised Skin Integrity  Goal: Skin integrity is maintained or improved  Description: INTERVENTIONS:  - Identify patients at risk for skin breakdown  - Assess and monitor skin integrity  - Assess and monitor nutrition and hydration status  - Monitor labs   - Assess for incontinence   - Turn and reposition patient  - Assist with mobility/ambulation  - Relieve pressure over bony prominences  - Avoid friction and shearing  - Provide appropriate hygiene as needed including keeping skin clean and dry  - Evaluate need for skin moisturizer/barrier cream  - Collaborate with interdisciplinary team   - Patient/family teaching  - Consider wound care consult   Outcome: Adequate for Discharge

## 2024-10-15 NOTE — NURSING NOTE
Hospitalist was in to see and evaluate pt and he is okay for discharge to the [facility]. Report called to Edna, all questions answered to her satisfaction. IV site removed with tip intact. Report to [facility] transport. Pt discharged from hospital.

## 2024-10-17 ENCOUNTER — LAB REQUISITION (OUTPATIENT)
Dept: LAB | Facility: HOSPITAL | Age: 89
End: 2024-10-17

## 2024-10-17 DIAGNOSIS — R53.1 WEAKNESS: ICD-10-CM

## 2024-10-17 LAB
ANION GAP SERPL CALCULATED.3IONS-SCNC: 9 MMOL/L (ref 4–13)
BUN SERPL-MCNC: 28 MG/DL (ref 5–25)
CALCIUM SERPL-MCNC: 8.6 MG/DL (ref 8.4–10.2)
CHLORIDE SERPL-SCNC: 103 MMOL/L (ref 96–108)
CO2 SERPL-SCNC: 22 MMOL/L (ref 21–32)
CREAT SERPL-MCNC: 0.92 MG/DL (ref 0.6–1.3)
GFR SERPL CREATININE-BSD FRML MDRD: 68 ML/MIN/1.73SQ M
GLUCOSE SERPL-MCNC: 107 MG/DL (ref 65–140)
POTASSIUM SERPL-SCNC: 4.2 MMOL/L (ref 3.5–5.3)
SODIUM SERPL-SCNC: 134 MMOL/L (ref 135–147)

## 2024-10-17 PROCEDURE — 80048 BASIC METABOLIC PNL TOTAL CA: CPT | Performed by: INTERNAL MEDICINE

## 2024-10-18 ENCOUNTER — LAB REQUISITION (OUTPATIENT)
Dept: LAB | Facility: HOSPITAL | Age: 89
End: 2024-10-18

## 2024-10-18 DIAGNOSIS — R53.1 WEAKNESS: ICD-10-CM

## 2024-10-18 LAB
BASOPHILS # BLD AUTO: 0.03 THOUSANDS/ΜL (ref 0–0.1)
BASOPHILS NFR BLD AUTO: 0 % (ref 0–1)
EOSINOPHIL # BLD AUTO: 0.21 THOUSAND/ΜL (ref 0–0.61)
EOSINOPHIL NFR BLD AUTO: 3 % (ref 0–6)
ERYTHROCYTE [DISTWIDTH] IN BLOOD BY AUTOMATED COUNT: 14.3 % (ref 11.6–15.1)
HCT VFR BLD AUTO: 36.2 % (ref 36.5–49.3)
HGB BLD-MCNC: 11.7 G/DL (ref 12–17)
IMM GRANULOCYTES # BLD AUTO: 0.13 THOUSAND/UL (ref 0–0.2)
IMM GRANULOCYTES NFR BLD AUTO: 2 % (ref 0–2)
LYMPHOCYTES # BLD AUTO: 1.42 THOUSANDS/ΜL (ref 0.6–4.47)
LYMPHOCYTES NFR BLD AUTO: 17 % (ref 14–44)
MCH RBC QN AUTO: 31.7 PG (ref 26.8–34.3)
MCHC RBC AUTO-ENTMCNC: 32.3 G/DL (ref 31.4–37.4)
MCV RBC AUTO: 98 FL (ref 82–98)
MONOCYTES # BLD AUTO: 0.58 THOUSAND/ΜL (ref 0.17–1.22)
MONOCYTES NFR BLD AUTO: 7 % (ref 4–12)
NEUTROPHILS # BLD AUTO: 5.82 THOUSANDS/ΜL (ref 1.85–7.62)
NEUTS SEG NFR BLD AUTO: 71 % (ref 43–75)
NRBC BLD AUTO-RTO: 0 /100 WBCS
PLATELET # BLD AUTO: 189 THOUSANDS/UL (ref 149–390)
PMV BLD AUTO: 11 FL (ref 8.9–12.7)
RBC # BLD AUTO: 3.69 MILLION/UL (ref 3.88–5.62)
WBC # BLD AUTO: 8.19 THOUSAND/UL (ref 4.31–10.16)

## 2024-10-18 PROCEDURE — 85025 COMPLETE CBC W/AUTO DIFF WBC: CPT | Performed by: INTERNAL MEDICINE

## 2024-10-23 ENCOUNTER — LAB REQUISITION (OUTPATIENT)
Dept: LAB | Facility: HOSPITAL | Age: 89
End: 2024-10-23

## 2024-10-23 DIAGNOSIS — R53.1 WEAKNESS: ICD-10-CM

## 2024-10-23 LAB
ANION GAP SERPL CALCULATED.3IONS-SCNC: 6 MMOL/L (ref 4–13)
BASOPHILS # BLD AUTO: 0.05 THOUSANDS/ΜL (ref 0–0.1)
BASOPHILS NFR BLD AUTO: 1 % (ref 0–1)
BUN SERPL-MCNC: 36 MG/DL (ref 5–25)
CALCIUM SERPL-MCNC: 8.7 MG/DL (ref 8.4–10.2)
CHLORIDE SERPL-SCNC: 104 MMOL/L (ref 96–108)
CO2 SERPL-SCNC: 26 MMOL/L (ref 21–32)
CREAT SERPL-MCNC: 1.07 MG/DL (ref 0.6–1.3)
EOSINOPHIL # BLD AUTO: 0.28 THOUSAND/ΜL (ref 0–0.61)
EOSINOPHIL NFR BLD AUTO: 3 % (ref 0–6)
ERYTHROCYTE [DISTWIDTH] IN BLOOD BY AUTOMATED COUNT: 14.2 % (ref 11.6–15.1)
GFR SERPL CREATININE-BSD FRML MDRD: 56 ML/MIN/1.73SQ M
GLUCOSE SERPL-MCNC: 97 MG/DL (ref 65–140)
HCT VFR BLD AUTO: 35.7 % (ref 36.5–49.3)
HGB BLD-MCNC: 11.6 G/DL (ref 12–17)
IMM GRANULOCYTES # BLD AUTO: 0.2 THOUSAND/UL (ref 0–0.2)
IMM GRANULOCYTES NFR BLD AUTO: 2 % (ref 0–2)
LYMPHOCYTES # BLD AUTO: 1.81 THOUSANDS/ΜL (ref 0.6–4.47)
LYMPHOCYTES NFR BLD AUTO: 18 % (ref 14–44)
MCH RBC QN AUTO: 32.1 PG (ref 26.8–34.3)
MCHC RBC AUTO-ENTMCNC: 32.5 G/DL (ref 31.4–37.4)
MCV RBC AUTO: 99 FL (ref 82–98)
MONOCYTES # BLD AUTO: 0.79 THOUSAND/ΜL (ref 0.17–1.22)
MONOCYTES NFR BLD AUTO: 8 % (ref 4–12)
NEUTROPHILS # BLD AUTO: 7.02 THOUSANDS/ΜL (ref 1.85–7.62)
NEUTS SEG NFR BLD AUTO: 68 % (ref 43–75)
NRBC BLD AUTO-RTO: 0 /100 WBCS
PLATELET # BLD AUTO: 198 THOUSANDS/UL (ref 149–390)
PMV BLD AUTO: 10 FL (ref 8.9–12.7)
POTASSIUM SERPL-SCNC: 4.3 MMOL/L (ref 3.5–5.3)
RBC # BLD AUTO: 3.61 MILLION/UL (ref 3.88–5.62)
SODIUM SERPL-SCNC: 136 MMOL/L (ref 135–147)
WBC # BLD AUTO: 10.15 THOUSAND/UL (ref 4.31–10.16)

## 2024-10-23 PROCEDURE — 85025 COMPLETE CBC W/AUTO DIFF WBC: CPT | Performed by: INTERNAL MEDICINE

## 2024-10-23 PROCEDURE — 80048 BASIC METABOLIC PNL TOTAL CA: CPT | Performed by: INTERNAL MEDICINE

## 2025-01-15 ENCOUNTER — APPOINTMENT (OUTPATIENT)
Dept: RADIOLOGY | Facility: HOSPITAL | Age: OVER 89
DRG: 377 | End: 2025-01-15
Payer: COMMERCIAL

## 2025-01-15 ENCOUNTER — APPOINTMENT (EMERGENCY)
Dept: CT IMAGING | Facility: HOSPITAL | Age: OVER 89
DRG: 377 | End: 2025-01-15
Payer: COMMERCIAL

## 2025-01-15 ENCOUNTER — HOSPITAL ENCOUNTER (INPATIENT)
Facility: HOSPITAL | Age: OVER 89
LOS: 20 days | Discharge: NURSING, REHAB OR LONG-TERM CARE | DRG: 377 | End: 2025-02-05
Attending: EMERGENCY MEDICINE | Admitting: HOSPITALIST
Payer: COMMERCIAL

## 2025-01-15 DIAGNOSIS — Z71.89 GOALS OF CARE, COUNSELING/DISCUSSION: ICD-10-CM

## 2025-01-15 DIAGNOSIS — R13.10 DYSPHAGIA, UNSPECIFIED TYPE: ICD-10-CM

## 2025-01-15 DIAGNOSIS — Z51.5 COMFORT MEASURES ONLY STATUS: ICD-10-CM

## 2025-01-15 DIAGNOSIS — R93.89 OPACITY NOTED ON IMAGING STUDY: ICD-10-CM

## 2025-01-15 DIAGNOSIS — K92.0 HEMATEMESIS: Primary | ICD-10-CM

## 2025-01-15 PROBLEM — L57.0 KERATOMA: Status: ACTIVE | Noted: 2021-07-23

## 2025-01-15 PROBLEM — E87.1 HYPONATREMIA: Status: RESOLVED | Noted: 2020-09-16 | Resolved: 2025-01-15

## 2025-01-15 PROBLEM — M62.59 MUSCLE WASTING AND ATROPHY, NOT ELSEWHERE CLASSIFIED, MULTIPLE SITES: Status: ACTIVE | Noted: 2022-08-24

## 2025-01-15 PROBLEM — I87.2 VENOUS INSUFFICIENCY OF BOTH LOWER EXTREMITIES: Status: ACTIVE | Noted: 2021-07-23
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
## 2025-01-15 PROBLEM — E87.6 HYPOKALEMIA: Status: RESOLVED | Noted: 2023-07-24 | Resolved: 2025-01-15

## 2025-01-15 PROBLEM — K59.00 CONSTIPATION: Status: ACTIVE | Noted: 2025-01-15

## 2025-01-15 PROBLEM — K92.2 UPPER GI BLEED: Status: ACTIVE | Noted: 2025-01-15

## 2025-01-15 PROBLEM — D72.829 LEUKOCYTOSIS: Status: ACTIVE | Noted: 2025-01-15

## 2025-01-15 LAB
ABO GROUP BLD: NORMAL
ALBUMIN SERPL BCG-MCNC: 3.8 G/DL (ref 3.5–5)
ALP SERPL-CCNC: 52 U/L (ref 34–104)
ALT SERPL W P-5'-P-CCNC: 16 U/L (ref 7–52)
ANION GAP SERPL CALCULATED.3IONS-SCNC: 11 MMOL/L (ref 4–13)
APTT PPP: 31 SECONDS (ref 23–34)
AST SERPL W P-5'-P-CCNC: 22 U/L (ref 13–39)
BACTERIA UR QL AUTO: ABNORMAL /HPF
BASOPHILS # BLD AUTO: 0.04 THOUSANDS/ΜL (ref 0–0.1)
BASOPHILS NFR BLD AUTO: 0 % (ref 0–1)
BILIRUB SERPL-MCNC: 0.44 MG/DL (ref 0.2–1)
BILIRUB UR QL STRIP: NEGATIVE
BLD GP AB SCN SERPL QL: NEGATIVE
BUN SERPL-MCNC: 31 MG/DL (ref 5–25)
CALCIUM SERPL-MCNC: 8.9 MG/DL (ref 8.4–10.2)
CHLORIDE SERPL-SCNC: 99 MMOL/L (ref 96–108)
CLARITY UR: CLEAR
CO2 SERPL-SCNC: 25 MMOL/L (ref 21–32)
COLOR UR: YELLOW
CREAT SERPL-MCNC: 1 MG/DL (ref 0.6–1.3)
EOSINOPHIL # BLD AUTO: 0.06 THOUSAND/ΜL (ref 0–0.61)
EOSINOPHIL NFR BLD AUTO: 1 % (ref 0–6)
ERYTHROCYTE [DISTWIDTH] IN BLOOD BY AUTOMATED COUNT: 14.6 % (ref 11.6–15.1)
FLUAV AG UPPER RESP QL IA.RAPID: NEGATIVE
FLUBV AG UPPER RESP QL IA.RAPID: NEGATIVE
GFR SERPL CREATININE-BSD FRML MDRD: 61 ML/MIN/1.73SQ M
GLUCOSE SERPL-MCNC: 129 MG/DL (ref 65–140)
GLUCOSE UR STRIP-MCNC: NEGATIVE MG/DL
HCT VFR BLD AUTO: 37.8 % (ref 36.5–49.3)
HGB BLD-MCNC: 11.6 G/DL (ref 12–17)
HGB BLD-MCNC: 12.4 G/DL (ref 12–17)
HGB BLD-MCNC: 12.7 G/DL (ref 12–17)
HGB UR QL STRIP.AUTO: ABNORMAL
IMM GRANULOCYTES # BLD AUTO: 0.07 THOUSAND/UL (ref 0–0.2)
IMM GRANULOCYTES NFR BLD AUTO: 1 % (ref 0–2)
INR PPP: 0.94 (ref 0.85–1.19)
KETONES UR STRIP-MCNC: NEGATIVE MG/DL
LEUKOCYTE ESTERASE UR QL STRIP: NEGATIVE
LIPASE SERPL-CCNC: 23 U/L (ref 11–82)
LYMPHOCYTES # BLD AUTO: 0.59 THOUSANDS/ΜL (ref 0.6–4.47)
LYMPHOCYTES NFR BLD AUTO: 5 % (ref 14–44)
MAGNESIUM SERPL-MCNC: 1.9 MG/DL (ref 1.9–2.7)
MCH RBC QN AUTO: 32.5 PG (ref 26.8–34.3)
MCHC RBC AUTO-ENTMCNC: 33.6 G/DL (ref 31.4–37.4)
MCV RBC AUTO: 97 FL (ref 82–98)
MONOCYTES # BLD AUTO: 0.55 THOUSAND/ΜL (ref 0.17–1.22)
MONOCYTES NFR BLD AUTO: 5 % (ref 4–12)
NEUTROPHILS # BLD AUTO: 10.79 THOUSANDS/ΜL (ref 1.85–7.62)
NEUTS SEG NFR BLD AUTO: 88 % (ref 43–75)
NITRITE UR QL STRIP: NEGATIVE
NON-SQ EPI CELLS URNS QL MICRO: ABNORMAL /HPF
NRBC BLD AUTO-RTO: 0 /100 WBCS
PH UR STRIP.AUTO: 7.5 [PH]
PLATELET # BLD AUTO: 192 THOUSANDS/UL (ref 149–390)
PMV BLD AUTO: 9.8 FL (ref 8.9–12.7)
POTASSIUM SERPL-SCNC: 4 MMOL/L (ref 3.5–5.3)
PROCALCITONIN SERPL-MCNC: <0.05 NG/ML
PROT SERPL-MCNC: 6.6 G/DL (ref 6.4–8.4)
PROT UR STRIP-MCNC: NEGATIVE MG/DL
PROTHROMBIN TIME: 13.1 SECONDS (ref 12.3–15)
RBC # BLD AUTO: 3.91 MILLION/UL (ref 3.88–5.62)
RBC #/AREA URNS AUTO: ABNORMAL /HPF
RH BLD: POSITIVE
SARS-COV+SARS-COV-2 AG RESP QL IA.RAPID: NEGATIVE
SODIUM SERPL-SCNC: 135 MMOL/L (ref 135–147)
SP GR UR STRIP.AUTO: 1.01
SPECIMEN EXPIRATION DATE: NORMAL
UROBILINOGEN UR QL STRIP.AUTO: 0.2 E.U./DL
WBC # BLD AUTO: 12.1 THOUSAND/UL (ref 4.31–10.16)
WBC #/AREA URNS AUTO: ABNORMAL /HPF

## 2025-01-15 PROCEDURE — 85730 THROMBOPLASTIN TIME PARTIAL: CPT | Performed by: EMERGENCY MEDICINE

## 2025-01-15 PROCEDURE — 99285 EMERGENCY DEPT VISIT HI MDM: CPT

## 2025-01-15 PROCEDURE — 81001 URINALYSIS AUTO W/SCOPE: CPT | Performed by: EMERGENCY MEDICINE

## 2025-01-15 PROCEDURE — 86901 BLOOD TYPING SEROLOGIC RH(D): CPT | Performed by: NURSE PRACTITIONER

## 2025-01-15 PROCEDURE — 36415 COLL VENOUS BLD VENIPUNCTURE: CPT | Performed by: EMERGENCY MEDICINE

## 2025-01-15 PROCEDURE — 85018 HEMOGLOBIN: CPT | Performed by: NURSE PRACTITIONER

## 2025-01-15 PROCEDURE — 83690 ASSAY OF LIPASE: CPT | Performed by: EMERGENCY MEDICINE

## 2025-01-15 PROCEDURE — 86900 BLOOD TYPING SEROLOGIC ABO: CPT | Performed by: NURSE PRACTITIONER

## 2025-01-15 PROCEDURE — 83735 ASSAY OF MAGNESIUM: CPT | Performed by: EMERGENCY MEDICINE

## 2025-01-15 PROCEDURE — 96374 THER/PROPH/DIAG INJ IV PUSH: CPT

## 2025-01-15 PROCEDURE — 99204 OFFICE O/P NEW MOD 45 MIN: CPT | Performed by: STUDENT IN AN ORGANIZED HEALTH CARE EDUCATION/TRAINING PROGRAM

## 2025-01-15 PROCEDURE — 86850 RBC ANTIBODY SCREEN: CPT | Performed by: NURSE PRACTITIONER

## 2025-01-15 PROCEDURE — 80053 COMPREHEN METABOLIC PANEL: CPT | Performed by: EMERGENCY MEDICINE

## 2025-01-15 PROCEDURE — 87811 SARS-COV-2 COVID19 W/OPTIC: CPT | Performed by: EMERGENCY MEDICINE

## 2025-01-15 PROCEDURE — 74177 CT ABD & PELVIS W/CONTRAST: CPT

## 2025-01-15 PROCEDURE — 96375 TX/PRO/DX INJ NEW DRUG ADDON: CPT

## 2025-01-15 PROCEDURE — 85025 COMPLETE CBC W/AUTO DIFF WBC: CPT | Performed by: EMERGENCY MEDICINE

## 2025-01-15 PROCEDURE — 71045 X-RAY EXAM CHEST 1 VIEW: CPT

## 2025-01-15 PROCEDURE — 87804 INFLUENZA ASSAY W/OPTIC: CPT | Performed by: EMERGENCY MEDICINE

## 2025-01-15 PROCEDURE — 99222 1ST HOSP IP/OBS MODERATE 55: CPT | Performed by: NURSE PRACTITIONER

## 2025-01-15 PROCEDURE — 81003 URINALYSIS AUTO W/O SCOPE: CPT | Performed by: EMERGENCY MEDICINE

## 2025-01-15 PROCEDURE — 84145 PROCALCITONIN (PCT): CPT | Performed by: PHYSICIAN ASSISTANT

## 2025-01-15 PROCEDURE — 99285 EMERGENCY DEPT VISIT HI MDM: CPT | Performed by: EMERGENCY MEDICINE

## 2025-01-15 PROCEDURE — 85610 PROTHROMBIN TIME: CPT | Performed by: EMERGENCY MEDICINE

## 2025-01-15 RX ORDER — ACETAMINOPHEN 325 MG/1
650 TABLET ORAL EVERY 4 HOURS PRN
COMMUNITY

## 2025-01-15 RX ORDER — SODIUM CHLORIDE, SODIUM LACTATE, POTASSIUM CHLORIDE, CALCIUM CHLORIDE 600; 310; 30; 20 MG/100ML; MG/100ML; MG/100ML; MG/100ML
75 INJECTION, SOLUTION INTRAVENOUS CONTINUOUS
Status: DISCONTINUED | OUTPATIENT
Start: 2025-01-15 | End: 2025-01-18

## 2025-01-15 RX ORDER — PANTOPRAZOLE SODIUM 40 MG/10ML
40 INJECTION, POWDER, LYOPHILIZED, FOR SOLUTION INTRAVENOUS EVERY 12 HOURS
Status: DISCONTINUED | OUTPATIENT
Start: 2025-01-15 | End: 2025-02-04

## 2025-01-15 RX ORDER — POTASSIUM CHLORIDE 1500 MG/1
20 TABLET, EXTENDED RELEASE ORAL DAILY
Status: DISCONTINUED | OUTPATIENT
Start: 2025-01-15 | End: 2025-01-17

## 2025-01-15 RX ORDER — FUROSEMIDE 20 MG/1
20 TABLET ORAL 3 TIMES WEEKLY
Status: DISCONTINUED | OUTPATIENT
Start: 2025-01-17 | End: 2025-02-04

## 2025-01-15 RX ORDER — ACETAMINOPHEN 325 MG/1
650 TABLET ORAL EVERY 4 HOURS PRN
Status: DISCONTINUED | OUTPATIENT
Start: 2025-01-15 | End: 2025-02-05 | Stop reason: HOSPADM

## 2025-01-15 RX ORDER — ONDANSETRON 2 MG/ML
4 INJECTION INTRAMUSCULAR; INTRAVENOUS ONCE
Status: COMPLETED | OUTPATIENT
Start: 2025-01-15 | End: 2025-01-15

## 2025-01-15 RX ORDER — ONDANSETRON 2 MG/ML
4 INJECTION INTRAMUSCULAR; INTRAVENOUS EVERY 6 HOURS PRN
Status: DISCONTINUED | OUTPATIENT
Start: 2025-01-15 | End: 2025-02-05 | Stop reason: HOSPADM

## 2025-01-15 RX ORDER — GABAPENTIN 100 MG/1
100 CAPSULE ORAL EVERY 12 HOURS
Status: DISCONTINUED | OUTPATIENT
Start: 2025-01-15 | End: 2025-01-28

## 2025-01-15 RX ORDER — FENOFIBRATE 48 MG/1
48 TABLET, COATED ORAL DAILY
Status: DISCONTINUED | OUTPATIENT
Start: 2025-01-15 | End: 2025-01-28

## 2025-01-15 RX ORDER — BISACODYL 10 MG
10 SUPPOSITORY, RECTAL RECTAL DAILY
Status: DISCONTINUED | OUTPATIENT
Start: 2025-01-15 | End: 2025-01-28

## 2025-01-15 RX ORDER — FUROSEMIDE 20 MG/1
20 TABLET ORAL 3 TIMES WEEKLY
Status: DISCONTINUED | OUTPATIENT
Start: 2025-01-15 | End: 2025-01-15

## 2025-01-15 RX ORDER — NITROGLYCERIN 0.4 MG/1
0.4 TABLET SUBLINGUAL
Status: DISCONTINUED | OUTPATIENT
Start: 2025-01-15 | End: 2025-02-05 | Stop reason: HOSPADM

## 2025-01-15 RX ORDER — NYSTATIN 100000 U/G
CREAM TOPICAL 2 TIMES DAILY
Status: DISCONTINUED | OUTPATIENT
Start: 2025-01-15 | End: 2025-02-05 | Stop reason: HOSPADM

## 2025-01-15 RX ORDER — ALLOPURINOL 300 MG/1
300 TABLET ORAL DAILY
Status: DISCONTINUED | OUTPATIENT
Start: 2025-01-15 | End: 2025-01-28

## 2025-01-15 RX ORDER — ASPIRIN 81 MG/1
81 TABLET, CHEWABLE ORAL DAILY
Status: DISCONTINUED | OUTPATIENT
Start: 2025-01-15 | End: 2025-01-16

## 2025-01-15 RX ORDER — DOCUSATE SODIUM 100 MG/1
100 CAPSULE, LIQUID FILLED ORAL 2 TIMES DAILY
Status: DISCONTINUED | OUTPATIENT
Start: 2025-01-15 | End: 2025-01-17

## 2025-01-15 RX ORDER — METOPROLOL SUCCINATE 25 MG/1
25 TABLET, EXTENDED RELEASE ORAL DAILY
Status: DISCONTINUED | OUTPATIENT
Start: 2025-01-15 | End: 2025-01-17

## 2025-01-15 RX ORDER — SENNOSIDES 8.6 MG
2 TABLET ORAL
Status: DISCONTINUED | OUTPATIENT
Start: 2025-01-15 | End: 2025-01-28

## 2025-01-15 RX ORDER — PANTOPRAZOLE SODIUM 40 MG/10ML
40 INJECTION, POWDER, LYOPHILIZED, FOR SOLUTION INTRAVENOUS ONCE
Status: COMPLETED | OUTPATIENT
Start: 2025-01-15 | End: 2025-01-15

## 2025-01-15 RX ADMIN — Medication 2000 UNITS: at 09:14

## 2025-01-15 RX ADMIN — ALLOPURINOL 300 MG: 300 TABLET ORAL at 09:14

## 2025-01-15 RX ADMIN — NYSTATIN 1 APPLICATION: 100000 CREAM TOPICAL at 09:14

## 2025-01-15 RX ADMIN — BISACODYL 10 MG: 10 SUPPOSITORY RECTAL at 14:01

## 2025-01-15 RX ADMIN — DOCUSATE SODIUM 100 MG: 100 CAPSULE, LIQUID FILLED ORAL at 09:13

## 2025-01-15 RX ADMIN — FENOFIBRATE 48 MG: 48 TABLET, FILM COATED ORAL at 09:13

## 2025-01-15 RX ADMIN — SODIUM CHLORIDE, SODIUM LACTATE, POTASSIUM CHLORIDE, AND CALCIUM CHLORIDE 75 ML/HR: .6; .31; .03; .02 INJECTION, SOLUTION INTRAVENOUS at 13:31

## 2025-01-15 RX ADMIN — IOHEXOL 100 ML: 350 INJECTION, SOLUTION INTRAVENOUS at 05:06

## 2025-01-15 RX ADMIN — PANTOPRAZOLE SODIUM 40 MG: 40 INJECTION, POWDER, FOR SOLUTION INTRAVENOUS at 04:26

## 2025-01-15 RX ADMIN — POTASSIUM CHLORIDE 20 MEQ: 1500 TABLET, EXTENDED RELEASE ORAL at 09:14

## 2025-01-15 RX ADMIN — ONDANSETRON 4 MG: 2 INJECTION INTRAMUSCULAR; INTRAVENOUS at 04:28

## 2025-01-15 RX ADMIN — METOPROLOL SUCCINATE 25 MG: 25 TABLET, EXTENDED RELEASE ORAL at 09:14

## 2025-01-15 RX ADMIN — ACETAMINOPHEN 650 MG: 325 TABLET ORAL at 23:16

## 2025-01-15 RX ADMIN — GABAPENTIN 100 MG: 100 CAPSULE ORAL at 07:06

## 2025-01-15 RX ADMIN — ACETAMINOPHEN 650 MG: 325 TABLET ORAL at 11:22

## 2025-01-15 RX ADMIN — FUROSEMIDE 20 MG: 20 TABLET ORAL at 09:14

## 2025-01-15 RX ADMIN — B-COMPLEX W/ C & FOLIC ACID TAB 1 TABLET: TAB at 09:14

## 2025-01-15 NOTE — ASSESSMENT & PLAN NOTE
Stool burden on CT scan  Place on bowel regimen. Can give tap water enema  and dulcolax suppository today   Check KUB in AM

## 2025-01-15 NOTE — PROGRESS NOTES
Pastoral Care Progress Note  CH initiated support visit to family and pt. Pt asleep, son of pt bedside.    CH shared regarding self and role. Encouraged family to ask for her if she can be of service.           01/15/25 1400   Clinical Encounter Type   Visited With Family

## 2025-01-15 NOTE — ASSESSMENT & PLAN NOTE
Possible reactionary to upper GI bleed  Patient is afebrile with no signs or symptoms of active infection  UA- negative   PCT- negative   Hold off on antibiotics at this time

## 2025-01-15 NOTE — ASSESSMENT & PLAN NOTE
Lab Results   Component Value Date    EGFR 61 01/15/2025    EGFR 56 10/23/2024    EGFR 68 10/17/2024    CREATININE 1.00 01/15/2025    CREATININE 1.07 10/23/2024    CREATININE 0.92 10/17/2024     Creatinine appears to be at baseline 0.9-1.1 mg/dL   Continue to monitor renal function closely  Avoid nephrotoxins and hypotension

## 2025-01-15 NOTE — ASSESSMENT & PLAN NOTE
Patient is rate controlled at this time  Continue prehospital Toprol-XL 25 mg p.o. daily  AC- not currently on outpatient

## 2025-01-15 NOTE — ASSESSMENT & PLAN NOTE
In summary, this is 89-year-old male with a history of a flutter on aspirin monotherapy, hypertension, hyperlipidemia, CKD, gout as well as a reported remote history of peptic ulcer disease who presented with a history of 1 day of lower abdominal pain with reports of 2 episodes of coffee-ground emesis at the nursing home.  No recent NSAID use.  No EGD on file to review.  No other evidence of GI bleed.  On presentation he was hemodynamically stable.  His hemoglobin is 12.7 which is about his baseline.  His BUN is elevated at 31 (been in the 30s for the last few months).      He underwent a CT abdomen and pelvis that showed evidence of possible distal esophagitis and gastritis. His stomach is notably dilated with air and fluid but no obvious mass or transition point.    Differential diagnosis for his symptoms include gastric outlet obstruction, reflux/erosive esophagitis, Pill esophagitis, gastritis,    Plan  Given his advanced age, hemodynamic stability, stable hemoglobin and no further episodes of hematemesis; would favor a conservative approach for this gentleman.  No plan for endoscopic intervention at this time unless there is an acute change in his clinical status   Recommend to continue with high-dose PPI therapy.  Protonix 40 mg twice daily daily  Initially recommended NG to low intermittent suction to decompress stomach. (Of note he had bleeding in his L nares with attempt by the nursing staff). Okay to hold off NG tube placement for now unless he develops persistent vomiting  Keep NPO for now  Can resume aspirin  Avoid NSAIDs   Trend CBC daily  Maintain type and screen  Antiemetic for nausea

## 2025-01-15 NOTE — ED PROVIDER NOTES
Time reflects when diagnosis was documented in both MDM as applicable and the Disposition within this note       Time User Action Codes Description Comment    1/15/2025  6:14 AM Chavez Valdez Add [K92.0] Hematemesis           ED Disposition       ED Disposition   Admit    Condition   Stable    Date/Time   Wed Kun 15, 2025  6:15 AM    Comment   Case was discussed with ramo and the patient's admission status was agreed to be Admission Status: observation status to the service of Dr. ralph .               Assessment & Plan       Medical Decision Making  This is 89-year-old male with a history of paroxysmal A-fib, 81mg asa, presents with lower abdominal pain followed by 2 episodes of dark vomiting.  Patient prevents from a nursing facility who reports emesis consistent with coffee-ground.    On exam patient is frail appearing however no acute distress.  His abdomen is slightly tympanic, overall nontender.  Currently denies any pain.  Normal to slightly hyperactive bowel sounds.    Differential includes obstruction, upper GI bleed, anemia.  Will get CT scan.  Will get a CBC to look for anemia, leukocytosis although some reactive leukocytosis would not be unexpected.    Problems Addressed:  Hematemesis: acute illness or injury    Amount and/or Complexity of Data Reviewed  Labs: ordered. Decision-making details documented in ED Course.  Radiology: ordered and independent interpretation performed. Decision-making details documented in ED Course.    Risk  Prescription drug management.  Decision regarding hospitalization.             Medications   allopurinol (ZYLOPRIM) tablet 300 mg (has no administration in time range)   Cholecalciferol (VITAMIN D3) tablet 2,000 Units (has no administration in time range)   gabapentin (NEURONTIN) capsule 100 mg (100 mg Oral Given 1/15/25 0706)   multivitamin stress formula tablet 1 tablet (has no administration in time range)   nitroglycerin (NITROSTAT) SL tablet 0.4 mg (has no  administration in time range)   nystatin (MYCOSTATIN) cream (has no administration in time range)   fenofibrate (TRICOR) tablet 48 mg (has no administration in time range)   ondansetron (ZOFRAN) injection 4 mg (has no administration in time range)   pantoprazole (PROTONIX) injection 40 mg (has no administration in time range)   ondansetron (ZOFRAN) injection 4 mg (4 mg Intravenous Given 1/15/25 0428)   pantoprazole (PROTONIX) injection 40 mg (40 mg Intravenous Given 1/15/25 0426)   iohexol (OMNIPAQUE) 350 MG/ML injection (MULTI-DOSE) 100 mL (100 mL Intravenous Given 1/15/25 0506)       ED Risk Strat Scores                          SBIRT 22yo+      Flowsheet Row Most Recent Value   Initial Alcohol Screen: US AUDIT-C     1. How often do you have a drink containing alcohol? 0 Filed at: 01/15/2025 0446   2. How many drinks containing alcohol do you have on a typical day you are drinking?  0 Filed at: 01/15/2025 0446   3a. Male UNDER 65: How often do you have five or more drinks on one occasion? 0 Filed at: 01/15/2025 0446   3b. FEMALE Any Age, or MALE 65+: How often do you have 4 or more drinks on one occassion? 0 Filed at: 01/15/2025 0446   Audit-C Score 0 Filed at: 01/15/2025 0446   NISHANT: How many times in the past year have you...    Used an illegal drug or used a prescription medication for non-medical reasons? Never Filed at: 01/15/2025 0446                            History of Present Illness       Chief Complaint   Patient presents with    Vomiting     Patient vomited twice at the nursing home, reportedly coffee ground emesis.       Past Medical History:   Diagnosis Date    Arthritis     Cataract     Coronary artery disease     Coronary atherosclerosis of native coronary artery     Diverticulitis of colon     Essential hypertension, benign     Hyperlipidemia     Hypertension     Peptic ulcer     Renal disorder       Past Surgical History:   Procedure Laterality Date    CATARACT EXTRACTION Right     Left eye  5/2021    CORONARY STENT PLACEMENT      SKIN BIOPSY      TONSILLECTOMY        Family History   Problem Relation Age of Onset    Heart attack Brother     Heart disease Mother     Cancer Father       Social History     Tobacco Use    Smoking status: Former     Types: Pipe    Smokeless tobacco: Never   Vaping Use    Vaping status: Never Used   Substance Use Topics    Alcohol use: Not Currently    Drug use: No      E-Cigarette/Vaping    E-Cigarette Use Never User       E-Cigarette/Vaping Substances    Nicotine No     THC No     CBD No     Flavoring No     Other No     Unknown No       I have reviewed and agree with the history as documented.     This is 89-year-old male presenting with 2 episodes of vomiting      Vomiting      Review of Systems   Gastrointestinal:  Positive for vomiting.           Objective       ED Triage Vitals   Temperature Pulse Blood Pressure Respirations SpO2 Patient Position - Orthostatic VS   01/15/25 0408 01/15/25 0408 01/15/25 0408 01/15/25 0408 01/15/25 0408 01/15/25 0515   (!) 97.3 °F (36.3 °C) 91 144/67 16 96 % Lying      Temp Source Heart Rate Source BP Location FiO2 (%) Pain Score    01/15/25 0408 01/15/25 0408 01/15/25 0408 -- 01/15/25 0408    Tympanic Monitor Left arm  No Pain      Vitals      Date and Time Temp Pulse SpO2 Resp BP Pain Score FACES Pain Rating User   01/18/25 0309 98.1 °F (36.7 °C) 73 97 % -- -- -- -- LB   01/18/25 0000 98.6 °F (37 °C) 99 96 % -- -- -- -- LB   01/17/25 2200 -- 100 91 % 18 111/66 -- -- DII   01/17/25 2150 -- -- -- -- -- No Pain -- LB   01/17/25 2150 103 °F (39.4 °C) 87 92 % -- -- -- -- DII   01/17/25 1758 -- -- -- -- -- No Pain -- LR   01/17/25 1754 101.4 °F (38.6 °C) 116 92 % -- -- -- -- DII   01/17/25 1500 -- -- 99 % -- -- -- -- LR   01/17/25 1358 -- -- -- 19 -- -- -- VINICIO   01/17/25 1358 100 °F (37.8 °C) 84 97 % -- 150/58 -- -- DII   01/17/25 1224 101.8 °F (38.8 °C) 92 95 % -- -- -- -- DII   01/17/25 0916 -- -- -- -- -- No Pain -- LR   01/17/25 0900 --  -- 94 % -- -- -- -- LR   01/17/25 0711 100.1 °F (37.8 °C) 85 96 % 22 148/65 -- -- DII   01/17/25 0300 99.5 °F (37.5 °C) 80 -- -- -- -- -- BB   01/16/25 2230 100.5 °F (38.1 °C) 85 96 % 16 100/41 -- -- DII   01/16/25 2030 -- -- 93 % -- -- No Pain -- BB   01/16/25 1926 101 °F (38.3 °C) 99 92 % -- -- -- -- DII   01/16/25 1812 -- -- -- -- -- Med Not Given for Pain - for MAR use only -- RAY   01/16/25 1542 101.8 °F (38.8 °C) 116 95 % -- 167/94 -- -- DII   01/16/25 1412 -- -- -- -- 188/90 notified RN -- -- YR   01/16/25 1412 101.5 °F (38.6 °C) 111 95 % 20 -- -- -- DII   01/16/25 1411 -- -- -- -- 195/100 notified RN -- -- YR   01/16/25 1411 101.5 °F (38.6 °C) 111 94 % 20 -- -- -- DII   01/16/25 0939 -- -- -- -- -- No Pain -- RAY   01/16/25 0706 99.2 °F (37.3 °C) 85 94 % 20 162/72 -- -- DII   01/16/25 0116 100.1 °F (37.8 °C) 82 94 % -- -- -- -- DII   01/15/25 2316 -- -- -- -- -- Med Not Given for Pain - for MAR use only -- BB   01/15/25 2251 101.3 °F (38.5 °C) 97 93 % 18 124/58 -- -- DII   01/15/25 2010 -- -- 93 % -- -- No Pain -- BB   01/15/25 1449 99 °F (37.2 °C) 82 96 % 17 169/73 -- -- DII   01/15/25 1122 -- -- -- -- -- 4 -- TS   01/15/25 0900 -- -- -- -- -- No Pain -- TS   01/15/25 0755 -- -- -- 18 -- No Pain -- ND   01/15/25 0755 98.9 °F (37.2 °C) 89 94 % -- 153/77 -- -- DII   01/15/25 0600 -- 86 95 % 17 131/62 -- -- KB   01/15/25 0515 -- 86 91 % 18 130/60 -- -- KB   01/15/25 0408 97.3 °F (36.3 °C) 91 96 % 16 144/67 No Pain -- TG            Physical Exam  Vitals and nursing note reviewed.   Constitutional:       Appearance: Normal appearance. He is well-developed.   HENT:      Head: Normocephalic and atraumatic.   Eyes:      Conjunctiva/sclera: Conjunctivae normal.      Pupils: Pupils are equal, round, and reactive to light.   Neck:      Trachea: No tracheal deviation.   Cardiovascular:      Rate and Rhythm: Normal rate and regular rhythm.      Heart sounds: Normal heart sounds. No murmur heard.  Pulmonary:      Effort:  Pulmonary effort is normal. No respiratory distress.      Breath sounds: Normal breath sounds. No wheezing or rales.   Abdominal:      General: Bowel sounds are normal. There is distension (mild).      Palpations: Abdomen is soft.      Tenderness: There is no abdominal tenderness.   Musculoskeletal:         General: No deformity.      Cervical back: Normal range of motion and neck supple.   Skin:     General: Skin is warm and dry.      Capillary Refill: Capillary refill takes less than 2 seconds.   Neurological:      General: No focal deficit present.      Mental Status: He is alert and oriented to person, place, and time.      Sensory: No sensory deficit.   Psychiatric:         Mood and Affect: Mood normal.         Judgment: Judgment normal.         Results Reviewed       Procedure Component Value Units Date/Time    Urine Microscopic [[id number]]  (Abnormal) Collected: 01/15/25 0720    Lab Status: Final result Specimen: Urine, Other Updated: 01/15/25 0818     RBC, UA 10-20 /hpf      WBC, UA 1-2 /hpf      Epithelial Cells Occasional /hpf      Bacteria, UA Occasional /hpf     Procalcitonin [[id number]]  (Normal) Collected: 01/15/25 0422    Lab Status: Final result Specimen: Blood from Arm, Left Updated: 01/15/25 0810     Procalcitonin <0.05 ng/ml     UA w Reflex to Microscopic w Reflex to Culture [[id number]]  (Abnormal) Collected: 01/15/25 0720    Lab Status: Final result Specimen: Urine, Other Updated: 01/15/25 0728     Color, UA Yellow     Clarity, UA Clear     Specific Gravity, UA 1.010     pH, UA 7.5     Leukocytes, UA Negative     Nitrite, UA Negative     Protein, UA Negative mg/dl      Glucose, UA Negative mg/dl      Ketones, UA Negative mg/dl      Urobilinogen, UA 0.2 E.U./dl      Bilirubin, UA Negative     Occult Blood, UA 1+    Comprehensive metabolic panel [[id number]]  (Abnormal) Collected: 01/15/25 0422    Lab Status: Final result Specimen: Blood from Arm, Left Updated: 01/15/25 0445     Sodium 135 mmol/L       Potassium 4.0 mmol/L      Chloride 99 mmol/L      CO2 25 mmol/L      ANION GAP 11 mmol/L      BUN 31 mg/dL      Creatinine 1.00 mg/dL      Glucose 129 mg/dL      Calcium 8.9 mg/dL      AST 22 U/L      ALT 16 U/L      Alkaline Phosphatase 52 U/L      Total Protein 6.6 g/dL      Albumin 3.8 g/dL      Total Bilirubin 0.44 mg/dL      eGFR 61 ml/min/1.73sq m     Narrative:      National Kidney Disease Foundation guidelines for Chronic Kidney Disease (CKD):     Stage 1 with normal or high GFR (GFR > 90 mL/min/1.73 square meters)    Stage 2 Mild CKD (GFR = 60-89 mL/min/1.73 square meters)    Stage 3A Moderate CKD (GFR = 45-59 mL/min/1.73 square meters)    Stage 3B Moderate CKD (GFR = 30-44 mL/min/1.73 square meters)    Stage 4 Severe CKD (GFR = 15-29 mL/min/1.73 square meters)    Stage 5 End Stage CKD (GFR <15 mL/min/1.73 square meters)  Note: GFR calculation is accurate only with a steady state creatinine    Magnesium [[id number]]  (Normal) Collected: 01/15/25 0422    Lab Status: Final result Specimen: Blood from Arm, Left Updated: 01/15/25 0445     Magnesium 1.9 mg/dL     Lipase [[id number]]  (Normal) Collected: 01/15/25 0422    Lab Status: Final result Specimen: Blood from Arm, Left Updated: 01/15/25 0445     Lipase 23 u/L     FLU/COVID Rapid Antigen (30 min. TAT) - Preferred screening test in ED [[id number]]  (Normal) Collected: 01/15/25 0422    Lab Status: Final result Specimen: Nares from Nose Updated: 01/15/25 0444     SARS COV Rapid Antigen Negative     Influenza A Rapid Antigen Negative     Influenza B Rapid Antigen Negative    Narrative:      This test has been performed using the [employer] 2 FLU+SARS Antigen test under the Emergency Use Authorization (EUA). This test has been validated by the  and verified by the performing laboratory. The Yulissa uses lateral flow immunofluorescent sandwich assay to detect SARS-COV, Influenza A and Influenza B Antigen.     The [employer] Yulissa 2 SARS Antigen test does  not differentiate between SARS-CoV and SARS-CoV-2.     Negative results are presumptive and may be confirmed with a molecular assay, if necessary, for patient management. Negative results do not rule out SARS-CoV-2 or influenza infection and should not be used as the sole basis for treatment or patient management decisions. A negative test result may occur if the level of antigen in a sample is below the limit of detection of this test.     Positive results are indicative of the presence of viral antigens, but do not rule out bacterial infection or co-infection with other viruses.     All test results should be used as an adjunct to clinical observations and other information available to the provider.    FOR PEDIATRIC PATIENTS - copy/paste COVID Guidelines URL to browser: [email or web address]    Protime-INR [[id number]]  (Normal) Collected: 01/15/25 0422    Lab Status: Final result Specimen: Blood from Arm, Left Updated: 01/15/25 0440     Protime 13.1 seconds      INR 0.94    Narrative:      INR Therapeutic Range    Indication                                             INR Range      Atrial Fibrillation                                               2.0-3.0  Hypercoagulable State                                    2.0.2.3  Left Ventricular Asist Device                            2.0-3.0  Mechanical Heart Valve                                  -    Aortic(with afib, MI, embolism, HF, LA enlargement,    and/or coagulopathy)                                     2.0-3.0 (2.5-3.5)     Mitral                                                             2.5-3.5  Prosthetic/Bioprosthetic Heart Valve               2.0-3.0  Venous thromboembolism (VTE: VT, PE        2.0-3.0    APTT [[id number]]  (Normal) Collected: 01/15/25 0422    Lab Status: Final result Specimen: Blood from Arm, Left Updated: 01/15/25 0440     PTT 31 seconds     CBC and differential [[id number]]  (Abnormal)  Collected: 01/15/25 0422    Lab Status: Final result Specimen: Blood from Arm, Left Updated: 01/15/25 0429     WBC 12.10 Thousand/uL      RBC 3.91 Million/uL      Hemoglobin 12.7 g/dL      Hematocrit 37.8 %      MCV 97 fL      MCH 32.5 pg      MCHC 33.6 g/dL      RDW 14.6 %      MPV 9.8 fL      Platelets 192 Thousands/uL      nRBC 0 /100 WBCs      Segmented % 88 %      Immature Grans % 1 %      Lymphocytes % 5 %      Monocytes % 5 %      Eosinophils Relative 1 %      Basophils Relative 0 %      Absolute Neutrophils 10.79 Thousands/µL      Absolute Immature Grans 0.07 Thousand/uL      Absolute Lymphocytes 0.59 Thousands/µL      Absolute Monocytes 0.55 Thousand/µL      Eosinophils Absolute 0.06 Thousand/µL      Basophils Absolute 0.04 Thousands/µL             CT abdomen pelvis w contrast   Final Interpretation by Geovanny Taylor MD (01/17 2239)      1.  New small bilateral pleural effusions with extensive bibasilar atelectasis.   2.  Wall thickening of the lower esophagus and proximal stomach is again noted without interval change. This may reflect mild focal esophagitis/gastritis. New NG tube with tip terminating in the gastric body region.   3.  No evidence of bowel obstruction, mesenteric inflammation, appendicitis, obstructive uropathy, free air, or free fluid. Descending and sigmoid diverticulosis again noted without evidence for acute diverticulitis. Subtle wall thickening of the mid to    distal sigmoid colon, rectum, and anal verge region could reflect mild focal colitis/proctitis.   4.  Multiple punctate pancreatic calcifications again seen likely sequelae of chronic pancreatitis.      Workstation performed: [id number]         XR chest portable   Final Interpretation by Jose Johns MD (01/17 7423)      Scattered streaky opacities favored to represent atelectasis but pneumonia not excluded.            Workstation performed: [id number]         XR abdomen 1 vw portable   Final Interpretation by Jose Johns  MD (01/17 9238)      New marked gaseous distention of the stomach.               Workstation performed: [id number]         XR abdomen 1 view kub   Final Interpretation by Diony Boateng MD (01/16 8732)      1.  NG tube projects over the gastric body with decompression of the stomach compared to the x-ray from earlier the same day.      2.  Mildly dilated mid abdominal small bowel loops with gas throughout the colon though the abdomen was only partially imaged.               Workstation performed: [id number]         XR abdomen 1 view kub   Final Interpretation by Diony Boateng MD (01/16 1082)      Worsening gastric distention.      The study was marked in EPIC for immediate notification.               Workstation performed: [id number]         XR chest portable   Final Interpretation by Sina Vitale MD (01/15 9982)      No endogastric tube visible within the field-of-view.      There is some gaseous distention of the stomach.      Minimal atelectasis left lung base.      The study was marked in EPIC for immediate notification.            Workstation performed: [id number]         CT abdomen pelvis with contrast   Final Interpretation by Geovanny Taylor MD (01/15 0303)      Trace left pleural effusion with bibasilar atelectasis.  Focal wall thickening of the lower esophagus, gastroesophageal junction, and proximal stomach noted which may reflect esophagitis/gastritis. No evidence for bowel obstruction, mesenteric    inflammation, appendicitis, obstructive uropathy, free air, or free fluid. Descending and sigmoid colon diverticulosis without evidence for acute diverticulitis.         Workstation performed: [id number]             Procedures    ED Medication and Procedure Management   Prior to Admission Medications   Prescriptions Last Dose Informant Patient Reported? Taking?   Cholecalciferol 50 MCG (2000 UT) CAPS 1/15/2025 at  9:14 AM Self, Child Yes Yes   Sig: Take by mouth   Choline Fenofibrate  (FENOFIBRIC ACID) 45 MG CPDR 1/15/2025 Self, Child Yes Yes   Sig: Take by mouth   Multiple Vitamins tablet 1/15/2025 at  9:14 AM Self, Child Yes Yes   Sig: Take by mouth   acetaminophen (TYLENOL) 325 mg tablet 1/15/2025 at 11:22 AM Outside Facility (Specify) Yes Yes   Sig: Take 650 mg by mouth every 4 (four) hours as needed for mild pain   allopurinol (ZYLOPRIM) 300 mg tablet 1/15/2025 at  9:14 AM Self, Child Yes Yes   aspirin 81 MG tablet Unknown Self, Child Yes No   Sig: Take 1 tablet by mouth see administration instructions Take 1 tablet twice weekly   docusate sodium (COLACE) 100 mg capsule 1/15/2025 at  9:13 AM  No Yes   Sig: Take 1 capsule (100 mg total) by mouth 2 (two) times a day   furosemide (LASIX) 20 mg tablet   No No   Sig: Take 1 tablet (20 mg total) by mouth 3 (three) times a week   gabapentin (NEURONTIN) 100 mg capsule 1/15/2025 at  7:06 AM Self, Child Yes Yes   Sig: Take 1 capsule by mouth every 12 (twelve) hours   metoprolol succinate (TOPROL-XL) 25 mg 24 hr tablet 1/15/2025 at  9:14 AM  No Yes   Sig: Take 1 tablet (25 mg total) by mouth daily   nitroglycerin (Nitrostat) 0.4 mg SL tablet Unknown Self, Child No No   Sig: Place 1 tablet (0.4 mg total) under the tongue every 5 (five) minutes as needed for chest pain prn   nystatin (MYCOSTATIN) cream 1/15/2025 at  9:14 AM  No Yes   Sig: Apply to affected area 2 times daily   potassium chloride (KLOR-CON) 20 mEq packet 1/15/2025 at  9:14 AM Self, Child Yes Yes   Sig: Take 20 mEq by mouth as needed Take 1 tablet when taking Furosemide      Facility-Administered Medications: None     Current Discharge Medication List        CONTINUE these medications which have NOT CHANGED    Details   acetaminophen (TYLENOL) 325 mg tablet Take 650 mg by mouth every 4 (four) hours as needed for mild pain      allopurinol (ZYLOPRIM) 300 mg tablet       Cholecalciferol 50 MCG (2000 UT) CAPS Take by mouth      Choline Fenofibrate (FENOFIBRIC ACID) 45 MG CPDR Take by mouth       docusate sodium (COLACE) 100 mg capsule Take 1 capsule (100 mg total) by mouth 2 (two) times a day    Associated Diagnoses: Generalized weakness; Tinea cruris      gabapentin (NEURONTIN) 100 mg capsule Take 1 capsule by mouth every 12 (twelve) hours      metoprolol succinate (TOPROL-XL) 25 mg 24 hr tablet Take 1 tablet (25 mg total) by mouth daily  Qty: 90 tablet, Refills: 4    Associated Diagnoses: Coronary artery disease involving native coronary artery of native heart without angina pectoris; Essential hypertension      Multiple Vitamins tablet Take by mouth      nystatin (MYCOSTATIN) cream Apply to affected area 2 times daily  Qty: 30 g, Refills: 0    Associated Diagnoses: Tinea cruris      potassium chloride (KLOR-CON) 20 mEq packet Take 20 mEq by mouth as needed Take 1 tablet when taking Furosemide      aspirin 81 MG tablet Take 1 tablet by mouth see administration instructions Take 1 tablet twice weekly      furosemide (LASIX) 20 mg tablet Take 1 tablet (20 mg total) by mouth 3 (three) times a week    Associated Diagnoses: Tinea cruris      nitroglycerin (Nitrostat) 0.4 mg SL tablet Place 1 tablet (0.4 mg total) under the tongue every 5 (five) minutes as needed for chest pain prn    Associated Diagnoses: Coronary artery disease involving native coronary artery of native heart without angina pectoris           No discharge procedures on file.  ED SEPSIS DOCUMENTATION   Time reflects when diagnosis was documented in both MDM as applicable and the Disposition within this note       Time User Action Codes Description Comment    1/15/2025  6:14 AM Chavez Valdez Add [K92.0] Hematemesis                  Chavez Valdez,   01/18/25 0707

## 2025-01-15 NOTE — ASSESSMENT & PLAN NOTE
Presented after staff noticed that he had 2 episodes of coffee-ground emesis at nursing home  Hold prehospital aspirin  Hemoglobin is at baseline  Has history of peptic ulcer disease  Placed on clear liquid diet  Give Protonix 40 mg IV twice daily  Consult GI, input appreciated

## 2025-01-15 NOTE — H&P
H&P - Hospitalist   Name: Prem Mar Sr. 89 y.o. male I MRN: [id number]  Unit/Bed#: [facility] I Date of Admission: 1/15/2025   Date of Service: 1/15/2025 I Hospital Day: 0     Assessment & Plan  Upper GI bleed  Presented after staff noticed that he had 2 episodes of coffee-ground emesis at nursing home  Hold prehospital aspirin  Hemoglobin is at baseline  Has history of peptic ulcer disease  Placed on clear liquid diet  Give Protonix 40 mg IV twice daily  Consult GI, input appreciated   Essential hypertension  Continue prehospital Toprol-XL 25 mg p.o. daily Lasix 20 mg p.o. daily on Monday Wednesday Friday  Idiopathic chronic gout of multiple sites without tophus  Continue prehospital allopurinol 300 mg p.o. daily  Mixed hyperlipidemia  Continue prehospital Tricor 48 mg p.o. daily  Chronic kidney disease, stage 3a (HCC)  Lab Results   Component Value Date    EGFR 61 01/15/2025    EGFR 56 10/23/2024    EGFR 68 10/17/2024    CREATININE 1.00 01/15/2025    CREATININE 1.07 10/23/2024    CREATININE 0.92 10/17/2024     Creatinine appears to be at baseline 0.9-1.1 mg/dL   Continue to monitor renal function closely  Avoid nephrotoxins and hypotension  Atrial flutter (HCC)  Patient is rate controlled at this time  Continue prehospital Toprol-XL 25 mg p.o. daily  AC- not currently on outpatient   Leukocytosis  Possible reactionary to upper GI bleed  Patient is afebrile with no signs or symptoms of active infection  UA- negative   PCT- negative   Hold off on antibiotics at this time      VTE Pharmacologic Prophylaxis: VTE Score: 5 High Risk (Score >/= 5) - Pharmacological DVT Prophylaxis Contraindicated. Sequential Compression Devices Ordered.  Code Status: Level 3 - DNAR and DNI   Discussion with family: Patient declined call to .     Anticipated Length of Stay: Patient will be admitted on an observation basis with an anticipated length of stay of less than 2 midnights secondary to SPECT and upper GI bleed  requiring continued monitoring and further GI evaluation.    History of Present Illness   Chief Complaint: Coffee-ground emesis x 2    Prem Mar Sr. is a 89 y.o. male with a PMH of atrial flutter, peptic ulcer disease who presents with abdominal pain, nausea and coffee-ground emesis x 2.  Patient with a history of atrial flutter on chronic aspirin 81 mg p.o. daily and additionally has a history of peptic ulcer disease sent to ER for further evaluation and treatment of his 1 day history of lower abdominal pain followed by 2 episodes of dark emesis which was described by nursing home staff as having coffee ground appearance.  Routine labs obtained in ER showed hemoglobin was at baseline and CT abdomen pelvis showed focal wall thickening of the lower esophagus and gastroesophageal junction and proximal stomach.    Review of Systems   Constitutional:  Negative for chills, fatigue and fever.   HENT:  Negative for congestion, ear pain, postnasal drip and sore throat.    Eyes:  Negative for discharge, itching and visual disturbance.   Respiratory:  Negative for cough, chest tightness, shortness of breath and wheezing.    Cardiovascular:  Negative for chest pain, palpitations and leg swelling.   Gastrointestinal:  Positive for abdominal pain, nausea and vomiting. Negative for diarrhea.        Reported hematemesis   Endocrine: Negative for cold intolerance and heat intolerance.   Genitourinary:  Negative for difficulty urinating, dysuria, frequency, hematuria and urgency.   Musculoskeletal:  Negative for back pain, gait problem and neck pain.   Skin:  Negative for rash and wound.   Neurological:  Negative for dizziness, speech difficulty, weakness, light-headedness and headaches.   Psychiatric/Behavioral:  Negative for behavioral problems, confusion and decreased concentration.    All other systems reviewed and are negative.      Historical Information   Past Medical History:   Diagnosis Date    Arthritis      Cataract     Coronary artery disease     Coronary atherosclerosis of native coronary artery     Diverticulitis of colon     Essential hypertension, benign     Hyperlipidemia     Hypertension     Peptic ulcer     Renal disorder      Past Surgical History:   Procedure Laterality Date    CATARACT EXTRACTION Right     Left eye 5/2021    CORONARY STENT PLACEMENT      SKIN BIOPSY      TONSILLECTOMY       Social History     Tobacco Use    Smoking status: Former     Types: Pipe    Smokeless tobacco: Never   Vaping Use    Vaping status: Never Used   Substance and Sexual Activity    Alcohol use: Not Currently    Drug use: No    Sexual activity: Not Currently     E-Cigarette/Vaping    E-Cigarette Use Never User      E-Cigarette/Vaping Substances    Nicotine No     THC No     CBD No     Flavoring No     Other No     Unknown No      Family history non-contributory  Social History:  Marital Status:    Occupation: Retired  Patient Pre-hospital Living Situation: Skilled Nursing Facility: [facility]  Patient Pre-hospital Level of Mobility: walks  Patient Pre-hospital Diet Restrictions: None    Meds/Allergies   I have reveiwed home medications using records provided by [facility].  Prior to Admission medications    Medication Sig Start Date End Date Taking? Authorizing Provider   allopurinol (ZYLOPRIM) 300 mg tablet  1/21/24   Historical Provider, MD   aspirin 81 MG tablet Take 1 tablet by mouth see administration instructions Take 1 tablet twice weekly    Historical Provider, MD   Cholecalciferol 50 MCG (2000 UT) CAPS Take by mouth    Historical Provider, MD   Choline Fenofibrate (FENOFIBRIC ACID) 45 MG CPDR Take by mouth 9/30/13   Historical Provider, MD   docusate sodium (COLACE) 100 mg capsule Take 1 capsule (100 mg total) by mouth 2 (two) times a day 10/15/24   Nhan Carter DO   furosemide (LASIX) 20 mg tablet Take 1 tablet (20 mg total) by mouth 3 (three) times a week 10/16/24   Nhan Carter DO   gabapentin (NEURONTIN)  100 mg capsule Take 1 capsule by mouth every 12 (twelve) hours 2/12/24   Historical Provider, MD   metoprolol succinate (TOPROL-XL) 25 mg 24 hr tablet Take 1 tablet (25 mg total) by mouth daily 5/29/24   Deshaun Delgado MD   Multiple Vitamins tablet Take by mouth    Historical Provider, MD   nitroglycerin (Nitrostat) 0.4 mg SL tablet Place 1 tablet (0.4 mg total) under the tongue every 5 (five) minutes as needed for chest pain prn 7/24/23   Eric Valencia,    nystatin (MYCOSTATIN) cream Apply to affected area 2 times daily 10/10/24   Roque Sheppard,    potassium chloride (KLOR-CON) 20 mEq packet Take 20 mEq by mouth as needed Take 1 tablet when taking Furosemide    Historical Provider, MD   meclizine (ANTIVERT) 25 mg tablet Take by mouth  8/26/21  Historical Provider, MD   metoprolol tartrate (LOPRESSOR) 25 mg tablet Take 25 mg by mouth 2 (two) times a day   8/17/22  Historical Provider, MD   polyethylene glycol (GLYCOLAX) 17 GM/SCOOP powder Take 17 g by mouth daily 10/9/21 8/17/22  Roque Proctor MD     No Known Allergies    Objective :  Temp:  [97.3 °F (36.3 °C)-98.9 °F (37.2 °C)] 98.9 °F (37.2 °C)  HR:  [86-91] 89  BP: (130-153)/(60-77) 153/77  Resp:  [16-18] 18  SpO2:  [91 %-96 %] 94 %  O2 Device: None (Room air)    Physical Exam  Vitals and nursing note reviewed.   Constitutional:       General: He is not in acute distress.     Appearance: Normal appearance. He is well-developed.      Comments: Frail and elderly      HENT:      Head: Normocephalic and atraumatic.      Right Ear: Tympanic membrane and external ear normal.      Left Ear: Tympanic membrane and external ear normal.      Nose: Nose normal.      Mouth/Throat:      Mouth: Mucous membranes are moist.      Pharynx: Oropharynx is clear. No oropharyngeal exudate.   Eyes:      General: No scleral icterus.        Right eye: No discharge.         Left eye: No discharge.      Extraocular Movements: Extraocular movements intact.      Pupils:  Pupils are equal, round, and reactive to light.   Neck:      Vascular: No JVD.   Cardiovascular:      Rate and Rhythm: Normal rate and regular rhythm.      Pulses: Normal pulses.      Heart sounds: Normal heart sounds. No murmur heard.  Pulmonary:      Effort: Pulmonary effort is normal. No respiratory distress.      Breath sounds: Normal breath sounds. No wheezing or rales.   Abdominal:      General: Bowel sounds are normal. There is no distension.      Palpations: Abdomen is soft. There is no mass.      Tenderness: There is no abdominal tenderness. There is no rebound.   Musculoskeletal:         General: No swelling, tenderness or deformity. Normal range of motion.      Cervical back: Normal range of motion and neck supple. No rigidity.   Lymphadenopathy:      Cervical: No cervical adenopathy.   Skin:     General: Skin is warm and dry.      Capillary Refill: Capillary refill takes less than 2 seconds.      Coloration: Skin is not pale.      Findings: No erythema or rash.   Neurological:      General: No focal deficit present.      Mental Status: He is alert and oriented to person, place, and time. Mental status is at baseline.   Psychiatric:         Mood and Affect: Mood normal.         Behavior: Behavior normal.         Thought Content: Thought content normal.         Judgment: Judgment normal.          Lines/Drains:            Lab Results: I have reviewed the following results:  Results from last 7 days   Lab Units 01/15/25  0422   WBC Thousand/uL 12.10*   HEMOGLOBIN g/dL 12.7   HEMATOCRIT % 37.8   PLATELETS Thousands/uL 192   SEGS PCT % 88*   LYMPHO PCT % 5*   MONO PCT % 5   EOS PCT % 1     Results from last 7 days   Lab Units 01/15/25  0422   SODIUM mmol/L 135   POTASSIUM mmol/L 4.0   CHLORIDE mmol/L 99   CO2 mmol/L 25   BUN mg/dL 31*   CREATININE mg/dL 1.00   ANION GAP mmol/L 11   CALCIUM mg/dL 8.9   ALBUMIN g/dL 3.8   TOTAL BILIRUBIN mg/dL 0.44   ALK PHOS U/L 52   ALT U/L 16   AST U/L 22   GLUCOSE RANDOM  mg/dL 129     Results from last 7 days   Lab Units 01/15/25  0422   INR  0.94         Lab Results   Component Value Date    HGBA1C 6.1 (H) 10/10/2024     Results from last 7 days   Lab Units 01/15/25  0422   PROCALCITONIN ng/ml <0.05       Imaging Results Review: I reviewed radiology reports from this admission including: CT abdomen/pelvis.  Other Study Results Review: No additional pertinent studies reviewed.    Administrative Statements   I have spent a total time of 53 minutes in caring for this patient on the day of the visit/encounter including Diagnostic results, Impressions, Documenting in the medical record, Reviewing / ordering tests, medicine, procedures  , and Obtaining or reviewing history  .    ** Please Note: This note has been constructed using a voice recognition system. **

## 2025-01-15 NOTE — CONSULTS
Consultation - Gastroenterology   Name: Prem Mar Sr. 89 y.o. male I MRN: [id number]  Unit/Bed#: -01 I Date of Admission: 1/15/2025   Date of Service: 1/15/2025 I Hospital Day: 0       Inpatient consult to gastroenterology     Date/Time  1/15/2025 8:09 AM     Performed by  Marcy Lane MD   Authorized by  Lukas Zamorano PA-C           Physician Requesting Evaluation: Bridgette Cadena MD   Reason for Evaluation / Principal Problem: Hematemesis    Assessment & Plan  Upper GI bleed  In summary, this is 89-year-old male with a history of a flutter on aspirin monotherapy, hypertension, hyperlipidemia, CKD, gout as well as a reported remote history of peptic ulcer disease who presented with a history of 1 day of lower abdominal pain with reports of 2 episodes of coffee-ground emesis at the nursing home.  No recent NSAID use.  No EGD on file to review.  No other evidence of GI bleed.  On presentation he was hemodynamically stable.  His hemoglobin is 12.7 which is about his baseline.  His BUN is elevated at 31 (been in the 30s for the last few months).      He underwent a CT abdomen and pelvis that showed evidence of possible distal esophagitis and gastritis. His stomach is notably dilated with air and fluid but no obvious mass or transition point.    Differential diagnosis for his symptoms include gastric outlet obstruction, reflux/erosive esophagitis, Pill esophagitis, gastritis,    Plan  Given his advanced age, hemodynamic stability, stable hemoglobin and no further episodes of hematemesis; would favor a conservative approach for this gentleman.  No plan for endoscopic intervention at this time unless there is an acute change in his clinical status   Recommend to continue with high-dose PPI therapy.  Protonix 40 mg twice daily daily  Initially recommended NG to low intermittent suction to decompress stomach. (Of note he had bleeding in his L nares with attempt by the nursing staff). Okay to hold  off NG tube placement for now unless he develops persistent vomiting  Keep NPO for now  Can resume aspirin  Avoid NSAIDs   Trend CBC daily  Maintain type and screen  Antiemetic for nausea    Constipation  Stool burden on CT scan  Place on bowel regimen. Can give tap water enema  and dulcolax suppository today   Check KUB in AM      History of Present Illness   HPI:  Prem Mar Sr. is a 89 y.o. male with history of peptic ulcer disease, Aflutter, HTN, gout, HLD, CKD3 who presented from a nursing home after 2 reported episodes of coffee ground emesis. He had lower abdominal pain x1 day. No fever, chills, melena, hematochezia. His is on Aspirin 81mg daily. No anticoagulant use. He has a reported history of peptic ulcer disease dating back to 2013 on chart review. No EGD to review.     Denies NSAID use,alcohol or tobacco use, dysphagia, weight loss, odynophagia    Review of Systems  See HPI    Objective :  Temp:  [97.3 °F (36.3 °C)-98.9 °F (37.2 °C)] 98.9 °F (37.2 °C)  HR:  [86-91] 89  BP: (130-153)/(60-77) 153/77  Resp:  [16-18] 18  SpO2:  [91 %-96 %] 94 %  O2 Device: None (Room air)    Physical Exam  Vitals and nursing note reviewed.   Constitutional:       General: He is not in acute distress.     Appearance: He is well-developed.   HENT:      Head: Normocephalic and atraumatic.   Eyes:      Conjunctiva/sclera: Conjunctivae normal.   Cardiovascular:      Rate and Rhythm: Normal rate and regular rhythm.      Heart sounds: No murmur heard.  Pulmonary:      Effort: Pulmonary effort is normal. No respiratory distress.      Breath sounds: Normal breath sounds.   Abdominal:      General: There is distension.      Palpations: Abdomen is soft.      Tenderness: There is no abdominal tenderness.      Comments: Tympanic to percussion   Musculoskeletal:         General: No swelling.      Cervical back: Neck supple.   Skin:     General: Skin is warm and dry.      Capillary Refill: Capillary refill takes less than 2  seconds.   Neurological:      Mental Status: He is alert.   Psychiatric:         Mood and Affect: Mood normal.           Lab Results: I have reviewed the following results:CBC/BMP:   .     01/15/25  0422   WBC 12.10*   HGB 12.7   HCT 37.8      SODIUM 135   K 4.0   CL 99   CO2 25   BUN 31*   CREATININE 1.00   GLUC 129   MG 1.9    , LFTs:   .     01/15/25  0422   AST 22   ALT 16   ALB 3.8   TBILI 0.44   ALKPHOS 52        Imaging Results Review: I reviewed radiology reports from this admission including: CT abdomen/pelvis.  Other Study Results Review: No additional pertinent studies reviewed.    Marcy Lane MD  Gastroenterology Fellow, PGY- 4  Available on EPIC Secure Chat  1/15/2025 8:15 AM

## 2025-01-16 ENCOUNTER — APPOINTMENT (OUTPATIENT)
Dept: RADIOLOGY | Facility: HOSPITAL | Age: OVER 89
DRG: 377 | End: 2025-01-16
Payer: COMMERCIAL

## 2025-01-16 PROBLEM — Z71.89 GOALS OF CARE, COUNSELING/DISCUSSION: Status: ACTIVE | Noted: 2025-01-16

## 2025-01-16 LAB
ANION GAP SERPL CALCULATED.3IONS-SCNC: 8 MMOL/L (ref 4–13)
BUN SERPL-MCNC: 21 MG/DL (ref 5–25)
CALCIUM SERPL-MCNC: 8.7 MG/DL (ref 8.4–10.2)
CHLORIDE SERPL-SCNC: 100 MMOL/L (ref 96–108)
CO2 SERPL-SCNC: 25 MMOL/L (ref 21–32)
CREAT SERPL-MCNC: 1.01 MG/DL (ref 0.6–1.3)
ERYTHROCYTE [DISTWIDTH] IN BLOOD BY AUTOMATED COUNT: 15 % (ref 11.6–15.1)
GFR SERPL CREATININE-BSD FRML MDRD: 60 ML/MIN/1.73SQ M
GLUCOSE SERPL-MCNC: 128 MG/DL (ref 65–140)
HCT VFR BLD AUTO: 36.9 % (ref 36.5–49.3)
HGB BLD-MCNC: 11.7 G/DL (ref 12–17)
HGB BLD-MCNC: 12 G/DL (ref 12–17)
HGB BLD-MCNC: 12.1 G/DL (ref 12–17)
HGB BLD-MCNC: 12.5 G/DL (ref 12–17)
MCH RBC QN AUTO: 32 PG (ref 26.8–34.3)
MCHC RBC AUTO-ENTMCNC: 32.8 G/DL (ref 31.4–37.4)
MCV RBC AUTO: 98 FL (ref 82–98)
PLATELET # BLD AUTO: 171 THOUSANDS/UL (ref 149–390)
PMV BLD AUTO: 9.9 FL (ref 8.9–12.7)
POTASSIUM SERPL-SCNC: 4 MMOL/L (ref 3.5–5.3)
RBC # BLD AUTO: 3.78 MILLION/UL (ref 3.88–5.62)
SODIUM SERPL-SCNC: 133 MMOL/L (ref 135–147)
WBC # BLD AUTO: 8.18 THOUSAND/UL (ref 4.31–10.16)

## 2025-01-16 PROCEDURE — 74018 RADEX ABDOMEN 1 VIEW: CPT

## 2025-01-16 PROCEDURE — 85027 COMPLETE CBC AUTOMATED: CPT | Performed by: NURSE PRACTITIONER

## 2025-01-16 PROCEDURE — 85018 HEMOGLOBIN: CPT | Performed by: NURSE PRACTITIONER

## 2025-01-16 PROCEDURE — 80048 BASIC METABOLIC PNL TOTAL CA: CPT | Performed by: NURSE PRACTITIONER

## 2025-01-16 PROCEDURE — 99232 SBSQ HOSP IP/OBS MODERATE 35: CPT | Performed by: HOSPITALIST

## 2025-01-16 PROCEDURE — 99232 SBSQ HOSP IP/OBS MODERATE 35: CPT | Performed by: STUDENT IN AN ORGANIZED HEALTH CARE EDUCATION/TRAINING PROGRAM

## 2025-01-16 RX ORDER — BISACODYL 10 MG
10 SUPPOSITORY, RECTAL RECTAL DAILY PRN
Status: DISCONTINUED | OUTPATIENT
Start: 2025-01-16 | End: 2025-02-05 | Stop reason: HOSPADM

## 2025-01-16 RX ORDER — ACETAMINOPHEN 650 MG/1
650 SUPPOSITORY RECTAL EVERY 4 HOURS PRN
Status: DISCONTINUED | OUTPATIENT
Start: 2025-01-16 | End: 2025-01-17

## 2025-01-16 RX ADMIN — NYSTATIN: 100000 CREAM TOPICAL at 09:06

## 2025-01-16 RX ADMIN — Medication 2000 UNITS: at 09:06

## 2025-01-16 RX ADMIN — SODIUM CHLORIDE, SODIUM LACTATE, POTASSIUM CHLORIDE, AND CALCIUM CHLORIDE 75 ML/HR: .6; .31; .03; .02 INJECTION, SOLUTION INTRAVENOUS at 03:02

## 2025-01-16 RX ADMIN — B-COMPLEX W/ C & FOLIC ACID TAB 1 TABLET: TAB at 09:05

## 2025-01-16 RX ADMIN — PANTOPRAZOLE SODIUM 40 MG: 40 INJECTION, POWDER, FOR SOLUTION INTRAVENOUS at 06:02

## 2025-01-16 RX ADMIN — METOPROLOL SUCCINATE 25 MG: 25 TABLET, EXTENDED RELEASE ORAL at 09:05

## 2025-01-16 RX ADMIN — POTASSIUM CHLORIDE 20 MEQ: 1500 TABLET, EXTENDED RELEASE ORAL at 09:05

## 2025-01-16 RX ADMIN — ALLOPURINOL 300 MG: 300 TABLET ORAL at 09:06

## 2025-01-16 RX ADMIN — ASPIRIN 81 MG: 81 TABLET, CHEWABLE ORAL at 09:05

## 2025-01-16 RX ADMIN — GABAPENTIN 100 MG: 100 CAPSULE ORAL at 06:02

## 2025-01-16 RX ADMIN — BISACODYL 10 MG: 10 SUPPOSITORY RECTAL at 09:06

## 2025-01-16 RX ADMIN — BISACODYL 10 MG: 10 SUPPOSITORY RECTAL at 18:11

## 2025-01-16 RX ADMIN — PANTOPRAZOLE SODIUM 40 MG: 40 INJECTION, POWDER, FOR SOLUTION INTRAVENOUS at 18:12

## 2025-01-16 RX ADMIN — FENOFIBRATE 48 MG: 48 TABLET, FILM COATED ORAL at 09:05

## 2025-01-16 RX ADMIN — SODIUM CHLORIDE, SODIUM LACTATE, POTASSIUM CHLORIDE, AND CALCIUM CHLORIDE 75 ML/HR: .6; .31; .03; .02 INJECTION, SOLUTION INTRAVENOUS at 18:21

## 2025-01-16 RX ADMIN — DOCUSATE SODIUM 100 MG: 100 CAPSULE, LIQUID FILLED ORAL at 09:06

## 2025-01-16 RX ADMIN — ACETAMINOPHEN 650 MG: 650 SUPPOSITORY RECTAL at 18:12

## 2025-01-16 NOTE — ASSESSMENT & PLAN NOTE
I had a long goals of care, counseling/discussion session with both the patient and his son in the presence of the patient's RN.  We reviewed quality of life and quantity of life  Patient is 89 years old, has significant comorbid medical conditions, and would be at high risk for any treatment options requiring anesthesia.  EGD testing might have risks that outweigh the benefits.  The patient verbalized understanding of the situation.  The son reported that he will talk to his sister and there may be consideration for a palliative care eval and/or hospice eval.  Case also reviewed with GI  Will see how the next 24 to 48 hours progress

## 2025-01-16 NOTE — OCCUPATIONAL THERAPY NOTE
Occupational Therapy Cancellation Note     01/16/25 1145   OT Last Visit   OT Visit Date 01/16/25   Note Type   Note type Cancelled Session   Cancel Reasons Medical status     OT orders received. Chart reviewed. Will continue to follow-up as able and appropriate  Sanjuana Roach OTR/L

## 2025-01-16 NOTE — PHYSICAL THERAPY NOTE
01/16/25 1233   PT Last Visit   PT Visit Date 01/16/25   Note Type   Note type Cancelled Session   Cancel Reasons Medical status   Additional Comments PT eval order received.  Pt being held due to medical instability.  Will continue to follow and complete PT eval when appropriate.

## 2025-01-16 NOTE — PROGRESS NOTES
Progress Note - Gastroenterology   Name: Prem Mar Sr. 89 y.o. male I MRN: [id number]  Unit/Bed#: -01 I Date of Admission: 1/15/2025   Date of Service: 1/16/2025 I Hospital Day: 0    Assessment & Plan  Upper GI bleed  In summary, this is 89-year-old male with a history of a flutter on aspirin monotherapy, hypertension, hyperlipidemia, CKD, gout as well as a reported remote history of peptic ulcer disease who presented with a history of 1 day of lower abdominal pain with reports of 2 episodes of coffee-ground emesis at the nursing home.  No recent NSAID use.  No EGD on file to review.  No other evidence of GI bleed.  On presentation he was hemodynamically stable.  His hemoglobin is 12.7 which is about his baseline.  His BUN is elevated at 31 (been in the 30s for the last few months).      He underwent a CT abdomen and pelvis that showed evidence of possible distal esophagitis and gastritis. His stomach is notably dilated with air and fluid but no obvious mass or transition point. Differential diagnosis for his symptoms include gastric outlet obstruction, reflux/erosive esophagitis, Pill esophagitis, gastritis, post infectious gastroparesis      Hgb stable at 12.1. Repeat Abd Xray this morning showed worsening gastric distention    Plan  Given his advanced age, hemodynamic stability, stable hemoglobin and no further episodes of hematemesis; would favor a conservative approach for this gentleman.  .  NG tube place with return of 175cc gastric contents. Will check Abd Xray for confirmation  Continue NG tube to low intermittent suction. Will discuss with patient and son possible EGD tomorrow too rule out GOO if no improvement  Continue with high-dose PPI therapy.  Protonix 40 mg twice daily daily  Keep NPO for now  Can resume aspirin  Avoid NSAIDs  Trend CBC daily  Maintain type and screen  Antiemetic for nausea    Constipation  Stool burden on CT scan   Give another tap water enema  today          Subjective   Patient seen and examined this morning. Denies any acute complaints including nausea or vomiting. No BM since yesterday    Objective :  Temp:  [99 °F (37.2 °C)-101.3 °F (38.5 °C)] 99.2 °F (37.3 °C)  HR:  [82-97] 85  BP: (124-169)/(58-73) 162/72  Resp:  [17-20] 20  SpO2:  [93 %-96 %] 94 %  O2 Device: None (Room air)    Physical Exam  Vitals and nursing note reviewed.   Constitutional:       General: He is not in acute distress.     Appearance: He is well-developed.   HENT:      Head: Normocephalic and atraumatic.   Eyes:      Conjunctiva/sclera: Conjunctivae normal.   Cardiovascular:      Rate and Rhythm: Normal rate and regular rhythm.      Heart sounds: No murmur heard.  Pulmonary:      Effort: Pulmonary effort is normal. No respiratory distress.      Breath sounds: Normal breath sounds.   Abdominal:      General: There is distension.      Palpations: Abdomen is soft.      Tenderness: There is no abdominal tenderness.      Comments: Abdomen severe distended and tympanic   Musculoskeletal:         General: No swelling.      Cervical back: Neck supple.   Skin:     General: Skin is warm and dry.      Capillary Refill: Capillary refill takes less than 2 seconds.   Neurological:      Mental Status: He is alert.   Psychiatric:         Mood and Affect: Mood normal.         Lab Results: I have reviewed the following results:CBC/BMP:   .     01/16/25  0511   WBC 8.18   HGB 12.1   HCT 36.9      SODIUM 133*   K 4.0      CO2 25   BUN 21   CREATININE 1.01   GLUC 128        Imaging Results Review: I reviewed radiology reports from this admission including: xray(s).  Other Study Results Review: No additional pertinent studies reviewed.    Marcy Lane MD  Gastroenterology Fellow, PGY- 4  Available on EPIC Secure Chat  1/16/2025 11:39 AM

## 2025-01-16 NOTE — PLAN OF CARE
Problem: PAIN - ADULT  Goal: Verbalizes/displays adequate comfort level or baseline comfort level  Description: Interventions:  - Encourage patient to monitor pain and request assistance  - Assess pain using appropriate pain scale  - Administer analgesics based on type and severity of pain and evaluate response  - Implement non-pharmacological measures as appropriate and evaluate response  - Consider cultural and social influences on pain and pain management  - Notify physician/advanced practitioner if interventions unsuccessful or patient reports new pain  Outcome: Progressing     Problem: INFECTION - ADULT  Goal: Absence or prevention of progression during hospitalization  Description: INTERVENTIONS:  - Assess and monitor for signs and symptoms of infection  - Monitor lab/diagnostic results  - Monitor all insertion sites, i.e. indwelling lines, tubes, and drains  - Monitor endotracheal if appropriate and nasal secretions for changes in amount and color  - [place] appropriate cooling/warming therapies per order  - Administer medications as ordered  - Instruct and encourage patient and family to use good hand hygiene technique  - Identify and instruct in appropriate isolation precautions for identified infection/condition  Outcome: Progressing  Goal: Absence of fever/infection during neutropenic period  Description: INTERVENTIONS:  - Monitor WBC    Outcome: Progressing     Problem: SAFETY ADULT  Goal: Patient will remain free of falls  Description: INTERVENTIONS:  - Educate patient/family on patient safety including physical limitations  - Instruct patient to call for assistance with activity   - Consult OT/PT to assist with strengthening/mobility   - Keep Call bell within reach  - Keep bed low and locked with side rails adjusted as appropriate  - Keep care items and personal belongings within reach  - Initiate and maintain comfort rounds  - Make Fall Risk Sign visible to staff  - Offer Toileting every 2 Hours,  in advance of need  - Initiate/Maintain bed alarm  - Obtain necessary fall risk management equipment: yellow socks  - Apply yellow socks and bracelet for high fall risk patients  - Consider moving patient to room near nurses station  Outcome: Progressing  Goal: Maintain or return to baseline ADL function  Description: INTERVENTIONS:  -  Assess patient's ability to carry out ADLs; assess patient's baseline for ADL function and identify physical deficits which impact ability to perform ADLs (bathing, care of mouth/teeth, toileting, grooming, dressing, etc.)  - Assess/evaluate cause of self-care deficits   - Assess range of motion  - Assess patient's mobility; develop plan if impaired  - Assess patient's need for assistive devices and provide as appropriate  - Encourage maximum independence but intervene and supervise when necessary  - Involve family in performance of ADLs  - Assess for home care needs following discharge   - Consider OT consult to assist with ADL evaluation and planning for discharge  - Provide patient education as appropriate  Outcome: Progressing  Goal: Maintains/Returns to pre admission functional level  Description: INTERVENTIONS:  - Perform AM-PAC 6 Click Basic Mobility/ Daily Activity assessment daily.  - Set and communicate daily mobility goal to care team and patient/family/caregiver.   - Collaborate with rehabilitation services on mobility goals if consulted  - Perform Range of Motion 3 times a day.  - Reposition patient every 2 hours.  - Dangle patient 3 times a day  - Stand patient 3 times a day  - Ambulate patient 3 times a day  - Out of bed to chair 3 times a day   - Out of bed for meals 3 times a day  - Out of bed for toileting  - Record patient progress and toleration of activity level   Outcome: Progressing     Problem: DISCHARGE PLANNING  Goal: Discharge to home or other facility with appropriate resources  Description: INTERVENTIONS:  - Identify barriers to discharge w/patient and  caregiver  - Arrange for needed discharge resources and transportation as appropriate  - Identify discharge learning needs (meds, wound care, etc.)  - Arrange for interpretive services to assist at discharge as needed  - Refer to Case Management Department for coordinating discharge planning if the patient needs post-hospital services based on physician/advanced practitioner order or complex needs related to functional status, cognitive ability, or social support system  Outcome: Progressing     Problem: Knowledge Deficit  Goal: Patient/family/caregiver demonstrates understanding of disease process, treatment plan, medications, and discharge instructions  Description: Complete learning assessment and assess knowledge base.  Interventions:  - Provide teaching at level of understanding  - Provide teaching via preferred learning methods  Outcome: Progressing     Problem: Prexisting or High Potential for Compromised Skin Integrity  Goal: Skin integrity is maintained or improved  Description: INTERVENTIONS:  - Identify patients at risk for skin breakdown  - Assess and monitor skin integrity  - Assess and monitor nutrition and hydration status  - Monitor labs   - Assess for incontinence   - Turn and reposition patient  - Assist with mobility/ambulation  - Relieve pressure over bony prominences  - Avoid friction and shearing  - Provide appropriate hygiene as needed including keeping skin clean and dry  - Evaluate need for skin moisturizer/barrier cream  - Collaborate with interdisciplinary team   - Patient/family teaching  - Consider wound care consult   Outcome: Progressing

## 2025-01-16 NOTE — ASSESSMENT & PLAN NOTE
Patient is rate controlled at this time  Continue prehospital Toprol-XL 25 mg p.o. daily  AC- not currently on as an outpatient

## 2025-01-16 NOTE — ASSESSMENT & PLAN NOTE
No further episodes of coffee-ground emesis thus far  Appreciate GI input  Workup thus far:-  #1.  CT abdomen pelvis-Focal wall thickening of the lower esophagus, gastroesophageal junction, and proximal stomach noted which may reflect esophagitis/gastritis. No evidence for bowel obstruction, mesenteric inflammation, appendicitis, obstructive uropathy, free air, or free fluid. Descending and sigmoid colon diverticulosis without evidence for acute diverticulitis.   #2.  X-ray chest-There is some gaseous distention of the stomach.   #3.  NG tube was placed  #4.  X-ray abdomen KUB-revealed worsening gastric distention  Status post a GI evaluation -there is consideration for the possibility of an EGD, however the patient is extremely high risk given his age and other comorbid medical conditions-see the goals of care counseling discussion below  Discontinue aspirin  Continue Protonix 40 mg IV twice daily  Continue n.p.o. status, continue IV fluids  NG tube placed again today  Follow-up KUB is pending  Continue with conservative management for now

## 2025-01-16 NOTE — ASSESSMENT & PLAN NOTE
Reactive and resolved  Patient is afebrile with no signs or symptoms of active infection  UA- negative   PCT- negative   No antibiotics at this time

## 2025-01-16 NOTE — ASSESSMENT & PLAN NOTE
Lab Results   Component Value Date    EGFR 60 01/16/2025    EGFR 61 01/15/2025    EGFR 56 10/23/2024    CREATININE 1.01 01/16/2025    CREATININE 1.00 01/15/2025    CREATININE 1.07 10/23/2024     Creatinine appears to be at baseline 0.9-1.1 mg/dL   Continue to monitor renal function closely  Avoid nephrotoxins and hypotension

## 2025-01-16 NOTE — ASSESSMENT & PLAN NOTE
In summary, this is 89-year-old male with a history of a flutter on aspirin monotherapy, hypertension, hyperlipidemia, CKD, gout as well as a reported remote history of peptic ulcer disease who presented with a history of 1 day of lower abdominal pain with reports of 2 episodes of coffee-ground emesis at the nursing home.  No recent NSAID use.  No EGD on file to review.  No other evidence of GI bleed.  On presentation he was hemodynamically stable.  His hemoglobin is 12.7 which is about his baseline.  His BUN is elevated at 31 (been in the 30s for the last few months).      He underwent a CT abdomen and pelvis that showed evidence of possible distal esophagitis and gastritis. His stomach is notably dilated with air and fluid but no obvious mass or transition point. Differential diagnosis for his symptoms include gastric outlet obstruction, reflux/erosive esophagitis, Pill esophagitis, gastritis, post infectious gastroparesis      Hgb stable at 12.1. Repeat Abd Xray this morning showed worsening gastric distention    Plan  Given his advanced age, hemodynamic stability, stable hemoglobin and no further episodes of hematemesis; would favor a conservative approach for this gentleman.  .  NG tube place with return of 175cc gastric contents. Will check Abd Xray for confirmation  Continue NG tube to low intermittent suction. Will discuss with patient and son possible EGD tomorrow too rule out GOO if no improvement  Continue with high-dose PPI therapy.  Protonix 40 mg twice daily daily  Keep NPO for now  Can resume aspirin  Avoid NSAIDs  Trend CBC daily  Maintain type and screen  Antiemetic for nausea

## 2025-01-16 NOTE — UTILIZATION REVIEW
Initial Clinical Review  OBS 01-15-25 @ 0615 CONVERTED TO INPATIENT 01-16-25 FOR CONTINUATION OF CARE FOR UPPER GI BLEED   INPATIENT ADMISSION  Once        Transfer Service: Hospitalist   Question Answer Comment   Level of Care Med Surg    Estimated length of stay More than 2 Midnights    Certification I certify that inpatient services are medically necessary for this patient for a duration of greater than two midnights. See H&P and MD Progress Notes for additional information about the patient's course of treatment.        01/16/25 1200      Admission: Date/Time/Statement:   Admission Orders (From admission, onward)       Ordered        01/16/25 1200  INPATIENT ADMISSION  Once            01/15/25 0615  Place in Observation  Once                          Orders Placed This Encounter   Procedures    Place in Observation     Standing Status:   Standing     Number of Occurrences:   1     Level of Care:   Med Surg [16]    INPATIENT ADMISSION     Standing Status:   Standing     Number of Occurrences:   1     Level of Care:   Med Surg [16]     Estimated length of stay:   More than 2 Midnights     Certification:   I certify that inpatient services are medically necessary for this patient for a duration of greater than two midnights. See H&P and MD Progress Notes for additional information about the patient's course of treatment.     ED Arrival Information       Expected   1/15/2025 04:08    Arrival   1/15/2025 04:05    Acuity   Urgent              Means of arrival   Ambulance    Escorted by   [facility]    Service   Hospitalist    Admission type   Emergency              Arrival complaint   vomiting (coffee ground emesis)             Chief Complaint   Patient presents with    Vomiting     Patient vomited twice at the nursing home, reportedly coffee ground emesis.       Initial Presentation: 89 y.o. male presented to ED via EMS as observation for upper GI bleed.  PMH of atrial flutter, peptic ulcer disease,  HTN,mixed hyperlipidemia, who presents with abdominal pain, nausea and coffee-ground emesis x 2.  Patient with a history of atrial flutter on chronic aspirin 81 mg p.o. daily and additionally has a history of peptic ulcer disease sent to ER for further evaluation and treatment of his 1 day history of lower abdominal pain followed by 2 episodes of dark emesis which was described by nursing home staff as having coffee ground appearance.  Routine labs obtained in ER showed hemoglobin was at baseline and CT abdomen pelvis showed focal wall thickening of the lower esophagus and gastroesophageal junction and proximal stomach. Exam frail and elderly. Plan IVF, IV PPI, monitor H/H and supportive care     Anticipated Length of Stay/Certification Statement:  Patient will be admitted on an observation basis with an anticipated length of stay of less than 2 midnights secondary to SPECT and upper GI bleed requiring continued monitoring and further GI evaluation.     Date:01-16-25    Day 2: abdominal XR today showed Worsening gastric distention.  acute distress, ill appearing NG tube inserted this AM , and appears very uncomfortable.  He continues to complain of a significant pressure in his abdomen.NPO, tap water enema.alcon mucous soft stool noted, T max 101.5 continue IVF IV PPI    conservative management for now     Date: 01-17-25  Day 3: Has surpassed a 2nd midnight with active treatments and services. Continue NPO holding ASA IV Protonix,. IVF   He says his stomach still feels swollen.  He is not passing gas  NGT in place for small amount of tan/brown semisolid material in canister (+) COVID starting IV   Remdesivir   CXR 01-17-25  Scattered streaky opacities favored to represent atelectasis but pneumonia not excluded.   XR abdomen 01-17-25  New marked gaseous distention of the stomach.     Certification Statement: The patient will continue to require additional inpatient hospital stay due to suspected GI bleeding, COVID  infection, case management, goals of care.           Certification Statement: The patient will continue to require additional inpatient hospital stay due to continued management of an upper GI bleed/gastric distention   ED Treatment-Medication Administration from 01/15/2025 0329 to 01/15/2025 0751         Date/Time Order Dose Route Action     01/15/2025 0428 ondansetron (ZOFRAN) injection 4 mg 4 mg Intravenous Given     01/15/2025 0426 pantoprazole (PROTONIX) injection 40 mg 40 mg Intravenous Given     01/15/2025 0506 iohexol (OMNIPAQUE) 350 MG/ML injection (MULTI-DOSE) 100 mL 100 mL Intravenous Given     01/15/2025 0706 gabapentin (NEURONTIN) capsule 100 mg 100 mg Oral Given            Scheduled Medications:  allopurinol, 300 mg, Oral, Daily  bisacodyl, 10 mg, Rectal, Daily  Cholecalciferol, 2,000 Units, Oral, Daily  docusate, 100 mg, Oral, BID  fenofibrate, 48 mg, Oral, Daily  [Held by provider] furosemide, 20 mg, Oral, Once per day on Monday Wednesday Friday  gabapentin, 100 mg, Oral, Q12H  metoprolol, 2.5 mg, Intravenous, Q6H  multivitamin stress formula, 1 tablet, Oral, Daily  nystatin, , Topical, BID  pantoprazole, 40 mg, Intravenous, Q12H  [START ON 1/18/2025] remdesivir, 100 mg, Intravenous, Q24H  senna, 2 tablet, Oral, HS      Continuous IV Infusions:  lactated ringers, 75 mL/hr, Intravenous, Continuous      PRN Meds:  acetaminophen, 650 mg, Rectal, Q4H PRN  acetaminophen, 650 mg, Oral, Q4H PRN  bisacodyl, 10 mg, Rectal, Daily PRN  nitroglycerin, 0.4 mg, Sublingual, Q5 Min PRN  ondansetron, 4 mg, Intravenous, Q6H PRN      ED Triage Vitals [01/15/25 0408]   Temperature Pulse Respirations Blood Pressure SpO2 Pain Score   (!) 97.3 °F (36.3 °C) 91 16 144/67 96 % No Pain     Weight (last 2 days)       Date/Time Weight    01/15/25 07:55:49 73.7 (162.48)    01/15/25 0700 73.7 (162.48)    01/15/25 0408 70.8 (156)            Vital Signs (last 3 days)       Date/Time Temp Pulse Resp BP MAP (mmHg) SpO2 O2 Device  Patient Position - Orthostatic VS Bria Coma Scale Score Pain    01/17/25 13:58:48 100 °F (37.8 °C) 84 19 150/58 89 97 % None (Room air) Lying -- --    01/17/25 12:24:28 101.8 °F (38.8 °C) 92 -- -- -- 95 % -- -- -- --    01/17/25 0916 -- -- -- -- -- -- -- -- -- No Pain    01/17/25 07:11:41 100.1 °F (37.8 °C) 85 22 148/65 93 96 % -- -- -- --    01/17/25 0300 99.5 °F (37.5 °C) 80 -- -- -- -- -- -- -- --    01/16/25 22:30:43 100.5 °F (38.1 °C) 85 16 100/41 61 96 % -- -- -- --    01/16/25 2030 -- -- -- -- -- 93 % -- -- 15 No Pain    01/16/25 19:26:39 101 °F (38.3 °C) 99 -- -- -- 92 % -- -- -- --    01/16/25 1812 -- -- -- -- -- -- -- -- -- Med Not Given for Pain - for MAR use only    01/16/25 15:42:23 101.8 °F (38.8 °C) 116 -- 167/94 118 95 % -- -- -- --    01/16/25 14:12:03 101.5 °F (38.6 °C) 111 20 188/90 123 95 % None (Room air) Lying -- --    01/16/25 14:11:03 101.5 °F (38.6 °C) 111 20 195/100 132 94 % None (Room air) Lying -- --    01/16/25 0939 -- -- -- -- -- -- -- -- 15 No Pain    01/16/25 07:06:11 99.2 °F (37.3 °C) 85 20 162/72 102 94 % -- -- -- --    01/16/25 01:16:07 100.1 °F (37.8 °C) 82 -- -- -- 94 % -- -- -- --    01/15/25 2316 -- -- -- -- -- -- -- -- -- Med Not Given for Pain - for MAR use only    01/15/25 22:51:59 101.3 °F (38.5 °C) 97 18 124/58 80 93 % -- -- -- --    01/15/25 2010 -- -- -- -- -- 93 % None (Room air) -- 15 No Pain    01/15/25 14:49:22 99 °F (37.2 °C) 82 17 169/73 105 96 % -- -- -- --    01/15/25 1122 -- -- -- -- -- -- -- -- -- 4    01/15/25 0900 -- -- -- -- -- -- None (Room air) -- 15 No Pain    01/15/25 07:55:49 98.9 °F (37.2 °C) 89 18 153/77 102 94 % -- Lying -- No Pain    01/15/25 0600 -- 86 17 131/62 89 95 % None (Room air) Lying -- --    01/15/25 0515 -- 86 18 130/60 87 91 % None (Room air) Lying -- --    01/15/25 0447 -- -- -- -- -- -- None (Room air) -- -- --    01/15/25 0446 -- -- -- -- -- -- -- -- 15 --    01/15/25 0408 97.3 °F (36.3 °C) 91 16 144/67 -- 96 % None (Room air) --  -- No Pain              Pertinent Labs/Diagnostic Test Results:   Radiology:  XR abdomen 1 view kub   Final Interpretation by Diony Boateng MD (01/16 0836)      Worsening gastric distention.      The study was marked in EPIC for immediate notification.               Workstation performed: [id number]         XR chest portable   Final Interpretation by Sina Vitale MD (01/15 1429)      No endogastric tube visible within the field-of-view.      There is some gaseous distention of the stomach.      Minimal atelectasis left lung base.      The study was marked in EPIC for immediate notification.            Workstation performed: [id number]         CT abdomen pelvis with contrast   Final Interpretation by Geovanny Taylor MD (01/15 0545)      Trace left pleural effusion with bibasilar atelectasis.  Focal wall thickening of the lower esophagus, gastroesophageal junction, and proximal stomach noted which may reflect esophagitis/gastritis. No evidence for bowel obstruction, mesenteric    inflammation, appendicitis, obstructive uropathy, free air, or free fluid. Descending and sigmoid colon diverticulosis without evidence for acute diverticulitis.         Workstation performed: [id number]                 Results from last 7 days   Lab Units 01/17/25  1337 01/17/25  0832 01/17/25  0518 01/17/25  0011 01/16/25  1635 01/16/25  1138 01/16/25  0511 01/15/25  1349 01/15/25  0422   WBC Thousand/uL  --   --  7.33  --   --   --  8.18  --  12.10*   HEMOGLOBIN g/dL 11.5* 11.5* 11.2* 11.0* 12.0   < > 12.1   < > 12.7   HEMATOCRIT %  --   --  34.1*  --   --   --  36.9  --  37.8   PLATELETS Thousands/uL  --   --  164  --   --   --  171  --  192   TOTAL NEUT ABS Thousands/µL  --   --  5.72  --   --   --   --   --  10.79*    < > = values in this interval not displayed.         Results from last 7 days   Lab Units 01/17/25  1337 01/17/25  0518 01/16/25  0511 01/15/25  0422   SODIUM mmol/L 135 134* 133* 135   POTASSIUM mmol/L 3.9  3.5 4.0 4.0   CHLORIDE mmol/L 101 101 100 99   CO2 mmol/L 25 26 25 25   ANION GAP mmol/L 9 7 8 11   BUN mg/dL 21 22 21 31*   CREATININE mg/dL 1.11 1.07 1.01 1.00   EGFR ml/min/1.73sq m 54 56 60 61   CALCIUM mg/dL 8.3* 8.1* 8.7 8.9   CALCIUM, IONIZED mmol/L 1.10*  --   --   --    MAGNESIUM mg/dL 1.9  --   --  1.9     Results from last 7 days   Lab Units 01/17/25  1337 01/15/25  0422   AST U/L 45* 22   ALT U/L 18 16   ALK PHOS U/L 44 52   TOTAL PROTEIN g/dL 6.2* 6.6   ALBUMIN g/dL 3.5 3.8   TOTAL BILIRUBIN mg/dL 0.60 0.44         Results from last 7 days   Lab Units 01/17/25  1337 01/17/25  0518 01/16/25  0511 01/15/25  0422   GLUCOSE RANDOM mg/dL 105 103 128 129     Results from last 7 days   Lab Units 01/15/25  0422   PROTIME seconds 13.1   INR  0.94   PTT seconds 31     Results from last 7 days   Lab Units 01/15/25  0422   PROCALCITONIN ng/ml <0.05     Results from last 7 days   Lab Units 01/15/25  0422   LIPASE u/L 23     Results from last 7 days   Lab Units 01/15/25  0720   CLARITY UA  Clear   COLOR UA  Yellow   SPEC GRAV UA  1.010   PH UA  7.5   GLUCOSE UA mg/dl Negative   KETONES UA mg/dl Negative   BLOOD UA  1+*   PROTEIN UA mg/dl Negative   NITRITE UA  Negative   BILIRUBIN UA  Negative   UROBILINOGEN UA E.U./dl 0.2   LEUKOCYTES UA  Negative   WBC UA /hpf 1-2   RBC UA /hpf 10-20*   BACTERIA UA /hpf Occasional   EPITHELIAL CELLS WET PREP /hpf Occasional       Past Medical History:   Diagnosis Date    Arthritis     Cataract     Coronary artery disease     Coronary atherosclerosis of native coronary artery     Diverticulitis of colon     Essential hypertension, benign     Hyperlipidemia     Hypertension     Peptic ulcer     Renal disorder      Present on Admission:   Essential hypertension   Mixed hyperlipidemia   Chronic kidney disease, stage 3a (HCC)   Atrial flutter (HCC)   Idiopathic chronic gout of multiple sites without tophus   Upper GI bleed   (Resolved) Leukocytosis      Admitting Diagnosis: Hematemesis  [K92.0]  Vomiting [R11.10]  Age/Sex: 89 y.o. male    Network Utilization Review Department  ATTENTION: Please call with any questions or concerns to [phone] and carefully listen to the prompts so that you are directed to the right person. All voicemails are confidential.   For Discharge needs, contact Care Management DC Support Team at [phone] opt. [phone]  Send all requests for admission clinical reviews, approved or denied determinations and any other requests to dedicated fax number below belonging to the campus where the patient is receiving treatment. List of dedicated fax numbers for the Facilities:  FACILITY NAME UR FAX NUMBER   ADMISSION DENIALS (Administrative/Medical Necessity) [phone]   DISCHARGE SUPPORT TEAM (NETWORK) [phone]   PARENT CHILD HEALTH (Maternity/NICU/Pediatrics) [phone]   [facility] [phone]   [facility] [phone]   [facility] [phone]   [facility] [phone]   [facility] [phone]   [facility] [phone]   [facility] [phone]   [facility] [phone]   [facility] [phone]   [facility] [phone]   [facility] [phone]   [facility] [phone]

## 2025-01-16 NOTE — PROGRESS NOTES
Progress Note - Hospitalist   Name: Prem Mar Sr. 89 y.o. male I MRN: [id number]  Unit/Bed#: [facility] Date of Admission: 1/15/2025   Date of Service: 1/16/2025 I Hospital Day: 0    Assessment & Plan  Upper GI bleed  No further episodes of coffee-ground emesis thus far  Appreciate GI input  Workup thus far:-  #1.  CT abdomen pelvis-Focal wall thickening of the lower esophagus, gastroesophageal junction, and proximal stomach noted which may reflect esophagitis/gastritis. No evidence for bowel obstruction, mesenteric inflammation, appendicitis, obstructive uropathy, free air, or free fluid. Descending and sigmoid colon diverticulosis without evidence for acute diverticulitis.   #2.  X-ray chest-There is some gaseous distention of the stomach.   #3.  NG tube was placed  #4.  X-ray abdomen KUB-revealed worsening gastric distention  Status post a GI evaluation -there is consideration for the possibility of an EGD, however the patient is extremely high risk given his age and other comorbid medical conditions-see the goals of care counseling discussion below  Discontinue aspirin  Continue Protonix 40 mg IV twice daily  Continue n.p.o. status, continue IV fluids  NG tube placed again today  Follow-up KUB is pending  Continue with conservative management for now  Essential hypertension  Continue prehospital Toprol-XL 25 mg p.o. daily Lasix 20 mg p.o. daily on Monday Wednesday Friday  Idiopathic chronic gout of multiple sites without tophus  Continue prehospital allopurinol 300 mg p.o. daily  Mixed hyperlipidemia  Continue prehospital Tricor 48 mg p.o. daily  Chronic kidney disease, stage 3a (HCC)  Lab Results   Component Value Date    EGFR 60 01/16/2025    EGFR 61 01/15/2025    EGFR 56 10/23/2024    CREATININE 1.01 01/16/2025    CREATININE 1.00 01/15/2025    CREATININE 1.07 10/23/2024     Creatinine appears to be at baseline 0.9-1.1 mg/dL   Continue to monitor renal function closely  Avoid nephrotoxins and  hypotension  Atrial flutter (HCC)  Patient is rate controlled at this time  Continue prehospital Toprol-XL 25 mg p.o. daily  AC- not currently on as an outpatient   Leukocytosis  Reactive and resolved  Patient is afebrile with no signs or symptoms of active infection  UA- negative   PCT- negative   No antibiotics at this time  Constipation  Nursing reports that the patient has had 2 bowel movements since coming into the hospital  Goals of care, counseling/discussion  I had a long goals of care, counseling/discussion session with both the patient and his son in the presence of the patient's RN.  We reviewed quality of life and quantity of life  Patient is 89 years old, has significant comorbid medical conditions, and would be at high risk for any treatment options requiring anesthesia.  EGD testing might have risks that outweigh the benefits.  The patient verbalized understanding of the situation.  The son reported that he will talk to his sister and there may be consideration for a palliative care eval and/or hospice eval.  Case also reviewed with GI  Will see how the next 24 to 48 hours progress    VTE Pharmacologic Prophylaxis: VTE Score: 5 High Risk (Score >/= 5) - Pharmacological DVT Prophylaxis Contraindicated. Sequential Compression Devices Ordered.    Mobility:   Basic Mobility Inpatient Raw Score: 6  JH-HLM Goal: 2: Bed activities/Dependent transfer  JH-HLM Achieved: 2: Bed activities/Dependent transfer  JH-HLM Goal NOT achieved. Continue with multidisciplinary rounding and encourage appropriate mobility to improve upon JH-HLM goals.    Patient Centered Rounds: I performed bedside rounds with nursing staff today.   Discussions with Specialists or Other Care Team Provider: GI, case management    Education and Discussions with Family / Patient: Updated  (son) at bedside.    Current Length of Stay: 0 day(s)  Current Patient Status: Inpatient   Certification Statement: The patient will continue to  require additional inpatient hospital stay due to continued management of an upper GI bleed/gastric distention  Discharge Plan: Anticipate discharge in 24-48 hrs to rehab facility.    Code Status: Level 3 - DNAR and DNI    Subjective   Patient seen, appears to be in mild distress, has an NG tube in place, and appears very uncomfortable.  He continues to complain of a significant pressure in his abdomen.    Objective :  Temp:  [99 °F (37.2 °C)-101.3 °F (38.5 °C)] 99.2 °F (37.3 °C)  HR:  [82-97] 85  BP: (124-169)/(58-73) 162/72  Resp:  [17-20] 20  SpO2:  [93 %-96 %] 94 %  O2 Device: None (Room air)    Body mass index is 26.22 kg/m².     Input and Output Summary (last 24 hours):     Intake/Output Summary (Last 24 hours) at 1/16/2025 1217  Last data filed at 1/16/2025 1052  Gross per 24 hour   Intake 1298.75 ml   Output 550 ml   Net 748.75 ml       Physical Exam  Vitals and nursing note reviewed.   Constitutional:       General: He is in acute distress.      Appearance: He is ill-appearing.   HENT:      Head: Normocephalic and atraumatic.      Comments: NG tube in place     Nose: Nose normal.   Eyes:      Extraocular Movements: Extraocular movements intact.      Pupils: Pupils are equal, round, and reactive to light.   Cardiovascular:      Rate and Rhythm: Normal rate and regular rhythm.      Pulses: Normal pulses.      Heart sounds: Normal heart sounds. No murmur heard.     No friction rub. No gallop.   Pulmonary:      Effort: Pulmonary effort is normal.      Breath sounds: Normal breath sounds.   Abdominal:      General: There is no distension.      Palpations: Abdomen is soft. There is no mass.      Tenderness: There is no abdominal tenderness. There is no guarding or rebound.   Musculoskeletal:         General: No swelling or tenderness. Normal range of motion.      Cervical back: Normal range of motion and neck supple. No rigidity. No muscular tenderness.      Right lower leg: No edema.      Left lower leg: No  edema.   Skin:     General: Skin is warm.      Capillary Refill: Capillary refill takes less than 2 seconds.      Findings: No erythema or rash.   Neurological:      General: No focal deficit present.      Mental Status: He is alert and oriented to person, place, and time. Mental status is at baseline.   Psychiatric:         Mood and Affect: Mood normal.         Behavior: Behavior normal.           Lines/Drains:  Lines/Drains/Airways       Active Status       Name Placement date Placement time Site Days    NG/OG/Enteral Tube Nasogastric 18 Fr Right nare 01/16/25  1052  Right nare  less than 1                            Lab Results: I have reviewed the following results:   Results from last 7 days   Lab Units 01/16/25  1138 01/16/25  0511 01/15/25  1349 01/15/25  0422   WBC Thousand/uL  --  8.18  --  12.10*   HEMOGLOBIN g/dL 12.5 12.1   < > 12.7   HEMATOCRIT %  --  36.9  --  37.8   PLATELETS Thousands/uL  --  171  --  192   SEGS PCT %  --   --   --  88*   LYMPHO PCT %  --   --   --  5*   MONO PCT %  --   --   --  5   EOS PCT %  --   --   --  1    < > = values in this interval not displayed.     Results from last 7 days   Lab Units 01/16/25  0511 01/15/25  0422   SODIUM mmol/L 133* 135   POTASSIUM mmol/L 4.0 4.0   CHLORIDE mmol/L 100 99   CO2 mmol/L 25 25   BUN mg/dL 21 31*   CREATININE mg/dL 1.01 1.00   ANION GAP mmol/L 8 11   CALCIUM mg/dL 8.7 8.9   ALBUMIN g/dL  --  3.8   TOTAL BILIRUBIN mg/dL  --  0.44   ALK PHOS U/L  --  52   ALT U/L  --  16   AST U/L  --  22   GLUCOSE RANDOM mg/dL 128 129     Results from last 7 days   Lab Units 01/15/25  0422   INR  0.94             Results from last 7 days   Lab Units 01/15/25  0422   PROCALCITONIN ng/ml <0.05       Recent Cultures (last 7 days):         Imaging Results Review: I reviewed radiology reports from this admission including: X-ray KUB.  Other Study Results Review: No additional pertinent studies reviewed.    Last 24 Hours Medication List:     Current  Facility-Administered Medications:     acetaminophen (TYLENOL) tablet 650 mg, Q4H PRN    allopurinol (ZYLOPRIM) tablet 300 mg, Daily    bisacodyl (DULCOLAX) rectal suppository 10 mg, Daily    Cholecalciferol (VITAMIN D3) tablet 2,000 Units, Daily    docusate sodium (COLACE) capsule 100 mg, BID    fenofibrate (TRICOR) tablet 48 mg, Daily    [Held by provider] furosemide (LASIX) tablet 20 mg, Once per day on Monday Wednesday Friday    gabapentin (NEURONTIN) capsule 100 mg, Q12H    lactated ringers infusion, Continuous, Last Rate: 75 mL/hr (01/16/25 0302)    metoprolol succinate (TOPROL-XL) 24 hr tablet 25 mg, Daily    multivitamin stress formula tablet 1 tablet, Daily    nitroglycerin (NITROSTAT) SL tablet 0.4 mg, Q5 Min PRN    nystatin (MYCOSTATIN) cream, BID    ondansetron (ZOFRAN) injection 4 mg, Q6H PRN    pantoprazole (PROTONIX) injection 40 mg, Q12H    potassium chloride (Klor-Con M20) CR tablet 20 mEq, Daily    senna (SENOKOT) tablet 17.2 mg, HS    Administrative Statements   Today, Patient Was Seen By: Bridgette Cadena MD      **Please Note: This note may have been constructed using a voice recognition system.**

## 2025-01-17 ENCOUNTER — APPOINTMENT (INPATIENT)
Dept: RADIOLOGY | Facility: HOSPITAL | Age: OVER 89
DRG: 377 | End: 2025-01-17
Payer: COMMERCIAL

## 2025-01-17 ENCOUNTER — APPOINTMENT (INPATIENT)
Dept: CT IMAGING | Facility: HOSPITAL | Age: OVER 89
DRG: 377 | End: 2025-01-17
Payer: COMMERCIAL

## 2025-01-17 PROBLEM — K31.0 ACUTE DISTENTION OF STOMACH: Status: ACTIVE | Noted: 2025-01-17

## 2025-01-17 PROBLEM — D72.829 LEUKOCYTOSIS: Status: RESOLVED | Noted: 2025-01-15 | Resolved: 2025-01-17

## 2025-01-17 PROBLEM — U07.1 COVID-19 VIRUS INFECTION: Status: ACTIVE | Noted: 2025-01-17

## 2025-01-17 LAB
ABO GROUP BLD: NORMAL
ALBUMIN SERPL BCG-MCNC: 3.5 G/DL (ref 3.5–5)
ALP SERPL-CCNC: 44 U/L (ref 34–104)
ALT SERPL W P-5'-P-CCNC: 18 U/L (ref 7–52)
ANION GAP SERPL CALCULATED.3IONS-SCNC: 7 MMOL/L (ref 4–13)
ANION GAP SERPL CALCULATED.3IONS-SCNC: 9 MMOL/L (ref 4–13)
AST SERPL W P-5'-P-CCNC: 45 U/L (ref 13–39)
BASOPHILS # BLD AUTO: 0.01 THOUSANDS/ΜL (ref 0–0.1)
BASOPHILS NFR BLD AUTO: 0 % (ref 0–1)
BILIRUB SERPL-MCNC: 0.6 MG/DL (ref 0.2–1)
BNP SERPL-MCNC: 171 PG/ML (ref 0–100)
BUN SERPL-MCNC: 21 MG/DL (ref 5–25)
BUN SERPL-MCNC: 22 MG/DL (ref 5–25)
CA-I BLD-SCNC: 1.1 MMOL/L (ref 1.12–1.32)
CALCIUM SERPL-MCNC: 8.1 MG/DL (ref 8.4–10.2)
CALCIUM SERPL-MCNC: 8.3 MG/DL (ref 8.4–10.2)
CARDIAC TROPONIN I PNL SERPL HS: 58 NG/L (ref 8–18)
CHLORIDE SERPL-SCNC: 101 MMOL/L (ref 96–108)
CHLORIDE SERPL-SCNC: 101 MMOL/L (ref 96–108)
CK SERPL-CCNC: 371 U/L (ref 39–308)
CO2 SERPL-SCNC: 25 MMOL/L (ref 21–32)
CO2 SERPL-SCNC: 26 MMOL/L (ref 21–32)
CREAT SERPL-MCNC: 1.07 MG/DL (ref 0.6–1.3)
CREAT SERPL-MCNC: 1.11 MG/DL (ref 0.6–1.3)
CRP SERPL QL: 95.8 MG/L
D DIMER PPP FEU-MCNC: 2.02 UG/ML FEU
EOSINOPHIL # BLD AUTO: 0.15 THOUSAND/ΜL (ref 0–0.61)
EOSINOPHIL NFR BLD AUTO: 2 % (ref 0–6)
ERYTHROCYTE [DISTWIDTH] IN BLOOD BY AUTOMATED COUNT: 14.9 % (ref 11.6–15.1)
FERRITIN SERPL-MCNC: 189 NG/ML (ref 24–336)
FLUAV AG UPPER RESP QL IA.RAPID: NEGATIVE
FLUBV AG UPPER RESP QL IA.RAPID: NEGATIVE
GFR SERPL CREATININE-BSD FRML MDRD: 54 ML/MIN/1.73SQ M
GFR SERPL CREATININE-BSD FRML MDRD: 56 ML/MIN/1.73SQ M
GLUCOSE SERPL-MCNC: 103 MG/DL (ref 65–140)
GLUCOSE SERPL-MCNC: 105 MG/DL (ref 65–140)
HCT VFR BLD AUTO: 34.1 % (ref 36.5–49.3)
HGB BLD-MCNC: 11 G/DL (ref 12–17)
HGB BLD-MCNC: 11.2 G/DL (ref 12–17)
HGB BLD-MCNC: 11.5 G/DL (ref 12–17)
HGB BLD-MCNC: 11.5 G/DL (ref 12–17)
IMM GRANULOCYTES # BLD AUTO: 0.04 THOUSAND/UL (ref 0–0.2)
IMM GRANULOCYTES NFR BLD AUTO: 1 % (ref 0–2)
LYMPHOCYTES # BLD AUTO: 0.85 THOUSANDS/ΜL (ref 0.6–4.47)
LYMPHOCYTES NFR BLD AUTO: 12 % (ref 14–44)
MAGNESIUM SERPL-MCNC: 1.9 MG/DL (ref 1.9–2.7)
MCH RBC QN AUTO: 32.1 PG (ref 26.8–34.3)
MCHC RBC AUTO-ENTMCNC: 32.8 G/DL (ref 31.4–37.4)
MCV RBC AUTO: 98 FL (ref 82–98)
MONOCYTES # BLD AUTO: 0.56 THOUSAND/ΜL (ref 0.17–1.22)
MONOCYTES NFR BLD AUTO: 8 % (ref 4–12)
NEUTROPHILS # BLD AUTO: 5.72 THOUSANDS/ΜL (ref 1.85–7.62)
NEUTS SEG NFR BLD AUTO: 77 % (ref 43–75)
NRBC BLD AUTO-RTO: 0 /100 WBCS
PLATELET # BLD AUTO: 164 THOUSANDS/UL (ref 149–390)
PMV BLD AUTO: 9.8 FL (ref 8.9–12.7)
POTASSIUM SERPL-SCNC: 3.5 MMOL/L (ref 3.5–5.3)
POTASSIUM SERPL-SCNC: 3.9 MMOL/L (ref 3.5–5.3)
PROT SERPL-MCNC: 6.2 G/DL (ref 6.4–8.4)
RBC # BLD AUTO: 3.49 MILLION/UL (ref 3.88–5.62)
RH BLD: POSITIVE
SARS-COV+SARS-COV-2 AG RESP QL IA.RAPID: POSITIVE
SODIUM SERPL-SCNC: 134 MMOL/L (ref 135–147)
SODIUM SERPL-SCNC: 135 MMOL/L (ref 135–147)
WBC # BLD AUTO: 7.33 THOUSAND/UL (ref 4.31–10.16)

## 2025-01-17 PROCEDURE — 86140 C-REACTIVE PROTEIN: CPT | Performed by: NURSE PRACTITIONER

## 2025-01-17 PROCEDURE — 74177 CT ABD & PELVIS W/CONTRAST: CPT

## 2025-01-17 PROCEDURE — 83880 ASSAY OF NATRIURETIC PEPTIDE: CPT | Performed by: NURSE PRACTITIONER

## 2025-01-17 PROCEDURE — 85025 COMPLETE CBC W/AUTO DIFF WBC: CPT | Performed by: HOSPITALIST

## 2025-01-17 PROCEDURE — 83735 ASSAY OF MAGNESIUM: CPT | Performed by: NURSE PRACTITIONER

## 2025-01-17 PROCEDURE — 82728 ASSAY OF FERRITIN: CPT | Performed by: NURSE PRACTITIONER

## 2025-01-17 PROCEDURE — 82550 ASSAY OF CK (CPK): CPT | Performed by: NURSE PRACTITIONER

## 2025-01-17 PROCEDURE — 84484 ASSAY OF TROPONIN QUANT: CPT | Performed by: NURSE PRACTITIONER

## 2025-01-17 PROCEDURE — 71045 X-RAY EXAM CHEST 1 VIEW: CPT

## 2025-01-17 PROCEDURE — 85379 FIBRIN DEGRADATION QUANT: CPT | Performed by: NURSE PRACTITIONER

## 2025-01-17 PROCEDURE — 99232 SBSQ HOSP IP/OBS MODERATE 35: CPT | Performed by: NURSE PRACTITIONER

## 2025-01-17 PROCEDURE — 82330 ASSAY OF CALCIUM: CPT | Performed by: NURSE PRACTITIONER

## 2025-01-17 PROCEDURE — 99232 SBSQ HOSP IP/OBS MODERATE 35: CPT | Performed by: STUDENT IN AN ORGANIZED HEALTH CARE EDUCATION/TRAINING PROGRAM

## 2025-01-17 PROCEDURE — 85018 HEMOGLOBIN: CPT | Performed by: NURSE PRACTITIONER

## 2025-01-17 PROCEDURE — 80048 BASIC METABOLIC PNL TOTAL CA: CPT | Performed by: HOSPITALIST

## 2025-01-17 PROCEDURE — 86901 BLOOD TYPING SEROLOGIC RH(D): CPT | Performed by: NURSE PRACTITIONER

## 2025-01-17 PROCEDURE — XW033E5 INTRODUCTION OF REMDESIVIR ANTI-INFECTIVE INTO PERIPHERAL VEIN, PERCUTANEOUS APPROACH, NEW TECHNOLOGY GROUP 5: ICD-10-PCS | Performed by: NURSE PRACTITIONER

## 2025-01-17 PROCEDURE — 80053 COMPREHEN METABOLIC PANEL: CPT | Performed by: NURSE PRACTITIONER

## 2025-01-17 PROCEDURE — 87804 INFLUENZA ASSAY W/OPTIC: CPT | Performed by: NURSE PRACTITIONER

## 2025-01-17 PROCEDURE — 74018 RADEX ABDOMEN 1 VIEW: CPT

## 2025-01-17 PROCEDURE — 86900 BLOOD TYPING SEROLOGIC ABO: CPT | Performed by: NURSE PRACTITIONER

## 2025-01-17 PROCEDURE — 87811 SARS-COV-2 COVID19 W/OPTIC: CPT | Performed by: NURSE PRACTITIONER

## 2025-01-17 RX ORDER — POTASSIUM CHLORIDE 20MEQ/15ML
20 LIQUID (ML) ORAL ONCE
Status: COMPLETED | OUTPATIENT
Start: 2025-01-17 | End: 2025-01-17

## 2025-01-17 RX ORDER — ACETAMINOPHEN 650 MG/1
650 SUPPOSITORY RECTAL EVERY 4 HOURS PRN
Status: DISCONTINUED | OUTPATIENT
Start: 2025-01-17 | End: 2025-01-17

## 2025-01-17 RX ORDER — METOPROLOL TARTRATE 1 MG/ML
2.5 INJECTION, SOLUTION INTRAVENOUS EVERY 6 HOURS
Status: DISCONTINUED | OUTPATIENT
Start: 2025-01-17 | End: 2025-02-05

## 2025-01-17 RX ORDER — ACETAMINOPHEN 10 MG/ML
1000 INJECTION, SOLUTION INTRAVENOUS EVERY 8 HOURS PRN
Status: DISCONTINUED | OUTPATIENT
Start: 2025-01-17 | End: 2025-02-05 | Stop reason: HOSPADM

## 2025-01-17 RX ADMIN — NYSTATIN: 100000 CREAM TOPICAL at 18:49

## 2025-01-17 RX ADMIN — ALLOPURINOL 300 MG: 300 TABLET ORAL at 09:16

## 2025-01-17 RX ADMIN — ACETAMINOPHEN 650 MG: 650 SUPPOSITORY RECTAL at 17:58

## 2025-01-17 RX ADMIN — ACETAMINOPHEN 650 MG: 650 SUPPOSITORY RECTAL at 09:16

## 2025-01-17 RX ADMIN — PANTOPRAZOLE SODIUM 40 MG: 40 INJECTION, POWDER, FOR SOLUTION INTRAVENOUS at 18:49

## 2025-01-17 RX ADMIN — BISACODYL 10 MG: 10 SUPPOSITORY RECTAL at 09:16

## 2025-01-17 RX ADMIN — ACETAMINOPHEN 1000 MG: 1000 INJECTION, SOLUTION INTRAVENOUS at 22:11

## 2025-01-17 RX ADMIN — GABAPENTIN 100 MG: 100 CAPSULE ORAL at 18:49

## 2025-01-17 RX ADMIN — METOROPROLOL TARTRATE 2.5 MG: 5 INJECTION, SOLUTION INTRAVENOUS at 09:15

## 2025-01-17 RX ADMIN — NYSTATIN: 100000 CREAM TOPICAL at 11:35

## 2025-01-17 RX ADMIN — DOCUSATE SODIUM 100 MG: 50 LIQUID ORAL at 18:49

## 2025-01-17 RX ADMIN — IOHEXOL 100 ML: 350 INJECTION, SOLUTION INTRAVENOUS at 20:58

## 2025-01-17 RX ADMIN — Medication 2000 UNITS: at 09:24

## 2025-01-17 RX ADMIN — POTASSIUM CHLORIDE 20 MEQ: 1.5 SOLUTION ORAL at 09:15

## 2025-01-17 RX ADMIN — SODIUM CHLORIDE, SODIUM LACTATE, POTASSIUM CHLORIDE, AND CALCIUM CHLORIDE 75 ML/HR: .6; .31; .03; .02 INJECTION, SOLUTION INTRAVENOUS at 21:45

## 2025-01-17 RX ADMIN — REMDESIVIR 200 MG: 100 INJECTION, POWDER, LYOPHILIZED, FOR SOLUTION INTRAVENOUS at 14:25

## 2025-01-17 RX ADMIN — SODIUM CHLORIDE, SODIUM LACTATE, POTASSIUM CHLORIDE, AND CALCIUM CHLORIDE 75 ML/HR: .6; .31; .03; .02 INJECTION, SOLUTION INTRAVENOUS at 06:45

## 2025-01-17 RX ADMIN — METOROPROLOL TARTRATE 2.5 MG: 5 INJECTION, SOLUTION INTRAVENOUS at 21:37

## 2025-01-17 RX ADMIN — PANTOPRAZOLE SODIUM 40 MG: 40 INJECTION, POWDER, FOR SOLUTION INTRAVENOUS at 05:55

## 2025-01-17 RX ADMIN — METOROPROLOL TARTRATE 2.5 MG: 5 INJECTION, SOLUTION INTRAVENOUS at 15:36

## 2025-01-17 NOTE — ASSESSMENT & PLAN NOTE
Stool burden on CT scan on admission   No BM or passing gas x2 days despite multiple enema and suppository  Repeat CTAP with IV and PO contrast to rule out obstruction, fecal impaction, Ileus

## 2025-01-17 NOTE — PLAN OF CARE
Problem: PAIN - ADULT  Goal: Verbalizes/displays adequate comfort level or baseline comfort level  Description: Interventions:  - Encourage patient to monitor pain and request assistance  - Assess pain using appropriate pain scale  - Administer analgesics based on type and severity of pain and evaluate response  - Implement non-pharmacological measures as appropriate and evaluate response  - Consider cultural and social influences on pain and pain management  - Notify physician/advanced practitioner if interventions unsuccessful or patient reports new pain  Outcome: Progressing     Problem: INFECTION - ADULT  Goal: Absence or prevention of progression during hospitalization  Description: INTERVENTIONS:  - Assess and monitor for signs and symptoms of infection  - Monitor lab/diagnostic results  - Monitor all insertion sites, i.e. indwelling lines, tubes, and drains  - Monitor endotracheal if appropriate and nasal secretions for changes in amount and color  - [place] appropriate cooling/warming therapies per order  - Administer medications as ordered  - Instruct and encourage patient and family to use good hand hygiene technique  - Identify and instruct in appropriate isolation precautions for identified infection/condition  Outcome: Progressing  Goal: Absence of fever/infection during neutropenic period  Description: INTERVENTIONS:  - Monitor WBC    Outcome: Progressing     Problem: SAFETY ADULT  Goal: Patient will remain free of falls  Description: INTERVENTIONS:  - Educate patient/family on patient safety including physical limitations  - Instruct patient to call for assistance with activity   - Consult OT/PT to assist with strengthening/mobility   - Keep Call bell within reach  - Keep bed low and locked with side rails adjusted as appropriate  - Keep care items and personal belongings within reach  - Initiate and maintain comfort rounds  - Make Fall Risk Sign visible to staff  - Offer Toileting every 2 Hours,  in advance of need  - Initiate/Maintain bed alarm  - Obtain necessary fall risk management equipment: yellow socks  - Apply yellow socks and bracelet for high fall risk patients  - Consider moving patient to room near nurses station  Outcome: Progressing  Goal: Maintain or return to baseline ADL function  Description: INTERVENTIONS:  -  Assess patient's ability to carry out ADLs; assess patient's baseline for ADL function and identify physical deficits which impact ability to perform ADLs (bathing, care of mouth/teeth, toileting, grooming, dressing, etc.)  - Assess/evaluate cause of self-care deficits   - Assess range of motion  - Assess patient's mobility; develop plan if impaired  - Assess patient's need for assistive devices and provide as appropriate  - Encourage maximum independence but intervene and supervise when necessary  - Involve family in performance of ADLs  - Assess for home care needs following discharge   - Consider OT consult to assist with ADL evaluation and planning for discharge  - Provide patient education as appropriate  Outcome: Progressing  Goal: Maintains/Returns to pre admission functional level  Description: INTERVENTIONS:  - Perform AM-PAC 6 Click Basic Mobility/ Daily Activity assessment daily.  - Set and communicate daily mobility goal to care team and patient/family/caregiver.   - Collaborate with rehabilitation services on mobility goals if consulted  - Perform Range of Motion 3 times a day.  - Reposition patient every 2 hours.  - Dangle patient 3 times a day  - Stand patient 3 times a day  - Ambulate patient 3 times a day  - Out of bed to chair 3 times a day   - Out of bed for meals 3 times a day  - Out of bed for toileting  - Record patient progress and toleration of activity level   Outcome: Progressing     Problem: DISCHARGE PLANNING  Goal: Discharge to home or other facility with appropriate resources  Description: INTERVENTIONS:  - Identify barriers to discharge w/patient and  caregiver  - Arrange for needed discharge resources and transportation as appropriate  - Identify discharge learning needs (meds, wound care, etc.)  - Arrange for interpretive services to assist at discharge as needed  - Refer to Case Management Department for coordinating discharge planning if the patient needs post-hospital services based on physician/advanced practitioner order or complex needs related to functional status, cognitive ability, or social support system  Outcome: Progressing     Problem: Knowledge Deficit  Goal: Patient/family/caregiver demonstrates understanding of disease process, treatment plan, medications, and discharge instructions  Description: Complete learning assessment and assess knowledge base.  Interventions:  - Provide teaching at level of understanding  - Provide teaching via preferred learning methods  Outcome: Progressing     Problem: Prexisting or High Potential for Compromised Skin Integrity  Goal: Skin integrity is maintained or improved  Description: INTERVENTIONS:  - Identify patients at risk for skin breakdown  - Assess and monitor skin integrity  - Assess and monitor nutrition and hydration status  - Monitor labs   - Assess for incontinence   - Turn and reposition patient  - Assist with mobility/ambulation  - Relieve pressure over bony prominences  - Avoid friction and shearing  - Provide appropriate hygiene as needed including keeping skin clean and dry  - Evaluate need for skin moisturizer/barrier cream  - Collaborate with interdisciplinary team   - Patient/family teaching  - Consider wound care consult   Outcome: Progressing

## 2025-01-17 NOTE — OCCUPATIONAL THERAPY NOTE
Occupational Therapy Cancel       01/17/25 1332   OT Last Visit   OT Visit Date 01/17/25   Note Type   Note type Cancelled Session   Cancel Reasons Medical status   Additional Comments Pt chart reviewed. OT consult recieved. Spoke to nursing, pt not currently appropriate for therapy at this time due to medical instability. Will continue to follow and reassess as schedule allows.     Adelita García OT      Patient Name: Prem Mar Sr.  Today's Date: 1/17/2025

## 2025-01-17 NOTE — PROGRESS NOTES
Progress Note - Hospitalist   Name: Prem Mar Sr. 89 y.o. male I MRN: [id number]  Unit/Bed#: [facility] Date of Admission: 1/15/2025   Date of Service: 1/17/2025 I Hospital Day: 1    Assessment & Plan  Upper GI bleed  Presented for suspected GI bleed  CT abdomen and pelvis 1/15/2025 with focal wall thickening of the lower esophagus, gastroesophageal junction, and proximal stomach which may reflect esophagitis/gastritis.  No evidence for bowel obstruction, mesenteric inflammation, appendicitis, obstructive uropathy, free air or free fluid  Chest x-ray 1/15/2025 with gaseous distention of the stomach  KUB 1/16/2025 with worsening distention  Gastroenterology input appreciated  Obtain repeat imaging today  Continue n.p.o.   Continue to hold aspirin   Continue Protonix 40 mg IV twice daily  Continue with conservative management for now.  Please see also, goals of care  Essential hypertension  Continue prehospital Toprol-XL 25 mg p.o. daily Lasix 20 mg p.o. daily on Monday Wednesday Friday  Idiopathic chronic gout of multiple sites without tophus  Continue prehospital allopurinol 300 mg p.o. daily  Mixed hyperlipidemia  Continue prehospital Tricor 48 mg p.o. daily  Chronic kidney disease, stage 3a (HCC)  Lab Results   Component Value Date    EGFR 56 01/17/2025    EGFR 60 01/16/2025    EGFR 61 01/15/2025    CREATININE 1.07 01/17/2025    CREATININE 1.01 01/16/2025    CREATININE 1.00 01/15/2025     Creatinine appears to be at baseline 0.9-1.1 mg/dL   Continue to monitor renal function closely  Avoid nephrotoxins and hypotension  Atrial flutter (HCC)  Currently rate controlled with metoprolol   Not currently on anticoagulation as outpatient  Leukocytosis (Resolved: 1/17/2025)  Resolved  Constipation  Nursing reports that the patient has had 2 bowel movements since coming into the hospital, however today patient reports he is not passing gas  GI input appreciated  Goals of care, counseling/discussion  Patient with  significant comorbid medical conditions would be high risk for treatment options requiring anesthesia.  EGD testing might have risks that outweigh the benefits.  Patient verbalized understanding.  Opened goals of care conversations with son   Continue supportive care  COVID-19 virus infection  Patient with fever and COVID-positive roommate at his skilled facility  Tested positive for COVID  Currently saturating 95 to 96% on room air  Will start remdesivir due to comorbidities  Continue supportive care  Monitor labs and vital signs, conduct serial physical assessments    VTE Pharmacologic Prophylaxis: VTE Score: 5 High Risk (Score >/= 5) - Pharmacological DVT Prophylaxis Contraindicated. Sequential Compression Devices Ordered.    Mobility:   Basic Mobility Inpatient Raw Score: 6  JH-HLM Goal: 2: Bed activities/Dependent transfer  JH-HLM Achieved: 2: Bed activities/Dependent transfer  JH-HLM Goal achieved. Continue to encourage appropriate mobility.    Patient Centered Rounds: I performed bedside rounds with nursing staff today.   Discussions with Specialists or Other Care Team Provider: Case management    Education and Discussions with Family / Patient: Updated  (son) at bedside.@10;30 am    Current Length of Stay: 1 day(s)  Current Patient Status: Inpatient   Certification Statement: The patient will continue to require additional inpatient hospital stay due to suspected GI bleeding, COVID infection, case management, goals of care.  Discharge Plan: Anticipate discharge in 48 hrs to prior assisted or independent living facility.    Code Status: Level 3 - DNAR and DNI    Subjective   Evaluated at bedside.  He says his stomach still feels swollen.  He is not passing gas.  Denies headache, shortness of breath, chest pain, or paresthesia.     Objective :  Temp:  [99.5 °F (37.5 °C)-101.8 °F (38.8 °C)] 101.8 °F (38.8 °C)  HR:  [] 92  BP: (100-195)/() 148/65  Resp:  [16-22] 22  SpO2:  [92 %-96 %]  95 %  O2 Device: None (Room air)    Body mass index is 26.22 kg/m².     Input and Output Summary (last 24 hours):     Intake/Output Summary (Last 24 hours) at 1/17/2025 1310  Last data filed at 1/17/2025 0900  Gross per 24 hour   Intake 1858.75 ml   Output 655 ml   Net 1203.75 ml       Physical Exam  Vitals and nursing note reviewed.   HENT:      Head: Normocephalic and atraumatic.      Nose: Nose normal.      Mouth/Throat:      Pharynx: Oropharynx is clear.   Eyes:      Pupils: Pupils are equal, round, and reactive to light.   Cardiovascular:      Rate and Rhythm: Normal rate and regular rhythm.      Pulses: Normal pulses.      Heart sounds: Normal heart sounds.   Pulmonary:      Effort: Pulmonary effort is normal. No respiratory distress.      Breath sounds: Normal breath sounds.   Abdominal:      General: Bowel sounds are normal. There is distension.      Palpations: Abdomen is soft.      Tenderness: There is no abdominal tenderness.      Comments: NGT in place for small amount of tan/brown semisolid material in canister   Musculoskeletal:      Cervical back: Neck supple.      Right lower leg: No edema.      Left lower leg: No edema.   Skin:     General: Skin is warm and dry.      Capillary Refill: Capillary refill takes less than 2 seconds.   Neurological:      General: No focal deficit present.      Mental Status: He is alert and oriented to person, place, and time.           Lines/Drains:  Lines/Drains/Airways       Active Status       Name Placement date Placement time Site Days    NG/OG/Enteral Tube Nasogastric 18 Fr Right nare 01/16/25  1052  Right nare  1                            Lab Results: I have reviewed the following results:   Results from last 7 days   Lab Units 01/17/25  0832 01/17/25  0518   WBC Thousand/uL  --  7.33   HEMOGLOBIN g/dL 11.5* 11.2*   HEMATOCRIT %  --  34.1*   PLATELETS Thousands/uL  --  164   SEGS PCT %  --  77*   LYMPHO PCT %  --  12*   MONO PCT %  --  8   EOS PCT %  --  2      Results from last 7 days   Lab Units 01/17/25  0518 01/16/25  0511 01/15/25  0422   SODIUM mmol/L 134*   < > 135   POTASSIUM mmol/L 3.5   < > 4.0   CHLORIDE mmol/L 101   < > 99   CO2 mmol/L 26   < > 25   BUN mg/dL 22   < > 31*   CREATININE mg/dL 1.07   < > 1.00   ANION GAP mmol/L 7   < > 11   CALCIUM mg/dL 8.1*   < > 8.9   ALBUMIN g/dL  --   --  3.8   TOTAL BILIRUBIN mg/dL  --   --  0.44   ALK PHOS U/L  --   --  52   ALT U/L  --   --  16   AST U/L  --   --  22   GLUCOSE RANDOM mg/dL 103   < > 129    < > = values in this interval not displayed.     Results from last 7 days   Lab Units 01/15/25  0422   INR  0.94             Results from last 7 days   Lab Units 01/15/25  0422   PROCALCITONIN ng/ml <0.05       Recent Cultures (last 7 days):         Imaging Results Review: I reviewed radiology reports from this admission including: chest xray and CT abdomen/pelvis.      Last 24 Hours Medication List:     Current Facility-Administered Medications:     acetaminophen (TYLENOL) rectal suppository 650 mg, Q4H PRN    acetaminophen (TYLENOL) tablet 650 mg, Q4H PRN    allopurinol (ZYLOPRIM) tablet 300 mg, Daily    bisacodyl (DULCOLAX) rectal suppository 10 mg, Daily    bisacodyl (DULCOLAX) rectal suppository 10 mg, Daily PRN    Cholecalciferol (VITAMIN D3) tablet 2,000 Units, Daily    docusate (COLACE) oral liquid 100 mg, BID    fenofibrate (TRICOR) tablet 48 mg, Daily    [Held by provider] furosemide (LASIX) tablet 20 mg, Once per day on Monday Wednesday Friday    gabapentin (NEURONTIN) capsule 100 mg, Q12H    lactated ringers infusion, Continuous, Last Rate: 75 mL/hr (01/17/25 0645)    metoprolol (LOPRESSOR) injection 2.5 mg, Q6H    multivitamin stress formula tablet 1 tablet, Daily    nitroglycerin (NITROSTAT) SL tablet 0.4 mg, Q5 Min PRN    nystatin (MYCOSTATIN) cream, BID    ondansetron (ZOFRAN) injection 4 mg, Q6H PRN    pantoprazole (PROTONIX) injection 40 mg, Q12H    remdesivir (Veklury) 200 mg in sodium chloride 0.9  % 290 mL IVPB, Q24H **FOLLOWED BY** [START ON 1/18/2025] remdesivir (Veklury) 100 mg in sodium chloride 0.9 % 270 mL IVPB, Q24H    senna (SENOKOT) tablet 17.2 mg, HS    Administrative Statements   Today, Patient Was Seen By: ANAM Rose  I have spent a total time of 55 minutes in caring for this patient on the day of the visit/encounter including Diagnostic results, Prognosis, Risks and benefits of tx options, Instructions for management, Patient and family education, Importance of tx compliance, Risk factor reductions, Impressions, Counseling / Coordination of care, Documenting in the medical record, Reviewing / ordering tests, medicine, procedures  , Obtaining or reviewing history  , and Communicating with other healthcare professionals .    **Please Note: This note may have been constructed using a voice recognition system.**

## 2025-01-17 NOTE — PROGRESS NOTES
Progress Note - Gastroenterology   Name: Prem Mar Sr. 89 y.o. male I MRN: [id number]  Unit/Bed#: -01 I Date of Admission: 1/15/2025   Date of Service: 1/17/2025 I Hospital Day: 1    Assessment & Plan  Acute distention of stomach  In summary, this is 89-year-old male with a history of a flutter on aspirin monotherapy, hypertension, hyperlipidemia, CKD, gout as well as a reported remote history of peptic ulcer disease who initially presented with a history of 1 day of lower abdominal pain with reports of 2 episodes of coffee-ground emesis at the nursing home.  No recent NSAID use.  No EGD on file to review.  No other evidence of GI bleed.  On presentation he was hemodynamically stable.  His hemoglobin is 12.7 which is about his baseline.  His BUN is elevated at 31 (been in the 30s for the last few months).      He underwent a CT abdomen and pelvis that showed evidence of possible distal esophagitis and gastritis. His stomach is notably dilated with air and fluid but no obvious mass or transition point.  His hemoglobin has remained stable without any sign of overt GI bleeding.  There is low suspicion for acute/active GI bleed at this time.  Differential diagnosis for his symptoms include gastric outlet obstruction, reflux/erosive esophagitis, Pill esophagitis, gastritis, post infectious gastroparesis      Plan  Given his advanced age, hemodynamic stability, stable hemoglobin and no further episodes of hematemesis; would favor a conservative approach for this gentleman.  No plan for endoscopic evaluation   unfortunately, he is now tested positive for COVID 19  NG tube placed yesterday with return of non bloody gastric contents. Drained total of 525cc over 24hrs. NG clamp trial today. Repeat KUB now  Continue with high-dose PPI therapy.  Protonix 40 mg twice daily daily  Keep NPO for now  Can resume aspirin  Avoid NSAIDs  Trend CBC daily  Maintain type and screen  Antiemetic for nausea  Constipation  Stool  burden on CT scan on admission   No BM or passing gas x2 days despite multiple enema and suppository  Repeat CTAP with IV and PO contrast to rule out obstruction, fecal impaction, Ileus    COVID-19 virus infection  Fever overnight and now tested positive for Covid-19.  He saturating well on room air and denies any shortness of breath.  Ongoing workup per primary team.          Subjective   Denies any acute complaints including nausea, vomiting, abdominal pain. Still has no had a BM and states he is not passing gas. He was spiking fevers overnight    Objective :  Temp:  [99.5 °F (37.5 °C)-101.8 °F (38.8 °C)] 101.8 °F (38.8 °C)  HR:  [] 92  BP: (100-195)/() 148/65  Resp:  [16-22] 22  SpO2:  [92 %-96 %] 95 %  O2 Device: None (Room air)    Physical Exam  Vitals and nursing note reviewed.   Constitutional:       General: He is not in acute distress.     Appearance: He is well-developed.   HENT:      Head: Normocephalic and atraumatic.      Nose:      Comments: NG tube in place  Eyes:      Conjunctiva/sclera: Conjunctivae normal.   Cardiovascular:      Rate and Rhythm: Normal rate and regular rhythm.      Heart sounds: No murmur heard.  Pulmonary:      Effort: Pulmonary effort is normal. No respiratory distress.      Breath sounds: Normal breath sounds.   Abdominal:      General: There is distension.      Palpations: Abdomen is soft.      Tenderness: There is no abdominal tenderness. There is no guarding or rebound.   Musculoskeletal:         General: No swelling.      Cervical back: Neck supple.   Skin:     General: Skin is warm and dry.      Capillary Refill: Capillary refill takes less than 2 seconds.   Neurological:      Mental Status: He is alert.   Psychiatric:         Mood and Affect: Mood normal.           Lab Results: I have reviewed the following results:CBC/BMP:   .     01/17/25  0518 01/17/25  0832   WBC 7.33  --    HGB 11.2* 11.5*   HCT 34.1*  --      --    SODIUM 134*  --    K 3.5  --       --    CO2 26  --    BUN 22  --    CREATININE 1.07  --    GLUC 103  --         Imaging Results Review: I reviewed radiology reports from this admission including: xray(s).  Other Study Results Review: No additional pertinent studies reviewed.    Marcy Lane MD  Gastroenterology Fellow, PGY- 4  Available on EPIC Secure Chat  1/17/2025 12:56 PM

## 2025-01-17 NOTE — CASE MANAGEMENT
Case Management Discharge Planning Note    Patient name Prem Mar Sr.  Location /-01 MRN [id number]  : 10/11/1924 Date 2025       Current Admission Date: 1/15/2025  Current Admission Diagnosis:Upper GI bleed   Patient Active Problem List    Diagnosis Date Noted Date Diagnosed    Goals of care, counseling/discussion 2025     Upper GI bleed 01/15/2025     Leukocytosis 01/15/2025     Constipation 01/15/2025     Tinea cruris 10/12/2024     Weakness 10/11/2024     Atrial flutter (HCC) 2024     Chronic pain syndrome 2024     Lumbar spondylosis 2024     Chronic midline low back pain without sciatica 2024     Nonexudative age-related macular degeneration of left eye 2023     Chronic kidney disease, stage 3a (HCC) 2023     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     Muscle wasting and atrophy, not elsewhere classified, multiple sites 2022     Difficulty swallowing 2022     Keratoma 2021     Venous insufficiency of both lower extremities 2021     Hypomagnesemia 2020     Mixed hyperlipidemia 2020     Coronary artery disease involving native coronary artery of native heart without angina pectoris 2018     Essential hypertension 2018     Bilateral leg edema 2018     Ventricular bigeminy 2018     Idiopathic chronic gout of multiple sites without tophus 05/10/2017     Edema 2014     Vitamin D deficiency 2013       LOS (days): 0  Geometric Mean LOS (GMLOS) (days): 2  Days to GMLOS:1.7     OBJECTIVE:  Risk of Unplanned Readmission Score: 13.31         Current admission status: Inpatient   Preferred Pharmacy:   [id number] - [place] - [address] [facility]  [address] [place] [address]  [place] [address]  Phone: [phone] Fax: [phone]    Primary Care Provider: Leonard Schreiber MD    Primary Insurance: [facility] REP  Secondary Insurance:      DISCHARGE DETAILS:    Discharge planning discussed with:: patient  & son was at the bedside  Freedom of Choice: Yes  Comments - Freedom of Choice: pt was from the [facility]- pt & family are in agreement with the pt retuning back to the [facility]-  referral was sent via aidin  CM contacted family/caregiver?: Yes             Contacts  Patient Contacts: Prem Mar jr  Relationship to Patient:: Family (son)  Contact Method: In Person  Reason/Outcome: Discharge Planning    Requested Home Health Care         Is the patient interested in HHC at discharge?: No    DME Referral Provided  Referral made for DME?: No    Other Referral/Resources/Interventions Provided:  Interventions: SNF  Referral Comments: referral sent to the [facility]- pt not stable for d/c - NG tube in place IV LR, h&h-  goals of care was discussion was completed    Would you like to participate in our [facility] service program?  : No - Declined    Treatment Team Recommendation: SNF ([facility])

## 2025-01-17 NOTE — ASSESSMENT & PLAN NOTE
Fever overnight and now tested positive for Covid-19.  He saturating well on room air and denies any shortness of breath.  Ongoing workup per primary team.

## 2025-01-17 NOTE — ASSESSMENT & PLAN NOTE
In summary, this is 89-year-old male with a history of a flutter on aspirin monotherapy, hypertension, hyperlipidemia, CKD, gout as well as a reported remote history of peptic ulcer disease who presented with a history of 1 day of lower abdominal pain with reports of 2 episodes of coffee-ground emesis at the nursing home.  No recent NSAID use.  No EGD on file to review.  No other evidence of GI bleed.  On presentation he was hemodynamically stable.  His hemoglobin is 12.7 which is about his baseline.  His BUN is elevated at 31 (been in the 30s for the last few months).      He underwent a CT abdomen and pelvis that showed evidence of possible distal esophagitis and gastritis. His stomach is notably dilated with air and fluid but no obvious mass or transition point. Differential diagnosis for his symptoms include gastric outlet obstruction, reflux/erosive esophagitis, Pill esophagitis, gastritis, post infectious gastroparesis      Hgb stable at 11.1 At this time, there is very low suspicion for GI bleeding    Plan  Given his advanced age, hemodynamic stability, stable hemoglobin and no further episodes of hematemesis; would favor a conservative approach for this gentleman.   NG tube places yesterday with return of non bloody gastric contents. Drained total of 525cc over 24hrs. NG clamp trial today. Repeat KUB now  Continue with high-dose PPI therapy.  Protonix 40 mg twice daily daily  Keep NPO for now  Can resume aspirin  Avoid NSAIDs  Trend CBC daily  Maintain type and screen  Antiemetic for nausea

## 2025-01-17 NOTE — ASSESSMENT & PLAN NOTE
In summary, this is 89-year-old male with a history of a flutter on aspirin monotherapy, hypertension, hyperlipidemia, CKD, gout as well as a reported remote history of peptic ulcer disease who initially presented with a history of 1 day of lower abdominal pain with reports of 2 episodes of coffee-ground emesis at the nursing home.  No recent NSAID use.  No EGD on file to review.  No other evidence of GI bleed.  On presentation he was hemodynamically stable.  His hemoglobin is 12.7 which is about his baseline.  His BUN is elevated at 31 (been in the 30s for the last few months).      He underwent a CT abdomen and pelvis that showed evidence of possible distal esophagitis and gastritis. His stomach is notably dilated with air and fluid but no obvious mass or transition point.  His hemoglobin has remained stable without any sign of overt GI bleeding.  There is low suspicion for acute/active GI bleed at this time.  Differential diagnosis for his symptoms include gastric outlet obstruction, reflux/erosive esophagitis, Pill esophagitis, gastritis, post infectious gastroparesis      Plan  Given his advanced age, hemodynamic stability, stable hemoglobin and no further episodes of hematemesis; would favor a conservative approach for this gentleman.  No plan for endoscopic evaluation   unfortunately, he is now tested positive for COVID 19  NG tube placed yesterday with return of non bloody gastric contents. Drained total of 525cc over 24hrs. NG clamp trial today. Repeat KUB now  Continue with high-dose PPI therapy.  Protonix 40 mg twice daily daily  Keep NPO for now  Can resume aspirin  Avoid NSAIDs  Trend CBC daily  Maintain type and screen  Antiemetic for nausea

## 2025-01-17 NOTE — ASSESSMENT & PLAN NOTE
Lab Results   Component Value Date    EGFR 56 01/17/2025    EGFR 60 01/16/2025    EGFR 61 01/15/2025    CREATININE 1.07 01/17/2025    CREATININE 1.01 01/16/2025    CREATININE 1.00 01/15/2025     Creatinine appears to be at baseline 0.9-1.1 mg/dL   Continue to monitor renal function closely  Avoid nephrotoxins and hypotension

## 2025-01-17 NOTE — ASSESSMENT & PLAN NOTE
Nursing reports that the patient has had 2 bowel movements since coming into the hospital, however today patient reports he is not passing gas  GI input appreciated   What Type Of Note Output Would You Prefer (Optional)?: Standard Output Hpi Title: Evaluation of Skin Lesions Have Your Spot(S) Been Treated In The Past?: has not been treated

## 2025-01-17 NOTE — ASSESSMENT & PLAN NOTE
Patient with fever and COVID-positive roommate at his skilled facility  Tested positive for COVID  Currently saturating 95 to 96% on room air  Will start remdesivir due to comorbidities  Continue supportive care  Monitor labs and vital signs, conduct serial physical assessments

## 2025-01-17 NOTE — PHYSICAL THERAPY NOTE
01/17/25 1331   PT Last Visit   PT Visit Date 01/17/25   Note Type   Note type Cancelled Session   Cancel Reasons Medical status   Additional Comments Will continue to follow.  PT held due to medical instability.

## 2025-01-17 NOTE — PROGRESS NOTES
Patient:    MRN:  [id number]    Aidin Request ID:  [id number]    Level of care reserved:  Skilled Nursing Facility    Partner Reserved:  [facility], [place] [id number] [phone]    Clinical needs requested:    Geography searched:  20 miles around [id number]    Start of Service:    Request sent:  3:36pm EST on 1/16/2025 by Dedra Fletcher    Partner reserved:  8:45am EST on 1/17/2025 by Dedra Fletcher    Choice list shared:  8:45am EST on 1/17/2025 by Dedra Fletcher

## 2025-01-17 NOTE — PROGRESS NOTES
Pastoral Care Progress Note  [facility] provided follow up visit to family. Pt's son bedside. Family shared that they hope that pt can be cleared of his medical problems soon so that he might return to his facility.   provided empathetic listening and support.           01/17/25 1200   Clinical Encounter Type   Visited With Family

## 2025-01-17 NOTE — ASSESSMENT & PLAN NOTE
Presented for suspected GI bleed  CT abdomen and pelvis 1/15/2025 with focal wall thickening of the lower esophagus, gastroesophageal junction, and proximal stomach which may reflect esophagitis/gastritis.  No evidence for bowel obstruction, mesenteric inflammation, appendicitis, obstructive uropathy, free air or free fluid  Chest x-ray 1/15/2025 with gaseous distention of the stomach  KUB 1/16/2025 with worsening distention  Gastroenterology input appreciated  Obtain repeat imaging today  Continue n.p.o.   Continue to hold aspirin   Continue Protonix 40 mg IV twice daily  Continue with conservative management for now.  Please see also, goals of care

## 2025-01-17 NOTE — ASSESSMENT & PLAN NOTE
Patient with significant comorbid medical conditions would be high risk for treatment options requiring anesthesia.  EGD testing might have risks that outweigh the benefits.  Patient verbalized understanding.  Opened goals of care conversations with son   Continue supportive care

## 2025-01-18 PROBLEM — J98.8 CONGESTION OF UPPER AIRWAY: Status: ACTIVE | Noted: 2025-01-18

## 2025-01-18 LAB
ALBUMIN SERPL BCG-MCNC: 2.5 G/DL (ref 3.5–5)
ALP SERPL-CCNC: 33 U/L (ref 34–104)
ALT SERPL W P-5'-P-CCNC: 14 U/L (ref 7–52)
ANION GAP SERPL CALCULATED.3IONS-SCNC: 11 MMOL/L (ref 4–13)
AST SERPL W P-5'-P-CCNC: 41 U/L (ref 13–39)
BASOPHILS # BLD MANUAL: 0.07 THOUSAND/UL (ref 0–0.1)
BASOPHILS NFR MAR MANUAL: 1 % (ref 0–1)
BILIRUB SERPL-MCNC: 0.38 MG/DL (ref 0.2–1)
BUN SERPL-MCNC: 18 MG/DL (ref 5–25)
CALCIUM ALBUM COR SERPL-MCNC: 8.4 MG/DL (ref 8.3–10.1)
CALCIUM SERPL-MCNC: 7.2 MG/DL (ref 8.4–10.2)
CHLORIDE SERPL-SCNC: 103 MMOL/L (ref 96–108)
CO2 SERPL-SCNC: 20 MMOL/L (ref 21–32)
CREAT SERPL-MCNC: 0.86 MG/DL (ref 0.6–1.3)
CRP SERPL QL: 95.3 MG/L
EOSINOPHIL # BLD MANUAL: 0 THOUSAND/UL (ref 0–0.4)
EOSINOPHIL NFR BLD MANUAL: 0 % (ref 0–6)
ERYTHROCYTE [DISTWIDTH] IN BLOOD BY AUTOMATED COUNT: 15 % (ref 11.6–15.1)
GFR SERPL CREATININE-BSD FRML MDRD: 71 ML/MIN/1.73SQ M
GLUCOSE SERPL-MCNC: 85 MG/DL (ref 65–140)
HCT VFR BLD AUTO: 30.4 % (ref 36.5–49.3)
HGB BLD-MCNC: 10 G/DL (ref 12–17)
HGB BLD-MCNC: 9.6 G/DL (ref 12–17)
LYMPHOCYTES # BLD AUTO: 0.43 THOUSAND/UL (ref 0.6–4.47)
LYMPHOCYTES # BLD AUTO: 6 % (ref 14–44)
MCH RBC QN AUTO: 31.6 PG (ref 26.8–34.3)
MCHC RBC AUTO-ENTMCNC: 31.6 G/DL (ref 31.4–37.4)
MCV RBC AUTO: 100 FL (ref 82–98)
METAMYELOCYTE ABSOLUTE CT: 0.07 THOUSAND/UL (ref 0–0.1)
METAMYELOCYTES NFR BLD MANUAL: 1 % (ref 0–1)
MONOCYTES # BLD AUTO: 0 THOUSAND/UL (ref 0–1.22)
MONOCYTES NFR BLD: 0 % (ref 4–12)
MYELOCYTE ABSOLUTE CT: 0.07 THOUSAND/UL (ref 0–0.1)
MYELOCYTES NFR BLD MANUAL: 1 % (ref 0–1)
NEUTROPHILS # BLD MANUAL: 6.54 THOUSAND/UL (ref 1.85–7.62)
NEUTS BAND NFR BLD MANUAL: 24 % (ref 0–8)
NEUTS SEG NFR BLD AUTO: 67 % (ref 43–75)
PLATELET # BLD AUTO: 129 THOUSANDS/UL (ref 149–390)
PLATELET BLD QL SMEAR: ABNORMAL
PMV BLD AUTO: 10 FL (ref 8.9–12.7)
POTASSIUM SERPL-SCNC: 3.7 MMOL/L (ref 3.5–5.3)
PROCALCITONIN SERPL-MCNC: 1.25 NG/ML
PROT SERPL-MCNC: 4.4 G/DL (ref 6.4–8.4)
RBC # BLD AUTO: 3.04 MILLION/UL (ref 3.88–5.62)
RBC MORPH BLD: NORMAL
SODIUM SERPL-SCNC: 134 MMOL/L (ref 135–147)
WBC # BLD AUTO: 7.19 THOUSAND/UL (ref 4.31–10.16)

## 2025-01-18 PROCEDURE — 84145 PROCALCITONIN (PCT): CPT | Performed by: NURSE PRACTITIONER

## 2025-01-18 PROCEDURE — 85018 HEMOGLOBIN: CPT | Performed by: NURSE PRACTITIONER

## 2025-01-18 PROCEDURE — 85027 COMPLETE CBC AUTOMATED: CPT | Performed by: NURSE PRACTITIONER

## 2025-01-18 PROCEDURE — 86140 C-REACTIVE PROTEIN: CPT | Performed by: NURSE PRACTITIONER

## 2025-01-18 PROCEDURE — 80053 COMPREHEN METABOLIC PANEL: CPT | Performed by: NURSE PRACTITIONER

## 2025-01-18 PROCEDURE — 99232 SBSQ HOSP IP/OBS MODERATE 35: CPT | Performed by: NURSE PRACTITIONER

## 2025-01-18 PROCEDURE — 85007 BL SMEAR W/DIFF WBC COUNT: CPT | Performed by: NURSE PRACTITIONER

## 2025-01-18 PROCEDURE — 94664 DEMO&/EVAL PT USE INHALER: CPT

## 2025-01-18 RX ORDER — FUROSEMIDE 10 MG/ML
40 INJECTION INTRAMUSCULAR; INTRAVENOUS ONCE
Status: COMPLETED | OUTPATIENT
Start: 2025-01-18 | End: 2025-01-18

## 2025-01-18 RX ORDER — HEPARIN SODIUM 5000 [USP'U]/ML
5000 INJECTION, SOLUTION INTRAVENOUS; SUBCUTANEOUS EVERY 8 HOURS SCHEDULED
Status: DISCONTINUED | OUTPATIENT
Start: 2025-01-18 | End: 2025-02-04

## 2025-01-18 RX ORDER — GLYCOPYRROLATE 0.2 MG/ML
0.1 INJECTION INTRAMUSCULAR; INTRAVENOUS EVERY 4 HOURS PRN
Status: DISCONTINUED | OUTPATIENT
Start: 2025-01-18 | End: 2025-02-05 | Stop reason: HOSPADM

## 2025-01-18 RX ADMIN — PANTOPRAZOLE SODIUM 40 MG: 40 INJECTION, POWDER, FOR SOLUTION INTRAVENOUS at 05:41

## 2025-01-18 RX ADMIN — METOROPROLOL TARTRATE 2.5 MG: 5 INJECTION, SOLUTION INTRAVENOUS at 03:06

## 2025-01-18 RX ADMIN — PANTOPRAZOLE SODIUM 40 MG: 40 INJECTION, POWDER, FOR SOLUTION INTRAVENOUS at 17:38

## 2025-01-18 RX ADMIN — ACETAMINOPHEN 1000 MG: 1000 INJECTION, SOLUTION INTRAVENOUS at 21:16

## 2025-01-18 RX ADMIN — BISACODYL 10 MG: 10 SUPPOSITORY RECTAL at 09:31

## 2025-01-18 RX ADMIN — METOROPROLOL TARTRATE 2.5 MG: 5 INJECTION, SOLUTION INTRAVENOUS at 09:40

## 2025-01-18 RX ADMIN — NYSTATIN 1 APPLICATION: 100000 CREAM TOPICAL at 09:41

## 2025-01-18 RX ADMIN — METOROPROLOL TARTRATE 2.5 MG: 5 INJECTION, SOLUTION INTRAVENOUS at 15:04

## 2025-01-18 RX ADMIN — GLYCOPYRROLATE 0.1 MG: 0.2 INJECTION, SOLUTION INTRAMUSCULAR; INTRAVENOUS at 21:16

## 2025-01-18 RX ADMIN — METOROPROLOL TARTRATE 2.5 MG: 5 INJECTION, SOLUTION INTRAVENOUS at 21:16

## 2025-01-18 RX ADMIN — FUROSEMIDE 40 MG: 10 INJECTION, SOLUTION INTRAMUSCULAR; INTRAVENOUS at 11:54

## 2025-01-18 RX ADMIN — HEPARIN SODIUM 5000 UNITS: 5000 INJECTION INTRAVENOUS; SUBCUTANEOUS at 15:04

## 2025-01-18 RX ADMIN — GLYCOPYRROLATE 0.1 MG: 0.2 INJECTION, SOLUTION INTRAMUSCULAR; INTRAVENOUS at 11:54

## 2025-01-18 RX ADMIN — REMDESIVIR 100 MG: 100 INJECTION, POWDER, LYOPHILIZED, FOR SOLUTION INTRAVENOUS at 15:02

## 2025-01-18 RX ADMIN — HEPARIN SODIUM 5000 UNITS: 5000 INJECTION INTRAVENOUS; SUBCUTANEOUS at 21:16

## 2025-01-18 NOTE — ASSESSMENT & PLAN NOTE
Upper airway congestion noted, receiving frequent suctioning  Patient does have strong cough, saturating well on room air  CT imaging revealed small bilateral pleural effusion and extensive bibasilar atelectasis  Maintain head of bed elevated  Aspiration precautions  Trial Robinul 0.1 mg IV every 4 hours as needed  Would aim to limit oral tracheal suctioning to Q8H or at the least 2 hours between attempts to avoid repetitive trauma, dryness, discomfort, or bradycardia and hypoxia  Add incentive spirometry  Will trial furosemide 40 mg IV x 1  Will consult RT

## 2025-01-18 NOTE — PLAN OF CARE
Problem: GASTROINTESTINAL - ADULT  Goal: Minimal or absence of nausea and/or vomiting  Description: INTERVENTIONS:  - Administer IV fluids if ordered to ensure adequate hydration  - Maintain NPO status until nausea and vomiting are resolved  - Nasogastric tube if ordered  - Administer ordered antiemetic medications as needed  - Provide nonpharmacologic comfort measures as appropriate  - Advance diet as tolerated, if ordered  - Consider nutrition services referral to assist patient with adequate nutrition and appropriate food choices  Outcome: Progressing  Goal: Maintains or returns to baseline bowel function  Description: INTERVENTIONS:  - Assess bowel function  - Encourage oral fluids to ensure adequate hydration  - Administer IV fluids if ordered to ensure adequate hydration  - Administer ordered medications as needed  - Encourage mobilization and activity  - Consider nutritional services referral to assist patient with adequate nutrition and appropriate food choices  Outcome: Progressing  Goal: Maintains adequate nutritional intake  Description: INTERVENTIONS:  - Monitor percentage of each meal consumed  - Identify factors contributing to decreased intake, treat as appropriate  - Assist with meals as needed  - Monitor I&O, weight, and lab values if indicated  - Obtain nutrition services referral as needed  Outcome: Progressing  Goal: Oral mucous membranes remain intact  Description: INTERVENTIONS  - Assess oral mucosa and hygiene practices  - Implement preventative oral hygiene regimen  - Implement oral medicated treatments as ordered  - Initiate Nutrition services referral as needed  Outcome: Progressing     Problem: INFECTION - ADULT  Goal: Absence or prevention of progression during hospitalization  Description: INTERVENTIONS:  - Assess and monitor for signs and symptoms of infection  - Monitor lab/diagnostic results  - Monitor all insertion sites, i.e. indwelling lines, tubes, and drains  - Monitor  endotracheal if appropriate and nasal secretions for changes in amount and color  - [place] appropriate cooling/warming therapies per order  - Administer medications as ordered  - Instruct and encourage patient and family to use good hand hygiene technique  - Identify and instruct in appropriate isolation precautions for identified infection/condition  1/18/2025 1430 by Lyndsey Moon RN  Outcome: Progressing  1/18/2025 1429 by Lyndsey Moon RN  Outcome: Progressing  Goal: Absence of fever/infection during neutropenic period  Description: INTERVENTIONS:  - Monitor WBC    1/18/2025 1430 by Lyndsey Moon RN  Outcome: Progressing  1/18/2025 1429 by Lynsdey Moon RN  Outcome: Progressing     Problem: RESPIRATORY - ADULT  Goal: Achieves optimal ventilation and oxygenation  Description: INTERVENTIONS:  - Assess for changes in respiratory status  - Assess for changes in mentation and behavior  - Position to facilitate oxygenation and minimize respiratory effort  - Oxygen administered by appropriate delivery if ordered  - Initiate smoking cessation education as indicated  - Encourage broncho-pulmonary hygiene including cough, deep breathe, Incentive Spirometry  - Assess the need for suctioning and aspirate as needed  - Assess and instruct to report SOB or any respiratory difficulty  - Respiratory Therapy support as indicated  Outcome: Progressing

## 2025-01-18 NOTE — PROGRESS NOTES
Progress Note - Hospitalist   Name: Prem Mar Sr. 89 y.o. male I MRN: [id number]  Unit/Bed#: [facility] Date of Admission: 1/15/2025   Date of Service: 1/18/2025 I Hospital Day: 2    Assessment & Plan  Upper GI bleed  Presented for suspected GI bleed  CT abdomen and pelvis 1/15/2025 with focal wall thickening of the lower esophagus, gastroesophageal junction, and proximal stomach which may reflect esophagitis/gastritis.  No evidence for bowel obstruction, mesenteric inflammation, appendicitis, obstructive uropathy, free air or free fluid  Chest x-ray 1/15/2025 with gaseous distention of the stomach  KUB 1/16/2025 with worsening distention  CT abdomen pelvis with contrast 1/17/2024: New small bilateral pleural effusions with extensive bibasilar atelectasis. Wall thickening of the lower esophagus and proximal stomach is again noted without interval change. This may reflect mild focal esophagitis/gastritis. New NG tube with tip terminating in the gastric body region. No evidence of bowel obstruction, mesenteric inflammation, appendicitis, obstructive uropathy, free air, or free fluid. Descending and sigmoid diverticulosis again noted without evidence for acute diverticulitis. Subtle wall thickening of the mid to distal sigmoid colon, rectum, and anal verge region could reflect mild focal colitis/proctitis. Multiple punctate pancreatic calcifications again seen likely sequelae of chronic pancreatitis  Keep NPO  Continue Protonix 40 mg IV twice daily  Continue with conservative management for now.  Please see also, goals of care  COVID-19 virus infection  Patient with fever and COVID-positive roommate at his skilled facility  Tested positive for COVID  Currently saturating 95 to 96% on room air  Continue remdesivir, D2  Continue supportive care  Monitor labs and vital signs, conduct serial physical assessments  Congestion of upper airway  Upper airway congestion noted, receiving frequent suctioning  Patient does  have strong cough, saturating well on room air  CT imaging revealed small bilateral pleural effusion and extensive bibasilar atelectasis  Maintain head of bed elevated  Aspiration precautions  Trial Robinul 0.1 mg IV every 4 hours as needed  Would aim to limit oral tracheal suctioning to Q8H or at the least 2 hours between attempts to avoid repetitive trauma, dryness, discomfort, or bradycardia and hypoxia  Add incentive spirometry  Will trial furosemide 40 mg IV x 1  Will consult RT  Goals of care, counseling/discussion  Patient with significant comorbid medical conditions would be high risk for treatment options requiring anesthesia.  EGD testing might have risks that outweigh the benefits.  Patient verbalized understanding.  Opened goals of care conversations with son.  Continued goals of care with son today by phone and will again meet with him in person when he arrives  Continue supportive care  Idiopathic chronic gout of multiple sites without tophus  Continue prehospital allopurinol 300 mg p.o. daily when tolerating p.o.  Essential hypertension  Continue prehospital Toprol-XL 25 mg p.o. daily and Lasix 20 mg p.o. daily on Monday Wednesday Friday  Monitor blood pressure  Mixed hyperlipidemia  Continue prehospital Tricor 48 mg p.o. daily when tolerating p.o.  Chronic kidney disease, stage 3a (HCC)  Lab Results   Component Value Date    EGFR 71 01/18/2025    EGFR 54 01/17/2025    EGFR 56 01/17/2025    CREATININE 0.86 01/18/2025    CREATININE 1.11 01/17/2025    CREATININE 1.07 01/17/2025     Creatinine appears to be at baseline 0.9-1.1 mg/dL   Continue to monitor renal function closely  Avoid nephrotoxins and hypotension  Atrial flutter (HCC)  Currently rate controlled with metoprolol   Not currently on anticoagulation as outpatient  Constipation  Nursing reports that the patient has had 2 bowel movements since coming into the hospital, however patient reporting that he is not passing gas  GI input  appreciated    VTE Pharmacologic Prophylaxis: VTE Score: 5 High Risk (Score >/= 5) - Pharmacological DVT Prophylaxis Ordered: heparin. Sequential Compression Devices Ordered.    Mobility:   Basic Mobility Inpatient Raw Score: 6  JH-HLM Goal: 2: Bed activities/Dependent transfer  JH-HLM Achieved: 2: Bed activities/Dependent transfer  JH-HLM Goal achieved. Continue to encourage appropriate mobility.    Patient Centered Rounds: I performed bedside rounds with nursing staff today.   Discussions with Specialists or Other Care Team Provider: SLIM Attending    Education and Discussions with Family / Patient: Updated  (son) via phone.    Current Length of Stay: 2 day(s)  Current Patient Status: Inpatient   Certification Statement: The patient will continue to require additional inpatient hospital stay due to abdominal distention, COVID infection, upper airway congestion, and goals of care.  Discharge Plan: Anticipate discharge in 48-72 hrs to D.    Code Status: Level 3 - DNAR and DNI    Subjective   Evaluated at bedside.  Denies pain or discomfort.  Says he is hungry and wants to lie flat.  Denies headache, myalgia, sore throat, shortness of breath, chest pain, nausea.     Objective :  Temp:  [97.7 °F (36.5 °C)-103 °F (39.4 °C)] 97.7 °F (36.5 °C)  HR:  [] 88  BP: (111-150)/(42-66) 119/42  Resp:  [18-20] 20  SpO2:  [91 %-99 %] 98 %  O2 Device: Nasal cannula  Nasal Cannula O2 Flow Rate (L/min):  [2 L/min] 2 L/min    Body mass index is 26.22 kg/m².     Input and Output Summary (last 24 hours):     Intake/Output Summary (Last 24 hours) at 1/18/2025 1114  Last data filed at 1/18/2025 0629  Gross per 24 hour   Intake 1175 ml   Output 350 ml   Net 825 ml       Physical Exam  Vitals and nursing note reviewed.   Constitutional:       Appearance: He is ill-appearing.   HENT:      Head: Normocephalic and atraumatic.      Mouth/Throat:      Mouth: Mucous membranes are moist.      Pharynx: Oropharynx is clear.    Eyes:      Pupils: Pupils are equal, round, and reactive to light.   Cardiovascular:      Rate and Rhythm: Normal rate and regular rhythm.      Pulses: Normal pulses.      Heart sounds: Normal heart sounds.   Pulmonary:      Effort: Pulmonary effort is normal. No respiratory distress.      Breath sounds: Rhonchi present.      Comments: Rhonchi on auscultation however no tachypnea or increased work of breathing at this time.  Suction canister with cream-colored fluid (suspect upper respiratory secretions mixed with saline due to frequent suctioning)  Abdominal:      General: Bowel sounds are normal. There is distension.      Palpations: Abdomen is soft.      Comments: Abdomen is distended however soft and nontender to deep palpation in all quadrants. NGT in place for tan/brown opaque material.  Suction was briefly turned to full to assess return, no additional fluid appeared in suction tubing and suction was returned to low intermittent.    Musculoskeletal:      Cervical back: Neck supple.      Right lower leg: No edema.      Left lower leg: No edema.   Skin:     General: Skin is warm and dry.      Capillary Refill: Capillary refill takes less than 2 seconds.   Neurological:      General: No focal deficit present.      Mental Status: He is alert and oriented to person, place, and time.      Comments: He is able to state that it is January 2025, although slowly.  He is able to state that he resides at the [facility].            Lines/Drains:  Lines/Drains/Airways       Active Status       Name Placement date Placement time Site Days    NG/OG/Enteral Tube Nasogastric 18 Fr Right nare 01/16/25  1052  Right nare  2                            Lab Results: I have reviewed the following results:   Results from last 7 days   Lab Units 01/18/25  0442 01/17/25  0832 01/17/25  0518   WBC Thousand/uL 7.19  --  7.33   HEMOGLOBIN g/dL 9.6*   < > 11.2*   HEMATOCRIT % 30.4*  --  34.1*   PLATELETS Thousands/uL 129*  --  164   BANDS PCT  % 24*  --   --    SEGS PCT %  --   --  77*   LYMPHO PCT % 6*  --  12*   MONO PCT % 0*  --  8   EOS PCT % 0  --  2    < > = values in this interval not displayed.     Results from last 7 days   Lab Units 01/18/25  0442   SODIUM mmol/L 134*   POTASSIUM mmol/L 3.7   CHLORIDE mmol/L 103   CO2 mmol/L 20*   BUN mg/dL 18   CREATININE mg/dL 0.86   ANION GAP mmol/L 11   CALCIUM mg/dL 7.2*   ALBUMIN g/dL 2.5*   TOTAL BILIRUBIN mg/dL 0.38   ALK PHOS U/L 33*   ALT U/L 14   AST U/L 41*   GLUCOSE RANDOM mg/dL 85     Results from last 7 days   Lab Units 01/15/25  0422   INR  0.94             Results from last 7 days   Lab Units 01/18/25  0442 01/15/25  0422   PROCALCITONIN ng/ml 1.25* <0.05       Recent Cultures (last 7 days):         Imaging Results Review: I reviewed radiology reports from this admission including: CT abdomen/pelvis.      Last 24 Hours Medication List:     Current Facility-Administered Medications:     acetaminophen (Ofirmev) injection 1,000 mg, Q8H PRN, Last Rate: 400 mL/hr at 01/18/25 0629    acetaminophen (TYLENOL) tablet 650 mg, Q4H PRN    allopurinol (ZYLOPRIM) tablet 300 mg, Daily    bisacodyl (DULCOLAX) rectal suppository 10 mg, Daily    bisacodyl (DULCOLAX) rectal suppository 10 mg, Daily PRN    Cholecalciferol (VITAMIN D3) tablet 2,000 Units, Daily    docusate (COLACE) oral liquid 100 mg, BID    fenofibrate (TRICOR) tablet 48 mg, Daily    furosemide (LASIX) injection 40 mg, Once    [Held by provider] furosemide (LASIX) tablet 20 mg, Once per day on Monday Wednesday Friday    gabapentin (NEURONTIN) capsule 100 mg, Q12H    glycopyrrolate (ROBINUL) injection 0.1 mg, Q4H PRN    iohexol (OMNIPAQUE) 240 MG/ML solution 50 mL, 90 min pre-procedure    metoprolol (LOPRESSOR) injection 2.5 mg, Q6H    multivitamin stress formula tablet 1 tablet, Daily    nitroglycerin (NITROSTAT) SL tablet 0.4 mg, Q5 Min PRN    nystatin (MYCOSTATIN) cream, BID    ondansetron (ZOFRAN) injection 4 mg, Q6H PRN    pantoprazole  (PROTONIX) injection 40 mg, Q12H    [COMPLETED] remdesivir (Veklury) 200 mg in sodium chloride 0.9 % 290 mL IVPB, Q24H **FOLLOWED BY** remdesivir (Veklury) 100 mg in sodium chloride 0.9 % 270 mL IVPB, Q24H    senna (SENOKOT) tablet 17.2 mg, HS    Administrative Statements   Today, Patient Was Seen By: ANAM Rose  I have spent a total time of 65 minutes in caring for this patient on the day of the visit/encounter including Diagnostic results, Prognosis, Risks and benefits of tx options, Instructions for management, Patient and family education, Importance of tx compliance, Risk factor reductions, Impressions, Counseling / Coordination of care, Documenting in the medical record, Reviewing / ordering tests, medicine, procedures  , Obtaining or reviewing history  , and Communicating with other healthcare professionals .    **Please Note: This note may have been constructed using a voice recognition system.**

## 2025-01-18 NOTE — QUICK NOTE
"Back to bedside to reassess patient.  He appears weak however his respirations are unlabored.  Congestion heard, however, patient in no acute distress.  Work of breathing is within normal limits.  Patient sleeping intermittently.  His son has arrived and is at bedside.  Updated regarding upper airway congestion and continued abdominal distention.  Expressed my concern that patient may become uncomfortable quickly.  Patient's son asked me if we could not \"suction his lungs out or open up his stomach\" and I reinforced that due to his age and medical comorbidities he is not a candidate for any procedure that would require anesthesia, operative or diagnostic.  I provided  support and told patient's son he did not have to make a decision right now, but it would be good to consider what he would do should an emergent condition arise. He said he would like to wait until his son arrives.  I told him to ask for me when he does.   "

## 2025-01-18 NOTE — ASSESSMENT & PLAN NOTE
Continue prehospital Toprol-XL 25 mg p.o. daily and Lasix 20 mg p.o. daily on Monday Wednesday Friday  Monitor blood pressure

## 2025-01-18 NOTE — PHYSICAL THERAPY NOTE
Physical Therapy Cancellation Note         01/18/25 0920   PT Last Visit   PT Visit Date 01/18/25   Note Type   Note type Cancelled Session   Cancel Reasons Medical status   Additional Comments Pt chart reviewed. Spoke to nursing about status of the pt, at this time PT is not appropriate due to pt's medical stability.     Hardik Keller PT

## 2025-01-18 NOTE — QUICK NOTE
Patient's grandson and daughter arrived.  Went to bedside and reviewed chart in entirety with family including imaging and specialty consultations.  Presented that although nothing may happen, something might happen and it would be best to be prepared for the decision.  I told them what transitioning to comfort care may look like, which would be removing the NG tube, allowing pleasure feeds, and medicating for symptoms.  Family will discuss.  I told them I remain available for questions or guidance.

## 2025-01-18 NOTE — QUICK NOTE
"Called patient's son, had in-depth discussion of patient's current condition.  I told him about the abdominal bloating and results of CT.  I informed him about the pharyngeal congestion and frequent suctioning.  I let him know that I think patient may be aspirating and I am afraid that he may be suffering.  I told him that his father asked me this morning to lay flat and also asked me for a \"steak or some meat.\"  Patient's oxygen saturation is 98%. There are rhonchi present on auscultation, however patient does have a strong cough.  I hope to continue the discussion when patient's son arrives to the hospital, especially in light of the upcoming snowstorm which may prevent travel.  "

## 2025-01-18 NOTE — ASSESSMENT & PLAN NOTE
Presented for suspected GI bleed  CT abdomen and pelvis 1/15/2025 with focal wall thickening of the lower esophagus, gastroesophageal junction, and proximal stomach which may reflect esophagitis/gastritis.  No evidence for bowel obstruction, mesenteric inflammation, appendicitis, obstructive uropathy, free air or free fluid  Chest x-ray 1/15/2025 with gaseous distention of the stomach  KUB 1/16/2025 with worsening distention  CT abdomen pelvis with contrast 1/17/2024: New small bilateral pleural effusions with extensive bibasilar atelectasis. Wall thickening of the lower esophagus and proximal stomach is again noted without interval change. This may reflect mild focal esophagitis/gastritis. New NG tube with tip terminating in the gastric body region. No evidence of bowel obstruction, mesenteric inflammation, appendicitis, obstructive uropathy, free air, or free fluid. Descending and sigmoid diverticulosis again noted without evidence for acute diverticulitis. Subtle wall thickening of the mid to distal sigmoid colon, rectum, and anal verge region could reflect mild focal colitis/proctitis. Multiple punctate pancreatic calcifications again seen likely sequelae of chronic pancreatitis  Keep NPO  Continue Protonix 40 mg IV twice daily  Continue with conservative management for now.  Please see also, goals of care

## 2025-01-18 NOTE — ASSESSMENT & PLAN NOTE
Patient with significant comorbid medical conditions would be high risk for treatment options requiring anesthesia.  EGD testing might have risks that outweigh the benefits.  Patient verbalized understanding.  Opened goals of care conversations with son.  Continued goals of care with son today by phone and will again meet with him in person when he arrives  Continue supportive care

## 2025-01-18 NOTE — ASSESSMENT & PLAN NOTE
Patient with fever and COVID-positive roommate at his skilled facility  Tested positive for COVID  Currently saturating 95 to 96% on room air  Continue remdesivir, D2  Continue supportive care  Monitor labs and vital signs, conduct serial physical assessments

## 2025-01-18 NOTE — RESPIRATORY THERAPY NOTE
RT Protocol Note  Prem Mar Sr. 89 y.o. male MRN: [id number]  Unit/Bed#: -01 Encounter: [id number]    Assessment    Principal Problem:    Upper GI bleed  Active Problems:    Essential hypertension    Idiopathic chronic gout of multiple sites without tophus    Mixed hyperlipidemia    Chronic kidney disease, stage 3a (HCC)    Atrial flutter (HCC)    Constipation    Goals of care, counseling/discussion    Acute distention of stomach    COVID-19 virus infection    Congestion of upper airway      Home Pulmonary Medications:  None    Home Devices/Therapy: Other (Comment) (None)    Past Medical History:   Diagnosis Date    Arthritis     Cataract     Coronary artery disease     Coronary atherosclerosis of native coronary artery     Diverticulitis of colon     Essential hypertension, benign     Hyperlipidemia     Hypertension     Peptic ulcer     Renal disorder      Social History     Socioeconomic History    Marital status:      Spouse name: None    Number of children: None    Years of education: None    Highest education level: None   Occupational History    None   Tobacco Use    Smoking status: Former     Types: Pipe    Smokeless tobacco: Never   Vaping Use    Vaping status: Never Used   Substance and Sexual Activity    Alcohol use: Not Currently    Drug use: No    Sexual activity: Not Currently   Other Topics Concern    None   Social History Narrative    None     Social Drivers of Health     Financial Resource Strain: Not on file   Food Insecurity: No Food Insecurity (1/15/2025)    Nursing - Inadequate Food Risk Classification     Worried About Running Out of Food in the Last Year: Never true     Ran Out of Food in the Last Year: Never true     Ran Out of Food in the Last Year: Never true   Transportation Needs: No Transportation Needs (1/15/2025)    Nursing - Transportation Risk Classification     Lack of Transportation: Not on file     Lack of Transportation: No   Physical Activity: Not on file  "  Stress: Not on file   Social Connections: Not on file   Intimate Partner Violence: Unknown (1/15/2025)    Nursing IPS     Feels Physically and Emotionally Safe: Not on file     Physically Hurt by Someone: Not on file     Humiliated or Emotionally Abused by Someone: Not on file     Physically Hurt by Someone: No     Hurt or Threatened by Someone: No   Housing Stability: Unknown (1/15/2025)    Nursing: Inadequate Housing Risk Classification     Has Housing: Not on file     Worried About Losing Housing: Not on file     Unable to Get Utilities: Not on file     Unable to Pay for Housing in the Last Year: No     Has Housin       Subjective         Objective    Physical Exam:   Assessment Type: Assess only  General Appearance: Alert, Awake  Respiratory Pattern: Dyspnea with exertion  Chest Assessment: Chest expansion symmetrical  Bilateral Breath Sounds: Diminished, Rales, Rhonchi  Cough: Strong, Productive  Suction: Oral (Patient coughed and was orally suctioned with the yankar from moderated amount of frothy tan secretions.)    Vitals:  Blood pressure 124/55, pulse 92, temperature 97.7 °F (36.5 °C), resp. rate 20, height 5' 6\" (1.676 m), weight 73.7 kg (162 lb 7.7 oz), SpO2 94%.          Imaging and other studies: Results Review Statement: I reviewed radiology reports from this admission including: chest xray.          Plan    Respiratory Plan:  (COVID / No Pulmonary history)        Resp Comments: Patient is Covid postitive. Patient given lasik and robinul by RN . Will continue to monitor the patient.   "

## 2025-01-18 NOTE — NURSING NOTE
Patient required multiple (q 15-30min) suction sessions, some lasting up to 25 minutes. When patient was removed from suction, and NG tube clamped, patient required more frequent suctioning due to fluid buildup and patient sounding like he is drowning.   Hospitalist, GI and Charge nurse notified.

## 2025-01-18 NOTE — ASSESSMENT & PLAN NOTE
Lab Results   Component Value Date    EGFR 71 01/18/2025    EGFR 54 01/17/2025    EGFR 56 01/17/2025    CREATININE 0.86 01/18/2025    CREATININE 1.11 01/17/2025    CREATININE 1.07 01/17/2025     Creatinine appears to be at baseline 0.9-1.1 mg/dL   Continue to monitor renal function closely  Avoid nephrotoxins and hypotension

## 2025-01-18 NOTE — CASE MANAGEMENT
Case Management Discharge Planning Note    Patient name Prem Mar Sr.  Location /-01 MRN [id number]  : 10/11/1924 Date 2025       Current Admission Date: 1/15/2025  Current Admission Diagnosis:Upper GI bleed   Patient Active Problem List    Diagnosis Date Noted Date Diagnosed    Acute distention of stomach 2025     COVID-19 virus infection 2025     Goals of care, counseling/discussion 2025     Upper GI bleed 01/15/2025     Constipation 01/15/2025     Tinea cruris 10/12/2024     Weakness 10/11/2024     Atrial flutter (HCC) 2024     Chronic pain syndrome 2024     Lumbar spondylosis 2024     Chronic midline low back pain without sciatica 2024     Nonexudative age-related macular degeneration of left eye 2023     Chronic kidney disease, stage 3a (HCC) 2023     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     Muscle wasting and atrophy, not elsewhere classified, multiple sites 2022     Difficulty swallowing 2022     Keratoma 2021     Venous insufficiency of both lower extremities 2021     Hypomagnesemia 2020     Mixed hyperlipidemia 2020     Coronary artery disease involving native coronary artery of native heart without angina pectoris 2018     Essential hypertension 2018     Bilateral leg edema 2018     Ventricular bigeminy 2018     Idiopathic chronic gout of multiple sites without tophus 05/10/2017     Edema 2014     Vitamin D deficiency 2013       LOS (days): 1  Geometric Mean LOS (GMLOS) (days): 4.5  Days to GMLOS:3.2     OBJECTIVE:  Risk of Unplanned Readmission Score: 17.59         Current admission status: Inpatient   Preferred Pharmacy:   [id number] - [place] - [address] [facility] [place]  [address] [place] [address]  [place] [address]  Phone: [phone] Fax: [phone]    Primary Care Provider: Leonard Schreiber  MD    Primary Insurance: JENNIFER ZENG  Secondary Insurance:     DISCHARGE DETAILS:       Freedom of Choice: Yes  Comments - Freedom of Choice: family would like the pt to retun to the [facility]- pt is able to return - he is a private bedhold - cm was asked to initate an auth prior to d/c - cm will continue to follow                               Other Referral/Resources/Interventions Provided:  Interventions: SNF  Referral Comments: [facility] -  pt has an ng tube, pt tested positive for covid,npo ct scan, GI following    Would you like to participate in our [facility] service program?  : No - Declined    Treatment Team Recommendation: SNF ([facility] need to start an auth - TBD)

## 2025-01-18 NOTE — PLAN OF CARE
Problem: PAIN - ADULT  Goal: Verbalizes/displays adequate comfort level or baseline comfort level  Description: Interventions:  - Encourage patient to monitor pain and request assistance  - Assess pain using appropriate pain scale  - Administer analgesics based on type and severity of pain and evaluate response  - Implement non-pharmacological measures as appropriate and evaluate response  - Consider cultural and social influences on pain and pain management  - Notify physician/advanced practitioner if interventions unsuccessful or patient reports new pain  Outcome: Progressing     Problem: INFECTION - ADULT  Goal: Absence or prevention of progression during hospitalization  Description: INTERVENTIONS:  - Assess and monitor for signs and symptoms of infection  - Monitor lab/diagnostic results  - Monitor all insertion sites, i.e. indwelling lines, tubes, and drains  - Monitor endotracheal if appropriate and nasal secretions for changes in amount and color  - [place] appropriate cooling/warming therapies per order  - Administer medications as ordered  - Instruct and encourage patient and family to use good hand hygiene technique  - Identify and instruct in appropriate isolation precautions for identified infection/condition  Outcome: Progressing  Goal: Absence of fever/infection during neutropenic period  Description: INTERVENTIONS:  - Monitor WBC    Outcome: Progressing     Problem: SAFETY ADULT  Goal: Patient will remain free of falls  Description: INTERVENTIONS:  - Educate patient/family on patient safety including physical limitations  - Instruct patient to call for assistance with activity   - Consult OT/PT to assist with strengthening/mobility   - Keep Call bell within reach  - Keep bed low and locked with side rails adjusted as appropriate  - Keep care items and personal belongings within reach  - Initiate and maintain comfort rounds  - Make Fall Risk Sign visible to staff  - Offer Toileting every 4 Hours,  in advance of need  - Initiate/Maintain bed alarm  - Obtain necessary fall risk management equipment: yellow socks   - Apply yellow socks and bracelet for high fall risk patients  - Consider moving patient to room near nurses station  Outcome: Progressing  Goal: Maintain or return to baseline ADL function  Description: INTERVENTIONS:  -  Assess patient's ability to carry out ADLs; assess patient's baseline for ADL function and identify physical deficits which impact ability to perform ADLs (bathing, care of mouth/teeth, toileting, grooming, dressing, etc.)  - Assess/evaluate cause of self-care deficits   - Assess range of motion  - Assess patient's mobility; develop plan if impaired  - Assess patient's need for assistive devices and provide as appropriate  - Encourage maximum independence but intervene and supervise when necessary  - Involve family in performance of ADLs  - Assess for home care needs following discharge   - Consider OT consult to assist with ADL evaluation and planning for discharge  - Provide patient education as appropriate  Outcome: Progressing  Goal: Maintains/Returns to pre admission functional level  Description: INTERVENTIONS:  - Perform AM-PAC 6 Click Basic Mobility/ Daily Activity assessment daily.  - Set and communicate daily mobility goal to care team and patient/family/caregiver.   - Collaborate with rehabilitation services on mobility goals if consulted  - Perform Range of Motion 3 times a day.  - Reposition patient every 2 hours.  - Dangle patient 3 times a day  - Stand patient 3 times a day  - Ambulate patient 3 times a day  - Out of bed to chair 3 times a day   - Out of bed for meals 3 times a day  - Out of bed for toileting  - Record patient progress and toleration of activity level   Outcome: Progressing

## 2025-01-18 NOTE — ASSESSMENT & PLAN NOTE
Nursing reports that the patient has had 2 bowel movements since coming into the hospital, however patient reporting that he is not passing gas  GI input appreciated

## 2025-01-19 ENCOUNTER — APPOINTMENT (INPATIENT)
Dept: RADIOLOGY | Facility: HOSPITAL | Age: OVER 89
DRG: 377 | End: 2025-01-19
Payer: COMMERCIAL

## 2025-01-19 LAB
ALBUMIN SERPL BCG-MCNC: 3.1 G/DL (ref 3.5–5)
ALP SERPL-CCNC: 45 U/L (ref 34–104)
ALT SERPL W P-5'-P-CCNC: 19 U/L (ref 7–52)
ANION GAP SERPL CALCULATED.3IONS-SCNC: 11 MMOL/L (ref 4–13)
AST SERPL W P-5'-P-CCNC: 49 U/L (ref 13–39)
BASOPHILS # BLD AUTO: 0 THOUSANDS/ΜL (ref 0–0.1)
BASOPHILS NFR BLD AUTO: 0 % (ref 0–1)
BILIRUB SERPL-MCNC: 0.43 MG/DL (ref 0.2–1)
BUN SERPL-MCNC: 28 MG/DL (ref 5–25)
CALCIUM ALBUM COR SERPL-MCNC: 8.7 MG/DL (ref 8.3–10.1)
CALCIUM SERPL-MCNC: 8 MG/DL (ref 8.4–10.2)
CHLORIDE SERPL-SCNC: 102 MMOL/L (ref 96–108)
CO2 SERPL-SCNC: 26 MMOL/L (ref 21–32)
CREAT SERPL-MCNC: 1.2 MG/DL (ref 0.6–1.3)
CRP SERPL QL: 177.3 MG/L
EOSINOPHIL # BLD AUTO: 0.01 THOUSAND/ΜL (ref 0–0.61)
EOSINOPHIL NFR BLD AUTO: 0 % (ref 0–6)
ERYTHROCYTE [DISTWIDTH] IN BLOOD BY AUTOMATED COUNT: 15 % (ref 11.6–15.1)
GFR SERPL CREATININE-BSD FRML MDRD: 49 ML/MIN/1.73SQ M
GLUCOSE SERPL-MCNC: 78 MG/DL (ref 65–140)
HCT VFR BLD AUTO: 35.3 % (ref 36.5–49.3)
HGB BLD-MCNC: 11.4 G/DL (ref 12–17)
IMM GRANULOCYTES # BLD AUTO: 0.03 THOUSAND/UL (ref 0–0.2)
IMM GRANULOCYTES NFR BLD AUTO: 1 % (ref 0–2)
LYMPHOCYTES # BLD AUTO: 0.73 THOUSANDS/ΜL (ref 0.6–4.47)
LYMPHOCYTES NFR BLD AUTO: 11 % (ref 14–44)
MCH RBC QN AUTO: 31.6 PG (ref 26.8–34.3)
MCHC RBC AUTO-ENTMCNC: 32.3 G/DL (ref 31.4–37.4)
MCV RBC AUTO: 98 FL (ref 82–98)
MONOCYTES # BLD AUTO: 0.28 THOUSAND/ΜL (ref 0.17–1.22)
MONOCYTES NFR BLD AUTO: 4 % (ref 4–12)
NEUTROPHILS # BLD AUTO: 5.48 THOUSANDS/ΜL (ref 1.85–7.62)
NEUTS SEG NFR BLD AUTO: 84 % (ref 43–75)
NRBC BLD AUTO-RTO: 0 /100 WBCS
PLATELET # BLD AUTO: 173 THOUSANDS/UL (ref 149–390)
PMV BLD AUTO: 10.1 FL (ref 8.9–12.7)
POTASSIUM SERPL-SCNC: 3.3 MMOL/L (ref 3.5–5.3)
PROCALCITONIN SERPL-MCNC: 1.82 NG/ML
PROT SERPL-MCNC: 5.8 G/DL (ref 6.4–8.4)
RBC # BLD AUTO: 3.61 MILLION/UL (ref 3.88–5.62)
SODIUM SERPL-SCNC: 139 MMOL/L (ref 135–147)
WBC # BLD AUTO: 6.53 THOUSAND/UL (ref 4.31–10.16)

## 2025-01-19 PROCEDURE — 71045 X-RAY EXAM CHEST 1 VIEW: CPT

## 2025-01-19 PROCEDURE — 86140 C-REACTIVE PROTEIN: CPT | Performed by: NURSE PRACTITIONER

## 2025-01-19 PROCEDURE — 80053 COMPREHEN METABOLIC PANEL: CPT | Performed by: NURSE PRACTITIONER

## 2025-01-19 PROCEDURE — 99232 SBSQ HOSP IP/OBS MODERATE 35: CPT | Performed by: NURSE PRACTITIONER

## 2025-01-19 PROCEDURE — 94664 DEMO&/EVAL PT USE INHALER: CPT

## 2025-01-19 PROCEDURE — 84145 PROCALCITONIN (PCT): CPT | Performed by: NURSE PRACTITIONER

## 2025-01-19 PROCEDURE — 85025 COMPLETE CBC W/AUTO DIFF WBC: CPT | Performed by: NURSE PRACTITIONER

## 2025-01-19 RX ORDER — POTASSIUM CHLORIDE 14.9 MG/ML
20 INJECTION INTRAVENOUS ONCE
Status: COMPLETED | OUTPATIENT
Start: 2025-01-19 | End: 2025-01-19

## 2025-01-19 RX ADMIN — HEPARIN SODIUM 5000 UNITS: 5000 INJECTION INTRAVENOUS; SUBCUTANEOUS at 21:25

## 2025-01-19 RX ADMIN — BISACODYL 10 MG: 10 SUPPOSITORY RECTAL at 10:25

## 2025-01-19 RX ADMIN — METOROPROLOL TARTRATE 2.5 MG: 5 INJECTION, SOLUTION INTRAVENOUS at 21:25

## 2025-01-19 RX ADMIN — REMDESIVIR 100 MG: 100 INJECTION, POWDER, LYOPHILIZED, FOR SOLUTION INTRAVENOUS at 15:34

## 2025-01-19 RX ADMIN — NYSTATIN: 100000 CREAM TOPICAL at 18:37

## 2025-01-19 RX ADMIN — NYSTATIN: 100000 CREAM TOPICAL at 10:27

## 2025-01-19 RX ADMIN — METOROPROLOL TARTRATE 2.5 MG: 5 INJECTION, SOLUTION INTRAVENOUS at 16:48

## 2025-01-19 RX ADMIN — DOCUSATE SODIUM 100 MG: 50 LIQUID ORAL at 10:27

## 2025-01-19 RX ADMIN — B-COMPLEX W/ C & FOLIC ACID TAB 1 TABLET: TAB at 10:26

## 2025-01-19 RX ADMIN — ACETAMINOPHEN 1000 MG: 1000 INJECTION, SOLUTION INTRAVENOUS at 15:30

## 2025-01-19 RX ADMIN — HEPARIN SODIUM 5000 UNITS: 5000 INJECTION INTRAVENOUS; SUBCUTANEOUS at 05:13

## 2025-01-19 RX ADMIN — FENOFIBRATE 48 MG: 48 TABLET, FILM COATED ORAL at 10:27

## 2025-01-19 RX ADMIN — ALLOPURINOL 300 MG: 300 TABLET ORAL at 10:26

## 2025-01-19 RX ADMIN — Medication 2000 UNITS: at 10:26

## 2025-01-19 RX ADMIN — PANTOPRAZOLE SODIUM 40 MG: 40 INJECTION, POWDER, FOR SOLUTION INTRAVENOUS at 05:13

## 2025-01-19 RX ADMIN — GLYCOPYRROLATE 0.1 MG: 0.2 INJECTION, SOLUTION INTRAMUSCULAR; INTRAVENOUS at 10:33

## 2025-01-19 RX ADMIN — METOROPROLOL TARTRATE 2.5 MG: 5 INJECTION, SOLUTION INTRAVENOUS at 10:33

## 2025-01-19 RX ADMIN — HEPARIN SODIUM 5000 UNITS: 5000 INJECTION INTRAVENOUS; SUBCUTANEOUS at 15:34

## 2025-01-19 RX ADMIN — METOROPROLOL TARTRATE 2.5 MG: 5 INJECTION, SOLUTION INTRAVENOUS at 02:15

## 2025-01-19 RX ADMIN — GLYCOPYRROLATE 0.1 MG: 0.2 INJECTION, SOLUTION INTRAMUSCULAR; INTRAVENOUS at 21:25

## 2025-01-19 RX ADMIN — GLYCOPYRROLATE 0.1 MG: 0.2 INJECTION, SOLUTION INTRAMUSCULAR; INTRAVENOUS at 05:36

## 2025-01-19 RX ADMIN — PANTOPRAZOLE SODIUM 40 MG: 40 INJECTION, POWDER, FOR SOLUTION INTRAVENOUS at 18:47

## 2025-01-19 RX ADMIN — POTASSIUM CHLORIDE 20 MEQ: 14.9 INJECTION, SOLUTION INTRAVENOUS at 10:25

## 2025-01-19 NOTE — ASSESSMENT & PLAN NOTE
Patient with fever and COVID-positive roommate at his skilled facility  Tested positive for COVID  Currently saturating 93 to 96% on room air  Continue remdesivir, D3  Continue Robinul for secretions, responded well  Continue supportive care  Monitor labs and vital signs, conduct serial physical assessments

## 2025-01-19 NOTE — PROGRESS NOTES
Progress Note - Hospitalist   Name: Prem Mar Sr. 89 y.o. male I MRN: [id number]  Unit/Bed#: [facility] Date of Admission: 1/15/2025   Date of Service: 1/19/2025 I Hospital Day: 3    Assessment & Plan  Upper GI bleed  Presented for suspected GI bleed  CT abdomen and pelvis 1/15/2025 with focal wall thickening of the lower esophagus, gastroesophageal junction, and proximal stomach which may reflect esophagitis/gastritis.  No evidence for bowel obstruction, mesenteric inflammation, appendicitis, obstructive uropathy, free air or free fluid  Chest x-ray 1/15/2025 with gaseous distention of the stomach  KUB 1/16/2025 with worsening distention  CT abdomen pelvis with contrast 1/17/2024: New small bilateral pleural effusions with extensive bibasilar atelectasis. Wall thickening of the lower esophagus and proximal stomach is again noted without interval change. This may reflect mild focal esophagitis/gastritis. New NG tube with tip terminating in the gastric body region. No evidence of bowel obstruction, mesenteric inflammation, appendicitis, obstructive uropathy, free air, or free fluid. Descending and sigmoid diverticulosis again noted without evidence for acute diverticulitis. Subtle wall thickening of the mid to distal sigmoid colon, rectum, and anal verge region could reflect mild focal colitis/proctitis. Multiple punctate pancreatic calcifications again seen likely sequelae of chronic pancreatitis  Keep NPO  Continue Protonix 40 mg IV twice daily  Continue with conservative management for now.  Please see also, goals of care  COVID-19 virus infection  Patient with fever and COVID-positive roommate at his skilled facility  Tested positive for COVID  Currently saturating 93 to 96% on room air  Continue remdesivir, D3  Continue Robinul for secretions, responded well  Continue supportive care  Monitor labs and vital signs, conduct serial physical assessments  Congestion of upper airway  Upper airway congestion  noted, patient has strong cough and able to clear  CT imaging revealed small bilateral pleural effusion and extensive bibasilar atelectasis  Maintain head of bed elevated  Aspiration precautions  Continue Robinul 0.1 mg IV every 4 hours as needed  Would aim to limit oral tracheal suctioning to Q8H or at the least 2 hours between attempts to avoid repetitive trauma, dryness, discomfort, or bradycardia and hypoxia  Continue incentive spirometry  Received furosemide 40 mg IV x 1 1/18/2025  Low-grade fever last evening, no leukocytosis  Procalcitonin trending upward, however only low-grade fever last evening (improving and suspect to be due to COVID) and no leukocytosis.  On room air.  Check chest x-ray  Goals of care, counseling/discussion  Patient with significant comorbid medical conditions would be high risk for treatment options requiring anesthesia.  EGD testing might have risks that outweigh the benefits.  Patient verbalized understanding.  Opened goals of care conversations with son.  Continued on phone on Saturday. Had further conversations with patient's son, daughter, and grandson when they arrived.  Ultimately they would like to keep him comfortable but for now continue with full treatment   Continue supportive care  Idiopathic chronic gout of multiple sites without tophus  Continue prehospital allopurinol 300 mg p.o. daily when tolerating p.o.  Essential hypertension  Prehospital takesToprol-XL 25 mg p.o. daily and Lasix 20 mg p.o. daily on Monday Wednesday Friday. Currently NPO. Metoprolol switched to IV. Received furosemide 40 mg IV x 1 yesterday  Monitor blood pressure  Mixed hyperlipidemia  Continue prehospital Tricor 48 mg p.o. daily when tolerating p.o.  Chronic kidney disease, stage 3a (HCC)  Lab Results   Component Value Date    EGFR 49 01/19/2025    EGFR 71 01/18/2025    EGFR 54 01/17/2025    CREATININE 1.20 01/19/2025    CREATININE 0.86 01/18/2025    CREATININE 1.11 01/17/2025     Creatinine  appears to be around baseline of 0.9-1.1 mg/dL   Continue to monitor renal function closely  Avoid nephrotoxins and hypotension  Atrial flutter (HCC)  Currently rate controlled with metoprolol   Not currently on anticoagulation as outpatient  Constipation  Nursing reports that the patient has had 2 bowel movements since coming into the hospital, however patient reporting that he is not passing gas. There have been no further BMs noted  GI input appreciated    VTE Pharmacologic Prophylaxis: VTE Score: 5 High Risk (Score >/= 5) - Pharmacological DVT Prophylaxis Ordered: heparin. Sequential Compression Devices Ordered.    Mobility:   Basic Mobility Inpatient Raw Score: 6  JH-HLM Goal: 2: Bed activities/Dependent transfer  JH-HLM Achieved: 2: Bed activities/Dependent transfer  JH-HLM Goal achieved. Continue to encourage appropriate mobility.    Patient Centered Rounds: I performed bedside rounds with nursing staff today.     Education and Discussions with Family / Patient: Updated  (son) via phone.  Will also attempt to update the rest of family when they arrive today.    Current Length of Stay: 3 day(s)  Current Patient Status: Inpatient   Certification Statement: The patient will continue to require additional inpatient hospital stay due to abdominal distention, COVID infection, upper airway congestion.  Discharge Plan: Anticipate discharge in 24-48 hrs to prior assisted or independent living facility.    Code Status: Level 3 - DNAR and DNI    Subjective   Evaluated at bedside.  The patient offers no complaints.  Says he is feeling a little better.  Denies myalgia, shortness of breath, chest pain, abdominal pain.  No BM.     Objective :  Temp:  [98.3 °F (36.8 °C)-101 °F (38.3 °C)] 99.5 °F (37.5 °C)  HR:  [69-93] 79  BP: (102-147)/(41-62) 145/58  Resp:  [16-20] 20  SpO2:  [91 %-98 %] 93 %  O2 Device: None (Room air)  Nasal Cannula O2 Flow Rate (L/min):  [2 L/min] 2 L/min    Body mass index is 26.22 kg/m².      Input and Output Summary (last 24 hours):     Intake/Output Summary (Last 24 hours) at 1/19/2025 0931  Last data filed at 1/19/2025 0530  Gross per 24 hour   Intake --   Output 450 ml   Net -450 ml       Physical Exam  Vitals and nursing note reviewed.   Constitutional:       General: He is not in acute distress.  HENT:      Head: Normocephalic and atraumatic.      Mouth/Throat:      Mouth: Mucous membranes are dry.      Pharynx: Oropharynx is clear.   Eyes:      Pupils: Pupils are equal, round, and reactive to light.   Cardiovascular:      Rate and Rhythm: Normal rate and regular rhythm.      Pulses: Normal pulses.      Heart sounds: Normal heart sounds.   Pulmonary:      Effort: Pulmonary effort is normal. No respiratory distress.      Breath sounds: Rhonchi present. No wheezing.      Comments: Coarse sonorous sounds on expiration  Abdominal:      General: Bowel sounds are normal.      Palpations: Abdomen is soft.      Tenderness: There is no abdominal tenderness.   Musculoskeletal:      Cervical back: Neck supple.      Right lower leg: Edema present.      Left lower leg: Edema present.   Skin:     General: Skin is warm and dry.      Capillary Refill: Capillary refill takes less than 2 seconds.   Neurological:      General: No focal deficit present.      Mental Status: He is alert and oriented to person, place, and time.           Lines/Drains:  Lines/Drains/Airways       Active Status       Name Placement date Placement time Site Days    NG/OG/Enteral Tube Nasogastric 18 Fr Right nare 01/16/25  1052  Right nare  2                            Lab Results: I have reviewed the following results:   Results from last 7 days   Lab Units 01/19/25  0523 01/18/25  0442   WBC Thousand/uL 6.53 7.19   HEMOGLOBIN g/dL 11.4* 9.6*   HEMATOCRIT % 35.3* 30.4*   PLATELETS Thousands/uL 173 129*   BANDS PCT %  --  24*   SEGS PCT % 84*  --    LYMPHO PCT % 11* 6*   MONO PCT % 4 0*   EOS PCT % 0 0     Results from last 7 days   Lab  Units 01/19/25  0523   SODIUM mmol/L 139   POTASSIUM mmol/L 3.3*   CHLORIDE mmol/L 102   CO2 mmol/L 26   BUN mg/dL 28*   CREATININE mg/dL 1.20   ANION GAP mmol/L 11   CALCIUM mg/dL 8.0*   ALBUMIN g/dL 3.1*   TOTAL BILIRUBIN mg/dL 0.43   ALK PHOS U/L 45   ALT U/L 19   AST U/L 49*   GLUCOSE RANDOM mg/dL 78     Results from last 7 days   Lab Units 01/15/25  0422   INR  0.94             Results from last 7 days   Lab Units 01/19/25  0523 01/18/25  0442 01/15/25  0422   PROCALCITONIN ng/ml 1.82* 1.25* <0.05       Recent Cultures (last 7 days):               Last 24 Hours Medication List:     Current Facility-Administered Medications:     acetaminophen (Ofirmev) injection 1,000 mg, Q8H PRN, Last Rate: 1,000 mg (01/18/25 2116)    acetaminophen (TYLENOL) tablet 650 mg, Q4H PRN    allopurinol (ZYLOPRIM) tablet 300 mg, Daily    bisacodyl (DULCOLAX) rectal suppository 10 mg, Daily    bisacodyl (DULCOLAX) rectal suppository 10 mg, Daily PRN    Cholecalciferol (VITAMIN D3) tablet 2,000 Units, Daily    docusate (COLACE) oral liquid 100 mg, BID    fenofibrate (TRICOR) tablet 48 mg, Daily    [Held by provider] furosemide (LASIX) tablet 20 mg, Once per day on Monday Wednesday Friday    gabapentin (NEURONTIN) capsule 100 mg, Q12H    glycopyrrolate (ROBINUL) injection 0.1 mg, Q4H PRN    heparin (porcine) subcutaneous injection 5,000 Units, Q8H ANKIT    iohexol (OMNIPAQUE) 240 MG/ML solution 50 mL, 90 min pre-procedure    metoprolol (LOPRESSOR) injection 2.5 mg, Q6H    multivitamin stress formula tablet 1 tablet, Daily    nitroglycerin (NITROSTAT) SL tablet 0.4 mg, Q5 Min PRN    nystatin (MYCOSTATIN) cream, BID    ondansetron (ZOFRAN) injection 4 mg, Q6H PRN    pantoprazole (PROTONIX) injection 40 mg, Q12H    potassium chloride 20 mEq IVPB (premix), Once    [COMPLETED] remdesivir (Veklury) 200 mg in sodium chloride 0.9 % 290 mL IVPB, Q24H **FOLLOWED BY** remdesivir (Veklury) 100 mg in sodium chloride 0.9 % 270 mL IVPB, Q24H    senna  (SENOKOT) tablet 17.2 mg, HS    Administrative Statements   Today, Patient Was Seen By: ANAM Rose  I have spent a total time of 50 minutes in caring for this patient on the day of the visit/encounter including Diagnostic results, Prognosis, Instructions for management, Patient and family education, Importance of tx compliance, Impressions, Counseling / Coordination of care, Documenting in the medical record, Reviewing / ordering tests, medicine, procedures  , Obtaining or reviewing history  , and Communicating with other healthcare professionals .    **Please Note: This note may have been constructed using a voice recognition system.**

## 2025-01-19 NOTE — PLAN OF CARE
Problem: PAIN - ADULT  Goal: Verbalizes/displays adequate comfort level or baseline comfort level  Description: Interventions:  - Encourage patient to monitor pain and request assistance  - Assess pain using appropriate pain scale  - Administer analgesics based on type and severity of pain and evaluate response  - Implement non-pharmacological measures as appropriate and evaluate response  - Consider cultural and social influences on pain and pain management  - Notify physician/advanced practitioner if interventions unsuccessful or patient reports new pain  Outcome: Progressing     Problem: INFECTION - ADULT  Goal: Absence or prevention of progression during hospitalization  Description: INTERVENTIONS:  - Assess and monitor for signs and symptoms of infection  - Monitor lab/diagnostic results  - Monitor all insertion sites, i.e. indwelling lines, tubes, and drains  - Monitor endotracheal if appropriate and nasal secretions for changes in amount and color  - [place] appropriate cooling/warming therapies per order  - Administer medications as ordered  - Instruct and encourage patient and family to use good hand hygiene technique  - Identify and instruct in appropriate isolation precautions for identified infection/condition  Outcome: Progressing  Goal: Absence of fever/infection during neutropenic period  Description: INTERVENTIONS:  - Monitor WBC    Outcome: Progressing     Problem: SAFETY ADULT  Goal: Patient will remain free of falls  Description: INTERVENTIONS:  - Educate patient/family on patient safety including physical limitations  - Instruct patient to call for assistance with activity   - Consult OT/PT to assist with strengthening/mobility   - Keep Call bell within reach  - Keep bed low and locked with side rails adjusted as appropriate  - Keep care items and personal belongings within reach  - Initiate and maintain comfort rounds  - Make Fall Risk Sign visible to staff  - Offer Toileting every 4 Hours,  in advance of need  - Initiate/Maintain bedalarm  - Obtain necessary fall risk management equipment: yellow socks   - Apply yellow socks and bracelet for high fall risk patients  - Consider moving patient to room near nurses station  Outcome: Progressing  Goal: Maintain or return to baseline ADL function  Description: INTERVENTIONS:  -  Assess patient's ability to carry out ADLs; assess patient's baseline for ADL function and identify physical deficits which impact ability to perform ADLs (bathing, care of mouth/teeth, toileting, grooming, dressing, etc.)  - Assess/evaluate cause of self-care deficits   - Assess range of motion  - Assess patient's mobility; develop plan if impaired  - Assess patient's need for assistive devices and provide as appropriate  - Encourage maximum independence but intervene and supervise when necessary  - Involve family in performance of ADLs  - Assess for home care needs following discharge   - Consider OT consult to assist with ADL evaluation and planning for discharge  - Provide patient education as appropriate  Outcome: Progressing  Goal: Maintains/Returns to pre admission functional level  Description: INTERVENTIONS:  - Perform AM-PAC 6 Click Basic Mobility/ Daily Activity assessment daily.  - Set and communicate daily mobility goal to care team and patient/family/caregiver.   - Collaborate with rehabilitation services on mobility goals if consulted  - Perform Range of Motion 3 times a day.  - Reposition patient every 2 hours.  - Dangle patient 3 times a day  - Stand patient 3 times a day  - Ambulate patient 3 times a day  - Out of bed to chair 3 times a day   - Out of bed for meals 3 times a day  - Out of bed for toileting  - Record patient progress and toleration of activity level   Outcome: Progressing

## 2025-01-19 NOTE — ASSESSMENT & PLAN NOTE
Prehospital takesToprol-XL 25 mg p.o. daily and Lasix 20 mg p.o. daily on Monday Wednesday Friday. Currently NPO. Metoprolol switched to IV. Received furosemide 40 mg IV x 1 yesterday  Monitor blood pressure

## 2025-01-19 NOTE — ASSESSMENT & PLAN NOTE
Upper airway congestion noted, patient has strong cough and able to clear  CT imaging revealed small bilateral pleural effusion and extensive bibasilar atelectasis  Maintain head of bed elevated  Aspiration precautions  Continue Robinul 0.1 mg IV every 4 hours as needed  Would aim to limit oral tracheal suctioning to Q8H or at the least 2 hours between attempts to avoid repetitive trauma, dryness, discomfort, or bradycardia and hypoxia  Continue incentive spirometry  Received furosemide 40 mg IV x 1 1/18/2025  Low-grade fever last evening, no leukocytosis  Procalcitonin trending upward, however only low-grade fever last evening (improving and suspect to be due to COVID) and no leukocytosis.  On room air.  Check chest x-ray

## 2025-01-19 NOTE — ASSESSMENT & PLAN NOTE
Lab Results   Component Value Date    EGFR 49 01/19/2025    EGFR 71 01/18/2025    EGFR 54 01/17/2025    CREATININE 1.20 01/19/2025    CREATININE 0.86 01/18/2025    CREATININE 1.11 01/17/2025     Creatinine appears to be around baseline of 0.9-1.1 mg/dL   Continue to monitor renal function closely  Avoid nephrotoxins and hypotension

## 2025-01-19 NOTE — ASSESSMENT & PLAN NOTE
Nursing reports that the patient has had 2 bowel movements since coming into the hospital, however patient reporting that he is not passing gas. There have been no further BMs noted  GI input appreciated

## 2025-01-19 NOTE — ASSESSMENT & PLAN NOTE
Patient with significant comorbid medical conditions would be high risk for treatment options requiring anesthesia.  EGD testing might have risks that outweigh the benefits.  Patient verbalized understanding.  Opened goals of care conversations with son.  Continued on phone on Saturday. Had further conversations with patient's son, daughter, and grandson when they arrived.  Ultimately they would like to keep him comfortable but for now continue with full treatment   Continue supportive care

## 2025-01-20 ENCOUNTER — APPOINTMENT (INPATIENT)
Dept: RADIOLOGY | Facility: HOSPITAL | Age: OVER 89
DRG: 377 | End: 2025-01-20
Payer: COMMERCIAL

## 2025-01-20 PROBLEM — R93.89 OPACITY NOTED ON IMAGING STUDY: Status: ACTIVE | Noted: 2025-01-20

## 2025-01-20 LAB
ALBUMIN SERPL BCG-MCNC: 3.1 G/DL (ref 3.5–5)
ALP SERPL-CCNC: 48 U/L (ref 34–104)
ALT SERPL W P-5'-P-CCNC: 21 U/L (ref 7–52)
ANION GAP SERPL CALCULATED.3IONS-SCNC: 10 MMOL/L (ref 4–13)
AST SERPL W P-5'-P-CCNC: 49 U/L (ref 13–39)
BASOPHILS # BLD AUTO: 0.01 THOUSANDS/ΜL (ref 0–0.1)
BASOPHILS NFR BLD AUTO: 0 % (ref 0–1)
BILIRUB SERPL-MCNC: 0.54 MG/DL (ref 0.2–1)
BUN SERPL-MCNC: 30 MG/DL (ref 5–25)
CALCIUM ALBUM COR SERPL-MCNC: 8.9 MG/DL (ref 8.3–10.1)
CALCIUM SERPL-MCNC: 8.2 MG/DL (ref 8.4–10.2)
CHLORIDE SERPL-SCNC: 103 MMOL/L (ref 96–108)
CO2 SERPL-SCNC: 25 MMOL/L (ref 21–32)
CREAT SERPL-MCNC: 1.11 MG/DL (ref 0.6–1.3)
CRP SERPL QL: 198.8 MG/L
EOSINOPHIL # BLD AUTO: 0 THOUSAND/ΜL (ref 0–0.61)
EOSINOPHIL NFR BLD AUTO: 0 % (ref 0–6)
ERYTHROCYTE [DISTWIDTH] IN BLOOD BY AUTOMATED COUNT: 15.3 % (ref 11.6–15.1)
GFR SERPL CREATININE-BSD FRML MDRD: 54 ML/MIN/1.73SQ M
GLUCOSE SERPL-MCNC: 95 MG/DL (ref 65–140)
HCT VFR BLD AUTO: 35.7 % (ref 36.5–49.3)
HGB BLD-MCNC: 11.4 G/DL (ref 12–17)
IMM GRANULOCYTES # BLD AUTO: 0.03 THOUSAND/UL (ref 0–0.2)
IMM GRANULOCYTES NFR BLD AUTO: 0 % (ref 0–2)
LYMPHOCYTES # BLD AUTO: 0.63 THOUSANDS/ΜL (ref 0.6–4.47)
LYMPHOCYTES NFR BLD AUTO: 8 % (ref 14–44)
MAGNESIUM SERPL-MCNC: 2.2 MG/DL (ref 1.9–2.7)
MCH RBC QN AUTO: 31.4 PG (ref 26.8–34.3)
MCHC RBC AUTO-ENTMCNC: 31.9 G/DL (ref 31.4–37.4)
MCV RBC AUTO: 98 FL (ref 82–98)
MONOCYTES # BLD AUTO: 0.27 THOUSAND/ΜL (ref 0.17–1.22)
MONOCYTES NFR BLD AUTO: 3 % (ref 4–12)
NEUTROPHILS # BLD AUTO: 7.17 THOUSANDS/ΜL (ref 1.85–7.62)
NEUTS SEG NFR BLD AUTO: 89 % (ref 43–75)
NRBC BLD AUTO-RTO: 0 /100 WBCS
PLATELET # BLD AUTO: 202 THOUSANDS/UL (ref 149–390)
PMV BLD AUTO: 10 FL (ref 8.9–12.7)
POTASSIUM SERPL-SCNC: 3.3 MMOL/L (ref 3.5–5.3)
PROCALCITONIN SERPL-MCNC: 1.43 NG/ML
PROT SERPL-MCNC: 6 G/DL (ref 6.4–8.4)
RBC # BLD AUTO: 3.63 MILLION/UL (ref 3.88–5.62)
SODIUM SERPL-SCNC: 138 MMOL/L (ref 135–147)
WBC # BLD AUTO: 8.11 THOUSAND/UL (ref 4.31–10.16)

## 2025-01-20 PROCEDURE — 99232 SBSQ HOSP IP/OBS MODERATE 35: CPT | Performed by: INTERNAL MEDICINE

## 2025-01-20 PROCEDURE — 92610 EVALUATE SWALLOWING FUNCTION: CPT

## 2025-01-20 PROCEDURE — 86140 C-REACTIVE PROTEIN: CPT | Performed by: NURSE PRACTITIONER

## 2025-01-20 PROCEDURE — 74018 RADEX ABDOMEN 1 VIEW: CPT

## 2025-01-20 PROCEDURE — 80053 COMPREHEN METABOLIC PANEL: CPT | Performed by: NURSE PRACTITIONER

## 2025-01-20 PROCEDURE — 83735 ASSAY OF MAGNESIUM: CPT | Performed by: NURSE PRACTITIONER

## 2025-01-20 PROCEDURE — 85025 COMPLETE CBC W/AUTO DIFF WBC: CPT | Performed by: NURSE PRACTITIONER

## 2025-01-20 PROCEDURE — 94664 DEMO&/EVAL PT USE INHALER: CPT

## 2025-01-20 PROCEDURE — 99232 SBSQ HOSP IP/OBS MODERATE 35: CPT | Performed by: NURSE PRACTITIONER

## 2025-01-20 PROCEDURE — 84145 PROCALCITONIN (PCT): CPT | Performed by: NURSE PRACTITIONER

## 2025-01-20 PROCEDURE — 94760 N-INVAS EAR/PLS OXIMETRY 1: CPT

## 2025-01-20 RX ORDER — CEFTRIAXONE 1 G/50ML
1000 INJECTION, SOLUTION INTRAVENOUS EVERY 24 HOURS
Status: DISCONTINUED | OUTPATIENT
Start: 2025-01-20 | End: 2025-01-21

## 2025-01-20 RX ADMIN — METOROPROLOL TARTRATE 2.5 MG: 5 INJECTION, SOLUTION INTRAVENOUS at 14:13

## 2025-01-20 RX ADMIN — ACETAMINOPHEN 1000 MG: 1000 INJECTION, SOLUTION INTRAVENOUS at 11:21

## 2025-01-20 RX ADMIN — HEPARIN SODIUM 5000 UNITS: 5000 INJECTION INTRAVENOUS; SUBCUTANEOUS at 04:17

## 2025-01-20 RX ADMIN — PANTOPRAZOLE SODIUM 40 MG: 40 INJECTION, POWDER, FOR SOLUTION INTRAVENOUS at 17:43

## 2025-01-20 RX ADMIN — GLYCOPYRROLATE 0.1 MG: 0.2 INJECTION, SOLUTION INTRAMUSCULAR; INTRAVENOUS at 22:01

## 2025-01-20 RX ADMIN — GLYCOPYRROLATE 0.1 MG: 0.2 INJECTION, SOLUTION INTRAMUSCULAR; INTRAVENOUS at 16:12

## 2025-01-20 RX ADMIN — METOROPROLOL TARTRATE 2.5 MG: 5 INJECTION, SOLUTION INTRAVENOUS at 02:05

## 2025-01-20 RX ADMIN — NYSTATIN: 100000 CREAM TOPICAL at 17:46

## 2025-01-20 RX ADMIN — ACETAMINOPHEN 1000 MG: 1000 INJECTION, SOLUTION INTRAVENOUS at 19:45

## 2025-01-20 RX ADMIN — HEPARIN SODIUM 5000 UNITS: 5000 INJECTION INTRAVENOUS; SUBCUTANEOUS at 14:13

## 2025-01-20 RX ADMIN — HEPARIN SODIUM 5000 UNITS: 5000 INJECTION INTRAVENOUS; SUBCUTANEOUS at 22:01

## 2025-01-20 RX ADMIN — GLYCOPYRROLATE 0.1 MG: 0.2 INJECTION, SOLUTION INTRAMUSCULAR; INTRAVENOUS at 11:21

## 2025-01-20 RX ADMIN — PANTOPRAZOLE SODIUM 40 MG: 40 INJECTION, POWDER, FOR SOLUTION INTRAVENOUS at 04:16

## 2025-01-20 RX ADMIN — METOROPROLOL TARTRATE 2.5 MG: 5 INJECTION, SOLUTION INTRAVENOUS at 08:24

## 2025-01-20 RX ADMIN — GLYCOPYRROLATE 0.1 MG: 0.2 INJECTION, SOLUTION INTRAMUSCULAR; INTRAVENOUS at 02:05

## 2025-01-20 RX ADMIN — BISACODYL 10 MG: 10 SUPPOSITORY RECTAL at 08:24

## 2025-01-20 RX ADMIN — NYSTATIN: 100000 CREAM TOPICAL at 10:44

## 2025-01-20 RX ADMIN — CEFTRIAXONE 1000 MG: 1 INJECTION, SOLUTION INTRAVENOUS at 10:44

## 2025-01-20 RX ADMIN — METOROPROLOL TARTRATE 2.5 MG: 5 INJECTION, SOLUTION INTRAVENOUS at 22:01

## 2025-01-20 NOTE — ASSESSMENT & PLAN NOTE
Chest x-ray: Retrocardiac opacity, which may be due to pneumonia and/or atelectasis.  Procalcitonin peaked and downtrending, with intermittent fever, remains without leukocytosis.  On room air.  Will start ceftriaxone IV empirically  Recheck procalcitonin tomorrow  Monitor labs and vital signs, conduct serial physical assessments

## 2025-01-20 NOTE — SPEECH THERAPY NOTE
Speech Language/Pathology  Speech/Language Pathology  Assessment    Patient Name: Prem Mar Sr.  Today's Date: 1/20/2025     Problem List  Principal Problem:    Upper GI bleed  Active Problems:    Essential hypertension    Idiopathic chronic gout of multiple sites without tophus    Mixed hyperlipidemia    Chronic kidney disease, stage 3a (HCC)    Atrial flutter (HCC)    Constipation    Goals of care, counseling/discussion    Acute distention of stomach    COVID-19 virus infection    Congestion of upper airway    Opacity noted on imaging study    Past Medical History  Past Medical History:   Diagnosis Date    Arthritis     Cataract     Coronary artery disease     Coronary atherosclerosis of native coronary artery     Diverticulitis of colon     Essential hypertension, benign     Hyperlipidemia     Hypertension     Peptic ulcer     Renal disorder      Past Surgical History  Past Surgical History:   Procedure Laterality Date    CATARACT EXTRACTION Right     Left eye 5/2021    CORONARY STENT PLACEMENT      SKIN BIOPSY      TONSILLECTOMY        Bedside Swallow Evaluation:    Summary:  Pt presented w/ mild oral and suspect MOD/severe pharyngeal dysphagia. Positioned upright and alert. Pt audibly wet upon ST arrival. Verbal cues to cough. Provided with deep suction pt is intermittently able to bring up secretions. Removed MOD amount of thick yellowish secretions. Continues with audible wet vocal quality. Verbal cues to swallow in attempt to clear. Laryngeal rise upon palpation appeared adequate. However pt immediate cough response, suction provided continued to remove secretions. Did not administer trials as high risk of aspiration. Pt does intiate gag response. Oral care completed, pt tolerated well. Spoke with Son at bedside reviewed recommendations. Spoke with nursing reported frequent suctioning. Encouraged pt to continue to sit fully upright.     Recommendations:  Diet:NPO   Meds:nonoral    Positioning:Upright  Pt to take PO/Meds only when fully alert and upright.   Oral care: FREQUENT   Aspiration precautions  Reflux precautions  Therapy Prognosis:guarded   Prognosis considerations:age, medical status   Frequency:2-5 times weekly as indicated.     Consider consult w/:  Rehab  GI following   Palliative     Goal(s):  Dysphagia LTG  -Patient will demonstrate safe and effective oral intake (without overt s/s significant oral/pharyngeal dysphagia including s/s penetration or aspiration) for the highest appropriate diet level.     1.Pt will tolerate least restrictive diet w/out s/s aspiration or oral/pharyngeal difficulties.   2.Pt will will effectively manipulate/masticate and transfer purees/solids w/out s/s dysphagia/aspiration.   3.Pt will tolerate thin liquids w/out s/s aspiration.   -If indicated, patient will comply with a Video/Modified Barium Swallow study for more complete assessment of swallowing anatomy/physiology/aspiration risk and to assess efficacy of treatment techniques so as to best guide treatment plan     H&P/Admit info/ pertinent provider notes: (PMH noted above)  Prem RUVALCABA Zaid Bonner is a 89 y.o. male with a PMH of atrial flutter, peptic ulcer disease who presents with abdominal pain, nausea and coffee-ground emesis x 2.  Patient with a history of atrial flutter on chronic aspirin 81 mg p.o. daily and additionally has a history of peptic ulcer disease sent to ER for further evaluation and treatment of his 1 day history of lower abdominal pain followed by 2 episodes of dark emesis which was described by nursing home staff as having coffee ground appearance.  Routine labs obtained in ER showed hemoglobin was at baseline and CT abdomen pelvis showed focal wall thickening of the lower esophagus and gastroesophageal junction and proximal stomach.     Special Studies:  XR CHEST PORTABLE   IMPRESSION:  Retrocardiac opacity, which may be due to pneumonia and/or  atelectasis    Procalcitonin: 1.43                       01/20/2025   WBC: 8.11                       01/20/2025     Code Status : Level 3 DNR/DNI    Previous MBS:none     Patient's goal: none stated     Did the pt report pain? No   If yes, was nursing notified/was it addressed? N/a    Reason for consult:  R/o aspiration  Determine safest and least restrictive diet  poor intake    Precautions:  Aspiration    Food Allergies: No Known    Current Diet: NPO    Premorbid diet: Dysphagia 3 dental soft and thin    O2 requirement: Room air    Social/Prior living [place]    Voice/Speech: MOD wet vocal quality    Follows commands: Basic    Cognitive status: Alert      Oral mech exam:  Dentition: Missing most, few natural remaining   Lips (VII): WNL  Tongue (XII): midline   Mandible (V):adequate   Face/oral sensation (V): WNL  Velum (X):WNL  Secretion management:reduced   Volitional cough:adequate   Volitional swallow: prompt     Pharyngeal stage:  Swallow promptness: prompt   Laryngeal rise: adequate appeared min delayed.   No overt s/s aspiration    Esophageal stage:  No s/s reported  H/o GERD    Aspiration precautions posted    Results d/w:  Pt, nursing, CRNP

## 2025-01-20 NOTE — OCCUPATIONAL THERAPY NOTE
Occupational Therapy Cancellation Note     01/20/25 0942   OT Last Visit   OT Visit Date 01/20/25   Note Type   Note type Cancelled Session   Cancel Reasons Medical status   Additional Comments RN requesting hold at this time d/t medical status     OT orders received. Chart reviewed. Will continue to follow-up as able and appropriate  Sanjuana Roach OTR/L

## 2025-01-20 NOTE — ASSESSMENT & PLAN NOTE
Upper airway congestion noted, patient has strong cough and able to clear  CT imaging revealed small bilateral pleural effusion and extensive bibasilar atelectasis  Maintain head of bed elevated  Aspiration precautions  Continue Robinul 0.1 mg IV every 4 hours as needed  Would aim to limit oral tracheal suctioning to Q8H or at the least 2 hours between attempts to avoid repetitive trauma, dryness, discomfort, or bradycardia and hypoxia  Continue incentive spirometry  Received furosemide 40 mg IV x 1 on 1/18/2025

## 2025-01-20 NOTE — ASSESSMENT & PLAN NOTE
Lab Results   Component Value Date    EGFR 54 01/20/2025    EGFR 49 01/19/2025    EGFR 71 01/18/2025    CREATININE 1.11 01/20/2025    CREATININE 1.20 01/19/2025    CREATININE 0.86 01/18/2025     Creatinine appears to be around baseline of 0.9-1.1 mg/dL   Continue to monitor renal function closely  Avoid nephrotoxins and hypotension

## 2025-01-20 NOTE — PLAN OF CARE
Problem: PAIN - ADULT  Goal: Verbalizes/displays adequate comfort level or baseline comfort level  Description: Interventions:  - Encourage patient to monitor pain and request assistance  - Assess pain using appropriate pain scale  - Administer analgesics based on type and severity of pain and evaluate response  - Implement non-pharmacological measures as appropriate and evaluate response  - Consider cultural and social influences on pain and pain management  - Notify physician/advanced practitioner if interventions unsuccessful or patient reports new pain  Outcome: Progressing     Problem: INFECTION - ADULT  Goal: Absence or prevention of progression during hospitalization  Description: INTERVENTIONS:  - Assess and monitor for signs and symptoms of infection  - Monitor lab/diagnostic results  - Monitor all insertion sites, i.e. indwelling lines, tubes, and drains  - Monitor endotracheal if appropriate and nasal secretions for changes in amount and color  - [place] appropriate cooling/warming therapies per order  - Administer medications as ordered  - Instruct and encourage patient and family to use good hand hygiene technique  - Identify and instruct in appropriate isolation precautions for identified infection/condition  Outcome: Progressing  Goal: Absence of fever/infection during neutropenic period  Description: INTERVENTIONS:  - Monitor WBC    Outcome: Progressing  Goal: Infection Control Precautions  Outcome: Progressing     Problem: SAFETY ADULT  Goal: Patient will remain free of falls  Description: INTERVENTIONS:  - Educate patient/family on patient safety including physical limitations  - Instruct patient to call for assistance with activity   - Consult OT/PT to assist with strengthening/mobility   - Keep Call bell within reach  - Keep bed low and locked with side rails adjusted as appropriate  - Keep care items and personal belongings within reach  - Initiate and maintain comfort rounds  - Make Fall  Risk Sign visible to staff  - Offer Toileting every 2 Hours, in advance of need  - Initiate/Maintain bedalarm  - Obtain necessary fall risk management equipment: nonskid socks  - Apply yellow socks and bracelet for high fall risk patients  - Consider moving patient to room near nurses station  Outcome: Progressing  Goal: Maintain or return to baseline ADL function  Description: INTERVENTIONS:  -  Assess patient's ability to carry out ADLs; assess patient's baseline for ADL function and identify physical deficits which impact ability to perform ADLs (bathing, care of mouth/teeth, toileting, grooming, dressing, etc.)  - Assess/evaluate cause of self-care deficits   - Assess range of motion  - Assess patient's mobility; develop plan if impaired  - Assess patient's need for assistive devices and provide as appropriate  - Encourage maximum independence but intervene and supervise when necessary  - Involve family in performance of ADLs  - Assess for home care needs following discharge   - Consider OT consult to assist with ADL evaluation and planning for discharge  - Provide patient education as appropriate  Outcome: Progressing  Goal: Maintains/Returns to pre admission functional level  Description: INTERVENTIONS:  - Perform AM-PAC 6 Click Basic Mobility/ Daily Activity assessment daily.  - Set and communicate daily mobility goal to care team and patient/family/caregiver.   - Collaborate with rehabilitation services on mobility goals if consulted  - Perform Range of Motion 3 times a day.  - Reposition patient every 2 hours.  - Dangle patient 3 times a day  - Stand patient 3 times a day  - Ambulate patient 3 times a day  - Out of bed to chair 3 times a day   - Out of bed for meals 3 times a day  - Out of bed for toileting  - Record patient progress and toleration of activity level   Outcome: Progressing     Problem: DISCHARGE PLANNING  Goal: Discharge to home or other facility with appropriate resources  Description:  INTERVENTIONS:  - Identify barriers to discharge w/patient and caregiver  - Arrange for needed discharge resources and transportation as appropriate  - Identify discharge learning needs (meds, wound care, etc.)  - Arrange for interpretive services to assist at discharge as needed  - Refer to Case Management Department for coordinating discharge planning if the patient needs post-hospital services based on physician/advanced practitioner order or complex needs related to functional status, cognitive ability, or social support system  Outcome: Progressing     Problem: Knowledge Deficit  Goal: Patient/family/caregiver demonstrates understanding of disease process, treatment plan, medications, and discharge instructions  Description: Complete learning assessment and assess knowledge base.  Interventions:  - Provide teaching at level of understanding  - Provide teaching via preferred learning methods  Outcome: Progressing     Problem: Prexisting or High Potential for Compromised Skin Integrity  Goal: Skin integrity is maintained or improved  Description: INTERVENTIONS:  - Identify patients at risk for skin breakdown  - Assess and monitor skin integrity  - Assess and monitor nutrition and hydration status  - Monitor labs   - Assess for incontinence   - Turn and reposition patient  - Assist with mobility/ambulation  - Relieve pressure over bony prominences  - Avoid friction and shearing  - Provide appropriate hygiene as needed including keeping skin clean and dry  - Evaluate need for skin moisturizer/barrier cream  - Collaborate with interdisciplinary team   - Patient/family teaching  - Consider wound care consult   Outcome: Progressing     Problem: GASTROINTESTINAL - ADULT  Goal: Minimal or absence of nausea and/or vomiting  Description: INTERVENTIONS:  - Administer IV fluids if ordered to ensure adequate hydration  - Maintain NPO status until nausea and vomiting are resolved  - Nasogastric tube if ordered  - Administer  ordered antiemetic medications as needed  - Provide nonpharmacologic comfort measures as appropriate  - Advance diet as tolerated, if ordered  - Consider nutrition services referral to assist patient with adequate nutrition and appropriate food choices  Outcome: Progressing  Goal: Maintains or returns to baseline bowel function  Description: INTERVENTIONS:  - Assess bowel function  - Encourage oral fluids to ensure adequate hydration  - Administer IV fluids if ordered to ensure adequate hydration  - Administer ordered medications as needed  - Encourage mobilization and activity  - Consider nutritional services referral to assist patient with adequate nutrition and appropriate food choices  Outcome: Progressing  Goal: Maintains adequate nutritional intake  Description: INTERVENTIONS:  - Monitor percentage of each meal consumed  - Identify factors contributing to decreased intake, treat as appropriate  - Assist with meals as needed  - Monitor I&O, weight, and lab values if indicated  - Obtain nutrition services referral as needed  Outcome: Progressing  Goal: Oral mucous membranes remain intact  Description: INTERVENTIONS  - Assess oral mucosa and hygiene practices  - Implement preventative oral hygiene regimen  - Implement oral medicated treatments as ordered  - Initiate Nutrition services referral as needed  Outcome: Progressing

## 2025-01-20 NOTE — NUTRITION
01/20/25 1455   Biochemical Data,Medical Tests, and Procedures   Biochemical Data/Medical Tests/Procedures Lab values reviewed;Meds reviewed   Labs (Comment) 1/20/2025 .8, potassium 3.3, BUN 30, calcium 8.2, AST 49   Meds (Comment) Dulcolax, vitamin D3, Colace, Tricor, heparin, MVI, Protonix   Nutrition-Focused Physical Exam   Nutrition-Focused Physical Exam Findings RN skin assessment reviewed;Edema;No skin issues documented  (+2 left upper extremity edema, +1 bilateral lower extremity edema.  No pressure areas.)   Medical-Related Concerns hypertension, gout, mixed hyperlipidemia, stage III CKD, vitamin D deficiency, difficulty swallowing, edema   Adequacy of Intake   Nutrition Modality NPO   Feeding Route   PO NPO   Current PO Intake   Current Diet Order NPO   Current Meal Intake Inadequate   Estimated calorie intake compared to estimated need Nutrient needs are not met   PES Statement   Problem Intake   Energy Balance (1) Inadequate energy intake NI-1.2   Related to Other (Comment)  (Upper GI bleed)   As evidenced by: NPO/CL > 3 days;Intake < estimated needs   Recommendations/Interventions   Malnutrition/BMI Present No  (Does not meet 2 criteria at present, will continue to monitor)   Summary ST consulted.  Presents from nursing home with vomiting.  Found to have upper GI bleed.  NGT in place to suction.  COVID-positive.  Nursing home staff report good intake and favorable weight gain prior to acute GI symptoms.  Past medical history significant for hypertension, gout, mixed hyperlipidemia, stage III CKD, vitamin D deficiency, difficulty swallowing, edema.  Weight history reviewed.  3/10/2024 154#, 10/7/2024 157#, 1/15/2025 162#.  No significant changes.  +2 left upper extremity edema, +1 bilateral lower extremity edema.  No pressure areas.  NPO at present.  RD protocol not initiated.  Per chart review patient has been NPO/clear liquids X 4 days.  NG clamp trial planned today with repeat KUB.   Anticipate diet advancement as clinically indicated.  RD to follow closely.   Interventions/Recommendations Diet to advance;Monitor I & O's   Education Assessment   Education Patient/caregiver not appropriate for education at this time   Patient Nutrition Goals   Goal Transition to PO diet;Tolerate PO diet;Meet PO needs   Goal Status Initiated   Timeframe to complete goal by next f/u   Nutrition Complexity Risk   Nutrition complexity level High risk   Follow up date 01/23/25

## 2025-01-20 NOTE — ASSESSMENT & PLAN NOTE
In summary, this is 89-year-old male with a history of a flutter on aspirin monotherapy, hypertension, hyperlipidemia, CKD, gout as well as a reported remote history of peptic ulcer disease who initially presented with a history of 1 day of lower abdominal pain with reports of 2 episodes of coffee-ground emesis at the nursing home.  No recent NSAID use.  No EGD on file to review.  No other evidence of GI bleed.  On presentation he was hemodynamically stable.  His hemoglobin is 12.7 which is about his baseline.  His BUN is elevated at 31 (been in the 30s for the last few months).      He underwent a CT abdomen and pelvis that showed evidence of possible distal esophagitis and gastritis. His stomach was notably dilated with air and fluid but no obvious mass or transition point.  His hemoglobin has remained stable without any sign of overt GI bleeding.  There is low suspicion for acute/active GI bleed at this time.  Differential diagnosis for his symptoms include gastric outlet obstruction, reflux/erosive esophagitis, Pill esophagitis, gastritis, post infectious gastroparesis. He failed NG clamp trial on 1/18. Repeat CTAP with po contrast showed wall thickening of the lower esophagus and proximal stomach; subtle wall thickening of the mid to distal sigmoid colon, rectum and anal verge which could be focal colitis/proctitis.  However, no evidence of bowel obstruction or significant inflammation that would explain his symptoms.      Plan  Given his advanced age, hemodynamic stability, stable hemoglobin and no further episodes of hematemesis; would favor a conservative approach for this gentleman.  No plan for endoscopic evaluation  unfortunately, he is now tested positive for COVID 19  NG tube placed 1/16 with return of non bloody gastric contents. Drained total of 825cc so far. Performed NG tube trial again today without worsening gastric distention. SLP evaluated and recommended strict NPO due to secretions. Recommend  initiation of trickle feeds via NG tube for nutrition if in line with goals of care  If patient develops nausea,vomiting or worsening hypoxia overnight with TF, stop it and obtain repeat abdominal Xray.   Replete electrolytes  Continue with high-dose PPI therapy.  Protonix 40 mg twice daily daily  Keep NPO for now per SLP recs. Appreciate input  Can resume aspirin  Avoid NSAIDs  Trend CBC daily  Maintain type and screen  Antiemetic for nausea

## 2025-01-20 NOTE — ASSESSMENT & PLAN NOTE
Patient with fever and COVID-positive roommate at his skilled facility  Tested positive for COVID  Currently saturating 94% on room air  Continue remdesivir, D4  Continue Robinul for secretions, responded well  Continue supportive care  Monitor labs and vital signs, conduct serial physical assessments

## 2025-01-20 NOTE — SPEECH THERAPY NOTE
Speech Language/Pathology  Orders Received. Chart Reviewed. Spoke with nursing, pt continues with NG tube to low intermittent suction. Not appropriate for bedside at this time. GI continues to follow. Ongoing GOC discussions. ST will continue follow as indicated.

## 2025-01-20 NOTE — CASE MANAGEMENT
Case Management Discharge Planning Note    Patient name Prem Mar Sr.  Location /-01 MRN [id number]  : 10/11/1924 Date 2025       Current Admission Date: 1/15/2025  Current Admission Diagnosis:Upper GI bleed   Patient Active Problem List    Diagnosis Date Noted Date Diagnosed    Opacity noted on imaging study 2025     Congestion of upper airway 2025     Acute distention of stomach 2025     COVID-19 virus infection 2025     Goals of care, counseling/discussion 2025     Upper GI bleed 01/15/2025     Constipation 01/15/2025     Tinea cruris 10/12/2024     Weakness 10/11/2024     Atrial flutter (HCC) 2024     Chronic pain syndrome 2024     Lumbar spondylosis 2024     Chronic midline low back pain without sciatica 2024     Nonexudative age-related macular degeneration of left eye 2023     Chronic kidney disease, stage 3a (HCC) 2023     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     Muscle wasting and atrophy, not elsewhere classified, multiple sites 2022     Difficulty swallowing 2022     Keratoma 2021     Venous insufficiency of both lower extremities 2021     Hypomagnesemia 2020     Mixed hyperlipidemia 2020     Coronary artery disease involving native coronary artery of native heart without angina pectoris 2018     Essential hypertension 2018     Bilateral leg edema 2018     Ventricular bigeminy 2018     Idiopathic chronic gout of multiple sites without tophus 05/10/2017     Edema 2014     Vitamin D deficiency 2013       LOS (days): 4  Geometric Mean LOS (GMLOS) (days): 4.5  Days to GMLOS:0.5     OBJECTIVE:  Risk of Unplanned Readmission Score: 22.27         Current admission status: Inpatient   Preferred Pharmacy:   [id number] - [place] - [address]  [address]  [place]  [id number]  Phone: [phone] Fax: [phone]    Primary Care Provider: Leonard Schreiber MD    [facility]  Secondary Insurance:     DISCHARGE DETAILS:       Freedom of Choice: Yes  Comments - Freedom of Choice: family would like the pt to return - he is a private bedhold                               Other Referral/Resources/Interventions Provided:  Interventions: SNF  Referral Comments: pt to return back to the [facility] when stable - pt is postive for covid- pt on IV rocephin, oxygen, NPO NG clamped    Would you like to participate in our [facility] service program?  : No - Declined    Treatment Team Recommendation: SNF ([facility] reqest was made to start an auth - TBD)

## 2025-01-20 NOTE — PROGRESS NOTES
Progress Note - Gastroenterology   Name: Prem Mar Sr. 89 y.o. male I MRN: [id number]  Unit/Bed#: -01 I Date of Admission: 1/15/2025   Date of Service: 1/20/2025 I Hospital Day: 4    Assessment & Plan  Acute distention of stomach  In summary, this is 89-year-old male with a history of a flutter on aspirin monotherapy, hypertension, hyperlipidemia, CKD, gout as well as a reported remote history of peptic ulcer disease who initially presented with a history of 1 day of lower abdominal pain with reports of 2 episodes of coffee-ground emesis at the nursing home.  No recent NSAID use.  No EGD on file to review.  No other evidence of GI bleed.  On presentation he was hemodynamically stable.  His hemoglobin is 12.7 which is about his baseline.  His BUN is elevated at 31 (been in the 30s for the last few months).      He underwent a CT abdomen and pelvis that showed evidence of possible distal esophagitis and gastritis. His stomach was notably dilated with air and fluid but no obvious mass or transition point.  His hemoglobin has remained stable without any sign of overt GI bleeding.  There is low suspicion for acute/active GI bleed at this time.  Differential diagnosis for his symptoms include gastric outlet obstruction, reflux/erosive esophagitis, Pill esophagitis, gastritis, post infectious gastroparesis. He failed NG clamp trial on 1/18. Repeat CTAP with po contrast showed wall thickening of the lower esophagus and proximal stomach; subtle wall thickening of the mid to distal sigmoid colon, rectum and anal verge which could be focal colitis/proctitis.  However, no evidence of bowel obstruction or significant inflammation that would explain his symptoms.      Plan  Given his advanced age, hemodynamic stability, stable hemoglobin and no further episodes of hematemesis; would favor a conservative approach for this gentleman.  No plan for endoscopic evaluation  unfortunately, he is now tested positive for  COVID 19  NG tube placed 1/16 with return of non bloody gastric contents. Drained total of 825cc so far. Performed NG tube trial again today without worsening gastric distention. SLP evaluated and recommended strict NPO due to secretions. Recommend initiation of trickle feeds via NG tube for nutrition if in line with goals of care  If patient develops nausea,vomiting or worsening hypoxia overnight with TF, stop it and obtain repeat abdominal Xray.   Replete electrolytes  Continue with high-dose PPI therapy.  Protonix 40 mg twice daily daily  Keep NPO for now per SLP recs. Appreciate input  Can resume aspirin  Avoid NSAIDs  Trend CBC daily  Maintain type and screen  Antiemetic for nausea  Constipation  Stool burden on CT scan on admission   Daily bowel regimen    COVID-19 virus infection  Intermittent fevers and now tested positive for Covid-19. Now on 2L. Denies any shortness of breath.  Mgt per primary team.          Subjective   No acute events overnight    Objective :  Temp:  [98 °F (36.7 °C)-101.8 °F (38.8 °C)] 99.9 °F (37.7 °C)  HR:  [] 90  BP: (130-154)/(48-66) 134/48  SpO2:  [89 %-96 %] 94 %  O2 Device: Nasal cannula  Nasal Cannula O2 Flow Rate (L/min):  [2 L/min] 2 L/min    Physical Exam  Vitals and nursing note reviewed.   Constitutional:       General: He is not in acute distress.     Appearance: He is well-developed.   HENT:      Head: Normocephalic and atraumatic.      Nose:      Comments: NG tube present  Eyes:      Conjunctiva/sclera: Conjunctivae normal.   Cardiovascular:      Rate and Rhythm: Normal rate and regular rhythm.      Heart sounds: No murmur heard.  Pulmonary:      Effort: Pulmonary effort is normal. No respiratory distress.      Breath sounds: Normal breath sounds.   Abdominal:      Palpations: Abdomen is soft.      Tenderness: There is no abdominal tenderness.   Musculoskeletal:         General: No swelling.   Skin:     General: Skin is warm and dry.      Capillary Refill:  Capillary refill takes less than 2 seconds.   Neurological:      Mental Status: He is alert.   Psychiatric:         Mood and Affect: Mood normal.           Lab Results: I have reviewed the following results:CBC/BMP:   .     01/20/25  0426   WBC 8.11   HGB 11.4*   HCT 35.7*      SODIUM 138   K 3.3*      CO2 25   BUN 30*   CREATININE 1.11   GLUC 95   MG 2.2        Imaging Results Review: I reviewed radiology reports from this admission including: CT abdomen/pelvis.  Other Study Results Review: No additional pertinent studies reviewed.    Marcy Lane MD  Gastroenterology Fellow, PGY- 4  Available on EPIC Secure Chat  1/20/2025 12:13 PM

## 2025-01-20 NOTE — ASSESSMENT & PLAN NOTE
Prehospital takesToprol-XL 25 mg p.o. daily and Lasix 20 mg p.o. daily on Monday Wednesday Friday. Currently NPO. Metoprolol switched to IV. Received furosemide 40 mg IV x 1 1/19/2025  Monitor blood pressure

## 2025-01-20 NOTE — UTILIZATION REVIEW
Continued Stay Review    Date: 1/18/25, 1/19/25 and 1/20/25                           Current Patient Class: Inpatient  Current Level of Care: Med Surg     HPI:89 y.o. male initially admitted on 1/16/25 due to Upper GI Bleeding.      1/18/25 Assessment/Plan: Hospitalist: Keep NPO. Now COVID+. Continue Remdesivir day 2. Ill appearing, Rhonchi on auscultation, requires increased suctioning due to possible aspiration. 2 liters oxygen NGT in place for tan/brown fluid. HGB 9.6, HCT 30.4, PLTS 129, , Co2 20, Calcium 7.2, Albumin 2.5, ALK phos 33, AST 41, pro valeriano  1.25. Ongoing Goals of care discussions with family, continue Telemetry and IV metoprolol Q 6, IV protonix Q12, IV Robinul Q prn.     1/19/25 Hospitalist: Pt. Says he is feeling a little better. + dry mucous membranes,  ml. + rhonchi on assessment. + edema in LE's. + abdominal distention. Temp 99.5 F, /58, HR 79, RR 20, Spo2 93%. Requires 2 liters oxygen. Remdesivir day 3. Remain NPO, NG tube to suction. Protonix IV BID. IV robinul x2 doses today.     1/20/25 Hospitalist: NPO. NG tube continues to low intermittent suction. Remains on 2 liters oxygen, Temp 101.8 F, HR 75, /48, Spo2 94%.  ml. Coarse Expiratory  sounds decreases air movement in lungs. Received IV robinul x2 doses today thus far. Bilateral forearm dependent edema, generalized weakness with LUE > RUE weakness. HGB improved 11.4, HCT 35.7, K 3.3, BUN 30, calcium 80.2, Albumin 3.1, AST 49, Pro valeriano 1.43. GI consult ordered. Ongoing Goals of Care discussion with family. PT/OT evals pending due to medically unstable.     Gastroenterology: NG tube placed 1/16 with return of non bloody gastric contents. Drained total of 825cc so far. NG clamp trial again today. Repeat KUB in an hr to see if gastric distention persistent. Continue with high-dose PPI therapy.  Protonix 40 mg twice daily daily  Keep NPO for now, trend CBC daily, antiemetics prn, daily bowel regimen.            Medications:   Scheduled Medications:  allopurinol, 300 mg, Oral, Daily  bisacodyl, 10 mg, Rectal, Daily  cefTRIAXone, 1,000 mg, Intravenous, Q24H  Cholecalciferol, 2,000 Units, Oral, Daily  docusate, 100 mg, Oral, BID  fenofibrate, 48 mg, Oral, Daily  [Held by provider] furosemide, 20 mg, Oral, Once per day on Monday Wednesday Friday  gabapentin, 100 mg, Oral, Q12H  heparin (porcine), 5,000 Units, Subcutaneous, Q8H ANKIT  iohexol, 50 mL, Oral, 90 min pre-procedure  metoprolol, 2.5 mg, Intravenous, Q6H  multivitamin stress formula, 1 tablet, Oral, Daily  nystatin, , Topical, BID  pantoprazole, 40 mg, Intravenous, Q12H  senna, 2 tablet, Oral, HS      Continuous IV Infusions: none      PRN Meds:  acetaminophen, 1,000 mg, Intravenous, Q8H PRN x 2 doses 1/18, 1 dose 1/19 and 1 dose 1/20  acetaminophen, 650 mg, Oral, Q4H PRN  bisacodyl, 10 mg, Rectal, Daily PRN  glycopyrrolate, 0.1 mg, Intravenous, Q4H PRN x2 doses 1/18, x3 doses 1/19 and 2 dose thus far 1/20  nitroglycerin, 0.4 mg, Sublingual, Q5 Min PRN  ondansetron, 4 mg, Intravenous, Q6H PRN  phenol, 1 spray, Mouth/Throat, Q2H PRN      Discharge Plan: TBD     Vital Signs (last 3 days)       Date/Time Temp Pulse Resp BP MAP (mmHg) SpO2 Calculated FIO2 (%) - Nasal Cannula Nasal Cannula O2 Flow Rate (L/min) O2 Device Patient Position - Orthostatic VS Bria Coma Scale Score Pain    01/20/25 11:18:35 99.9 °F (37.7 °C) 90 -- -- -- 94 % -- -- -- -- -- --    01/20/25 0830 -- -- -- -- -- 90 % 28 2 L/min Nasal cannula -- 15 No Pain    01/20/25 07:30:57 -- 75 -- 134/48 77 94 % -- -- -- -- -- --    01/20/25 07:26:19 98 °F (36.7 °C) 74 -- 134/48 77 94 % -- -- -- -- -- --    01/20/25 0724 -- -- -- -- -- 94 % 28 2 L/min Nasal cannula -- -- --    01/20/25 02:04:13 -- 77 -- 148/59 89 96 % -- -- -- -- -- --    01/19/25 22:18:30 98.8 °F (37.1 °C) 77 -- 130/61 84 93 % -- -- -- -- -- --    01/19/25 19:40:16 99.8 °F (37.7 °C) 78 -- 131/60 84 94 % -- -- -- -- -- No Pain     01/19/25 1649 -- -- -- -- -- 94 % 28 2 L/min Nasal cannula -- -- --    01/19/25 15:40:43 101.8 °F (38.8 °C) 106 -- 154/66 95 89 % 28 2 L/min Nasal cannula -- -- --    01/19/25 15:39:33 101.8 °F (38.8 °C) 104 -- 154/66 95 90 % -- -- None (Room air) -- -- --    01/19/25 0900 -- -- -- -- -- 92 % -- -- None (Room air) -- 15 No Pain    01/19/25 07:09:09 99.5 °F (37.5 °C) 79 20 145/58 87 93 % -- -- -- Lying -- --    01/19/25 02:13:17 -- 70 -- 119/43 68 91 % -- -- -- -- -- --    01/19/25 0100 -- -- -- -- -- -- -- -- None (Room air) -- -- --    01/18/25 22:38:48 100.2 °F (37.9 °C) 69 18 102/41 61 96 % 28 2 L/min Nasal cannula Lying -- --    01/18/25 22:17:01 100.6 °F (38.1 °C) 83 -- -- -- 98 % -- -- -- -- -- --    01/18/25 21:14:51 101 °F (38.3 °C) 80 -- 147/54 85 96 % -- -- -- -- -- --    01/18/25 21:11:14 101 °F (38.3 °C) 93 -- 147/54 85 97 % -- -- -- -- -- --    01/18/25 1952 -- -- -- -- -- -- 28 2 L/min None (Room air) -- -- No Pain    01/18/25 14:19:08 98.3 °F (36.8 °C) 84 16 142/62 89 95 % -- -- None (Room air) Lying -- --    01/18/25 11:52:32 -- 92 -- 124/55 78 94 % -- -- -- -- -- --    01/18/25 09:38:17 -- 88 -- 119/42 68 98 % -- -- -- -- -- --    01/18/25 0845 -- -- -- -- -- -- -- -- -- -- 15 No Pain    01/18/25 07:06:35 97.7 °F (36.5 °C) 87 20 129/52 78 91 % -- -- -- -- -- --    01/18/25 0309 98.1 °F (36.7 °C) 73 -- -- -- 97 % -- -- -- -- -- --    01/18/25 0000 98.6 °F (37 °C) 99 -- -- -- 96 % -- -- -- -- -- --    01/17/25 22:00:10 -- 100 18 111/66 81 91 % -- -- -- -- -- --    01/17/25 21:50:10 103 °F (39.4 °C) 87 -- -- -- 92 % 28 2 L/min Nasal cannula -- -- No Pain    01/17/25 1758 -- -- -- -- -- -- -- -- -- -- -- No Pain    01/17/25 17:54:15 101.4 °F (38.6 °C) 116 -- -- -- 92 % -- -- -- -- -- --    01/17/25 1500 -- -- -- -- -- 99 % -- -- -- -- -- --    01/17/25 13:58:48 100 °F (37.8 °C) 84 19 150/58 89 97 % 28 2 L/min Nasal cannula Lying -- --    01/17/25 12:24:28 101.8 °F (38.8 °C) 92 -- -- -- 95 % -- -- -- --  -- --    01/17/25 0916 -- -- -- -- -- -- -- -- -- -- -- No Pain    01/17/25 0900 -- -- -- -- -- 94 % -- -- None (Room air) -- 15 --    01/17/25 07:11:41 100.1 °F (37.8 °C) 85 22 148/65 93 96 % -- -- -- -- -- --    01/17/25 0300 99.5 °F (37.5 °C) 80 -- -- -- -- -- -- -- -- -- --          Weight (last 2 days)       None            Pertinent Labs/Diagnostic Results:   Radiology:  XR chest portable   Final Interpretation by Beatris Benton MD (01/20 0904)      Retrocardiac opacity, which may be due to pneumonia and/or atelectasis.            Workstation performed: [id number]         CT abdomen pelvis w contrast   Final Interpretation by Geovanny Taylor MD (01/17 2239)      1.  New small bilateral pleural effusions with extensive bibasilar atelectasis.   2.  Wall thickening of the lower esophagus and proximal stomach is again noted without interval change. This may reflect mild focal esophagitis/gastritis. New NG tube with tip terminating in the gastric body region.   3.  No evidence of bowel obstruction, mesenteric inflammation, appendicitis, obstructive uropathy, free air, or free fluid. Descending and sigmoid diverticulosis again noted without evidence for acute diverticulitis. Subtle wall thickening of the mid to    distal sigmoid colon, rectum, and anal verge region could reflect mild focal colitis/proctitis.   4.  Multiple punctate pancreatic calcifications again seen likely sequelae of chronic pancreatitis.      Workstation performed: [id number]         XR chest portable   Final Interpretation by Jose Johns MD (01/17 1684)      Scattered streaky opacities favored to represent atelectasis but pneumonia not excluded.            Workstation performed: [id number]         XR abdomen 1 vw portable   Final Interpretation by Jose Johns MD (01/17 8447)      New marked gaseous distention of the stomach.               Workstation performed: [id number]         XR abdomen 1 view kub   Final Interpretation by  Diony Boateng MD (01/16 1484)      1.  NG tube projects over the gastric body with decompression of the stomach compared to the x-ray from earlier the same day.      2.  Mildly dilated mid abdominal small bowel loops with gas throughout the colon though the abdomen was only partially imaged.               Workstation performed: [id number]         XR abdomen 1 view kub   Final Interpretation by Diony Boateng MD (01/16 8806)      Worsening gastric distention.      The study was marked in EPIC for immediate notification.               Workstation performed: [id number]         XR chest portable   Final Interpretation by Sina Vitale MD (01/15 9043)      No endogastric tube visible within the field-of-view.      There is some gaseous distention of the stomach.      Minimal atelectasis left lung base.      The study was marked in EPIC for immediate notification.            Workstation performed: [id number]         CT abdomen pelvis with contrast   Final Interpretation by Geovanny Taylor MD (01/15 9261)      Trace left pleural effusion with bibasilar atelectasis.  Focal wall thickening of the lower esophagus, gastroesophageal junction, and proximal stomach noted which may reflect esophagitis/gastritis. No evidence for bowel obstruction, mesenteric    inflammation, appendicitis, obstructive uropathy, free air, or free fluid. Descending and sigmoid colon diverticulosis without evidence for acute diverticulitis.         Workstation performed: [id number]         XR abdomen 1 view kub    (Results Pending)         Results from last 7 days   Lab Units 01/20/25  0426 01/19/25  0523 01/18/25  0442 01/18/25  0015 01/17/25  1337 01/17/25  0832 01/17/25  0518 01/16/25  1138 01/16/25  0511   WBC Thousand/uL 8.11 6.53 7.19  --   --   --  7.33  --  8.18   HEMOGLOBIN g/dL 11.4* 11.4* 9.6* 10.0* 11.5*   < > 11.2*   < > 12.1   HEMATOCRIT % 35.7* 35.3* 30.4*  --   --   --  34.1*  --  36.9   PLATELETS Thousands/uL 202 173  129*  --   --   --  164  --  171   TOTAL NEUT ABS Thousands/µL 7.17 5.48  --   --   --   --  5.72  --   --    BANDS PCT %  --   --  24*  --   --   --   --   --   --     < > = values in this interval not displayed.         Results from last 7 days   Lab Units 01/20/25  0426 01/19/25  0523 01/18/25  0442 01/17/25  1337 01/17/25  0518 01/16/25  0511 01/15/25  0422   SODIUM mmol/L 138 139 134* 135 134*   < > 135   POTASSIUM mmol/L 3.3* 3.3* 3.7 3.9 3.5   < > 4.0   CHLORIDE mmol/L 103 102 103 101 101   < > 99   CO2 mmol/L 25 26 20* 25 26   < > 25   ANION GAP mmol/L 10 11 11 9 7   < > 11   BUN mg/dL 30* 28* 18 21 22   < > 31*   CREATININE mg/dL 1.11 1.20 0.86 1.11 1.07   < > 1.00   EGFR ml/min/1.73sq m 54 49 71 54 56   < > 61   CALCIUM mg/dL 8.2* 8.0* 7.2* 8.3* 8.1*   < > 8.9   CALCIUM, IONIZED mmol/L  --   --   --  1.10*  --   --   --    MAGNESIUM mg/dL 2.2  --   --  1.9  --   --  1.9    < > = values in this interval not displayed.     Results from last 7 days   Lab Units 01/20/25  0426 01/19/25  0523 01/18/25  0442 01/17/25  1337 01/15/25  0422   AST U/L 49* 49* 41* 45* 22   ALT U/L 21 19 14 18 16   ALK PHOS U/L 48 45 33* 44 52   TOTAL PROTEIN g/dL 6.0* 5.8* 4.4* 6.2* 6.6   ALBUMIN g/dL 3.1* 3.1* 2.5* 3.5 3.8   TOTAL BILIRUBIN mg/dL 0.54 0.43 0.38 0.60 0.44         Results from last 7 days   Lab Units 01/20/25 0426 01/19/25 0523 01/18/25  0442 01/17/25  1337 01/17/25  0518 01/16/25  0511 01/15/25  0422   GLUCOSE RANDOM mg/dL 95 78 85 105 103 128 129               Results from last 7 days   Lab Units 01/17/25  1337   CK TOTAL U/L 371*         Results from last 7 days   Lab Units 01/17/25  1337   D-DIMER QUANTITATIVE ug/ml FEU 2.02*     Results from last 7 days   Lab Units 01/15/25  0422   PROTIME seconds 13.1   INR  0.94   PTT seconds 31         Results from last 7 days   Lab Units 01/20/25  0426 01/19/25  0523 01/18/25  0442 01/15/25  0422   PROCALCITONIN ng/ml 1.43* 1.82* 1.25* <0.05                 Results from last  7 days   Lab Units 01/17/25  1337   BNP pg/mL 171*     Results from last 7 days   Lab Units 01/17/25  1337   FERRITIN ng/mL 189                 Results from last 7 days   Lab Units 01/15/25  0422   LIPASE u/L 23     Results from last 7 days   Lab Units 01/20/25  0426 01/19/25  0523 01/18/25  0442 01/17/25  1337   CRP mg/L 198.8* 177.3* 95.3* 95.8*             Results from last 7 days   Lab Units 01/15/25  0720   CLARITY UA  Clear   COLOR UA  Yellow   SPEC GRAV UA  1.010   PH UA  7.5   GLUCOSE UA mg/dl Negative   KETONES UA mg/dl Negative   BLOOD UA  1+*   PROTEIN UA mg/dl Negative   NITRITE UA  Negative   BILIRUBIN UA  Negative   UROBILINOGEN UA E.U./dl 0.2   LEUKOCYTES UA  Negative   WBC UA /hpf 1-2   RBC UA /hpf 10-20*   BACTERIA UA /hpf Occasional   EPITHELIAL CELLS WET PREP /hpf Occasional                 Network Utilization Review Department  ATTENTION: Please call with any questions or concerns to [phone] and carefully listen to the prompts so that you are directed to the right person. All voicemails are confidential.   For Discharge needs, contact Care Management DC Support Team at [phone] opt. 2  Send all requests for admission clinical reviews, approved or denied determinations and any other requests to dedicated fax number below belonging to the [facility] where the patient is receiving treatment. List of dedicated fax numbers for the Facilities:  FACILITY NAME UR FAX NUMBER   ADMISSION DENIALS (Administrative/Medical Necessity) [phone]   DISCHARGE SUPPORT TEAM (NETWORK) [phone]   PARENT CHILD HEALTH (Maternity/NICU/Pediatrics) [phone]   [facility] [phone]   [facility] [phone]   [facility] [phone]   [facility] [phone]   [facility] [phone]   [facility] [phone]   [facility]  [phone]   [facility] [phone]   [facility] [phone]   [facility] [phone]   [facility] [phone]   [facility] [phone]

## 2025-01-20 NOTE — PHYSICAL THERAPY NOTE
Physical Therapy Cancellation Note         01/20/25 1103   PT Last Visit   PT Visit Date 01/20/25   Note Type   Note type Cancelled Session   Cancel Reasons Medical status   Additional Comments PT eval ordered and chart reviewed. Spoke with nursing and pt at this time is not medically appropriate at this time. PT to follow.     Hardik Keller PT

## 2025-01-20 NOTE — PLAN OF CARE
Problem: PAIN - ADULT  Goal: Verbalizes/displays adequate comfort level or baseline comfort level  Description: Interventions:  - Encourage patient to monitor pain and request assistance  - Assess pain using appropriate pain scale  - Administer analgesics based on type and severity of pain and evaluate response  - Implement non-pharmacological measures as appropriate and evaluate response  - Consider cultural and social influences on pain and pain management  - Notify physician/advanced practitioner if interventions unsuccessful or patient reports new pain  Outcome: Progressing     Problem: INFECTION - ADULT  Goal: Absence or prevention of progression during hospitalization  Description: INTERVENTIONS:  - Assess and monitor for signs and symptoms of infection  - Monitor lab/diagnostic results  - Monitor all insertion sites, i.e. indwelling lines, tubes, and drains  - Monitor endotracheal if appropriate and nasal secretions for changes in amount and color  - [place] appropriate cooling/warming therapies per order  - Administer medications as ordered  - Instruct and encourage patient and family to use good hand hygiene technique  - Identify and instruct in appropriate isolation precautions for identified infection/condition  Outcome: Progressing  Goal: Absence of fever/infection during neutropenic period  Description: INTERVENTIONS:  - Monitor WBC    Outcome: Progressing     Problem: SAFETY ADULT  Goal: Patient will remain free of falls  Description: INTERVENTIONS:  - Educate patient/family on patient safety including physical limitations  - Instruct patient to call for assistance with activity   - Consult OT/PT to assist with strengthening/mobility   - Keep Call bell within reach  - Keep bed low and locked with side rails adjusted as appropriate  - Keep care items and personal belongings within reach  - Initiate and maintain comfort rounds  - Make Fall Risk Sign visible to staff  - Offer Toileting every 4 Hours,  in advance of need  - Initiate/Maintain bed alarm  - Obtain necessary fall risk management equipment: yellow socks   - Apply yellow socks and bracelet for high fall risk patients  - Consider moving patient to room near nurses station  Outcome: Progressing  Goal: Maintain or return to baseline ADL function  Description: INTERVENTIONS:  -  Assess patient's ability to carry out ADLs; assess patient's baseline for ADL function and identify physical deficits which impact ability to perform ADLs (bathing, care of mouth/teeth, toileting, grooming, dressing, etc.)  - Assess/evaluate cause of self-care deficits   - Assess range of motion  - Assess patient's mobility; develop plan if impaired  - Assess patient's need for assistive devices and provide as appropriate  - Encourage maximum independence but intervene and supervise when necessary  - Involve family in performance of ADLs  - Assess for home care needs following discharge   - Consider OT consult to assist with ADL evaluation and planning for discharge  - Provide patient education as appropriate  Outcome: Progressing  Goal: Maintains/Returns to pre admission functional level  Description: INTERVENTIONS:  - Perform AM-PAC 6 Click Basic Mobility/ Daily Activity assessment daily.  - Set and communicate daily mobility goal to care team and patient/family/caregiver.   - Collaborate with rehabilitation services on mobility goals if consulted  - Perform Range of Motion 3 times a day.  - Reposition patient every 2 hours.  - Dangle patient 3 times a day  - Stand patient 3 times a day  - Ambulate patient 3 times a day  - Out of bed to chair 3 times a day   - Out of bed for meals 3 times a day  - Out of bed for toileting  - Record patient progress and toleration of activity level   Outcome: Progressing

## 2025-01-20 NOTE — PROGRESS NOTES
Progress Note - Hospitalist   Name: Prem Mar Sr. 89 y.o. male I MRN: [id number]  Unit/Bed#: [facility] Date of Admission: 1/15/2025   Date of Service: 1/20/2025 I Hospital Day: 4    Assessment & Plan  Upper GI bleed  Presented for suspected GI bleed  CT abdomen and pelvis 1/15/2025 with focal wall thickening of the lower esophagus, gastroesophageal junction, and proximal stomach which may reflect esophagitis/gastritis.  No evidence for bowel obstruction, mesenteric inflammation, appendicitis, obstructive uropathy, free air or free fluid  Chest x-ray 1/15/2025 with gaseous distention of the stomach  KUB 1/16/2025 with worsening distention  CT abdomen pelvis with contrast 1/17/2024: New small bilateral pleural effusions with extensive bibasilar atelectasis. Wall thickening of the lower esophagus and proximal stomach is again noted without interval change. This may reflect mild focal esophagitis/gastritis. New NG tube with tip terminating in the gastric body region. No evidence of bowel obstruction, mesenteric inflammation, appendicitis, obstructive uropathy, free air, or free fluid. Descending and sigmoid diverticulosis again noted without evidence for acute diverticulitis. Subtle wall thickening of the mid to distal sigmoid colon, rectum, and anal verge region could reflect mild focal colitis/proctitis. Multiple punctate pancreatic calcifications again seen likely sequelae of chronic pancreatitis  Keep NPO  Continue Protonix 40 mg IV twice daily  Continue with conservative management for now.  Please see also, goals of care  COVID-19 virus infection  Patient with fever and COVID-positive roommate at his skilled facility  Tested positive for COVID  Currently saturating 94% on room air  Continue remdesivir, D4  Continue Robinul for secretions, responded well  Continue supportive care  Monitor labs and vital signs, conduct serial physical assessments  Congestion of upper airway  Upper airway congestion noted,  patient has strong cough and able to clear  CT imaging revealed small bilateral pleural effusion and extensive bibasilar atelectasis  Maintain head of bed elevated  Aspiration precautions  Continue Robinul 0.1 mg IV every 4 hours as needed  Would aim to limit oral tracheal suctioning to Q8H or at the least 2 hours between attempts to avoid repetitive trauma, dryness, discomfort, or bradycardia and hypoxia  Continue incentive spirometry  Received furosemide 40 mg IV x 1 on 1/18/2025  Goals of care, counseling/discussion  Patient with significant comorbid medical conditions would be high risk for treatment options requiring anesthesia.  EGD testing might have risks that outweigh the benefits.  Patient verbalized understanding.  Opened goals of care conversations with son.  Continued on phone on Saturday. Had further conversations with patient's son, daughter, and grandson when they arrived.  Ultimately they would like to keep him comfortable but for now continue with full treatment   Continue supportive care  Idiopathic chronic gout of multiple sites without tophus  Continue prehospital allopurinol 300 mg p.o. daily when tolerating p.o.  Essential hypertension  Prehospital takesToprol-XL 25 mg p.o. daily and Lasix 20 mg p.o. daily on Monday Wednesday Friday. Currently NPO. Metoprolol switched to IV. Received furosemide 40 mg IV x 1 1/19/2025  Monitor blood pressure  Mixed hyperlipidemia  Continue prehospital Tricor 48 mg p.o. daily when tolerating p.o.  Chronic kidney disease, stage 3a (HCC)  Lab Results   Component Value Date    EGFR 54 01/20/2025    EGFR 49 01/19/2025    EGFR 71 01/18/2025    CREATININE 1.11 01/20/2025    CREATININE 1.20 01/19/2025    CREATININE 0.86 01/18/2025     Creatinine appears to be around baseline of 0.9-1.1 mg/dL   Continue to monitor renal function closely  Avoid nephrotoxins and hypotension  Atrial flutter (HCC)  Currently rate controlled with metoprolol   Not currently on anticoagulation  as outpatient  Constipation  Nursing reports that the patient has had 2 bowel movements since coming into the hospital, however patient reporting that he is not passing gas. There have been no further BMs noted  GI input appreciated  Acute distention of stomach    Opacity noted on imaging study  Chest x-ray: Retrocardiac opacity, which may be due to pneumonia and/or atelectasis.  Procalcitonin peaked and downtrending, with intermittent fever, remains without leukocytosis.  On room air.  Will start ceftriaxone IV empirically  Recheck procalcitonin tomorrow  Monitor labs and vital signs, conduct serial physical assessments      VTE Pharmacologic Prophylaxis: VTE Score: 5 High Risk (Score >/= 5) - Pharmacological DVT Prophylaxis Ordered: heparin. Sequential Compression Devices Ordered.    Mobility:   Basic Mobility Inpatient Raw Score: 6  JH-HLM Goal: 2: Bed activities/Dependent transfer  JH-HLM Achieved: 1: Laying in bed  JH-HLM Goal NOT achieved. Continue with multidisciplinary rounding and encourage appropriate mobility to improve upon JH-HLM goals.    Patient Centered Rounds: I performed bedside rounds with nursing staff today.   Discussions with Specialists or Other Care Team Provider: Case management    Education and Discussions with Family / Patient: Updated  (son) at bedside.    Current Length of Stay: 4 day(s)  Current Patient Status: Inpatient   Certification Statement: The patient will continue to require additional inpatient hospital stay due to gastric distention, upper airway congestion, COVID infection, possible pneumonia.  Discharge Plan: Anticipate discharge in 24-48 hrs to prior assisted or independent living facility.    Code Status: Level 3 - DNAR and DNI    Subjective   Evaluated at bedside.  He denies headache, sore throat, shortness of breath, chest pain, abdominal pain, nausea.  No bowel movement overnight.  No overnight events.     Objective :  Temp:  [98 °F (36.7 °C)-101.8 °F  (38.8 °C)] 98 °F (36.7 °C)  HR:  [] 75  BP: (130-154)/(48-66) 134/48  SpO2:  [89 %-96 %] 94 %  O2 Device: Nasal cannula  Nasal Cannula O2 Flow Rate (L/min):  [2 L/min] 2 L/min    Body mass index is 26.22 kg/m².     Input and Output Summary (last 24 hours):     Intake/Output Summary (Last 24 hours) at 1/20/2025 0922  Last data filed at 1/20/2025 0640  Gross per 24 hour   Intake 0 ml   Output 825 ml   Net -825 ml       Physical Exam  Vitals and nursing note reviewed.   HENT:      Head: Normocephalic and atraumatic.      Nose: Nose normal.      Mouth/Throat:      Mouth: Mucous membranes are moist.      Pharynx: Oropharynx is clear.   Eyes:      Pupils: Pupils are equal, round, and reactive to light.   Cardiovascular:      Rate and Rhythm: Normal rate and regular rhythm.      Pulses: Normal pulses.      Heart sounds: Normal heart sounds.   Pulmonary:      Effort: Pulmonary effort is normal. No respiratory distress.      Breath sounds: Normal breath sounds.      Comments: Coarse expiratory sounds decreased/improved  Abdominal:      General: Bowel sounds are decreased.      Palpations: Abdomen is soft.      Tenderness: There is no abdominal tenderness.   Musculoskeletal:      Cervical back: Neck supple.      Right lower leg: No edema.      Left lower leg: No edema.      Comments: Has bilateral forearm dependent edema   Skin:     General: Skin is warm and dry.      Capillary Refill: Capillary refill takes less than 2 seconds.   Neurological:      General: No focal deficit present.      Mental Status: He is alert.      Comments: Generalized weakness, left upper extremity more than right upper extremity, lower extremity strength 2 out of 5           Lines/Drains:  Lines/Drains/Airways       Active Status       Name Placement date Placement time Site Days    NG/OG/Enteral Tube Nasogastric 18 Fr Right nare 01/16/25  1052  Right nare  3    External Urinary Catheter Medium 01/19/25  1500  -- less than 1                             Lab Results: I have reviewed the following results:   Results from last 7 days   Lab Units 01/20/25  0426 01/19/25  0523 01/18/25  0442   WBC Thousand/uL 8.11   < > 7.19   HEMOGLOBIN g/dL 11.4*   < > 9.6*   HEMATOCRIT % 35.7*   < > 30.4*   PLATELETS Thousands/uL 202   < > 129*   BANDS PCT %  --   --  24*   SEGS PCT % 89*   < >  --    LYMPHO PCT % 8*   < > 6*   MONO PCT % 3*   < > 0*   EOS PCT % 0   < > 0    < > = values in this interval not displayed.     Results from last 7 days   Lab Units 01/20/25  0426   SODIUM mmol/L 138   POTASSIUM mmol/L 3.3*   CHLORIDE mmol/L 103   CO2 mmol/L 25   BUN mg/dL 30*   CREATININE mg/dL 1.11   ANION GAP mmol/L 10   CALCIUM mg/dL 8.2*   ALBUMIN g/dL 3.1*   TOTAL BILIRUBIN mg/dL 0.54   ALK PHOS U/L 48   ALT U/L 21   AST U/L 49*   GLUCOSE RANDOM mg/dL 95     Results from last 7 days   Lab Units 01/15/25  0422   INR  0.94             Results from last 7 days   Lab Units 01/20/25  0426 01/19/25  0523 01/18/25  0442 01/15/25  0422   PROCALCITONIN ng/ml 1.43* 1.82* 1.25* <0.05       Recent Cultures (last 7 days):               Last 24 Hours Medication List:     Current Facility-Administered Medications:     acetaminophen (Ofirmev) injection 1,000 mg, Q8H PRN, Last Rate: 1,000 mg (01/19/25 1530)    acetaminophen (TYLENOL) tablet 650 mg, Q4H PRN    allopurinol (ZYLOPRIM) tablet 300 mg, Daily    bisacodyl (DULCOLAX) rectal suppository 10 mg, Daily    bisacodyl (DULCOLAX) rectal suppository 10 mg, Daily PRN    cefTRIAXone (ROCEPHIN) IVPB (premix in dextrose) 1,000 mg 50 mL, Q24H    Cholecalciferol (VITAMIN D3) tablet 2,000 Units, Daily    docusate (COLACE) oral liquid 100 mg, BID    fenofibrate (TRICOR) tablet 48 mg, Daily    [Held by provider] furosemide (LASIX) tablet 20 mg, Once per day on Monday Wednesday Friday    gabapentin (NEURONTIN) capsule 100 mg, Q12H    glycopyrrolate (ROBINUL) injection 0.1 mg, Q4H PRN    heparin (porcine) subcutaneous injection 5,000 Units, Q8H  ANKIT    iohexol (OMNIPAQUE) 240 MG/ML solution 50 mL, 90 min pre-procedure    metoprolol (LOPRESSOR) injection 2.5 mg, Q6H    multivitamin stress formula tablet 1 tablet, Daily    nitroglycerin (NITROSTAT) SL tablet 0.4 mg, Q5 Min PRN    nystatin (MYCOSTATIN) cream, BID    ondansetron (ZOFRAN) injection 4 mg, Q6H PRN    pantoprazole (PROTONIX) injection 40 mg, Q12H    phenol (CHLORASEPTIC) 1.4 % mucosal liquid 1 spray, Q2H PRN    senna (SENOKOT) tablet 17.2 mg, HS    Administrative Statements   Today, Patient Was Seen By: ANAM Rose      **Please Note: This note may have been constructed using a voice recognition system.**

## 2025-01-20 NOTE — ASSESSMENT & PLAN NOTE
Intermittent fevers and now tested positive for Covid-19. Now on 2L. Denies any shortness of breath.  Mgt per primary team.

## 2025-01-21 ENCOUNTER — APPOINTMENT (INPATIENT)
Dept: NON INVASIVE DIAGNOSTICS | Facility: HOSPITAL | Age: OVER 89
DRG: 377 | End: 2025-01-21
Payer: COMMERCIAL

## 2025-01-21 ENCOUNTER — APPOINTMENT (INPATIENT)
Dept: RADIOLOGY | Facility: HOSPITAL | Age: OVER 89
DRG: 377 | End: 2025-01-21
Payer: COMMERCIAL

## 2025-01-21 PROBLEM — Z51.5 PALLIATIVE CARE BY SPECIALIST: Status: ACTIVE | Noted: 2025-01-21

## 2025-01-21 LAB
ALBUMIN SERPL BCG-MCNC: 3 G/DL (ref 3.5–5)
ALP SERPL-CCNC: 49 U/L (ref 34–104)
ALT SERPL W P-5'-P-CCNC: 21 U/L (ref 7–52)
ANION GAP SERPL CALCULATED.3IONS-SCNC: 10 MMOL/L (ref 4–13)
AST SERPL W P-5'-P-CCNC: 42 U/L (ref 13–39)
BASOPHILS # BLD AUTO: 0.02 THOUSANDS/ΜL (ref 0–0.1)
BASOPHILS NFR BLD AUTO: 0 % (ref 0–1)
BILIRUB SERPL-MCNC: 0.56 MG/DL (ref 0.2–1)
BUN SERPL-MCNC: 30 MG/DL (ref 5–25)
CALCIUM ALBUM COR SERPL-MCNC: 9.3 MG/DL (ref 8.3–10.1)
CALCIUM SERPL-MCNC: 8.5 MG/DL (ref 8.4–10.2)
CHLORIDE SERPL-SCNC: 107 MMOL/L (ref 96–108)
CO2 SERPL-SCNC: 26 MMOL/L (ref 21–32)
CREAT SERPL-MCNC: 1.07 MG/DL (ref 0.6–1.3)
EOSINOPHIL # BLD AUTO: 0.01 THOUSAND/ΜL (ref 0–0.61)
EOSINOPHIL NFR BLD AUTO: 0 % (ref 0–6)
ERYTHROCYTE [DISTWIDTH] IN BLOOD BY AUTOMATED COUNT: 15.5 % (ref 11.6–15.1)
GFR SERPL CREATININE-BSD FRML MDRD: 56 ML/MIN/1.73SQ M
GLUCOSE SERPL-MCNC: 106 MG/DL (ref 65–140)
GLUCOSE SERPL-MCNC: 111 MG/DL (ref 65–140)
GLUCOSE SERPL-MCNC: 92 MG/DL (ref 65–140)
HCT VFR BLD AUTO: 33.8 % (ref 36.5–49.3)
HGB BLD-MCNC: 11 G/DL (ref 12–17)
IMM GRANULOCYTES # BLD AUTO: 0.1 THOUSAND/UL (ref 0–0.2)
IMM GRANULOCYTES NFR BLD AUTO: 1 % (ref 0–2)
LYMPHOCYTES # BLD AUTO: 0.75 THOUSANDS/ΜL (ref 0.6–4.47)
LYMPHOCYTES NFR BLD AUTO: 7 % (ref 14–44)
MCH RBC QN AUTO: 31.3 PG (ref 26.8–34.3)
MCHC RBC AUTO-ENTMCNC: 32.5 G/DL (ref 31.4–37.4)
MCV RBC AUTO: 96 FL (ref 82–98)
MONOCYTES # BLD AUTO: 0.34 THOUSAND/ΜL (ref 0.17–1.22)
MONOCYTES NFR BLD AUTO: 3 % (ref 4–12)
NEUTROPHILS # BLD AUTO: 9.4 THOUSANDS/ΜL (ref 1.85–7.62)
NEUTS SEG NFR BLD AUTO: 89 % (ref 43–75)
NRBC BLD AUTO-RTO: 0 /100 WBCS
PLATELET # BLD AUTO: 244 THOUSANDS/UL (ref 149–390)
PMV BLD AUTO: 9.7 FL (ref 8.9–12.7)
POTASSIUM SERPL-SCNC: 3.1 MMOL/L (ref 3.5–5.3)
PROCALCITONIN SERPL-MCNC: 1.53 NG/ML
PROT SERPL-MCNC: 6.1 G/DL (ref 6.4–8.4)
RBC # BLD AUTO: 3.52 MILLION/UL (ref 3.88–5.62)
SODIUM SERPL-SCNC: 143 MMOL/L (ref 135–147)
WBC # BLD AUTO: 10.62 THOUSAND/UL (ref 4.31–10.16)

## 2025-01-21 PROCEDURE — 93970 EXTREMITY STUDY: CPT | Performed by: SURGERY

## 2025-01-21 PROCEDURE — 99223 1ST HOSP IP/OBS HIGH 75: CPT | Performed by: PHYSICIAN ASSISTANT

## 2025-01-21 PROCEDURE — 82948 REAGENT STRIP/BLOOD GLUCOSE: CPT

## 2025-01-21 PROCEDURE — 85025 COMPLETE CBC W/AUTO DIFF WBC: CPT | Performed by: NURSE PRACTITIONER

## 2025-01-21 PROCEDURE — 92526 ORAL FUNCTION THERAPY: CPT

## 2025-01-21 PROCEDURE — 80053 COMPREHEN METABOLIC PANEL: CPT | Performed by: NURSE PRACTITIONER

## 2025-01-21 PROCEDURE — 84145 PROCALCITONIN (PCT): CPT | Performed by: NURSE PRACTITIONER

## 2025-01-21 PROCEDURE — 93970 EXTREMITY STUDY: CPT

## 2025-01-21 PROCEDURE — 71045 X-RAY EXAM CHEST 1 VIEW: CPT

## 2025-01-21 PROCEDURE — 99232 SBSQ HOSP IP/OBS MODERATE 35: CPT

## 2025-01-21 RX ORDER — POTASSIUM CHLORIDE 14.9 MG/ML
20 INJECTION INTRAVENOUS
Status: COMPLETED | OUTPATIENT
Start: 2025-01-21 | End: 2025-01-21

## 2025-01-21 RX ORDER — VANCOMYCIN HYDROCHLORIDE 750 MG/150ML
10 INJECTION, SOLUTION INTRAVENOUS EVERY 24 HOURS
Status: DISCONTINUED | OUTPATIENT
Start: 2025-01-22 | End: 2025-01-22

## 2025-01-21 RX ORDER — POTASSIUM CHLORIDE 1500 MG/1
40 TABLET, EXTENDED RELEASE ORAL ONCE
Status: DISCONTINUED | OUTPATIENT
Start: 2025-01-21 | End: 2025-01-21

## 2025-01-21 RX ORDER — VANCOMYCIN HYDROCHLORIDE 1 G/200ML
15 INJECTION, SOLUTION INTRAVENOUS EVERY 12 HOURS
Status: DISCONTINUED | OUTPATIENT
Start: 2025-01-21 | End: 2025-01-21

## 2025-01-21 RX ORDER — CEFEPIME HYDROCHLORIDE 2 G/1
2000 INJECTION, POWDER, FOR SOLUTION INTRAVENOUS EVERY 12 HOURS
Status: DISCONTINUED | OUTPATIENT
Start: 2025-01-21 | End: 2025-01-21

## 2025-01-21 RX ADMIN — HEPARIN SODIUM 5000 UNITS: 5000 INJECTION INTRAVENOUS; SUBCUTANEOUS at 05:02

## 2025-01-21 RX ADMIN — METOROPROLOL TARTRATE 2.5 MG: 5 INJECTION, SOLUTION INTRAVENOUS at 03:13

## 2025-01-21 RX ADMIN — VANCOMYCIN HYDROCHLORIDE 1500 MG: 1 INJECTION, POWDER, LYOPHILIZED, FOR SOLUTION INTRAVENOUS at 12:03

## 2025-01-21 RX ADMIN — METOROPROLOL TARTRATE 2.5 MG: 5 INJECTION, SOLUTION INTRAVENOUS at 21:29

## 2025-01-21 RX ADMIN — GABAPENTIN 100 MG: 100 CAPSULE ORAL at 17:13

## 2025-01-21 RX ADMIN — GLYCOPYRROLATE 0.1 MG: 0.2 INJECTION, SOLUTION INTRAMUSCULAR; INTRAVENOUS at 17:26

## 2025-01-21 RX ADMIN — HEPARIN SODIUM 5000 UNITS: 5000 INJECTION INTRAVENOUS; SUBCUTANEOUS at 21:29

## 2025-01-21 RX ADMIN — FENOFIBRATE 48 MG: 48 TABLET, FILM COATED ORAL at 11:08

## 2025-01-21 RX ADMIN — PANTOPRAZOLE SODIUM 40 MG: 40 INJECTION, POWDER, FOR SOLUTION INTRAVENOUS at 05:02

## 2025-01-21 RX ADMIN — SENNOSIDES 17.2 MG: 8.6 TABLET, FILM COATED ORAL at 21:29

## 2025-01-21 RX ADMIN — PANTOPRAZOLE SODIUM 40 MG: 40 INJECTION, POWDER, FOR SOLUTION INTRAVENOUS at 17:12

## 2025-01-21 RX ADMIN — B-COMPLEX W/ C & FOLIC ACID TAB 1 TABLET: TAB at 11:08

## 2025-01-21 RX ADMIN — POTASSIUM CHLORIDE 20 MEQ: 14.9 INJECTION, SOLUTION INTRAVENOUS at 07:53

## 2025-01-21 RX ADMIN — CEFEPIME 2000 MG: 2 INJECTION, POWDER, FOR SOLUTION INTRAVENOUS at 10:40

## 2025-01-21 RX ADMIN — NYSTATIN: 100000 CREAM TOPICAL at 11:09

## 2025-01-21 RX ADMIN — CEFEPIME 2000 MG: 2 INJECTION, POWDER, FOR SOLUTION INTRAVENOUS at 22:50

## 2025-01-21 RX ADMIN — ALLOPURINOL 300 MG: 300 TABLET ORAL at 11:08

## 2025-01-21 RX ADMIN — DOCUSATE SODIUM 100 MG: 50 LIQUID ORAL at 17:12

## 2025-01-21 RX ADMIN — POTASSIUM CHLORIDE 20 MEQ: 14.9 INJECTION, SOLUTION INTRAVENOUS at 10:40

## 2025-01-21 RX ADMIN — Medication 2000 UNITS: at 11:08

## 2025-01-21 RX ADMIN — NYSTATIN: 100000 CREAM TOPICAL at 17:12

## 2025-01-21 RX ADMIN — DOCUSATE SODIUM 100 MG: 50 LIQUID ORAL at 11:08

## 2025-01-21 RX ADMIN — METOROPROLOL TARTRATE 2.5 MG: 5 INJECTION, SOLUTION INTRAVENOUS at 15:31

## 2025-01-21 RX ADMIN — ACETAMINOPHEN 1000 MG: 1000 INJECTION, SOLUTION INTRAVENOUS at 17:26

## 2025-01-21 RX ADMIN — METOROPROLOL TARTRATE 2.5 MG: 5 INJECTION, SOLUTION INTRAVENOUS at 07:52

## 2025-01-21 RX ADMIN — HEPARIN SODIUM 5000 UNITS: 5000 INJECTION INTRAVENOUS; SUBCUTANEOUS at 15:30

## 2025-01-21 NOTE — SPEECH THERAPY NOTE
Speech Language/Pathology    Speech/Language Pathology Progress Note    Patient Name: Prem Mar Sr.  Today's Date: 1/21/2025     Problem List  Principal Problem:    Upper GI bleed  Active Problems:    Essential hypertension    Idiopathic chronic gout of multiple sites without tophus    Mixed hyperlipidemia    Chronic kidney disease, stage 3a (HCC)    Atrial flutter (HCC)    Constipation    Goals of care, counseling/discussion    Acute distention of stomach    COVID-19 virus infection    Congestion of upper airway    Opacity noted on imaging study       Past Medical History  Past Medical History:   Diagnosis Date    Arthritis     Cataract     Coronary artery disease     Coronary atherosclerosis of native coronary artery     Diverticulitis of colon     Essential hypertension, benign     Hyperlipidemia     Hypertension     Peptic ulcer     Renal disorder         Past Surgical History  Past Surgical History:   Procedure Laterality Date    CATARACT EXTRACTION Right     Left eye 5/2021    CORONARY STENT PLACEMENT      SKIN BIOPSY      TONSILLECTOMY           Subjective:  Pt alert and positioned upright.   Objective:  Pt was seen for f/u dysphagia therapy. Positioned upright and alerted to stim. Son at bedside. Pt audibly wet. Cued to cough. Utilized [place] for deep suction. Removed MOD amount of secretions. Pt does have strong cough however inconsistently brings up secretions to be removed via suction. Trialed small ice chips x2. Briefly manipulated trial. Verbal and tactile cues provided to intiate swallow however pt unable. Removed trial via suction. Provided additional trial. Pt poor oral reception verbal cues and tongue push to attend trial. Brief manipulation. Verbal and tactile cues provided to intiate swallow. VERY delayed swallow initiate, laryngeal rise palpated appeared weak. Pt cough response removed mod amount of secretions. Discontinued as high risk of aspiration. Answered sons questions at bedside.  Reviewed session with nursing and PA-C  Assessment:  Pt continues with mod amount of secretions. Benefits from frequent suctioning. Did not intiate swallow with initial  Very weak laryngeal rise upon palpation.   Plan/Recommendations:  Recommend continue strict NPO   Ensure good oral care frequent   Aspiration Precautions  ST continue to f/u as indicated.

## 2025-01-21 NOTE — PLAN OF CARE
Problem: PAIN - ADULT  Goal: Verbalizes/displays adequate comfort level or baseline comfort level  Description: Interventions:  - Encourage patient to monitor pain and request assistance  - Assess pain using appropriate pain scale  - Administer analgesics based on type and severity of pain and evaluate response  - Implement non-pharmacological measures as appropriate and evaluate response  - Consider cultural and social influences on pain and pain management  - Notify physician/advanced practitioner if interventions unsuccessful or patient reports new pain  Outcome: Progressing     Problem: INFECTION - ADULT  Goal: Absence or prevention of progression during hospitalization  Description: INTERVENTIONS:  - Assess and monitor for signs and symptoms of infection  - Monitor lab/diagnostic results  - Monitor all insertion sites, i.e. indwelling lines, tubes, and drains  - Monitor endotracheal if appropriate and nasal secretions for changes in amount and color  - [place] appropriate cooling/warming therapies per order  - Administer medications as ordered  - Instruct and encourage patient and family to use good hand hygiene technique  - Identify and instruct in appropriate isolation precautions for identified infection/condition  Outcome: Progressing  Goal: Absence of fever/infection during neutropenic period  Description: INTERVENTIONS:  - Monitor WBC    Outcome: Progressing  Goal: Infection Control Precautions  Outcome: Progressing     Problem: SAFETY ADULT  Goal: Patient will remain free of falls  Description: INTERVENTIONS:  - Educate patient/family on patient safety including physical limitations  - Instruct patient to call for assistance with activity   - Consult OT/PT to assist with strengthening/mobility   - Keep Call bell within reach  - Keep bed low and locked with side rails adjusted as appropriate  - Keep care items and personal belongings within reach  - Initiate and maintain comfort rounds  - Make Fall  Risk Sign visible to staff  - Offer Toileting every  Hours, in advance of need  - Initiate/Maintain alarm  - Obtain necessary fall risk management equipment:   - Apply yellow socks and bracelet for high fall risk patients  - Consider moving patient to room near nurses station  Outcome: Progressing  Goal: Maintain or return to baseline ADL function  Description: INTERVENTIONS:  -  Assess patient's ability to carry out ADLs; assess patient's baseline for ADL function and identify physical deficits which impact ability to perform ADLs (bathing, care of mouth/teeth, toileting, grooming, dressing, etc.)  - Assess/evaluate cause of self-care deficits   - Assess range of motion  - Assess patient's mobility; develop plan if impaired  - Assess patient's need for assistive devices and provide as appropriate  - Encourage maximum independence but intervene and supervise when necessary  - Involve family in performance of ADLs  - Assess for home care needs following discharge   - Consider OT consult to assist with ADL evaluation and planning for discharge  - Provide patient education as appropriate  Outcome: Progressing  Goal: Maintains/Returns to pre admission functional level  Description: INTERVENTIONS:  - Perform AM-PAC 6 Click Basic Mobility/ Daily Activity assessment daily.  - Set and communicate daily mobility goal to care team and patient/family/caregiver.   - Collaborate with rehabilitation services on mobility goals if consulted  - Perform Range of Motion  times a day.  - Reposition patient every  hours.  - Dangle patient  times a day  - Stand patient  times a day  - Ambulate patient  times a day  - Out of bed to chair  times a day   - Out of bed for meals  times a day  - Out of bed for toileting  - Record patient progress and toleration of activity level   Outcome: Progressing

## 2025-01-21 NOTE — ASSESSMENT & PLAN NOTE
Presented with suspected GI bleed  GI team consulted  Continue conservative measures  NGT in place  Mgmt per SLIM/GI

## 2025-01-21 NOTE — RESPIRATORY THERAPY NOTE
RT Protocol Note  Prem Mar Sr. 89 y.o. male MRN: [id number]  Unit/Bed#: -01 Encounter: [id number]    Assessment    Principal Problem:    Upper GI bleed  Active Problems:    Essential hypertension    Idiopathic chronic gout of multiple sites without tophus    Mixed hyperlipidemia    Chronic kidney disease, stage 3a (HCC)    Atrial flutter (HCC)    Constipation    Goals of care, counseling/discussion    Acute distention of stomach    COVID-19 virus infection    Congestion of upper airway    Opacity noted on imaging study      Home Pulmonary Medications:    Home Devices/Therapy: Other (Comment) (None)    Past Medical History:   Diagnosis Date    Arthritis     Cataract     Coronary artery disease     Coronary atherosclerosis of native coronary artery     Diverticulitis of colon     Essential hypertension, benign     Hyperlipidemia     Hypertension     Peptic ulcer     Renal disorder      Social History     Socioeconomic History    Marital status:      Spouse name: None    Number of children: None    Years of education: None    Highest education level: None   Occupational History    None   Tobacco Use    Smoking status: Former     Types: Pipe    Smokeless tobacco: Never   Vaping Use    Vaping status: Never Used   Substance and Sexual Activity    Alcohol use: Not Currently    Drug use: No    Sexual activity: Not Currently   Other Topics Concern    None   Social History Narrative    None     Social Drivers of Health     Financial Resource Strain: Not on file   Food Insecurity: No Food Insecurity (1/15/2025)    Nursing - Inadequate Food Risk Classification     Worried About Running Out of Food in the Last Year: Never true     Ran Out of Food in the Last Year: Never true     Ran Out of Food in the Last Year: Never true   Transportation Needs: No Transportation Needs (1/15/2025)    Nursing - Transportation Risk Classification     Lack of Transportation: Not on file     Lack of Transportation: No  "  Physical Activity: Not on file   Stress: Not on file   Social Connections: Not on file   Intimate Partner Violence: Unknown (1/15/2025)    Nursing IPS     Feels Physically and Emotionally Safe: Not on file     Physically Hurt by Someone: Not on file     Humiliated or Emotionally Abused by Someone: Not on file     Physically Hurt by Someone: No     Hurt or Threatened by Someone: No   Housing Stability: Unknown (1/15/2025)    Nursing: Inadequate Housing Risk Classification     Has Housing: Not on file     Worried About Losing Housing: Not on file     Unable to Get Utilities: Not on file     Unable to Pay for Housing in the Last Year: No     Has Housin       Subjective         Objective    Physical Exam:   Assessment Type: Assess only  General Appearance: Awake  Respiratory Pattern: Normal  Chest Assessment: Chest expansion symmetrical  Bilateral Breath Sounds: Rhonchi    Vitals:  Blood pressure 150/66, pulse 80, temperature 99.2 °F (37.3 °C), resp. rate 20, height 5' 6\" (1.676 m), weight 73.7 kg (162 lb 7.7 oz), SpO2 93%.          Imaging and other studies: Results Review Statement: I reviewed radiology reports from this admission including: CT chest.    O2 Device: Nasal Cannula     Plan    Respiratory Plan: Discontinue Protocol  Airway Clearance Plan: Discontinue Protocol     Resp Comments: Pt assessed per respiratory and ariway clearence protocal. Pt has atelectasis vs pneumonia on CXR. Ct shows atelectasis and bilat pleural effusions. Pt cont. to require 2lnc. No indication for bronchodilator therapy at this time. Pt required frequent suctioning, has a strong productive cough. Pt unable to perform IS of flutter valve.   "

## 2025-01-21 NOTE — UTILIZATION REVIEW
Continued Stay Review    Date: 1/21/25                          Current Patient Class: Inpatient  Current Level of Care: MED-SURG    HPI:89 y.o. male initially admitted OBS ON 1/15 UPGRADED TO INPT on 1/16.     Assessment/Plan: No acute events overnight. Per discussion with son, patient did not get much sleep last night because of his fever. Rhonchi. Swelling (bilateral UE). NGT PLACED.1/20 patient passed NG clamping trial and evaluated by SLP but did not pass.Per GI note, if enteral feeding is desired and aligns with GOC, ok to use NGT for trickle feeding.Nutrition consulted for tube feeds.given ongoing febrile episodes (tmax 102.5 overnight), and leukocytosis this morning will broaden abx to cefepime and vanc. MRSA culture ordered.Repeat CXR showed Slight improvement in opacity in the medial left base which may be due to atelectasis and/or pneumonia. WBC 10.62. HGB 11, HCT 33.8. K 3.1. BUN 30. AST 42. Procalcitonin 1.53.saturating 93% on 2 L NC.S/p remdesivir x3 days.Palliative care consulted.Anticipate discharge in >72 hrs to discharge location to be determined pending rehab evaluations.     Medications:   Scheduled Medications:  allopurinol, 300 mg, Oral, Daily  bisacodyl, 10 mg, Rectal, Daily  cefepime, 2,000 mg, Intravenous, Q12H  Cholecalciferol, 2,000 Units, Oral, Daily  docusate, 100 mg, Oral, BID  fenofibrate, 48 mg, Oral, Daily  [Held by provider] furosemide, 20 mg, Oral, Once per day on Monday Wednesday Friday  gabapentin, 100 mg, Oral, Q12H  heparin (porcine), 5,000 Units, Subcutaneous, Q8H ANKIT  iohexol, 50 mL, Oral, 90 min pre-procedure  metoprolol, 2.5 mg, Intravenous, Q6H  multivitamin stress formula, 1 tablet, Oral, Daily  nystatin, , Topical, BID  pantoprazole, 40 mg, Intravenous, Q12H  potassium chloride, 20 mEq, Intravenous, Q2H  senna, 2 tablet, Oral, HS  vancomycin, 15 mg/kg, Intravenous, Q12H      Continuous IV Infusions:     PRN Meds:  acetaminophen, 1,000 mg, Intravenous, Q8H PRN    acetaminophen, 650 mg, Oral, Q4H PRN  bisacodyl, 10 mg, Rectal, Daily PRN  glycopyrrolate, 0.1 mg, Intravenous, Q4H PRN  nitroglycerin, 0.4 mg, Sublingual, Q5 Min PRN  ondansetron, 4 mg, Intravenous, Q6H PRN  phenol, 1 spray, Mouth/Throat, Q2H PRN      Discharge Plan: TBD    Vital Signs (last 3 days)       Date/Time Temp Pulse Resp BP MAP (mmHg) SpO2 Calculated FIO2 (%) - Nasal Cannula Nasal Cannula O2 Flow Rate (L/min) O2 Device Patient Position - Orthostatic VS [place] Coma Scale Score Pain    01/21/25 07:48:41 99.2 °F (37.3 °C) 80 20 150/66 94 93 % -- -- -- -- -- --    01/21/25 07:48:22 99.2 °F (37.3 °C) 76 20 150/66 94 93 % -- -- -- -- -- --    01/21/25 03:11:11 99.2 °F (37.3 °C) 80 -- 140/59 86 94 % -- -- -- -- -- --    01/21/25 02:12:04 -- 83 -- 121/57 78 93 % -- -- -- -- -- --    01/20/25 22:37:57 100.5 °F (38.1 °C) 103 16 85/60 68 94 % -- -- -- -- -- --    01/20/25 2200 98.6 °F (37 °C) 86 -- -- -- 95 % -- -- -- -- -- --    01/20/25 20:12:32 100.7 °F (38.2 °C) 98 -- -- -- 94 % -- -- -- -- -- --    01/20/25 19:40:36 102.5 °F (39.2 °C) 102 -- -- -- 95 % 28 2 L/min Nasal cannula -- -- No Pain    01/20/25 14:32:14 100.1 °F (37.8 °C) 84 16 146/56 86 95 % 28 2 L/min Nasal cannula Lying -- --    01/20/25 14:10:27 -- 81 -- 140/51 81 92 % -- -- -- -- -- --    01/20/25 11:18:35 99.9 °F (37.7 °C) 90 -- -- -- 94 % -- -- -- -- -- --    01/20/25 0830 -- -- -- -- -- 90 % 28 2 L/min Nasal cannula -- 15 No Pain    01/20/25 07:30:57 -- 75 -- 134/48 77 94 % -- -- -- -- -- --    01/20/25 07:26:19 98 °F (36.7 °C) 74 -- 134/48 77 94 % -- -- -- -- -- --    01/20/25 0724 -- -- -- -- -- 94 % 28 2 L/min Nasal cannula -- -- --    01/20/25 02:04:13 -- 77 -- 148/59 89 96 % -- -- -- -- -- --    01/19/25 22:18:30 98.8 °F (37.1 °C) 77 -- 130/61 84 93 % -- -- -- -- -- --    01/19/25 19:40:16 99.8 °F (37.7 °C) 78 -- 131/60 84 94 % -- -- -- -- -- No Pain    01/19/25 1649 -- -- -- -- -- 94 % 28 2 L/min Nasal cannula -- -- --    01/19/25  15:40:43 101.8 °F (38.8 °C) 106 -- 154/66 95 89 % 28 2 L/min Nasal cannula -- -- --    01/19/25 15:39:33 101.8 °F (38.8 °C) 104 -- 154/66 95 90 % -- -- None (Room air) -- -- --    01/19/25 0900 -- -- -- -- -- 92 % -- -- None (Room air) -- 15 No Pain    01/19/25 07:09:09 99.5 °F (37.5 °C) 79 20 145/58 87 93 % -- -- -- Lying -- --    01/19/25 02:13:17 -- 70 -- 119/43 68 91 % -- -- -- -- -- --    01/19/25 0100 -- -- -- -- -- -- -- -- None (Room air) -- -- --    01/18/25 22:38:48 100.2 °F (37.9 °C) 69 18 102/41 61 96 % 28 2 L/min Nasal cannula Lying -- --    01/18/25 22:17:01 100.6 °F (38.1 °C) 83 -- -- -- 98 % -- -- -- -- -- --    01/18/25 21:14:51 101 °F (38.3 °C) 80 -- 147/54 85 96 % -- -- -- -- -- --    01/18/25 21:11:14 101 °F (38.3 °C) 93 -- 147/54 85 97 % -- -- -- -- -- --    01/18/25 1952 -- -- -- -- -- -- 28 2 L/min None (Room air) -- -- No Pain    01/18/25 14:19:08 98.3 °F (36.8 °C) 84 16 142/62 89 95 % -- -- None (Room air) Lying -- --    01/18/25 11:52:32 -- 92 -- 124/55 78 94 % -- -- -- -- -- --    01/18/25 09:38:17 -- 88 -- 119/42 68 98 % -- -- -- -- -- --    01/18/25 0845 -- -- -- -- -- -- -- -- -- -- 15 No Pain    01/18/25 07:06:35 97.7 °F (36.5 °C) 87 20 129/52 78 91 % -- -- -- -- -- --    01/18/25 0309 98.1 °F (36.7 °C) 73 -- -- -- 97 % -- -- -- -- -- --    01/18/25 0000 98.6 °F (37 °C) 99 -- -- -- 96 % -- -- -- -- -- --          Weight (last 2 days)       None            Pertinent Labs/Diagnostic Results:   Radiology:  XR chest portable   Final Interpretation by Leonor Ingram MD (01/21 0915)      Slight improvement in opacity in the medial left base which may be due to atelectasis and/or pneumonia.            Workstation performed: [id number]         XR abdomen 1 vw portable   Final Interpretation by Heaven Barton MD (01/20 2509)   Nonobstructive bowel gas pattern. Residual oral contrast throughout the colon. No gastric distention. Satisfactory position of the endogastric tube.                Workstation performed: [id number]         XR chest portable   Final Interpretation by Beatris Benton MD (01/20 0904)      Retrocardiac opacity, which may be due to pneumonia and/or atelectasis.            Workstation performed: [id number]         CT abdomen pelvis w contrast   Final Interpretation by Geovanny Taylor MD (01/17 7299)      1.  New small bilateral pleural effusions with extensive bibasilar atelectasis.   2.  Wall thickening of the lower esophagus and proximal stomach is again noted without interval change. This may reflect mild focal esophagitis/gastritis. New NG tube with tip terminating in the gastric body region.   3.  No evidence of bowel obstruction, mesenteric inflammation, appendicitis, obstructive uropathy, free air, or free fluid. Descending and sigmoid diverticulosis again noted without evidence for acute diverticulitis. Subtle wall thickening of the mid to    distal sigmoid colon, rectum, and anal verge region could reflect mild focal colitis/proctitis.   4.  Multiple punctate pancreatic calcifications again seen likely sequelae of chronic pancreatitis.      Workstation performed: [id number]         XR chest portable   Final Interpretation by Jose Johns MD (01/17 1327)      Scattered streaky opacities favored to represent atelectasis but pneumonia not excluded.            Workstation performed: [id number]         XR abdomen 1 vw portable   Final Interpretation by Jose Johns MD (01/17 1328)      New marked gaseous distention of the stomach.               Workstation performed: [id number]         XR abdomen 1 view kub   Final Interpretation by Diony Boateng MD (01/16 1254)      1.  NG tube projects over the gastric body with decompression of the stomach compared to the x-ray from earlier the same day.      2.  Mildly dilated mid abdominal small bowel loops with gas throughout the colon though the abdomen was only partially imaged.               Workstation performed:  [id number]         XR abdomen 1 view kub   Final Interpretation by Diony Boateng MD (01/16 0836)      Worsening gastric distention.      The study was marked in EPIC for immediate notification.               Workstation performed: [id number]         XR chest portable   Final Interpretation by Sina Vitale MD (01/15 1429)      No endogastric tube visible within the field-of-view.      There is some gaseous distention of the stomach.      Minimal atelectasis left lung base.      The study was marked in EPIC for immediate notification.            Workstation performed: [id number]         CT abdomen pelvis with contrast   Final Interpretation by Geovanny Taylor MD (01/15 0545)      Trace left pleural effusion with bibasilar atelectasis.  Focal wall thickening of the lower esophagus, gastroesophageal junction, and proximal stomach noted which may reflect esophagitis/gastritis. No evidence for bowel obstruction, mesenteric    inflammation, appendicitis, obstructive uropathy, free air, or free fluid. Descending and sigmoid colon diverticulosis without evidence for acute diverticulitis.         Workstation performed: [id number]         VAS upper limb venous duplex scan, complete, bilateral    (Results Pending)               Results from last 7 days   Lab Units 01/21/25  0441 01/20/25  0426 01/19/25  0523 01/18/25  0442 01/18/25  0015 01/17/25  0832 01/17/25  0518   WBC Thousand/uL 10.62* 8.11 6.53 7.19  --   --  7.33   HEMOGLOBIN g/dL 11.0* 11.4* 11.4* 9.6* 10.0*   < > 11.2*   HEMATOCRIT % 33.8* 35.7* 35.3* 30.4*  --   --  34.1*   PLATELETS Thousands/uL 244 202 173 129*  --   --  164   TOTAL NEUT ABS Thousands/µL 9.40* 7.17 5.48  --   --   --  5.72   BANDS PCT %  --   --   --  24*  --   --   --     < > = values in this interval not displayed.         Results from last 7 days   Lab Units 01/21/25  0441 01/20/25  0426 01/19/25  0523 01/18/25  0442 01/17/25  1337 01/16/25  0511 01/15/25  0422   SODIUM mmol/L 143  138 139 134* 135   < > 135   POTASSIUM mmol/L 3.1* 3.3* 3.3* 3.7 3.9   < > 4.0   CHLORIDE mmol/L 107 103 102 103 101   < > 99   CO2 mmol/L 26 25 26 20* 25   < > 25   ANION GAP mmol/L 10 10 11 11 9   < > 11   BUN mg/dL 30* 30* 28* 18 21   < > 31*   CREATININE mg/dL 1.07 1.11 1.20 0.86 1.11   < > 1.00   EGFR ml/min/1.73sq m 56 54 49 71 54   < > 61   CALCIUM mg/dL 8.5 8.2* 8.0* 7.2* 8.3*   < > 8.9   CALCIUM, IONIZED mmol/L  --   --   --   --  1.10*  --   --    MAGNESIUM mg/dL  --  2.2  --   --  1.9  --  1.9    < > = values in this interval not displayed.     Results from last 7 days   Lab Units 01/21/25 0441 01/20/25  0426 01/19/25  0523 01/18/25  0442 01/17/25  1337   AST U/L 42* 49* 49* 41* 45*   ALT U/L 21 21 19 14 18   ALK PHOS U/L 49 48 45 33* 44   TOTAL PROTEIN g/dL 6.1* 6.0* 5.8* 4.4* 6.2*   ALBUMIN g/dL 3.0* 3.1* 3.1* 2.5* 3.5   TOTAL BILIRUBIN mg/dL 0.56 0.54 0.43 0.38 0.60     Results from last 7 days   Lab Units 01/21/25  0747   POC GLUCOSE mg/dl 92     Results from last 7 days   Lab Units 01/21/25  0441 01/20/25  0426 01/19/25  0523 01/18/25  0442 01/17/25  1337 01/17/25  0518 01/16/25  0511 01/15/25  0422   GLUCOSE RANDOM mg/dL 106 95 78 85 105 103 128 129       Results from last 7 days   Lab Units 01/17/25  1337   CK TOTAL U/L 371*         Results from last 7 days   Lab Units 01/17/25  1337   D-DIMER QUANTITATIVE ug/ml FEU 2.02*     Results from last 7 days   Lab Units 01/15/25  0422   PROTIME seconds 13.1   INR  0.94   PTT seconds 31         Results from last 7 days   Lab Units 01/21/25  0441 01/20/25  0426 01/19/25  0523 01/18/25  0442 01/15/25  0422   PROCALCITONIN ng/ml 1.53* 1.43* 1.82* 1.25* <0.05                 Results from last 7 days   Lab Units 01/17/25  1337   BNP pg/mL 171*     Results from last 7 days   Lab Units 01/17/25  1337   FERRITIN ng/mL 189                 Results from last 7 days   Lab Units 01/15/25  0422   LIPASE u/L 23     Results from last 7 days   Lab Units 01/20/25  0426  01/19/25  0523 01/18/25  0442 01/17/25  1337   CRP mg/L 198.8* 177.3* 95.3* 95.8*             Results from last 7 days   Lab Units 01/15/25  0720   CLARITY UA  Clear   COLOR UA  Yellow   SPEC GRAV UA  1.010   PH UA  7.5   GLUCOSE UA mg/dl Negative   KETONES UA mg/dl Negative   BLOOD UA  1+*   PROTEIN UA mg/dl Negative   NITRITE UA  Negative   BILIRUBIN UA  Negative   UROBILINOGEN UA E.U./dl 0.2   LEUKOCYTES UA  Negative   WBC UA /hpf 1-2   RBC UA /hpf 10-20*   BACTERIA UA /hpf Occasional   EPITHELIAL CELLS WET PREP /hpf Occasional       Network Utilization Review Department  ATTENTION: Please call with any questions or concerns to [phone] and carefully listen to the prompts so that you are directed to the right person. All voicemails are confidential.   For Discharge needs, contact Care Management DC Support Team at [phone] opt. 2  Send all requests for admission clinical reviews, approved or denied determinations and any other requests to dedicated fax number below belonging to the [place] where the patient is receiving treatment. List of dedicated fax numbers for the Facilities:  FACILITY NAME UR FAX NUMBER   ADMISSION DENIALS (Administrative/Medical Necessity) [phone]   DISCHARGE SUPPORT TEAM (NETWORK) [phone]   PARENT CHILD HEALTH (Maternity/NICU/Pediatrics) [phone]   [facility] [phone]   [facility] [phone]   [facility] [phone]   [facility] [phone]   [facility] [phone]   [facility] [phone]   [facility] [phone]   [facility] [phone]   [facility] [phone]   [facility] [phone]   [facility] [phone]   [facility]  [facility] [phone]

## 2025-01-21 NOTE — QUICK NOTE
Venous duplex ordered for bilateral UE edema. 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 Given his principal problem of suspected UGIB, hold off on initiating anticoagulation at this time.

## 2025-01-21 NOTE — ASSESSMENT & PLAN NOTE
Patient with significant comorbid medical conditions would be high risk for treatment options requiring anesthesia.  EGD testing might have risks that outweigh the benefits.  Patient verbalized understanding.  Opened goals of care conversations with son.  Continued on phone on Saturday. Had further conversations with patient's son, daughter, and grandson when they arrived.  Ultimately they would like to keep him comfortable but for now continue with full treatment   Palliative care consulted for further goals   Continue supportive care

## 2025-01-21 NOTE — PROGRESS NOTES
Progress Note - Hospitalist   Name: Prem Mar Sr. 89 y.o. male I MRN: [id number]  Unit/Bed#: -01 I Date of Admission: 1/15/2025   Date of Service: 1/21/2025 I Hospital Day: 5    Assessment & Plan  Upper GI bleed  Presented for suspected GI bleed  CT abdomen and pelvis 1/15/2025 with focal wall thickening of the lower esophagus, gastroesophageal junction, and proximal stomach which may reflect esophagitis/gastritis.  No evidence for bowel obstruction, mesenteric inflammation, appendicitis, obstructive uropathy, free air or free fluid  Chest x-ray 1/15/2025 with gaseous distention of the stomach  KUB 1/16/2025 with worsening distention  NG was placed  CT abdomen pelvis with contrast 1/17/2024: New small bilateral pleural effusions with extensive bibasilar atelectasis. Wall thickening of the lower esophagus and proximal stomach is again noted without interval change. This may reflect mild focal esophagitis/gastritis. New NG tube with tip terminating in the gastric body region. No evidence of bowel obstruction, mesenteric inflammation, appendicitis, obstructive uropathy, free air, or free fluid. Descending and sigmoid diverticulosis again noted without evidence for acute diverticulitis. Subtle wall thickening of the mid to distal sigmoid colon, rectum, and anal verge region could reflect mild focal colitis/proctitis. Multiple punctate pancreatic calcifications again seen likely sequelae of chronic pancreatitis  1/20 patient passed NG clamping trial and evaluated by SLP but did not pass   Keep NPO for now   Per GI note, if enteral feeding is desired and aligns with GOC, ok to use NGT for trickle feeding  Nutrition consulted for tube feeds  Continue Protonix 40 mg IV twice daily  Continue with conservative management for now.  Please see also, goals of care  Opacity noted on imaging study  Chest x-ray: Retrocardiac opacity, which may be due to pneumonia and/or atelectasis.  Procalcitonin peaked and  downtrending, with intermittent fever  Will start ceftriaxone IV empirically - given ongoing febrile episodes (tmax 102.5 overnight), and leukocytosis this morning will broaden abx to cefepime and vanc. MRSA culture ordered  Repeat CXR pending   Monitor labs and vital signs, conduct serial physical assessments    COVID-19 virus infection  Patient with fever and COVID-positive roommate at his skilled facility  Tested positive for COVID  Currently saturating 93% on 2 L NC  S/p remdesivir x3 days  Continue Robinul for secretions, responded well  Continue supportive care  Monitor labs and vital signs, conduct serial physical assessments  Congestion of upper airway  Upper airway congestion noted, patient has strong cough and able to clear  CT imaging revealed small bilateral pleural effusion and extensive bibasilar atelectasis  Maintain head of bed elevated  Aspiration precautions  Continue Robinul 0.1 mg IV every 4 hours as needed  Would aim to limit oral tracheal suctioning to Q8H or at the least 2 hours between attempts to avoid repetitive trauma, dryness, discomfort, or bradycardia and hypoxia  Continue incentive spirometry  Received furosemide 40 mg IV x 1 on 1/18/2025  Goals of care, counseling/discussion  Patient with significant comorbid medical conditions would be high risk for treatment options requiring anesthesia.  EGD testing might have risks that outweigh the benefits.  Patient verbalized understanding.  Opened goals of care conversations with son.  Continued on phone on Saturday. Had further conversations with patient's son, daughter, and grandson when they arrived.  Ultimately they would like to keep him comfortable but for now continue with full treatment   Palliative care consulted for further goals   Continue supportive care  Idiopathic chronic gout of multiple sites without tophus  Continue prehospital allopurinol 300 mg p.o. daily when tolerating p.o.  Essential hypertension  Prehospital  takesToprol-XL 25 mg p.o. daily and Lasix 20 mg p.o. daily on Monday Wednesday Friday. Currently NPO. Metoprolol switched to IV. Received furosemide 40 mg IV x 1 1/19/2025  Monitor blood pressure  Mixed hyperlipidemia  Continue prehospital Tricor 48 mg p.o. daily when tolerating p.o.  Chronic kidney disease, stage 3a (HCC)  Lab Results   Component Value Date    EGFR 56 01/21/2025    EGFR 54 01/20/2025    EGFR 49 01/19/2025    CREATININE 1.07 01/21/2025    CREATININE 1.11 01/20/2025    CREATININE 1.20 01/19/2025     Creatinine appears to be around baseline of 0.9-1.1 mg/dL   Continue to monitor renal function closely  Avoid nephrotoxins and hypotension  Atrial flutter (HCC)  Currently rate controlled with metoprolol   Not currently on anticoagulation as outpatient  Constipation  Nursing reports that the patient has had 2 bowel movements since coming into the hospital, however patient reporting that he is not passing gas. There have been no further BMs noted  GI input appreciated    VTE Pharmacologic Prophylaxis: VTE Score: 5 High Risk (Score >/= 5) - Pharmacological DVT Prophylaxis Ordered: heparin. Sequential Compression Devices Ordered.    Mobility:   Basic Mobility Inpatient Raw Score: 6  JH-HLM Goal: 2: Bed activities/Dependent transfer  JH-HLM Achieved: 1: Laying in bed  JH-HLM Goal NOT achieved. Continue with multidisciplinary rounding and encourage appropriate mobility to improve upon JH-HLM goals.    Patient Centered Rounds: I performed bedside rounds with nursing staff today.   Discussions with Specialists or Other Care Team Provider: None    Education and Discussions with Family / Patient: Updated  (son) at bedside.    Current Length of Stay: 5 day(s)  Current Patient Status: Inpatient   Certification Statement: The patient will continue to require additional inpatient hospital stay due to IV abx, pending ongoing speech eval, safe dispo  Discharge Plan: Anticipate discharge in >72 hrs to  discharge location to be determined pending rehab evaluations.    Code Status: Level 3 - DNAR and DNI    Subjective   Seen and examined. No acute events overnight. Per discussion with son, patient did not get much sleep last night because of his fever.    Objective :  Temp:  [98.6 °F (37 °C)-102.5 °F (39.2 °C)] 99.2 °F (37.3 °C)  HR:  [] 80  BP: ()/(51-60) 140/59  Resp:  [16] 16  SpO2:  [90 %-95 %] 94 %  O2 Device: Nasal cannula  Nasal Cannula O2 Flow Rate (L/min):  [2 L/min] 2 L/min    Body mass index is 26.22 kg/m².     Input and Output Summary (last 24 hours):     Intake/Output Summary (Last 24 hours) at 1/21/2025 0772  Last data filed at 1/21/2025 0351  Gross per 24 hour   Intake 0 ml   Output 1050 ml   Net -1050 ml       Physical Exam  Constitutional:       General: He is not in acute distress.     Appearance: He is ill-appearing (chronically).   HENT:      Head: Normocephalic and atraumatic.      Nose: Nose normal.      Mouth/Throat:      Mouth: Mucous membranes are dry.   Eyes:      Conjunctiva/sclera: Conjunctivae normal.   Pulmonary:      Effort: No respiratory distress.      Breath sounds: Rhonchi present.   Abdominal:      General: There is no distension.      Palpations: Abdomen is soft.      Tenderness: There is no abdominal tenderness.   Musculoskeletal:         General: Swelling (bilateral UE) present.      Right lower leg: Edema present.      Left lower leg: Edema present.   Skin:     General: Skin is warm and dry.       Lines/Drains:  Lines/Drains/Airways       Active Status       Name Placement date Placement time Site Days    NG/OG/Enteral Tube Nasogastric 18 Fr Right nare 01/16/25  1052  Right nare  4    External Urinary Catheter Medium 01/19/25  1500  -- 1                            Lab Results: I have reviewed the following results:   Results from last 7 days   Lab Units 01/21/25  0441 01/19/25  0523 01/18/25  0442   WBC Thousand/uL 10.62*   < > 7.19   HEMOGLOBIN g/dL 11.0*   < >  9.6*   HEMATOCRIT % 33.8*   < > 30.4*   PLATELETS Thousands/uL 244   < > 129*   BANDS PCT %  --   --  24*   SEGS PCT % 89*   < >  --    LYMPHO PCT % 7*   < > 6*   MONO PCT % 3*   < > 0*   EOS PCT % 0   < > 0    < > = values in this interval not displayed.     Results from last 7 days   Lab Units 01/21/25  0441   SODIUM mmol/L 143   POTASSIUM mmol/L 3.1*   CHLORIDE mmol/L 107   CO2 mmol/L 26   BUN mg/dL 30*   CREATININE mg/dL 1.07   ANION GAP mmol/L 10   CALCIUM mg/dL 8.5   ALBUMIN g/dL 3.0*   TOTAL BILIRUBIN mg/dL 0.56   ALK PHOS U/L 49   ALT U/L 21   AST U/L 42*   GLUCOSE RANDOM mg/dL 106     Results from last 7 days   Lab Units 01/15/25  0422   INR  0.94             Results from last 7 days   Lab Units 01/21/25  0441 01/20/25  0426 01/19/25  0523 01/18/25  0442 01/15/25  0422   PROCALCITONIN ng/ml 1.53* 1.43* 1.82* 1.25* <0.05       Recent Cultures (last 7 days):         Imaging Results Review: I reviewed radiology reports from this admission including: chest xray.  Other Study Results Review: No additional pertinent studies reviewed.    Last 24 Hours Medication List:     Current Facility-Administered Medications:     acetaminophen (Ofirmev) injection 1,000 mg, Q8H PRN, Last Rate: 1,000 mg (01/20/25 1945)    acetaminophen (TYLENOL) tablet 650 mg, Q4H PRN    allopurinol (ZYLOPRIM) tablet 300 mg, Daily    bisacodyl (DULCOLAX) rectal suppository 10 mg, Daily    bisacodyl (DULCOLAX) rectal suppository 10 mg, Daily PRN    cefTRIAXone (ROCEPHIN) IVPB (premix in dextrose) 1,000 mg 50 mL, Q24H, Last Rate: 1,000 mg (01/20/25 1044)    Cholecalciferol (VITAMIN D3) tablet 2,000 Units, Daily    docusate (COLACE) oral liquid 100 mg, BID    fenofibrate (TRICOR) tablet 48 mg, Daily    [Held by provider] furosemide (LASIX) tablet 20 mg, Once per day on Monday Wednesday Friday    gabapentin (NEURONTIN) capsule 100 mg, Q12H    glycopyrrolate (ROBINUL) injection 0.1 mg, Q4H PRN    heparin (porcine) subcutaneous injection 5,000 Units,  Q8H ANKIT    iohexol (OMNIPAQUE) 240 MG/ML solution 50 mL, 90 min pre-procedure    metoprolol (LOPRESSOR) injection 2.5 mg, Q6H    multivitamin stress formula tablet 1 tablet, Daily    nitroglycerin (NITROSTAT) SL tablet 0.4 mg, Q5 Min PRN    nystatin (MYCOSTATIN) cream, BID    ondansetron (ZOFRAN) injection 4 mg, Q6H PRN    pantoprazole (PROTONIX) injection 40 mg, Q12H    phenol (CHLORASEPTIC) 1.4 % mucosal liquid 1 spray, Q2H PRN    senna (SENOKOT) tablet 17.2 mg, HS    Administrative Statements   Today, Patient Was Seen By: Nicolette Benson PA-C    **Please Note: This note may have been constructed using a voice recognition system.**

## 2025-01-21 NOTE — ASSESSMENT & PLAN NOTE
Patient with fever and COVID-positive roommate at his skilled facility  Tested positive for COVID  Currently saturating 93% on 2 L NC  S/p remdesivir x3 days  Continue Robinul for secretions, responded well  Continue supportive care  Monitor labs and vital signs, conduct serial physical assessments

## 2025-01-21 NOTE — PROGRESS NOTES
Prem RUVALCABA Zaid Bonner is a 89 y.o. male who is currently ordered Vancomycin IV with management by the Pharmacy Consult service.  Relevant clinical data and objective / subjective history reviewed.  Vancomycin Assessment:  Indication and Goal AUC/Trough: Pneumonia (goal -600, trough >10), -600, trough >10  Clinical Status: stable  Micro:    COVID +  Renal Function:  SCr: 1.07 mg/dL  CrCl: 33.1 mL/min  Renal replacement: Not on dialysis  Days of Therapy: 1  Current Dose: 1500 mg once  Vancomycin Plan:  New Dosin mg q24  Estimated AUC: 436 mcg*hr/mL  Estimated Trough: 13.3 mcg/mL  Next Level:  6am  Renal Function Monitoring: Daily BMP and UOP  Pharmacy will continue to follow closely for s/sx of nephrotoxicity, infusion reactions and appropriateness of therapy.  BMP and CBC will be ordered per protocol. We will continue to follow the patient’s culture results and clinical progress daily.    Kizzy Hoyt, Pharmacist

## 2025-01-21 NOTE — CONSULTS
Consultation - Palliative Care   Name: Prem Mar Sr. 89 y.o. male I MRN: [id number]  Unit/Bed#: -01 I Date of Admission: 1/15/2025   Date of Service: 1/21/2025 I Hospital Day: 5   Inpatient consult to Palliative Care  Consult performed by: Tres Sue PA-C  Consult ordered by: Nicolette Benson PA-C        Physician Requesting Evaluation: Nhan Carter DO   Reason for Evaluation / Principal Problem: GOC    Assessment & Plan  Goals of care, counseling/discussion  Decisional apparatus:  Patient is not competent on exam today.  If competency is lost, patient's substitute decision maker would default to adult children by [place] Act 169.  Advance Directive / Living Will / POLST / POA Forms: No    Goals  Level 3 code status.   Disease focused care.  DNR/DNI limits placed.   Discussed GOC with son/pt. Agreeable with current medical management but would not want aggressive measures to prolong life without hope for reasonable recovery  Pt has tolerable QoL prior to hospitalization, hope to return to baseline function and return to SNF  Unsure about long-term artificial nutrition by tube or surgical interventions if needed. Want to focus on comfort and QoL.   Discussed hospice/comfort care. Not currently considering at this time  Son and daughter to discuss goals further amongst themselves tonight.   GOC discussions ongoing.     Palliative care by specialist  Social support  Patient is supported by son, daughter, grandchildren  Supportive listening provided  Normalized experience of patient/family  Provided anxiety containment  Provided anticipatory guidance    Follow up  Palliative Care will continue to follow  Please reach out to on-call provider via Epic Secure Chat  if questions or concerns arise.  Please do not hesitate to reach our on call provider through our clinic answering service at [phone] should you have acute symptom control concerns.    Upper GI bleed  Presented with suspected GI  bleed  GI team consulted  Continue conservative measures  NGT in place  Mgmt per SLIM/GI    PDMP Review: I have reviewed the patient's controlled substance dispensing history in the Prescription Drug Monitoring Program in compliance with the [facility] regulations before prescribing any controlled substances.  No data    History of Present Illness   HPI: Prem Mar Sr. is a 89 y.o. year old male who presents with PMH of Aflutter, PUD, who presented to [facility] ED from SNF after 2 episodes of coffee-ground emesis. CT scan revealed thickening of lower esophagus and GE junction. GI team consulted. Risk of EGD unlikely to outweigh benefit and conservative management recommended. Family agreeable as they would like to focus on patient's comfort. Patient also positive for COVID-19. PSC team consulted for goals of care.     Patient visited by son today. Introduced PSC services. Patient was a limited participant in today's discussion as he was mostly sleeping. However, would awake occasionally and answer some questions. Patient states he is okay with everything done so far in the hospital. The son reports patient has had a tolerable QoL recently, though he feels since he has had the fevers he has not been thinking clearly. Their hope is that he can improve back to his baseline and return to the SNF. As for limits, patient would not want CPR or intubation. Son Is unsure about long-term feeding tube if needed. Patient does not have a living will or POA document. Explained [place] act 169 to son and obtained contact information for patient's daughter. Palliative SW called daughter to introduce services. Plan is for continued management with no escalation to aggressive measures, such as surgery. They would like to take things day-to-day.      Review of Systems   Unable to perform ROS: Mental status change     I have reviewed the patient's PMH, PSH, Social History, Family History, Meds, and Allergies  Historical Information   Past  Medical History:   Diagnosis Date    Arthritis     Cataract     Coronary artery disease     Coronary atherosclerosis of native coronary artery     Diverticulitis of colon     Essential hypertension, benign     Hyperlipidemia     Hypertension     Peptic ulcer     Renal disorder      Past Surgical History:   Procedure Laterality Date    CATARACT EXTRACTION Right     Left eye 5/2021    CORONARY STENT PLACEMENT      SKIN BIOPSY      TONSILLECTOMY       Social History     Tobacco Use    Smoking status: Former     Types: Pipe    Smokeless tobacco: Never   Vaping Use    Vaping status: Never Used   Substance and Sexual Activity    Alcohol use: Not Currently    Drug use: No    Sexual activity: Not Currently     E-Cigarette/Vaping    E-Cigarette Use Never User      E-Cigarette/Vaping Substances    Nicotine No     THC No     CBD No     Flavoring No     Other No     Unknown No        Social History     Tobacco Use    Smoking status: Former     Types: Pipe    Smokeless tobacco: Never   Vaping Use    Vaping status: Never Used   Substance and Sexual Activity    Alcohol use: Not Currently    Drug use: No    Sexual activity: Not Currently       Current Facility-Administered Medications:     acetaminophen (Ofirmev) injection 1,000 mg, Q8H PRN, Last Rate: 1,000 mg (01/20/25 1945)    acetaminophen (TYLENOL) tablet 650 mg, Q4H PRN    allopurinol (ZYLOPRIM) tablet 300 mg, Daily    bisacodyl (DULCOLAX) rectal suppository 10 mg, Daily    bisacodyl (DULCOLAX) rectal suppository 10 mg, Daily PRN    cefepime (MAXIPIME) 2 g/50 mL dextrose IVPB, Q12H, Last Rate: 2,000 mg (01/21/25 1040)    Cholecalciferol (VITAMIN D3) tablet 2,000 Units, Daily    docusate (COLACE) oral liquid 100 mg, BID    fenofibrate (TRICOR) tablet 48 mg, Daily    [Held by provider] furosemide (LASIX) tablet 20 mg, Once per day on Monday Wednesday Friday    gabapentin (NEURONTIN) capsule 100 mg, Q12H    glycopyrrolate (ROBINUL) injection 0.1 mg, Q4H PRN    heparin (porcine)  subcutaneous injection 5,000 Units, Q8H ANKIT    iohexol (OMNIPAQUE) 240 MG/ML solution 50 mL, 90 min pre-procedure    metoprolol (LOPRESSOR) injection 2.5 mg, Q6H    multivitamin stress formula tablet 1 tablet, Daily    nitroglycerin (NITROSTAT) SL tablet 0.4 mg, Q5 Min PRN    nystatin (MYCOSTATIN) cream, BID    ondansetron (ZOFRAN) injection 4 mg, Q6H PRN    pantoprazole (PROTONIX) injection 40 mg, Q12H    phenol (CHLORASEPTIC) 1.4 % mucosal liquid 1 spray, Q2H PRN    senna (SENOKOT) tablet 17.2 mg, HS    vancomycin (VANCOCIN) 1500 mg in sodium chloride 0.9% 250 mL IVPB, Once    [START ON 1/22/2025] vancomycin (VANCOCIN) IVPB (premix in dextrose) 750 mg 150 mL, Q24H  Prior to Admission Medications   Prescriptions Last Dose Informant Patient Reported? Taking?   Cholecalciferol 50 MCG (2000 UT) CAPS 1/15/2025 at  9:14 AM Self, Child Yes Yes   Sig: Take by mouth   Choline Fenofibrate (FENOFIBRIC ACID) 45 MG CPDR 1/15/2025 Self, Child Yes Yes   Sig: Take by mouth   Multiple Vitamins tablet 1/15/2025 at  9:14 AM Self, Child Yes Yes   Sig: Take by mouth   acetaminophen (TYLENOL) 325 mg tablet 1/15/2025 at 11:22 AM Outside Facility (Specify) Yes Yes   Sig: Take 650 mg by mouth every 4 (four) hours as needed for mild pain   allopurinol (ZYLOPRIM) 300 mg tablet 1/15/2025 at  9:14 AM Self, Child Yes Yes   aspirin 81 MG tablet Unknown Self, Child Yes No   Sig: Take 1 tablet by mouth see administration instructions Take 1 tablet twice weekly   docusate sodium (COLACE) 100 mg capsule 1/15/2025 at  9:13 AM  No Yes   Sig: Take 1 capsule (100 mg total) by mouth 2 (two) times a day   furosemide (LASIX) 20 mg tablet   No No   Sig: Take 1 tablet (20 mg total) by mouth 3 (three) times a week   gabapentin (NEURONTIN) 100 mg capsule 1/15/2025 at  7:06 AM Self, Child Yes Yes   Sig: Take 1 capsule by mouth every 12 (twelve) hours   metoprolol succinate (TOPROL-XL) 25 mg 24 hr tablet 1/15/2025 at  9:14 AM  No Yes   Sig: Take 1 tablet (25 mg  total) by mouth daily   nitroglycerin (Nitrostat) 0.4 mg SL tablet Unknown Self, Child No No   Sig: Place 1 tablet (0.4 mg total) under the tongue every 5 (five) minutes as needed for chest pain prn   nystatin (MYCOSTATIN) cream 1/15/2025 at  9:14 AM  No Yes   Sig: Apply to affected area 2 times daily   potassium chloride (KLOR-CON) 20 mEq packet 1/15/2025 at  9:14 AM Self, Child Yes Yes   Sig: Take 20 mEq by mouth as needed Take 1 tablet when taking Furosemide      Facility-Administered Medications: None     Patient has no known allergies.    Objective :  Temp:  [98.6 °F (37 °C)-102.5 °F (39.2 °C)] 99.2 °F (37.3 °C)  HR:  [] 80  BP: ()/(51-66) 150/66  Resp:  [16-20] 20  SpO2:  [92 %-95 %] 93 %  O2 Device: Nasal cannula  Nasal Cannula O2 Flow Rate (L/min):  [2 L/min] 2 L/min    Physical Exam  Vitals reviewed.   Constitutional:       General: He is sleeping.   Cardiovascular:      Rate and Rhythm: Normal rate.   Pulmonary:      Effort: No respiratory distress.   Neurological:      Mental Status: He is lethargic.          Lab Results: I have reviewed the following results:  Lab Results   Component Value Date/Time    SODIUM 143 01/21/2025 04:41 AM    SODIUM 126 (L) 09/06/2022 06:45 PM    K 3.1 (L) 01/21/2025 04:41 AM    K 4.0 09/06/2022 06:45 PM    BUN 30 (H) 01/21/2025 04:41 AM    BUN 24 09/06/2022 06:45 PM    CREATININE 1.07 01/21/2025 04:41 AM    CREATININE 0.80 09/06/2022 06:45 PM    GLUC 106 01/21/2025 04:41 AM    GLUC 124 (H) 09/06/2022 06:45 PM    CALCIUM 8.5 01/21/2025 04:41 AM    CALCIUM 8.2 (L) 09/06/2022 06:45 PM    AST 42 (H) 01/21/2025 04:41 AM    ALT 21 01/21/2025 04:41 AM    ALB 3.0 (L) 01/21/2025 04:41 AM    TP 6.1 (L) 01/21/2025 04:41 AM    EGFR 56 01/21/2025 04:41 AM    EGFR 80 09/06/2022 06:45 PM     Lab Results   Component Value Date/Time    HGB 11.0 (L) 01/21/2025 04:41 AM    WBC 10.62 (H) 01/21/2025 04:41 AM     01/21/2025 04:41 AM    INR 0.94 01/15/2025 04:22 AM    PTT 31  01/15/2025 04:22 AM     Lab Results   Component Value Date/Time    [id number] 3.763 08/18/2022 04:38 PM       Imaging Results Review: I reviewed radiology reports from this admission including: chest xray and CT abdomen/pelvis.  Other Study Results Review: EKG was reviewed.         Administrative Statements   I have spent a total time of 60 minutes in caring for this patient on the day of the visit/encounter including Diagnostic results, Prognosis, Risks and benefits of tx options, Instructions for management, Patient and family education, Counseling / Coordination of care, Documenting in the medical record, Reviewing / ordering tests, medicine, procedures  , Obtaining or reviewing history  , and Communicating with other healthcare professionals . Topics discussed with the patient / family include symptom assessment and management, psychosocial support, advanced directives, goals of care, hospice services, supportive listening, and anticipatory guidance. Case dicussed with SHANKAR, palliative SW, .

## 2025-01-21 NOTE — ASSESSMENT & PLAN NOTE
Decisional apparatus:  Patient is not competent on exam today.  If competency is lost, patient's substitute decision maker would default to adult children [place].  Advance Directive / Living Will / POLST / POA Forms: No    Goals  Level 3 code status.   Disease focused care.  DNR/DNI limits placed.   Discussed GOC with son/pt. Agreeable with current medical management but would not want aggressive measures to prolong life without hope for reasonable recovery  Pt has tolerable QoL prior to hospitalization, hope to return to baseline function and return to SNF  Unsure about long-term artificial nutrition by tube or surgical interventions if needed. Want to focus on comfort and QoL.   Discussed hospice/comfort care. Not currently considering at this time  Son and daughter to discuss goals further amongst themselves tonight.   GOC discussions ongoing.

## 2025-01-21 NOTE — SOCIAL WORK
Palliative Inpatient Assessment Note    LSW appreciates the opportunity to provide patient/family with inpatient emotional support and guidance while they continue to receive medical attention from a Palliative provider.    LSW completed an assessment of need which was completed with Pt and Pt son, Prem Kuhn.    Relationship status: Patient   Duration of relationship: Patient has been  for 68 years  Name of significant other: Kari  Children and Ages: 2 children Prem Kuhn, son and Kelsi dtr, 2 grandchildren and 1 great grand child  Pets: No  Other important family information: Son Prem Kuhn is very involved in care  Living situation (place and with whom): Patient lives at [facility]    Patient's primary caregiver: Care facility  Any limitations: Patient was using a wheelchair recently to get around  Environmental concerns or barriers: None     history:    Employment history/ Source of income: Patient was a     Disability:    Concerns regarding literacy: No    Spirituality/ Congregation:    Cultural information:   Mental Health and/or Drug and Alcohol history:    Advanced Directives:  Patient wants son and daughter to be primary healthcare representatives  Patient's strengths, social supports, and resources: Pt son very involved and supportive  Patient/family current level of coping:  Pt is generally minimally verbal and is continuing to deal with covid/fever side effects.  Level of understanding: Pt is generally minimally verbal and is continuing to deal with covid/fever side effects.  Patient/family concerns and areas of need: Son does not mention any concerns presently.  Social work called and spoke with patient's daughter Kelsi.  Social work explained that Tres Sue PA-C and social work spent time speaking with her father and her brother social work explained palliative care and that we try to support the patient.  Social work mention that brother would  like to speak with her about the possibility of a feeding tube becoming permanent if patient does not improve.    I have spent 40 minutes with Patient and family today in which greater than 50% of this time was spent in counseling/coordination of care.    *All questions may not be answered due to constraints.  Follow-up discussions may need to occur.

## 2025-01-21 NOTE — ASSESSMENT & PLAN NOTE
Presented for suspected GI bleed  CT abdomen and pelvis 1/15/2025 with focal wall thickening of the lower esophagus, gastroesophageal junction, and proximal stomach which may reflect esophagitis/gastritis.  No evidence for bowel obstruction, mesenteric inflammation, appendicitis, obstructive uropathy, free air or free fluid  Chest x-ray 1/15/2025 with gaseous distention of the stomach  KUB 1/16/2025 with worsening distention  NG was placed  CT abdomen pelvis with contrast 1/17/2024: New small bilateral pleural effusions with extensive bibasilar atelectasis. Wall thickening of the lower esophagus and proximal stomach is again noted without interval change. This may reflect mild focal esophagitis/gastritis. New NG tube with tip terminating in the gastric body region. No evidence of bowel obstruction, mesenteric inflammation, appendicitis, obstructive uropathy, free air, or free fluid. Descending and sigmoid diverticulosis again noted without evidence for acute diverticulitis. Subtle wall thickening of the mid to distal sigmoid colon, rectum, and anal verge region could reflect mild focal colitis/proctitis. Multiple punctate pancreatic calcifications again seen likely sequelae of chronic pancreatitis  1/20 patient passed NG clamping trial and evaluated by SLP but did not pass   Keep NPO for now   Per GI note, if enteral feeding is desired and aligns with GOC, ok to use NGT for trickle feeding  Nutrition consulted for tube feeds  Continue Protonix 40 mg IV twice daily  Continue with conservative management for now.  Please see also, goals of care

## 2025-01-21 NOTE — ASSESSMENT & PLAN NOTE
Social support  Patient is supported by son, daughter, grandchildren  Supportive listening provided  Normalized experience of patient/family  Provided anxiety containment  Provided anticipatory guidance    Follow up  Palliative Care will continue to follow  Please reach out to on-call provider via Epic Secure Chat  if questions or concerns arise.  Please do not hesitate to reach our on call provider through our clinic answering service at [phone] should you have acute symptom control concerns.

## 2025-01-21 NOTE — PLAN OF CARE
Problem: PAIN - ADULT  Goal: Verbalizes/displays adequate comfort level or baseline comfort level  Description: Interventions:  - Encourage patient to monitor pain and request assistance  - Assess pain using appropriate pain scale  - Administer analgesics based on type and severity of pain and evaluate response  - Implement non-pharmacological measures as appropriate and evaluate response  - Consider cultural and social influences on pain and pain management  - Notify physician/advanced practitioner if interventions unsuccessful or patient reports new pain  Outcome: Progressing     Problem: INFECTION - ADULT  Goal: Absence or prevention of progression during hospitalization  Description: INTERVENTIONS:  - Assess and monitor for signs and symptoms of infection  - Monitor lab/diagnostic results  - Monitor all insertion sites, i.e. indwelling lines, tubes, and drains  - Monitor endotracheal if appropriate and nasal secretions for changes in amount and color  - [place] appropriate cooling/warming therapies per order  - Administer medications as ordered  - Instruct and encourage patient and family to use good hand hygiene technique  - Identify and instruct in appropriate isolation precautions for identified infection/condition  Outcome: Progressing  Goal: Absence of fever/infection during neutropenic period  Description: INTERVENTIONS:  - Monitor WBC    Outcome: Progressing  Goal: Infection Control Precautions  Outcome: Progressing     Problem: SAFETY ADULT  Goal: Patient will remain free of falls  Description: INTERVENTIONS:  - Educate patient/family on patient safety including physical limitations  - Instruct patient to call for assistance with activity   - Consult OT/PT to assist with strengthening/mobility   - Keep Call bell within reach  - Keep bed low and locked with side rails adjusted as appropriate  - Keep care items and personal belongings within reach  - Initiate and maintain comfort rounds  - Make Fall  Risk Sign visible to staff  - Offer Toileting every 2 Hours, in advance of need  - Initiate/Maintain bed alarm  - Obtain necessary fall risk management equipment: nonskid socks  - Apply yellow socks and bracelet for high fall risk patients  - Consider moving patient to room near nurses station  Outcome: Progressing  Goal: Maintain or return to baseline ADL function  Description: INTERVENTIONS:  -  Assess patient's ability to carry out ADLs; assess patient's baseline for ADL function and identify physical deficits which impact ability to perform ADLs (bathing, care of mouth/teeth, toileting, grooming, dressing, etc.)  - Assess/evaluate cause of self-care deficits   - Assess range of motion  - Assess patient's mobility; develop plan if impaired  - Assess patient's need for assistive devices and provide as appropriate  - Encourage maximum independence but intervene and supervise when necessary  - Involve family in performance of ADLs  - Assess for home care needs following discharge   - Consider OT consult to assist with ADL evaluation and planning for discharge  - Provide patient education as appropriate  Outcome: Progressing  Goal: Maintains/Returns to pre admission functional level  Description: INTERVENTIONS:  - Perform AM-PAC 6 Click Basic Mobility/ Daily Activity assessment daily.  - Set and communicate daily mobility goal to care team and patient/family/caregiver.   - Collaborate with rehabilitation services on mobility goals if consulted  - Perform Range of Motion 3 times a day.  - Reposition patient every 2 hours.  - Dangle patient 3 times a day  - Stand patient 3 times a day  - Ambulate patient 3 times a day  - Out of bed to chair 3 times a day   - Out of bed for meals 3 times a day  - Out of bed for toileting  - Record patient progress and toleration of activity level   Outcome: Progressing     Problem: DISCHARGE PLANNING  Goal: Discharge to home or other facility with appropriate resources  Description:  INTERVENTIONS:  - Identify barriers to discharge w/patient and caregiver  - Arrange for needed discharge resources and transportation as appropriate  - Identify discharge learning needs (meds, wound care, etc.)  - Arrange for interpretive services to assist at discharge as needed  - Refer to Case Management Department for coordinating discharge planning if the patient needs post-hospital services based on physician/advanced practitioner order or complex needs related to functional status, cognitive ability, or social support system  Outcome: Progressing     Problem: Knowledge Deficit  Goal: Patient/family/caregiver demonstrates understanding of disease process, treatment plan, medications, and discharge instructions  Description: Complete learning assessment and assess knowledge base.  Interventions:  - Provide teaching at level of understanding  - Provide teaching via preferred learning methods  Outcome: Progressing     Problem: Prexisting or High Potential for Compromised Skin Integrity  Goal: Skin integrity is maintained or improved  Description: INTERVENTIONS:  - Identify patients at risk for skin breakdown  - Assess and monitor skin integrity  - Assess and monitor nutrition and hydration status  - Monitor labs   - Assess for incontinence   - Turn and reposition patient  - Assist with mobility/ambulation  - Relieve pressure over bony prominences  - Avoid friction and shearing  - Provide appropriate hygiene as needed including keeping skin clean and dry  - Evaluate need for skin moisturizer/barrier cream  - Collaborate with interdisciplinary team   - Patient/family teaching  - Consider wound care consult   Outcome: Progressing     Problem: GASTROINTESTINAL - ADULT  Goal: Minimal or absence of nausea and/or vomiting  Description: INTERVENTIONS:  - Administer IV fluids if ordered to ensure adequate hydration  - Maintain NPO status until nausea and vomiting are resolved  - Nasogastric tube if ordered  - Administer  ordered antiemetic medications as needed  - Provide nonpharmacologic comfort measures as appropriate  - Advance diet as tolerated, if ordered  - Consider nutrition services referral to assist patient with adequate nutrition and appropriate food choices  Outcome: Progressing  Goal: Maintains or returns to baseline bowel function  Description: INTERVENTIONS:  - Assess bowel function  - Encourage oral fluids to ensure adequate hydration  - Administer IV fluids if ordered to ensure adequate hydration  - Administer ordered medications as needed  - Encourage mobilization and activity  - Consider nutritional services referral to assist patient with adequate nutrition and appropriate food choices  Outcome: Progressing  Goal: Oral mucous membranes remain intact  Description: INTERVENTIONS  - Assess oral mucosa and hygiene practices  - Implement preventative oral hygiene regimen  - Implement oral medicated treatments as ordered  - Initiate Nutrition services referral as needed  Outcome: Progressing     Problem: RESPIRATORY - ADULT  Goal: Achieves optimal ventilation and oxygenation  Description: INTERVENTIONS:  - Assess for changes in respiratory status  - Assess for changes in mentation and behavior  - Position to facilitate oxygenation and minimize respiratory effort  - Oxygen administered by appropriate delivery if ordered  - Initiate smoking cessation education as indicated  - Encourage broncho-pulmonary hygiene including cough, deep breathe, Incentive Spirometry  - Assess the need for suctioning and aspirate as needed  - Assess and instruct to report SOB or any respiratory difficulty  - Respiratory Therapy support as indicated  Outcome: Progressing     Problem: Nutrition/Hydration-ADULT  Goal: Nutrient/Hydration intake appropriate for improving, restoring or maintaining nutritional needs  Description: Monitor and assess patient's nutrition/hydration status for malnutrition. Collaborate with interdisciplinary team and  initiate plan and interventions as ordered.  Monitor patient's weight and dietary intake as ordered or per policy. Utilize nutrition screening tool and intervene as necessary. Determine patient's food preferences and provide high-protein, high-caloric foods as appropriate.     INTERVENTIONS:  - Monitor oral intake, urinary output, labs, and treatment plans  - Assess nutrition and hydration status and recommend course of action  - Evaluate amount of meals eaten  - Assist patient with eating if necessary   - Allow adequate time for meals  - Recommend/ encourage appropriate diets, oral nutritional supplements, and vitamin/mineral supplements  - Order, calculate, and assess calorie counts as needed  - Recommend, monitor, and adjust tube feedings and TPN/PPN based on assessed needs  - Assess need for intravenous fluids  - Provide specific nutrition/hydration education as appropriate  - Include patient/family/caregiver in decisions related to nutrition  Outcome: Progressing

## 2025-01-21 NOTE — ASSESSMENT & PLAN NOTE
Chest x-ray: Retrocardiac opacity, which may be due to pneumonia and/or atelectasis.  Procalcitonin peaked and downtrending, with intermittent fever  Will start ceftriaxone IV empirically - given ongoing febrile episodes (tmax 102.5 overnight), and leukocytosis this morning will broaden abx to cefepime and vanc. MRSA culture ordered  Repeat CXR pending   Monitor labs and vital signs, conduct serial physical assessments

## 2025-01-21 NOTE — ASSESSMENT & PLAN NOTE
Lab Results   Component Value Date    EGFR 56 01/21/2025    EGFR 54 01/20/2025    EGFR 49 01/19/2025    CREATININE 1.07 01/21/2025    CREATININE 1.11 01/20/2025    CREATININE 1.20 01/19/2025     Creatinine appears to be around baseline of 0.9-1.1 mg/dL   Continue to monitor renal function closely  Avoid nephrotoxins and hypotension

## 2025-01-22 LAB
ANION GAP SERPL CALCULATED.3IONS-SCNC: 8 MMOL/L (ref 4–13)
BUN SERPL-MCNC: 28 MG/DL (ref 5–25)
CALCIUM SERPL-MCNC: 8.3 MG/DL (ref 8.4–10.2)
CHLORIDE SERPL-SCNC: 112 MMOL/L (ref 96–108)
CO2 SERPL-SCNC: 27 MMOL/L (ref 21–32)
CREAT SERPL-MCNC: 0.83 MG/DL (ref 0.6–1.3)
ERYTHROCYTE [DISTWIDTH] IN BLOOD BY AUTOMATED COUNT: 15.8 % (ref 11.6–15.1)
GFR SERPL CREATININE-BSD FRML MDRD: 72 ML/MIN/1.73SQ M
GLUCOSE SERPL-MCNC: 98 MG/DL (ref 65–140)
HCT VFR BLD AUTO: 34.4 % (ref 36.5–49.3)
HGB BLD-MCNC: 10.9 G/DL (ref 12–17)
MCH RBC QN AUTO: 31.1 PG (ref 26.8–34.3)
MCHC RBC AUTO-ENTMCNC: 31.7 G/DL (ref 31.4–37.4)
MCV RBC AUTO: 98 FL (ref 82–98)
PLATELET # BLD AUTO: 247 THOUSANDS/UL (ref 149–390)
PMV BLD AUTO: 9.7 FL (ref 8.9–12.7)
POTASSIUM SERPL-SCNC: 3.4 MMOL/L (ref 3.5–5.3)
RBC # BLD AUTO: 3.5 MILLION/UL (ref 3.88–5.62)
SODIUM SERPL-SCNC: 147 MMOL/L (ref 135–147)
WBC # BLD AUTO: 9.02 THOUSAND/UL (ref 4.31–10.16)

## 2025-01-22 PROCEDURE — 99233 SBSQ HOSP IP/OBS HIGH 50: CPT

## 2025-01-22 PROCEDURE — 85027 COMPLETE CBC AUTOMATED: CPT

## 2025-01-22 PROCEDURE — 99232 SBSQ HOSP IP/OBS MODERATE 35: CPT | Performed by: PHYSICIAN ASSISTANT

## 2025-01-22 PROCEDURE — 92526 ORAL FUNCTION THERAPY: CPT

## 2025-01-22 PROCEDURE — 80048 BASIC METABOLIC PNL TOTAL CA: CPT

## 2025-01-22 RX ORDER — CEFTRIAXONE 1 G/50ML
1000 INJECTION, SOLUTION INTRAVENOUS EVERY 24 HOURS
Status: DISCONTINUED | OUTPATIENT
Start: 2025-01-22 | End: 2025-01-27

## 2025-01-22 RX ORDER — VANCOMYCIN HYDROCHLORIDE 1 G/200ML
12.5 INJECTION, SOLUTION INTRAVENOUS EVERY 24 HOURS
Status: DISCONTINUED | OUTPATIENT
Start: 2025-01-22 | End: 2025-01-22

## 2025-01-22 RX ORDER — POTASSIUM CHLORIDE 14.9 MG/ML
20 INJECTION INTRAVENOUS ONCE
Status: COMPLETED | OUTPATIENT
Start: 2025-01-22 | End: 2025-01-22

## 2025-01-22 RX ADMIN — HEPARIN SODIUM 5000 UNITS: 5000 INJECTION INTRAVENOUS; SUBCUTANEOUS at 14:39

## 2025-01-22 RX ADMIN — CEFEPIME 2000 MG: 2 INJECTION, POWDER, FOR SOLUTION INTRAVENOUS at 12:14

## 2025-01-22 RX ADMIN — NYSTATIN: 100000 CREAM TOPICAL at 17:46

## 2025-01-22 RX ADMIN — METOROPROLOL TARTRATE 2.5 MG: 5 INJECTION, SOLUTION INTRAVENOUS at 14:39

## 2025-01-22 RX ADMIN — BISACODYL 10 MG: 10 SUPPOSITORY RECTAL at 08:09

## 2025-01-22 RX ADMIN — B-COMPLEX W/ C & FOLIC ACID TAB 1 TABLET: TAB at 08:10

## 2025-01-22 RX ADMIN — METOROPROLOL TARTRATE 2.5 MG: 5 INJECTION, SOLUTION INTRAVENOUS at 21:12

## 2025-01-22 RX ADMIN — Medication 2000 UNITS: at 08:10

## 2025-01-22 RX ADMIN — SENNOSIDES 17.2 MG: 8.6 TABLET, FILM COATED ORAL at 21:16

## 2025-01-22 RX ADMIN — PANTOPRAZOLE SODIUM 40 MG: 40 INJECTION, POWDER, FOR SOLUTION INTRAVENOUS at 17:46

## 2025-01-22 RX ADMIN — GABAPENTIN 100 MG: 100 CAPSULE ORAL at 17:46

## 2025-01-22 RX ADMIN — DOCUSATE SODIUM 100 MG: 50 LIQUID ORAL at 08:10

## 2025-01-22 RX ADMIN — FENOFIBRATE 48 MG: 48 TABLET, FILM COATED ORAL at 08:10

## 2025-01-22 RX ADMIN — METOROPROLOL TARTRATE 2.5 MG: 5 INJECTION, SOLUTION INTRAVENOUS at 03:42

## 2025-01-22 RX ADMIN — HEPARIN SODIUM 5000 UNITS: 5000 INJECTION INTRAVENOUS; SUBCUTANEOUS at 21:18

## 2025-01-22 RX ADMIN — GLYCOPYRROLATE 0.1 MG: 0.2 INJECTION, SOLUTION INTRAMUSCULAR; INTRAVENOUS at 01:23

## 2025-01-22 RX ADMIN — DOCUSATE SODIUM 100 MG: 50 LIQUID ORAL at 17:46

## 2025-01-22 RX ADMIN — NYSTATIN: 100000 CREAM TOPICAL at 08:10

## 2025-01-22 RX ADMIN — PANTOPRAZOLE SODIUM 40 MG: 40 INJECTION, POWDER, FOR SOLUTION INTRAVENOUS at 06:36

## 2025-01-22 RX ADMIN — VANCOMYCIN HYDROCHLORIDE 750 MG: 750 INJECTION, SOLUTION INTRAVENOUS at 01:14

## 2025-01-22 RX ADMIN — POTASSIUM CHLORIDE 20 MEQ: 14.9 INJECTION, SOLUTION INTRAVENOUS at 08:10

## 2025-01-22 RX ADMIN — CEFTRIAXONE 1000 MG: 1 INJECTION, SOLUTION INTRAVENOUS at 14:39

## 2025-01-22 RX ADMIN — METOROPROLOL TARTRATE 2.5 MG: 5 INJECTION, SOLUTION INTRAVENOUS at 08:09

## 2025-01-22 RX ADMIN — ALLOPURINOL 300 MG: 300 TABLET ORAL at 08:10

## 2025-01-22 RX ADMIN — GLYCOPYRROLATE 0.1 MG: 0.2 INJECTION, SOLUTION INTRAMUSCULAR; INTRAVENOUS at 06:35

## 2025-01-22 RX ADMIN — HEPARIN SODIUM 5000 UNITS: 5000 INJECTION INTRAVENOUS; SUBCUTANEOUS at 06:36

## 2025-01-22 NOTE — PLAN OF CARE
Problem: PAIN - ADULT  Goal: Verbalizes/displays adequate comfort level or baseline comfort level  Description: Interventions:  - Encourage patient to monitor pain and request assistance  - Assess pain using appropriate pain scale  - Administer analgesics based on type and severity of pain and evaluate response  - Implement non-pharmacological measures as appropriate and evaluate response  - Consider cultural and social influences on pain and pain management  - Notify physician/advanced practitioner if interventions unsuccessful or patient reports new pain  Outcome: Progressing     Problem: INFECTION - ADULT  Goal: Absence or prevention of progression during hospitalization  Description: INTERVENTIONS:  - Assess and monitor for signs and symptoms of infection  - Monitor lab/diagnostic results  - Monitor all insertion sites, i.e. indwelling lines, tubes, and drains  - Monitor endotracheal if appropriate and nasal secretions for changes in amount and color  - [place] appropriate cooling/warming therapies per order  - Administer medications as ordered  - Instruct and encourage patient and family to use good hand hygiene technique  - Identify and instruct in appropriate isolation precautions for identified infection/condition  Outcome: Progressing  Goal: Absence of fever/infection during neutropenic period  Description: INTERVENTIONS:  - Monitor WBC    Outcome: Progressing  Goal: Infection Control Precautions  Outcome: Progressing     Problem: SAFETY ADULT  Goal: Patient will remain free of falls  Description: INTERVENTIONS:  - Educate patient/family on patient safety including physical limitations  - Instruct patient to call for assistance with activity   - Consult OT/PT to assist with strengthening/mobility   - Keep Call bell within reach  - Keep bed low and locked with side rails adjusted as appropriate  - Keep care items and personal belongings within reach  - Initiate and maintain comfort rounds  - Make Fall  Risk Sign visible to staff  - Offer Toileting every 2 Hours, in advance of need  - Initiate/Maintain bed alarm  - Apply yellow socks and bracelet for high fall risk patients  - Consider moving patient to room near nurses station  Outcome: Progressing  Goal: Maintain or return to baseline ADL function  Description: INTERVENTIONS:  -  Assess patient's ability to carry out ADLs; assess patient's baseline for ADL function and identify physical deficits which impact ability to perform ADLs (bathing, care of mouth/teeth, toileting, grooming, dressing, etc.)  - Assess/evaluate cause of self-care deficits   - Assess range of motion  - Assess patient's mobility; develop plan if impaired  - Assess patient's need for assistive devices and provide as appropriate  - Encourage maximum independence but intervene and supervise when necessary  - Involve family in performance of ADLs  - Assess for home care needs following discharge   - Consider OT consult to assist with ADL evaluation and planning for discharge  - Provide patient education as appropriate  Outcome: Progressing    Problem: GASTROINTESTINAL - ADULT  Goal: Minimal or absence of nausea and/or vomiting  Description: INTERVENTIONS:  - Administer IV fluids if ordered to ensure adequate hydration  - Maintain NPO status until nausea and vomiting are resolved  - Nasogastric tube if ordered  - Administer ordered antiemetic medications as needed  - Provide nonpharmacologic comfort measures as appropriate  - Advance diet as tolerated, if ordered  - Consider nutrition services referral to assist patient with adequate nutrition and appropriate food choices  Outcome: Progressing  Goal: Maintains or returns to baseline bowel function  Description: INTERVENTIONS:  - Assess bowel function  - Encourage oral fluids to ensure adequate hydration  - Administer IV fluids if ordered to ensure adequate hydration  - Administer ordered medications as needed  - Encourage mobilization and  activity  - Consider nutritional services referral to assist patient with adequate nutrition and appropriate food choices  Outcome: Progressing  Goal: Maintains adequate nutritional intake  Description: INTERVENTIONS:  - Monitor percentage of each meal consumed  - Identify factors contributing to decreased intake, treat as appropriate  - Assist with meals as needed  - Monitor I&O, weight, and lab values if indicated  - Obtain nutrition services referral as needed  Outcome: Progressing  Goal: Oral mucous membranes remain intact  Description: INTERVENTIONS  - Assess oral mucosa and hygiene practices  - Implement preventative oral hygiene regimen  - Implement oral medicated treatments as ordered  - Initiate Nutrition services referral as needed  Outcome: Progressing     Problem: RESPIRATORY - ADULT  Goal: Achieves optimal ventilation and oxygenation  Description: INTERVENTIONS:  - Assess for changes in respiratory status  - Assess for changes in mentation and behavior  - Position to facilitate oxygenation and minimize respiratory effort  - Oxygen administered by appropriate delivery if ordered  - Initiate smoking cessation education as indicated  - Encourage broncho-pulmonary hygiene including cough, deep breathe, Incentive Spirometry  - Assess the need for suctioning and aspirate as needed  - Assess and instruct to report SOB or any respiratory difficulty  - Respiratory Therapy support as indicated  Outcome: Progressing

## 2025-01-22 NOTE — CASE MANAGEMENT
Case Management Discharge Planning Note    Patient name Prem Mar Sr.  Location /-01 MRN [id number]  : 10/11/1924 Date 2025       Current Admission Date: 1/15/2025  Current Admission Diagnosis:Upper GI bleed   Patient Active Problem List    Diagnosis Date Noted Date Diagnosed    Palliative care by specialist 2025     Opacity noted on imaging study 2025     Congestion of upper airway 2025     Acute distention of stomach 2025     COVID-19 virus infection 2025     Goals of care, counseling/discussion 2025     Upper GI bleed 01/15/2025     Constipation 01/15/2025     Tinea cruris 10/12/2024     Weakness 10/11/2024     Atrial flutter (HCC) 2024     Chronic pain syndrome 2024     Lumbar spondylosis 2024     Chronic midline low back pain without sciatica 2024     Nonexudative age-related macular degeneration of left eye 2023     Chronic kidney disease, stage 3a (HCC) 2023     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     Muscle wasting and atrophy, not elsewhere classified, multiple sites 2022     Difficulty swallowing 2022     Keratoma 2021     Venous insufficiency of both lower extremities 2021     Hypomagnesemia 2020     Mixed hyperlipidemia 2020     Coronary artery disease involving native coronary artery of native heart without angina pectoris 2018     Essential hypertension 2018     Bilateral leg edema 2018     Ventricular bigeminy 2018     Idiopathic chronic gout of multiple sites without tophus 05/10/2017     Edema 2014     Vitamin D deficiency 2013       LOS (days): 6  Geometric Mean LOS (GMLOS) (days): 4.5  Days to GMLOS:-1.8     OBJECTIVE:  Risk of Unplanned Readmission Score: 22.71         Current admission status: Inpatient   Preferred Pharmacy:   [id number] - [place] - [address] [address]  [facility] [address]  Phone: [phone] Fax: [phone]    Primary Care Provider: Leonard Schreiber MD    Primary Insurance: [facility]  Secondary Insurance:     DISCHARGE DETAILS:       Freedom of Choice: Yes  Comments - Freedom of Choice: family would like the pt to return to the [facility] - pt is a bedhold                               Other Referral/Resources/Interventions Provided:  Referral Comments: pt will return to the [facility] when stable for d/c - pt not stable for d/c - goals of care discussion was completed - family would like the pt comfortable butthey want to continue with full tx- family would like trial feedings via NG and then they will decide if they would like a peg tube-IV rocephin,oxyen    Would you like to participate in our [facility] service program?  : No - Declined    Treatment Team Recommendation:  ([facility] bed hold -they would like an auth initiated- BLS)

## 2025-01-22 NOTE — PHYSICAL THERAPY NOTE
Physical Therapy Cancellation Note           01/22/25 1500   PT Last Visit   PT Visit Date 01/22/25   Note Type   Note type Cancelled Session   Cancel Reasons Medical status   Additional Comments Per discussion with ID team, pt not medically stable for therapy services. Pt is resident of the [facility] at baseline. Pt requires TD for ADLs and mobility. Pt is functioning at baseline. Will sign off at this time.       If new needs arise, re-consult PT when needed    Hardik Keller PT

## 2025-01-22 NOTE — ASSESSMENT & PLAN NOTE
Lab Results   Component Value Date    EGFR 72 01/22/2025    EGFR 56 01/21/2025    EGFR 54 01/20/2025    CREATININE 0.83 01/22/2025    CREATININE 1.07 01/21/2025    CREATININE 1.11 01/20/2025     Creatinine appears to be around baseline of 0.9-1.1 mg/dL   Creatinine better than baseline currently  Continue to monitor renal function closely  Avoid nephrotoxins and hypotension  BMP a.m.

## 2025-01-22 NOTE — ASSESSMENT & PLAN NOTE
Patient with fever and COVID-positive roommate at his skilled facility  Tested positive for COVID 9/17  Continues to require 2 L nasal cannula oxygen  S/p remdesivir x3 days  Continue supportive care  Isolation precautions  Monitor labs and vital signs, conduct serial physical assessments

## 2025-01-22 NOTE — PROGRESS NOTES
Prem RUVALCABA Zaid Bonner is a 89 y.o. male who is currently ordered Vancomycin IV with management by the Pharmacy Consult service.  Relevant clinical data and objective / subjective history reviewed.  Vancomycin Assessment:  Indication and Goal AUC/Trough: Pneumonia (goal -600, trough >10), -600, trough >10  Clinical Status: stable  Micro:   pending  Renal Function:  SCr: 0.83 mg/dL  CrCl: 42.4 mL/min  Renal replacement: Not on dialysis  Days of Therapy: 2  Current Dose: 750mg IV q24h  Vancomycin Plan:  New Dosinmg IV q24h  Estimated AUC: 497 mcg*hr/mL  Estimated Trough: 14.1 mcg/mL  Next Level: 1/24 AM Labs  Renal Function Monitoring: Daily BMP and UOP  Pharmacy will continue to follow closely for s/sx of nephrotoxicity, infusion reactions and appropriateness of therapy.  BMP and CBC will be ordered per protocol. We will continue to follow the patient’s culture results and clinical progress daily.    Ronen Holman, Pharmacist

## 2025-01-22 NOTE — ASSESSMENT & PLAN NOTE
Patient with significant comorbid medical conditions would be high risk for treatment options requiring anesthesia.  EGD testing might have risks that outweigh the benefits.  Patient verbalized understanding.  Opened goals of care conversations with son.  Continued on phone on Saturday. Had further conversations with patient's son, daughter, and grandson when they arrived.  Ultimately they would like to keep him comfortable but for now continue with full treatment   Appreciate input of palliative care continue to follow, addressing goals of care  Patient's son at bedside today reports he would like to trial tube feeds via the NG tube.  Still undecided about PEG.  Will initiate tube feed via NG per recommendations of nutrition  Continue supportive care

## 2025-01-22 NOTE — ASSESSMENT & PLAN NOTE
Social support  Patient is supported by son, daughter, grandchildren  Supportive listening provided  Normalized experience of patient/family  Provided anxiety containment  Provided anticipatory guidance    Follow up  Palliative Care will continue to follow  Please reach out to on-call provider via Epic Secure Chat  if questions or concerns arise.  Please do not hesitate to reach our on call provider through our clinic answering service at [phone] should you have acute symptom control concerns.

## 2025-01-22 NOTE — OCCUPATIONAL THERAPY NOTE
Occupational Therapy Screen/Cancellation Note     01/22/25 1445   OT Last Visit   OT Visit Date 01/22/25   Note Type   Note type Cancelled Session;Screen   Cancel Reasons Medical status   Additional Comments Per discussion with ID team, pt not medically stable for therapy services. Pt is resident of the [facility] and [facility] at baseline. Pt requires TD for ADLs and mobility. Pt is functioning at baseline. Will sign off at this time.     OT orders received. Please re-consult if need arises.    Sanjuana Roach OTR/L

## 2025-01-22 NOTE — ASSESSMENT & PLAN NOTE
Prehospital takesToprol-XL 25 mg p.o. daily and Lasix 20 mg p.o. daily on Monday Wednesday Friday.   Currently NPO. Metoprolol switched to IV. Received furosemide 40 mg IV x 1 1/19/2025  Blood pressures controlled  Monitor BP per protocol

## 2025-01-22 NOTE — CONSULTS
Vancomycin IV Pharmacy-to-Dose Consultation     Vancomycin has been discontinued.  Pharmacy will sign off.  Please contact or re-consult with questions.    Sanjuana Sterling, Pharmacist

## 2025-01-22 NOTE — ASSESSMENT & PLAN NOTE
Upper airway congestion noted, patient has strong cough and able to clear  CT imaging revealed small bilateral pleural effusion and extensive bibasilar atelectasis  Maintain head of bed elevated  Aspiration precautions  Continue Robinul 0.1 mg IV every 4 hours as needed  Continue incentive spirometry  Suction as needed

## 2025-01-22 NOTE — NUTRITION
"   01/22/25 1313   Biochemical Data,Medical Tests, and Procedures   Biochemical Data/Medical Tests/Procedures Lab values reviewed;Meds reviewed   Labs (Comment) 1/22/2025 potassium 3.4, chloride 112, BUN 28, calcium 8.3, hemoglobin 10.9, hematocrit 34.4   Meds (Comment) Dulcolax, vitamin D3, Colace, heparin, MVI, Protonix, senna   Nutrition-Focused Physical Exam   Nutrition-Focused Physical Exam Findings RN skin assessment reviewed;Edema;Wound  (+2 bilateral upper extremity edema, +1 bilateral lower extremity edema, pressure injury at sacrum, NGT present)   Medical-Related Concerns PMH reviewed   Adequacy of Intake   Nutrition Modality NPO   Feeding Route   PO NPO   Current PO Intake   Current Diet Order NPO   Current Meal Intake Inadequate   Estimated calorie intake compared to estimated need Nutrient needs are not met   PES Statement   Problem Continue previous diagnosis   Recommendations/Interventions   Malnutrition/BMI Present No  (1 criteria met, inadequate intake.  Will continue to monitor.)   Summary Nutrition follow-up assessment.  Consult-tube feeding.  Most recently evaluated by ST 1/22, recommending continuation of strict NPO.  Palliative consulted 1/21, \"son is unsure about long-term feeding tube if needed.\"  Currently NPO.  NGT present.  Patient is at risk for refeeding syndrome.  Monitor electrolytes and replete as needed.  Recommend slow initiation of tube feeding.  Recommend starting Jevity 1.5 at 10 mL/h.  Titrate up by 10 mL every 6 hours to goal of 40 mL/h continuous.  Additional 120 mL water flush every 4 hours.  Provides 1440 kcals, 61 g protein, 729 mL free water plus additional 720 mL from flushes at goal.  RD to make updated recommendations as hospital course progresses.   Interventions/Recommendations Initiate EN;Monitor I & O's   Intervention Comments Recommend starting Jevity 1.5 at 10 mL/h. Titrate up by 10 mL every 6 hours to goal of 40 mL/h continuous. Additional 120 mL water flush " every 4 hours. Provides 1440 kcals, 61 g protein, 729 mL free water plus additional 720 mL from flushes at goal.   Education Assessment   Education Patient/caregiver not appropriate for education at this time   Patient Nutrition Goals   Goal Nutrition via appropriate route;Initiate EN;Tolerate EN/PN goal   Goal Status Revised   Timeframe to complete goal by next f/u   Nutrition Complexity Risk   Nutrition complexity level High risk   Follow up date 01/24/25

## 2025-01-22 NOTE — UTILIZATION REVIEW
Continued Stay Review    Date:  1/22                          Current Patient Class: Inpatient  Current Level of Care:  med surg    HPI:89 y.o. male initially admitted on    OBS  1/15   IP ADMISSION   1/16     Assessment/Plan:   1/22  remains  NPO.  Plan trickle feed  via  NGT today and monitor  tolerance.    Son deciding on  peg tube.  On O2  2L  NC  with sats  92 %.  More awake/alert today.   Not as fatigued.   Continue   BLAINE,  IV   lopressor   and  IV protonix.  Continue all current meds.      Medications:   Scheduled Medications:  allopurinol, 300 mg, Oral, Daily  bisacodyl, 10 mg, Rectal, Daily  cefTRIAXone, 1,000 mg, Intravenous, Q24H  Cholecalciferol, 2,000 Units, Oral, Daily  docusate, 100 mg, Oral, BID  fenofibrate, 48 mg, Oral, Daily  [Held by provider] furosemide, 20 mg, Oral, Once per day on Monday Wednesday Friday  gabapentin, 100 mg, Oral, Q12H  heparin (porcine), 5,000 Units, Subcutaneous, Q8H ANKIT  iohexol, 50 mL, Oral, 90 min pre-procedure  metoprolol, 2.5 mg, Intravenous, Q6H  multivitamin stress formula, 1 tablet, Oral, Daily  nystatin, , Topical, BID  pantoprazole, 40 mg, Intravenous, Q12H  senna, 2 tablet, Oral, HS      Continuous IV Infusions:     PRN Meds:  acetaminophen, 1,000 mg, Intravenous, Q8H PRN  acetaminophen, 650 mg, Oral, Q4H PRN  bisacodyl, 10 mg, Rectal, Daily PRN  glycopyrrolate, 0.1 mg, Intravenous, Q4H PRN  nitroglycerin, 0.4 mg, Sublingual, Q5 Min PRN  ondansetron, 4 mg, Intravenous, Q6H PRN  phenol, 1 spray, Mouth/Throat, Q2H PRN      Discharge Plan:   TBD    Vital Signs (last 3 days)       Date/Time Temp Pulse Resp BP MAP (mmHg) SpO2 Calculated FIO2 (%) - Nasal Cannula Nasal Cannula O2 Flow Rate (L/min) O2 Device Patient Position - Orthostatic VS Bria Coma Scale Score Pain    01/22/25 14:21:22 98.4 °F (36.9 °C) 73 20 160/60 93 96 % -- -- -- -- -- --    01/22/25 14:17:13 98.4 °F (36.9 °C) 72 20 131/45 74 95 % -- -- -- -- -- --    01/22/25 08:07:08 98.7 °F (37.1 °C) 84 --  143/58 86 92 % -- -- -- -- -- --    01/22/25 03:42:46 -- 69 -- 150/54 86 94 % -- -- -- -- -- --    01/21/25 22:10:02 98.1 °F (36.7 °C) 71 20 140/55 83 96 % 28 2 L/min Nasal cannula Lying -- --    01/21/25 1959 -- -- -- -- -- -- -- -- -- -- 15 No Pain    01/21/25 17:19:29 99.2 °F (37.3 °C) 74 -- -- -- 96 % -- -- -- -- -- --    01/21/25 17:18:41 97.6 °F (36.4 °C) 74 -- -- -- 97 % -- -- -- -- -- --    01/21/25 14:59:49 98.7 °F (37.1 °C) 83 -- 149/61 90 95 % -- -- -- -- -- --    01/21/25 0830 -- -- -- -- -- -- 28 2 L/min Nasal cannula -- -- No Pain    01/21/25 07:48:41 99.2 °F (37.3 °C) 80 20 150/66 94 93 % -- -- -- -- -- --    01/21/25 07:48:22 99.2 °F (37.3 °C) 76 20 150/66 94 93 % -- -- -- -- -- --    01/21/25 03:11:11 99.2 °F (37.3 °C) 80 -- 140/59 86 94 % -- -- -- -- -- --    01/21/25 02:12:04 -- 83 -- 121/57 78 93 % -- -- -- -- -- --    01/20/25 22:37:57 100.5 °F (38.1 °C) 103 16 85/60 68 94 % -- -- -- -- -- --    01/20/25 2200 98.6 °F (37 °C) 86 -- -- -- 95 % -- -- -- -- -- --    01/20/25 20:12:32 100.7 °F (38.2 °C) 98 -- -- -- 94 % -- -- -- -- -- --    01/20/25 19:40:36 102.5 °F (39.2 °C) 102 -- -- -- 95 % 28 2 L/min Nasal cannula -- -- No Pain    01/20/25 14:32:14 100.1 °F (37.8 °C) 84 16 146/56 86 95 % 28 2 L/min Nasal cannula Lying -- --    01/20/25 14:10:27 -- 81 -- 140/51 81 92 % -- -- -- -- -- --    01/20/25 11:18:35 99.9 °F (37.7 °C) 90 -- -- -- 94 % -- -- -- -- -- --    01/20/25 0830 -- -- -- -- -- 90 % 28 2 L/min Nasal cannula -- 15 No Pain    01/20/25 07:30:57 -- 75 -- 134/48 77 94 % -- -- -- -- -- --    01/20/25 07:26:19 98 °F (36.7 °C) 74 -- 134/48 77 94 % -- -- -- -- -- --    01/20/25 0724 -- -- -- -- -- 94 % 28 2 L/min Nasal cannula -- -- --    01/20/25 02:04:13 -- 77 -- 148/59 89 96 % -- -- -- -- -- --    01/19/25 22:18:30 98.8 °F (37.1 °C) 77 -- 130/61 84 93 % -- -- -- -- -- --    01/19/25 19:40:16 99.8 °F (37.7 °C) 78 -- 131/60 84 94 % -- -- -- -- -- No Pain    01/19/25 1649 -- -- -- -- -- 94 %  28 2 L/min Nasal cannula -- -- --    01/19/25 15:40:43 101.8 °F (38.8 °C) 106 -- 154/66 95 89 % 28 2 L/min Nasal cannula -- -- --    01/19/25 15:39:33 101.8 °F (38.8 °C) 104 -- 154/66 95 90 % -- -- None (Room air) -- -- --    01/19/25 0900 -- -- -- -- -- 92 % -- -- None (Room air) -- 15 No Pain    01/19/25 07:09:09 99.5 °F (37.5 °C) 79 20 145/58 87 93 % -- -- -- Lying -- --    01/19/25 02:13:17 -- 70 -- 119/43 68 91 % -- -- -- -- -- --    01/19/25 0100 -- -- -- -- -- -- -- -- None (Room air) -- -- --                Pertinent Labs/Diagnostic Results:   Radiology:  VAS upper limb venous duplex scan, complete, bilateral   Final Interpretation by Abigail Alvarado MD (01/21 2109)      XR chest portable   Final Interpretation by Leonor Ingram MD (01/21 0915)      Slight improvement in opacity in the medial left base which may be due to atelectasis and/or pneumonia.            Workstation performed: [id number]         XR abdomen 1 vw portable   Final Interpretation by Heaven Barton MD (01/20 1417)   Nonobstructive bowel gas pattern. Residual oral contrast throughout the colon. No gastric distention. Satisfactory position of the endogastric tube.               Workstation performed: [id number]         XR chest portable   Final Interpretation by Beatris Benton MD (01/20 0904)      Retrocardiac opacity, which may be due to pneumonia and/or atelectasis.            Workstation performed: [id number]         CT abdomen pelvis w contrast   Final Interpretation by Geovanny Taylor MD (01/17 2239)      1.  New small bilateral pleural effusions with extensive bibasilar atelectasis.   2.  Wall thickening of the lower esophagus and proximal stomach is again noted without interval change. This may reflect mild focal esophagitis/gastritis. New NG tube with tip terminating in the gastric body region.   3.  No evidence of bowel obstruction, mesenteric inflammation, appendicitis, obstructive uropathy, free air, or free fluid.  Descending and sigmoid diverticulosis again noted without evidence for acute diverticulitis. Subtle wall thickening of the mid to    distal sigmoid colon, rectum, and anal verge region could reflect mild focal colitis/proctitis.   4.  Multiple punctate pancreatic calcifications again seen likely sequelae of chronic pancreatitis.      Workstation performed: [id number]         XR chest portable   Final Interpretation by Jose Johns MD (01/17 1327)      Scattered streaky opacities favored to represent atelectasis but pneumonia not excluded.            Workstation performed: [id number]         XR abdomen 1 vw portable   Final Interpretation by Jose Johns MD (01/17 1328)      New marked gaseous distention of the stomach.               Workstation performed: [id number]         XR abdomen 1 view kub   Final Interpretation by Diony Boateng MD (01/16 4489)      1.  NG tube projects over the gastric body with decompression of the stomach compared to the x-ray from earlier the same day.      2.  Mildly dilated mid abdominal small bowel loops with gas throughout the colon though the abdomen was only partially imaged.               Workstation performed: [id number]         XR abdomen 1 view kub   Final Interpretation by Diony Boateng MD (01/16 8300)      Worsening gastric distention.      The study was marked in EPIC for immediate notification.               Workstation performed: [id number]         XR chest portable   Final Interpretation by Sina Vitale MD (01/15 9849)      No endogastric tube visible within the field-of-view.      There is some gaseous distention of the stomach.      Minimal atelectasis left lung base.      The study was marked in EPIC for immediate notification.            Workstation performed: [id number]         CT abdomen pelvis with contrast   Final Interpretation by Geovanny Taylor MD (01/15 9504)      Trace left pleural effusion with bibasilar atelectasis.  Focal wall  thickening of the lower esophagus, gastroesophageal junction, and proximal stomach noted which may reflect esophagitis/gastritis. No evidence for bowel obstruction, mesenteric    inflammation, appendicitis, obstructive uropathy, free air, or free fluid. Descending and sigmoid colon diverticulosis without evidence for acute diverticulitis.         Workstation performed: [id number]                 Results from last 7 days   Lab Units 01/22/25 0448 01/21/25 0441 01/20/25 0426 01/19/25 0523 01/18/25 0442   WBC Thousand/uL 9.02 10.62* 8.11 6.53 7.19   HEMOGLOBIN g/dL 10.9* 11.0* 11.4* 11.4* 9.6*   HEMATOCRIT % 34.4* 33.8* 35.7* 35.3* 30.4*   PLATELETS Thousands/uL 247 244 202 173 129*   TOTAL NEUT ABS Thousands/µL  --  9.40* 7.17 5.48  --    BANDS PCT %  --   --   --   --  24*         Results from last 7 days   Lab Units 01/22/25 0448 01/21/25 0441 01/20/25 0426 01/19/25 0523 01/18/25 0442 01/17/25  1337   SODIUM mmol/L 147 143 138 139 134* 135   POTASSIUM mmol/L 3.4* 3.1* 3.3* 3.3* 3.7 3.9   CHLORIDE mmol/L 112* 107 103 102 103 101   CO2 mmol/L 27 26 25 26 20* 25   ANION GAP mmol/L 8 10 10 11 11 9   BUN mg/dL 28* 30* 30* 28* 18 21   CREATININE mg/dL 0.83 1.07 1.11 1.20 0.86 1.11   EGFR ml/min/1.73sq m 72 56 54 49 71 54   CALCIUM mg/dL 8.3* 8.5 8.2* 8.0* 7.2* 8.3*   CALCIUM, IONIZED mmol/L  --   --   --   --   --  1.10*   MAGNESIUM mg/dL  --   --  2.2  --   --  1.9     Results from last 7 days   Lab Units 01/21/25 0441 01/20/25 0426 01/19/25 0523 01/18/25 0442 01/17/25  1337   AST U/L 42* 49* 49* 41* 45*   ALT U/L 21 21 19 14 18   ALK PHOS U/L 49 48 45 33* 44   TOTAL PROTEIN g/dL 6.1* 6.0* 5.8* 4.4* 6.2*   ALBUMIN g/dL 3.0* 3.1* 3.1* 2.5* 3.5   TOTAL BILIRUBIN mg/dL 0.56 0.54 0.43 0.38 0.60     Results from last 7 days   Lab Units 01/21/25  1138 01/21/25  0747   POC GLUCOSE mg/dl 111 92     Results from last 7 days   Lab Units 01/22/25  0448 01/21/25  0441 01/20/25  0426 01/19/25  0523 01/18/25  0442  01/17/25  1337 01/17/25  0518 01/16/25  0511   GLUCOSE RANDOM mg/dL 98 106 95 78 85 105 103 128           Results from last 7 days   Lab Units 01/17/25  1337   CK TOTAL U/L 371*         Results from last 7 days   Lab Units 01/17/25  1337   D-DIMER QUANTITATIVE ug/ml FEU 2.02*             Results from last 7 days   Lab Units 01/21/25  0441 01/20/25  0426 01/19/25  0523 01/18/25  0442   PROCALCITONIN ng/ml 1.53* 1.43* 1.82* 1.25*                 Results from last 7 days   Lab Units 01/17/25  1337   BNP pg/mL 171*     Results from last 7 days   Lab Units 01/17/25  1337   FERRITIN ng/mL 189                     Results from last 7 days   Lab Units 01/20/25  0426 01/19/25  0523 01/18/25  0442 01/17/25  1337   CRP mg/L 198.8* 177.3* 95.3* 95.8*             Network Utilization Review Department  ATTENTION: Please call with any questions or concerns to [phone] and carefully listen to the prompts so that you are directed to the right person. All voicemails are confidential.   For Discharge needs, contact Care Management DC Support Team at [phone] opt. 2  Send all requests for admission clinical reviews, approved or denied determinations and any other requests to dedicated fax number below belonging to the campus where the patient is receiving treatment. List of dedicated fax numbers for the Facilities:  FACILITY NAME UR FAX NUMBER   ADMISSION DENIALS (Administrative/Medical Necessity) [phone]   DISCHARGE SUPPORT TEAM (NETWORK) [phone]   PARENT CHILD HEALTH (Maternity/NICU/Pediatrics) [phone]   [facility] [phone]   General acute hospital [phone]   [facility] [phone]   [facility] [phone]   Critical access hospital [phone]   [facility] [phone]   [facility] [phone]   [facility]  [phone]   [facility] [phone]   [facility] [phone]   [facility] [phone]   [facility] [phone]

## 2025-01-22 NOTE — PROGRESS NOTES
Progress Note - Hospitalist   Name: Prem Mar Sr. 89 y.o. male I MRN: [id number]  Unit/Bed#: -01 I Date of Admission: 1/15/2025   Date of Service: 1/22/2025 I Hospital Day: 6    Assessment & Plan  Upper GI bleed  Presented for suspected GI bleed  CT abdomen and pelvis 1/15/2025 with focal wall thickening of the lower esophagus, gastroesophageal junction, and proximal stomach which may reflect esophagitis/gastritis.  No evidence for bowel obstruction, mesenteric inflammation, appendicitis, obstructive uropathy, free air or free fluid  Chest x-ray 1/15/2025 with gaseous distention of the stomach  KUB 1/16/2025 with worsening distention  NG was placed  CT abdomen pelvis with contrast 1/17/2024: New small bilateral pleural effusions with extensive bibasilar atelectasis. Wall thickening of the lower esophagus and proximal stomach is again noted without interval change. This may reflect mild focal esophagitis/gastritis. New NG tube with tip terminating in the gastric body region. No evidence of bowel obstruction, mesenteric inflammation, appendicitis, obstructive uropathy, free air, or free fluid. Descending and sigmoid diverticulosis again noted without evidence for acute diverticulitis. Subtle wall thickening of the mid to distal sigmoid colon, rectum, and anal verge region could reflect mild focal colitis/proctitis. Multiple punctate pancreatic calcifications again seen likely sequelae of chronic pancreatitis  1/20 patient passed NG clamping trial and evaluated by SLP but did not pass   Keep NPO   Nutritional recommendations for tube feeds  Initiating trickle feeding through NG today  Appreciate GI who are following  Continue Protonix 40 mg IV twice daily  Continue with conservative management for now.  Please see also, goals of care  Opacity noted on imaging study  Chest x-ray: Retrocardiac opacity, which may be due to pneumonia and/or atelectasis.  Procalcitonin peaked and downtrending, with intermittent  fever  Per discussion with ID pharmacy will de-escalate antibiotics to ceftriaxone 1 g every 24 hours  Repeat CXR-slight improvement in left base Pacitti atelectasis versus pneumonia  Monitor labs and vital signs, conduct serial physical assessments    COVID-19 virus infection  Patient with fever and COVID-positive roommate at his skilled facility  Tested positive for COVID 9/17  Continues to require 2 L nasal cannula oxygen  S/p remdesivir x3 days  Continue supportive care  Isolation precautions  Monitor labs and vital signs, conduct serial physical assessments  Congestion of upper airway  Upper airway congestion noted, patient has strong cough and able to clear  CT imaging revealed small bilateral pleural effusion and extensive bibasilar atelectasis  Maintain head of bed elevated  Aspiration precautions  Continue Robinul 0.1 mg IV every 4 hours as needed  Continue incentive spirometry  Suction as needed  Goals of care, counseling/discussion  Patient with significant comorbid medical conditions would be high risk for treatment options requiring anesthesia.  EGD testing might have risks that outweigh the benefits.  Patient verbalized understanding.  Opened goals of care conversations with son.  Continued on phone on Saturday. Had further conversations with patient's son, daughter, and grandson when they arrived.  Ultimately they would like to keep him comfortable but for now continue with full treatment   Appreciate input of palliative care continue to follow, addressing goals of care  Patient's son at bedside today reports he would like to trial tube feeds via the NG tube.  Still undecided about PEG.  Will initiate tube feed via NG per recommendations of nutrition  Continue supportive care  Idiopathic chronic gout of multiple sites without tophus  Continue prehospital allopurinol 300 mg p.o. daily when tolerating p.o.  Essential hypertension  Prehospital takesToprol-XL 25 mg p.o. daily and Lasix 20 mg p.o. daily on  Monday Wednesday Friday.   Currently NPO. Metoprolol switched to IV. Received furosemide 40 mg IV x 1 1/19/2025  Blood pressures controlled  Monitor BP per protocol  Mixed hyperlipidemia  Continue prehospital Tricor 48 mg p.o. daily when tolerating p.o.  Chronic kidney disease, stage 3a (HCC)  Lab Results   Component Value Date    EGFR 72 01/22/2025    EGFR 56 01/21/2025    EGFR 54 01/20/2025    CREATININE 0.83 01/22/2025    CREATININE 1.07 01/21/2025    CREATININE 1.11 01/20/2025     Creatinine appears to be around baseline of 0.9-1.1 mg/dL   Creatinine better than baseline currently  Continue to monitor renal function closely  Avoid nephrotoxins and hypotension  BMP a.m.  Atrial flutter (HCC)  Currently rate controlled with metoprolol   Not currently on anticoagulation as outpatient  Constipation  GI input appreciated  Continue bowel regimen monitor effectiveness  Acute distention of stomach  Appreciate input of GI who are following  N.p.o.  Appreciate input of nutrition  Initiate trickle feed tube feed today through NG tube per recommendations of nutrition, monitor tolerance  Palliative care by specialist  Appreciate input of palliative care who continue to follow-see goals of care discussion    VTE Pharmacologic Prophylaxis: VTE Score: 5 High Risk (Score >/= 5) - Pharmacological DVT Prophylaxis Ordered: heparin. Sequential Compression Devices Ordered.    Mobility:   Basic Mobility Inpatient Raw Score: 6  JH-HLM Goal: 2: Bed activities/Dependent transfer  JH-HLM Achieved: 1: Laying in bed  JH-HLM Goal NOT achieved. Continue with multidisciplinary rounding and encourage appropriate mobility to improve upon JH-HLM goals.    Patient Centered Rounds: I performed bedside rounds with nursing staff today.   Discussions with Specialists or Other Care Team Provider: GI, palliative care, nursing, case management    Education and Discussions with Family / Patient: Updated  (son) at bedside.    Current Length  of Stay: 6 day(s)  Current Patient Status: Inpatient   Certification Statement: The patient will continue to require additional inpatient hospital stay due to continues n.p.o., speech following, initially trickle tube feed via NG today, monitoring tolerance, also safe dispo case management following  Discharge Plan:  Son is at bedside, we are continuing ongoing goals of care discussion.  Spoke with him at length as did palliative care.  Son is requesting we initiate tube feed via the NG tube today.  He is still unclear if he will decide to put a PEG tube in, states that he feels to see how his dad responds to the tube feeding via the NG first.  Initiating tube feeds per nutritional recommendations.  Will monitor tolerance.  Per discussion with ID pharmacy, de-escalated antibiotics.  Repeat labs in the AM.  Case management continues to follow.    Code Status: Level 3 - DNAR and DNI    Subjective   Patient offers no complaints of discomfort when asked.  Son at bedside.    Objective :  Temp:  [97.6 °F (36.4 °C)-99.2 °F (37.3 °C)] 98.7 °F (37.1 °C)  HR:  [69-84] 84  BP: (140-150)/(54-61) 143/58  Resp:  [20] 20  SpO2:  [92 %-97 %] 92 %  O2 Device: Nasal cannula  Nasal Cannula O2 Flow Rate (L/min):  [2 L/min] 2 L/min    Body mass index is 26.22 kg/m².     Input and Output Summary (last 24 hours):     Intake/Output Summary (Last 24 hours) at 1/22/2025 1416  Last data filed at 1/22/2025 0807  Gross per 24 hour   Intake 0 ml   Output --   Net 0 ml       Physical Exam  Vitals and nursing note reviewed.   Constitutional:       General: He is not in acute distress.     Appearance: He is well-developed.   HENT:      Head: Normocephalic and atraumatic.   Cardiovascular:      Rate and Rhythm: Normal rate and regular rhythm.      Pulses: Normal pulses.      Heart sounds: Normal heart sounds. No murmur heard.  Pulmonary:      Effort: Pulmonary effort is normal. No respiratory distress.      Breath sounds: Normal breath sounds. No  wheezing or rales.   Abdominal:      General: There is no distension.      Palpations: Abdomen is soft.      Tenderness: There is no abdominal tenderness. There is no guarding.   Musculoskeletal:         General: No swelling. Normal range of motion.   Skin:     General: Skin is warm and dry.      Capillary Refill: Capillary refill takes less than 2 seconds.   Neurological:      General: No focal deficit present.      Mental Status: He is alert. Mental status is at baseline.   Psychiatric:         Mood and Affect: Mood normal.         Behavior: Behavior normal.           Lines/Drains:  Lines/Drains/Airways       Active Status       Name Placement date Placement time Site Days    NG/OG/Enteral Tube Nasogastric 18 Fr Right nare 01/16/25  1052  Right nare  6    External Urinary Catheter 01/21/25 2230  -- less than 1                            Lab Results: I have reviewed the following results:   Results from last 7 days   Lab Units 01/22/25  0448 01/21/25  0441 01/19/25  0523 01/18/25  0442   WBC Thousand/uL 9.02 10.62*   < > 7.19   HEMOGLOBIN g/dL 10.9* 11.0*   < > 9.6*   HEMATOCRIT % 34.4* 33.8*   < > 30.4*   PLATELETS Thousands/uL 247 244   < > 129*   BANDS PCT %  --   --   --  24*   SEGS PCT %  --  89*   < >  --    LYMPHO PCT %  --  7*   < > 6*   MONO PCT %  --  3*   < > 0*   EOS PCT %  --  0   < > 0    < > = values in this interval not displayed.     Results from last 7 days   Lab Units 01/22/25  0448 01/21/25  0441   SODIUM mmol/L 147 143   POTASSIUM mmol/L 3.4* 3.1*   CHLORIDE mmol/L 112* 107   CO2 mmol/L 27 26   BUN mg/dL 28* 30*   CREATININE mg/dL 0.83 1.07   ANION GAP mmol/L 8 10   CALCIUM mg/dL 8.3* 8.5   ALBUMIN g/dL  --  3.0*   TOTAL BILIRUBIN mg/dL  --  0.56   ALK PHOS U/L  --  49   ALT U/L  --  21   AST U/L  --  42*   GLUCOSE RANDOM mg/dL 98 106         Results from last 7 days   Lab Units 01/21/25  1138 01/21/25  0747   POC GLUCOSE mg/dl 111 92         Results from last 7 days   Lab Units 01/21/25  1744  01/20/25  0426 01/19/25  0523 01/18/25  0442   PROCALCITONIN ng/ml 1.53* 1.43* 1.82* 1.25*       Recent Cultures (last 7 days):         Imaging Results Review: I reviewed radiology reports from this admission including: Chest x-ray, CT pelvis, CT abdomen pelvis, follow-up chest x-ray x 4, KUB,.  Other Study Results Review: No additional pertinent studies reviewed.    Last 24 Hours Medication List:     Current Facility-Administered Medications:     acetaminophen (Ofirmev) injection 1,000 mg, Q8H PRN, Last Rate: 1,000 mg (01/21/25 1726)    acetaminophen (TYLENOL) tablet 650 mg, Q4H PRN    allopurinol (ZYLOPRIM) tablet 300 mg, Daily    bisacodyl (DULCOLAX) rectal suppository 10 mg, Daily    bisacodyl (DULCOLAX) rectal suppository 10 mg, Daily PRN    cefTRIAXone (ROCEPHIN) IVPB (premix in dextrose) 1,000 mg 50 mL, Q24H    Cholecalciferol (VITAMIN D3) tablet 2,000 Units, Daily    docusate (COLACE) oral liquid 100 mg, BID    fenofibrate (TRICOR) tablet 48 mg, Daily    [Held by provider] furosemide (LASIX) tablet 20 mg, Once per day on Monday Wednesday Friday    gabapentin (NEURONTIN) capsule 100 mg, Q12H    glycopyrrolate (ROBINUL) injection 0.1 mg, Q4H PRN    heparin (porcine) subcutaneous injection 5,000 Units, Q8H ANKIT    iohexol (OMNIPAQUE) 240 MG/ML solution 50 mL, 90 min pre-procedure    metoprolol (LOPRESSOR) injection 2.5 mg, Q6H    multivitamin stress formula tablet 1 tablet, Daily    nitroglycerin (NITROSTAT) SL tablet 0.4 mg, Q5 Min PRN    nystatin (MYCOSTATIN) cream, BID    ondansetron (ZOFRAN) injection 4 mg, Q6H PRN    pantoprazole (PROTONIX) injection 40 mg, Q12H    phenol (CHLORASEPTIC) 1.4 % mucosal liquid 1 spray, Q2H PRN    senna (SENOKOT) tablet 17.2 mg, HS    Administrative Statements   Today, Patient Was Seen By: ANAM Harrison  I have spent a total time of 41 minutes in caring for this patient on the day of the visit/encounter including Diagnostic results, Patient and family education,  Documenting in the medical record, Reviewing / ordering tests, medicine, procedures  , Obtaining or reviewing history  , and Communicating with other healthcare professionals .    **Please Note: This note may have been constructed using a voice recognition system.**

## 2025-01-22 NOTE — PROGRESS NOTES
Progress Note - Palliative Care   Name: Prem Mar Sr. 89 y.o. male I MRN: [id number]  Unit/Bed#: -P[facility] I Date of Admission: 1/15/2025   Date of Service: 1/22/2025 I Hospital Day: 6    Assessment & Plan  Goals of care, counseling/discussion  Decisional apparatus:  Patient has capacity on exam today.  If lost, patient's substitute decision maker would default to both adult children by [place].  Advance Directive / Living Will / POLST / POA Forms: No    Goals  Level 3 code status.   Disease focused care.  DNR/DNI limits placed.   Would like to avoid PEG if possible. Hopeful that he may pass swallow test tomorrow  Reviewed PEG tube risks, complications. Patient and son considering as he has had a very tolerable QoL up until this hospitalization.  GOC discussions will be ongoing as clinical course progresses.      Palliative care by specialist  Social support  Patient is supported by son, daughter, grandchildren  Supportive listening provided  Normalized experience of patient/family  Provided anxiety containment  Provided anticipatory guidance    Follow up  Palliative Care will continue to follow  Please reach out to on-call provider via Epic Secure Chat  if questions or concerns arise.  Please do not hesitate to reach our on call provider through our clinic answering service at [phone] should you have acute symptom control concerns.    Upper GI bleed  Presented with suspected GI bleed  GI team consulted  Continue conservative measures  NGT in place  Mgmt per SLIM/GI    Subjective   Patient more awake and alert today. Reports feeling okay and improved from yesterday. Not as fatigued. Patient looks to his son for decisional support though appears to have much more insight into his condition today.     Objective :  Temp:  [97.6 °F (36.4 °C)-99.2 °F (37.3 °C)] 98.7 °F (37.1 °C)  HR:  [69-84] 84  BP: (140-150)/(54-61) 143/58  Resp:  [20] 20  SpO2:  [92 %-97 %] 92 %  O2 Device: Nasal cannula  Nasal Cannula  O2 Flow Rate (L/min):  [2 L/min] 2 L/min    Physical Exam  Vitals and nursing note reviewed.   Constitutional:       Appearance: He is ill-appearing.   HENT:      Head: Normocephalic.   Skin:     General: Skin is warm.   Neurological:      Mental Status: He is alert.   Psychiatric:         Mood and Affect: Mood normal.          Lab Results: I have reviewed the following results:  Lab Results   Component Value Date/Time    SODIUM 147 01/22/2025 04:48 AM    SODIUM 126 (L) 09/06/2022 06:45 PM    K 3.4 (L) 01/22/2025 04:48 AM    K 4.0 09/06/2022 06:45 PM    BUN 28 (H) 01/22/2025 04:48 AM    BUN 24 09/06/2022 06:45 PM    CREATININE 0.83 01/22/2025 04:48 AM    CREATININE 0.80 09/06/2022 06:45 PM    GLUC 98 01/22/2025 04:48 AM    GLUC 124 (H) 09/06/2022 06:45 PM    CALCIUM 8.3 (L) 01/22/2025 04:48 AM    CALCIUM 8.2 (L) 09/06/2022 06:45 PM    AST 42 (H) 01/21/2025 04:41 AM    ALT 21 01/21/2025 04:41 AM    ALB 3.0 (L) 01/21/2025 04:41 AM    TP 6.1 (L) 01/21/2025 04:41 AM    EGFR 72 01/22/2025 04:48 AM    EGFR 80 09/06/2022 06:45 PM     Lab Results   Component Value Date/Time    HGB 10.9 (L) 01/22/2025 04:48 AM    WBC 9.02 01/22/2025 04:48 AM     01/22/2025 04:48 AM    INR 0.94 01/15/2025 04:22 AM    PTT 31 01/15/2025 04:22 AM     Lab Results   Component Value Date/Time    OBC3UUBKMBEP 3.763 08/18/2022 04:38 PM       Administrative Statements   I have spent a total time of 30 minutes in caring for this patient on the day of the visit/encounter including Prognosis, Risks and benefits of tx options, Instructions for management, Patient and family education, Counseling / Coordination of care, Documenting in the medical record, Reviewing / ordering tests, medicine, procedures  , Obtaining or reviewing history  , and Communicating with other healthcare professionals . Topics discussed with the patient / family include symptom assessment and management, psychosocial support, goals of care, supportive listening, and  anticipatory guidance. Case discussed with SHANKAR.

## 2025-01-22 NOTE — ASSESSMENT & PLAN NOTE
Presented for suspected GI bleed  CT abdomen and pelvis 1/15/2025 with focal wall thickening of the lower esophagus, gastroesophageal junction, and proximal stomach which may reflect esophagitis/gastritis.  No evidence for bowel obstruction, mesenteric inflammation, appendicitis, obstructive uropathy, free air or free fluid  Chest x-ray 1/15/2025 with gaseous distention of the stomach  KUB 1/16/2025 with worsening distention  NG was placed  CT abdomen pelvis with contrast 1/17/2024: New small bilateral pleural effusions with extensive bibasilar atelectasis. Wall thickening of the lower esophagus and proximal stomach is again noted without interval change. This may reflect mild focal esophagitis/gastritis. New NG tube with tip terminating in the gastric body region. No evidence of bowel obstruction, mesenteric inflammation, appendicitis, obstructive uropathy, free air, or free fluid. Descending and sigmoid diverticulosis again noted without evidence for acute diverticulitis. Subtle wall thickening of the mid to distal sigmoid colon, rectum, and anal verge region could reflect mild focal colitis/proctitis. Multiple punctate pancreatic calcifications again seen likely sequelae of chronic pancreatitis  1/20 patient passed NG clamping trial and evaluated by SLP but did not pass   Keep NPO   Nutritional recommendations for tube feeds  Initiating trickle feeding through NG today  Appreciate GI who are following  Continue Protonix 40 mg IV twice daily  Continue with conservative management for now.  Please see also, goals of care

## 2025-01-22 NOTE — SPEECH THERAPY NOTE
Speech Language/Pathology    Speech/Language Pathology Progress Note    Patient Name: Prem Mar Sr.  Today's Date: 1/22/2025     Problem List  Principal Problem:    Upper GI bleed  Active Problems:    Essential hypertension    Idiopathic chronic gout of multiple sites without tophus    Mixed hyperlipidemia    Chronic kidney disease, stage 3a (HCC)    Atrial flutter (HCC)    Constipation    Goals of care, counseling/discussion    Acute distention of stomach    COVID-19 virus infection    Congestion of upper airway    Opacity noted on imaging study    Palliative care by specialist       Past Medical History  Past Medical History:   Diagnosis Date    Arthritis     Cataract     Coronary artery disease     Coronary atherosclerosis of native coronary artery     Diverticulitis of colon     Essential hypertension, benign     Hyperlipidemia     Hypertension     Peptic ulcer     Renal disorder         Past Surgical History  Past Surgical History:   Procedure Laterality Date    CATARACT EXTRACTION Right     Left eye 5/2021    CORONARY STENT PLACEMENT      SKIN BIOPSY      TONSILLECTOMY           Subjective:  Pt positioned upright and alerted to stim.   Objective:  Pt was seen f/u dysphagia for po potential. Son at bedside. Pt audibly wet vocal quality.  Removed MOD amount of secretions with deep suction. Pt is able to cough to bring up to be removed via suction intermittently. Secretions appeared light brown. Cough effectiveness varies. Trialed ice chip for pleasure. Min bolus manipulation MOD delayed swallow initiation immediate cough suspect due to spill. Removed MOD amount of secretion with suction. Laryngeal rise appeared weak upon palpation. Reviewed with son recommendations and aspiration precautions.   Assessment:  Pt continues with MOD amount of secretions. Frequent suctioning. Cough effectiveness varies.  Weak delayed swallow initiation followed by immediate cough.   Plan/Recommendations:  Recommend continue  strict NPO   Ensure frequent oral care  Aspiration precautions   Ongoing GOC discussions.  ST continue to f/u as indicated

## 2025-01-22 NOTE — ASSESSMENT & PLAN NOTE
Chest x-ray: Retrocardiac opacity, which may be due to pneumonia and/or atelectasis.  Procalcitonin peaked and downtrending, with intermittent fever  Per discussion with ID pharmacy will de-escalate antibiotics to ceftriaxone 1 g every 24 hours  Repeat CXR-slight improvement in left base Pacitti atelectasis versus pneumonia  Monitor labs and vital signs, conduct serial physical assessments

## 2025-01-22 NOTE — ASSESSMENT & PLAN NOTE
Appreciate input of GI who are following  N.p.o.  Appreciate input of nutrition  Initiate trickle feed tube feed today through NG tube per recommendations of nutrition, monitor tolerance

## 2025-01-22 NOTE — ASSESSMENT & PLAN NOTE
Decisional apparatus:  Patient has capacity on exam today.  If lost, patient's substitute decision maker would default to both adult children by [place].  Advance Directive / Living Will / POLST / POA Forms: No    Goals  Level 3 code status.   Disease focused care.  DNR/DNI limits placed.   Would like to avoid PEG if possible. Hopeful that he may pass swallow test tomorrow  Reviewed PEG tube risks, complications. Patient and son considering as he has had a very tolerable QoL up until this hospitalization.  GOC discussions will be ongoing as clinical course progresses.

## 2025-01-23 ENCOUNTER — APPOINTMENT (INPATIENT)
Dept: RADIOLOGY | Facility: HOSPITAL | Age: OVER 89
DRG: 377 | End: 2025-01-23
Payer: COMMERCIAL

## 2025-01-23 PROBLEM — E87.8 ELECTROLYTE ABNORMALITY: Status: ACTIVE | Noted: 2025-01-23

## 2025-01-23 LAB
ANION GAP SERPL CALCULATED.3IONS-SCNC: 8 MMOL/L (ref 4–13)
BUN SERPL-MCNC: 26 MG/DL (ref 5–25)
CALCIUM SERPL-MCNC: 8.5 MG/DL (ref 8.4–10.2)
CHLORIDE SERPL-SCNC: 113 MMOL/L (ref 96–108)
CO2 SERPL-SCNC: 27 MMOL/L (ref 21–32)
CREAT SERPL-MCNC: 0.76 MG/DL (ref 0.6–1.3)
ERYTHROCYTE [DISTWIDTH] IN BLOOD BY AUTOMATED COUNT: 16 % (ref 11.6–15.1)
GFR SERPL CREATININE-BSD FRML MDRD: 74 ML/MIN/1.73SQ M
GLUCOSE SERPL-MCNC: 114 MG/DL (ref 65–140)
HCT VFR BLD AUTO: 32.5 % (ref 36.5–49.3)
HGB BLD-MCNC: 10.4 G/DL (ref 12–17)
MAGNESIUM SERPL-MCNC: 2.2 MG/DL (ref 1.9–2.7)
MCH RBC QN AUTO: 31.5 PG (ref 26.8–34.3)
MCHC RBC AUTO-ENTMCNC: 32 G/DL (ref 31.4–37.4)
MCV RBC AUTO: 99 FL (ref 82–98)
PHOSPHATE SERPL-MCNC: 1.2 MG/DL (ref 2.3–4.1)
PLATELET # BLD AUTO: 283 THOUSANDS/UL (ref 149–390)
PMV BLD AUTO: 9.5 FL (ref 8.9–12.7)
POTASSIUM SERPL-SCNC: 3.3 MMOL/L (ref 3.5–5.3)
PROCALCITONIN SERPL-MCNC: 0.69 NG/ML
RBC # BLD AUTO: 3.3 MILLION/UL (ref 3.88–5.62)
SODIUM SERPL-SCNC: 148 MMOL/L (ref 135–147)
WBC # BLD AUTO: 7.78 THOUSAND/UL (ref 4.31–10.16)

## 2025-01-23 PROCEDURE — 92526 ORAL FUNCTION THERAPY: CPT

## 2025-01-23 PROCEDURE — 74018 RADEX ABDOMEN 1 VIEW: CPT

## 2025-01-23 PROCEDURE — 84100 ASSAY OF PHOSPHORUS: CPT

## 2025-01-23 PROCEDURE — 84145 PROCALCITONIN (PCT): CPT

## 2025-01-23 PROCEDURE — 99233 SBSQ HOSP IP/OBS HIGH 50: CPT

## 2025-01-23 PROCEDURE — 85027 COMPLETE CBC AUTOMATED: CPT

## 2025-01-23 PROCEDURE — 83735 ASSAY OF MAGNESIUM: CPT

## 2025-01-23 PROCEDURE — 80048 BASIC METABOLIC PNL TOTAL CA: CPT

## 2025-01-23 RX ADMIN — GABAPENTIN 100 MG: 100 CAPSULE ORAL at 18:15

## 2025-01-23 RX ADMIN — GLYCOPYRROLATE 0.1 MG: 0.2 INJECTION, SOLUTION INTRAMUSCULAR; INTRAVENOUS at 01:21

## 2025-01-23 RX ADMIN — NYSTATIN: 100000 CREAM TOPICAL at 17:20

## 2025-01-23 RX ADMIN — ALLOPURINOL 300 MG: 300 TABLET ORAL at 09:53

## 2025-01-23 RX ADMIN — FENOFIBRATE 48 MG: 48 TABLET, FILM COATED ORAL at 09:53

## 2025-01-23 RX ADMIN — DOCUSATE SODIUM 100 MG: 50 LIQUID ORAL at 09:54

## 2025-01-23 RX ADMIN — Medication 2000 UNITS: at 09:53

## 2025-01-23 RX ADMIN — METOROPROLOL TARTRATE 2.5 MG: 5 INJECTION, SOLUTION INTRAVENOUS at 14:22

## 2025-01-23 RX ADMIN — POTASSIUM PHOSPHATE 9 MMOL: 236; 224 INJECTION, SOLUTION INTRAVENOUS at 10:56

## 2025-01-23 RX ADMIN — METOROPROLOL TARTRATE 2.5 MG: 5 INJECTION, SOLUTION INTRAVENOUS at 02:47

## 2025-01-23 RX ADMIN — CEFTRIAXONE 1000 MG: 1 INJECTION, SOLUTION INTRAVENOUS at 14:30

## 2025-01-23 RX ADMIN — HEPARIN SODIUM 5000 UNITS: 5000 INJECTION INTRAVENOUS; SUBCUTANEOUS at 14:23

## 2025-01-23 RX ADMIN — DOCUSATE SODIUM 100 MG: 50 LIQUID ORAL at 17:19

## 2025-01-23 RX ADMIN — SENNOSIDES 17.2 MG: 8.6 TABLET, FILM COATED ORAL at 21:46

## 2025-01-23 RX ADMIN — NYSTATIN: 100000 CREAM TOPICAL at 09:54

## 2025-01-23 RX ADMIN — PANTOPRAZOLE SODIUM 40 MG: 40 INJECTION, POWDER, FOR SOLUTION INTRAVENOUS at 05:39

## 2025-01-23 RX ADMIN — BISACODYL 10 MG: 10 SUPPOSITORY RECTAL at 09:54

## 2025-01-23 RX ADMIN — PANTOPRAZOLE SODIUM 40 MG: 40 INJECTION, POWDER, FOR SOLUTION INTRAVENOUS at 17:20

## 2025-01-23 RX ADMIN — HEPARIN SODIUM 5000 UNITS: 5000 INJECTION INTRAVENOUS; SUBCUTANEOUS at 05:39

## 2025-01-23 RX ADMIN — METOROPROLOL TARTRATE 2.5 MG: 5 INJECTION, SOLUTION INTRAVENOUS at 20:02

## 2025-01-23 RX ADMIN — HEPARIN SODIUM 5000 UNITS: 5000 INJECTION INTRAVENOUS; SUBCUTANEOUS at 21:46

## 2025-01-23 RX ADMIN — GABAPENTIN 100 MG: 100 CAPSULE ORAL at 05:39

## 2025-01-23 RX ADMIN — METOROPROLOL TARTRATE 2.5 MG: 5 INJECTION, SOLUTION INTRAVENOUS at 09:53

## 2025-01-23 RX ADMIN — B-COMPLEX W/ C & FOLIC ACID TAB 1 TABLET: TAB at 09:53

## 2025-01-23 NOTE — PROGRESS NOTES
Progress Note - Hospitalist   Name: Prem Mar Sr. 89 y.o. male I MRN: [id number]  Unit/Bed#: [facility] I Date of Admission: 1/15/2025   Date of Service: 1/23/2025 I Hospital Day: 7    Assessment & Plan  Upper GI bleed  Presented for suspected GI bleed  CT abdomen and pelvis 1/15/2025 with focal wall thickening of the lower esophagus, gastroesophageal junction, and proximal stomach which may reflect esophagitis/gastritis.  No evidence for bowel obstruction, mesenteric inflammation, appendicitis, obstructive uropathy, free air or free fluid  Chest x-ray 1/15/2025 with gaseous distention of the stomach  KUB 1/16/2025 with worsening distention  NG was placed  CT abdomen pelvis with contrast 1/17/2024: New small bilateral pleural effusions with extensive bibasilar atelectasis. Wall thickening of the lower esophagus and proximal stomach is again noted without interval change. This may reflect mild focal esophagitis/gastritis. New NG tube with tip terminating in the gastric body region. No evidence of bowel obstruction, mesenteric inflammation, appendicitis, obstructive uropathy, free air, or free fluid. Descending and sigmoid diverticulosis again noted without evidence for acute diverticulitis. Subtle wall thickening of the mid to distal sigmoid colon, rectum, and anal verge region could reflect mild focal colitis/proctitis. Multiple punctate pancreatic calcifications again seen likely sequelae of chronic pancreatitis  1/20 patient passed NG clamping trial and evaluated by SLP but did not pass   Keep NPO   Nutritional recommendations for tube feeds  Initiating trickle feeding through NG 1/22  Patient not tolerating tube feed overnight, having residuals, rhonchi bilaterally requiring suctioning  Speech therapy continues to follow-recommend continued n.p.o.  Appreciate GI who are following  Continue Protonix 40 mg IV twice daily  Continue with conservative management for now.  Please see also, goals of care  Opacity  noted on imaging study  Chest x-ray: Retrocardiac opacity, which may be due to pneumonia and/or atelectasis.  Procalcitonin peaked and downtrending, with intermittent fever  Per discussion with ID pharmacy will de-escalate antibiotics to ceftriaxone 1 g every 24 hours  Repeat CXR-slight improvement in left base patchy atelectasis versus pneumonia  Monitor labs and vital signs, conduct serial physical assessments    COVID-19 virus infection  Patient with fever and COVID-positive roommate at his skilled facility  Tested positive for COVID 9/17  Continues to require 2 L nasal cannula oxygen  S/p remdesivir x3 days  Continue supportive care  Isolation precautions  Monitor labs and vital signs, conduct serial physical assessments  Congestion of upper airway  Upper airway congestion noted, patient has strong cough and able to clear  CT imaging revealed small bilateral pleural effusion and extensive bibasilar atelectasis  Maintain head of bed elevated  Aspiration precautions  Appreciate input of speech therapy who are following  Continue Robinul 0.1 mg IV every 4 hours as needed  Continue incentive spirometry  Suction as needed  Goals of care, counseling/discussion  Patient with significant comorbid medical conditions would be high risk for treatment options requiring anesthesia.  EGD testing might have risks that outweigh the benefits.  Patient verbalized understanding.  Opened goals of care conversations with son.  Continued on phone on Saturday. Had further conversations with patient's son, daughter, and grandson when they arrived.  Ultimately they would like to keep him comfortable but for now continue with full treatment   Appreciate input of palliative care continue to follow, addressing goals of care  1/22 had at length discussion with patient's son at bedside. He reports he would like to trial tube feeds via the NG tube.  Still undecided about PEG.  Will initiate tube feed via NG per recommendations of  nutrition  1/23 patient not tolerating tube feed overnight, having residuals and rhonchi bilateral lungs.  Stop tube feed.  Will check imaging.  Again had at length discussion with patient's son bedside.  Did make him aware that patient will unlikely be able to tolerate oral feedings anymore.  He is also aware that patient is not currently tolerating his tube feed well.  He remains undecided about PEG tube placement.  I did speak with him at length that patient will likely have repeated issues with aspiration and frequent hospitalizations if we are going to continue to pursue treatment.  Suggested that he speak with his sister and the patient and that we would need to come up with a decision.  Will follow-up again tomorrow.  Continue supportive care  Idiopathic chronic gout of multiple sites without tophus  Continue prehospital allopurinol 300 mg p.o. daily when tolerating p.o.  Essential hypertension  Prehospital takesToprol-XL 25 mg p.o. daily and Lasix 20 mg p.o. daily on Monday Wednesday Friday.   Currently NPO. Metoprolol switched to IV. Received furosemide 40 mg IV x 1 1/19/2025  Blood pressures controlled  Monitor BP per protocol  Mixed hyperlipidemia  Continue prehospital Tricor 48 mg p.o. daily when tolerating p.o.  Chronic kidney disease, stage 3a (HCC)  Lab Results   Component Value Date    EGFR 74 01/23/2025    EGFR 72 01/22/2025    EGFR 56 01/21/2025    CREATININE 0.76 01/23/2025    CREATININE 0.83 01/22/2025    CREATININE 1.07 01/21/2025     Creatinine appears to be around baseline of 0.9-1.1 mg/dL   Creatinine better than baseline currently  Continue to monitor renal function closely  Avoid nephrotoxins and hypotension  BMP a.m.  Atrial flutter (HCC)  Currently rate controlled with metoprolol   Not currently on anticoagulation as outpatient  Constipation  GI input appreciated  Continue bowel regimen-unclear when last bowel movement per nursing  Will check KUB  Acute distention of stomach  Appreciate  input of GI who are following  N.p.o.  Appreciate input of nutrition  1/22 initiated trickle feed tube feed today through NG tube-reported patient having residuals today, rhonchi bilateral lungs.  Placed tube feed on hold  Speech therapy continues to work with patient-still not appropriate for n.p.o.  Will check KUB  See goals of care discussion  Palliative care by specialist  Appreciate input of palliative care who continue to follow-see goals of care discussion  Electrolyte abnormality  Patient was initiated on trickle tube feed on 1/22  High risk for refeeding syndrome  Continue to monitor electrolytes and replete accordingly  BMP, magnesium, phosphorus a.m.    VTE Pharmacologic Prophylaxis: VTE Score: 5 High Risk (Score >/= 5) - Pharmacological DVT Prophylaxis Ordered: heparin. Sequential Compression Devices Ordered.    Mobility:   Basic Mobility Inpatient Raw Score: 6  JH-HLM Goal: 2: Bed activities/Dependent transfer  JH-HLM Achieved: 2: Bed activities/Dependent transfer  JH-HLM Goal achieved. Continue to encourage appropriate mobility.    Patient Centered Rounds: I performed bedside rounds with nursing staff today.   Discussions with Specialists or Other Care Team Provider: GI, palliative care, nursing, case management    Education and Discussions with Family / Patient: Updated  (son) at bedside.    Current Length of Stay: 7 day(s)  Current Patient Status: Inpatient   Certification Statement: The patient will continue to require additional inpatient hospital stay due to continues n.p.o., speech following, tube feeding currently on hold awaiting follow-up imaging suspect may be aspirating  Discharge Plan:  Son is at bedside, we are continuing ongoing goals of care discussion.  Patient was initiated on trickle tube feeds via NG tube on 1/22.  Noted to have residuals overnight and rhonchi bilaterally, suspect likely aspirating.  Ordered follow-up imaging.  Speech therapy continues to work with  patient's-still not appropriate for p.o required suctioning.  Repeating labs in the AM.  Did tell son we will follow-up tomorrow regarding decision for PEG tube, suggest that he speak with his sister and patient.    Code Status: Level 3 - DNAR and DNI    Subjective   Patient offers no complaints of discomfort when asked.  Son at bedside.    Objective :  Temp:  [98.4 °F (36.9 °C)-98.9 °F (37.2 °C)] 98.9 °F (37.2 °C)  HR:  [72-85] 85  BP: (128-165)/(45-64) 128/51  Resp:  [20-22] 22  SpO2:  [95 %-96 %] 95 %    Body mass index is 26.22 kg/m².     Input and Output Summary (last 24 hours):     Intake/Output Summary (Last 24 hours) at 1/23/2025 1316  Last data filed at 1/23/2025 1315  Gross per 24 hour   Intake 100 ml   Output 300 ml   Net -200 ml       Physical Exam  Vitals reviewed.   Constitutional:       General: He is not in acute distress.     Appearance: He is well-developed.   HENT:      Head: Normocephalic and atraumatic.   Cardiovascular:      Rate and Rhythm: Normal rate and regular rhythm.      Pulses: Normal pulses.      Heart sounds: Normal heart sounds. No murmur heard.  Pulmonary:      Effort: Pulmonary effort is normal. No respiratory distress.      Breath sounds: Rhonchi present. No wheezing or rales.      Comments: Nonlabored respirations at rest on O2 2 L nasal cannula occasional moist cough  Abdominal:      General: There is no distension.      Palpations: Abdomen is soft.      Tenderness: There is no abdominal tenderness. There is no guarding.   Musculoskeletal:         General: No swelling. Normal range of motion.   Skin:     General: Skin is warm and dry.      Capillary Refill: Capillary refill takes less than 2 seconds.   Neurological:      General: No focal deficit present.      Mental Status: He is alert. Mental status is at baseline.   Psychiatric:         Mood and Affect: Mood normal.         Behavior: Behavior normal.           Lines/Drains:  Lines/Drains/Airways       Active Status       Name  Placement date Placement time Site Days    NG/OG/Enteral Tube Nasogastric 18 Fr Right nare 01/16/25  1052  Right nare  7    External Urinary Catheter Medium 01/23/25  0340  -- less than 1                            Lab Results: I have reviewed the following results:   Results from last 7 days   Lab Units 01/23/25 0447 01/22/25 0448 01/21/25  0441 01/19/25  0523 01/18/25  0442   WBC Thousand/uL 7.78   < > 10.62*   < > 7.19   HEMOGLOBIN g/dL 10.4*   < > 11.0*   < > 9.6*   HEMATOCRIT % 32.5*   < > 33.8*   < > 30.4*   PLATELETS Thousands/uL 283   < > 244   < > 129*   BANDS PCT %  --   --   --   --  24*   SEGS PCT %  --   --  89*   < >  --    LYMPHO PCT %  --   --  7*   < > 6*   MONO PCT %  --   --  3*   < > 0*   EOS PCT %  --   --  0   < > 0    < > = values in this interval not displayed.     Results from last 7 days   Lab Units 01/23/25 0447 01/22/25 0448 01/21/25 0441   SODIUM mmol/L 148*   < > 143   POTASSIUM mmol/L 3.3*   < > 3.1*   CHLORIDE mmol/L 113*   < > 107   CO2 mmol/L 27   < > 26   BUN mg/dL 26*   < > 30*   CREATININE mg/dL 0.76   < > 1.07   ANION GAP mmol/L 8   < > 10   CALCIUM mg/dL 8.5   < > 8.5   ALBUMIN g/dL  --   --  3.0*   TOTAL BILIRUBIN mg/dL  --   --  0.56   ALK PHOS U/L  --   --  49   ALT U/L  --   --  21   AST U/L  --   --  42*   GLUCOSE RANDOM mg/dL 114   < > 106    < > = values in this interval not displayed.         Results from last 7 days   Lab Units 01/21/25  1138 01/21/25  0747   POC GLUCOSE mg/dl 111 92         Results from last 7 days   Lab Units 01/23/25  0447 01/21/25  0441 01/20/25  0426 01/19/25  0523 01/18/25 0442   PROCALCITONIN ng/ml 0.69* 1.53* 1.43* 1.82* 1.25*       Recent Cultures (last 7 days):         Imaging Results Review: I reviewed radiology reports from this admission including: Chest x-ray, CT pelvis, CT abdomen pelvis, follow-up chest x-ray x 4, KUB,.  Other Study Results Review: No additional pertinent studies reviewed.    Last 24 Hours Medication List:      Current Facility-Administered Medications:     acetaminophen (Ofirmev) injection 1,000 mg, Q8H PRN, Last Rate: 1,000 mg (01/21/25 1726)    acetaminophen (TYLENOL) tablet 650 mg, Q4H PRN    allopurinol (ZYLOPRIM) tablet 300 mg, Daily    bisacodyl (DULCOLAX) rectal suppository 10 mg, Daily    bisacodyl (DULCOLAX) rectal suppository 10 mg, Daily PRN    cefTRIAXone (ROCEPHIN) IVPB (premix in dextrose) 1,000 mg 50 mL, Q24H, Last Rate: 1,000 mg (01/22/25 1439)    Cholecalciferol (VITAMIN D3) tablet 2,000 Units, Daily    docusate (COLACE) oral liquid 100 mg, BID    fenofibrate (TRICOR) tablet 48 mg, Daily    [Held by provider] furosemide (LASIX) tablet 20 mg, Once per day on Monday Wednesday Friday    gabapentin (NEURONTIN) capsule 100 mg, Q12H    glycopyrrolate (ROBINUL) injection 0.1 mg, Q4H PRN    heparin (porcine) subcutaneous injection 5,000 Units, Q8H ANKIT    iohexol (OMNIPAQUE) 240 MG/ML solution 50 mL, 90 min pre-procedure    metoprolol (LOPRESSOR) injection 2.5 mg, Q6H    multivitamin stress formula tablet 1 tablet, Daily    nitroglycerin (NITROSTAT) SL tablet 0.4 mg, Q5 Min PRN    nystatin (MYCOSTATIN) cream, BID    ondansetron (ZOFRAN) injection 4 mg, Q6H PRN    pantoprazole (PROTONIX) injection 40 mg, Q12H    phenol (CHLORASEPTIC) 1.4 % mucosal liquid 1 spray, Q2H PRN    senna (SENOKOT) tablet 17.2 mg, HS    Administrative Statements   Today, Patient Was Seen By: ANAM Harrison  I have spent a total time of 43 minutes in caring for this patient on the day of the visit/encounter including Diagnostic results, Patient and family education, Documenting in the medical record, Reviewing / ordering tests, medicine, procedures  , Obtaining or reviewing history  , and Communicating with other healthcare professionals .    **Please Note: This note may have been constructed using a voice recognition system.**

## 2025-01-23 NOTE — ASSESSMENT & PLAN NOTE
Patient with significant comorbid medical conditions would be high risk for treatment options requiring anesthesia.  EGD testing might have risks that outweigh the benefits.  Patient verbalized understanding.  Opened goals of care conversations with son.  Continued on phone on Saturday. Had further conversations with patient's son, daughter, and grandson when they arrived.  Ultimately they would like to keep him comfortable but for now continue with full treatment   Appreciate input of palliative care continue to follow, addressing goals of care  1/22 had at length discussion with patient's son at bedside. He reports he would like to trial tube feeds via the NG tube.  Still undecided about PEG.  Will initiate tube feed via NG per recommendations of nutrition  1/23 patient not tolerating tube feed overnight, having residuals and rhonchi bilateral lungs.  Stop tube feed.  Will check imaging.  Again had at length discussion with patient's son bedside.  Did make him aware that patient will unlikely be able to tolerate oral feedings anymore.  He is also aware that patient is not currently tolerating his tube feed well.  He remains undecided about PEG tube placement.  I did speak with him at length that patient will likely have repeated issues with aspiration and frequent hospitalizations if we are going to continue to pursue treatment.  Suggested that he speak with his sister and the patient and that we would need to come up with a decision.  Will follow-up again tomorrow.  Continue supportive care

## 2025-01-23 NOTE — PLAN OF CARE
Problem: GASTROINTESTINAL - ADULT  Goal: Minimal or absence of nausea and/or vomiting  Description: INTERVENTIONS:  - Administer IV fluids if ordered to ensure adequate hydration  - Maintain NPO status until nausea and vomiting are resolved  - Nasogastric tube if ordered  - Administer ordered antiemetic medications as needed  - Provide nonpharmacologic comfort measures as appropriate  - Advance diet as tolerated, if ordered  - Consider nutrition services referral to assist patient with adequate nutrition and appropriate food choices  Outcome: Progressing     Problem: GASTROINTESTINAL - ADULT  Goal: Maintains adequate nutritional intake  Description: INTERVENTIONS:  - Monitor percentage of each meal consumed  - Identify factors contributing to decreased intake, treat as appropriate  - Assist with meals as needed  - Monitor I&O, weight, and lab values if indicated  - Obtain nutrition services referral as needed  Outcome: Progressing

## 2025-01-23 NOTE — ASSESSMENT & PLAN NOTE
Chest x-ray: Retrocardiac opacity, which may be due to pneumonia and/or atelectasis.  Procalcitonin peaked and downtrending, with intermittent fever  Per discussion with ID pharmacy will de-escalate antibiotics to ceftriaxone 1 g every 24 hours  Repeat CXR-slight improvement in left base patchy atelectasis versus pneumonia  Monitor labs and vital signs, conduct serial physical assessments

## 2025-01-23 NOTE — ASSESSMENT & PLAN NOTE
Upper airway congestion noted, patient has strong cough and able to clear  CT imaging revealed small bilateral pleural effusion and extensive bibasilar atelectasis  Maintain head of bed elevated  Aspiration precautions  Appreciate input of speech therapy who are following  Continue Robinul 0.1 mg IV every 4 hours as needed  Continue incentive spirometry  Suction as needed

## 2025-01-23 NOTE — ASSESSMENT & PLAN NOTE
Patient was initiated on trickle tube feed on 1/22  High risk for refeeding syndrome  Continue to monitor electrolytes and replete accordingly  BMP, magnesium, phosphorus a.m.

## 2025-01-23 NOTE — ASSESSMENT & PLAN NOTE
Appreciate input of GI who are following  N.p.o.  Appreciate input of nutrition  1/22 initiated trickle feed tube feed today through NG tube-reported patient having residuals today, rhonchi bilateral lungs.  Placed tube feed on hold  Speech therapy continues to work with patient-still not appropriate for n.p.o.  Will check KUB  See goals of care discussion

## 2025-01-23 NOTE — WOUND OSTOMY CARE
Consult Note - Wound   Prem Mar Sr. 89 y.o. male MRN: [id number]  Unit/Bed#: -01 Encounter: [id number]      History and Present Illness:  Admitted to [facility] for upper gi bleed.PMH: Acute distention of stomach, COVID19 virus,congestion of upper airway,palliative care, electrolyte abnormality.    Assessment Findings:   Seen for initial wound consult. Dependent for care, incont of bowel and bladder, tube feeds.  1)Bilateral heels intact, offloaded on pillow with preventative heel foams on  2)bilateral buttocks erythema with partial thickness skin loss non blanchable. Triad paste applied and covered with foam dressing . Positioned on side, foam wedges In place,    No induration, fluctuance, odor, warmth/temperature differences, redness, or purulence noted to the above noted wounds and skin areas assessed. New dressings applied per orders listed below. Patient tolerated well- no s/s of non-verbal pain or discomfort observed during the encounter. Bedside nurse aware of plan of care. See flow sheets for more detailed assessment findings.  Wound care will continue to follow, call or Secure Chat Message with questions.   Skin care plans:  1-Cleanse sacro-buttocks with soap and water. Apply Triad Paste to Sacro-buttocks Wound Bed TID and PRN  2-Cleanse B/L Heels with soap and water. Pat dry. Apply Silicone Border Foam (Mepilex) to areas. Ronen with P for Prevention and change every 3 days or PRN soilage/displacement. Peel back and inspect Q-shift.  3-Elevate heels to offload pressure  4-Ehob cushion when out of bed.  5-Turn/repoisiton q2h or when medically stable for pressure re-distribution on skin.  6-Moisturize skin daily with skin nourishing cream   7-Place Patient on ATR Turning and repositioning system  Cleanse B/L Heels with soap and water. Pat dry. Apply Silicone Border Foam (Mepilex) to areas. Ronen with P for Prevention and change every 3 days or PRN soilage/displacement. Peel back and inspect  Q-shift.    Wound 01/22/25 Pressure Injury Buttocks Left;Right (Active)   Wound Image   01/23/25 1410   Wound Description Beefy red;Bleeding;Non-blanchable erythema 01/23/25 1410   Pressure Injury Stage 2 01/23/25 1410   Alla-wound Assessment Erythema 01/23/25 1410   Wound Length (cm) 4 cm 01/23/25 1410   Wound Width (cm) 4 cm 01/23/25 1410   Wound Surface Area (cm^2) 16 cm^2 01/23/25 1410   Drainage Amount None 01/23/25 1410   Treatments Site care;Cleansed 01/23/25 1410   Dressing Foam, Silicon (eg. Allevyn, etc) 01/23/25 1410   Dressing Changed Changed 01/23/25 1410   Patient Tolerance Tolerated well 01/23/25 1410   Dressing Status Clean;Dry;Intact;Remoistened 01/23/25 1410       Call or Secure Chat with any questions  Wound Care will continue to follow weekly    Angeles RAMIREZN RN CWON

## 2025-01-23 NOTE — ASSESSMENT & PLAN NOTE
Presented for suspected GI bleed  CT abdomen and pelvis 1/15/2025 with focal wall thickening of the lower esophagus, gastroesophageal junction, and proximal stomach which may reflect esophagitis/gastritis.  No evidence for bowel obstruction, mesenteric inflammation, appendicitis, obstructive uropathy, free air or free fluid  Chest x-ray 1/15/2025 with gaseous distention of the stomach  KUB 1/16/2025 with worsening distention  NG was placed  CT abdomen pelvis with contrast 1/17/2024: New small bilateral pleural effusions with extensive bibasilar atelectasis. Wall thickening of the lower esophagus and proximal stomach is again noted without interval change. This may reflect mild focal esophagitis/gastritis. New NG tube with tip terminating in the gastric body region. No evidence of bowel obstruction, mesenteric inflammation, appendicitis, obstructive uropathy, free air, or free fluid. Descending and sigmoid diverticulosis again noted without evidence for acute diverticulitis. Subtle wall thickening of the mid to distal sigmoid colon, rectum, and anal verge region could reflect mild focal colitis/proctitis. Multiple punctate pancreatic calcifications again seen likely sequelae of chronic pancreatitis  1/20 patient passed NG clamping trial and evaluated by SLP but did not pass   Keep NPO   Nutritional recommendations for tube feeds  Initiating trickle feeding through NG 1/22  Patient not tolerating tube feed overnight, having residuals, rhonchi bilaterally requiring suctioning  Speech therapy continues to follow-recommend continued n.p.o.  Appreciate GI who are following  Continue Protonix 40 mg IV twice daily  Continue with conservative management for now.  Please see also, goals of care

## 2025-01-23 NOTE — NURSING NOTE
Upon entering the pt's room pt was noted to be very wet and gargling.  Suctioned for a moderate amount of TF.  Tolerated being suctioned well.  Also, noted TF sitting in the back of the pt's throat.  Gastric residual checked for 60 mL (+)of TF that wasn't absorbed.  (+) NG placement noted via auscultation as well.  Mouth care provided at this time.  Provider made aware and placed TF on hold at this time.  Awaiting for ST to re-eval pt for future recommendations.

## 2025-01-23 NOTE — PLAN OF CARE
Problem: PAIN - ADULT  Goal: Verbalizes/displays adequate comfort level or baseline comfort level  Description: Interventions:  - Encourage patient to monitor pain and request assistance  - Assess pain using appropriate pain scale  - Administer analgesics based on type and severity of pain and evaluate response  - Implement non-pharmacological measures as appropriate and evaluate response  - Consider cultural and social influences on pain and pain management  - Notify physician/advanced practitioner if interventions unsuccessful or patient reports new pain  Outcome: Progressing     Problem: INFECTION - ADULT  Goal: Absence or prevention of progression during hospitalization  Description: INTERVENTIONS:  - Assess and monitor for signs and symptoms of infection  - Monitor lab/diagnostic results  - Monitor all insertion sites, i.e. indwelling lines, tubes, and drains  - Monitor endotracheal if appropriate and nasal secretions for changes in amount and color  - [place] appropriate cooling/warming therapies per order  - Administer medications as ordered  - Instruct and encourage patient and family to use good hand hygiene technique  - Identify and instruct in appropriate isolation precautions for identified infection/condition  Outcome: Progressing  Goal: Absence of fever/infection during neutropenic period  Description: INTERVENTIONS:  - Monitor WBC    Outcome: Progressing  Goal: Infection Control Precautions  Outcome: Progressing     Problem: SAFETY ADULT  Goal: Maintain or return to baseline ADL function  Description: INTERVENTIONS:  -  Assess patient's ability to carry out ADLs; assess patient's baseline for ADL function and identify physical deficits which impact ability to perform ADLs (bathing, care of mouth/teeth, toileting, grooming, dressing, etc.)  - Assess/evaluate cause of self-care deficits   - Assess range of motion  - Assess patient's mobility; develop plan if impaired  - Assess patient's need for  assistive devices and provide as appropriate  - Encourage maximum independence but intervene and supervise when necessary  - Involve family in performance of ADLs  - Assess for home care needs following discharge   - Consider OT consult to assist with ADL evaluation and planning for discharge  - Provide patient education as appropriate  Outcome: Progressing     Problem: DISCHARGE PLANNING  Goal: Discharge to home or other facility with appropriate resources  Description: INTERVENTIONS:  - Identify barriers to discharge w/patient and caregiver  - Arrange for needed discharge resources and transportation as appropriate  - Identify discharge learning needs (meds, wound care, etc.)  - Arrange for interpretive services to assist at discharge as needed  - Refer to Case Management Department for coordinating discharge planning if the patient needs post-hospital services based on physician/advanced practitioner order or complex needs related to functional status, cognitive ability, or social support system  Outcome: Progressing     Problem: Knowledge Deficit  Goal: Patient/family/caregiver demonstrates understanding of disease process, treatment plan, medications, and discharge instructions  Description: Complete learning assessment and assess knowledge base.  Interventions:  - Provide teaching at level of understanding  - Provide teaching via preferred learning methods  Outcome: Progressing     Problem: Prexisting or High Potential for Compromised Skin Integrity  Goal: Skin integrity is maintained or improved  Description: INTERVENTIONS:  - Identify patients at risk for skin breakdown  - Assess and monitor skin integrity  - Assess and monitor nutrition and hydration status  - Monitor labs   - Assess for incontinence   - Turn and reposition patient  - Assist with mobility/ambulation  - Relieve pressure over bony prominences  - Avoid friction and shearing  - Provide appropriate hygiene as needed including keeping skin clean  and dry  - Evaluate need for skin moisturizer/barrier cream  - Collaborate with interdisciplinary team   - Patient/family teaching  - Consider wound care consult   Outcome: Progressing     Problem: GASTROINTESTINAL - ADULT  Goal: Minimal or absence of nausea and/or vomiting  Description: INTERVENTIONS:  - Administer IV fluids if ordered to ensure adequate hydration  - Maintain NPO status until nausea and vomiting are resolved  - Nasogastric tube if ordered  - Administer ordered antiemetic medications as needed  - Provide nonpharmacologic comfort measures as appropriate  - Advance diet as tolerated, if ordered  - Consider nutrition services referral to assist patient with adequate nutrition and appropriate food choices  Outcome: Progressing  Goal: Maintains or returns to baseline bowel function  Description: INTERVENTIONS:  - Assess bowel function  - Encourage oral fluids to ensure adequate hydration  - Administer IV fluids if ordered to ensure adequate hydration  - Administer ordered medications as needed  - Encourage mobilization and activity  - Consider nutritional services referral to assist patient with adequate nutrition and appropriate food choices  Outcome: Progressing  Goal: Maintains adequate nutritional intake  Description: INTERVENTIONS:  - Monitor percentage of each meal consumed  - Identify factors contributing to decreased intake, treat as appropriate  - Assist with meals as needed  - Monitor I&O, weight, and lab values if indicated  - Obtain nutrition services referral as needed  Outcome: Progressing  Goal: Oral mucous membranes remain intact  Description: INTERVENTIONS  - Assess oral mucosa and hygiene practices  - Implement preventative oral hygiene regimen  - Implement oral medicated treatments as ordered  - Initiate Nutrition services referral as needed  Outcome: Progressing     Problem: RESPIRATORY - ADULT  Goal: Achieves optimal ventilation and oxygenation  Description: INTERVENTIONS:  - Assess  for changes in respiratory status  - Assess for changes in mentation and behavior  - Position to facilitate oxygenation and minimize respiratory effort  - Oxygen administered by appropriate delivery if ordered  - Initiate smoking cessation education as indicated  - Encourage broncho-pulmonary hygiene including cough, deep breathe, Incentive Spirometry  - Assess the need for suctioning and aspirate as needed  - Assess and instruct to report SOB or any respiratory difficulty  - Respiratory Therapy support as indicated  Outcome: Progressing     Problem: Nutrition/Hydration-ADULT  Goal: Nutrient/Hydration intake appropriate for improving, restoring or maintaining nutritional needs  Description: Monitor and assess patient's nutrition/hydration status for malnutrition. Collaborate with interdisciplinary team and initiate plan and interventions as ordered.  Monitor patient's weight and dietary intake as ordered or per policy. Utilize nutrition screening tool and intervene as necessary. Determine patient's food preferences and provide high-protein, high-caloric foods as appropriate.     INTERVENTIONS:  - Monitor oral intake, urinary output, labs, and treatment plans  - Assess nutrition and hydration status and recommend course of action  - Evaluate amount of meals eaten  - Assist patient with eating if necessary   - Allow adequate time for meals  - Recommend/ encourage appropriate diets, oral nutritional supplements, and vitamin/mineral supplements  - Order, calculate, and assess calorie counts as needed  - Recommend, monitor, and adjust tube feedings and TPN/PPN based on assessed needs  - Assess need for intravenous fluids  - Provide specific nutrition/hydration education as appropriate  - Include patient/family/caregiver in decisions related to nutrition  Outcome: Progressing

## 2025-01-23 NOTE — SPEECH THERAPY NOTE
Speech Language/Pathology    Speech/Language Pathology Progress Note    Patient Name: Prem Mar Sr.  Today's Date: 1/23/2025     Problem List  Principal Problem:    Upper GI bleed  Active Problems:    Essential hypertension    Idiopathic chronic gout of multiple sites without tophus    Mixed hyperlipidemia    Chronic kidney disease, stage 3a (HCC)    Atrial flutter (HCC)    Constipation    Goals of care, counseling/discussion    Acute distention of stomach    COVID-19 virus infection    Congestion of upper airway    Opacity noted on imaging study    Palliative care by specialist    Electrolyte abnormality       Past Medical History  Past Medical History:   Diagnosis Date    Arthritis     Cataract     Coronary artery disease     Coronary atherosclerosis of native coronary artery     Diverticulitis of colon     Essential hypertension, benign     Hyperlipidemia     Hypertension     Peptic ulcer     Renal disorder         Past Surgical History  Past Surgical History:   Procedure Laterality Date    CATARACT EXTRACTION Right     Left eye 5/2021    CORONARY STENT PLACEMENT      SKIN BIOPSY      TONSILLECTOMY           Subjective:  Pt alerted to stim and was positioned upright. Son at bedside.   Objective:  Pt was seen for f/u dysphagia therapy for po potential. Spoke with nursing and CRNP pt not tolerating NG tube feedings. Upon ST arrival pt audibly wet removed mod amount of secretions with in oral cavity and deep suctioning. Pt cued to cough which is able to intermittently bring up secretions to be removed via suction. Initial cough is strong however coughs following appear weak and unable to remove secretions via suction. Verbal cues to swallow in attempt to clear secretions however ineffective as pt will begin to cough. Oral care completed. Continued to suction as pt was able to bring up secretions. Did not administer trials as pt requiring frequent suctioning. Son questioned if secretions just due to COVID, ST  explained pt has multiple co morbidities effecting swallow status as long as natural age regression. Son questioned about frequent suctioning to remove secretions. ST explained that pt will be suctioned as able however continuous frequent suctioning can result in trauma including irritation and bleeding. Son appeared to understand.  Assessment:  MOD amount of secretions appeared somewhat more compared to day prior suspect due to not tolerating NG tube feed.Frequent suctioning cough continues to vary.  Plan/Recommendations:  Recommend continue NPO ensure frequent oral care  Ongoing GOC discussion   ST continue to f/u as indicated.

## 2025-01-23 NOTE — DISCHARGE INSTR - OTHER ORDERS
Skin care plans:  1-Cleanse sacro-buttocks with soap and water. Apply Triad Paste to Sacro-buttocks Wound Bed TID and PRN  2-Cleanse B/L Heels with soap and water. Pat dry. Apply Silicone Border Foam (Mepilex) to areas. [place] with P for Prevention and change every 3 days or PRN soilage/displacement. Peel back and inspect Q-shift.  3-Elevate heels to offload pressure  4-Ehob cushion when out of bed.  5-Turn/repoisiton q2h or when medically stable for pressure re-distribution on skin.  6-Moisturize skin daily with skin nourishing cream   7-Place Patient on ATR Turning and repositioning system  Cleanse B/L Heels with soap and water. Pat dry. Apply Silicone Border Foam (Mepilex) to areas. [place] with P for Prevention and change every 3 days or PRN soilage/displacement. Peel back and inspect Q-shift.

## 2025-01-23 NOTE — UTILIZATION REVIEW
Continued Stay Review    Date: 1/23/25                          Current Patient Class: Inpatient Current Level of Care: MS    HPI:89 y.o. male initially admitted on 1/15 observation converted IP on 1/16/25.      1/23/25 Assessment/Plan: Pt offers no complaints of discomfort when asked. Abnormal labs: Na 148, K 3.3, Phos 1.2, Procal 0.69. On exam, Rhonci present. Occasional moist cough. O2 2L @ 96%. GOC discussion continues w/ son. Not tolerating tube feed overnight. Maintains NPO. Tube feedings on hold awaiting KUB, suspect may be aspirating. Speech continues to work w/ pt. Continue Protonix IV BID. Ceftriaxone IV, Potassium phosphates IV x1, F/U labs in am. F/U with son and DTR tomorrow regarding GOC.     Medications:   Scheduled Medications:  allopurinol, 300 mg, Oral, Daily  bisacodyl, 10 mg, Rectal, Daily  cefTRIAXone, 1,000 mg, Intravenous, Q24H  Cholecalciferol, 2,000 Units, Oral, Daily  docusate, 100 mg, Oral, BID  fenofibrate, 48 mg, Oral, Daily  [Held by provider] furosemide, 20 mg, Oral, Once per day on Monday Wednesday Friday  gabapentin, 100 mg, Oral, Q12H  heparin (porcine), 5,000 Units, Subcutaneous, Q8H ANKIT  iohexol, 50 mL, Oral, 90 min pre-procedure  metoprolol, 2.5 mg, Intravenous, Q6H  multivitamin stress formula, 1 tablet, Oral, Daily  nystatin, , Topical, BID  pantoprazole, 40 mg, Intravenous, Q12H  senna, 2 tablet, Oral, HS  potassium phosphates 9 mmol in sodium chloride 0.9 % 100 mL Infusion  Dose: 9 mmol  Freq: Once Route: IV  Last Dose: Stopped (01/23/25 1256)  Start: 01/23/25 0800 End: 01/23/25 1256    Continuous IV Infusions: NONE     PRN Meds:  acetaminophen, 1,000 mg, Intravenous, Q8H PRN  acetaminophen, 650 mg, Oral, Q4H PRN  bisacodyl, 10 mg, Rectal, Daily PRN  glycopyrrolate, 0.1 mg, Intravenous, Q4H PRN -given x1 1/23  nitroglycerin, 0.4 mg, Sublingual, Q5 Min PRN  ondansetron, 4 mg, Intravenous, Q6H PRN  phenol, 1 spray, Mouth/Throat, Q2H PRN      Discharge Plan: TBD    Vital Signs  (last 3 days)       Date/Time Temp Pulse Resp BP MAP (mmHg) SpO2 Calculated FIO2 (%) - Nasal Cannula Nasal Cannula O2 Flow Rate (L/min) O2 Device Patient Position - Orthostatic VS [place] Coma Scale Score Pain    01/23/25 1000 -- -- -- -- -- -- -- -- -- -- -- No Pain    01/23/25 08:01:01 98.9 °F (37.2 °C) 85 22 128/51 77 95 % -- -- -- -- -- --    01/22/25 21:32:30 98.6 °F (37 °C) 81 20 -- -- 96 % -- -- -- -- 15 No Pain    01/22/25 21:10:39 -- 84 21 165/64 98 96 % -- -- -- -- -- --    01/22/25 14:21:22 98.4 °F (36.9 °C) 73 20 160/60 93 96 % -- -- -- Lying -- --    01/22/25 14:17:13 98.4 °F (36.9 °C) 72 20 131/45 74 95 % -- -- -- -- -- --    01/22/25 08:07:08 98.7 °F (37.1 °C) 84 -- 143/58 86 92 % -- -- -- -- -- --    01/22/25 03:42:46 -- 69 -- 150/54 86 94 % -- -- -- -- -- --    01/21/25 22:10:02 98.1 °F (36.7 °C) 71 20 140/55 83 96 % 28 2 L/min Nasal cannula Lying -- --    01/21/25 1959 -- -- -- -- -- -- -- -- -- -- 15 No Pain    01/21/25 17:19:29 99.2 °F (37.3 °C) 74 -- -- -- 96 % -- -- -- -- -- --    01/21/25 17:18:41 97.6 °F (36.4 °C) 74 -- -- -- 97 % -- -- -- -- -- --    01/21/25 14:59:49 98.7 °F (37.1 °C) 83 -- 149/61 90 95 % -- -- -- -- -- --    01/21/25 0830 -- -- -- -- -- -- 28 2 L/min Nasal cannula -- -- No Pain    01/21/25 07:48:41 99.2 °F (37.3 °C) 80 20 150/66 94 93 % -- -- -- -- -- --    01/21/25 07:48:22 99.2 °F (37.3 °C) 76 20 150/66 94 93 % -- -- -- -- -- --    01/21/25 03:11:11 99.2 °F (37.3 °C) 80 -- 140/59 86 94 % -- -- -- -- -- --    01/21/25 02:12:04 -- 83 -- 121/57 78 93 % -- -- -- -- -- --    01/20/25 22:37:57 100.5 °F (38.1 °C) 103 16 85/60 68 94 % -- -- -- -- -- --    01/20/25 2200 98.6 °F (37 °C) 86 -- -- -- 95 % -- -- -- -- -- --    01/20/25 20:12:32 100.7 °F (38.2 °C) 98 -- -- -- 94 % -- -- -- -- -- --    01/20/25 19:40:36 102.5 °F (39.2 °C) 102 -- -- -- 95 % 28 2 L/min Nasal cannula -- -- No Pain    01/20/25 14:32:14 100.1 °F (37.8 °C) 84 16 146/56 86 95 % 28 2 L/min Nasal cannula Lying  -- --    01/20/25 14:10:27 -- 81 -- 140/51 81 92 % -- -- -- -- -- --    01/20/25 11:18:35 99.9 °F (37.7 °C) 90 -- -- -- 94 % -- -- -- -- -- --    01/20/25 0830 -- -- -- -- -- 90 % 28 2 L/min Nasal cannula -- 15 No Pain    01/20/25 07:30:57 -- 75 -- 134/48 77 94 % -- -- -- -- -- --    01/20/25 07:26:19 98 °F (36.7 °C) 74 -- 134/48 77 94 % -- -- -- -- -- --    01/20/25 0724 -- -- -- -- -- 94 % 28 2 L/min Nasal cannula -- -- --    01/20/25 02:04:13 -- 77 -- 148/59 89 96 % -- -- -- -- -- --          Weight (last 2 days)       None            Pertinent Labs/Diagnostic Results:   Radiology:  VAS upper limb venous duplex scan, complete, bilateral   Final Interpretation by Abigail Alvarado MD (01/21 2109)      XR chest portable   Final Interpretation by Leonor Ingram MD (01/21 0915)      Slight improvement in opacity in the medial left base which may be due to atelectasis and/or pneumonia.            Workstation performed: [id number]         XR abdomen 1 vw portable   Final Interpretation by Heaven Barton MD (01/20 1417)   Nonobstructive bowel gas pattern. Residual oral contrast throughout the colon. No gastric distention. Satisfactory position of the endogastric tube.               Workstation performed: [id number]         XR chest portable   Final Interpretation by Beatris Benton MD (01/20 0904)      Retrocardiac opacity, which may be due to pneumonia and/or atelectasis.            Workstation performed: [id number]         CT abdomen pelvis w contrast   Final Interpretation by Geovanny Taylor MD (01/17 2239)      1.  New small bilateral pleural effusions with extensive bibasilar atelectasis.   2.  Wall thickening of the lower esophagus and proximal stomach is again noted without interval change. This may reflect mild focal esophagitis/gastritis. New NG tube with tip terminating in the gastric body region.   3.  No evidence of bowel obstruction, mesenteric inflammation, appendicitis, obstructive uropathy, free  air, or free fluid. Descending and sigmoid diverticulosis again noted without evidence for acute diverticulitis. Subtle wall thickening of the mid to    distal sigmoid colon, rectum, and anal verge region could reflect mild focal colitis/proctitis.   4.  Multiple punctate pancreatic calcifications again seen likely sequelae of chronic pancreatitis.      Workstation performed: [id number]         XR chest portable   Final Interpretation by Jose Johns MD (01/17 1327)      Scattered streaky opacities favored to represent atelectasis but pneumonia not excluded.            Workstation performed: [id number]         XR abdomen 1 vw portable   Final Interpretation by Jose Johns MD (01/17 1328)      New marked gaseous distention of the stomach.               Workstation performed: [id number]         XR abdomen 1 view kub   Final Interpretation by Diony Boateng MD (01/16 2351)      1.  NG tube projects over the gastric body with decompression of the stomach compared to the x-ray from earlier the same day.      2.  Mildly dilated mid abdominal small bowel loops with gas throughout the colon though the abdomen was only partially imaged.               Workstation performed: [id number]         XR abdomen 1 view kub   Final Interpretation by Diony Boateng MD (01/16 3564)      Worsening gastric distention.      The study was marked in EPIC for immediate notification.               Workstation performed: [id number]         XR chest portable   Final Interpretation by Sina Vitale MD (01/15 4169)      No endogastric tube visible within the field-of-view.      There is some gaseous distention of the stomach.      Minimal atelectasis left lung base.      The study was marked in EPIC for immediate notification.            Workstation performed: [id number]         CT abdomen pelvis with contrast   Final Interpretation by Geovanny Taylor MD (01/15 3670)      Trace left pleural effusion with bibasilar  atelectasis.  Focal wall thickening of the lower esophagus, gastroesophageal junction, and proximal stomach noted which may reflect esophagitis/gastritis. No evidence for bowel obstruction, mesenteric    inflammation, appendicitis, obstructive uropathy, free air, or free fluid. Descending and sigmoid colon diverticulosis without evidence for acute diverticulitis.         Workstation performed: [id number]         XR abdomen 1 view kub    (Results Pending)     Cardiology:  No orders to display     GI:  No orders to display           Results from last 7 days   Lab Units 01/23/25 0447 01/22/25 0448 01/21/25 0441 01/20/25 0426 01/19/25 0523 01/18/25 0442   WBC Thousand/uL 7.78 9.02 10.62* 8.11 6.53 7.19   HEMOGLOBIN g/dL 10.4* 10.9* 11.0* 11.4* 11.4* 9.6*   HEMATOCRIT % 32.5* 34.4* 33.8* 35.7* 35.3* 30.4*   PLATELETS Thousands/uL 283 247 244 202 173 129*   TOTAL NEUT ABS Thousands/µL  --   --  9.40* 7.17 5.48  --    BANDS PCT %  --   --   --   --   --  24*         Results from last 7 days   Lab Units 01/23/25 0447 01/22/25 0448 01/21/25 0441 01/20/25 0426 01/19/25 0523 01/18/25 0442 01/17/25  1337   SODIUM mmol/L 148* 147 143 138 139   < > 135   POTASSIUM mmol/L 3.3* 3.4* 3.1* 3.3* 3.3*   < > 3.9   CHLORIDE mmol/L 113* 112* 107 103 102   < > 101   CO2 mmol/L 27 27 26 25 26   < > 25   ANION GAP mmol/L 8 8 10 10 11   < > 9   BUN mg/dL 26* 28* 30* 30* 28*   < > 21   CREATININE mg/dL 0.76 0.83 1.07 1.11 1.20   < > 1.11   EGFR ml/min/1.73sq m 74 72 56 54 49   < > 54   CALCIUM mg/dL 8.5 8.3* 8.5 8.2* 8.0*   < > 8.3*   CALCIUM, IONIZED mmol/L  --   --   --   --   --   --  1.10*   MAGNESIUM mg/dL 2.2  --   --  2.2  --   --  1.9   PHOSPHORUS mg/dL 1.2*  --   --   --   --   --   --     < > = values in this interval not displayed.     Results from last 7 days   Lab Units 01/21/25  0441 01/20/25  0426 01/19/25  0523 01/18/25  0442 01/17/25  1337   AST U/L 42* 49* 49* 41* 45*   ALT U/L 21 21 19 14 18   ALK PHOS U/L 49 48  45 33* 44   TOTAL PROTEIN g/dL 6.1* 6.0* 5.8* 4.4* 6.2*   ALBUMIN g/dL 3.0* 3.1* 3.1* 2.5* 3.5   TOTAL BILIRUBIN mg/dL 0.56 0.54 0.43 0.38 0.60     Results from last 7 days   Lab Units 01/21/25  1138 01/21/25  0747   POC GLUCOSE mg/dl 111 92     Results from last 7 days   Lab Units 01/23/25  0447 01/22/25  0448 01/21/25  0441 01/20/25  0426 01/19/25  0523 01/18/25  0442 01/17/25  1337 01/17/25  0518   GLUCOSE RANDOM mg/dL 114 98 106 95 78 85 105 103     Results from last 7 days   Lab Units 01/17/25  1337   CK TOTAL U/L 371*         Results from last 7 days   Lab Units 01/17/25  1337   D-DIMER QUANTITATIVE ug/ml FEU 2.02*             Results from last 7 days   Lab Units 01/23/25  0447 01/21/25  0441 01/20/25  0426 01/19/25  0523 01/18/25  0442   PROCALCITONIN ng/ml 0.69* 1.53* 1.43* 1.82* 1.25*     Results from last 7 days   Lab Units 01/17/25  1337   BNP pg/mL 171*     Results from last 7 days   Lab Units 01/17/25  1337   FERRITIN ng/mL 189     Results from last 7 days   Lab Units 01/20/25  0426 01/19/25  0523 01/18/25  0442 01/17/25  1337   CRP mg/L 198.8* 177.3* 95.3* 95.8*           Network Utilization Review Department  ATTENTION: Please call with any questions or concerns to [phone] and carefully listen to the prompts so that you are directed to the right person. All voicemails are confidential.   For Discharge needs, contact Care Management DC Support Team at [phone] opt. 2  Send all requests for admission clinical reviews, approved or denied determinations and any other requests to dedicated fax number below belonging to the campus where the patient is receiving treatment. List of dedicated fax numbers for the Facilities:  FACILITY NAME UR FAX NUMBER   ADMISSION DENIALS (Administrative/Medical Necessity) [phone]   DISCHARGE SUPPORT TEAM (NETWORK) [phone]   PARENT CHILD HEALTH (Maternity/NICU/Pediatrics) [phone]   [facility] [phone]   [facility]  [facility] [phone]   [facility] [phone]   [facility] [phone]   [facility] [phone]   [facility] [phone]   [facility] [phone]   [facility] [phone]   [facility] [phone]   [facility] [phone]   [facility] [phone]   [facility] [phone]

## 2025-01-23 NOTE — ASSESSMENT & PLAN NOTE
Lab Results   Component Value Date    EGFR 74 01/23/2025    EGFR 72 01/22/2025    EGFR 56 01/21/2025    CREATININE 0.76 01/23/2025    CREATININE 0.83 01/22/2025    CREATININE 1.07 01/21/2025     Creatinine appears to be around baseline of 0.9-1.1 mg/dL   Creatinine better than baseline currently  Continue to monitor renal function closely  Avoid nephrotoxins and hypotension  BMP a.m.

## 2025-01-23 NOTE — ASSESSMENT & PLAN NOTE
GI input appreciated  Continue bowel regimen-unclear when last bowel movement per nursing  Will check KUB

## 2025-01-24 PROBLEM — E87.0 HYPERNATREMIA: Status: ACTIVE | Noted: 2025-01-24

## 2025-01-24 LAB
ANION GAP SERPL CALCULATED.3IONS-SCNC: 5 MMOL/L (ref 4–13)
BUN SERPL-MCNC: 22 MG/DL (ref 5–25)
CALCIUM SERPL-MCNC: 8.4 MG/DL (ref 8.4–10.2)
CHLORIDE SERPL-SCNC: 114 MMOL/L (ref 96–108)
CO2 SERPL-SCNC: 30 MMOL/L (ref 21–32)
CREAT SERPL-MCNC: 0.78 MG/DL (ref 0.6–1.3)
ERYTHROCYTE [DISTWIDTH] IN BLOOD BY AUTOMATED COUNT: 16.1 % (ref 11.6–15.1)
GFR SERPL CREATININE-BSD FRML MDRD: 74 ML/MIN/1.73SQ M
GLUCOSE SERPL-MCNC: 108 MG/DL (ref 65–140)
HCT VFR BLD AUTO: 32.6 % (ref 36.5–49.3)
HGB BLD-MCNC: 10.4 G/DL (ref 12–17)
MAGNESIUM SERPL-MCNC: 2.2 MG/DL (ref 1.9–2.7)
MCH RBC QN AUTO: 31.7 PG (ref 26.8–34.3)
MCHC RBC AUTO-ENTMCNC: 31.9 G/DL (ref 31.4–37.4)
MCV RBC AUTO: 99 FL (ref 82–98)
PHOSPHATE SERPL-MCNC: 1.7 MG/DL (ref 2.3–4.1)
PLATELET # BLD AUTO: 289 THOUSANDS/UL (ref 149–390)
PMV BLD AUTO: 9.7 FL (ref 8.9–12.7)
POTASSIUM SERPL-SCNC: 3.2 MMOL/L (ref 3.5–5.3)
RBC # BLD AUTO: 3.28 MILLION/UL (ref 3.88–5.62)
SODIUM SERPL-SCNC: 149 MMOL/L (ref 135–147)
WBC # BLD AUTO: 7.6 THOUSAND/UL (ref 4.31–10.16)

## 2025-01-24 PROCEDURE — 99233 SBSQ HOSP IP/OBS HIGH 50: CPT

## 2025-01-24 PROCEDURE — 80048 BASIC METABOLIC PNL TOTAL CA: CPT

## 2025-01-24 PROCEDURE — 85027 COMPLETE CBC AUTOMATED: CPT

## 2025-01-24 PROCEDURE — 99233 SBSQ HOSP IP/OBS HIGH 50: CPT | Performed by: PHYSICIAN ASSISTANT

## 2025-01-24 PROCEDURE — 99418 PROLNG IP/OBS E/M EA 15 MIN: CPT | Performed by: PHYSICIAN ASSISTANT

## 2025-01-24 PROCEDURE — 92526 ORAL FUNCTION THERAPY: CPT

## 2025-01-24 PROCEDURE — 83735 ASSAY OF MAGNESIUM: CPT

## 2025-01-24 PROCEDURE — 84100 ASSAY OF PHOSPHORUS: CPT

## 2025-01-24 RX ORDER — DEXTROSE MONOHYDRATE AND SODIUM CHLORIDE 5; .45 G/100ML; G/100ML
50 INJECTION, SOLUTION INTRAVENOUS CONTINUOUS
Status: DISCONTINUED | OUTPATIENT
Start: 2025-01-24 | End: 2025-01-25

## 2025-01-24 RX ADMIN — SENNOSIDES 17.2 MG: 8.6 TABLET, FILM COATED ORAL at 22:00

## 2025-01-24 RX ADMIN — DEXTROSE AND SODIUM CHLORIDE 50 ML/HR: 5; .45 INJECTION, SOLUTION INTRAVENOUS at 11:15

## 2025-01-24 RX ADMIN — NYSTATIN: 100000 CREAM TOPICAL at 11:13

## 2025-01-24 RX ADMIN — METOROPROLOL TARTRATE 2.5 MG: 5 INJECTION, SOLUTION INTRAVENOUS at 14:53

## 2025-01-24 RX ADMIN — GABAPENTIN 100 MG: 100 CAPSULE ORAL at 18:32

## 2025-01-24 RX ADMIN — ALLOPURINOL 300 MG: 300 TABLET ORAL at 08:27

## 2025-01-24 RX ADMIN — METOROPROLOL TARTRATE 2.5 MG: 5 INJECTION, SOLUTION INTRAVENOUS at 03:10

## 2025-01-24 RX ADMIN — HEPARIN SODIUM 5000 UNITS: 5000 INJECTION INTRAVENOUS; SUBCUTANEOUS at 05:35

## 2025-01-24 RX ADMIN — BISACODYL 10 MG: 10 SUPPOSITORY RECTAL at 11:04

## 2025-01-24 RX ADMIN — GABAPENTIN 100 MG: 100 CAPSULE ORAL at 05:35

## 2025-01-24 RX ADMIN — CEFTRIAXONE 1000 MG: 1 INJECTION, SOLUTION INTRAVENOUS at 14:57

## 2025-01-24 RX ADMIN — POTASSIUM PHOSPHATE 9 MMOL: 236; 224 INJECTION, SOLUTION INTRAVENOUS at 08:17

## 2025-01-24 RX ADMIN — HEPARIN SODIUM 5000 UNITS: 5000 INJECTION INTRAVENOUS; SUBCUTANEOUS at 14:51

## 2025-01-24 RX ADMIN — Medication 2000 UNITS: at 08:27

## 2025-01-24 RX ADMIN — HEPARIN SODIUM 5000 UNITS: 5000 INJECTION INTRAVENOUS; SUBCUTANEOUS at 22:00

## 2025-01-24 RX ADMIN — DOCUSATE SODIUM 100 MG: 50 LIQUID ORAL at 08:27

## 2025-01-24 RX ADMIN — NYSTATIN 1 APPLICATION: 100000 CREAM TOPICAL at 18:33

## 2025-01-24 RX ADMIN — B-COMPLEX W/ C & FOLIC ACID TAB 1 TABLET: TAB at 08:27

## 2025-01-24 RX ADMIN — ACETAMINOPHEN 1000 MG: 1000 INJECTION, SOLUTION INTRAVENOUS at 21:53

## 2025-01-24 RX ADMIN — PANTOPRAZOLE SODIUM 40 MG: 40 INJECTION, POWDER, FOR SOLUTION INTRAVENOUS at 05:35

## 2025-01-24 RX ADMIN — METOROPROLOL TARTRATE 2.5 MG: 5 INJECTION, SOLUTION INTRAVENOUS at 20:48

## 2025-01-24 RX ADMIN — DOCUSATE SODIUM 100 MG: 50 LIQUID ORAL at 18:32

## 2025-01-24 RX ADMIN — METOROPROLOL TARTRATE 2.5 MG: 5 INJECTION, SOLUTION INTRAVENOUS at 08:22

## 2025-01-24 RX ADMIN — PANTOPRAZOLE SODIUM 40 MG: 40 INJECTION, POWDER, FOR SOLUTION INTRAVENOUS at 18:32

## 2025-01-24 NOTE — ASSESSMENT & PLAN NOTE
Patient with significant comorbid medical conditions would be high risk for treatment options requiring anesthesia.  EGD testing might have risks that outweigh the benefits.  Patient verbalized understanding.  Opened goals of care conversations with son.  Continued on phone on Saturday. Had further conversations with patient's son, daughter, and grandson when they arrived.  Ultimately they would like to keep him comfortable but for now continue with full treatment   Appreciate input of palliative care continue to follow, addressing goals of care  1/22 had at length discussion with patient's son at bedside. He reports he would like to trial tube feeds via the NG tube.  Still undecided about PEG.  Will initiate tube feed via NG per recommendations of nutrition  1/23 patient not tolerating tube feed overnight, having residuals and rhonchi bilateral lungs.  Stop tube feed.  Will check imaging.  Again had at length discussion with patient's son bedside.  Did make him aware that patient will unlikely be able to tolerate oral feedings anymore.  He is also aware that patient is not currently tolerating his tube feed well.  He remains undecided about PEG tube placement.  I did speak with him at length that patient will likely have repeated issues with aspiration and frequent hospitalizations if we are going to continue to pursue treatment.  Suggested that he speak with his sister and the patient and that we would need to come up with a decision.  Will follow-up again tomorrow.  1/24 patient condition unchanged.  Please see palliative care notes from today.  After ongoing of care discussion it was decided family wants PEG tube.  Undetermined at this time if GI will place PEG tube, they are discussing it.  Would not be done on the weekend will have decision by Monday.  General surgery is also aware and would be available for PEG, however would not be done till Monday.  Will continue IV fluid hydration.  Will not resume tube  feed via NG as patient was requiring frequent suction of what appeared to be tube feed.

## 2025-01-24 NOTE — NUTRITION
01/24/25 1412   Biochemical Data,Medical Tests, and Procedures   Biochemical Data/Medical Tests/Procedures Lab values reviewed;Meds reviewed   Labs (Comment) 1/24/25 Na 149, K 3.2, Cl 114, P 1.7   Meds (Comment) vitamin D3, colace, heparin, MVI, protonix   Nutrition-Focused Physical Exam   Nutrition-Focused Physical Exam Findings RN skin assessment reviewed;Edema;Wound  (+2 generalized edema, +2 BL UE edema, +2 BL LE edema, stage 2 pressure injury at left/right buttocks, NGT present)   Medical-Related Concerns PMH reviewed   Adequacy of Intake   Nutrition Modality NPO;EN  ((EN held))   Feeding Route   PO NPO   Current PO Intake   Current Diet Order NPO; EN (EN held)   Current Meal Intake Inadequate   Estimated calorie intake compared to estimated need Nutrient needs are not met.   PES Statement   Problem Continue previous diagnosis   Recommendations/Interventions   Malnutrition/BMI Present No   Summary Nutrition follow-up assessment.  Weight history this admission reviewed.  Noted 8# loss in 9 days (4.9% body weight).  Patient noted to not be tolerating tube feeding.  Per provider, tube feeding held.  Most recently evaluated by ST 1/24, recommending continuation of strict NPO.  Goals of care discussions continue.  Given ongoing risk for aspiration, consider NJ tube placement vs TPN if within GOC. Given patient's age and co-morbidities, recommend continued GOC discussions.   Interventions/Recommendations Monitor I & O's   Education Assessment   Education Patient/caregiver not appropriate for education at this time   Patient Nutrition Goals   Goal Nutrition via appropriate route   Goal Status Revised   Timeframe to complete goal by next f/u   Nutrition Complexity Risk   Nutrition complexity level High risk   Follow up date 01/28/25

## 2025-01-24 NOTE — ASSESSMENT & PLAN NOTE
Decisional apparatus:  Patient has capacity on exam today.  If lost, patient's substitute decision maker would default to both adult children by [place] Act 169.  Advance Directive / Living Will / POLST / POA Forms: No    Goals  Level 3 code status.   Disease focused care.  DNR/DNI limits placed.   Family meeting today with patient, daughter, and son, and SLIM to discuss feeding tube/continued disease-focused care vs. Hospice  Pt undecided and would like his family to help make the decision. Called daughter and grandson. After some deliberation they have decided to proceed with disease-focused care and would be agreeable with PEG placement if able.   Pt has tolerable QoL and would like to return to SNF and spend his days talking with people and watching sports.  GOC discussions will be ongoing pending clinical course. PSC will continue to follow peripherally for further GOC as needed.

## 2025-01-24 NOTE — ASSESSMENT & PLAN NOTE
Appreciate input of GI who are following  N.p.o.  Appreciate input of nutrition  1/22 initiated trickle feed tube feed today through NG tube-reported patient having residuals today, rhonchi bilateral lungs.  Placed tube feed on hold 1/23  Speech therapy continues to work with patient-still not appropriate for n.p.o.  See goals of care discussion

## 2025-01-24 NOTE — UTILIZATION REVIEW
Continued Stay Review    Date: 01/24/25                          Current Patient Class: Inpatient  Current Level of Care: Med/Surg    HPI:89 y.o. male initially admitted on 1/15 for Upper GI bleed, complicated by COVID-19 positive on 1/17.     Assessment/Plan: Requesting further time with family to discuss PEG tube placement vs hospice care. Exam: NGT, ill-appearing, appears to have full insight into condition. Plan: continue IV rocephin, continue other current meds, hold PO lasix, IV PPI BID, start IVF -D5 1/2NS @ 50 mL/hr. Supportive care. Continue tube feeds via NGT. Incentive spirometry, Aspiration precautions: HOB to 30-45° at all times, sit upright 90° when eating or drinking and for 1 hour afterwards, oral care QID, suction and supplemental O2 set up at bedside. Local wound care TID. I&O.       Medications:   Scheduled Medications:  allopurinol, 300 mg, Oral, Daily  bisacodyl, 10 mg, Rectal, Daily  cefTRIAXone, 1,000 mg, Intravenous, Q24H  Cholecalciferol, 2,000 Units, Oral, Daily  docusate, 100 mg, Oral, BID  fenofibrate, 48 mg, Oral, Daily  [Held by provider] furosemide, 20 mg, Oral, Once per day on Monday Wednesday Friday  gabapentin, 100 mg, Oral, Q12H  heparin (porcine), 5,000 Units, Subcutaneous, Q8H ANKIT  iohexol, 50 mL, Oral, 90 min pre-procedure  metoprolol, 2.5 mg, Intravenous, Q6H  multivitamin stress formula, 1 tablet, Oral, Daily  nystatin, , Topical, BID  pantoprazole, 40 mg, Intravenous, Q12H  senna, 2 tablet, Oral, HS    Continuous IV Infusions:  dextrose 5 % and sodium chloride 0.45 %, 50 mL/hr, Intravenous, Continuous    PRN Meds:  acetaminophen, 1,000 mg, Intravenous, Q8H PRN  acetaminophen, 650 mg, Oral, Q4H PRN  bisacodyl, 10 mg, Rectal, Daily PRN  glycopyrrolate, 0.1 mg, Intravenous, Q4H PRN  nitroglycerin, 0.4 mg, Sublingual, Q5 Min PRN  ondansetron, 4 mg, Intravenous, Q6H PRN  phenol, 1 spray, Mouth/Throat, Q2H PRN  potassium phosphates, 9 mmol, Intravenous, 1/24 x1      Discharge  Plan: TBD    Vital Signs (last 3 days)       Date/Time Temp Pulse Resp BP MAP (mmHg) SpO2 Calculated FIO2 (%) - Nasal Cannula Nasal Cannula O2 Flow Rate (L/min) O2 Device Patient Position - Orthostatic VS Bria Coma Scale Score Pain    01/24/25 08:20:11 -- 78 -- 129/62 84 93 % -- -- -- -- -- --    01/24/25 0804 -- -- -- -- -- -- -- -- -- -- -- No Pain    01/24/25 07:13:31 99.2 °F (37.3 °C) 76 20 150/65 93 95 % -- -- -- -- -- --    01/24/25 03:09:06 -- 72 18 121/48 72 96 % -- -- -- -- -- --    01/23/25 21:43:41 -- 90 -- -- -- 96 % -- -- -- -- -- --    01/23/25 21:43:26 -- 87 -- 123/55 78 96 % -- -- -- -- -- --    01/23/25 21:39:05 99.7 °F (37.6 °C) 82 18 123/55 78 95 % -- -- -- -- -- --    01/23/25 2008 -- -- -- -- -- 97 % 28 2 L/min Nasal cannula -- 15 No Pain    01/23/25 19:59:29 -- 88 -- 152/61 91 96 % -- -- -- -- -- --    01/23/25 14:29:55 99.5 °F (37.5 °C) 79 16 127/48 74 97 % -- -- -- Lying -- --    01/23/25 1000 -- -- -- -- -- -- -- -- -- -- -- No Pain    01/23/25 08:01:01 98.9 °F (37.2 °C) 85 22 128/51 77 95 % -- -- -- -- -- --    01/22/25 21:32:30 98.6 °F (37 °C) 81 20 -- -- 96 % -- -- -- -- 15 No Pain    01/22/25 21:10:39 -- 84 21 165/64 98 96 % -- -- -- -- -- --    01/22/25 14:21:22 98.4 °F (36.9 °C) 73 20 160/60 93 96 % -- -- -- Lying -- --    01/22/25 14:17:13 98.4 °F (36.9 °C) 72 20 131/45 74 95 % -- -- -- -- -- --    01/22/25 08:07:08 98.7 °F (37.1 °C) 84 -- 143/58 86 92 % -- -- -- -- -- --    01/22/25 03:42:46 -- 69 -- 150/54 86 94 % -- -- -- -- -- --    01/21/25 22:10:02 98.1 °F (36.7 °C) 71 20 140/55 83 96 % 28 2 L/min Nasal cannula Lying -- --    01/21/25 1959 -- -- -- -- -- -- -- -- -- -- 15 No Pain    01/21/25 17:19:29 99.2 °F (37.3 °C) 74 -- -- -- 96 % -- -- -- -- -- --    01/21/25 17:18:41 97.6 °F (36.4 °C) 74 -- -- -- 97 % -- -- -- -- -- --    01/21/25 14:59:49 98.7 °F (37.1 °C) 83 -- 149/61 90 95 % -- -- -- -- -- --    01/21/25 0830 -- -- -- -- -- -- 28 2 L/min Nasal cannula -- -- No Pain     01/21/25 07:48:41 99.2 °F (37.3 °C) 80 20 150/66 94 93 % -- -- -- -- -- --    01/21/25 07:48:22 99.2 °F (37.3 °C) 76 20 150/66 94 93 % -- -- -- -- -- --    01/21/25 03:11:11 99.2 °F (37.3 °C) 80 -- 140/59 86 94 % -- -- -- -- -- --    01/21/25 02:12:04 -- 83 -- 121/57 78 93 % -- -- -- -- -- --            Pertinent Labs/Diagnostic Results:   Radiology:  XR abdomen 1 view kub   Final Interpretation by Sb Haynes MD (01/23 1613)      Nonobstructive bowel gas pattern.            Results from last 7 days   Lab Units 01/24/25 0425 01/23/25 0447 01/22/25 0448 01/21/25 0441 01/20/25 0426 01/19/25  0523 01/18/25 0442   WBC Thousand/uL 7.60 7.78 9.02 10.62* 8.11 6.53 7.19   HEMOGLOBIN g/dL 10.4* 10.4* 10.9* 11.0* 11.4* 11.4* 9.6*   HEMATOCRIT % 32.6* 32.5* 34.4* 33.8* 35.7* 35.3* 30.4*   PLATELETS Thousands/uL 289 283 247 244 202 173 129*   TOTAL NEUT ABS Thousands/µL  --   --   --  9.40* 7.17 5.48  --    BANDS PCT %  --   --   --   --   --   --  24*         Results from last 7 days   Lab Units 01/24/25 0425 01/23/25 0447 01/22/25 0448 01/21/25 0441 01/20/25 0426   SODIUM mmol/L 149* 148* 147 143 138   POTASSIUM mmol/L 3.2* 3.3* 3.4* 3.1* 3.3*   CHLORIDE mmol/L 114* 113* 112* 107 103   CO2 mmol/L 30 27 27 26 25   ANION GAP mmol/L 5 8 8 10 10   BUN mg/dL 22 26* 28* 30* 30*   CREATININE mg/dL 0.78 0.76 0.83 1.07 1.11   EGFR ml/min/1.73sq m 74 74 72 56 54   CALCIUM mg/dL 8.4 8.5 8.3* 8.5 8.2*   MAGNESIUM mg/dL 2.2 2.2  --   --  2.2   PHOSPHORUS mg/dL 1.7* 1.2*  --   --   --      Results from last 7 days   Lab Units 01/24/25  0425 01/23/25  0447 01/22/25  0448 01/21/25  0441 01/20/25  0426 01/19/25  0523 01/18/25  0442   GLUCOSE RANDOM mg/dL 108 114 98 106 95 78 85     Results from last 7 days   Lab Units 01/23/25  0447 01/21/25  0441 01/20/25  0426 01/19/25  0523 01/18/25  0442   PROCALCITONIN ng/ml 0.69* 1.53* 1.43* 1.82* 1.25*         Network Utilization Review Department  ATTENTION: Please call  with any questions or concerns to [phone] and carefully listen to the prompts so that you are directed to the right person. All voicemails are confidential.   For Discharge needs, contact Care Management DC Support Team at [phone] opt. 2  Send all requests for admission clinical reviews, approved or denied determinations and any other requests to dedicated fax number below belonging to the campus where the patient is receiving treatment. List of dedicated fax numbers for the Facilities:  FACILITY NAME UR FAX NUMBER   ADMISSION DENIALS (Administrative/Medical Necessity) [phone]   DISCHARGE SUPPORT TEAM (NETWORK) [phone]   PARENT CHILD HEALTH (Maternity/NICU/Pediatrics) [phone]   [facility] [phone]   [facility] [phone]   [facility] [phone]   [facility] [phone]   [facility] [phone]   [facility] [phone]   [facility] [phone]   [facility] [phone]   [facility] [phone]   Critical access hospital [phone]   [facility] [phone]   [facility] [phone]

## 2025-01-24 NOTE — PLAN OF CARE
Problem: RESPIRATORY - ADULT  Goal: Achieves optimal ventilation and oxygenation  Description: INTERVENTIONS:  - Assess for changes in respiratory status  - Assess for changes in mentation and behavior  - Position to facilitate oxygenation and minimize respiratory effort  - Oxygen administered by appropriate delivery if ordered  - Initiate smoking cessation education as indicated  - Encourage broncho-pulmonary hygiene including cough, deep breathe, Incentive Spirometry  - Assess the need for suctioning and aspirate as needed  - Assess and instruct to report SOB or any respiratory difficulty  - Respiratory Therapy support as indicated  Outcome: Progressing     Problem: Nutrition/Hydration-ADULT  Goal: Nutrient/Hydration intake appropriate for improving, restoring or maintaining nutritional needs  Description: Monitor and assess patient's nutrition/hydration status for malnutrition. Collaborate with interdisciplinary team and initiate plan and interventions as ordered.  Monitor patient's weight and dietary intake as ordered or per policy. Utilize nutrition screening tool and intervene as necessary. Determine patient's food preferences and provide high-protein, high-caloric foods as appropriate.     INTERVENTIONS:  - Monitor oral intake, urinary output, labs, and treatment plans  - Assess nutrition and hydration status and recommend course of action  - Evaluate amount of meals eaten  - Assist patient with eating if necessary   - Allow adequate time for meals  - Recommend/ encourage appropriate diets, oral nutritional supplements, and vitamin/mineral supplements  - Order, calculate, and assess calorie counts as needed  - Recommend, monitor, and adjust tube feedings and TPN/PPN based on assessed needs  - Assess need for intravenous fluids  - Provide specific nutrition/hydration education as appropriate  - Include patient/family/caregiver in decisions related to nutrition  Outcome: Progressing     Problem: INFECTION -  ADULT  Goal: Absence or prevention of progression during hospitalization  Description: INTERVENTIONS:  - Assess and monitor for signs and symptoms of infection  - Monitor lab/diagnostic results  - Monitor all insertion sites, i.e. indwelling lines, tubes, and drains  - Monitor endotracheal if appropriate and nasal secretions for changes in amount and color  - [place] appropriate cooling/warming therapies per order  - Administer medications as ordered  - Instruct and encourage patient and family to use good hand hygiene technique  - Identify and instruct in appropriate isolation precautions for identified infection/condition  Outcome: Progressing     Problem: INFECTION - ADULT  Goal: Infection Control Precautions  Outcome: Progressing

## 2025-01-24 NOTE — ASSESSMENT & PLAN NOTE
Social support  Patient is supported by son, daughter, grandchildren  Supportive listening provided  Normalized experience of patient/family  Provided anxiety containment  Provided anticipatory guidance    Follow up  Palliative Care will continue to follow  Please reach out to on-call provider via Epic Secure Chat  if questions or concerns arise.  Please do not hesitate to reach our on call provider through our clinic answering service at [phone] should you have acute symptom control concerns.

## 2025-01-24 NOTE — CASE MANAGEMENT
Case Management Discharge Planning Note    Patient name Prem Mar Sr.  Location /-01 MRN [id number]  : 10/11/1924 Date 2025       Current Admission Date: 1/15/2025  Current Admission Diagnosis:Upper GI bleed   Patient Active Problem List    Diagnosis Date Noted Date Diagnosed    Electrolyte abnormality 2025     Palliative care by specialist 2025     Opacity noted on imaging study 2025     Congestion of upper airway 2025     Acute distention of stomach 2025     COVID-19 virus infection 2025     Goals of care, counseling/discussion 2025     Upper GI bleed 01/15/2025     Constipation 01/15/2025     Tinea cruris 10/12/2024     Weakness 10/11/2024     Atrial flutter (HCC) 2024     Chronic pain syndrome 2024     Lumbar spondylosis 2024     Chronic midline low back pain without sciatica 2024     Nonexudative age-related macular degeneration of left eye 2023     Chronic kidney disease, stage 3a (HCC) 2023     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     Muscle wasting and atrophy, not elsewhere classified, multiple sites 2022     Difficulty swallowing 2022     Keratoma 2021     Venous insufficiency of both lower extremities 2021     Hypomagnesemia 2020     Mixed hyperlipidemia 2020     Coronary artery disease involving native coronary artery of native heart without angina pectoris 2018     Essential hypertension 2018     Bilateral leg edema 2018     Ventricular bigeminy 2018     Idiopathic chronic gout of multiple sites without tophus 05/10/2017     Edema 2014     Vitamin D deficiency 2013       LOS (days): 7  Geometric Mean LOS (GMLOS) (days): 4.5  Days to GMLOS:-2.8     OBJECTIVE:  Risk of Unplanned Readmission Score: 24.46         Current admission status: Inpatient   Preferred Pharmacy:   [id number] -  [place] - [address]  [address]  [place] [address]  Phone: [phone] Fax: [phone]    Primary Care Provider: Leonard Schreiber MD    Primary Insurance: JENNIFER ZENG  Secondary Insurance:     DISCHARGE DETAILS:                                          Other Referral/Resources/Interventions Provided:  Referral Comments: pt  is not tolerating trickle feedings via NG tube- goals of care discusion again- family not sure about a peg tube, pt is npo, IV protonix, GI, IV rocephin- pt is a bedhold at the [facility]    Would you like to participate in our [facility] service program?  : No - Declined    Treatment Team Recommendation: SNF ([facility] bed hold  they would likean auth initiated- bls)

## 2025-01-24 NOTE — ASSESSMENT & PLAN NOTE
Lab Results   Component Value Date    EGFR 74 01/24/2025    EGFR 74 01/23/2025    EGFR 72 01/22/2025    CREATININE 0.78 01/24/2025    CREATININE 0.76 01/23/2025    CREATININE 0.83 01/22/2025     Creatinine appears to be around baseline of 0.9-1.1 mg/dL   Creatinine better than baseline currently  Continue to monitor renal function closely  Avoid nephrotoxins and hypotension  BMP a.m.

## 2025-01-24 NOTE — PLAN OF CARE
Problem: INFECTION - ADULT  Goal: Absence or prevention of progression during hospitalization  Description: INTERVENTIONS:  - Assess and monitor for signs and symptoms of infection  - Monitor lab/diagnostic results  - Monitor all insertion sites, i.e. indwelling lines, tubes, and drains  - Monitor endotracheal if appropriate and nasal secretions for changes in amount and color  - [place] appropriate cooling/warming therapies per order  - Administer medications as ordered  - Instruct and encourage patient and family to use good hand hygiene technique  - Identify and instruct in appropriate isolation precautions for identified infection/condition  Outcome: Progressing  Goal: Infection Control Precautions  Outcome: Progressing    Problem: Prexisting or High Potential for Compromised Skin Integrity  Goal: Skin integrity is maintained or improved  Description: INTERVENTIONS:  - Identify patients at risk for skin breakdown  - Assess and monitor skin integrity  - Assess and monitor nutrition and hydration status  - Monitor labs   - Assess for incontinence   - Turn and reposition patient  - Assist with mobility/ambulation  - Relieve pressure over bony prominences  - Avoid friction and shearing  - Provide appropriate hygiene as needed including keeping skin clean and dry  - Evaluate need for skin moisturizer/barrier cream  - Collaborate with interdisciplinary team   - Patient/family teaching  - Consider wound care consult   Outcome: Progressing    Problem: GASTROINTESTINAL - ADULT  Goal: Minimal or absence of nausea and/or vomiting  Description: INTERVENTIONS:  - Administer IV fluids if ordered to ensure adequate hydration  - Maintain NPO status until nausea and vomiting are resolved  - Nasogastric tube if ordered  - Administer ordered antiemetic medications as needed  - Provide nonpharmacologic comfort measures as appropriate  - Advance diet as tolerated, if ordered  - Consider nutrition services referral to assist  patient with adequate nutrition and appropriate food choices  Outcome: Progressing  Goal: Maintains or returns to baseline bowel function  Description: INTERVENTIONS:  - Assess bowel function  - Encourage oral fluids to ensure adequate hydration  - Administer IV fluids if ordered to ensure adequate hydration  - Administer ordered medications as needed  - Encourage mobilization and activity  - Consider nutritional services referral to assist patient with adequate nutrition and appropriate food choices  Outcome: Progressing  Goal: Maintains adequate nutritional intake  Description: INTERVENTIONS:  - Monitor percentage of each meal consumed  - Identify factors contributing to decreased intake, treat as appropriate  - Assist with meals as needed  - Monitor I&O, weight, and lab values if indicated  - Obtain nutrition services referral as needed  Outcome: Progressing  Goal: Oral mucous membranes remain intact  Description: INTERVENTIONS  - Assess oral mucosa and hygiene practices  - Implement preventative oral hygiene regimen  - Implement oral medicated treatments as ordered  - Initiate Nutrition services referral as needed  Outcome: Progressing    Problem: METABOLIC, FLUID AND ELECTROLYTES - ADULT  Goal: Electrolytes maintained within normal limits  Description: INTERVENTIONS:  - Monitor labs and assess patient for signs and symptoms of electrolyte imbalances  - Administer electrolyte replacement as ordered  - Monitor response to electrolyte replacements, including repeat lab results as appropriate  - Instruct patient on fluid and nutrition as appropriate  Outcome: Progressing  Goal: Fluid balance maintained  Description: INTERVENTIONS:  - Monitor labs   - Monitor I/O and WT  - Instruct patient on fluid and nutrition as appropriate  - Assess for signs & symptoms of volume excess or deficit  Outcome: Progressing     Problem: RESPIRATORY - ADULT  Goal: Achieves optimal ventilation and oxygenation  Description:  INTERVENTIONS:  - Assess for changes in respiratory status  - Assess for changes in mentation and behavior  - Position to facilitate oxygenation and minimize respiratory effort  - Oxygen administered by appropriate delivery if ordered  - Initiate smoking cessation education as indicated  - Encourage broncho-pulmonary hygiene including cough, deep breathe, Incentive Spirometry  - Assess the need for suctioning and aspirate as needed  - Assess and instruct to report SOB or any respiratory difficulty  - Respiratory Therapy support as indicated  Outcome: Progressing

## 2025-01-24 NOTE — ASSESSMENT & PLAN NOTE
Presented with suspected GI bleed  GI team consulted  Continue conservative measures; significant aspiration and difficulty managing secretions.   NGT in place - not tolerating trickle feeds  Mgmt per SLIM/GI

## 2025-01-24 NOTE — PROGRESS NOTES
Progress Note - Palliative Care   Name: Prem Mar Sr. 89 y.o. male I MRN: [id number]  Unit/Bed#: -01 I Date of Admission: 1/15/2025   Date of Service: 1/24/2025 I Hospital Day: 8    Assessment & Plan  Goals of care, counseling/discussion  Decisional apparatus:  Patient has capacity on exam today.  If lost, patient's substitute decision maker would default to both adult children by [place] Act 169.  Advance Directive / Living Will / POLST / POA Forms: No    Goals  Level 3 code status.   Disease focused care.  DNR/DNI limits placed.   Family meeting today with patient, daughter, and son, and SLIM to discuss feeding tube/continued disease-focused care vs. Hospice  Pt undecided and would like his family to help make the decision. Called daughter and grandson. After some deliberation they have decided to proceed with disease-focused care and would be agreeable with PEG placement if able.   Pt has tolerable QoL and would like to return to SNF and spend his days talking with people and watching sports.  GOC discussions will be ongoing pending clinical course. PSC will continue to follow peripherally for further GOC as needed.   Palliative care by specialist  Social support  Patient is supported by son, daughter, grandchildren  Supportive listening provided  Normalized experience of patient/family  Provided anxiety containment  Provided anticipatory guidance    Follow up  Palliative Care will continue to follow  Please reach out to on-call provider via Epic Secure Chat  if questions or concerns arise.  Please do not hesitate to reach our on call provider through our clinic answering service at [phone] should you have acute symptom control concerns.    Upper GI bleed  Presented with suspected GI bleed  GI team consulted  Continue conservative measures; significant aspiration and difficulty managing secretions.   NGT in place - not tolerating trickle feeds  Mgmt per SLIM/GI    Subjective   Pt reports feeling  okay. States he feels better. Pt appears to have full insight into his condition and treatment options. He is requesting more time with his children and grandson to discuss whether to pursue PEG tube or hospice.    Objective :  Temp:  [98.9 °F (37.2 °C)-99.7 °F (37.6 °C)] 99.2 °F (37.3 °C)  HR:  [72-90] 76  BP: (121-152)/(48-65) 150/65  Resp:  [16-22] 20  SpO2:  [95 %-97 %] 95 %  O2 Device: Nasal cannula  Nasal Cannula O2 Flow Rate (L/min):  [2 L/min] 2 L/min    Physical Exam  Vitals and nursing note reviewed.   Constitutional:       Appearance: He is ill-appearing.      Comments: NGT in place   Cardiovascular:      Rate and Rhythm: Normal rate.   Pulmonary:      Effort: Pulmonary effort is normal.   Neurological:      Mental Status: He is alert and oriented to person, place, and time.          Lab Results: I have reviewed the following results:  Lab Results   Component Value Date/Time    SODIUM 149 (H) 01/24/2025 04:25 AM    SODIUM 126 (L) 09/06/2022 06:45 PM    K 3.2 (L) 01/24/2025 04:25 AM    K 4.0 09/06/2022 06:45 PM    BUN 22 01/24/2025 04:25 AM    BUN 24 09/06/2022 06:45 PM    CREATININE 0.78 01/24/2025 04:25 AM    CREATININE 0.80 09/06/2022 06:45 PM    GLUC 108 01/24/2025 04:25 AM    GLUC 124 (H) 09/06/2022 06:45 PM    CALCIUM 8.4 01/24/2025 04:25 AM    CALCIUM 8.2 (L) 09/06/2022 06:45 PM    AST 42 (H) 01/21/2025 04:41 AM    ALT 21 01/21/2025 04:41 AM    ALB 3.0 (L) 01/21/2025 04:41 AM    TP 6.1 (L) 01/21/2025 04:41 AM    EGFR 74 01/24/2025 04:25 AM    EGFR 80 09/06/2022 06:45 PM     Lab Results   Component Value Date/Time    HGB 10.4 (L) 01/24/2025 04:25 AM    WBC 7.60 01/24/2025 04:25 AM     01/24/2025 04:25 AM    INR 0.94 01/15/2025 04:22 AM    PTT 31 01/15/2025 04:22 AM     Lab Results   Component Value Date/Time    GBY9AZJXBWOG 3.763 08/18/2022 04:38 PM       Administrative Statements   I have spent a total time of 90 minutes in caring for this patient on the day of the visit/encounter including  Prognosis, Risks and benefits of tx options, Patient and family education, Risk factor reductions, Counseling / Coordination of care, Documenting in the medical record, Reviewing / ordering tests, medicine, procedures  , Obtaining or reviewing history  , and Communicating with other healthcare professionals . Topics discussed with the patient / family include symptom assessment and management, psychosocial support, goals of care, hospice services, supportive listening, and anticipatory guidance. Case discussed with THANG CHAPARRO RN.

## 2025-01-24 NOTE — PROGRESS NOTES
Progress Note - Hospitalist   Name: Prem Mar Sr. 89 y.o. male I MRN: [id number]  Unit/Bed#: [facility] I Date of Admission: 1/15/2025   Date of Service: 1/24/2025 I Hospital Day: 8    Assessment & Plan  Upper GI bleed  Presented for suspected GI bleed  CT abdomen and pelvis 1/15/2025 with focal wall thickening of the lower esophagus, gastroesophageal junction, and proximal stomach which may reflect esophagitis/gastritis.  No evidence for bowel obstruction, mesenteric inflammation, appendicitis, obstructive uropathy, free air or free fluid  Chest x-ray 1/15/2025 with gaseous distention of the stomach  KUB 1/16/2025 with worsening distention  NG was placed  CT abdomen pelvis with contrast 1/17/2024: New small bilateral pleural effusions with extensive bibasilar atelectasis. Wall thickening of the lower esophagus and proximal stomach is again noted without interval change. This may reflect mild focal esophagitis/gastritis. New NG tube with tip terminating in the gastric body region. No evidence of bowel obstruction, mesenteric inflammation, appendicitis, obstructive uropathy, free air, or free fluid. Descending and sigmoid diverticulosis again noted without evidence for acute diverticulitis. Subtle wall thickening of the mid to distal sigmoid colon, rectum, and anal verge region could reflect mild focal colitis/proctitis. Multiple punctate pancreatic calcifications again seen likely sequelae of chronic pancreatitis  1/20 patient passed NG clamping trial and evaluated by SLP but did not pass   Keep NPO   Nutritional recommendations for tube feeds  Initiating trickle feeding through NG 1/22  Patient not tolerating tube feed overnight, having residuals, rhonchi bilaterally requiring suctioning  Speech therapy continues to follow-recommend continued n.p.o.  Appreciate GI who are following-after goals of care discussion family wants PEG tube.  GI is aware.  Continue Protonix 40 mg IV twice daily  Continue with  conservative management for now.  Please see also, goals of care  Opacity noted on imaging study  Chest x-ray: Retrocardiac opacity, which may be due to pneumonia and/or atelectasis.  Procalcitonin peaked and downtrending, with intermittent fever  Per discussion with ID pharmacy will de-escalate antibiotics to ceftriaxone 1 g every 24 hours  Repeat CXR-slight improvement in left base patchy atelectasis versus pneumonia  Monitor labs and vital signs, conduct serial physical assessments    COVID-19 virus infection  Patient with fever and COVID-positive roommate at his skilled facility  Tested positive for COVID 9/17  Continues to require 2 L nasal cannula oxygen  S/p remdesivir x3 days  Continue supportive care  Isolation precautions  Monitor labs and vital signs, conduct serial physical assessments  Congestion of upper airway  Upper airway congestion noted, patient has strong cough and able to clear  CT imaging revealed small bilateral pleural effusion and extensive bibasilar atelectasis  Maintain head of bed elevated  Aspiration precautions  Appreciate input of speech therapy who are following  Continue Robinul 0.1 mg IV every 4 hours as needed  Continue incentive spirometry  Suction as needed  Goals of care, counseling/discussion  Patient with significant comorbid medical conditions would be high risk for treatment options requiring anesthesia.  EGD testing might have risks that outweigh the benefits.  Patient verbalized understanding.  Opened goals of care conversations with son.  Continued on phone on Saturday. Had further conversations with patient's son, daughter, and grandson when they arrived.  Ultimately they would like to keep him comfortable but for now continue with full treatment   Appreciate input of palliative care continue to follow, addressing goals of care  1/22 had at length discussion with patient's son at bedside. He reports he would like to trial tube feeds via the NG tube.  Still undecided  about PEG.  Will initiate tube feed via NG per recommendations of nutrition  1/23 patient not tolerating tube feed overnight, having residuals and rhonchi bilateral lungs.  Stop tube feed.  Will check imaging.  Again had at length discussion with patient's son bedside.  Did make him aware that patient will unlikely be able to tolerate oral feedings anymore.  He is also aware that patient is not currently tolerating his tube feed well.  He remains undecided about PEG tube placement.  I did speak with him at length that patient will likely have repeated issues with aspiration and frequent hospitalizations if we are going to continue to pursue treatment.  Suggested that he speak with his sister and the patient and that we would need to come up with a decision.  Will follow-up again tomorrow.  1/24 patient condition unchanged.  Please see palliative care notes from today.  After ongoing of care discussion it was decided family wants PEG tube.  Undetermined at this time if GI will place PEG tube, they are discussing it.  Would not be done on the weekend will have decision by Monday.  General surgery is also aware and would be available for PEG, however would not be done till Monday.  Will continue IV fluid hydration.  Will not resume tube feed via NG as patient was requiring frequent suction of what appeared to be tube feed.  Idiopathic chronic gout of multiple sites without tophus  Continue prehospital allopurinol 300 mg p.o. daily when tolerating p.o.  Essential hypertension  Prehospital takesToprol-XL 25 mg p.o. daily and Lasix 20 mg p.o. daily on Monday Wednesday Friday.   Currently NPO. Metoprolol switched to IV. Received furosemide 40 mg IV x 1 1/19/2025  Blood pressures controlled  Monitor BP per protocol  Mixed hyperlipidemia  Continue prehospital Tricor 48 mg p.o. daily when tolerating p.o.  Chronic kidney disease, stage 3a (HCC)  Lab Results   Component Value Date    EGFR 74 01/24/2025    EGFR 74 01/23/2025     EGFR 72 01/22/2025    CREATININE 0.78 01/24/2025    CREATININE 0.76 01/23/2025    CREATININE 0.83 01/22/2025     Creatinine appears to be around baseline of 0.9-1.1 mg/dL   Creatinine better than baseline currently  Continue to monitor renal function closely  Avoid nephrotoxins and hypotension  BMP a.m.  Atrial flutter (HCC)  Currently rate controlled with metoprolol   Not currently on anticoagulation as outpatient  Constipation  GI input appreciated  Continue bowel regimen   Acute distention of stomach  Appreciate input of GI who are following  N.p.o.  Appreciate input of nutrition  1/22 initiated trickle feed tube feed today through NG tube-reported patient having residuals today, rhonchi bilateral lungs.  Placed tube feed on hold 1/23  Speech therapy continues to work with patient-still not appropriate for n.p.o.  See goals of care discussion  Palliative care by specialist  Appreciate input of palliative care who continue to follow-see goals of care discussion  Electrolyte abnormality  Patient was initiated on trickle tube feed on 1/22  High risk for refeeding syndrome  Continue to monitor electrolytes and replete accordingly  BMP, magnesium, phosphorus a.m.  Hypernatremia  In setting of poor oral intake  Initiated D5 and a half today 50 cc an hour  We will monitor fluid status closely  BMP a.m.    VTE Pharmacologic Prophylaxis: VTE Score: 5 High Risk (Score >/= 5) - Pharmacological DVT Prophylaxis Ordered: heparin. Sequential Compression Devices Ordered.    Mobility:   Basic Mobility Inpatient Raw Score: 6  JH-HLM Goal: 2: Bed activities/Dependent transfer  JH-HLM Achieved: 2: Bed activities/Dependent transfer  JH-HLM Goal achieved. Continue to encourage appropriate mobility.    Patient Centered Rounds: I performed bedside rounds with nursing staff today.   Discussions with Specialists or Other Care Team Provider: GI, palliative care, nursing, case management    Education and Discussions with Family / Patient:  Updated  () via phone.    Current Length of Stay: 8 day(s)  Current Patient Status: Inpatient   Certification Statement: The patient will continue to require additional inpatient hospital stay due to ongoing goals of care, family now requesting PEG tube GI continues to follow  Discharge Plan: Greater than 72 hours to skilled facility  Code Status: Level 3 - DNAR and DNI    Subjective   Patient offers no complaints of discomfort when asked.  He is awake alert and oriented x 3 but sleepy.  Son at bedside    Objective :  Temp:  [99.2 °F (37.3 °C)-99.7 °F (37.6 °C)] 99.3 °F (37.4 °C)  HR:  [72-90] 80  BP: (121-156)/(48-65) 156/56  Resp:  [18-20] 20  SpO2:  [93 %-97 %] 97 %  O2 Device: Nasal cannula  Nasal Cannula O2 Flow Rate (L/min):  [2 L/min] 2 L/min    Body mass index is 24.98 kg/m².     Input and Output Summary (last 24 hours):     Intake/Output Summary (Last 24 hours) at 1/24/2025 1834  Last data filed at 1/24/2025 1200  Gross per 24 hour   Intake 0 ml   Output --   Net 0 ml       Physical Exam  Vitals reviewed.   Constitutional:       General: He is not in acute distress.     Appearance: He is well-developed.   HENT:      Head: Normocephalic and atraumatic.   Cardiovascular:      Rate and Rhythm: Normal rate and regular rhythm.      Pulses: Normal pulses.      Heart sounds: Normal heart sounds. No murmur heard.  Pulmonary:      Effort: Pulmonary effort is normal. No respiratory distress.      Breath sounds: Rhonchi present. No wheezing or rales.      Comments: Nonlabored respirations at rest on O2 2 L nasal cannula occasional moist cough  Abdominal:      General: There is no distension.      Palpations: Abdomen is soft.      Tenderness: There is no abdominal tenderness. There is no guarding.   Musculoskeletal:         General: No swelling. Normal range of motion.   Skin:     General: Skin is warm and dry.      Capillary Refill: Capillary refill takes less than 2 seconds.   Neurological:       General: No focal deficit present.      Mental Status: He is alert. Mental status is at baseline.   Psychiatric:         Mood and Affect: Mood normal.         Behavior: Behavior normal.           Lines/Drains:  Lines/Drains/Airways       Active Status       Name Placement date Placement time Site Days    NG/OG/Enteral Tube Nasogastric 18 Fr Right nare 01/16/25  1052  Right nare  8    External Urinary Catheter Medium 01/23/25  0340  -- 1                            Lab Results: I have reviewed the following results:   Results from last 7 days   Lab Units 01/24/25  0425 01/22/25  0448 01/21/25  0441 01/19/25  0523 01/18/25  0442   WBC Thousand/uL 7.60   < > 10.62*   < > 7.19   HEMOGLOBIN g/dL 10.4*   < > 11.0*   < > 9.6*   HEMATOCRIT % 32.6*   < > 33.8*   < > 30.4*   PLATELETS Thousands/uL 289   < > 244   < > 129*   BANDS PCT %  --   --   --   --  24*   SEGS PCT %  --   --  89*   < >  --    LYMPHO PCT %  --   --  7*   < > 6*   MONO PCT %  --   --  3*   < > 0*   EOS PCT %  --   --  0   < > 0    < > = values in this interval not displayed.     Results from last 7 days   Lab Units 01/24/25  0425 01/22/25  0448 01/21/25  0441   SODIUM mmol/L 149*   < > 143   POTASSIUM mmol/L 3.2*   < > 3.1*   CHLORIDE mmol/L 114*   < > 107   CO2 mmol/L 30   < > 26   BUN mg/dL 22   < > 30*   CREATININE mg/dL 0.78   < > 1.07   ANION GAP mmol/L 5   < > 10   CALCIUM mg/dL 8.4   < > 8.5   ALBUMIN g/dL  --   --  3.0*   TOTAL BILIRUBIN mg/dL  --   --  0.56   ALK PHOS U/L  --   --  49   ALT U/L  --   --  21   AST U/L  --   --  42*   GLUCOSE RANDOM mg/dL 108   < > 106    < > = values in this interval not displayed.         Results from last 7 days   Lab Units 01/21/25  1138 01/21/25  0747   POC GLUCOSE mg/dl 111 92         Results from last 7 days   Lab Units 01/23/25  0447 01/21/25  0441 01/20/25  0426 01/19/25  0523 01/18/25  0442   PROCALCITONIN ng/ml 0.69* 1.53* 1.43* 1.82* 1.25*       Recent Cultures (last 7 days):         Imaging Results  Review: I reviewed radiology reports from this admission including: Chest x-ray, CT pelvis, CT abdomen pelvis, follow-up chest x-ray x 4, KUB,.  Other Study Results Review: No additional pertinent studies reviewed.    Last 24 Hours Medication List:     Current Facility-Administered Medications:     acetaminophen (Ofirmev) injection 1,000 mg, Q8H PRN, Last Rate: 1,000 mg (01/21/25 1726)    acetaminophen (TYLENOL) tablet 650 mg, Q4H PRN    allopurinol (ZYLOPRIM) tablet 300 mg, Daily    bisacodyl (DULCOLAX) rectal suppository 10 mg, Daily    bisacodyl (DULCOLAX) rectal suppository 10 mg, Daily PRN    cefTRIAXone (ROCEPHIN) IVPB (premix in dextrose) 1,000 mg 50 mL, Q24H, Last Rate: 1,000 mg (01/24/25 1457)    Cholecalciferol (VITAMIN D3) tablet 2,000 Units, Daily    dextrose 5 % and sodium chloride 0.45 % infusion, Continuous, Last Rate: 50 mL/hr (01/24/25 1115)    docusate (COLACE) oral liquid 100 mg, BID    fenofibrate (TRICOR) tablet 48 mg, Daily    [Held by provider] furosemide (LASIX) tablet 20 mg, Once per day on Monday Wednesday Friday    gabapentin (NEURONTIN) capsule 100 mg, Q12H    glycopyrrolate (ROBINUL) injection 0.1 mg, Q4H PRN    heparin (porcine) subcutaneous injection 5,000 Units, Q8H ANKIT    iohexol (OMNIPAQUE) 240 MG/ML solution 50 mL, 90 min pre-procedure    metoprolol (LOPRESSOR) injection 2.5 mg, Q6H    multivitamin stress formula tablet 1 tablet, Daily    nitroglycerin (NITROSTAT) SL tablet 0.4 mg, Q5 Min PRN    nystatin (MYCOSTATIN) cream, BID    ondansetron (ZOFRAN) injection 4 mg, Q6H PRN    pantoprazole (PROTONIX) injection 40 mg, Q12H    phenol (CHLORASEPTIC) 1.4 % mucosal liquid 1 spray, Q2H PRN    senna (SENOKOT) tablet 17.2 mg, HS    Administrative Statements   Today, Patient Was Seen By: Sallie Hollenbaugh, CRNP  I have spent a total time of 40 minutes in caring for this patient on the day of the visit/encounter including Diagnostic results, Patient and family education, Documenting in the  medical record, Reviewing / ordering tests, medicine, procedures  , Obtaining or reviewing history  , and Communicating with other healthcare professionals .    **Please Note: This note may have been constructed using a voice recognition system.**

## 2025-01-24 NOTE — ASSESSMENT & PLAN NOTE
Presented for suspected GI bleed  CT abdomen and pelvis 1/15/2025 with focal wall thickening of the lower esophagus, gastroesophageal junction, and proximal stomach which may reflect esophagitis/gastritis.  No evidence for bowel obstruction, mesenteric inflammation, appendicitis, obstructive uropathy, free air or free fluid  Chest x-ray 1/15/2025 with gaseous distention of the stomach  KUB 1/16/2025 with worsening distention  NG was placed  CT abdomen pelvis with contrast 1/17/2024: New small bilateral pleural effusions with extensive bibasilar atelectasis. Wall thickening of the lower esophagus and proximal stomach is again noted without interval change. This may reflect mild focal esophagitis/gastritis. New NG tube with tip terminating in the gastric body region. No evidence of bowel obstruction, mesenteric inflammation, appendicitis, obstructive uropathy, free air, or free fluid. Descending and sigmoid diverticulosis again noted without evidence for acute diverticulitis. Subtle wall thickening of the mid to distal sigmoid colon, rectum, and anal verge region could reflect mild focal colitis/proctitis. Multiple punctate pancreatic calcifications again seen likely sequelae of chronic pancreatitis  1/20 patient passed NG clamping trial and evaluated by SLP but did not pass   Keep NPO   Nutritional recommendations for tube feeds  Initiating trickle feeding through NG 1/22  Patient not tolerating tube feed overnight, having residuals, rhonchi bilaterally requiring suctioning  Speech therapy continues to follow-recommend continued n.p.o.  Appreciate GI who are following-after goals of care discussion family wants PEG tube.  GI is aware.  Continue Protonix 40 mg IV twice daily  Continue with conservative management for now.  Please see also, goals of care

## 2025-01-24 NOTE — ASSESSMENT & PLAN NOTE
In setting of poor oral intake  Initiated D5 and a half today 50 cc an hour  We will monitor fluid status closely  BMP a.m.

## 2025-01-24 NOTE — SPEECH THERAPY NOTE
Speech Language/Pathology    Speech/Language Pathology Progress Note    Patient Name: Prem Mar Sr.  Today's Date: 1/24/2025     Problem List  Principal Problem:    Upper GI bleed  Active Problems:    Essential hypertension    Idiopathic chronic gout of multiple sites without tophus    Mixed hyperlipidemia    Chronic kidney disease, stage 3a (HCC)    Atrial flutter (HCC)    Constipation    Goals of care, counseling/discussion    Acute distention of stomach    COVID-19 virus infection    Congestion of upper airway    Opacity noted on imaging study    Palliative care by specialist    Electrolyte abnormality    Hypernatremia       Past Medical History  Past Medical History:   Diagnosis Date    Arthritis     Cataract     Coronary artery disease     Coronary atherosclerosis of native coronary artery     Diverticulitis of colon     Essential hypertension, benign     Hyperlipidemia     Hypertension     Peptic ulcer     Renal disorder         Past Surgical History  Past Surgical History:   Procedure Laterality Date    CATARACT EXTRACTION Right     Left eye 5/2021    CORONARY STENT PLACEMENT      SKIN BIOPSY      TONSILLECTOMY           Subjective:  Pt was positioned upright and alerted to stim.   Objective:  Pt was seen for f/u dysphagia therapy for po potential.  Positioned fully upright.  Rhonci present. Pt audible wet vocal quality and weak cough upon ST arrival. Verbal cues provided for pt to cough to remove secretions. Removed mod amount of thick yellow secretions. Cough is inconsistent in bringing up secretions at level to be removed via suction. Did not administer ice chips as pt high risk of aspiration and decreased ability to manage own secretions. Reviewed with son. Son questioned about continuous suction, informed son suction can not be provided too frequently as will result in trauma. ST also expressed concern for comfort of pt with frequent suctioning and pharyngeal congestion. Son questioned about  nutrition through IV. Also son expressed  would like to continue with swallow treatment. Reviewed session with CRNP and palliative.   Assessment:  Continues with mod amount of secretions. Frequent suctioning. Cough varies.   Plan/Recommendations:  Recommend continue strict NPO  Continue with frequent oral care as tolerated.   Ongoing GOC discussion  ST will continue to f/u as indicated.

## 2025-01-25 LAB
ANION GAP SERPL CALCULATED.3IONS-SCNC: 5 MMOL/L (ref 4–13)
BUN SERPL-MCNC: 18 MG/DL (ref 5–25)
CALCIUM SERPL-MCNC: 8.2 MG/DL (ref 8.4–10.2)
CHLORIDE SERPL-SCNC: 115 MMOL/L (ref 96–108)
CO2 SERPL-SCNC: 29 MMOL/L (ref 21–32)
CREAT SERPL-MCNC: 0.81 MG/DL (ref 0.6–1.3)
ERYTHROCYTE [DISTWIDTH] IN BLOOD BY AUTOMATED COUNT: 16.2 % (ref 11.6–15.1)
GFR SERPL CREATININE-BSD FRML MDRD: 72 ML/MIN/1.73SQ M
GLUCOSE SERPL-MCNC: 115 MG/DL (ref 65–140)
HCT VFR BLD AUTO: 33.3 % (ref 36.5–49.3)
HGB BLD-MCNC: 10.5 G/DL (ref 12–17)
MAGNESIUM SERPL-MCNC: 2.2 MG/DL (ref 1.9–2.7)
MCH RBC QN AUTO: 31.3 PG (ref 26.8–34.3)
MCHC RBC AUTO-ENTMCNC: 31.5 G/DL (ref 31.4–37.4)
MCV RBC AUTO: 99 FL (ref 82–98)
PHOSPHATE SERPL-MCNC: 1.7 MG/DL (ref 2.3–4.1)
PLATELET # BLD AUTO: 295 THOUSANDS/UL (ref 149–390)
PMV BLD AUTO: 10 FL (ref 8.9–12.7)
POTASSIUM SERPL-SCNC: 3.2 MMOL/L (ref 3.5–5.3)
PROCALCITONIN SERPL-MCNC: 0.24 NG/ML
RBC # BLD AUTO: 3.35 MILLION/UL (ref 3.88–5.62)
SODIUM SERPL-SCNC: 149 MMOL/L (ref 135–147)
WBC # BLD AUTO: 13.3 THOUSAND/UL (ref 4.31–10.16)

## 2025-01-25 PROCEDURE — 84145 PROCALCITONIN (PCT): CPT

## 2025-01-25 PROCEDURE — 83735 ASSAY OF MAGNESIUM: CPT

## 2025-01-25 PROCEDURE — 80048 BASIC METABOLIC PNL TOTAL CA: CPT

## 2025-01-25 PROCEDURE — 99233 SBSQ HOSP IP/OBS HIGH 50: CPT

## 2025-01-25 PROCEDURE — 84100 ASSAY OF PHOSPHORUS: CPT

## 2025-01-25 PROCEDURE — 85027 COMPLETE CBC AUTOMATED: CPT

## 2025-01-25 RX ORDER — DEXTROSE MONOHYDRATE 50 MG/ML
75 INJECTION, SOLUTION INTRAVENOUS CONTINUOUS
Status: DISCONTINUED | OUTPATIENT
Start: 2025-01-25 | End: 2025-02-02

## 2025-01-25 RX ORDER — SCOPOLAMINE 1 MG/3D
1 PATCH, EXTENDED RELEASE TRANSDERMAL
Status: DISCONTINUED | OUTPATIENT
Start: 2025-01-25 | End: 2025-02-05 | Stop reason: HOSPADM

## 2025-01-25 RX ADMIN — Medication 2000 UNITS: at 09:32

## 2025-01-25 RX ADMIN — HEPARIN SODIUM 5000 UNITS: 5000 INJECTION INTRAVENOUS; SUBCUTANEOUS at 14:02

## 2025-01-25 RX ADMIN — DEXTROSE AND SODIUM CHLORIDE 50 ML/HR: 5; .45 INJECTION, SOLUTION INTRAVENOUS at 10:03

## 2025-01-25 RX ADMIN — HEPARIN SODIUM 5000 UNITS: 5000 INJECTION INTRAVENOUS; SUBCUTANEOUS at 22:33

## 2025-01-25 RX ADMIN — GABAPENTIN 100 MG: 100 CAPSULE ORAL at 18:35

## 2025-01-25 RX ADMIN — SENNOSIDES 17.2 MG: 8.6 TABLET, FILM COATED ORAL at 22:32

## 2025-01-25 RX ADMIN — NYSTATIN 1 APPLICATION: 100000 CREAM TOPICAL at 18:43

## 2025-01-25 RX ADMIN — SCOPOLAMINE 1 PATCH: 1.5 PATCH, EXTENDED RELEASE TRANSDERMAL at 18:35

## 2025-01-25 RX ADMIN — HEPARIN SODIUM 5000 UNITS: 5000 INJECTION INTRAVENOUS; SUBCUTANEOUS at 05:10

## 2025-01-25 RX ADMIN — METOROPROLOL TARTRATE 2.5 MG: 5 INJECTION, SOLUTION INTRAVENOUS at 20:36

## 2025-01-25 RX ADMIN — DOCUSATE SODIUM 100 MG: 50 LIQUID ORAL at 09:33

## 2025-01-25 RX ADMIN — CEFTRIAXONE 1000 MG: 1 INJECTION, SOLUTION INTRAVENOUS at 15:57

## 2025-01-25 RX ADMIN — DEXTROSE 75 ML/HR: 5 SOLUTION INTRAVENOUS at 11:54

## 2025-01-25 RX ADMIN — FENOFIBRATE 48 MG: 48 TABLET, FILM COATED ORAL at 09:32

## 2025-01-25 RX ADMIN — NYSTATIN 1 APPLICATION: 100000 CREAM TOPICAL at 09:34

## 2025-01-25 RX ADMIN — ALLOPURINOL 300 MG: 300 TABLET ORAL at 09:32

## 2025-01-25 RX ADMIN — PANTOPRAZOLE SODIUM 40 MG: 40 INJECTION, POWDER, FOR SOLUTION INTRAVENOUS at 05:10

## 2025-01-25 RX ADMIN — B-COMPLEX W/ C & FOLIC ACID TAB 1 TABLET: TAB at 09:32

## 2025-01-25 RX ADMIN — POTASSIUM PHOSPHATE 9 MMOL: 236; 224 INJECTION, SOLUTION INTRAVENOUS at 14:04

## 2025-01-25 RX ADMIN — METOROPROLOL TARTRATE 2.5 MG: 5 INJECTION, SOLUTION INTRAVENOUS at 09:33

## 2025-01-25 RX ADMIN — METOROPROLOL TARTRATE 2.5 MG: 5 INJECTION, SOLUTION INTRAVENOUS at 03:07

## 2025-01-25 RX ADMIN — GABAPENTIN 100 MG: 100 CAPSULE ORAL at 05:30

## 2025-01-25 RX ADMIN — DOCUSATE SODIUM 100 MG: 50 LIQUID ORAL at 18:40

## 2025-01-25 RX ADMIN — PANTOPRAZOLE SODIUM 40 MG: 40 INJECTION, POWDER, FOR SOLUTION INTRAVENOUS at 18:37

## 2025-01-25 RX ADMIN — METOROPROLOL TARTRATE 2.5 MG: 5 INJECTION, SOLUTION INTRAVENOUS at 13:56

## 2025-01-25 RX ADMIN — BISACODYL 10 MG: 10 SUPPOSITORY RECTAL at 09:32

## 2025-01-25 NOTE — PLAN OF CARE
Problem: RESPIRATORY - ADULT  Goal: Achieves optimal ventilation and oxygenation  Description: INTERVENTIONS:  - Assess for changes in respiratory status  - Assess for changes in mentation and behavior  - Position to facilitate oxygenation and minimize respiratory effort  - Oxygen administered by appropriate delivery if ordered  - Initiate smoking cessation education as indicated  - Encourage broncho-pulmonary hygiene including cough, deep breathe, Incentive Spirometry  - Assess the need for suctioning and aspirate as needed  - Assess and instruct to report SOB or any respiratory difficulty  - Respiratory Therapy support as indicated  Outcome: Progressing     Problem: Nutrition/Hydration-ADULT  Goal: Nutrient/Hydration intake appropriate for improving, restoring or maintaining nutritional needs  Description: Monitor and assess patient's nutrition/hydration status for malnutrition. Collaborate with interdisciplinary team and initiate plan and interventions as ordered.  Monitor patient's weight and dietary intake as ordered or per policy. Utilize nutrition screening tool and intervene as necessary. Determine patient's food preferences and provide high-protein, high-caloric foods as appropriate.     INTERVENTIONS:  - Monitor oral intake, urinary output, labs, and treatment plans  - Assess nutrition and hydration status and recommend course of action  - Evaluate amount of meals eaten  - Assist patient with eating if necessary   - Allow adequate time for meals  - Recommend/ encourage appropriate diets, oral nutritional supplements, and vitamin/mineral supplements  - Order, calculate, and assess calorie counts as needed  - Recommend, monitor, and adjust tube feedings and TPN/PPN based on assessed needs  - Assess need for intravenous fluids  - Provide specific nutrition/hydration education as appropriate  - Include patient/family/caregiver in decisions related to nutrition  Outcome: Progressing     Problem:  GASTROINTESTINAL - ADULT  Goal: Maintains or returns to baseline bowel function  Description: INTERVENTIONS:  - Assess bowel function  - Encourage oral fluids to ensure adequate hydration  - Administer IV fluids if ordered to ensure adequate hydration  - Administer ordered medications as needed  - Encourage mobilization and activity  - Consider nutritional services referral to assist patient with adequate nutrition and appropriate food choices  Outcome: Progressing

## 2025-01-25 NOTE — ASSESSMENT & PLAN NOTE
In setting of poor oral intake  Started D5W 75 an hour x 12 hours today  We will monitor fluid status closely  BMP a.m.

## 2025-01-25 NOTE — CASE MANAGEMENT
Case Management Assessment    Patient name Prem Mar .  Location /-01 MRN [id number]  : 10/11/1924 Date 2025       Current Admission Date: 1/15/2025  Current Admission Diagnosis:Upper GI bleed   Patient Active Problem List    Diagnosis Date Noted Date Diagnosed    Hypernatremia 2025     Electrolyte abnormality 2025     Palliative care by specialist 2025     Opacity noted on imaging study 2025     Congestion of upper airway 2025     Acute distention of stomach 2025     COVID-19 virus infection 2025     Goals of care, counseling/discussion 2025     Upper GI bleed 01/15/2025     Constipation 01/15/2025     Tinea cruris 10/12/2024     Weakness 10/11/2024     Atrial flutter (HCC) 2024     Chronic pain syndrome 2024     Lumbar spondylosis 2024     Chronic midline low back pain without sciatica 2024     Nonexudative age-related macular degeneration of left eye 2023     Chronic kidney disease, stage 3a (HCC) 2023     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     Muscle wasting and atrophy, not elsewhere classified, multiple sites 2022     Difficulty swallowing 2022     Keratoma 2021     Venous insufficiency of both lower extremities 2021     Hypomagnesemia 2020     Mixed hyperlipidemia 2020     Coronary artery disease involving native coronary artery of native heart without angina pectoris 2018     Essential hypertension 2018     Bilateral leg edema 2018     Ventricular bigeminy 2018     Idiopathic chronic gout of multiple sites without tophus 05/10/2017     Edema 2014     Vitamin D deficiency 2013       LOS (days): 8  Geometric Mean LOS (GMLOS) (days): 4.5  Days to GMLOS:-4     OBJECTIVE:    Risk of Unplanned Readmission Score: 22.47         Current admission status: Inpatient       Preferred Pharmacy:    [id number] - [place] - [address]  [address]  [place] [address]  Phone: [phone] Fax: [phone]    Primary Care Provider: Leonard Schreiber MD    Primary Insurance: JENNIFER ZENG  Secondary Insurance:     ASSESSMENT:  Active Health Care Proxies       Prem Mar Jr Health Care Representative - Son   Primary Phone: [phone] (Home)  Mobile Phone: [phone]

## 2025-01-25 NOTE — ASSESSMENT & PLAN NOTE
Upper airway congestion noted, patient has strong cough and able to clear  CT imaging revealed small bilateral pleural effusion and extensive bibasilar atelectasis  Maintain head of bed elevated  Aspiration precautions  Appreciate input of speech therapy who are following  Continue Robinul 0.1 mg IV every 4 hours as needed  Add scopolamine today  Continue incentive spirometry  Suction as needed

## 2025-01-25 NOTE — ASSESSMENT & PLAN NOTE
Lab Results   Component Value Date    EGFR 72 01/25/2025    EGFR 74 01/24/2025    EGFR 74 01/23/2025    CREATININE 0.81 01/25/2025    CREATININE 0.78 01/24/2025    CREATININE 0.76 01/23/2025     Creatinine appears to be around baseline of 0.9-1.1 mg/dL   Creatinine better than baseline currently  Continue to monitor renal function closely  Avoid nephrotoxins and hypotension  BMP a.m.

## 2025-01-25 NOTE — ASSESSMENT & PLAN NOTE
Patient with significant comorbid medical conditions would be high risk for treatment options requiring anesthesia.  EGD testing might have risks that outweigh the benefits.  Patient verbalized understanding.  Opened goals of care conversations with son.  Continued on phone on Saturday. Had further conversations with patient's son, daughter, and grandson when they arrived.  Ultimately they would like to keep him comfortable but for now continue with full treatment   Appreciate input of palliative care continue to follow, addressing goals of care  1/22 had at length discussion with patient's son at bedside. He reports he would like to trial tube feeds via the NG tube.  Still undecided about PEG.  Will initiate tube feed via NG per recommendations of nutrition  1/23 patient not tolerating tube feed overnight, having residuals and rhonchi bilateral lungs.  Stop tube feed.  Will check imaging.  Again had at length discussion with patient's son bedside.  Did make him aware that patient will unlikely be able to tolerate oral feedings anymore.  He is also aware that patient is not currently tolerating his tube feed well.  He remains undecided about PEG tube placement.  I did speak with him at length that patient will likely have repeated issues with aspiration and frequent hospitalizations if we are going to continue to pursue treatment.  Suggested that he speak with his sister and the patient and that we would need to come up with a decision.  Will follow-up again tomorrow.  1/24 patient condition unchanged.  Please see palliative care notes from today.  After ongoing of care discussion it was decided family wants PEG tube.  Undetermined at this time if GI will place PEG tube, they are discussing it.  Would not be done on the weekend will have decision by Monday.  General surgery is also aware and would be available for PEG, however would not be done till Monday.  Will continue IV fluid hydration.  Will not resume tube  feed via NG as patient was requiring frequent suction of what appeared to be tube feed.  1/25 family still wants PEG tube-will discuss with GI again Monday

## 2025-01-25 NOTE — PROGRESS NOTES
Progress Note - Hospitalist   Name: Prem Mar Sr. 89 y.o. male I MRN: [id number]  Unit/Bed#: [facility] Date of Admission: 1/15/2025   Date of Service: 1/25/2025 I Hospital Day: 9    Assessment & Plan  Upper GI bleed  Presented for suspected GI bleed  CT abdomen and pelvis 1/15/2025 with focal wall thickening of the lower esophagus, gastroesophageal junction, and proximal stomach which may reflect esophagitis/gastritis.  No evidence for bowel obstruction, mesenteric inflammation, appendicitis, obstructive uropathy, free air or free fluid  Chest x-ray 1/15/2025 with gaseous distention of the stomach  KUB 1/16/2025 with worsening distention  NG was placed  CT abdomen pelvis with contrast 1/17/2024: New small bilateral pleural effusions with extensive bibasilar atelectasis. Wall thickening of the lower esophagus and proximal stomach is again noted without interval change. This may reflect mild focal esophagitis/gastritis. New NG tube with tip terminating in the gastric body region. No evidence of bowel obstruction, mesenteric inflammation, appendicitis, obstructive uropathy, free air, or free fluid. Descending and sigmoid diverticulosis again noted without evidence for acute diverticulitis. Subtle wall thickening of the mid to distal sigmoid colon, rectum, and anal verge region could reflect mild focal colitis/proctitis. Multiple punctate pancreatic calcifications again seen likely sequelae of chronic pancreatitis  1/20 patient passed NG clamping trial and evaluated by SLP but did not pass   Keep NPO   Nutritional recommendations for tube feeds  Initiating trickle feeding through NG 1/22  Patient not tolerating tube feed overnight, having residuals, rhonchi bilaterally requiring suctioning  Speech therapy continues to follow-recommend continued n.p.o.  Appreciate GI who are following-after goals of care discussion family wants PEG tube.  GI is aware.  Continue Protonix 40 mg IV twice daily  Continue with  conservative management for now.  Please see also, goals of care  Opacity noted on imaging study  Chest x-ray: Retrocardiac opacity, which may be due to pneumonia and/or atelectasis.  Procalcitonin peaked and downtrending, with intermittent fever  Per discussion with ID pharmacy will de-escalate antibiotics to ceftriaxone 1 g every 24 hours  Repeat CXR-slight improvement in left base patchy atelectasis versus pneumonia  Monitor labs and vital signs, conduct serial physical assessments    COVID-19 virus infection  Patient with fever and COVID-positive roommate at his skilled facility  Tested positive for COVID 9/17  Continues to require 2 L nasal cannula oxygen  S/p remdesivir x3 days  Continue supportive care  Isolation precautions  Monitor labs and vital signs, conduct serial physical assessments  Congestion of upper airway  Upper airway congestion noted, patient has strong cough and able to clear  CT imaging revealed small bilateral pleural effusion and extensive bibasilar atelectasis  Maintain head of bed elevated  Aspiration precautions  Appreciate input of speech therapy who are following  Continue Robinul 0.1 mg IV every 4 hours as needed  Add scopolamine today  Continue incentive spirometry  Suction as needed  Goals of care, counseling/discussion  Patient with significant comorbid medical conditions would be high risk for treatment options requiring anesthesia.  EGD testing might have risks that outweigh the benefits.  Patient verbalized understanding.  Opened goals of care conversations with son.  Continued on phone on Saturday. Had further conversations with patient's son, daughter, and grandson when they arrived.  Ultimately they would like to keep him comfortable but for now continue with full treatment   Appreciate input of palliative care continue to follow, addressing goals of care  1/22 had at length discussion with patient's son at bedside. He reports he would like to trial tube feeds via the NG  tube.  Still undecided about PEG.  Will initiate tube feed via NG per recommendations of nutrition  1/23 patient not tolerating tube feed overnight, having residuals and rhonchi bilateral lungs.  Stop tube feed.  Will check imaging.  Again had at length discussion with patient's son bedside.  Did make him aware that patient will unlikely be able to tolerate oral feedings anymore.  He is also aware that patient is not currently tolerating his tube feed well.  He remains undecided about PEG tube placement.  I did speak with him at length that patient will likely have repeated issues with aspiration and frequent hospitalizations if we are going to continue to pursue treatment.  Suggested that he speak with his sister and the patient and that we would need to come up with a decision.  Will follow-up again tomorrow.  1/24 patient condition unchanged.  Please see palliative care notes from today.  After ongoing of care discussion it was decided family wants PEG tube.  Undetermined at this time if GI will place PEG tube, they are discussing it.  Would not be done on the weekend will have decision by Monday.  General surgery is also aware and would be available for PEG, however would not be done till Monday.  Will continue IV fluid hydration.  Will not resume tube feed via NG as patient was requiring frequent suction of what appeared to be tube feed.  1/25 family still wants PEG tube-will discuss with GI again Monday  Idiopathic chronic gout of multiple sites without tophus  Continue prehospital allopurinol 300 mg p.o. daily when tolerating p.o.  Essential hypertension  Prehospital takesToprol-XL 25 mg p.o. daily and Lasix 20 mg p.o. daily on Monday Wednesday Friday.   Currently NPO. Metoprolol switched to IV. Received furosemide 40 mg IV x 1 1/19/2025  Blood pressures controlled  Monitor BP per protocol  Mixed hyperlipidemia  Continue prehospital Tricor 48 mg p.o. daily when tolerating p.o.  Chronic kidney disease, stage  3a (HCC)  Lab Results   Component Value Date    EGFR 72 01/25/2025    EGFR 74 01/24/2025    EGFR 74 01/23/2025    CREATININE 0.81 01/25/2025    CREATININE 0.78 01/24/2025    CREATININE 0.76 01/23/2025     Creatinine appears to be around baseline of 0.9-1.1 mg/dL   Creatinine better than baseline currently  Continue to monitor renal function closely  Avoid nephrotoxins and hypotension  BMP a.m.  Atrial flutter (HCC)  Currently rate controlled with metoprolol   Not currently on anticoagulation as outpatient  Constipation  GI input appreciated  Continue bowel regimen   Acute distention of stomach  Appreciate input of GI who are following  N.p.o.  Appreciate input of nutrition  1/22 initiated trickle feed tube feed today through NG tube-reported patient having residuals today, rhonchi bilateral lungs.  Placed tube feed on hold 1/23  Speech therapy continues to work with patient-still not appropriate for n.p.o.  See goals of care discussion  Palliative care by specialist  Appreciate input of palliative care who continue to follow-see goals of care discussion  Electrolyte abnormality  Patient was initiated on trickle tube feed on 1/22  High risk for refeeding syndrome  Continue to monitor electrolytes and replete accordingly  BMP, magnesium, phosphorus a.m.  Hypernatremia  In setting of poor oral intake  Started D5W 75 an hour x 12 hours today  We will monitor fluid status closely  BMP a.m.    VTE Pharmacologic Prophylaxis: VTE Score: 5 High Risk (Score >/= 5) - Pharmacological DVT Prophylaxis Ordered: heparin. Sequential Compression Devices Ordered.    Mobility:   Basic Mobility Inpatient Raw Score: 6  -HLM Goal: 2: Bed activities/Dependent transfer  JH-HLM Achieved: 1: Laying in bed  JH-HLM Goal NOT achieved. Continue with multidisciplinary rounding and encourage appropriate mobility to improve upon JH-HLM goals.    Patient Centered Rounds: I performed bedside rounds with nursing staff today.   Discussions with  Specialists or Other Care Team Provider: GI, palliative care nursing, case management    Education and Discussions with Family / Patient: Updated  (son and grandson) at bedside.    Current Length of Stay: 9 day(s)  Current Patient Status: Inpatient   Certification Statement: The patient will continue to require additional inpatient hospital stay due to ongoing goals of care, family now requesting PEG tube GI continues to follow  Discharge Plan: Greater than 72 hours to skilled facility  Code Status: Level 3 - DNAR and DNI    Subjective   Patient offers no complaints of discomfort when asked.  Family is at bedside.      Objective :  Temp:  [98.7 °F (37.1 °C)-100.5 °F (38.1 °C)] 98.9 °F (37.2 °C)  HR:  [76-91] 91  BP: (115-149)/(39-56) 149/49  Resp:  [17-22] 22  SpO2:  [93 %-97 %] 94 %  O2 Device: Nasal cannula  Nasal Cannula O2 Flow Rate (L/min):  [2 L/min] 2 L/min    Body mass index is 24.98 kg/m².     Input and Output Summary (last 24 hours):     Intake/Output Summary (Last 24 hours) at 1/25/2025 1609  Last data filed at 1/25/2025 1241  Gross per 24 hour   Intake 534.3 ml   Output 100 ml   Net 434.3 ml       Physical Exam  Vitals reviewed.   Constitutional:       General: He is not in acute distress.     Appearance: He is well-developed.   HENT:      Head: Normocephalic and atraumatic.   Cardiovascular:      Rate and Rhythm: Normal rate and regular rhythm.      Pulses: Normal pulses.      Heart sounds: Normal heart sounds. No murmur heard.  Pulmonary:      Effort: Pulmonary effort is normal. No respiratory distress.      Breath sounds: Rhonchi present. No wheezing or rales.      Comments: Nonlabored respirations at rest on O2 2 L nasal cannula occasional moist cough  Abdominal:      General: There is no distension.      Palpations: Abdomen is soft.      Tenderness: There is no abdominal tenderness. There is no guarding.   Musculoskeletal:         General: No swelling. Normal range of motion.   Skin:      General: Skin is warm and dry.      Capillary Refill: Capillary refill takes less than 2 seconds.   Neurological:      General: No focal deficit present.      Mental Status: He is alert. Mental status is at baseline.   Psychiatric:         Mood and Affect: Mood normal.         Behavior: Behavior normal.           Lines/Drains:  Lines/Drains/Airways       Active Status       Name Placement date Placement time Site Days    NG/OG/Enteral Tube Nasogastric 18 Fr Right nare 01/16/25  1052  Right nare  9                            Lab Results: I have reviewed the following results:   Results from last 7 days   Lab Units 01/25/25  0521 01/22/25 0448 01/21/25 0441   WBC Thousand/uL 13.30*   < > 10.62*   HEMOGLOBIN g/dL 10.5*   < > 11.0*   HEMATOCRIT % 33.3*   < > 33.8*   PLATELETS Thousands/uL 295   < > 244   SEGS PCT %  --   --  89*   LYMPHO PCT %  --   --  7*   MONO PCT %  --   --  3*   EOS PCT %  --   --  0    < > = values in this interval not displayed.     Results from last 7 days   Lab Units 01/25/25  0521 01/22/25 0448 01/21/25  0441   SODIUM mmol/L 149*   < > 143   POTASSIUM mmol/L 3.2*   < > 3.1*   CHLORIDE mmol/L 115*   < > 107   CO2 mmol/L 29   < > 26   BUN mg/dL 18   < > 30*   CREATININE mg/dL 0.81   < > 1.07   ANION GAP mmol/L 5   < > 10   CALCIUM mg/dL 8.2*   < > 8.5   ALBUMIN g/dL  --   --  3.0*   TOTAL BILIRUBIN mg/dL  --   --  0.56   ALK PHOS U/L  --   --  49   ALT U/L  --   --  21   AST U/L  --   --  42*   GLUCOSE RANDOM mg/dL 115   < > 106    < > = values in this interval not displayed.         Results from last 7 days   Lab Units 01/21/25  1138 01/21/25  0747   POC GLUCOSE mg/dl 111 92         Results from last 7 days   Lab Units 01/23/25  0447 01/21/25  0441 01/20/25  0426 01/19/25  0523   PROCALCITONIN ng/ml 0.69* 1.53* 1.43* 1.82*       Recent Cultures (last 7 days):         Imaging Results Review: I reviewed radiology reports from this admission including: Chest x-ray, CT pelvis, CT abdomen  pelvis, follow-up chest x-ray x 4, KUB,.  Other Study Results Review: No additional pertinent studies reviewed.    Last 24 Hours Medication List:     Current Facility-Administered Medications:     acetaminophen (Ofirmev) injection 1,000 mg, Q8H PRN, Last Rate: 1,000 mg (01/24/25 2153)    acetaminophen (TYLENOL) tablet 650 mg, Q4H PRN    allopurinol (ZYLOPRIM) tablet 300 mg, Daily    bisacodyl (DULCOLAX) rectal suppository 10 mg, Daily    bisacodyl (DULCOLAX) rectal suppository 10 mg, Daily PRN    cefTRIAXone (ROCEPHIN) IVPB (premix in dextrose) 1,000 mg 50 mL, Q24H, Last Rate: 1,000 mg (01/25/25 1557)    Cholecalciferol (VITAMIN D3) tablet 2,000 Units, Daily    dextrose 5 % infusion, Continuous, Last Rate: 75 mL/hr (01/25/25 1154)    docusate (COLACE) oral liquid 100 mg, BID    fenofibrate (TRICOR) tablet 48 mg, Daily    [Held by provider] furosemide (LASIX) tablet 20 mg, Once per day on Monday Wednesday Friday    gabapentin (NEURONTIN) capsule 100 mg, Q12H    glycopyrrolate (ROBINUL) injection 0.1 mg, Q4H PRN    heparin (porcine) subcutaneous injection 5,000 Units, Q8H ANKIT    iohexol (OMNIPAQUE) 240 MG/ML solution 50 mL, 90 min pre-procedure    metoprolol (LOPRESSOR) injection 2.5 mg, Q6H    multivitamin stress formula tablet 1 tablet, Daily    nitroglycerin (NITROSTAT) SL tablet 0.4 mg, Q5 Min PRN    nystatin (MYCOSTATIN) cream, BID    ondansetron (ZOFRAN) injection 4 mg, Q6H PRN    pantoprazole (PROTONIX) injection 40 mg, Q12H    phenol (CHLORASEPTIC) 1.4 % mucosal liquid 1 spray, Q2H PRN    scopolamine (TRANSDERM-SCOP) 1 mg/3 days TD 72 hr patch 1 patch, Q72H    senna (SENOKOT) tablet 17.2 mg, HS    Administrative Statements   Today, Patient Was Seen By: ANAM Harrison  I have spent a total time of 37 minutes in caring for this patient on the day of the visit/encounter including Diagnostic results, Patient and family education, Documenting in the medical record, Reviewing / ordering tests, medicine,  procedures  , Obtaining or reviewing history  , and Communicating with other healthcare professionals .    **Please Note: This note may have been constructed using a voice recognition system.**

## 2025-01-25 NOTE — PLAN OF CARE
Problem: PAIN - ADULT  Goal: Verbalizes/displays adequate comfort level or baseline comfort level  Description: Interventions:  - Encourage patient to monitor pain and request assistance  - Assess pain using appropriate pain scale  - Administer analgesics based on type and severity of pain and evaluate response  - Implement non-pharmacological measures as appropriate and evaluate response  - Consider cultural and social influences on pain and pain management  - Notify physician/advanced practitioner if interventions unsuccessful or patient reports new pain  Outcome: Progressing     Problem: INFECTION - ADULT  Goal: Absence or prevention of progression during hospitalization  Description: INTERVENTIONS:  - Assess and monitor for signs and symptoms of infection  - Monitor lab/diagnostic results  - Monitor all insertion sites, i.e. indwelling lines, tubes, and drains  - Monitor endotracheal if appropriate and nasal secretions for changes in amount and color  - [place] appropriate cooling/warming therapies per order  - Administer medications as ordered  - Instruct and encourage patient and family to use good hand hygiene technique  - Identify and instruct in appropriate isolation precautions for identified infection/condition  Outcome: Progressing  Goal: Absence of fever/infection during neutropenic period  Description: INTERVENTIONS:  - Monitor WBC    Outcome: Progressing  Goal: Infection Control Precautions  Outcome: Progressing     Problem: SAFETY ADULT  Goal: Patient will remain free of falls  Description: INTERVENTIONS:  - Educate patient/family on patient safety including physical limitations  - Instruct patient to call for assistance with activity   - Consult OT/PT to assist with strengthening/mobility   - Keep Call bell within reach  - Keep bed low and locked with side rails adjusted as appropriate  - Keep care items and personal belongings within reach  - Initiate and maintain comfort rounds  - Make Fall  Risk Sign visible to staff  - Offer Toileting every 2 Hours, in advance of need  - Initiate/Maintain bed alarm  - Obtain necessary fall risk management equipment: non slip socks  - Apply yellow socks and bracelet for high fall risk patients  - Consider moving patient to room near nurses station  Outcome: Progressing  Goal: Maintain or return to baseline ADL function  Description: INTERVENTIONS:  -  Assess patient's ability to carry out ADLs; assess patient's baseline for ADL function and identify physical deficits which impact ability to perform ADLs (bathing, care of mouth/teeth, toileting, grooming, dressing, etc.)  - Assess/evaluate cause of self-care deficits   - Assess range of motion  - Assess patient's mobility; develop plan if impaired  - Assess patient's need for assistive devices and provide as appropriate  - Encourage maximum independence but intervene and supervise when necessary  - Involve family in performance of ADLs  - Assess for home care needs following discharge   - Consider OT consult to assist with ADL evaluation and planning for discharge  - Provide patient education as appropriate  Outcome: Progressing  Goal: Maintains/Returns to pre admission functional level  Description: INTERVENTIONS:  - Perform AM-PAC 6 Click Basic Mobility/ Daily Activity assessment daily.  - Set and communicate daily mobility goal to care team and patient/family/caregiver.   - Collaborate with rehabilitation services on mobility goals if consulted  - Perform Range of Motion 2 times a day.  - Reposition patient every 2 hours.  - Dangle patient 3 times a day  - Stand patient 3 times a day  - Ambulate patient 3 times a day  - Out of bed to chair 3 times a day   - Out of bed for meals 3 times a day  - Out of bed for toileting  - Record patient progress and toleration of activity level   Outcome: Progressing     Problem: DISCHARGE PLANNING  Goal: Discharge to home or other facility with appropriate resources  Description:  INTERVENTIONS:  - Identify barriers to discharge w/patient and caregiver  - Arrange for needed discharge resources and transportation as appropriate  - Identify discharge learning needs (meds, wound care, etc.)  - Arrange for interpretive services to assist at discharge as needed  - Refer to Case Management Department for coordinating discharge planning if the patient needs post-hospital services based on physician/advanced practitioner order or complex needs related to functional status, cognitive ability, or social support system  Outcome: Progressing     Problem: Knowledge Deficit  Goal: Patient/family/caregiver demonstrates understanding of disease process, treatment plan, medications, and discharge instructions  Description: Complete learning assessment and assess knowledge base.  Interventions:  - Provide teaching at level of understanding  - Provide teaching via preferred learning methods  Outcome: Progressing     Problem: Prexisting or High Potential for Compromised Skin Integrity  Goal: Skin integrity is maintained or improved  Description: INTERVENTIONS:  - Identify patients at risk for skin breakdown  - Assess and monitor skin integrity  - Assess and monitor nutrition and hydration status  - Monitor labs   - Assess for incontinence   - Turn and reposition patient  - Assist with mobility/ambulation  - Relieve pressure over bony prominences  - Avoid friction and shearing  - Provide appropriate hygiene as needed including keeping skin clean and dry  - Evaluate need for skin moisturizer/barrier cream  - Collaborate with interdisciplinary team   - Patient/family teaching  - Consider wound care consult   Outcome: Progressing     Problem: GASTROINTESTINAL - ADULT  Goal: Minimal or absence of nausea and/or vomiting  Description: INTERVENTIONS:  - Administer IV fluids if ordered to ensure adequate hydration  - Maintain NPO status until nausea and vomiting are resolved  - Nasogastric tube if ordered  - Administer  ordered antiemetic medications as needed  - Provide nonpharmacologic comfort measures as appropriate  - Advance diet as tolerated, if ordered  - Consider nutrition services referral to assist patient with adequate nutrition and appropriate food choices  Outcome: Progressing  Goal: Maintains or returns to baseline bowel function  Description: INTERVENTIONS:  - Assess bowel function  - Encourage oral fluids to ensure adequate hydration  - Administer IV fluids if ordered to ensure adequate hydration  - Administer ordered medications as needed  - Encourage mobilization and activity  - Consider nutritional services referral to assist patient with adequate nutrition and appropriate food choices  Outcome: Progressing  Goal: Maintains adequate nutritional intake  Description: INTERVENTIONS:  - Monitor percentage of each meal consumed  - Identify factors contributing to decreased intake, treat as appropriate  - Assist with meals as needed  - Monitor I&O, weight, and lab values if indicated  - Obtain nutrition services referral as needed  Outcome: Progressing  Goal: Oral mucous membranes remain intact  Description: INTERVENTIONS  - Assess oral mucosa and hygiene practices  - Implement preventative oral hygiene regimen  - Implement oral medicated treatments as ordered  - Initiate Nutrition services referral as needed  Outcome: Progressing     Problem: RESPIRATORY - ADULT  Goal: Achieves optimal ventilation and oxygenation  Description: INTERVENTIONS:  - Assess for changes in respiratory status  - Assess for changes in mentation and behavior  - Position to facilitate oxygenation and minimize respiratory effort  - Oxygen administered by appropriate delivery if ordered  - Initiate smoking cessation education as indicated  - Encourage broncho-pulmonary hygiene including cough, deep breathe, Incentive Spirometry  - Assess the need for suctioning and aspirate as needed  - Assess and instruct to report SOB or any respiratory  difficulty  - Respiratory Therapy support as indicated  Outcome: Progressing     Problem: Nutrition/Hydration-ADULT  Goal: Nutrient/Hydration intake appropriate for improving, restoring or maintaining nutritional needs  Description: Monitor and assess patient's nutrition/hydration status for malnutrition. Collaborate with interdisciplinary team and initiate plan and interventions as ordered.  Monitor patient's weight and dietary intake as ordered or per policy. Utilize nutrition screening tool and intervene as necessary. Determine patient's food preferences and provide high-protein, high-caloric foods as appropriate.     INTERVENTIONS:  - Monitor oral intake, urinary output, labs, and treatment plans  - Assess nutrition and hydration status and recommend course of action  - Evaluate amount of meals eaten  - Assist patient with eating if necessary   - Allow adequate time for meals  - Recommend/ encourage appropriate diets, oral nutritional supplements, and vitamin/mineral supplements  - Order, calculate, and assess calorie counts as needed  - Recommend, monitor, and adjust tube feedings and TPN/PPN based on assessed needs  - Assess need for intravenous fluids  - Provide specific nutrition/hydration education as appropriate  - Include patient/family/caregiver in decisions related to nutrition  Outcome: Progressing     Problem: METABOLIC, FLUID AND ELECTROLYTES - ADULT  Goal: Electrolytes maintained within normal limits  Description: INTERVENTIONS:  - Monitor labs and assess patient for signs and symptoms of electrolyte imbalances  - Administer electrolyte replacement as ordered  - Monitor response to electrolyte replacements, including repeat lab results as appropriate  - Instruct patient on fluid and nutrition as appropriate  Outcome: Progressing  Goal: Fluid balance maintained  Description: INTERVENTIONS:  - Monitor labs   - Monitor I/O and WT  - Instruct patient on fluid and nutrition as appropriate  - Assess for  signs & symptoms of volume excess or deficit  Outcome: Progressing

## 2025-01-26 LAB
ANION GAP SERPL CALCULATED.3IONS-SCNC: 6 MMOL/L (ref 4–13)
BUN SERPL-MCNC: 15 MG/DL (ref 5–25)
CALCIUM SERPL-MCNC: 8 MG/DL (ref 8.4–10.2)
CHLORIDE SERPL-SCNC: 112 MMOL/L (ref 96–108)
CO2 SERPL-SCNC: 28 MMOL/L (ref 21–32)
CREAT SERPL-MCNC: 0.85 MG/DL (ref 0.6–1.3)
ERYTHROCYTE [DISTWIDTH] IN BLOOD BY AUTOMATED COUNT: 16.4 % (ref 11.6–15.1)
GFR SERPL CREATININE-BSD FRML MDRD: 71 ML/MIN/1.73SQ M
GLUCOSE SERPL-MCNC: 114 MG/DL (ref 65–140)
HCT VFR BLD AUTO: 33.4 % (ref 36.5–49.3)
HGB BLD-MCNC: 10.1 G/DL (ref 12–17)
MAGNESIUM SERPL-MCNC: 2 MG/DL (ref 1.9–2.7)
MCH RBC QN AUTO: 30.5 PG (ref 26.8–34.3)
MCHC RBC AUTO-ENTMCNC: 30.2 G/DL (ref 31.4–37.4)
MCV RBC AUTO: 101 FL (ref 82–98)
PHOSPHATE SERPL-MCNC: 2.2 MG/DL (ref 2.3–4.1)
PLATELET # BLD AUTO: 313 THOUSANDS/UL (ref 149–390)
PMV BLD AUTO: 10.3 FL (ref 8.9–12.7)
POTASSIUM SERPL-SCNC: 3.2 MMOL/L (ref 3.5–5.3)
PROCALCITONIN SERPL-MCNC: 0.24 NG/ML
RBC # BLD AUTO: 3.31 MILLION/UL (ref 3.88–5.62)
SODIUM SERPL-SCNC: 146 MMOL/L (ref 135–147)
WBC # BLD AUTO: 15.46 THOUSAND/UL (ref 4.31–10.16)

## 2025-01-26 PROCEDURE — 85027 COMPLETE CBC AUTOMATED: CPT

## 2025-01-26 PROCEDURE — 83735 ASSAY OF MAGNESIUM: CPT

## 2025-01-26 PROCEDURE — 80048 BASIC METABOLIC PNL TOTAL CA: CPT

## 2025-01-26 PROCEDURE — 84100 ASSAY OF PHOSPHORUS: CPT

## 2025-01-26 PROCEDURE — 99233 SBSQ HOSP IP/OBS HIGH 50: CPT

## 2025-01-26 PROCEDURE — 84145 PROCALCITONIN (PCT): CPT

## 2025-01-26 RX ADMIN — METOROPROLOL TARTRATE 2.5 MG: 5 INJECTION, SOLUTION INTRAVENOUS at 09:38

## 2025-01-26 RX ADMIN — FENOFIBRATE 48 MG: 48 TABLET, FILM COATED ORAL at 09:38

## 2025-01-26 RX ADMIN — HEPARIN SODIUM 5000 UNITS: 5000 INJECTION INTRAVENOUS; SUBCUTANEOUS at 06:10

## 2025-01-26 RX ADMIN — ACETAMINOPHEN 650 MG: 325 TABLET ORAL at 09:37

## 2025-01-26 RX ADMIN — GABAPENTIN 100 MG: 100 CAPSULE ORAL at 06:10

## 2025-01-26 RX ADMIN — PANTOPRAZOLE SODIUM 40 MG: 40 INJECTION, POWDER, FOR SOLUTION INTRAVENOUS at 06:10

## 2025-01-26 RX ADMIN — DOCUSATE SODIUM 100 MG: 50 LIQUID ORAL at 17:23

## 2025-01-26 RX ADMIN — PANTOPRAZOLE SODIUM 40 MG: 40 INJECTION, POWDER, FOR SOLUTION INTRAVENOUS at 17:23

## 2025-01-26 RX ADMIN — DOCUSATE SODIUM 100 MG: 50 LIQUID ORAL at 09:38

## 2025-01-26 RX ADMIN — Medication 2000 UNITS: at 09:37

## 2025-01-26 RX ADMIN — POTASSIUM PHOSPHATE 9 MMOL: 236; 224 INJECTION, SOLUTION INTRAVENOUS at 09:52

## 2025-01-26 RX ADMIN — NYSTATIN 1 APPLICATION: 100000 CREAM TOPICAL at 17:30

## 2025-01-26 RX ADMIN — METOROPROLOL TARTRATE 2.5 MG: 5 INJECTION, SOLUTION INTRAVENOUS at 03:06

## 2025-01-26 RX ADMIN — GLYCOPYRROLATE 0.1 MG: 0.2 INJECTION, SOLUTION INTRAMUSCULAR; INTRAVENOUS at 02:14

## 2025-01-26 RX ADMIN — NYSTATIN 1 APPLICATION: 100000 CREAM TOPICAL at 09:38

## 2025-01-26 RX ADMIN — DEXTROSE 75 ML/HR: 5 SOLUTION INTRAVENOUS at 04:38

## 2025-01-26 RX ADMIN — GABAPENTIN 100 MG: 100 CAPSULE ORAL at 17:30

## 2025-01-26 RX ADMIN — HEPARIN SODIUM 5000 UNITS: 5000 INJECTION INTRAVENOUS; SUBCUTANEOUS at 14:59

## 2025-01-26 RX ADMIN — HEPARIN SODIUM 5000 UNITS: 5000 INJECTION INTRAVENOUS; SUBCUTANEOUS at 21:35

## 2025-01-26 RX ADMIN — B-COMPLEX W/ C & FOLIC ACID TAB 1 TABLET: TAB at 09:37

## 2025-01-26 RX ADMIN — METOROPROLOL TARTRATE 2.5 MG: 5 INJECTION, SOLUTION INTRAVENOUS at 21:34

## 2025-01-26 RX ADMIN — CEFTRIAXONE 1000 MG: 1 INJECTION, SOLUTION INTRAVENOUS at 14:59

## 2025-01-26 RX ADMIN — METOROPROLOL TARTRATE 2.5 MG: 5 INJECTION, SOLUTION INTRAVENOUS at 14:59

## 2025-01-26 RX ADMIN — SENNOSIDES 17.2 MG: 8.6 TABLET, FILM COATED ORAL at 21:33

## 2025-01-26 RX ADMIN — BISACODYL 10 MG: 10 SUPPOSITORY RECTAL at 09:38

## 2025-01-26 RX ADMIN — ALLOPURINOL 300 MG: 300 TABLET ORAL at 09:37

## 2025-01-26 RX ADMIN — DEXTROSE 75 ML/HR: 5 SOLUTION INTRAVENOUS at 21:10

## 2025-01-26 RX ADMIN — GLYCOPYRROLATE 0.1 MG: 0.2 INJECTION, SOLUTION INTRAMUSCULAR; INTRAVENOUS at 09:38

## 2025-01-26 NOTE — PROGRESS NOTES
Progress Note - Hospitalist   Name: Prem Mar Sr. 89 y.o. male I MRN: [id number]  Unit/Bed#: [facility] I Date of Admission: 1/15/2025   Date of Service: 1/26/2025 I Hospital Day: 10    Assessment & Plan  Upper GI bleed  Presented for suspected GI bleed  CT abdomen and pelvis 1/15/2025 with focal wall thickening of the lower esophagus, gastroesophageal junction, and proximal stomach which may reflect esophagitis/gastritis.  No evidence for bowel obstruction, mesenteric inflammation, appendicitis, obstructive uropathy, free air or free fluid  Chest x-ray 1/15/2025 with gaseous distention of the stomach  KUB 1/16/2025 with worsening distention  NG was placed  CT abdomen pelvis with contrast 1/17/2024: New small bilateral pleural effusions with extensive bibasilar atelectasis. Wall thickening of the lower esophagus and proximal stomach is again noted without interval change. This may reflect mild focal esophagitis/gastritis. New NG tube with tip terminating in the gastric body region. No evidence of bowel obstruction, mesenteric inflammation, appendicitis, obstructive uropathy, free air, or free fluid. Descending and sigmoid diverticulosis again noted without evidence for acute diverticulitis. Subtle wall thickening of the mid to distal sigmoid colon, rectum, and anal verge region could reflect mild focal colitis/proctitis. Multiple punctate pancreatic calcifications again seen likely sequelae of chronic pancreatitis  1/20 patient passed NG clamping trial and evaluated by SLP but did not pass   Keep NPO   Initiating trickle feeding through NG 1/22-discontinued 1/23 due to high residuals and rhonchi requiring frequent suctioning  Speech therapy continues to follow-patient remains n.p.o.  Continue PPI  See goals of care discussion  Hemoglobin has remained stable no signs of bleeding  Opacity noted on imaging study  Chest x-ray: Retrocardiac opacity, which may be due to pneumonia and/or atelectasis.  Procalcitonin  peaked and downtrending, with intermittent fever  Repeat CXR-slight improvement in left base patchy atelectasis versus pneumonia  Received vancomycin 1/21 through 1/22  Received cefepime 1/21 to 1/22  Transitioned to Rocephin 1/22 per recommendations of ID pharmacy  Monitor labs and vital signs, conduct serial physical assessments    COVID-19 virus infection  Patient with fever and COVID-positive roommate at his skilled facility  Tested positive for COVID 9/17  Continues to require 2 L nasal cannula oxygen  S/p remdesivir x3 days  Continue supportive care  Isolation precautions  Monitor labs and vital signs, conduct serial physical assessments  Congestion of upper airway  Upper airway congestion noted, patient has strong cough and able to clear  CT imaging revealed small bilateral pleural effusion and extensive bibasilar atelectasis  Maintain head of bed elevated  Aspiration precautions  Appreciate input of speech therapy who are following  Continue Robinul 0.1 mg IV every 4 hours as needed  Added scopolamine 1/25  Continue incentive spirometry  Suction as needed  Goals of care, counseling/discussion  Patient with significant comorbid medical conditions would be high risk for treatment options requiring anesthesia.  EGD testing might have risks that outweigh the benefits.  Patient verbalized understanding.  Opened goals of care conversations with son.  Continued on phone on Saturday. Had further conversations with patient's son, daughter, and grandson when they arrived.  Ultimately they would like to keep him comfortable but for now continue with full treatment   Appreciate input of palliative care continue to follow, addressing goals of care  1/22 had at length discussion with patient's son at bedside.  Undecided about PEG tube.  Initiated trickle feeds through NG   1/23 patient not tolerating tube feed overnight, having residuals and rhonchi bilateral lungs.  Tube feeds stopped due to residuals and rhonchi in  lungs requiring frequent suctioning.  Continue IV fluids.  At length discussion with patient's son still undecided about PEG tube.  Advised patient would likely have frequent hospitalizations due to high risk for aspiration/pneumonia.  Advised to talk to his sister to come up with a decision regarding patient's goals of care comfort versus feeding tube.  1/24 patient condition unchanged.  Please see palliative care notes from today.  After ongoing of care discussion it was decided family wants PEG tube.  Undetermined at this time if GI will place PEG tube, they are discussing it.  Would not be done on the weekend will have decision by 1/27.  General surgery is also aware and would be available for PEG, however would not be done until 1/27.  Will continue IV fluid hydration.  Will not resume tube feed via NG as patient was requiring frequent suction of what appeared to be tube feed.  1/25 family still wants PEG tube-will discuss with GI again Monday  Idiopathic chronic gout of multiple sites without tophus  Continue prehospital allopurinol 300 mg p.o. daily when tolerating p.o.  Essential hypertension  Prehospital takesToprol-XL 25 mg p.o. daily and Lasix 20 mg p.o. daily on Monday Wednesday Friday.   Currently NPO. Metoprolol switched to IV. Received furosemide 40 mg IV x 1 1/19/2025  Blood pressures controlled  Monitor BP per protocol  Mixed hyperlipidemia  Continue prehospital Tricor 48 mg p.o. daily when tolerating p.o.  Chronic kidney disease, stage 3a (HCC)  Lab Results   Component Value Date    EGFR 71 01/26/2025    EGFR 72 01/25/2025    EGFR 74 01/24/2025    CREATININE 0.85 01/26/2025    CREATININE 0.81 01/25/2025    CREATININE 0.78 01/24/2025     Creatinine appears to be around baseline of 0.9-1.1 mg/dL   Creatinine better than baseline currently  Continue to monitor renal function closely  Avoid nephrotoxins and hypotension  BMP a.m.  Atrial flutter (HCC)  Currently rate controlled with metoprolol   Not  currently on anticoagulation as outpatient  Constipation  GI input appreciated  Continue bowel regimen, monitor effectiveness  Acute distention of stomach  Appreciate input of GI who are following  N.p.o.  Appreciate input of nutrition  1/22 initiated trickle feed tube feed  through NG tube-reported patient having residuals 1/23, rhonchi bilateral lungs.  Placed tube feed on hold 1/23  Speech therapy continues to work with patient-still not appropriate for n.p.o.  See goals of care discussion  Palliative care by specialist  Appreciate input of palliative care who continue to follow-see goals of care discussion  Electrolyte abnormality  Patient was initiated on trickle tube feed on 1/22  High risk for refeeding syndrome  Continue to monitor electrolytes and replete accordingly  BMP, magnesium, phosphorus a.m.  Hypernatremia  In setting of poor oral intake  Started D5W infusion yesterday 75 mL/h  Hypernatremia improving, will continue IV fluids D5W today  Monitor fluid status closely  BMP a.m.    VTE Pharmacologic Prophylaxis: VTE Score: 5 High Risk (Score >/= 5) - Pharmacological DVT Prophylaxis Ordered: heparin. Sequential Compression Devices Ordered.    Mobility:   Basic Mobility Inpatient Raw Score: 6  JH-HLM Goal: 2: Bed activities/Dependent transfer  JH-HLM Achieved: 2: Bed activities/Dependent transfer  JH-HLM Goal achieved. Continue to encourage appropriate mobility.    Patient Centered Rounds: I performed bedside rounds with nursing staff today.   Discussions with Specialists or Other Care Team Provider: GI, palliative care nursing, case management    Education and Discussions with Family / Patient: Updated  (son and grandson) at bedside.    Current Length of Stay: 10 day(s)  Current Patient Status: Inpatient   Certification Statement: The patient will continue to require additional inpatient hospital stay due to ongoing goals of care, family now requesting PEG tube GI continues to follow,  coordination of care  Discharge Plan: Greater than 72 hours to skilled facility  Code Status: Level 3 - DNAR and DNI    Subjective   Patient offers no complaints of discomfort when asked.  Family is at bedside.      Objective :  Temp:  [98.4 °F (36.9 °C)-100 °F (37.8 °C)] 98.4 °F (36.9 °C)  HR:  [75-91] 75  BP: ()/(45-56) 125/45  Resp:  [17-22] 17  SpO2:  [94 %-97 %] 94 %  O2 Device: None (Room air)  Nasal Cannula O2 Flow Rate (L/min):  [2 L/min] 2 L/min    Body mass index is 24.98 kg/m².     Input and Output Summary (last 24 hours):     Intake/Output Summary (Last 24 hours) at 1/26/2025 1330  Last data filed at 1/26/2025 0803  Gross per 24 hour   Intake 1315 ml   Output 35 ml   Net 1280 ml       Physical Exam  Vitals reviewed.   Constitutional:       General: He is not in acute distress.     Appearance: He is well-developed.   HENT:      Head: Normocephalic and atraumatic.   Cardiovascular:      Rate and Rhythm: Normal rate and regular rhythm.      Pulses: Normal pulses.      Heart sounds: Normal heart sounds. No murmur heard.  Pulmonary:      Effort: Pulmonary effort is normal. No respiratory distress.      Breath sounds: Rhonchi present. No wheezing or rales.      Comments: Nonlabored respirations at rest on O2 2 L nasal cannula occasional moist cough  Abdominal:      General: There is no distension.      Palpations: Abdomen is soft.      Tenderness: There is no abdominal tenderness. There is no guarding.   Musculoskeletal:         General: No swelling. Normal range of motion.   Skin:     General: Skin is warm and dry.      Capillary Refill: Capillary refill takes less than 2 seconds.   Neurological:      General: No focal deficit present.      Mental Status: He is alert. Mental status is at baseline.   Psychiatric:         Mood and Affect: Mood normal.         Behavior: Behavior normal.           Lines/Drains:  Lines/Drains/Airways       Active Status       Name Placement date Placement time Site Days     NG/OG/Enteral Tube Nasogastric 18 Fr Right nare 01/16/25  1052  Right nare  10                            Lab Results: I have reviewed the following results:   Results from last 7 days   Lab Units 01/26/25 0447 01/22/25 0448 01/21/25 0441   WBC Thousand/uL 15.46*   < > 10.62*   HEMOGLOBIN g/dL 10.1*   < > 11.0*   HEMATOCRIT % 33.4*   < > 33.8*   PLATELETS Thousands/uL 313   < > 244   SEGS PCT %  --   --  89*   LYMPHO PCT %  --   --  7*   MONO PCT %  --   --  3*   EOS PCT %  --   --  0    < > = values in this interval not displayed.     Results from last 7 days   Lab Units 01/26/25 0447 01/22/25 0448 01/21/25 0441   SODIUM mmol/L 146   < > 143   POTASSIUM mmol/L 3.2*   < > 3.1*   CHLORIDE mmol/L 112*   < > 107   CO2 mmol/L 28   < > 26   BUN mg/dL 15   < > 30*   CREATININE mg/dL 0.85   < > 1.07   ANION GAP mmol/L 6   < > 10   CALCIUM mg/dL 8.0*   < > 8.5   ALBUMIN g/dL  --   --  3.0*   TOTAL BILIRUBIN mg/dL  --   --  0.56   ALK PHOS U/L  --   --  49   ALT U/L  --   --  21   AST U/L  --   --  42*   GLUCOSE RANDOM mg/dL 114   < > 106    < > = values in this interval not displayed.         Results from last 7 days   Lab Units 01/21/25  1138 01/21/25  0747   POC GLUCOSE mg/dl 111 92         Results from last 7 days   Lab Units 01/26/25  0447 01/25/25  0521 01/23/25  0447 01/21/25  0441 01/20/25  0426   PROCALCITONIN ng/ml 0.24 0.24 0.69* 1.53* 1.43*       Recent Cultures (last 7 days):         Imaging Results Review: I reviewed radiology reports from this admission including: Chest x-ray, CT pelvis, CT abdomen pelvis, follow-up chest x-ray x 4, KUB,.  Other Study Results Review: No additional pertinent studies reviewed.    Last 24 Hours Medication List:     Current Facility-Administered Medications:     acetaminophen (Ofirmev) injection 1,000 mg, Q8H PRN, Last Rate: 1,000 mg (01/24/25 2153)    acetaminophen (TYLENOL) tablet 650 mg, Q4H PRN    allopurinol (ZYLOPRIM) tablet 300 mg, Daily    bisacodyl (DULCOLAX) rectal  suppository 10 mg, Daily    bisacodyl (DULCOLAX) rectal suppository 10 mg, Daily PRN    cefTRIAXone (ROCEPHIN) IVPB (premix in dextrose) 1,000 mg 50 mL, Q24H, Last Rate: 1,000 mg (01/25/25 3677)    Cholecalciferol (VITAMIN D3) tablet 2,000 Units, Daily    dextrose 5 % infusion, Continuous, Last Rate: 75 mL/hr (01/26/25 0708)    docusate (COLACE) oral liquid 100 mg, BID    fenofibrate (TRICOR) tablet 48 mg, Daily    [Held by provider] furosemide (LASIX) tablet 20 mg, Once per day on Monday Wednesday Friday    gabapentin (NEURONTIN) capsule 100 mg, Q12H    glycopyrrolate (ROBINUL) injection 0.1 mg, Q4H PRN    heparin (porcine) subcutaneous injection 5,000 Units, Q8H ANKIT    iohexol (OMNIPAQUE) 240 MG/ML solution 50 mL, 90 min pre-procedure    metoprolol (LOPRESSOR) injection 2.5 mg, Q6H    multivitamin stress formula tablet 1 tablet, Daily    nitroglycerin (NITROSTAT) SL tablet 0.4 mg, Q5 Min PRN    nystatin (MYCOSTATIN) cream, BID    ondansetron (ZOFRAN) injection 4 mg, Q6H PRN    pantoprazole (PROTONIX) injection 40 mg, Q12H    phenol (CHLORASEPTIC) 1.4 % mucosal liquid 1 spray, Q2H PRN    scopolamine (TRANSDERM-SCOP) 1 mg/3 days TD 72 hr patch 1 patch, Q72H    senna (SENOKOT) tablet 17.2 mg, HS    Administrative Statements   Today, Patient Was Seen By: ANAM Harrison  I have spent a total time of 38 minutes in caring for this patient on the day of the visit/encounter including Diagnostic results, Patient and family education, Documenting in the medical record, Reviewing / ordering tests, medicine, procedures  , Obtaining or reviewing history  , and Communicating with other healthcare professionals .    **Please Note: This note may have been constructed using a voice recognition system.**

## 2025-01-26 NOTE — ASSESSMENT & PLAN NOTE
Chest x-ray: Retrocardiac opacity, which may be due to pneumonia and/or atelectasis.  Procalcitonin peaked and downtrending, with intermittent fever  Repeat CXR-slight improvement in left base patchy atelectasis versus pneumonia  Received vancomycin 1/21 through 1/22  Received cefepime 1/21 to 1/22  Transitioned to Rocephin 1/22 per recommendations of ID pharmacy  Monitor labs and vital signs, conduct serial physical assessments

## 2025-01-26 NOTE — ASSESSMENT & PLAN NOTE
Presented for suspected GI bleed  CT abdomen and pelvis 1/15/2025 with focal wall thickening of the lower esophagus, gastroesophageal junction, and proximal stomach which may reflect esophagitis/gastritis.  No evidence for bowel obstruction, mesenteric inflammation, appendicitis, obstructive uropathy, free air or free fluid  Chest x-ray 1/15/2025 with gaseous distention of the stomach  KUB 1/16/2025 with worsening distention  NG was placed  CT abdomen pelvis with contrast 1/17/2024: New small bilateral pleural effusions with extensive bibasilar atelectasis. Wall thickening of the lower esophagus and proximal stomach is again noted without interval change. This may reflect mild focal esophagitis/gastritis. New NG tube with tip terminating in the gastric body region. No evidence of bowel obstruction, mesenteric inflammation, appendicitis, obstructive uropathy, free air, or free fluid. Descending and sigmoid diverticulosis again noted without evidence for acute diverticulitis. Subtle wall thickening of the mid to distal sigmoid colon, rectum, and anal verge region could reflect mild focal colitis/proctitis. Multiple punctate pancreatic calcifications again seen likely sequelae of chronic pancreatitis  1/20 patient passed NG clamping trial and evaluated by SLP but did not pass   Keep NPO   Initiating trickle feeding through NG 1/22-discontinued 1/23 due to high residuals and rhonchi requiring frequent suctioning  Speech therapy continues to follow-patient remains n.p.o.  Continue PPI  See goals of care discussion  Hemoglobin has remained stable no signs of bleeding

## 2025-01-26 NOTE — ASSESSMENT & PLAN NOTE
Upper airway congestion noted, patient has strong cough and able to clear  CT imaging revealed small bilateral pleural effusion and extensive bibasilar atelectasis  Maintain head of bed elevated  Aspiration precautions  Appreciate input of speech therapy who are following  Continue Robinul 0.1 mg IV every 4 hours as needed  Added scopolamine 1/25  Continue incentive spirometry  Suction as needed

## 2025-01-26 NOTE — ASSESSMENT & PLAN NOTE
Patient with significant comorbid medical conditions would be high risk for treatment options requiring anesthesia.  EGD testing might have risks that outweigh the benefits.  Patient verbalized understanding.  Opened goals of care conversations with son.  Continued on phone on Saturday. Had further conversations with patient's son, daughter, and grandson when they arrived.  Ultimately they would like to keep him comfortable but for now continue with full treatment   Appreciate input of palliative care continue to follow, addressing goals of care  1/22 had at length discussion with patient's son at bedside.  Undecided about PEG tube.  Initiated trickle feeds through NG   1/23 patient not tolerating tube feed overnight, having residuals and rhonchi bilateral lungs.  Tube feeds stopped due to residuals and rhonchi in lungs requiring frequent suctioning.  Continue IV fluids.  At length discussion with patient's son still undecided about PEG tube.  Advised patient would likely have frequent hospitalizations due to high risk for aspiration/pneumonia.  Advised to talk to his sister to come up with a decision regarding patient's goals of care comfort versus feeding tube.  1/24 patient condition unchanged.  Please see palliative care notes from today.  After ongoing of care discussion it was decided family wants PEG tube.  Undetermined at this time if GI will place PEG tube, they are discussing it.  Would not be done on the weekend will have decision by 1/27.  General surgery is also aware and would be available for PEG, however would not be done until 1/27.  Will continue IV fluid hydration.  Will not resume tube feed via NG as patient was requiring frequent suction of what appeared to be tube feed.  1/25 family still wants PEG tube-will discuss with GI again Monday

## 2025-01-26 NOTE — ASSESSMENT & PLAN NOTE
Lab Results   Component Value Date    EGFR 71 01/26/2025    EGFR 72 01/25/2025    EGFR 74 01/24/2025    CREATININE 0.85 01/26/2025    CREATININE 0.81 01/25/2025    CREATININE 0.78 01/24/2025     Creatinine appears to be around baseline of 0.9-1.1 mg/dL   Creatinine better than baseline currently  Continue to monitor renal function closely  Avoid nephrotoxins and hypotension  BMP a.m.

## 2025-01-26 NOTE — PLAN OF CARE
Problem: SAFETY ADULT  Goal: Patient will remain free of falls  Description: INTERVENTIONS:  - Educate patient/family on patient safety including physical limitations  - Instruct patient to call for assistance with activity   - Consult OT/PT to assist with strengthening/mobility   - Keep Call bell within reach  - Keep bed low and locked with side rails adjusted as appropriate  - Keep care items and personal belongings within reach  - Initiate and maintain comfort rounds  - Make Fall Risk Sign visible to staff  - Offer Toileting every 2 Hours, in advance of need  - Initiate/Maintain bed alarm  - Obtain necessary fall risk management equipment:  yellow socks  - Apply yellow socks and bracelet for high fall risk patients  - Consider moving patient to room near nurses station  Outcome: Progressing     Problem: RESPIRATORY - ADULT  Goal: Achieves optimal ventilation and oxygenation  Description: INTERVENTIONS:  - Assess for changes in respiratory status  - Assess for changes in mentation and behavior  - Position to facilitate oxygenation and minimize respiratory effort  - Oxygen administered by appropriate delivery if ordered  - Initiate smoking cessation education as indicated  - Encourage broncho-pulmonary hygiene including cough, deep breathe, Incentive Spirometry  - Assess the need for suctioning and aspirate as needed  - Assess and instruct to report SOB or any respiratory difficulty  - Respiratory Therapy support as indicated  Outcome: Progressing       Problem: METABOLIC, FLUID AND ELECTROLYTES - ADULT  Goal: Electrolytes maintained within normal limits  Description: INTERVENTIONS:  - Monitor labs and assess patient for signs and symptoms of electrolyte imbalances  - Administer electrolyte replacement as ordered  - Monitor response to electrolyte replacements, including repeat lab results as appropriate  - Instruct patient on fluid and nutrition as appropriate  Outcome: Progressing

## 2025-01-26 NOTE — ASSESSMENT & PLAN NOTE
Appreciate input of GI who are following  N.p.o.  Appreciate input of nutrition  1/22 initiated trickle feed tube feed  through NG tube-reported patient having residuals 1/23, rhonchi bilateral lungs.  Placed tube feed on hold 1/23  Speech therapy continues to work with patient-still not appropriate for n.p.o.  See goals of care discussion

## 2025-01-26 NOTE — PLAN OF CARE
Problem: PAIN - ADULT  Goal: Verbalizes/displays adequate comfort level or baseline comfort level  Description: Interventions:  - Encourage patient to monitor pain and request assistance  - Assess pain using appropriate pain scale  - Administer analgesics based on type and severity of pain and evaluate response  - Implement non-pharmacological measures as appropriate and evaluate response  - Consider cultural and social influences on pain and pain management  - Notify physician/advanced practitioner if interventions unsuccessful or patient reports new pain  Outcome: Progressing     Problem: INFECTION - ADULT  Goal: Absence or prevention of progression during hospitalization  Description: INTERVENTIONS:  - Assess and monitor for signs and symptoms of infection  - Monitor lab/diagnostic results  - Monitor all insertion sites, i.e. indwelling lines, tubes, and drains  - Monitor endotracheal if appropriate and nasal secretions for changes in amount and color  - [place] appropriate cooling/warming therapies per order  - Administer medications as ordered  - Instruct and encourage patient and family to use good hand hygiene technique  - Identify and instruct in appropriate isolation precautions for identified infection/condition  Outcome: Progressing  Goal: Absence of fever/infection during neutropenic period  Description: INTERVENTIONS:  - Monitor WBC    Outcome: Progressing  Goal: Infection Control Precautions  Outcome: Progressing     Problem: SAFETY ADULT  Goal: Patient will remain free of falls  Description: INTERVENTIONS:  - Educate patient/family on patient safety including physical limitations  - Instruct patient to call for assistance with activity   - Consult OT/PT to assist with strengthening/mobility   - Keep Call bell within reach  - Keep bed low and locked with side rails adjusted as appropriate  - Keep care items and personal belongings within reach  - Initiate and maintain comfort rounds  - Make Fall  Risk Sign visible to staff  - Offer Toileting every 2 Hours, in advance of need  - Initiate/Maintain bed alarm  - Obtain necessary fall risk management equipment: non slip socks  - Apply yellow socks and bracelet for high fall risk patients  - Consider moving patient to room near nurses station  Outcome: Progressing  Goal: Maintain or return to baseline ADL function  Description: INTERVENTIONS:  -  Assess patient's ability to carry out ADLs; assess patient's baseline for ADL function and identify physical deficits which impact ability to perform ADLs (bathing, care of mouth/teeth, toileting, grooming, dressing, etc.)  - Assess/evaluate cause of self-care deficits   - Assess range of motion  - Assess patient's mobility; develop plan if impaired  - Assess patient's need for assistive devices and provide as appropriate  - Encourage maximum independence but intervene and supervise when necessary  - Involve family in performance of ADLs  - Assess for home care needs following discharge   - Consider OT consult to assist with ADL evaluation and planning for discharge  - Provide patient education as appropriate  Outcome: Progressing  Goal: Maintains/Returns to pre admission functional level  Description: INTERVENTIONS:  - Perform AM-PAC 6 Click Basic Mobility/ Daily Activity assessment daily.  - Set and communicate daily mobility goal to care team and patient/family/caregiver.   - Collaborate with rehabilitation services on mobility goals if consulted  - Perform Range of Motion 2 times a day.  - Reposition patient every 2 hours.  - Dangle patient 3 times a day  - Stand patient 3 times a day  - Ambulate patient 3 times a day  - Out of bed to chair 3 times a day   - Out of bed for meals 3 times a day  - Out of bed for toileting  - Record patient progress and toleration of activity level   Outcome: Progressing     Problem: DISCHARGE PLANNING  Goal: Discharge to home or other facility with appropriate resources  Description:  INTERVENTIONS:  - Identify barriers to discharge w/patient and caregiver  - Arrange for needed discharge resources and transportation as appropriate  - Identify discharge learning needs (meds, wound care, etc.)  - Arrange for interpretive services to assist at discharge as needed  - Refer to Case Management Department for coordinating discharge planning if the patient needs post-hospital services based on physician/advanced practitioner order or complex needs related to functional status, cognitive ability, or social support system  Outcome: Progressing     Problem: Knowledge Deficit  Goal: Patient/family/caregiver demonstrates understanding of disease process, treatment plan, medications, and discharge instructions  Description: Complete learning assessment and assess knowledge base.  Interventions:  - Provide teaching at level of understanding  - Provide teaching via preferred learning methods  Outcome: Progressing     Problem: Prexisting or High Potential for Compromised Skin Integrity  Goal: Skin integrity is maintained or improved  Description: INTERVENTIONS:  - Identify patients at risk for skin breakdown  - Assess and monitor skin integrity  - Assess and monitor nutrition and hydration status  - Monitor labs   - Assess for incontinence   - Turn and reposition patient  - Assist with mobility/ambulation  - Relieve pressure over bony prominences  - Avoid friction and shearing  - Provide appropriate hygiene as needed including keeping skin clean and dry  - Evaluate need for skin moisturizer/barrier cream  - Collaborate with interdisciplinary team   - Patient/family teaching  - Consider wound care consult   Outcome: Progressing     Problem: GASTROINTESTINAL - ADULT  Goal: Minimal or absence of nausea and/or vomiting  Description: INTERVENTIONS:  - Administer IV fluids if ordered to ensure adequate hydration  - Maintain NPO status until nausea and vomiting are resolved  - Nasogastric tube if ordered  - Administer  ordered antiemetic medications as needed  - Provide nonpharmacologic comfort measures as appropriate  - Advance diet as tolerated, if ordered  - Consider nutrition services referral to assist patient with adequate nutrition and appropriate food choices  Outcome: Progressing  Goal: Maintains or returns to baseline bowel function  Description: INTERVENTIONS:  - Assess bowel function  - Encourage oral fluids to ensure adequate hydration  - Administer IV fluids if ordered to ensure adequate hydration  - Administer ordered medications as needed  - Encourage mobilization and activity  - Consider nutritional services referral to assist patient with adequate nutrition and appropriate food choices  Outcome: Progressing  Goal: Maintains adequate nutritional intake  Description: INTERVENTIONS:  - Monitor percentage of each meal consumed  - Identify factors contributing to decreased intake, treat as appropriate  - Assist with meals as needed  - Monitor I&O, weight, and lab values if indicated  - Obtain nutrition services referral as needed  Outcome: Progressing  Goal: Oral mucous membranes remain intact  Description: INTERVENTIONS  - Assess oral mucosa and hygiene practices  - Implement preventative oral hygiene regimen  - Implement oral medicated treatments as ordered  - Initiate Nutrition services referral as needed  Outcome: Progressing     Problem: RESPIRATORY - ADULT  Goal: Achieves optimal ventilation and oxygenation  Description: INTERVENTIONS:  - Assess for changes in respiratory status  - Assess for changes in mentation and behavior  - Position to facilitate oxygenation and minimize respiratory effort  - Oxygen administered by appropriate delivery if ordered  - Initiate smoking cessation education as indicated  - Encourage broncho-pulmonary hygiene including cough, deep breathe, Incentive Spirometry  - Assess the need for suctioning and aspirate as needed  - Assess and instruct to report SOB or any respiratory  difficulty  - Respiratory Therapy support as indicated  Outcome: Progressing     Problem: Nutrition/Hydration-ADULT  Goal: Nutrient/Hydration intake appropriate for improving, restoring or maintaining nutritional needs  Description: Monitor and assess patient's nutrition/hydration status for malnutrition. Collaborate with interdisciplinary team and initiate plan and interventions as ordered.  Monitor patient's weight and dietary intake as ordered or per policy. Utilize nutrition screening tool and intervene as necessary. Determine patient's food preferences and provide high-protein, high-caloric foods as appropriate.     INTERVENTIONS:  - Monitor oral intake, urinary output, labs, and treatment plans  - Assess nutrition and hydration status and recommend course of action  - Evaluate amount of meals eaten  - Assist patient with eating if necessary   - Allow adequate time for meals  - Recommend/ encourage appropriate diets, oral nutritional supplements, and vitamin/mineral supplements  - Order, calculate, and assess calorie counts as needed  - Recommend, monitor, and adjust tube feedings and TPN/PPN based on assessed needs  - Assess need for intravenous fluids  - Provide specific nutrition/hydration education as appropriate  - Include patient/family/caregiver in decisions related to nutrition  Outcome: Progressing     Problem: METABOLIC, FLUID AND ELECTROLYTES - ADULT  Goal: Electrolytes maintained within normal limits  Description: INTERVENTIONS:  - Monitor labs and assess patient for signs and symptoms of electrolyte imbalances  - Administer electrolyte replacement as ordered  - Monitor response to electrolyte replacements, including repeat lab results as appropriate  - Instruct patient on fluid and nutrition as appropriate  Outcome: Progressing  Goal: Fluid balance maintained  Description: INTERVENTIONS:  - Monitor labs   - Monitor I/O and WT  - Instruct patient on fluid and nutrition as appropriate  - Assess for  signs & symptoms of volume excess or deficit  Outcome: Progressing

## 2025-01-26 NOTE — ASSESSMENT & PLAN NOTE
In setting of poor oral intake  Started D5W infusion yesterday 75 mL/h  Hypernatremia improving, will continue IV fluids D5W today  Monitor fluid status closely  BMP a.m.

## 2025-01-27 PROBLEM — R13.10 DYSPHAGIA: Status: ACTIVE | Noted: 2025-01-27

## 2025-01-27 LAB
ANION GAP SERPL CALCULATED.3IONS-SCNC: 4 MMOL/L (ref 4–13)
BUN SERPL-MCNC: 12 MG/DL (ref 5–25)
CALCIUM SERPL-MCNC: 7.6 MG/DL (ref 8.4–10.2)
CHLORIDE SERPL-SCNC: 108 MMOL/L (ref 96–108)
CO2 SERPL-SCNC: 28 MMOL/L (ref 21–32)
CREAT SERPL-MCNC: 0.73 MG/DL (ref 0.6–1.3)
ERYTHROCYTE [DISTWIDTH] IN BLOOD BY AUTOMATED COUNT: 15.9 % (ref 11.6–15.1)
GFR SERPL CREATININE-BSD FRML MDRD: 76 ML/MIN/1.73SQ M
GLUCOSE SERPL-MCNC: 113 MG/DL (ref 65–140)
HCT VFR BLD AUTO: 30 % (ref 36.5–49.3)
HGB BLD-MCNC: 9.5 G/DL (ref 12–17)
MAGNESIUM SERPL-MCNC: 1.9 MG/DL (ref 1.9–2.7)
MCH RBC QN AUTO: 31.4 PG (ref 26.8–34.3)
MCHC RBC AUTO-ENTMCNC: 31.7 G/DL (ref 31.4–37.4)
MCV RBC AUTO: 99 FL (ref 82–98)
PHOSPHATE SERPL-MCNC: 1.7 MG/DL (ref 2.3–4.1)
PLATELET # BLD AUTO: 258 THOUSANDS/UL (ref 149–390)
PMV BLD AUTO: 10.1 FL (ref 8.9–12.7)
POTASSIUM SERPL-SCNC: 3.2 MMOL/L (ref 3.5–5.3)
RBC # BLD AUTO: 3.03 MILLION/UL (ref 3.88–5.62)
SODIUM SERPL-SCNC: 140 MMOL/L (ref 135–147)
WBC # BLD AUTO: 9.51 THOUSAND/UL (ref 4.31–10.16)

## 2025-01-27 PROCEDURE — 84100 ASSAY OF PHOSPHORUS: CPT

## 2025-01-27 PROCEDURE — 87081 CULTURE SCREEN ONLY: CPT

## 2025-01-27 PROCEDURE — 99233 SBSQ HOSP IP/OBS HIGH 50: CPT | Performed by: STUDENT IN AN ORGANIZED HEALTH CARE EDUCATION/TRAINING PROGRAM

## 2025-01-27 PROCEDURE — 99232 SBSQ HOSP IP/OBS MODERATE 35: CPT | Performed by: PHYSICIAN ASSISTANT

## 2025-01-27 PROCEDURE — 99232 SBSQ HOSP IP/OBS MODERATE 35: CPT | Performed by: NURSE PRACTITIONER

## 2025-01-27 PROCEDURE — 83735 ASSAY OF MAGNESIUM: CPT

## 2025-01-27 PROCEDURE — 85027 COMPLETE CBC AUTOMATED: CPT

## 2025-01-27 PROCEDURE — 80048 BASIC METABOLIC PNL TOTAL CA: CPT

## 2025-01-27 RX ADMIN — GABAPENTIN 100 MG: 100 CAPSULE ORAL at 18:06

## 2025-01-27 RX ADMIN — METOROPROLOL TARTRATE 2.5 MG: 5 INJECTION, SOLUTION INTRAVENOUS at 02:45

## 2025-01-27 RX ADMIN — DEXTROSE 75 ML/HR: 5 SOLUTION INTRAVENOUS at 13:17

## 2025-01-27 RX ADMIN — Medication 2000 UNITS: at 09:50

## 2025-01-27 RX ADMIN — HEPARIN SODIUM 5000 UNITS: 5000 INJECTION INTRAVENOUS; SUBCUTANEOUS at 13:08

## 2025-01-27 RX ADMIN — METOROPROLOL TARTRATE 2.5 MG: 5 INJECTION, SOLUTION INTRAVENOUS at 21:14

## 2025-01-27 RX ADMIN — DOCUSATE SODIUM 100 MG: 50 LIQUID ORAL at 10:01

## 2025-01-27 RX ADMIN — PANTOPRAZOLE SODIUM 40 MG: 40 INJECTION, POWDER, FOR SOLUTION INTRAVENOUS at 05:44

## 2025-01-27 RX ADMIN — POTASSIUM PHOSPHATE 9 MMOL: 236; 224 INJECTION, SOLUTION INTRAVENOUS at 18:01

## 2025-01-27 RX ADMIN — FENOFIBRATE 48 MG: 48 TABLET, FILM COATED ORAL at 09:50

## 2025-01-27 RX ADMIN — HEPARIN SODIUM 5000 UNITS: 5000 INJECTION INTRAVENOUS; SUBCUTANEOUS at 21:14

## 2025-01-27 RX ADMIN — BISACODYL 10 MG: 10 SUPPOSITORY RECTAL at 09:50

## 2025-01-27 RX ADMIN — GLYCOPYRROLATE 0.1 MG: 0.2 INJECTION, SOLUTION INTRAMUSCULAR; INTRAVENOUS at 22:09

## 2025-01-27 RX ADMIN — ALLOPURINOL 300 MG: 300 TABLET ORAL at 09:50

## 2025-01-27 RX ADMIN — GABAPENTIN 100 MG: 100 CAPSULE ORAL at 05:44

## 2025-01-27 RX ADMIN — PANTOPRAZOLE SODIUM 40 MG: 40 INJECTION, POWDER, FOR SOLUTION INTRAVENOUS at 18:06

## 2025-01-27 RX ADMIN — METOROPROLOL TARTRATE 2.5 MG: 5 INJECTION, SOLUTION INTRAVENOUS at 09:50

## 2025-01-27 RX ADMIN — METOROPROLOL TARTRATE 2.5 MG: 5 INJECTION, SOLUTION INTRAVENOUS at 15:55

## 2025-01-27 RX ADMIN — DOCUSATE SODIUM 100 MG: 50 LIQUID ORAL at 18:06

## 2025-01-27 RX ADMIN — B-COMPLEX W/ C & FOLIC ACID TAB 1 TABLET: TAB at 09:50

## 2025-01-27 NOTE — ASSESSMENT & PLAN NOTE
Presented with suspected GI bleed  GI team consulted  Continue conservative measures; significant aspiration and difficulty managing secretions.   NGT in place - not tolerating trickle feeds

## 2025-01-27 NOTE — PROGRESS NOTES
Progress Note - Palliative Care   Name: Prem Mar Sr. 89 y.o. male I MRN: [id number]  Unit/Bed#: -01 I Date of Admission: 1/15/2025   Date of Service: 1/27/2025 I Hospital Day: 11    Assessment & Plan  Goals of care, counseling/discussion  Decisional apparatus:  Patient has capacity on exam today.  If lost, patient's substitute decision maker would default to both adult children by [place].  Advance Directive / Living Will / POLST / POA Forms: No    Goals  Level 3 code status.   Disease focused care.  DNR/DNI limits placed.   Met with patient, and both children to review goals. They would like to pursue feeding tube placement.   IR team to eval this afternoon.  Palliative care by specialist  Social support  Patient is supported by son, daughter, grandchildren  Supportive listening provided  Normalized experience of patient/family  Provided anxiety containment  Provided anticipatory guidance    Follow up  Palliative Care will sign off today as goals are clear.   Please reach out to on-call provider via Epic Secure Chat  if questions or concerns arise.  Please do not hesitate to reach our on call provider through our clinic answering service at [phone] should you have acute symptom control concerns.    Upper GI bleed  Presented with suspected GI bleed  GI team consulted  Continue conservative measures; significant aspiration and difficulty managing secretions.   NGT in place - not tolerating trickle feeds    Subjective   Pt reports doing okay today with no complaints. Visited by son and daughter. Reviewed GOC. They are all in agreement with proceeding with feeding tube placement. Reviewed risks and answered all questions.     Objective :  Temp:  [96.8 °F (36 °C)-98.4 °F (36.9 °C)] 98.4 °F (36.9 °C)  HR:  [71-82] 82  BP: (110-132)/(43-52) 121/46  Resp:  [16-18] 18  SpO2:  [96 %-99 %] 96 %    Physical Exam  Vitals reviewed.   Constitutional:       General: He is not in acute distress.      Appearance: He is ill-appearing.   Cardiovascular:      Rate and Rhythm: Normal rate.   Pulmonary:      Effort: Pulmonary effort is normal.   Neurological:      Mental Status: He is alert and oriented to person, place, and time.   Psychiatric:         Mood and Affect: Mood normal.         Behavior: Behavior normal.         Lab Results: I have reviewed the following results:  Lab Results   Component Value Date/Time    SODIUM 140 01/27/2025 05:47 AM    SODIUM 126 (L) 09/06/2022 06:45 PM    K 3.2 (L) 01/27/2025 05:47 AM    K 4.0 09/06/2022 06:45 PM    BUN 12 01/27/2025 05:47 AM    BUN 24 09/06/2022 06:45 PM    CREATININE 0.73 01/27/2025 05:47 AM    CREATININE 0.80 09/06/2022 06:45 PM    GLUC 113 01/27/2025 05:47 AM    GLUC 124 (H) 09/06/2022 06:45 PM    CALCIUM 7.6 (L) 01/27/2025 05:47 AM    CALCIUM 8.2 (L) 09/06/2022 06:45 PM    AST 42 (H) 01/21/2025 04:41 AM    ALT 21 01/21/2025 04:41 AM    ALB 3.0 (L) 01/21/2025 04:41 AM    TP 6.1 (L) 01/21/2025 04:41 AM    EGFR 76 01/27/2025 05:47 AM    EGFR 80 09/06/2022 06:45 PM     Lab Results   Component Value Date/Time    HGB 9.5 (L) 01/27/2025 05:47 AM    WBC 9.51 01/27/2025 05:47 AM     01/27/2025 05:47 AM    INR 0.94 01/15/2025 04:22 AM    PTT 31 01/15/2025 04:22 AM     Lab Results   Component Value Date/Time    [id number] 3.763 08/18/2022 04:38 PM       Administrative Statements   I have spent a total time of 30 minutes in caring for this patient on the day of the visit/encounter including Prognosis, Risks and benefits of tx options, Patient and family education, Risk factor reductions, Counseling / Coordination of care, Documenting in the medical record, Reviewing / ordering tests, medicine, procedures  , Obtaining or reviewing history  , and Communicating with other healthcare professionals . Topics discussed with the patient / family include symptom assessment and management, psychosocial support, goals of care, supportive listening, and anticipatory guidance.  Case discussed with ZULLY CHAPARRO, , palliative SW, and speech.

## 2025-01-27 NOTE — ASSESSMENT & PLAN NOTE
Lab Results   Component Value Date    EGFR 76 01/27/2025    EGFR 71 01/26/2025    EGFR 72 01/25/2025    CREATININE 0.73 01/27/2025    CREATININE 0.85 01/26/2025    CREATININE 0.81 01/25/2025     Creatinine appears to be around baseline of 0.9-1.1 mg/dL   Continue to monitor renal function closely  Avoid nephrotoxins and hypotension  Trend creatinine

## 2025-01-27 NOTE — ASSESSMENT & PLAN NOTE
Intermittent fevers and now tested positive for Covid-19. Denies any shortness of breath.  Mgt per primary team.

## 2025-01-27 NOTE — ASSESSMENT & PLAN NOTE
Patient was initiated on trickle tube feed on 1/22/2025  High risk for refeeding syndrome  Continue to monitor electrolytes and replete accordingly  BMP, magnesium, phosphorus a.m.

## 2025-01-27 NOTE — PROGRESS NOTES
Pastoral Care Progress Note  CH initiated follow up visit to support pt and family. Pt received CH upright in bed, son and dtr present.    Ch shared about self and role, encouraged pt and family to ask for her if she can be of service. CH oriented family to availability of  if they should wish a visit.           01/27/25 1300   Clinical Encounter Type   Visited With Patient and family together

## 2025-01-27 NOTE — PLAN OF CARE
Problem: PAIN - ADULT  Goal: Verbalizes/displays adequate comfort level or baseline comfort level  Description: Interventions:  - Encourage patient to monitor pain and request assistance  - Assess pain using appropriate pain scale  - Administer analgesics based on type and severity of pain and evaluate response  - Implement non-pharmacological measures as appropriate and evaluate response  - Consider cultural and social influences on pain and pain management  - Notify physician/advanced practitioner if interventions unsuccessful or patient reports new pain  Outcome: Progressing     Problem: DISCHARGE PLANNING  Goal: Discharge to home or other facility with appropriate resources  Description: INTERVENTIONS:  - Identify barriers to discharge w/patient and caregiver  - Arrange for needed discharge resources and transportation as appropriate  - Identify discharge learning needs (meds, wound care, etc.)  - Refer to Case Management Department for coordinating discharge planning if the patient needs post-hospital services based on physician/advanced practitioner order or complex needs related to functional status, cognitive ability, or social support system  Outcome: Progressing

## 2025-01-27 NOTE — ASSESSMENT & PLAN NOTE
Patient with significant comorbid medical conditions would be high risk for treatment options requiring anesthesia.  EGD testing might have risks that outweigh the benefits.  Patient verbalized understanding.  Opened goals of care conversations with son.  Continued on phone on Saturday. Had further conversations with patient's son, daughter, and grandson when they arrived Ultimately they would like to keep him comfortable but for now continue with full treatment   Palliative input appreciated

## 2025-01-27 NOTE — PLAN OF CARE
Problem: INFECTION - ADULT  Goal: Absence or prevention of progression during hospitalization  Description: INTERVENTIONS:  - Assess and monitor for signs and symptoms of infection  - Monitor lab/diagnostic results  - Monitor all insertion sites, i.e. indwelling lines, tubes, and drains  - Monitor endotracheal if appropriate and nasal secretions for changes in amount and color  - Avery appropriate cooling/warming therapies per order  - Administer medications as ordered  - Instruct and encourage patient and family to use good hand hygiene technique  - Identify and instruct in appropriate isolation precautions for identified infection/condition  Outcome: Progressing     Problem: Prexisting or High Potential for Compromised Skin Integrity  Goal: Skin integrity is maintained or improved  Description: INTERVENTIONS:  - Identify patients at risk for skin breakdown  - Assess and monitor skin integrity  - Assess and monitor nutrition and hydration status  - Monitor labs   - Assess for incontinence   - Turn and reposition patient  - Assist with mobility/ambulation  - Relieve pressure over bony prominences  - Avoid friction and shearing  - Provide appropriate hygiene as needed including keeping skin clean and dry  - Evaluate need for skin moisturizer/barrier cream  - Collaborate with interdisciplinary team   - Patient/family teaching  - Consider wound care consult   Outcome: Progressing     Problem: RESPIRATORY - ADULT  Goal: Achieves optimal ventilation and oxygenation  Description: INTERVENTIONS:  - Assess for changes in respiratory status  - Assess for changes in mentation and behavior  - Position to facilitate oxygenation and minimize respiratory effort  - Oxygen administered by appropriate delivery if ordered  - Initiate smoking cessation education as indicated  - Encourage broncho-pulmonary hygiene including cough, deep breathe, Incentive Spirometry  - Assess the need for suctioning and aspirate as needed  -  Assess and instruct to report SOB or any respiratory difficulty  - Respiratory Therapy support as indicated  Outcome: Progressing     Problem: Nutrition/Hydration-ADULT  Goal: Nutrient/Hydration intake appropriate for improving, restoring or maintaining nutritional needs  Description: Monitor and assess patient's nutrition/hydration status for malnutrition. Collaborate with interdisciplinary team and initiate plan and interventions as ordered.  Monitor patient's weight and dietary intake as ordered or per policy. Utilize nutrition screening tool and intervene as necessary. Determine patient's food preferences and provide high-protein, high-caloric foods as appropriate.     INTERVENTIONS:  - Monitor oral intake, urinary output, labs, and treatment plans  - Assess nutrition and hydration status and recommend course of action  - Evaluate amount of meals eaten  - Assist patient with eating if necessary   - Allow adequate time for meals  - Recommend/ encourage appropriate diets, oral nutritional supplements, and vitamin/mineral supplements  - Order, calculate, and assess calorie counts as needed  - Recommend, monitor, and adjust tube feedings and TPN/PPN based on assessed needs  - Assess need for intravenous fluids  - Provide specific nutrition/hydration education as appropriate  - Include patient/family/caregiver in decisions related to nutrition  Outcome: Progressing

## 2025-01-27 NOTE — UTILIZATION REVIEW
Continued Stay Review    Date: 1/25-27                          Current Patient Class: Inpatient  Current Level of Care: MEd/surg    HPI:89 y.o. male initially admitted on 1/16       Assessment/Plan: Continue with IV dextrose, NPO. GCs 15. Trace lower extremity edema present.   As per GI, not a good candidate for PEG tube placement given poor prognosis with advanced age, multiple co morbidities, deteriorating mental status, risk of complications, limited benefit from nutritional support. REcommend ongoing goals of care discussions. Continue protonix.     1/26 UPdate: Rhonchi heard in lungs.  Nonlabored respirations. He has a moist cough, on 2LNC. Continue with IV fluids, keeping NPO.     1/25 UPdate:  Keep NPO.  Continue with protonix.  S/P remdesivir x 3 days.  Continue with full care. Family wishes for PEG tube.  Discuss with GI.  Continue with IV dextrose fluids.  Not tolerating tube feedings through NGtube. Denies complaints.   Medications:   Scheduled Medications:  allopurinol, 300 mg, Oral, Daily  bisacodyl, 10 mg, Rectal, Daily  Cholecalciferol, 2,000 Units, Oral, Daily  docusate, 100 mg, Oral, BID  fenofibrate, 48 mg, Oral, Daily  [Held by provider] furosemide, 20 mg, Oral, Once per day on Monday Wednesday Friday  gabapentin, 100 mg, Oral, Q12H  heparin (porcine), 5,000 Units, Subcutaneous, Q8H ANKIT  iohexol, 50 mL, Oral, 90 min pre-procedure  metoprolol, 2.5 mg, Intravenous, Q6H  multivitamin stress formula, 1 tablet, Oral, Daily  nystatin, , Topical, BID  pantoprazole, 40 mg, Intravenous, Q12H  scopolamine, 1 patch, Transdermal, Q72H  senna, 2 tablet, Oral, HS      Continuous IV Infusions:  dextrose, 75 mL/hr, Intravenous, Continuous      PRN Meds:  acetaminophen, 1,000 mg, Intravenous, Q8H PRN  acetaminophen, 650 mg, Oral, Q4H PRN  bisacodyl, 10 mg, Rectal, Daily PRN  glycopyrrolate, 0.1 mg, Intravenous, Q4H PRN  nitroglycerin, 0.4 mg, Sublingual, Q5 Min PRN  ondansetron, 4 mg, Intravenous, Q6H  PRN  phenol, 1 spray, Mouth/Throat, Q2H PRN      Discharge Plan: TBd    Vital Signs (last 3 days)       Date/Time Temp Pulse Resp BP MAP (mmHg) SpO2 Calculated FIO2 (%) - Nasal Cannula Nasal Cannula O2 Flow Rate (L/min) O2 Device Patient Position - Orthostatic VS [place] Coma Scale Score Pain    01/27/25 1000 -- -- -- -- -- -- -- -- -- -- 15 No Pain    01/27/25 07:49:29 98.4 °F (36.9 °C) 82 18 121/46 71 96 % -- -- -- -- -- --    01/27/25 0244 -- 76 -- 132/52 79 98 % -- -- -- -- -- --    01/26/25 2318 98.2 °F (36.8 °C) 71 16 128/46 73 98 % -- -- -- -- -- --    01/26/25 21:31:48 98.4 °F (36.9 °C) 73 16 110/43 65 99 % -- -- -- Lying -- --    01/26/25 21:17:35 98.4 °F (36.9 °C) 75 -- -- -- 98 % -- -- -- -- -- --    01/26/25 2106 -- -- -- -- -- -- -- -- -- -- 15 No Pain    01/26/25 15:00:23 96.8 °F (36 °C) 75 16 117/44 68 99 % -- -- -- -- -- --    01/26/25 0759 -- -- -- -- -- -- -- -- -- -- 15 No Pain    01/26/25 06:57:37 98.4 °F (36.9 °C) 75 17 125/45 72 94 % -- -- -- -- -- --    01/26/25 03:04:21 -- 80 -- 128/49 75 95 % -- -- -- -- -- --    01/26/25 0219 98.6 °F (37 °C) 81 -- -- -- -- -- -- -- -- -- --    01/25/25 22:29:53 100 °F (37.8 °C) 81 20 105/53 70 97 % -- -- None (Room air) Lying -- --    01/25/25 2036 -- -- -- -- -- 95 % 28 2 L/min Nasal cannula -- 15 No Pain    01/25/25 20:21:27 -- 83 -- 128/49 75 95 % -- -- -- -- -- --    01/25/25 20:19:10 -- 87 -- 92/56 68 95 % -- -- -- -- -- --    01/25/25 14:35:24 98.9 °F (37.2 °C) -- 22 149/49 82 -- -- -- -- -- -- --    01/25/25 13:51:18 -- 91 17 139/56 84 94 % -- -- -- -- -- --    01/25/25 1236 -- -- -- -- -- -- -- -- -- -- 15 No Pain    01/25/25 07:03:13 98.7 °F (37.1 °C) 81 17 130/47 75 97 % -- -- -- -- -- --    01/25/25 03:05:20 -- 83 -- 124/49 74 95 % -- -- -- -- -- --    01/25/25 03:00:18 -- 79 -- 115/39 64 95 % -- -- -- -- -- --    01/24/25 23:55:21 99.6 °F (37.6 °C) 88 20 120/46 71 95 % -- -- -- -- -- --    01/24/25 22:30:47 99.8 °F (37.7 °C) 76 -- -- -- 94 % --  -- -- -- -- --    01/24/25 21:37:51 100.5 °F (38.1 °C) 77 20 -- -- 96 % -- -- -- -- -- --    01/24/25 20:45:14 -- 80 -- 115/40 65 93 % 28 2 L/min Nasal cannula -- 15 No Pain    01/24/25 14:35:43 99.3 °F (37.4 °C) 80 20 156/56 89 97 % -- -- -- -- -- --    01/24/25 08:20:11 -- 78 -- 129/62 84 93 % -- -- -- -- -- --    01/24/25 0804 -- -- -- -- -- -- -- -- -- -- -- No Pain    01/24/25 0800 -- -- -- -- -- -- -- -- -- -- 15 --    01/24/25 07:13:31 99.2 °F (37.3 °C) 76 20 150/65 93 95 % -- -- -- -- -- --    01/24/25 03:09:06 -- 72 18 121/48 72 96 % -- -- -- -- -- --          Weight (last 2 days)       None            Pertinent Labs/Diagnostic Results:   Radiology:  XR abdomen 1 view kub   Final Interpretation by Sb Haynes MD (01/23 1613)      Nonobstructive bowel gas pattern.               Workstation performed: [id number]         VAS upper limb venous duplex scan, complete, bilateral   Final Interpretation by Abigail Alvarado MD (01/21 2109)      XR chest portable   Final Interpretation by Leonor Ingram MD (01/21 0915)      Slight improvement in opacity in the medial left base which may be due to atelectasis and/or pneumonia.            Workstation performed: [id number]         XR abdomen 1 vw portable   Final Interpretation by Heaven Barton MD (01/20 1417)   Nonobstructive bowel gas pattern. Residual oral contrast throughout the colon. No gastric distention. Satisfactory position of the endogastric tube.               Workstation performed: [id number]         XR chest portable   Final Interpretation by Beatris Benton MD (01/20 0904)      Retrocardiac opacity, which may be due to pneumonia and/or atelectasis.            Workstation performed: [id number]         CT abdomen pelvis w contrast   Final Interpretation by Geovanny Taylor MD (01/17 2239)      1.  New small bilateral pleural effusions with extensive bibasilar atelectasis.   2.  Wall thickening of the lower esophagus and proximal  stomach is again noted without interval change. This may reflect mild focal esophagitis/gastritis. New NG tube with tip terminating in the gastric body region.   3.  No evidence of bowel obstruction, mesenteric inflammation, appendicitis, obstructive uropathy, free air, or free fluid. Descending and sigmoid diverticulosis again noted without evidence for acute diverticulitis. Subtle wall thickening of the mid to    distal sigmoid colon, rectum, and anal verge region could reflect mild focal colitis/proctitis.   4.  Multiple punctate pancreatic calcifications again seen likely sequelae of chronic pancreatitis.      Workstation performed: [id number]         XR chest portable   Final Interpretation by Jose Johns MD (01/17 1327)      Scattered streaky opacities favored to represent atelectasis but pneumonia not excluded.            Workstation performed: [id number]         XR abdomen 1 vw portable   Final Interpretation by Jose Johns MD (01/17 1328)      New marked gaseous distention of the stomach.               Workstation performed: [id number]         XR abdomen 1 view kub   Final Interpretation by Diony Boateng MD (01/16 8396)      1.  NG tube projects over the gastric body with decompression of the stomach compared to the x-ray from earlier the same day.      2.  Mildly dilated mid abdominal small bowel loops with gas throughout the colon though the abdomen was only partially imaged.               Workstation performed: [id number]         XR abdomen 1 view kub   Final Interpretation by Diony Boateng MD (01/16 0826)      Worsening gastric distention.      The study was marked in EPIC for immediate notification.               Workstation performed: [id number]         XR chest portable   Final Interpretation by Sina Vitale MD (01/15 1878)      No endogastric tube visible within the field-of-view.      There is some gaseous distention of the stomach.      Minimal atelectasis left lung  base.      The study was marked in EPIC for immediate notification.            Workstation performed: [id number]         CT abdomen pelvis with contrast   Final Interpretation by Geovanny Taylor MD (01/15 0545)      Trace left pleural effusion with bibasilar atelectasis.  Focal wall thickening of the lower esophagus, gastroesophageal junction, and proximal stomach noted which may reflect esophagitis/gastritis. No evidence for bowel obstruction, mesenteric    inflammation, appendicitis, obstructive uropathy, free air, or free fluid. Descending and sigmoid colon diverticulosis without evidence for acute diverticulitis.         Workstation performed: [id number]             Results from last 7 days   Lab Units 01/27/25  0547 01/26/25  0447 01/25/25  0521 01/24/25  0425 01/23/25  0447 01/22/25  0448 01/21/25  0441   WBC Thousand/uL 9.51 15.46* 13.30* 7.60 7.78   < > 10.62*   HEMOGLOBIN g/dL 9.5* 10.1* 10.5* 10.4* 10.4*   < > 11.0*   HEMATOCRIT % 30.0* 33.4* 33.3* 32.6* 32.5*   < > 33.8*   PLATELETS Thousands/uL 258 313 295 289 283   < > 244   TOTAL NEUT ABS Thousands/µL  --   --   --   --   --   --  9.40*    < > = values in this interval not displayed.         Results from last 7 days   Lab Units 01/27/25  0547 01/26/25  0447 01/25/25  0521 01/24/25  0425 01/23/25 0447   SODIUM mmol/L 140 146 149* 149* 148*   POTASSIUM mmol/L 3.2* 3.2* 3.2* 3.2* 3.3*   CHLORIDE mmol/L 108 112* 115* 114* 113*   CO2 mmol/L 28 28 29 30 27   ANION GAP mmol/L 4 6 5 5 8   BUN mg/dL 12 15 18 22 26*   CREATININE mg/dL 0.73 0.85 0.81 0.78 0.76   EGFR ml/min/1.73sq m 76 71 72 74 74   CALCIUM mg/dL 7.6* 8.0* 8.2* 8.4 8.5   MAGNESIUM mg/dL 1.9 2.0 2.2 2.2 2.2   PHOSPHORUS mg/dL 1.7* 2.2* 1.7* 1.7* 1.2*     Results from last 7 days   Lab Units 01/21/25  0441   AST U/L 42*   ALT U/L 21   ALK PHOS U/L 49   TOTAL PROTEIN g/dL 6.1*   ALBUMIN g/dL 3.0*   TOTAL BILIRUBIN mg/dL 0.56     Results from last 7 days   Lab Units 01/21/25  1138 01/21/25  0747   POC  GLUCOSE mg/dl 111 92     Results from last 7 days   Lab Units 01/27/25  0547 01/26/25  0447 01/25/25  0521 01/24/25  0425 01/23/25  0447 01/22/25  0448 01/21/25  0441   GLUCOSE RANDOM mg/dL 113 114 115 108 114 98 106               Results from last 7 days   Lab Units 01/26/25  0447 01/25/25  0521 01/23/25  0447 01/21/25  0441   PROCALCITONIN ng/ml 0.24 0.24 0.69* 1.53*         Network Utilization Review Department  ATTENTION: Please call with any questions or concerns to [phone] and carefully listen to the prompts so that you are directed to the right person. All voicemails are confidential.   For Discharge needs, contact Care Management DC Support Team at [phone] opt. 2  Send all requests for admission clinical reviews, approved or denied determinations and any other requests to dedicated fax number below belonging to the campus where the patient is receiving treatment. List of dedicated fax numbers for the Facilities:  FACILITY NAME UR FAX NUMBER   ADMISSION DENIALS (Administrative/Medical Necessity) [phone]   DISCHARGE SUPPORT TEAM (NETWORK) [phone]   PARENT CHILD HEALTH (Maternity/NICU/Pediatrics) [phone]   [facility] [phone]   [facility] [phone]   [facility] [phone]   [facility] [phone]   [facility] [phone]   [facility] [phone]   [facility] [phone]   [facility] [phone]   [facility] [phone]   [facility] [phone]   [facility] [phone]   [facility] [place] [phone]

## 2025-01-27 NOTE — ASSESSMENT & PLAN NOTE
Decisional apparatus:  Patient has capacity on exam today.  If lost, patient's substitute decision maker would default to both adult children by [place] Act 169.  Advance Directive / Living Will / POLST / POA Forms: No    Goals  Level 3 code status.   Disease focused care.  DNR/DNI limits placed.   Met with patient, and both children to review goals. They would like to pursue feeding tube placement.   IR team to eval this afternoon.

## 2025-01-27 NOTE — ASSESSMENT & PLAN NOTE
Patient with fever and COVID-positive roommate at his skilled facility  Tested positive for COVID 9/17  Continues to require 2 L nasal cannula oxygen  S/p remdesivir x 3 days  Continue supportive care  Isolation precautions  Monitor labs and vital signs, conduct serial physical assessments

## 2025-01-27 NOTE — PROGRESS NOTES
Progress Note - Gastroenterology   Name: Prem Mar Sr. 89 y.o. male I MRN: [id number]  Unit/Bed#: -01 I Date of Admission: 1/15/2025   Date of Service: 1/27/2025 I Hospital Day: 11    Assessment & Plan  Dysphagia  89-year-old male with a history of Aflutter on ASA, HTN, CKD, gout, and remote history of reported PUD who initially presented from his SNF with abdominal pain and coffee-ground emesis now found to be COVID positive since admission. No prior EGD on file to review.  No other evidence of GI bleed. Hemoglobin remains stable. CT abdomen and pelvis showed evidence of possible distal esophagitis and gastritis. His stomach was notably dilated with air and fluid but no obvious mass or transition point. Low suspicion for acute/active GI bleed at this time.  Differential diagnosis for his symptoms include gastric outlet obstruction, reflux/erosive esophagitis, Pill esophagitis, gastritis, post infectious gastroparesis. NG tube placed 1/16. He failed NG clamp trial 1/18. Repeat CTAP with po contrast showed wall thickening of the lower esophagus and proximal stomach; subtle wall thickening of the mid to distal sigmoid colon, rectum and anal verge which could be focal colitis/proctitis.  However, no evidence of bowel obstruction or significant inflammation that would explain his symptoms.    GI has now bee re-engaged due to concern for dysphagia. Patient has been evaluated by ST; he is currently NPO with NG tube in place. He is at high risk for aspiration as he has significant inability to manage his own secretions.     It is my professional opinion that this patient is not a good candidate for PEG tube placement for the following reasons:  Poor prognosis - which is guarded at this time given his advanced age with multiple comorbidities (as listed above).  The combination of these factors suggests a limited life expectancy and ongoing decline in functional status.  According to the literature, patients  with poor prognosis and comorbid conditions generally derive minimal benefit from PEG tube placement and in fact early placement of PEG tube in patients with short life expectancy has been shown to lead to more complications without prolonged survival  Deteriorating mental status - which raises concern about the patient's ability to comply with necessary postprocedural care for PEG tube placement.  Placement of PEG tubes in patients with cognitive impairment is associated with higher risk of tube dislodgment, infection, procedural complications, and has not been shown to improve quality of life in these patients.  Risk of complications - PEG tube placement itself carries inherent risks, including infection, bleeding, bowel perforation, and aspiration pneumonia.  Given the patient's advanced age and other comorbidities, the risk of these complications is significantly heightened.  Literature also suggests that invasive interventions, such as PEG tube placement, may lead to further harm and further hasten clinical decline.   Limited benefit from nutritional support - for patients with end-stage diseases and poor functional status, studies have shown that artificial nutrition does not necessarily improve survival or quality of life.  It is generally recommended in these cases to focus on comfort and quality of life rather than aggressive interventions such as PEG tube placement.  Recommend ongoing goals of care discussion with family  If family wishes to proceed with feeding tube placement despite our recommendations, would recommend IR consultation to consider placement of J-tube if there is concern for aspiration. This should theoretically reduce the risk of aspiration, however the literature is mixed. Some studies suggested a modest reduction in aspiration risk with post-pyloric feeding while others show no significant difference. The variable results likely depend on individual patient factors and tube  "positioning  Continue Protonix 40 mg twice daily   Acute distention of stomach  As above  Constipation  Stool burden on CT scan on admission   Daily bowel regimen    COVID-19 virus infection  Intermittent fevers and now tested positive for Covid-19. Denies any shortness of breath.  Mgt per primary team.      I have discussed the above management plan in detail with the primary service.     Subjective   Patient seen and evaluated at bedside. Patient unable to provide much history; son present at bedside. Son wants to proceed with PEG tube. I explained the risks of PEG tube placement for patient and that I recommend against it (as above). Son verbalized understand and would still like to proceed. He reports that his father \"can't starve to death.\"     Objective :  Temp:  [96.8 °F (36 °C)-98.4 °F (36.9 °C)] 98.4 °F (36.9 °C)  HR:  [71-82] 82  BP: (110-132)/(43-52) 121/46  Resp:  [16-18] 18  SpO2:  [96 %-99 %] 96 %    Physical Exam  General: Frail, ill-appearing but non toxic  Head: Normocephalic, atraumatic  Eyes: Anicteric, no conjunctival erythema  ENT: NG tube in place  Neck: Trachea midline, no visible lymphadenopathy   Respiratory:  Non-labored respirations, symmetric thorax expansion  Cardiovascular:  Extremities appear well-perfused  Abdomen: Non-distended, soft  Extremities: Moves extremities spontaneously  Skin: No visible rashes or jaundice  Neuro: No gross focal deficits, no aphasia     Tessa Faustin D.O.  Gastroenterology Fellow, PGY-4  [facility] [address]   "

## 2025-01-27 NOTE — ASSESSMENT & PLAN NOTE
Social support  Patient is supported by son, daughter, grandchildren  Supportive listening provided  Normalized experience of patient/family  Provided anxiety containment  Provided anticipatory guidance    Follow up  Palliative Care will sign off today as goals are clear.   Please reach out to on-call provider via Epic Secure Chat  if questions or concerns arise.  Please do not hesitate to reach our on call provider through our clinic answering service at [phone] should you have acute symptom control concerns.

## 2025-01-27 NOTE — ASSESSMENT & PLAN NOTE
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 No prior EGD on file to review.  No other evidence of GI bleed. Hemoglobin remains stable. CT abdomen and pelvis showed evidence of possible distal esophagitis and gastritis. His stomach was notably dilated with air and fluid but no obvious mass or transition point. Low suspicion for acute/active GI bleed at this time.  Differential diagnosis for his symptoms include gastric outlet obstruction, reflux/erosive esophagitis, Pill esophagitis, gastritis, post infectious gastroparesis. NG tube placed 1/16. He failed NG clamp trial 1/18. Repeat CTAP with po contrast showed wall thickening of the lower esophagus and proximal stomach; subtle wall thickening of the mid to distal sigmoid colon, rectum and anal verge which could be focal colitis/proctitis.  However, no evidence of bowel obstruction or significant inflammation that would explain his symptoms.    GI has now bee re-engaged due to concern for dysphagia. Patient has been evaluated by ST; he is currently NPO with NG tube in place. He is at high risk for aspiration as he has significant inability to manage his own secretions.     It is my professional opinion that this patient is not a good candidate for PEG tube placement for the following reasons:  Poor prognosis - which is guarded at this time given his advanced age with multiple comorbidities (as listed above).  The combination of these factors suggests a limited life expectancy and ongoing decline in functional status.  According to the literature, patients with poor prognosis and comorbid conditions generally derive minimal benefit from PEG tube placement and in fact early placement of PEG tube in patients with short life expectancy has been shown to lead to more complications  without prolonged survival  Deteriorating mental status - which raises concern about the patient's ability to comply with necessary postprocedural care for PEG tube placement.  Placement of PEG tubes in patients with cognitive impairment is associated with higher risk of tube dislodgment, infection, procedural complications, and has not been shown to improve quality of life in these patients.  Risk of complications - PEG tube placement itself carries inherent risks, including infection, bleeding, bowel perforation, and aspiration pneumonia.  Given the patient's advanced age and other comorbidities, the risk of these complications is significantly heightened.  Literature also suggests that invasive interventions, such as PEG tube placement, may lead to further harm and further hasten clinical decline.   Limited benefit from nutritional support - for patients with end-stage diseases and poor functional status, studies have shown that artificial nutrition does not necessarily improve survival or quality of life.  It is generally recommended in these cases to focus on comfort and quality of life rather than aggressive interventions such as PEG tube placement.  Recommend ongoing goals of care discussion with family  If family wishes to proceed with feeding tube placement despite our recommendations, would recommend IR consultation to consider placement of J-tube if there is concern for aspiration. This should theoretically reduce the risk of aspiration, however the literature is mixed. Some studies suggested a modest reduction in aspiration risk with post-pyloric feeding while others show no significant difference. The variable results likely depend on individual patient factors and tube positioning  Continue Protonix 40 mg twice daily

## 2025-01-27 NOTE — ASSESSMENT & PLAN NOTE
Chest x-ray: Retrocardiac opacity, which may be due to pneumonia and/or atelectasis.  Procalcitonin peaked and downtrending, with intermittent fever  Repeat CXR-slight improvement in left base patchy atelectasis versus pneumonia  Received vancomycin 1/21 through 1/22  Received cefepime 1/21 to 1/22  Transitioned to Rocephin 1/22 per recommendations of ID pharmacy, now discontinued  Monitor labs and vital signs, conduct serial physical assessments

## 2025-01-27 NOTE — ASSESSMENT & PLAN NOTE
Prehospital takesToprol-XL 25 mg p.o. daily and Lasix 20 mg p.o. daily on Monday Wednesday Friday.   Currently NPO. Metoprolol switched to IV. Received furosemide 40 mg IV x 1 1/19/2025  Blood pressure controlled  Monitor BP per protocol

## 2025-01-27 NOTE — ASSESSMENT & PLAN NOTE
Presented for suspected GI bleed  CT abdomen and pelvis 1/15/2025 with focal wall thickening of the lower esophagus, gastroesophageal junction, and proximal stomach which may reflect esophagitis/gastritis.  No evidence for bowel obstruction, mesenteric inflammation, appendicitis, obstructive uropathy, free air or free fluid  Chest x-ray 1/15/2025 with gaseous distention of the stomach  KUB 1/16/2025 with worsening distention  NGT was placed  CT abdomen pelvis with contrast 1/17/2024: New small bilateral pleural effusions with extensive bibasilar atelectasis. Wall thickening of the lower esophagus and proximal stomach is again noted without interval change. This may reflect mild focal esophagitis/gastritis. New NG tube with tip terminating in the gastric body region. No evidence of bowel obstruction, mesenteric inflammation, appendicitis, obstructive uropathy, free air, or free fluid. Descending and sigmoid diverticulosis again noted without evidence for acute diverticulitis. Subtle wall thickening of the mid to distal sigmoid colon, rectum, and anal verge region could reflect mild focal colitis/proctitis. Multiple punctate pancreatic calcifications again seen likely sequelae of chronic pancreatitis  Passed NG clamping trial 1/20/2025, later evaluated by SLP but did not pass   Keep NPO   Trickle feeding through NG initiated 1/22/2025 but discontinued 1/23/2025 due to high residuals and rhonchi requiring frequent suctioning  Speech therapy continues to follow  General surgery is aware and would be available for PEG, however could not be done until 1/27/2025 and additional goals of care conversations  Continue PPI  Hemoglobin has remained stable, no signs of bleeding

## 2025-01-27 NOTE — SPEECH THERAPY NOTE
Speech Language/Pathology    Speech/Language Pathology Progress Note    Patient Name: Prem Mar Sr.  Today's Date: 1/27/2025     Problem List  Principal Problem:    Upper GI bleed  Active Problems:    Essential hypertension    Idiopathic chronic gout of multiple sites without tophus    Mixed hyperlipidemia    Chronic kidney disease, stage 3a (HCC)    Atrial flutter (HCC)    Constipation    Goals of care, counseling/discussion    Acute distention of stomach    COVID-19 virus infection    Congestion of upper airway    Opacity noted on imaging study    Palliative care by specialist    Electrolyte abnormality    Hypernatremia    Dysphagia       Past Medical History  Past Medical History:   Diagnosis Date    Arthritis     Cataract     Coronary artery disease     Coronary atherosclerosis of native coronary artery     Diverticulitis of colon     Essential hypertension, benign     Hyperlipidemia     Hypertension     Peptic ulcer     Renal disorder         Past Surgical History  Past Surgical History:   Procedure Laterality Date    CATARACT EXTRACTION Right     Left eye 5/2021    CORONARY STENT PLACEMENT      SKIN BIOPSY      TONSILLECTOMY           Subjective:  Pt positioned upright and alerted to stim.   Objective:  Pt was seen for f/u dysphagia therapy for po potential. Spoke with nursing, palliative, and GI. Upon ST arrival pt audibly wet weak cough with secretions. Verbal cues to utilize strong effortful cough ST removed mod amount of secretions with deep suction. Pt inconsistent at bringing up secretions to level in which they can be removed via suction. Verbal cues to attempt swallow to clear. Laryngeal rise upon palpation appeared weak and  VERY effortful. Immediate cough following x2 cued swallow in which mod amount of secretions removed via suction. ST provided oral care. Did not administer trials as pt high risk of aspiration and requiring frequent suctioning. Reviewed with son pt does have swallow function  however explained swallow is weak and not able to clear secretions. Expressed concern of potential aspiration of secretions and explained to son risk factors of materials such as liquids entering the lungs may result in increased cough, which may contribute mucus, fever, sweating, and chills, shortness of breath, sharp or stabbing chest pain, loss of appetite, low energy, and fatigue. Also reviewed aspiration can lead to rapid/shallow breathing or shortness of breath resulting in increased oxygen requirements including intubation. Son and daughter appeared to understand. Son questioned to resume NG tube feedings, ST reviewed GI's recommendations as stated moments prior and discussed pt had poor tolerance prior. Son then questioned if poor tolerance due to pt leaning slightly to the side. Son then questioned if ST will continue f/u tomorrow, ST will re evaluate again tomorrow.   Assessment:  MOD amount of thick secretions. Frequent suctioning. Initial cough is strong however following ineffective at bringing up secretions. Weak swallow palpated in attempt to clear.   Plan/Recommendations:  Recommend continue NPO, ongoing GOC discussions  Ensure good oral care   ST will f/u as indicated.

## 2025-01-27 NOTE — PROGRESS NOTES
Progress Note - Hospitalist   Name: Prem Mar Sr. 89 y.o. male I MRN: [id number]  Unit/Bed#: [facility] Date of Admission: 1/15/2025   Date of Service: 1/27/2025 I Hospital Day: 11    Assessment & Plan  Upper GI bleed  Presented for suspected GI bleed  CT abdomen and pelvis 1/15/2025 with focal wall thickening of the lower esophagus, gastroesophageal junction, and proximal stomach which may reflect esophagitis/gastritis.  No evidence for bowel obstruction, mesenteric inflammation, appendicitis, obstructive uropathy, free air or free fluid  Chest x-ray 1/15/2025 with gaseous distention of the stomach  KUB 1/16/2025 with worsening distention  NGT was placed  CT abdomen pelvis with contrast 1/17/2024: New small bilateral pleural effusions with extensive bibasilar atelectasis. Wall thickening of the lower esophagus and proximal stomach is again noted without interval change. This may reflect mild focal esophagitis/gastritis. New NG tube with tip terminating in the gastric body region. No evidence of bowel obstruction, mesenteric inflammation, appendicitis, obstructive uropathy, free air, or free fluid. Descending and sigmoid diverticulosis again noted without evidence for acute diverticulitis. Subtle wall thickening of the mid to distal sigmoid colon, rectum, and anal verge region could reflect mild focal colitis/proctitis. Multiple punctate pancreatic calcifications again seen likely sequelae of chronic pancreatitis  Passed NG clamping trial 1/20/2025, later evaluated by SLP but did not pass   Keep NPO   Trickle feeding through NG initiated 1/22/2025 but discontinued 1/23/2025 due to high residuals and rhonchi requiring frequent suctioning  Speech therapy continues to follow  General surgery is aware and would be available for PEG, however could not be done until 1/27/2025 and additional goals of care conversations  Continue PPI  Hemoglobin has remained stable, no signs of bleeding  Opacity noted on imaging  study  Chest x-ray: Retrocardiac opacity, which may be due to pneumonia and/or atelectasis.  Procalcitonin peaked and downtrending, with intermittent fever  Repeat CXR-slight improvement in left base patchy atelectasis versus pneumonia  Received vancomycin 1/21 through 1/22  Received cefepime 1/21 to 1/22  Transitioned to Rocephin 1/22 per recommendations of ID pharmacy, now discontinued  Monitor labs and vital signs, conduct serial physical assessments    COVID-19 virus infection  Patient with fever and COVID-positive roommate at his skilled facility  Tested positive for COVID 9/17  Continues to require 2 L nasal cannula oxygen  S/p remdesivir x 3 days  Continue supportive care  Isolation precautions  Monitor labs and vital signs, conduct serial physical assessments  Congestion of upper airway  Upper airway congestion noted, patient has strong cough and able to clear  CT imaging revealed small bilateral pleural effusion and extensive bibasilar atelectasis  Maintain head of bed elevated  Aspiration precautions  Appreciate input of speech therapy who are following  Continue Robinul 0.1 mg IV every 4 hours as needed  Added scopolamine 1/25  Continue incentive spirometry  Suction as needed  Goals of care, counseling/discussion  Patient with significant comorbid medical conditions would be high risk for treatment options requiring anesthesia.  EGD testing might have risks that outweigh the benefits.  Patient verbalized understanding.  Opened goals of care conversations with son.  Continued on phone on Saturday. Had further conversations with patient's son, daughter, and grandson when they arrived Ultimately they would like to keep him comfortable but for now continue with full treatment   Palliative input appreciated   Idiopathic chronic gout of multiple sites without tophus  Continue prehospital allopurinol 300 mg p.o. daily when tolerating p.o.  Essential hypertension  Prehospital takesToprol-XL 25 mg p.o. daily and  Lasix 20 mg p.o. daily on Monday Wednesday Friday.   Currently NPO. Metoprolol switched to IV. Received furosemide 40 mg IV x 1 1/19/2025  Blood pressure controlled  Monitor BP per protocol  Mixed hyperlipidemia  Continue prehospital Tricor 48 mg p.o. daily when tolerating p.o.  Chronic kidney disease, stage 3a (HCC)  Lab Results   Component Value Date    EGFR 76 01/27/2025    EGFR 71 01/26/2025    EGFR 72 01/25/2025    CREATININE 0.73 01/27/2025    CREATININE 0.85 01/26/2025    CREATININE 0.81 01/25/2025     Creatinine appears to be around baseline of 0.9-1.1 mg/dL   Continue to monitor renal function closely  Avoid nephrotoxins and hypotension  Trend creatinine  Atrial flutter (HCC)  Currently rate controlled with metoprolol   Not currently on anticoagulation as outpatient  Constipation  GI input appreciated  Continue bowel regimen, monitor effectiveness  Electrolyte abnormality  Patient was initiated on trickle tube feed on 1/22/2025  High risk for refeeding syndrome  Continue to monitor electrolytes and replete accordingly  BMP, magnesium, phosphorus a.m.  Hypernatremia  In setting of poor oral intake  Started D5W infusion yesterday 75 mL/h  Hypernatremia improving, will continue IV fluids D5W today  Monitor fluid status closely  BMP a.m.    VTE Pharmacologic Prophylaxis: VTE Score: 5 High Risk (Score >/= 5) - Pharmacological DVT Prophylaxis Ordered: heparin. Sequential Compression Devices Ordered.    Mobility:   Basic Mobility Inpatient Raw Score: 6  JH-HLM Goal: 2: Bed activities/Dependent transfer  JH-HLM Achieved: 2: Bed activities/Dependent transfer  JH-HLM Goal achieved. Continue to encourage appropriate mobility.    Patient Centered Rounds: I performed bedside rounds with nursing staff today.   Discussions with Specialists or Other Care Team Provider: Case management, GI, IR    Education and Discussions with Family / Patient: Updated  (son) at bedside.    Current Length of Stay: 11  day(s)  Current Patient Status: Inpatient   Certification Statement: The patient will continue to require additional inpatient hospital stay due to dysphagia, care coordination, goals of care discussions, electrolyte abnormalities.  Discharge Plan:  TBD.    Code Status: Level 3 - DNAR and DNI    Subjective   Evaluated at bedside.  Unable to obtain full review of systems from patient.  He denies pain discomfort.  Continues to require suctioning overnight, remains NPO.    Objective :  Temp:  [96.8 °F (36 °C)-98.4 °F (36.9 °C)] 98.4 °F (36.9 °C)  HR:  [71-82] 82  BP: (110-132)/(43-52) 121/46  Resp:  [16-18] 18  SpO2:  [96 %-99 %] 96 %    Body mass index is 24.98 kg/m².     Input and Output Summary (last 24 hours):     Intake/Output Summary (Last 24 hours) at 1/27/2025 1356  Last data filed at 1/27/2025 1317  Gross per 24 hour   Intake 2478.75 ml   Output --   Net 2478.75 ml       Physical Exam  Vitals and nursing note reviewed.   Constitutional:       Appearance: He is ill-appearing.   HENT:      Head: Normocephalic and atraumatic.      Nose: Nose normal.      Mouth/Throat:      Mouth: Mucous membranes are dry.      Pharynx: Oropharynx is clear.   Eyes:      Pupils: Pupils are equal, round, and reactive to light.   Cardiovascular:      Pulses: Normal pulses.      Heart sounds: Normal heart sounds.   Pulmonary:      Effort: Pulmonary effort is normal. No respiratory distress.      Breath sounds: Rhonchi present.   Abdominal:      General: Bowel sounds are normal.      Palpations: Abdomen is soft.      Tenderness: There is no abdominal tenderness.   Musculoskeletal:         General: Swelling present.      Cervical back: Neck supple.      Right lower leg: No edema.      Left lower leg: No edema.   Skin:     General: Skin is warm and dry.      Capillary Refill: Capillary refill takes less than 2 seconds.   Neurological:      General: No focal deficit present.      Mental Status: He is alert. Mental status is at baseline.            Lines/Drains:  Lines/Drains/Airways       Active Status       Name Placement date Placement time Site Days    NG/OG/Enteral Tube Nasogastric 18 Fr Right nare 01/16/25  1052  Right nare  11                            Lab Results: I have reviewed the following results:   Results from last 7 days   Lab Units 01/27/25  0547 01/22/25  0448 01/21/25  0441   WBC Thousand/uL 9.51   < > 10.62*   HEMOGLOBIN g/dL 9.5*   < > 11.0*   HEMATOCRIT % 30.0*   < > 33.8*   PLATELETS Thousands/uL 258   < > 244   SEGS PCT %  --   --  89*   LYMPHO PCT %  --   --  7*   MONO PCT %  --   --  3*   EOS PCT %  --   --  0    < > = values in this interval not displayed.     Results from last 7 days   Lab Units 01/27/25  0547 01/22/25 0448 01/21/25  0441   SODIUM mmol/L 140   < > 143   POTASSIUM mmol/L 3.2*   < > 3.1*   CHLORIDE mmol/L 108   < > 107   CO2 mmol/L 28   < > 26   BUN mg/dL 12   < > 30*   CREATININE mg/dL 0.73   < > 1.07   ANION GAP mmol/L 4   < > 10   CALCIUM mg/dL 7.6*   < > 8.5   ALBUMIN g/dL  --   --  3.0*   TOTAL BILIRUBIN mg/dL  --   --  0.56   ALK PHOS U/L  --   --  49   ALT U/L  --   --  21   AST U/L  --   --  42*   GLUCOSE RANDOM mg/dL 113   < > 106    < > = values in this interval not displayed.         Results from last 7 days   Lab Units 01/21/25  1138 01/21/25  0747   POC GLUCOSE mg/dl 111 92         Results from last 7 days   Lab Units 01/26/25  0447 01/25/25  0521 01/23/25  0447 01/21/25  0441   PROCALCITONIN ng/ml 0.24 0.24 0.69* 1.53*       Recent Cultures (last 7 days):               Last 24 Hours Medication List:     Current Facility-Administered Medications:     acetaminophen (Ofirmev) injection 1,000 mg, Q8H PRN, Last Rate: 1,000 mg (01/24/25 2153)    acetaminophen (TYLENOL) tablet 650 mg, Q4H PRN    allopurinol (ZYLOPRIM) tablet 300 mg, Daily    bisacodyl (DULCOLAX) rectal suppository 10 mg, Daily    bisacodyl (DULCOLAX) rectal suppository 10 mg, Daily PRN    Cholecalciferol (VITAMIN D3) tablet 2,000  Units, Daily    dextrose 5 % infusion, Continuous, Last Rate: 75 mL/hr (01/27/25 1317)    docusate (COLACE) oral liquid 100 mg, BID    fenofibrate (TRICOR) tablet 48 mg, Daily    [Held by provider] furosemide (LASIX) tablet 20 mg, Once per day on Monday Wednesday Friday    gabapentin (NEURONTIN) capsule 100 mg, Q12H    glycopyrrolate (ROBINUL) injection 0.1 mg, Q4H PRN    heparin (porcine) subcutaneous injection 5,000 Units, Q8H ANKIT    iohexol (OMNIPAQUE) 240 MG/ML solution 50 mL, 90 min pre-procedure    metoprolol (LOPRESSOR) injection 2.5 mg, Q6H    multivitamin stress formula tablet 1 tablet, Daily    nitroglycerin (NITROSTAT) SL tablet 0.4 mg, Q5 Min PRN    nystatin (MYCOSTATIN) cream, BID    ondansetron (ZOFRAN) injection 4 mg, Q6H PRN    pantoprazole (PROTONIX) injection 40 mg, Q12H    phenol (CHLORASEPTIC) 1.4 % mucosal liquid 1 spray, Q2H PRN    scopolamine (TRANSDERM-SCOP) 1 mg/3 days TD 72 hr patch 1 patch, Q72H    senna (SENOKOT) tablet 17.2 mg, HS    Administrative Statements   Today, Patient Was Seen By: ANAM Rose      **Please Note: This note may have been constructed using a voice recognition system.**

## 2025-01-27 NOTE — PROGRESS NOTES
Pastoral Care Progress Note  CH responded to visit request from family in setting of difficult treatment news. Son of pt received  and requested that she contact the pt's , Fr. Isaacs at [facility].  called [phone] and spoke to the [facility]  relaying the request.  updated family bedside that  will be available tomorrow morning, but if they need help in the interim they can ask the RN to reach out to [facility] and ask for the  to respond if there is a crisis.  CH remains available.

## 2025-01-28 LAB
ANION GAP SERPL CALCULATED.3IONS-SCNC: 5 MMOL/L (ref 4–13)
BASOPHILS # BLD AUTO: 0.01 THOUSANDS/ΜL (ref 0–0.1)
BASOPHILS NFR BLD AUTO: 0 % (ref 0–1)
BUN SERPL-MCNC: 9 MG/DL (ref 5–25)
CALCIUM SERPL-MCNC: 7.5 MG/DL (ref 8.4–10.2)
CHLORIDE SERPL-SCNC: 105 MMOL/L (ref 96–108)
CO2 SERPL-SCNC: 27 MMOL/L (ref 21–32)
CREAT SERPL-MCNC: 0.69 MG/DL (ref 0.6–1.3)
EOSINOPHIL # BLD AUTO: 0.06 THOUSAND/ΜL (ref 0–0.61)
EOSINOPHIL NFR BLD AUTO: 1 % (ref 0–6)
ERYTHROCYTE [DISTWIDTH] IN BLOOD BY AUTOMATED COUNT: 15.6 % (ref 11.6–15.1)
GFR SERPL CREATININE-BSD FRML MDRD: 77 ML/MIN/1.73SQ M
GLUCOSE SERPL-MCNC: 114 MG/DL (ref 65–140)
HCT VFR BLD AUTO: 30.2 % (ref 36.5–49.3)
HGB BLD-MCNC: 9.5 G/DL (ref 12–17)
IMM GRANULOCYTES # BLD AUTO: 0.07 THOUSAND/UL (ref 0–0.2)
IMM GRANULOCYTES NFR BLD AUTO: 1 % (ref 0–2)
LYMPHOCYTES # BLD AUTO: 0.91 THOUSANDS/ΜL (ref 0.6–4.47)
LYMPHOCYTES NFR BLD AUTO: 11 % (ref 14–44)
MCH RBC QN AUTO: 30.7 PG (ref 26.8–34.3)
MCHC RBC AUTO-ENTMCNC: 31.5 G/DL (ref 31.4–37.4)
MCV RBC AUTO: 98 FL (ref 82–98)
MONOCYTES # BLD AUTO: 0.29 THOUSAND/ΜL (ref 0.17–1.22)
MONOCYTES NFR BLD AUTO: 4 % (ref 4–12)
NEUTROPHILS # BLD AUTO: 6.92 THOUSANDS/ΜL (ref 1.85–7.62)
NEUTS SEG NFR BLD AUTO: 83 % (ref 43–75)
NRBC BLD AUTO-RTO: 0 /100 WBCS
PLATELET # BLD AUTO: 270 THOUSANDS/UL (ref 149–390)
PMV BLD AUTO: 10.1 FL (ref 8.9–12.7)
POTASSIUM SERPL-SCNC: 3.2 MMOL/L (ref 3.5–5.3)
RBC # BLD AUTO: 3.09 MILLION/UL (ref 3.88–5.62)
SODIUM SERPL-SCNC: 137 MMOL/L (ref 135–147)
WBC # BLD AUTO: 8.26 THOUSAND/UL (ref 4.31–10.16)

## 2025-01-28 PROCEDURE — 99233 SBSQ HOSP IP/OBS HIGH 50: CPT | Performed by: PHYSICIAN ASSISTANT

## 2025-01-28 PROCEDURE — 85025 COMPLETE CBC W/AUTO DIFF WBC: CPT | Performed by: NURSE PRACTITIONER

## 2025-01-28 PROCEDURE — 99232 SBSQ HOSP IP/OBS MODERATE 35: CPT | Performed by: NURSE PRACTITIONER

## 2025-01-28 PROCEDURE — 80048 BASIC METABOLIC PNL TOTAL CA: CPT | Performed by: NURSE PRACTITIONER

## 2025-01-28 PROCEDURE — 99232 SBSQ HOSP IP/OBS MODERATE 35: CPT | Performed by: STUDENT IN AN ORGANIZED HEALTH CARE EDUCATION/TRAINING PROGRAM

## 2025-01-28 PROCEDURE — 92526 ORAL FUNCTION THERAPY: CPT

## 2025-01-28 PROCEDURE — 99418 PROLNG IP/OBS E/M EA 15 MIN: CPT | Performed by: PHYSICIAN ASSISTANT

## 2025-01-28 RX ORDER — POTASSIUM CHLORIDE 14.9 MG/ML
20 INJECTION INTRAVENOUS ONCE
Status: COMPLETED | OUTPATIENT
Start: 2025-01-28 | End: 2025-01-28

## 2025-01-28 RX ORDER — HYDROMORPHONE HCL IN WATER/PF 6 MG/30 ML
0.2 PATIENT CONTROLLED ANALGESIA SYRINGE INTRAVENOUS EVERY 4 HOURS PRN
Status: DISCONTINUED | OUTPATIENT
Start: 2025-01-28 | End: 2025-02-04

## 2025-01-28 RX ADMIN — DEXTROSE 75 ML/HR: 5 SOLUTION INTRAVENOUS at 02:50

## 2025-01-28 RX ADMIN — DOCUSATE SODIUM 100 MG: 50 LIQUID ORAL at 08:22

## 2025-01-28 RX ADMIN — NYSTATIN: 100000 CREAM TOPICAL at 08:23

## 2025-01-28 RX ADMIN — HEPARIN SODIUM 5000 UNITS: 5000 INJECTION INTRAVENOUS; SUBCUTANEOUS at 22:06

## 2025-01-28 RX ADMIN — METOROPROLOL TARTRATE 2.5 MG: 5 INJECTION, SOLUTION INTRAVENOUS at 08:22

## 2025-01-28 RX ADMIN — DOCUSATE SODIUM 100 MG: 50 LIQUID ORAL at 17:15

## 2025-01-28 RX ADMIN — ALLOPURINOL 300 MG: 300 TABLET ORAL at 08:22

## 2025-01-28 RX ADMIN — METOROPROLOL TARTRATE 2.5 MG: 5 INJECTION, SOLUTION INTRAVENOUS at 14:40

## 2025-01-28 RX ADMIN — HEPARIN SODIUM 5000 UNITS: 5000 INJECTION INTRAVENOUS; SUBCUTANEOUS at 05:40

## 2025-01-28 RX ADMIN — FENOFIBRATE 48 MG: 48 TABLET, FILM COATED ORAL at 08:21

## 2025-01-28 RX ADMIN — GABAPENTIN 100 MG: 100 CAPSULE ORAL at 05:40

## 2025-01-28 RX ADMIN — PANTOPRAZOLE SODIUM 40 MG: 40 INJECTION, POWDER, FOR SOLUTION INTRAVENOUS at 05:40

## 2025-01-28 RX ADMIN — B-COMPLEX W/ C & FOLIC ACID TAB 1 TABLET: TAB at 08:21

## 2025-01-28 RX ADMIN — Medication 2000 UNITS: at 08:22

## 2025-01-28 RX ADMIN — METOROPROLOL TARTRATE 2.5 MG: 5 INJECTION, SOLUTION INTRAVENOUS at 20:41

## 2025-01-28 RX ADMIN — PANTOPRAZOLE SODIUM 40 MG: 40 INJECTION, POWDER, FOR SOLUTION INTRAVENOUS at 17:15

## 2025-01-28 RX ADMIN — METOROPROLOL TARTRATE 2.5 MG: 5 INJECTION, SOLUTION INTRAVENOUS at 02:52

## 2025-01-28 RX ADMIN — HEPARIN SODIUM 5000 UNITS: 5000 INJECTION INTRAVENOUS; SUBCUTANEOUS at 14:40

## 2025-01-28 RX ADMIN — NYSTATIN: 100000 CREAM TOPICAL at 17:32

## 2025-01-28 RX ADMIN — SCOPOLAMINE 1 PATCH: 1.5 PATCH, EXTENDED RELEASE TRANSDERMAL at 15:44

## 2025-01-28 RX ADMIN — BISACODYL 10 MG: 10 SUPPOSITORY RECTAL at 08:21

## 2025-01-28 RX ADMIN — GLYCOPYRROLATE 0.1 MG: 0.2 INJECTION, SOLUTION INTRAMUSCULAR; INTRAVENOUS at 05:45

## 2025-01-28 RX ADMIN — GLYCOPYRROLATE 0.1 MG: 0.2 INJECTION, SOLUTION INTRAMUSCULAR; INTRAVENOUS at 20:41

## 2025-01-28 RX ADMIN — GABAPENTIN 100 MG: 100 CAPSULE ORAL at 17:31

## 2025-01-28 RX ADMIN — POTASSIUM CHLORIDE 20 MEQ: 14.9 INJECTION, SOLUTION INTRAVENOUS at 09:24

## 2025-01-28 RX ADMIN — ACETAMINOPHEN 1000 MG: 1000 INJECTION, SOLUTION INTRAVENOUS at 14:40

## 2025-01-28 RX ADMIN — DEXTROSE 75 ML/HR: 5 SOLUTION INTRAVENOUS at 16:08

## 2025-01-28 RX ADMIN — GLYCOPYRROLATE 0.1 MG: 0.2 INJECTION, SOLUTION INTRAMUSCULAR; INTRAVENOUS at 14:40

## 2025-01-28 NOTE — ASSESSMENT & PLAN NOTE
Lab Results   Component Value Date    EGFR 77 01/28/2025    EGFR 76 01/27/2025    EGFR 71 01/26/2025    CREATININE 0.69 01/28/2025    CREATININE 0.73 01/27/2025    CREATININE 0.85 01/26/2025

## 2025-01-28 NOTE — ASSESSMENT & PLAN NOTE
Patient with fever and COVID-positive roommate at his skilled facility  Tested positive for COVID 12/17/2025  Continues to require 2 L nasal cannula oxygen  S/p remdesivir x 3 days  Continue supportive care  Monitor labs and vital signs, conduct serial physical assessments

## 2025-01-28 NOTE — ASSESSMENT & PLAN NOTE
Lab Results   Component Value Date    EGFR 77 01/28/2025    EGFR 76 01/27/2025    EGFR 71 01/26/2025    CREATININE 0.69 01/28/2025    CREATININE 0.73 01/27/2025    CREATININE 0.85 01/26/2025     Creatinine appears to be around baseline of 0.9-1.1 mg/dL   Continue to monitor renal function closely  Avoid nephrotoxins and hypotension  Trend creatinine

## 2025-01-28 NOTE — ASSESSMENT & PLAN NOTE
Chest x-ray: Retrocardiac opacity, which may be due to pneumonia and/or atelectasis  Procalcitonin peaked and downtrending, with intermittent fever  Repeat CXR-slight improvement in left base patchy atelectasis versus pneumonia  Received vancomycin 1/21 through 1/22  Received cefepime 1/21 to 1/22  Transitioned to Rocephin 1/22 per recommendations of ID pharmacy, now discontinued  Monitor labs and vital signs, conduct serial physical assessments

## 2025-01-28 NOTE — PLAN OF CARE
Problem: PAIN - ADULT  Goal: Verbalizes/displays adequate comfort level or baseline comfort level  Description: Interventions:  - Encourage patient to monitor pain and request assistance  - Assess pain using appropriate pain scale  - Administer analgesics based on type and severity of pain and evaluate response  - Implement non-pharmacological measures as appropriate and evaluate response  - Consider cultural and social influences on pain and pain management  - Notify physician/advanced practitioner if interventions unsuccessful or patient reports new pain  Outcome: Progressing     Problem: INFECTION - ADULT  Goal: Absence or prevention of progression during hospitalization  Description: INTERVENTIONS:  - Assess and monitor for signs and symptoms of infection  - Monitor lab/diagnostic results  - Monitor all insertion sites, i.e. indwelling lines, tubes, and drains  - Monitor endotracheal if appropriate and nasal secretions for changes in amount and color  - [place] appropriate cooling/warming therapies per order  - Administer medications as ordered  - Instruct and encourage patient and family to use good hand hygiene technique  - Identify and instruct in appropriate isolation precautions for identified infection/condition  Outcome: Progressing  Goal: Absence of fever/infection during neutropenic period  Description: INTERVENTIONS:  - Monitor WBC    Outcome: Progressing  Goal: Infection Control Precautions  Outcome: Progressing     Problem: SAFETY ADULT  Goal: Patient will remain free of falls  Description: INTERVENTIONS:  - Educate patient/family on patient safety including physical limitations  - Instruct patient to call for assistance with activity   - Consult OT/PT to assist with strengthening/mobility   - Keep Call bell within reach  - Keep bed low and locked with side rails adjusted as appropriate  - Keep care items and personal belongings within reach  - Initiate and maintain comfort rounds  - Make Fall  Risk Sign visible to staff  - Offer Toileting every 2 Hours, in advance of need  - Initiate/Maintain bed alarm  - Obtain necessary fall risk management equipment: bed alarm  - Apply yellow socks and bracelet for high fall risk patients  - Consider moving patient to room near nurses station  Outcome: Progressing  Goal: Maintain or return to baseline ADL function  Description: INTERVENTIONS:  -  Assess patient's ability to carry out ADLs; assess patient's baseline for ADL function and identify physical deficits which impact ability to perform ADLs (bathing, care of mouth/teeth, toileting, grooming, dressing, etc.)  - Assess/evaluate cause of self-care deficits   - Assess range of motion  - Assess patient's mobility; develop plan if impaired  - Assess patient's need for assistive devices and provide as appropriate  - Encourage maximum independence but intervene and supervise when necessary  - Involve family in performance of ADLs  - Assess for home care needs following discharge   - Consider OT consult to assist with ADL evaluation and planning for discharge  - Provide patient education as appropriate  Outcome: Progressing  Goal: Maintains/Returns to pre admission functional level  Description: INTERVENTIONS:  - Perform AM-PAC 6 Click Basic Mobility/ Daily Activity assessment daily.  - Set and communicate daily mobility goal to care team and patient/family/caregiver.   - Collaborate with rehabilitation services on mobility goals if consulted  - Perform Range of Motion 3 times a day.  - Reposition patient every 2 hours.  - Dangle patient 3 times a day  - Record patient progress and toleration of activity level   Outcome: Progressing     Problem: DISCHARGE PLANNING  Goal: Discharge to home or other facility with appropriate resources  Description: INTERVENTIONS:  - Identify barriers to discharge w/patient and caregiver  - Arrange for needed discharge resources and transportation as appropriate  - Identify discharge  learning needs (meds, wound care, etc.)  - Arrange for interpretive services to assist at discharge as needed  - Refer to Case Management Department for coordinating discharge planning if the patient needs post-hospital services based on physician/advanced practitioner order or complex needs related to functional status, cognitive ability, or social support system  Outcome: Progressing     Problem: Knowledge Deficit  Goal: Patient/family/caregiver demonstrates understanding of disease process, treatment plan, medications, and discharge instructions  Description: Complete learning assessment and assess knowledge base.  Interventions:  - Provide teaching at level of understanding  - Provide teaching via preferred learning methods  Outcome: Progressing     Problem: Prexisting or High Potential for Compromised Skin Integrity  Goal: Skin integrity is maintained or improved  Description: INTERVENTIONS:  - Identify patients at risk for skin breakdown  - Assess and monitor skin integrity  - Assess and monitor nutrition and hydration status  - Monitor labs   - Assess for incontinence   - Turn and reposition patient  - Assist with mobility/ambulation  - Relieve pressure over bony prominences  - Avoid friction and shearing  - Provide appropriate hygiene as needed including keeping skin clean and dry  - Evaluate need for skin moisturizer/barrier cream  - Collaborate with interdisciplinary team   - Patient/family teaching  - Consider wound care consult   Outcome: Progressing     Problem: GASTROINTESTINAL - ADULT  Goal: Minimal or absence of nausea and/or vomiting  Description: INTERVENTIONS:  - Administer IV fluids if ordered to ensure adequate hydration  - Maintain NPO status until nausea and vomiting are resolved  - Nasogastric tube if ordered  - Administer ordered antiemetic medications as needed  - Provide nonpharmacologic comfort measures as appropriate  - Advance diet as tolerated, if ordered  - Consider nutrition services  referral to assist patient with adequate nutrition and appropriate food choices  Outcome: Progressing  Goal: Maintains or returns to baseline bowel function  Description: INTERVENTIONS:  - Assess bowel function  - Encourage oral fluids to ensure adequate hydration  - Administer IV fluids if ordered to ensure adequate hydration  - Administer ordered medications as needed  - Encourage mobilization and activity  - Consider nutritional services referral to assist patient with adequate nutrition and appropriate food choices  Outcome: Progressing  Goal: Maintains adequate nutritional intake  Description: INTERVENTIONS:  - Monitor percentage of each meal consumed  - Identify factors contributing to decreased intake, treat as appropriate  - Assist with meals as needed  - Monitor I&O, weight, and lab values if indicated  - Obtain nutrition services referral as needed  Outcome: Progressing  Goal: Oral mucous membranes remain intact  Description: INTERVENTIONS  - Assess oral mucosa and hygiene practices  - Implement preventative oral hygiene regimen  - Implement oral medicated treatments as ordered  - Initiate Nutrition services referral as needed  Outcome: Progressing     Problem: RESPIRATORY - ADULT  Goal: Achieves optimal ventilation and oxygenation  Description: INTERVENTIONS:  - Assess for changes in respiratory status  - Assess for changes in mentation and behavior  - Position to facilitate oxygenation and minimize respiratory effort  - Oxygen administered by appropriate delivery if ordered  - Initiate smoking cessation education as indicated  - Encourage broncho-pulmonary hygiene including cough, deep breathe, Incentive Spirometry  - Assess the need for suctioning and aspirate as needed  - Assess and instruct to report SOB or any respiratory difficulty  - Respiratory Therapy support as indicated  Outcome: Progressing     Problem: Nutrition/Hydration-ADULT  Goal: Nutrient/Hydration intake appropriate for improving,  restoring or maintaining nutritional needs  Description: Monitor and assess patient's nutrition/hydration status for malnutrition. Collaborate with interdisciplinary team and initiate plan and interventions as ordered.  Monitor patient's weight and dietary intake as ordered or per policy. Utilize nutrition screening tool and intervene as necessary. Determine patient's food preferences and provide high-protein, high-caloric foods as appropriate.     INTERVENTIONS:  - Monitor oral intake, urinary output, labs, and treatment plans  - Assess nutrition and hydration status and recommend course of action  - Evaluate amount of meals eaten  - Assist patient with eating if necessary   - Allow adequate time for meals  - Recommend/ encourage appropriate diets, oral nutritional supplements, and vitamin/mineral supplements  - Order, calculate, and assess calorie counts as needed  - Recommend, monitor, and adjust tube feedings and TPN/PPN based on assessed needs  - Assess need for intravenous fluids  - Provide specific nutrition/hydration education as appropriate  - Include patient/family/caregiver in decisions related to nutrition  Outcome: Progressing     Problem: METABOLIC, FLUID AND ELECTROLYTES - ADULT  Goal: Electrolytes maintained within normal limits  Description: INTERVENTIONS:  - Monitor labs and assess patient for signs and symptoms of electrolyte imbalances  - Administer electrolyte replacement as ordered  - Monitor response to electrolyte replacements, including repeat lab results as appropriate  - Instruct patient on fluid and nutrition as appropriate  Outcome: Progressing  Goal: Fluid balance maintained  Description: INTERVENTIONS:  - Monitor labs   - Monitor I/O and WT  - Instruct patient on fluid and nutrition as appropriate  - Assess for signs & symptoms of volume excess or deficit  Outcome: Progressing

## 2025-01-28 NOTE — PROGRESS NOTES
Progress Note - Hospitalist   Name: Prem Mar Sr. 89 y.o. male I MRN: [id number]  Unit/Bed#: -01 I Date of Admission: 1/15/2025   Date of Service: 1/28/2025 I Hospital Day: 12    Assessment & Plan  Upper GI bleed  Presented for suspected GI bleed  CT abdomen and pelvis 1/15/2025 with focal wall thickening of the lower esophagus, gastroesophageal junction, and proximal stomach which may reflect esophagitis/gastritis.  No evidence for bowel obstruction, mesenteric inflammation, appendicitis, obstructive uropathy, free air or free fluid  Chest x-ray 1/15/2025 with gaseous distention of the stomach  KUB 1/16/2025 with worsening distention  NGT was placed  CT abdomen pelvis with contrast 1/17/2024: New small bilateral pleural effusions with extensive bibasilar atelectasis. Wall thickening of the lower esophagus and proximal stomach is again noted without interval change. This may reflect mild focal esophagitis/gastritis. New NG tube with tip terminating in the gastric body region. No evidence of bowel obstruction, mesenteric inflammation, appendicitis, obstructive uropathy, free air, or free fluid. Descending and sigmoid diverticulosis again noted without evidence for acute diverticulitis. Subtle wall thickening of the mid to distal sigmoid colon, rectum, and anal verge region could reflect mild focal colitis/proctitis. Multiple punctate pancreatic calcifications again seen likely sequelae of chronic pancreatitis  Continue PPI  Hemoglobin has remained stable, no signs of bleeding  See also, plan for dysphagia  Opacity noted on imaging study  Chest x-ray: Retrocardiac opacity, which may be due to pneumonia and/or atelectasis  Procalcitonin peaked and downtrending, with intermittent fever  Repeat CXR-slight improvement in left base patchy atelectasis versus pneumonia  Received vancomycin 1/21 through 1/22  Received cefepime 1/21 to 1/22  Transitioned to Rocephin 1/22 per recommendations of ID pharmacy, now  discontinued  Monitor labs and vital signs, conduct serial physical assessments    COVID-19 virus infection  Patient with fever and COVID-positive roommate at his skilled facility  Tested positive for COVID 12/17/2025  Continues to require 2 L nasal cannula oxygen  S/p remdesivir x 3 days  Continue supportive care  Monitor labs and vital signs, conduct serial physical assessments  Congestion of upper airway  Upper airway congestion noted, patient has strong cough and able to clear  CT imaging revealed small bilateral pleural effusion and extensive bibasilar atelectasis  Maintain head of bed elevated  Aspiration precautions  Appreciate input of speech therapy who are following  Continue Robinul 0.1 mg IV every 4 hours as needed  Continue scopolamine 1/25  Continue incentive spirometry  Suction as needed  Goals of care, counseling/discussion  Patient with significant comorbid medical conditions would be high risk for treatment options requiring anesthesia.  EGD testing might have risks that outweigh the benefits.  Patient verbalized understanding.  Opened goals of care conversations with son.  Continued on phone on Saturday. Had further conversations with patient's son, daughter, and grandson when they arrived   Had discussion with patient's 1/28/2025 regarding non-candidacy for feeding tube and no good options for nutrition currently   Ultimately they would like to keep him comfortable but for now continue with full treatment   Palliative input appreciated   Idiopathic chronic gout of multiple sites without tophus  Continue prehospital allopurinol 300 mg p.o. daily when tolerating p.o.  Essential hypertension  Prehospital takesToprol-XL 25 mg p.o. daily and Lasix 20 mg p.o. daily on Monday Wednesday Friday.   Currently NPO. Metoprolol switched to IV. Received furosemide 40 mg IV x 1 1/19/2025  Blood pressure controlled  Monitor BP per protocol  Mixed hyperlipidemia  Continue prehospital Tricor 48 mg p.o. daily when  tolerating p.o.  Chronic kidney disease, stage 3a (HCC)  Lab Results   Component Value Date    EGFR 77 01/28/2025    EGFR 76 01/27/2025    EGFR 71 01/26/2025    CREATININE 0.69 01/28/2025    CREATININE 0.73 01/27/2025    CREATININE 0.85 01/26/2025     Creatinine appears to be around baseline of 0.9-1.1 mg/dL   Continue to monitor renal function closely  Avoid nephrotoxins and hypotension  Trend creatinine  Atrial flutter (HCC)  Currently rate controlled with metoprolol   Not currently on anticoagulation as outpatient  Constipation  GI input appreciated  Continue bowel regimen, monitor effectiveness  Palliative care by specialist  Palliative care input appreciated.  They are continuing to follow  Electrolyte abnormality  Patient was initiated on trickle tube feed on 1/22/2025, but this was discontinued due to suspected aspiration  High risk for refeeding syndrome  Continue to monitor electrolytes and replete accordingly  BMP, magnesium, phosphorus a.m.  Hypernatremia  In setting of poor oral intake, hyponatremia now improved  Started D5W infusion yesterday 75 mL/h  Continue IV fluids D5W   Monitor fluid status closely  Continue ongoing goals of care discussions  Daily BMP and trend sodium  Acute distention of stomach  Appreciate input of GI who are following  N.p.o.  Appreciate input of nutrition  1/22 initiated trickle feed tube feed  through NG tube-reported patient having residuals 1/23, rhonchi bilateral lungs.  Placed tube feed on hold 1/23  Speech therapy continues to work with patient-still not appropriate for n.p.o.  See goals of care discussion  Dysphagia  Passed NG clamping trial 1/20/2025, later evaluated by SLP but did not pass   Keep NPO   Trickle feeding through NG initiated 1/22/2025 but discontinued 1/23/2025 due to high residuals and rhonchi requiring frequent suctioning  Speech therapy continues to follow  General surgery input regarding feeding tube appreciated.  Patient not currently a candidate  due to poor prognosis, risk of complications, and limited benefit    VTE Pharmacologic Prophylaxis: VTE Score: 5 High Risk (Score >/= 5) - Pharmacological DVT Prophylaxis Ordered: heparin. Sequential Compression Devices Ordered.    Mobility:   Basic Mobility Inpatient Raw Score: 6  JH-HLM Goal: 2: Bed activities/Dependent transfer  JH-HLM Achieved: 2: Bed activities/Dependent transfer  JH-HLM Goal achieved. Continue to encourage appropriate mobility.    Patient Centered Rounds: I performed bedside rounds with nursing staff today.   Discussions with Specialists or Other Care Team Provider: GI, palliative, case management    Education and Discussions with Family / Patient: Updated  (son) at bedside.    Current Length of Stay: 12 day(s)  Current Patient Status: Inpatient   Certification Statement: The patient will continue to require additional inpatient hospital stay due to dysphagia, goals of care.  Discharge Plan:  To be determined pending progress and goals of care discussions.    Code Status: Level 3 - DNAR and DNI    Subjective   Evaluated at bedside.  Patient denies any pain or discomfort when directly asked.  Denies headache nausea abdominal pain.  Tries to speak but has no volume.  Per nursing he is no longer to take ice chips as he is tired of being suctioned.  Son is at bedside.    Objective :  Temp:  [98.9 °F (37.2 °C)-100.2 °F (37.9 °C)] 100.2 °F (37.9 °C)  HR:  [73-84] 73  BP: (118-140)/(43-55) 118/43  Resp:  [18-19] 19  SpO2:  [89 %-94 %] 90 %  O2 Device: Nasal cannula  Nasal Cannula O2 Flow Rate (L/min):  [2 L/min] 2 L/min    Body mass index is 24.98 kg/m².     Input and Output Summary (last 24 hours):     Intake/Output Summary (Last 24 hours) at 1/28/2025 1001  Last data filed at 1/28/2025 0800  Gross per 24 hour   Intake 582.5 ml   Output --   Net 582.5 ml       Physical Exam  Vitals and nursing note reviewed.   HENT:      Head: Normocephalic and atraumatic.      Nose: Nose normal.       Mouth/Throat:      Mouth: Mucous membranes are dry.      Pharynx: Oropharynx is clear.   Eyes:      Pupils: Pupils are equal, round, and reactive to light.   Cardiovascular:      Rate and Rhythm: Normal rate and regular rhythm.      Pulses: Normal pulses.   Pulmonary:      Effort: Pulmonary effort is normal. No respiratory distress.      Breath sounds: Rhonchi present.   Abdominal:      General: Bowel sounds are normal.      Palpations: Abdomen is soft.      Tenderness: There is no abdominal tenderness.   Musculoskeletal:         General: Swelling present.      Cervical back: Neck supple.      Comments: Bilateral upper extremity swelling unchanged   Skin:     General: Skin is warm and dry.      Capillary Refill: Capillary refill takes less than 2 seconds.   Neurological:      General: No focal deficit present.      Mental Status: He is alert.           Lines/Drains:  Lines/Drains/Airways       Active Status       Name Placement date Placement time Site Days    NG/OG/Enteral Tube Nasogastric 18 Fr Right nare 01/16/25  1052  Right nare  11                            Lab Results: I have reviewed the following results:   Results from last 7 days   Lab Units 01/28/25  0540   WBC Thousand/uL 8.26   HEMOGLOBIN g/dL 9.5*   HEMATOCRIT % 30.2*   PLATELETS Thousands/uL 270   SEGS PCT % 83*   LYMPHO PCT % 11*   MONO PCT % 4   EOS PCT % 1     Results from last 7 days   Lab Units 01/28/25  0540   SODIUM mmol/L 137   POTASSIUM mmol/L 3.2*   CHLORIDE mmol/L 105   CO2 mmol/L 27   BUN mg/dL 9   CREATININE mg/dL 0.69   ANION GAP mmol/L 5   CALCIUM mg/dL 7.5*   GLUCOSE RANDOM mg/dL 114         Results from last 7 days   Lab Units 01/21/25  1138   POC GLUCOSE mg/dl 111         Results from last 7 days   Lab Units 01/26/25  0447 01/25/25  0521 01/23/25  0447   PROCALCITONIN ng/ml 0.24 0.24 0.69*       Recent Cultures (last 7 days):               Last 24 Hours Medication List:     Current Facility-Administered Medications:      acetaminophen (Ofirmev) injection 1,000 mg, Q8H PRN, Last Rate: 1,000 mg (01/24/25 2153)    acetaminophen (TYLENOL) tablet 650 mg, Q4H PRN    allopurinol (ZYLOPRIM) tablet 300 mg, Daily    bisacodyl (DULCOLAX) rectal suppository 10 mg, Daily    bisacodyl (DULCOLAX) rectal suppository 10 mg, Daily PRN    Cholecalciferol (VITAMIN D3) tablet 2,000 Units, Daily    dextrose 5 % infusion, Continuous, Last Rate: 75 mL/hr (01/28/25 0250)    docusate (COLACE) oral liquid 100 mg, BID    fenofibrate (TRICOR) tablet 48 mg, Daily    [Held by provider] furosemide (LASIX) tablet 20 mg, Once per day on Monday Wednesday Friday    gabapentin (NEURONTIN) capsule 100 mg, Q12H    glycopyrrolate (ROBINUL) injection 0.1 mg, Q4H PRN    heparin (porcine) subcutaneous injection 5,000 Units, Q8H ANKIT    iohexol (OMNIPAQUE) 240 MG/ML solution 50 mL, 90 min pre-procedure    metoprolol (LOPRESSOR) injection 2.5 mg, Q6H    multivitamin stress formula tablet 1 tablet, Daily    nitroglycerin (NITROSTAT) SL tablet 0.4 mg, Q5 Min PRN    nystatin (MYCOSTATIN) cream, BID    ondansetron (ZOFRAN) injection 4 mg, Q6H PRN    pantoprazole (PROTONIX) injection 40 mg, Q12H    phenol (CHLORASEPTIC) 1.4 % mucosal liquid 1 spray, Q2H PRN    potassium chloride 20 mEq IVPB (premix), Once, Last Rate: 20 mEq (01/28/25 0924)    scopolamine (TRANSDERM-SCOP) 1 mg/3 days TD 72 hr patch 1 patch, Q72H    senna (SENOKOT) tablet 17.2 mg, HS    Administrative Statements   Today, Patient Was Seen By: ANAM Rose      **Please Note: This note may have been constructed using a voice recognition system.**

## 2025-01-28 NOTE — ASSESSMENT & PLAN NOTE
Symptom management  Hydromorphone IV 0.2mg q4h PRN mod-sev pain  Reviewed med risks, side effects. Encouraged family to report concerning symptoms/distress if seen. Pt is lethargic and mostly sleeping during the day, and typically does not complain about discomfort.     Social support  Patient is supported by son, daughter, grandchildren  Supportive listening provided  Normalized experience of patient/family  Provided anxiety containment  Provided anticipatory guidance    Follow up  Palliative Care will continue to follow.   Please reach out to on-call provider via Epic Secure Chat  if questions or concerns arise.  Please do not hesitate to reach our on call provider through our clinic answering service at [phone] should you have acute symptom control concerns.

## 2025-01-28 NOTE — PROGRESS NOTES
Progress Note - Palliative Care   Name: Prem Mar Sr. 89 y.o. male I MRN: [id number]  Unit/Bed#: [facility]-01 I Date of Admission: 1/15/2025   Date of Service: 1/28/2025 I Hospital Day: 12    Assessment & Plan  Goals of care, counseling/discussion  Decisional apparatus:  Patient does not have capacity on exam today.  If lost, patient's substitute decision maker would default to both adult children by [place] Act 169.  Advance Directive / Living Will / POLST / POA Forms: No    Goals  Level 3 code status.   Disease focused care.  DNR/DNI limits placed.   Family meeting today with patient, daughter, son, GI, PSC and SLIM team. Medical updates provided, discussed prognosis, and treatment options. Patient not a good candidate for PEG tube/J tube. Family taking time to consider.   Tentative plan to remove NGT and continue medical management / speech therapy in hopes of improvement in swallowing with no escalation to aggressive measures if condition worsens.   Focus on comfort, but not ready for full comfort measures at this time. Reviewed comfort medications, side effects.   Further GOC discussions needed. SLIM to follow up on discussion later today.   Palliative care by specialist  Symptom management  Hydromorphone IV 0.2mg q4h PRN mod-sev pain  Reviewed med risks, side effects. Encouraged family to report concerning symptoms/distress if seen. Pt is lethargic and mostly sleeping during the day, and typically does not complain about discomfort.     Social support  Patient is supported by son, daughter, grandchildren  Supportive listening provided  Normalized experience of patient/family  Provided anxiety containment  Provided anticipatory guidance    Follow up  Palliative Care will continue to follow.   Please reach out to on-call provider via Epic Secure Chat  if questions or concerns arise.  Please do not hesitate to reach our on call provider through our clinic answering service at [phone] should you have acute  symptom control concerns.    Upper GI bleed  Presented with suspected GI bleed  GI team consulted  Continue conservative measures; significant aspiration and difficulty managing secretions.   NGT in place - not tolerating trickle feeds;        PDMP Review: I have reviewed the patient's controlled substance dispensing history in the Prescription Drug Monitoring Program in compliance with the [facility] regulations before prescribing any controlled substances.  No data.    Subjective   Patient lethargic today. Awakes to verbal stimuli but cannot maintain alertness through conversation. Clinical condition worse today. Visited by son and daughter. Had family meeting with GI and PSC team to discuss patients condition, prognosis, and limited treatment options. Explained patient would unlikely be able to tolerate anesthesia for placement of PEG/J tube, and that it would unlikely prolong his life and certainly not improve QoL. Discussed option for comfort measures. Family would like patient to continue to be evaluated by ST and disease-focused care. Tentative plan to remove NGT and continue medical management via IV and speech therapy with no escalation. Focus on patient's comfort but not ready for full comfort measures yet. All questions and concerns addressed today.    Objective :  Temp:  [98.9 °F (37.2 °C)-100.2 °F (37.9 °C)] 100.2 °F (37.9 °C)  HR:  [73-84] 73  BP: (118-140)/(43-55) 118/43  Resp:  [18-19] 19  SpO2:  [89 %-94 %] 90 %  O2 Device: Nasal cannula  Nasal Cannula O2 Flow Rate (L/min):  [2 L/min] 2 L/min    Physical Exam  Vitals and nursing note reviewed.   Constitutional:       Appearance: He is underweight. He is ill-appearing.   HENT:      Head: Normocephalic.   Cardiovascular:      Rate and Rhythm: Normal rate.   Pulmonary:      Effort: Pulmonary effort is normal.   Musculoskeletal:         General: Swelling present.   Skin:     General: Skin is warm and dry.   Neurological:      Mental Status: He is lethargic.          Lab Results: I have reviewed the following results:  Lab Results   Component Value Date/Time    SODIUM 137 01/28/2025 05:40 AM    SODIUM 126 (L) 09/06/2022 06:45 PM    K 3.2 (L) 01/28/2025 05:40 AM    K 4.0 09/06/2022 06:45 PM    BUN 9 01/28/2025 05:40 AM    BUN 24 09/06/2022 06:45 PM    CREATININE 0.69 01/28/2025 05:40 AM    CREATININE 0.80 09/06/2022 06:45 PM    GLUC 114 01/28/2025 05:40 AM    GLUC 124 (H) 09/06/2022 06:45 PM    CALCIUM 7.5 (L) 01/28/2025 05:40 AM    CALCIUM 8.2 (L) 09/06/2022 06:45 PM    AST 42 (H) 01/21/2025 04:41 AM    ALT 21 01/21/2025 04:41 AM    ALB 3.0 (L) 01/21/2025 04:41 AM    TP 6.1 (L) 01/21/2025 04:41 AM    EGFR 77 01/28/2025 05:40 AM    EGFR 80 09/06/2022 06:45 PM     Lab Results   Component Value Date/Time    HGB 9.5 (L) 01/28/2025 05:40 AM    WBC 8.26 01/28/2025 05:40 AM     01/28/2025 05:40 AM    INR 0.94 01/15/2025 04:22 AM    PTT 31 01/15/2025 04:22 AM     Lab Results   Component Value Date/Time    [id number] 3.763 08/18/2022 04:38 PM       Administrative Statements   I have spent a total time of 90 minutes in caring for this patient on the day of the visit/encounter including Diagnostic results, Prognosis, Risks and benefits of tx options, Instructions for management, Patient and family education, Risk factor reductions, Counseling / Coordination of care, Documenting in the medical record, Reviewing / ordering tests, medicine, procedures  , Obtaining or reviewing history  , and Communicating with other healthcare professionals . Topics discussed with the patient / family include symptom assessment and management, medication review, medication adjustment, psychosocial support, goals of care, hospice services, supportive listening, and anticipatory guidance. Case discussed with SLIM, palliative SW, GI, speech.

## 2025-01-28 NOTE — ASSESSMENT & PLAN NOTE
Passed NG clamping trial 1/20/2025, later evaluated by SLP but did not pass   Keep NPO   Trickle feeding through NG initiated 1/22/2025 but discontinued 1/23/2025 due to high residuals and rhonchi requiring frequent suctioning  Speech therapy continues to follow  General surgery input regarding feeding tube appreciated.  Patient not currently a candidate due to poor prognosis, risk of complications, and limited benefit

## 2025-01-28 NOTE — SPEECH THERAPY NOTE
Speech Language/Pathology    Speech/Language Pathology Progress Note    Patient Name: Prem Mar Sr.  Today's Date: 1/28/2025     Problem List  Principal Problem:    Upper GI bleed  Active Problems:    Essential hypertension    Idiopathic chronic gout of multiple sites without tophus    Mixed hyperlipidemia    Chronic kidney disease, stage 3a (HCC)    Atrial flutter (HCC)    Constipation    Goals of care, counseling/discussion    Acute distention of stomach    COVID-19 virus infection    Congestion of upper airway    Opacity noted on imaging study    Palliative care by specialist    Electrolyte abnormality    Hypernatremia    Dysphagia       Past Medical History  Past Medical History:   Diagnosis Date    Arthritis     Cataract     Coronary artery disease     Coronary atherosclerosis of native coronary artery     Diverticulitis of colon     Essential hypertension, benign     Hyperlipidemia     Hypertension     Peptic ulcer     Renal disorder         Past Surgical History  Past Surgical History:   Procedure Laterality Date    CATARACT EXTRACTION Right     Left eye 5/2021    CORONARY STENT PLACEMENT      SKIN BIOPSY      TONSILLECTOMY           Subjective:  Pt alerted  to stim. Positioned upright.   Objective:  Pt was seen for f/u dysphagia therapy. Oral care completed with suction. Continues with mod amount of secretions verbal cues provided for cough to removed with deep suction as able. Provided verbal cues to rest in between suction. Trialed x2 ice chips. Pt manipulated minimally initial trial. Bolus control and formation appeared functional. Transfer and Swallow initiation were MOD delayed. Laryngeal rise upon palpation appeared weak and effortful. Immediate cough response removed trial via suction.  Following second trial of ice chip pt min attendance did not intiate swallow with verbal cues. Cough suspect due to spill. Removed trials via suction. ST questioned pt if he would like to trial any more. Pt  "answered back \"no more\".  Son arrived at bedside reviewed session with him. Son questioned about trialing again later, ST stated will return as able and pending level of appropriateness. Reviewed with son pt does have weak swallow initiated and currently he is unable to swallow his own secretions to clear them effectively. Son questioned about suction, reviewed suction provided however to reduce trauma suction should not be done excessively. Reviewed session with palliative.   Assessment:  Continues with secretions. Benefits from suction. Cough effectiveness varies. Mod delayed weak swallow with immediate cough following initial trials. Pt did not intiate swallow with second trial, harsh cough with secretions. Declined additional trials.   Plan/Recommendations:  Recommend continue NPO ensure oral care  Ongoing GOC   ST will f/u as indicated.      "

## 2025-01-28 NOTE — ASSESSMENT & PLAN NOTE
Presented with suspected GI bleed  GI team consulted  Continue conservative measures; significant aspiration and difficulty managing secretions.   NGT in place - not tolerating trickle feeds;

## 2025-01-28 NOTE — NUTRITION
01/28/25 1502   Biochemical Data,Medical Tests, and Procedures   Biochemical Data/Medical Tests/Procedures Lab values reviewed;Meds reviewed   Labs (Comment) 1/28/2025 potassium 3.2, calcium 7.5, hemoglobin 9.5, hematocrit 30.2   Meds (Comment) Dulcolax, vitamin D3, Colace, Tricor, heparin, MVI, Protonix, D5 infusion   Nutrition-Focused Physical Exam   Nutrition-Focused Physical Exam Findings RN skin assessment reviewed;Edema;Wound  (Nonpitting generalized edema, +2 bilateral upper extremity edema, trace bilateral lower extremity edema, stage II pressure injury at left/right buttocks, NGT present)   Medical-Related Concerns PMH reviewed   Adequacy of Intake   Nutrition Modality NPO;EN  ((EN held))   Feeding Route   PO NPO   Tube Feeding NG-tube   Formula Jevity 1.5   Formula rate 10 mL/h   Continuous Continuous via Pump   Pump Type Kangaroo ePump   Water Flushes Yes  (120 mL every 4 hours)   Total EN Volume   (Insufficient data available to analyze)   Current PO Intake   Current Diet Order NPO; EN (EN held)   Current Meal Intake Inadequate   Estimated calorie intake compared to estimated need Nutrient needs are not met.   PES Statement   Problem Continue previous diagnosis   Recommendations/Interventions   Malnutrition/BMI Present No  (2 criteria not met at this time.  Positive for inadequate intake.  RD continues monitoring.)   Summary Nutrition follow-up assessment.  1/28/2025 161#(bed scale).  Per chart review patient's family continues to choose nutrition via alternate means despite provider recommendations and continued risk for aspiration.  When/if clinically indicated to initiate tube feeding, recommend Jevity 1.5 at 10 mL/h.  Titrate up by 10 mL every 6 hours to goal of 40 mL/h continuous.  Additional 120 mL water flush every 4 hours.  Provides 1440 kcals, 61 g protein, 729 mL free water plus additional 720 mL from flushes at goal.  RD continues following.   Interventions/Recommendations Monitor I & O's    Education Assessment   Education Patient/caregiver not appropriate for education at this time   Patient Nutrition Goals   Goal Nutrition via appropriate route   Goal Status Not met;Extended   Timeframe to complete goal by next f/u   Nutrition Complexity Risk   Nutrition complexity level High risk   Follow up date 01/31/25

## 2025-01-28 NOTE — CASE MANAGEMENT
Case Management Discharge Planning Note    Patient name Prem Mar Sr.  Location /-01 MRN [id number]  : 10/11/1924 Date 2025       Current Admission Date: 1/15/2025  Current Admission Diagnosis:Upper GI bleed   Patient Active Problem List    Diagnosis Date Noted Date Diagnosed    Dysphagia 2025     Hypernatremia 2025     Electrolyte abnormality 2025     Palliative care by specialist 2025     Opacity noted on imaging study 2025     Congestion of upper airway 2025     Acute distention of stomach 2025     COVID-19 virus infection 2025     Goals of care, counseling/discussion 2025     Upper GI bleed 01/15/2025     Constipation 01/15/2025     Tinea cruris 10/12/2024     Weakness 10/11/2024     Atrial flutter (HCC) 2024     Chronic pain syndrome 2024     Lumbar spondylosis 2024     Chronic midline low back pain without sciatica 2024     Nonexudative age-related macular degeneration of left eye 2023     Chronic kidney disease, stage 3a (HCC) 2023     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     Muscle wasting and atrophy, not elsewhere classified, multiple sites 2022     Difficulty swallowing 2022     Keratoma 2021     Venous insufficiency of both lower extremities 2021     Hypomagnesemia 2020     Mixed hyperlipidemia 2020     Coronary artery disease involving native coronary artery of native heart without angina pectoris 2018     Essential hypertension 2018     Bilateral leg edema 2018     Ventricular bigeminy 2018     Idiopathic chronic gout of multiple sites without tophus 05/10/2017     Edema 2014     Vitamin D deficiency 2013       LOS (days): 11  Geometric Mean LOS (GMLOS) (days): 4.5  Days to GMLOS:-6.8     OBJECTIVE:  Risk of Unplanned Readmission Score: 26.45         Current admission status:  Inpatient   Preferred Pharmacy:   [id number] - [place] - [address]  [address]  [place] [address]  Phone: [phone] Fax: [phone]    Primary Care Provider: Leonard Schreiber MD    Primary Insurance: [facility]  Secondary Insurance:     DISCHARGE DETAILS:       Freedom of Choice: Yes  Comments - Freedom of Choice: pt is a bedhold- plan is for the pt to return -  family would like to have a peg tube for tube feedings  IR consult-                               Other Referral/Resources/Interventions Provided:  Referral Comments: speech following, GOC, suctioning, palliative,IV d5, npo,    Would you like to participate in our [facility] service program?  : No - Declined    Treatment Team Recommendation: SNF ([facility] pt is a bedhold - BLS)

## 2025-01-28 NOTE — ASSESSMENT & PLAN NOTE
Decisional apparatus:  Patient does not have capacity on exam today.  If lost, patient's substitute decision maker would default to both adult children by [place] 169.  Advance Directive / Living Will / POLST / POA Forms: No    Goals  Level 3 code status.   Disease focused care.  DNR/DNI limits placed.   Family meeting today with patient, daughter, son, GI, PSC and SLIM team. Medical updates provided, discussed prognosis, and treatment options. Patient not a good candidate for PEG tube/J tube. Family taking time to consider.   Tentative plan to remove NGT and continue medical management / speech therapy in hopes of improvement in swallowing with no escalation to aggressive measures if condition worsens.   Focus on comfort, but not ready for full comfort measures at this time. Reviewed comfort medications, side effects.   Further GOC discussions needed. SLIM to follow up on discussion later today.

## 2025-01-28 NOTE — ASSESSMENT & PLAN NOTE
Patient was initiated on trickle tube feed on 1/22/2025, but this was discontinued due to suspected aspiration  High risk for refeeding syndrome  Continue to monitor electrolytes and replete accordingly  BMP, magnesium, phosphorus a.m.

## 2025-01-28 NOTE — CONSULTS
Interventional Radiology  Consultation 1/28/2025     Inpatient Consult to IR  Consult performed by: Eric Oreilly PA-C  Consult ordered by: ANAM Rose Sr.   10/11/1924   [id number]      Assessment/Plan:  Assessment is failure to thrive.  Weight loss.  Poor nutrition.  Gastroenterology left a fairly eloquent note explaining why they would not place a tube.      Percutaneous jejunostomy is not offered at our institution.  While this procedure has been reported, it is not commonly performed, and is something I have no expertise with.    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)    Chronic kidney disease, stage 3a (HCC)    Lab Results   Component Value Date    EGFR 77 01/28/2025    EGFR 76 01/27/2025    EGFR 71 01/26/2025    CREATININE 0.69 01/28/2025    CREATININE 0.73 01/27/2025    CREATININE 0.85 01/26/2025         Nonexudative age-related macular degeneration of left eye    Chronic pain syndrome    Lumbar spondylosis    Chronic midline low back pain without sciatica    Atrial flutter (HCC)    Weakness    Tinea cruris    Edema    Keratoma    Muscle wasting and atrophy, not elsewhere classified, multiple sites    Venous insufficiency of both lower extremities    Constipation    Goals of care, counseling/discussion    Acute distention of stomach    COVID-19 virus infection    Congestion of upper airway    Opacity noted on imaging study    Palliative care by specialist    Electrolyte abnormality    Hypernatremia    Dysphagia             Subjective:     Patient ID: Prem Mar Sr. is a 89 y.o. male.    History of Present Illness  89-year-old.  Currently admitted and doing poorly.  Family is in favor of tube feedings.  Gastroenterology has declined to place tube.    Review of Systems      Past Medical History:   Diagnosis Date    Arthritis     Cataract     Coronary artery disease     Coronary atherosclerosis of native coronary artery     Diverticulitis of colon     Essential hypertension, benign     Hyperlipidemia     Hypertension     Peptic ulcer     Renal disorder         Past Surgical History:   Procedure Laterality Date    CATARACT EXTRACTION Right     Left eye 5/2021    CORONARY STENT PLACEMENT      SKIN BIOPSY      TONSILLECTOMY          Social History     Tobacco Use   Smoking Status Former    Types: Pipe   Smokeless Tobacco Never        Social History     Substance and Sexual Activity   Alcohol Use Not Currently        Social History     Substance and Sexual Activity   Drug Use No        No Known Allergies    Current Facility-Administered Medications   Medication Dose Route Frequency Provider Last Rate Last Admin    acetaminophen (Ofirmev) injection 1,000 mg  1,000 mg Intravenous Q8H PRN Felipe Elizabeth PA-C 400 mL/hr at 01/24/25 2153 1,000 mg at 01/24/25 2153    acetaminophen (TYLENOL) tablet 650 mg  650 mg Oral Q4H PRN ANAM Salinas   650 mg at 01/26/25 0937    allopurinol (ZYLOPRIM) tablet 300 mg  300 mg Oral Daily Lukas Zamorano PA-C   300 mg at 01/28/25 0822    bisacodyl (DULCOLAX) rectal suppository 10 mg  10 mg Rectal Daily ANAM Salinas   10 mg at 01/28/25 0821    bisacodyl (DULCOLAX) rectal suppository 10 mg  10 mg Rectal Daily PRN Bridgette Cadena MD   10 mg at 01/16/25 1811    Cholecalciferol (VITAMIN D3) tablet 2,000 Units  2,000 Units Oral Daily Lukas Zamorano PA-C   2,000 Units at 01/28/25 0822    dextrose 5 % infusion  75 mL/hr Intravenous Continuous ANAM Harrison 75 mL/hr at  01/28/25 0250 75 mL/hr at 01/28/25 0250    docusate (COLACE) oral liquid 100 mg  100 mg Oral BID ANAM Rose   100 mg at 01/28/25 0822    fenofibrate (TRICOR) tablet 48 mg  48 mg Oral Daily Lukas Zamorano PA-C   48 mg at 01/28/25 0821    [Held by provider] furosemide (LASIX) tablet 20 mg  20 mg Oral Once per day on Monday Wednesday Friday ANAM Salinas        gabapentin (NEURONTIN) capsule 100 mg  100 mg Oral Q12H Lukas Zamorano PA-C   100 mg at 01/28/25 0540    glycopyrrolate (ROBINUL) injection 0.1 mg  0.1 mg Intravenous Q4H PRN ANAM Rose   0.1 mg at 01/28/25 0545    heparin (porcine) subcutaneous injection 5,000 Units  5,000 Units Subcutaneous Q8H [facility] ANAM Rose   5,000 Units at 01/28/25 0540    iohexol (OMNIPAQUE) 240 MG/ML solution 50 mL  50 mL Oral 90 min pre-procedure ANAM Rose        metoprolol (LOPRESSOR) injection 2.5 mg  2.5 mg Intravenous Q6H ANAM Rose   2.5 mg at 01/28/25 0822    multivitamin stress formula tablet 1 tablet  1 tablet Oral Daily Lukas Zamorano PA-C   1 tablet at 01/28/25 0821    nitroglycerin (NITROSTAT) SL tablet 0.4 mg  0.4 mg Sublingual Q5 Min PRN Lukas Zamorano PA-C        nystatin (MYCOSTATIN) cream   Topical BID Lukas Zamorano PA-C   Given at 01/28/25 0823    ondansetron (ZOFRAN) injection 4 mg  4 mg Intravenous Q6H PRN Lukas Zamorano PA-C        pantoprazole (PROTONIX) injection 40 mg  40 mg Intravenous Q12H Lukas Zamorano PA-C   40 mg at 01/28/25 0540    phenol (CHLORASEPTIC) 1.4 % mucosal liquid 1 spray  1 spray Mouth/Throat Q2H PRN Lukas Zamorano PA-C        potassium chloride 20 mEq IVPB (premix)  20 mEq Intravenous Once ANAM Rose 50 mL/hr at 01/28/25 0924 20 mEq at 01/28/25 0924    scopolamine (TRANSDERM-SCOP) 1 mg/3 days TD 72 hr patch 1 patch  1 patch Transdermal Q72H ANAM Harrison   1 patch at 01/25/25 1835    senna (SENOKOT) tablet 17.2 mg  2 tablet Oral HS ANAM Salinas   17.2 mg at  01/26/25 2133          Objective:    Vitals:    01/27/25 2252 01/28/25 0250 01/28/25 0820 01/28/25 0823   BP: 140/50 (!) 118/43     Pulse: 81 73     Resp: 19      Temp: 100.2 °F (37.9 °C)      TempSrc:       SpO2: 90% (!) 89% (!) 89% 90%   Weight:       Height:            Physical Exam      Lab Results   Component Value Date     (H) 01/17/2025      Lab Results   Component Value Date    WBC 8.26 01/28/2025    HGB 9.5 (L) 01/28/2025    HCT 30.2 (L) 01/28/2025    MCV 98 01/28/2025     01/28/2025     Lab Results   Component Value Date    INR 0.94 01/15/2025    INR 0.94 10/21/2021    INR 0.98 08/26/2021    PROTIME 13.1 01/15/2025    PROTIME 12.7 10/21/2021    PROTIME 12.9 08/26/2021     Lab Results   Component Value Date    PTT 31 01/15/2025         I have personally reviewed pertinent imaging and laboratory results.     Code Status: Level 3 - DNAR and DNI  Advance Directive and Living Will:      Power of :    POLST:      IR has been consulted to evaluate the patient, determine the appropriate procedure, and whether or not a procedure can and should be performed.    Thank you for allowing me to participate in the care of Prem Mar Sr.. Please don't hesitate to call, text, email, or TigerText with any questions.     This text is generated with voice recognition software. There may be translation, syntax,  or grammatical errors. If you have any questions, please contact the dictating provider.

## 2025-01-28 NOTE — ASSESSMENT & PLAN NOTE
Patient with significant comorbid medical conditions would be high risk for treatment options requiring anesthesia.  EGD testing might have risks that outweigh the benefits.  Patient verbalized understanding.  Opened goals of care conversations with son.  Continued on phone on Saturday. Had further conversations with patient's son, daughter, and grandson when they arrived   Had discussion with patient's 1/28/2025 regarding non-candidacy for feeding tube and no good options for nutrition currently   Ultimately they would like to keep him comfortable but for now continue with full treatment   Palliative input appreciated

## 2025-01-28 NOTE — UTILIZATION REVIEW
"Continued Stay Review    Date: 1/28/24                           Current Patient Class: Inpatient  Current Level of Care: Med Surg     HPI:89 y.o. male initially admitted on 1/16/25 due to Upper GI bleed.     Assessment/Plan: Hospitalist: NPO. Bilateral upper extremity edema  + rhonchi in lungs. Receiving IV robinul for increased oral secretions, scopolamine patch and IV robinul x2 doses today. IV lopressor Q 6 for BP control. /43, HR 73, RR 19. Spo2 90% , placed on 2 liters oxygen. Assessed by GI to not be a candidate for G tube.\" due to his current clinical status, comorbidities and medical futility\" . Pt not taking ice chips due to required suctioning. Poor intake. Ongoing goals of care discussion. With pt/family today and gastroenterology, palliative, social work, and hospitalist. Comfort focused care would be appropriate for this patient given overall prognosis, pending family decision. Remains on IVF's for hydration. 2 liters oxygen for Spo2 90% on room air. Continue DVT ppx's., IV lopressor Q 6 for BP control, IV protonix Q12, scopolamine patch for secretions, IV tyelnol for comfort, IV robinul for secretions.     Interventional Radiology: \" Percutaneous jejunostomy is not offered at our institution. While this procedure has been reported, it is not commonly performed, and is something I have no expertise with. \"         Medications:   Scheduled Medications:  allopurinol, 300 mg, Oral, Daily  bisacodyl, 10 mg, Rectal, Daily  Cholecalciferol, 2,000 Units, Oral, Daily  docusate, 100 mg, Oral, BID  fenofibrate, 48 mg, Oral, Daily  [Held by provider] furosemide, 20 mg, Oral, Once per day on Monday Wednesday Friday  gabapentin, 100 mg, Oral, Q12H  heparin (porcine), 5,000 Units, Subcutaneous, Q8H ANKIT  iohexol, 50 mL, Oral, 90 min pre-procedure  metoprolol, 2.5 mg, Intravenous, Q6H  multivitamin stress formula, 1 tablet, Oral, Daily  nystatin, , Topical, BID  pantoprazole, 40 mg, Intravenous, " Q12H  scopolamine, 1 patch, Transdermal, Q72H  senna, 2 tablet, Oral, HS      Continuous IV Infusions:  dextrose, 75 mL/hr, Intravenous, Continuous      PRN Meds:  acetaminophen, 1,000 mg, Intravenous, Q8H PRN x1 dose 1/28   acetaminophen, 650 mg, Oral, Q4H PRN  bisacodyl, 10 mg, Rectal, Daily PRN  glycopyrrolate, 0.1 mg, Intravenous, Q4H PRN x2 dose 1/28   HYDROmorphone, 0.2 mg, Intravenous, Q4H PRN  nitroglycerin, 0.4 mg, Sublingual, Q5 Min PRN  ondansetron, 4 mg, Intravenous, Q6H PRN  phenol, 1 spray, Mouth/Throat, Q2H PRN      Discharge Plan: TBD     Vital Signs (last 3 days)       Date/Time Temp Pulse Resp BP MAP (mmHg) SpO2 Calculated FIO2 (%) - Nasal Cannula Nasal Cannula O2 Flow Rate (L/min) O2 Device Patient Position - Orthostatic VS Bria Coma Scale Score Pain    01/28/25 14:35:58 100 °F (37.8 °C) 70 22 149/55 86 95 % -- -- -- -- -- --    01/28/25 0823 -- -- -- -- -- 90 % 28 2 L/min Nasal cannula -- -- No Pain    01/28/25 0820 -- -- -- -- -- 89 % -- -- None (Room air) -- -- --    01/28/25 02:50:09 -- 73 -- 118/43 68 89 % -- -- -- -- -- --    01/27/25 22:52:10 100.2 °F (37.9 °C) 81 19 140/50 80 90 % -- -- -- -- -- --    01/27/25 21:12:21 -- 84 -- 130/55 80 91 % -- -- -- -- -- --    01/27/25 1958 -- -- -- -- -- -- -- -- -- -- 15 No Pain    01/27/25 15:50:34 98.9 °F (37.2 °C) 78 18 130/46 74 94 % -- -- -- -- -- --    01/27/25 1000 -- -- -- -- -- -- -- -- -- -- 15 No Pain    01/27/25 07:49:29 98.4 °F (36.9 °C) 82 18 121/46 71 96 % -- -- -- -- -- --    01/27/25 0244 -- 76 -- 132/52 79 98 % -- -- -- -- -- --    01/26/25 2318 98.2 °F (36.8 °C) 71 16 128/46 73 98 % -- -- -- -- -- --    01/26/25 21:31:48 98.4 °F (36.9 °C) 73 16 110/43 65 99 % -- -- -- Lying -- --    01/26/25 21:17:35 98.4 °F (36.9 °C) 75 -- -- -- 98 % -- -- -- -- -- --    01/26/25 2106 -- -- -- -- -- -- -- -- -- -- 15 No Pain    01/26/25 15:00:23 96.8 °F (36 °C) 75 16 117/44 68 99 % -- -- -- -- -- --    01/26/25 0759 -- -- -- -- -- -- -- -- -- --  15 No Pain    01/26/25 06:57:37 98.4 °F (36.9 °C) 75 17 125/45 72 94 % -- -- -- -- -- --    01/26/25 03:04:21 -- 80 -- 128/49 75 95 % -- -- -- -- -- --    01/26/25 0219 98.6 °F (37 °C) 81 -- -- -- -- -- -- -- -- -- --    01/25/25 22:29:53 100 °F (37.8 °C) 81 20 105/53 70 97 % -- -- None (Room air) Lying -- --    01/25/25 2036 -- -- -- -- -- 95 % 28 2 L/min Nasal cannula -- 15 No Pain    01/25/25 20:21:27 -- 83 -- 128/49 75 95 % -- -- -- -- -- --    01/25/25 20:19:10 -- 87 -- 92/56 68 95 % -- -- -- -- -- --    01/25/25 14:35:24 98.9 °F (37.2 °C) -- 22 149/49 82 -- -- -- -- -- -- --    01/25/25 13:51:18 -- 91 17 139/56 84 94 % -- -- -- -- -- --    01/25/25 1236 -- -- -- -- -- -- -- -- -- -- 15 No Pain    01/25/25 07:03:13 98.7 °F (37.1 °C) 81 17 130/47 75 97 % -- -- -- -- -- --    01/25/25 03:05:20 -- 83 -- 124/49 74 95 % -- -- -- -- -- --    01/25/25 03:00:18 -- 79 -- 115/39 64 95 % -- -- -- -- -- --          Weight (last 2 days)       Date/Time Weight    01/28/25 1451 73.1 (161.16)            Pertinent Labs/Diagnostic Results:   Radiology:  XR abdomen 1 view kub   Final Interpretation by Sb Haynes MD (01/23 1613)      Nonobstructive bowel gas pattern.               Workstation performed: [id number]         VAS upper limb venous duplex scan, complete, bilateral   Final Interpretation by Abigail Alvarado MD (01/21 2109)      XR chest portable   Final Interpretation by Leonor Ingram MD (01/21 0915)      Slight improvement in opacity in the medial left base which may be due to atelectasis and/or pneumonia.            Workstation performed: [id number]         XR abdomen 1 vw portable   Final Interpretation by Heaven Barton MD (01/20 1417)   Nonobstructive bowel gas pattern. Residual oral contrast throughout the colon. No gastric distention. Satisfactory position of the endogastric tube.               Workstation performed: [id number]         XR chest portable   Final Interpretation by Beatris  Demetria Benton MD (01/20 0904)      Retrocardiac opacity, which may be due to pneumonia and/or atelectasis.            Workstation performed: [id number]         CT abdomen pelvis w contrast   Final Interpretation by Geovanny Taylor MD (01/17 2239)      1.  New small bilateral pleural effusions with extensive bibasilar atelectasis.   2.  Wall thickening of the lower esophagus and proximal stomach is again noted without interval change. This may reflect mild focal esophagitis/gastritis. New NG tube with tip terminating in the gastric body region.   3.  No evidence of bowel obstruction, mesenteric inflammation, appendicitis, obstructive uropathy, free air, or free fluid. Descending and sigmoid diverticulosis again noted without evidence for acute diverticulitis. Subtle wall thickening of the mid to    distal sigmoid colon, rectum, and anal verge region could reflect mild focal colitis/proctitis.   4.  Multiple punctate pancreatic calcifications again seen likely sequelae of chronic pancreatitis.      Workstation performed: [id number]         XR chest portable   Final Interpretation by Jose Johns MD (01/17 1327)      Scattered streaky opacities favored to represent atelectasis but pneumonia not excluded.            Workstation performed: [id number]         XR abdomen 1 vw portable   Final Interpretation by Jose Johns MD (01/17 1328)      New marked gaseous distention of the stomach.               Workstation performed: [id number]         XR abdomen 1 view kub   Final Interpretation by Diony Boateng MD (01/16 3421)      1.  NG tube projects over the gastric body with decompression of the stomach compared to the x-ray from earlier the same day.      2.  Mildly dilated mid abdominal small bowel loops with gas throughout the colon though the abdomen was only partially imaged.               Workstation performed: [id number]         XR abdomen 1 view kub   Final Interpretation by Diony Boateng MD (01/16 5804)       Worsening gastric distention.      The study was marked in EPIC for immediate notification.               Workstation performed: [id number]         XR chest portable   Final Interpretation by Sina Vitale MD (01/15 1429)      No endogastric tube visible within the field-of-view.      There is some gaseous distention of the stomach.      Minimal atelectasis left lung base.      The study was marked in EPIC for immediate notification.            Workstation performed: [id number]         CT abdomen pelvis with contrast   Final Interpretation by Geovanny Taylor MD (01/15 0545)      Trace left pleural effusion with bibasilar atelectasis.  Focal wall thickening of the lower esophagus, gastroesophageal junction, and proximal stomach noted which may reflect esophagitis/gastritis. No evidence for bowel obstruction, mesenteric    inflammation, appendicitis, obstructive uropathy, free air, or free fluid. Descending and sigmoid colon diverticulosis without evidence for acute diverticulitis.         Workstation performed: [id number]             Results from last 7 days   Lab Units 01/28/25  0540 01/27/25  0547 01/26/25 0447 01/25/25  0521 01/24/25 0425   WBC Thousand/uL 8.26 9.51 15.46* 13.30* 7.60   HEMOGLOBIN g/dL 9.5* 9.5* 10.1* 10.5* 10.4*   HEMATOCRIT % 30.2* 30.0* 33.4* 33.3* 32.6*   PLATELETS Thousands/uL 270 258 313 295 289   TOTAL NEUT ABS Thousands/µL 6.92  --   --   --   --          Results from last 7 days   Lab Units 01/28/25  0540 01/27/25  0547 01/26/25 0447 01/25/25  0521 01/24/25  0425 01/23/25 0447   SODIUM mmol/L 137 140 146 149* 149* 148*   POTASSIUM mmol/L 3.2* 3.2* 3.2* 3.2* 3.2* 3.3*   CHLORIDE mmol/L 105 108 112* 115* 114* 113*   CO2 mmol/L 27 28 28 29 30 27   ANION GAP mmol/L 5 4 6 5 5 8   BUN mg/dL 9 12 15 18 22 26*   CREATININE mg/dL 0.69 0.73 0.85 0.81 0.78 0.76   EGFR ml/min/1.73sq m 77 76 71 72 74 74   CALCIUM mg/dL 7.5* 7.6* 8.0* 8.2* 8.4 8.5   MAGNESIUM mg/dL  --  1.9 2.0 2.2 2.2  2.2   PHOSPHORUS mg/dL  --  1.7* 2.2* 1.7* 1.7* 1.2*             Results from last 7 days   Lab Units 01/28/25  0540 01/27/25  0547 01/26/25  0447 01/25/25  0521 01/24/25  0425 01/23/25  0447 01/22/25  0448   GLUCOSE RANDOM mg/dL 114 113 114 115 108 114 98                 Results from last 7 days   Lab Units 01/26/25  0447 01/25/25  0521 01/23/25  0447   PROCALCITONIN ng/ml 0.24 0.24 0.69*                     Network Utilization Review Department  ATTENTION: Please call with any questions or concerns to [phone] and carefully listen to the prompts so that you are directed to the right person. All voicemails are confidential.   For Discharge needs, contact Care Management DC Support Team at [phone] opt. 2  Send all requests for admission clinical reviews, approved or denied determinations and any other requests to dedicated fax number below belonging to the [facility] where the patient is receiving treatment. List of dedicated fax numbers for the Facilities:  FACILITY NAME UR FAX NUMBER   ADMISSION DENIALS (Administrative/Medical Necessity) [phone]   DISCHARGE SUPPORT TEAM (NETWORK) [phone]   PARENT CHILD HEALTH (Maternity/NICU/Pediatrics) [phone]   [facility] [phone]   [facility] [phone]   [facility] [phone]   [facility] [phone]   [facility] [phone]   [facility] [phone]   [facility] [phone]   [facility] [phone]   [facility] [phone]   [facility] [phone]   [facility] [phone]   [facility] [phone]

## 2025-01-28 NOTE — ASSESSMENT & PLAN NOTE
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  Had a multidisciplinary discussion with the patient, his son and daughter.  Discussed that he is not a candidate for PEG or PEGJ placement due to his current clinical status, comorbidities and medical futility. Given his recent COVID infection, ronchi, oxygen requirement, ineffective cough and inability to clear his secretions, he is extremely high risk for procedural sedation/anesthesia.  In addition, trickle feeds were unsuccessful over the weekend due to high residuals and he is extremely high risk for aspiration due to reflux.  Post pyloric feeds do not eliminate the risk of reflux aspiration so PEGJ not indicated.  Ongoing goals of care discussion. Comfort focused care would be appropriate for this patient given overall prognosis  Continue pantoprazole 40 mg twice daily

## 2025-01-28 NOTE — QUICK NOTE
Had care team discussion with gastroenterology, palliative, social work, and hospitalist.  Patient's condition was explained in detail to patient's son and daughter.  It was communicated to the patient's family that care team believes that he has had a rapid deterioration and is unlikely to have meaningful recovery.  GI communicated to family that G-tube is unlikely to prolong life and likely to hasten his passing.  Provided emotional support to family.  Offered opportunity to ask questions..  Plan to revisit in two hours and see if family has any additional questions.

## 2025-01-28 NOTE — PLAN OF CARE
Problem: PAIN - ADULT  Goal: Verbalizes/displays adequate comfort level or baseline comfort level  Description: Interventions:  - Encourage patient to monitor pain and request assistance  - Assess pain using appropriate pain scale  - Administer analgesics based on type and severity of pain and evaluate response  - Implement non-pharmacological measures as appropriate and evaluate response  - Consider cultural and social influences on pain and pain management  - Notify physician/advanced practitioner if interventions unsuccessful or patient reports new pain  Outcome: Progressing     Problem: INFECTION - ADULT  Goal: Absence or prevention of progression during hospitalization  Description: INTERVENTIONS:  - Assess and monitor for signs and symptoms of infection  - Monitor lab/diagnostic results  - Monitor all insertion sites, i.e. indwelling lines, tubes, and drains  - Monitor endotracheal if appropriate and nasal secretions for changes in amount and color  - [place] appropriate cooling/warming therapies per order  - Administer medications as ordered  - Instruct and encourage patient and family to use good hand hygiene technique  - Identify and instruct in appropriate isolation precautions for identified infection/condition  Outcome: Progressing  Goal: Absence of fever/infection during neutropenic period  Description: INTERVENTIONS:  - Monitor WBC    Outcome: Progressing  Goal: Infection Control Precautions  Outcome: Progressing     Problem: SAFETY ADULT  Goal: Patient will remain free of falls  Description: INTERVENTIONS:  - Educate patient/family on patient safety including physical limitations  - Instruct patient to call for assistance with activity   - Consult OT/PT to assist with strengthening/mobility   - Keep Call bell within reach  - Keep bed low and locked with side rails adjusted as appropriate  - Keep care items and personal belongings within reach  - Initiate and maintain comfort rounds  - Make Fall  Risk Sign visible to staff  - Offer Toileting every 2 Hours, in advance of need  - Initiate/Maintain bed alarm  - Obtain necessary fall risk management equipment: nonskid socks  - Apply yellow socks and bracelet for high fall risk patients  - Consider moving patient to room near nurses station  Outcome: Progressing  Goal: Maintain or return to baseline ADL function  Description: INTERVENTIONS:  -  Assess patient's ability to carry out ADLs; assess patient's baseline for ADL function and identify physical deficits which impact ability to perform ADLs (bathing, care of mouth/teeth, toileting, grooming, dressing, etc.)  - Assess/evaluate cause of self-care deficits   - Assess range of motion  - Assess patient's mobility; develop plan if impaired  - Assess patient's need for assistive devices and provide as appropriate  - Encourage maximum independence but intervene and supervise when necessary  - Involve family in performance of ADLs  - Assess for home care needs following discharge   - Consider OT consult to assist with ADL evaluation and planning for discharge  - Provide patient education as appropriate  Outcome: Progressing  Goal: Maintains/Returns to pre admission functional level  Description: INTERVENTIONS:  - Perform AM-PAC 6 Click Basic Mobility/ Daily Activity assessment daily.  - Set and communicate daily mobility goal to care team and patient/family/caregiver.   - Collaborate with rehabilitation services on mobility goals if consulted  - Perform Range of Motion 3 times a day.  - Reposition patient every 2 hours.  - Dangle patient 3 times a day  - Stand patient 3 times a day  - Ambulate patient 3 times a day  - Out of bed to chair 3 times a day   - Out of bed for meals 3 times a day  - Out of bed for toileting  - Record patient progress and toleration of activity level   Outcome: Progressing     Problem: DISCHARGE PLANNING  Goal: Discharge to home or other facility with appropriate resources  Description:  INTERVENTIONS:  - Identify barriers to discharge w/patient and caregiver  - Arrange for needed discharge resources and transportation as appropriate  - Identify discharge learning needs (meds, wound care, etc.)  - Arrange for interpretive services to assist at discharge as needed  - Refer to Case Management Department for coordinating discharge planning if the patient needs post-hospital services based on physician/advanced practitioner order or complex needs related to functional status, cognitive ability, or social support system  Outcome: Progressing     Problem: Knowledge Deficit  Goal: Patient/family/caregiver demonstrates understanding of disease process, treatment plan, medications, and discharge instructions  Description: Complete learning assessment and assess knowledge base.  Interventions:  - Provide teaching at level of understanding  - Provide teaching via preferred learning methods  Outcome: Progressing     Problem: Prexisting or High Potential for Compromised Skin Integrity  Goal: Skin integrity is maintained or improved  Description: INTERVENTIONS:  - Identify patients at risk for skin breakdown  - Assess and monitor skin integrity  - Assess and monitor nutrition and hydration status  - Monitor labs   - Assess for incontinence   - Turn and reposition patient  - Assist with mobility/ambulation  - Relieve pressure over bony prominences  - Avoid friction and shearing  - Provide appropriate hygiene as needed including keeping skin clean and dry  - Evaluate need for skin moisturizer/barrier cream  - Collaborate with interdisciplinary team   - Patient/family teaching  - Consider wound care consult   Outcome: Progressing     Problem: GASTROINTESTINAL - ADULT  Goal: Minimal or absence of nausea and/or vomiting  Description: INTERVENTIONS:  - Administer IV fluids if ordered to ensure adequate hydration  - Maintain NPO status until nausea and vomiting are resolved  - Nasogastric tube if ordered  - Administer  ordered antiemetic medications as needed  - Provide nonpharmacologic comfort measures as appropriate  - Advance diet as tolerated, if ordered  - Consider nutrition services referral to assist patient with adequate nutrition and appropriate food choices  Outcome: Progressing  Goal: Maintains or returns to baseline bowel function  Description: INTERVENTIONS:  - Assess bowel function  - Encourage oral fluids to ensure adequate hydration  - Administer IV fluids if ordered to ensure adequate hydration  - Administer ordered medications as needed  - Encourage mobilization and activity  - Consider nutritional services referral to assist patient with adequate nutrition and appropriate food choices  Outcome: Progressing     Problem: RESPIRATORY - ADULT  Goal: Achieves optimal ventilation and oxygenation  Description: INTERVENTIONS:  - Assess for changes in respiratory status  - Assess for changes in mentation and behavior  - Position to facilitate oxygenation and minimize respiratory effort  - Oxygen administered by appropriate delivery if ordered  - Initiate smoking cessation education as indicated  - Encourage broncho-pulmonary hygiene including cough, deep breathe, Incentive Spirometry  - Assess the need for suctioning and aspirate as needed  - Assess and instruct to report SOB or any respiratory difficulty  - Respiratory Therapy support as indicated  Outcome: Progressing     Problem: METABOLIC, FLUID AND ELECTROLYTES - ADULT  Goal: Fluid balance maintained  Description: INTERVENTIONS:  - Monitor labs   - Monitor I/O and WT  - Instruct patient on fluid and nutrition as appropriate  - Assess for signs & symptoms of volume excess or deficit  Outcome: Progressing

## 2025-01-28 NOTE — ASSESSMENT & PLAN NOTE
Presented for suspected GI bleed  CT abdomen and pelvis 1/15/2025 with focal wall thickening of the lower esophagus, gastroesophageal junction, and proximal stomach which may reflect esophagitis/gastritis.  No evidence for bowel obstruction, mesenteric inflammation, appendicitis, obstructive uropathy, free air or free fluid  Chest x-ray 1/15/2025 with gaseous distention of the stomach  KUB 1/16/2025 with worsening distention  NGT was placed  CT abdomen pelvis with contrast 1/17/2024: New small bilateral pleural effusions with extensive bibasilar atelectasis. Wall thickening of the lower esophagus and proximal stomach is again noted without interval change. This may reflect mild focal esophagitis/gastritis. New NG tube with tip terminating in the gastric body region. No evidence of bowel obstruction, mesenteric inflammation, appendicitis, obstructive uropathy, free air, or free fluid. Descending and sigmoid diverticulosis again noted without evidence for acute diverticulitis. Subtle wall thickening of the mid to distal sigmoid colon, rectum, and anal verge region could reflect mild focal colitis/proctitis. Multiple punctate pancreatic calcifications again seen likely sequelae of chronic pancreatitis  Continue PPI  Hemoglobin has remained stable, no signs of bleeding  See also, plan for dysphagia

## 2025-01-28 NOTE — ASSESSMENT & PLAN NOTE
Upper airway congestion noted, patient has strong cough and able to clear  CT imaging revealed small bilateral pleural effusion and extensive bibasilar atelectasis  Maintain head of bed elevated  Aspiration precautions  Appreciate input of speech therapy who are following  Continue Robinul 0.1 mg IV every 4 hours as needed  Continue scopolamine 1/25  Continue incentive spirometry  Suction as needed

## 2025-01-28 NOTE — PROGRESS NOTES
Progress Note - Gastroenterology   Name: Prem Mar Sr. 89 y.o. male I MRN: [id number]  Unit/Bed#: -01 I Date of Admission: 1/15/2025   Date of Service: 1/28/2025 I Hospital Day: 12     Assessment & Plan  Dysphagia  89-year-old male with a history of Aflutter on ASA, HTN, CKD, gout, and remote history of reported PUD who initially presented from his SNF with abdominal pain and coffee-ground emesis now found to be COVID positive since admission.  Had a multidisciplinary discussion with the patient, his son and daughter.  Discussed that he is not a candidate for PEG or PEGJ placement due to his current clinical status, comorbidities and medical futility. Given his recent COVID infection, ronchi, oxygen requirement, ineffective cough and inability to clear his secretions, he is extremely high risk for procedural sedation/anesthesia.  In addition, trickle feeds were unsuccessful over the weekend due to high residuals and he is extremely high risk for aspiration due to reflux.  Post pyloric feeds do not eliminate the risk of reflux aspiration so PEGJ not indicated.  Ongoing goals of care discussion. Comfort focused care would be appropriate for this patient given overall prognosis  Continue pantoprazole 40 mg twice daily    24 Hour Events : No significant events  Subjective : Unchanged.  Denies any discomfort when awakened    Objective :  Temp:  [100 °F (37.8 °C)-100.2 °F (37.9 °C)] 100 °F (37.8 °C)  HR:  [70-84] 70  BP: (118-149)/(43-55) 149/55  Resp:  [19-22] 22  SpO2:  [89 %-95 %] 95 %  O2 Device: Nasal cannula  Nasal Cannula O2 Flow Rate (L/min):  [2 L/min] 2 L/min    Physical Exam  Vitals and nursing note reviewed.   Constitutional:       General: He is not in acute distress.     Appearance: He is well-developed. He is ill-appearing.      Comments: Arouses to voice but falls asleep again   HENT:      Head: Normocephalic and atraumatic.   Eyes:      Conjunctiva/sclera: Conjunctivae normal.    Cardiovascular:      Rate and Rhythm: Normal rate and regular rhythm.      Heart sounds: No murmur heard.     Comments: +upper extremity edema  Pulmonary:      Breath sounds: Rhonchi present.   Abdominal:      Palpations: Abdomen is soft.      Tenderness: There is no abdominal tenderness.   Musculoskeletal:      Cervical back: Neck supple.   Skin:     General: Skin is dry.      Capillary Refill: Capillary refill takes less than 2 seconds.   Psychiatric:         Mood and Affect: Mood normal.         Lab Results: I have reviewed the following results:CBC/BMP:   .     01/28/25  0540   WBC 8.26   HGB 9.5*   HCT 30.2*      SODIUM 137   K 3.2*      CO2 27   BUN 9   CREATININE 0.69   GLUC 114        Imaging Results Review: No pertinent imaging studies reviewed.  Other Study Results Review: No additional pertinent studies reviewed.    VTE Pharmacologic Prophylaxis: Heparin  VTE Mechanical Prophylaxis: sequential compression device    Administrative Statements   I have spent a total time of 45 minutes in caring for this patient on the day of the visit/encounter including Prognosis and Risks and benefits of tx options. Topics discussed with the patient / family include goals of care.

## 2025-01-28 NOTE — ASSESSMENT & PLAN NOTE
In setting of poor oral intake, hyponatremia now improved  Started D5W infusion yesterday 75 mL/h  Continue IV fluids D5W   Monitor fluid status closely  Continue ongoing goals of care discussions  Daily BMP and trend sodium

## 2025-01-29 LAB
ANION GAP SERPL CALCULATED.3IONS-SCNC: 5 MMOL/L (ref 4–13)
BASOPHILS # BLD AUTO: 0.01 THOUSANDS/ΜL (ref 0–0.1)
BASOPHILS NFR BLD AUTO: 0 % (ref 0–1)
BUN SERPL-MCNC: 8 MG/DL (ref 5–25)
CALCIUM SERPL-MCNC: 7.6 MG/DL (ref 8.4–10.2)
CHLORIDE SERPL-SCNC: 103 MMOL/L (ref 96–108)
CO2 SERPL-SCNC: 27 MMOL/L (ref 21–32)
CREAT SERPL-MCNC: 0.71 MG/DL (ref 0.6–1.3)
EOSINOPHIL # BLD AUTO: 0.07 THOUSAND/ΜL (ref 0–0.61)
EOSINOPHIL NFR BLD AUTO: 1 % (ref 0–6)
ERYTHROCYTE [DISTWIDTH] IN BLOOD BY AUTOMATED COUNT: 15.2 % (ref 11.6–15.1)
GFR SERPL CREATININE-BSD FRML MDRD: 76 ML/MIN/1.73SQ M
GLUCOSE SERPL-MCNC: 105 MG/DL (ref 65–140)
HCT VFR BLD AUTO: 32 % (ref 36.5–49.3)
HGB BLD-MCNC: 10.3 G/DL (ref 12–17)
IMM GRANULOCYTES # BLD AUTO: 0.1 THOUSAND/UL (ref 0–0.2)
IMM GRANULOCYTES NFR BLD AUTO: 1 % (ref 0–2)
LYMPHOCYTES # BLD AUTO: 0.84 THOUSANDS/ΜL (ref 0.6–4.47)
LYMPHOCYTES NFR BLD AUTO: 10 % (ref 14–44)
MAGNESIUM SERPL-MCNC: 1.8 MG/DL (ref 1.9–2.7)
MCH RBC QN AUTO: 31.4 PG (ref 26.8–34.3)
MCHC RBC AUTO-ENTMCNC: 32.2 G/DL (ref 31.4–37.4)
MCV RBC AUTO: 98 FL (ref 82–98)
MONOCYTES # BLD AUTO: 0.35 THOUSAND/ΜL (ref 0.17–1.22)
MONOCYTES NFR BLD AUTO: 4 % (ref 4–12)
MRSA NOSE QL CULT: NORMAL
NEUTROPHILS # BLD AUTO: 6.91 THOUSANDS/ΜL (ref 1.85–7.62)
NEUTS SEG NFR BLD AUTO: 84 % (ref 43–75)
NRBC BLD AUTO-RTO: 0 /100 WBCS
PLATELET # BLD AUTO: 274 THOUSANDS/UL (ref 149–390)
PMV BLD AUTO: 10.4 FL (ref 8.9–12.7)
POTASSIUM SERPL-SCNC: 3.6 MMOL/L (ref 3.5–5.3)
RBC # BLD AUTO: 3.28 MILLION/UL (ref 3.88–5.62)
SODIUM SERPL-SCNC: 135 MMOL/L (ref 135–147)
WBC # BLD AUTO: 8.28 THOUSAND/UL (ref 4.31–10.16)

## 2025-01-29 PROCEDURE — 83735 ASSAY OF MAGNESIUM: CPT | Performed by: NURSE PRACTITIONER

## 2025-01-29 PROCEDURE — 92526 ORAL FUNCTION THERAPY: CPT

## 2025-01-29 PROCEDURE — 80048 BASIC METABOLIC PNL TOTAL CA: CPT | Performed by: NURSE PRACTITIONER

## 2025-01-29 PROCEDURE — 85025 COMPLETE CBC W/AUTO DIFF WBC: CPT | Performed by: NURSE PRACTITIONER

## 2025-01-29 PROCEDURE — 99232 SBSQ HOSP IP/OBS MODERATE 35: CPT | Performed by: PHYSICIAN ASSISTANT

## 2025-01-29 RX ORDER — MAGNESIUM SULFATE HEPTAHYDRATE 40 MG/ML
2 INJECTION, SOLUTION INTRAVENOUS ONCE
Status: COMPLETED | OUTPATIENT
Start: 2025-01-29 | End: 2025-01-29

## 2025-01-29 RX ADMIN — HYDROMORPHONE HYDROCHLORIDE 0.2 MG: 0.2 INJECTION, SOLUTION INTRAMUSCULAR; INTRAVENOUS; SUBCUTANEOUS at 09:21

## 2025-01-29 RX ADMIN — HEPARIN SODIUM 5000 UNITS: 5000 INJECTION INTRAVENOUS; SUBCUTANEOUS at 14:44

## 2025-01-29 RX ADMIN — PANTOPRAZOLE SODIUM 40 MG: 40 INJECTION, POWDER, FOR SOLUTION INTRAVENOUS at 17:27

## 2025-01-29 RX ADMIN — PANTOPRAZOLE SODIUM 40 MG: 40 INJECTION, POWDER, FOR SOLUTION INTRAVENOUS at 05:00

## 2025-01-29 RX ADMIN — NYSTATIN: 100000 CREAM TOPICAL at 09:01

## 2025-01-29 RX ADMIN — METOROPROLOL TARTRATE 2.5 MG: 5 INJECTION, SOLUTION INTRAVENOUS at 09:01

## 2025-01-29 RX ADMIN — MAGNESIUM SULFATE HEPTAHYDRATE 2 G: 40 INJECTION, SOLUTION INTRAVENOUS at 09:25

## 2025-01-29 RX ADMIN — GLYCOPYRROLATE 0.1 MG: 0.2 INJECTION, SOLUTION INTRAMUSCULAR; INTRAVENOUS at 21:42

## 2025-01-29 RX ADMIN — DEXTROSE 75 ML/HR: 5 SOLUTION INTRAVENOUS at 02:09

## 2025-01-29 RX ADMIN — METOROPROLOL TARTRATE 2.5 MG: 5 INJECTION, SOLUTION INTRAVENOUS at 02:10

## 2025-01-29 RX ADMIN — DEXTROSE 75 ML/HR: 5 SOLUTION INTRAVENOUS at 15:36

## 2025-01-29 RX ADMIN — METOROPROLOL TARTRATE 2.5 MG: 5 INJECTION, SOLUTION INTRAVENOUS at 14:44

## 2025-01-29 RX ADMIN — HEPARIN SODIUM 5000 UNITS: 5000 INJECTION INTRAVENOUS; SUBCUTANEOUS at 21:26

## 2025-01-29 RX ADMIN — METOROPROLOL TARTRATE 2.5 MG: 5 INJECTION, SOLUTION INTRAVENOUS at 21:26

## 2025-01-29 RX ADMIN — HEPARIN SODIUM 5000 UNITS: 5000 INJECTION INTRAVENOUS; SUBCUTANEOUS at 05:00

## 2025-01-29 NOTE — ASSESSMENT & PLAN NOTE
Symptom management  Hydromorphone IV 0.2mg q4h PRN mod-sev pain  Reviewed med risks, side effects. Encouraged family to report concerning symptoms/distress if seen.   Pt agreeable to trial dose this morning. Further titration pending tolerance/response with function as primary goal.     Social support  Patient is supported by son, daughter, grandchildren  Supportive listening provided  Normalized experience of patient/family  Provided anxiety containment  Provided anticipatory guidance    Follow up  Palliative Care will continue to follow.   Please reach out to on-call provider via Epic Secure Chat  if questions or concerns arise.  Please do not hesitate to reach our on call provider through our clinic answering service at [phone] should you have acute symptom control concerns.

## 2025-01-29 NOTE — ASSESSMENT & PLAN NOTE
Presented with suspected GI bleed  GI team consulted  Continue conservative measures; significant aspiration and difficulty managing secretions.   NGT removed 01/28/25.

## 2025-01-29 NOTE — CASE MANAGEMENT
Case Management Discharge Planning Note    Patient name Prem Mar Sr.  Location /-01 MRN [id number]  : 10/11/1924 Date 2025       Current Admission Date: 1/15/2025  Current Admission Diagnosis:Upper GI bleed   Patient Active Problem List    Diagnosis Date Noted Date Diagnosed    Dysphagia 2025     Hypernatremia 2025     Electrolyte abnormality 2025     Palliative care by specialist 2025     Opacity noted on imaging study 2025     Congestion of upper airway 2025     Acute distention of stomach 2025     COVID-19 virus infection 2025     Goals of care, counseling/discussion 2025     Upper GI bleed 01/15/2025     Constipation 01/15/2025     Tinea cruris 10/12/2024     Weakness 10/11/2024     Atrial flutter (HCC) 2024     Chronic pain syndrome 2024     Lumbar spondylosis 2024     Chronic midline low back pain without sciatica 2024     Nonexudative age-related macular degeneration of left eye 2023     Chronic kidney disease, stage 3a (HCC) 2023     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     Muscle wasting and atrophy, not elsewhere classified, multiple sites 2022     Difficulty swallowing 2022     Keratoma 2021     Venous insufficiency of both lower extremities 2021     Hypomagnesemia 2020     Mixed hyperlipidemia 2020     Coronary artery disease involving native coronary artery of native heart without angina pectoris 2018     Essential hypertension 2018     Bilateral leg edema 2018     Ventricular bigeminy 2018     Idiopathic chronic gout of multiple sites without tophus 05/10/2017     Edema 2014     Vitamin D deficiency 2013       LOS (days): 12  Geometric Mean LOS (GMLOS) (days): 4.5  Days to GMLOS:-7.9     OBJECTIVE:  Risk of Unplanned Readmission Score: 25.16         Current admission status:  Inpatient   Preferred Pharmacy:   [id number] - [place] - [address] [facility]  [address] [place] [address]  [place] [address]  Phone: [phone] Fax: [phone]    Primary Care Provider: Leonard Schreiber MD    Primary Insurance: [employer] REP  Secondary Insurance:     DISCHARGE DETAILS:       Freedom of Choice: Yes  Comments - Freedom of Choice: pt is a bedhold at the [facility]- goals of care discussion todday- pt is not a candidate for a peg or pegj tube- trickle feedings were not successful

## 2025-01-29 NOTE — PLAN OF CARE
Problem: PAIN - ADULT  Goal: Verbalizes/displays adequate comfort level or baseline comfort level  Description: Interventions:  - Encourage patient to monitor pain and request assistance  - Assess pain using appropriate pain scale  - Administer analgesics based on type and severity of pain and evaluate response  - Implement non-pharmacological measures as appropriate and evaluate response  - Consider cultural and social influences on pain and pain management  - Notify physician/advanced practitioner if interventions unsuccessful or patient reports new pain  Outcome: Progressing     Problem: INFECTION - ADULT  Goal: Absence or prevention of progression during hospitalization  Description: INTERVENTIONS:  - Assess and monitor for signs and symptoms of infection  - Monitor lab/diagnostic results  - Monitor all insertion sites, i.e. indwelling lines, tubes, and drains  - Monitor endotracheal if appropriate and nasal secretions for changes in amount and color  - [place] appropriate cooling/warming therapies per order  - Administer medications as ordered  - Instruct and encourage patient and family to use good hand hygiene technique  - Identify and instruct in appropriate isolation precautions for identified infection/condition  Outcome: Progressing  Goal: Absence of fever/infection during neutropenic period  Description: INTERVENTIONS:  - Monitor WBC    Outcome: Progressing  Goal: Infection Control Precautions  Outcome: Progressing     Problem: SAFETY ADULT  Goal: Patient will remain free of falls  Description: INTERVENTIONS:  - Educate patient/family on patient safety including physical limitations  - Instruct patient to call for assistance with activity   - Consult OT/PT to assist with strengthening/mobility   - Keep Call bell within reach  - Keep bed low and locked with side rails adjusted as appropriate  - Keep care items and personal belongings within reach  - Initiate and maintain comfort rounds  - Make Fall  Risk Sign visible to staff  - Offer Toileting every 2 Hours, in advance of need  - Initiate/Maintain bed alarm  - Obtain necessary fall risk management equipment: bed alarm  - Apply yellow socks and bracelet for high fall risk patients  - Consider moving patient to room near nurses station  Outcome: Progressing  Goal: Maintain or return to baseline ADL function  Description: INTERVENTIONS:  -  Assess patient's ability to carry out ADLs; assess patient's baseline for ADL function and identify physical deficits which impact ability to perform ADLs (bathing, care of mouth/teeth, toileting, grooming, dressing, etc.)  - Assess/evaluate cause of self-care deficits   - Assess range of motion  - Assess patient's mobility; develop plan if impaired  - Assess patient's need for assistive devices and provide as appropriate  - Encourage maximum independence but intervene and supervise when necessary  - Involve family in performance of ADLs  - Assess for home care needs following discharge   - Consider OT consult to assist with ADL evaluation and planning for discharge  - Provide patient education as appropriate  Outcome: Progressing  Goal: Maintains/Returns to pre admission functional level  Description: INTERVENTIONS:  - Perform AM-PAC 6 Click Basic Mobility/ Daily Activity assessment daily.  - Set and communicate daily mobility goal to care team and patient/family/caregiver.   - Collaborate with rehabilitation services on mobility goals if consulted  - Perform Range of Motion 3 times a day.  - Reposition patient every 2 hours.  - Dangle patient 3 times a day  - Stand patient 3 times a day  - Ambulate patient 3 times a day  - Out of bed to chair 3 times a day   - Out of bed for meals 3 times a day  - Out of bed for toileting  - Record patient progress and toleration of activity level   Outcome: Progressing     Problem: DISCHARGE PLANNING  Goal: Discharge to home or other facility with appropriate resources  Description:  INTERVENTIONS:  - Identify barriers to discharge w/patient and caregiver  - Arrange for needed discharge resources and transportation as appropriate  - Identify discharge learning needs (meds, wound care, etc.)  - Arrange for interpretive services to assist at discharge as needed  - Refer to Case Management Department for coordinating discharge planning if the patient needs post-hospital services based on physician/advanced practitioner order or complex needs related to functional status, cognitive ability, or social support system  Outcome: Progressing     Problem: Knowledge Deficit  Goal: Patient/family/caregiver demonstrates understanding of disease process, treatment plan, medications, and discharge instructions  Description: Complete learning assessment and assess knowledge base.  Interventions:  - Provide teaching at level of understanding  - Provide teaching via preferred learning methods  Outcome: Progressing     Problem: Prexisting or High Potential for Compromised Skin Integrity  Goal: Skin integrity is maintained or improved  Description: INTERVENTIONS:  - Identify patients at risk for skin breakdown  - Assess and monitor skin integrity  - Assess and monitor nutrition and hydration status  - Monitor labs   - Assess for incontinence   - Turn and reposition patient  - Assist with mobility/ambulation  - Relieve pressure over bony prominences  - Avoid friction and shearing  - Provide appropriate hygiene as needed including keeping skin clean and dry  - Evaluate need for skin moisturizer/barrier cream  - Collaborate with interdisciplinary team   - Patient/family teaching  - Consider wound care consult   Outcome: Progressing     Problem: GASTROINTESTINAL - ADULT  Goal: Minimal or absence of nausea and/or vomiting  Description: INTERVENTIONS:  - Administer IV fluids if ordered to ensure adequate hydration  - Maintain NPO status until nausea and vomiting are resolved  - Nasogastric tube if ordered  - Administer  ordered antiemetic medications as needed  - Provide nonpharmacologic comfort measures as appropriate  - Advance diet as tolerated, if ordered  - Consider nutrition services referral to assist patient with adequate nutrition and appropriate food choices  Outcome: Progressing  Goal: Maintains or returns to baseline bowel function  Description: INTERVENTIONS:  - Assess bowel function  - Encourage oral fluids to ensure adequate hydration  - Administer IV fluids if ordered to ensure adequate hydration  - Administer ordered medications as needed  - Encourage mobilization and activity  - Consider nutritional services referral to assist patient with adequate nutrition and appropriate food choices  Outcome: Progressing  Goal: Maintains adequate nutritional intake  Description: INTERVENTIONS:  - Monitor percentage of each meal consumed  - Identify factors contributing to decreased intake, treat as appropriate  - Assist with meals as needed  - Monitor I&O, weight, and lab values if indicated  - Obtain nutrition services referral as needed  Outcome: Progressing  Goal: Oral mucous membranes remain intact  Description: INTERVENTIONS  - Assess oral mucosa and hygiene practices  - Implement preventative oral hygiene regimen  - Implement oral medicated treatments as ordered  - Initiate Nutrition services referral as needed  Outcome: Progressing     Problem: RESPIRATORY - ADULT  Goal: Achieves optimal ventilation and oxygenation  Description: INTERVENTIONS:  - Assess for changes in respiratory status  - Assess for changes in mentation and behavior  - Position to facilitate oxygenation and minimize respiratory effort  - Oxygen administered by appropriate delivery if ordered  - Initiate smoking cessation education as indicated  - Encourage broncho-pulmonary hygiene including cough, deep breathe, Incentive Spirometry  - Assess the need for suctioning and aspirate as needed  - Assess and instruct to report SOB or any respiratory  difficulty  - Respiratory Therapy support as indicated  Outcome: Progressing     Problem: Nutrition/Hydration-ADULT  Goal: Nutrient/Hydration intake appropriate for improving, restoring or maintaining nutritional needs  Description: Monitor and assess patient's nutrition/hydration status for malnutrition. Collaborate with interdisciplinary team and initiate plan and interventions as ordered.  Monitor patient's weight and dietary intake as ordered or per policy. Utilize nutrition screening tool and intervene as necessary. Determine patient's food preferences and provide high-protein, high-caloric foods as appropriate.     INTERVENTIONS:  - Monitor oral intake, urinary output, labs, and treatment plans  - Assess nutrition and hydration status and recommend course of action  - Evaluate amount of meals eaten  - Assist patient with eating if necessary   - Allow adequate time for meals  - Recommend/ encourage appropriate diets, oral nutritional supplements, and vitamin/mineral supplements  - Order, calculate, and assess calorie counts as needed  - Recommend, monitor, and adjust tube feedings and TPN/PPN based on assessed needs  - Assess need for intravenous fluids  - Provide specific nutrition/hydration education as appropriate  - Include patient/family/caregiver in decisions related to nutrition  Outcome: Progressing     Problem: METABOLIC, FLUID AND ELECTROLYTES - ADULT  Goal: Electrolytes maintained within normal limits  Description: INTERVENTIONS:  - Monitor labs and assess patient for signs and symptoms of electrolyte imbalances  - Administer electrolyte replacement as ordered  - Monitor response to electrolyte replacements, including repeat lab results as appropriate  - Instruct patient on fluid and nutrition as appropriate  Outcome: Progressing  Goal: Fluid balance maintained  Description: INTERVENTIONS:  - Monitor labs   - Monitor I/O and WT  - Instruct patient on fluid and nutrition as appropriate  - Assess for  signs & symptoms of volume excess or deficit  Outcome: Progressing

## 2025-01-29 NOTE — PROGRESS NOTES
Progress Note - Palliative Care   Name: Prem Mar Sr. 89 y.o. male I MRN: [id number]  Unit/Bed#: -01 I Date of Admission: 1/15/2025   Date of Service: 1/29/2025 I Hospital Day: 13    Assessment & Plan  Goals of care, counseling/discussion  Decisional apparatus:  Patient does have capacity on exam today.  If lost, patient's substitute decision maker would default to both adult children by [place] Act 169.  Advance Directive / Living Will / POLST / POA Forms: No    Goals  Level 3 code status.   Disease focused care.  DNR/DNI limits placed.   NGT removed last night. ST assessing this morning. Reviewed GOC discussion yesterday with patient who expressed understanding. Reviewed comfort care and plan to continue to assess swallow and to use pain medication as ordered for discomfort.   Continue disease-focused care and consider transition to full comfort measures if clinical condition worsens.   GOC discussions ongoing.   Palliative care by specialist  Symptom management  Hydromorphone IV 0.2mg q4h PRN mod-sev pain  Reviewed med risks, side effects. Encouraged family to report concerning symptoms/distress if seen.   Pt agreeable to trial dose this morning. Further titration pending tolerance/response with function as primary goal.     Social support  Patient is supported by son, daughter, grandchildren  Supportive listening provided  Normalized experience of patient/family  Provided anxiety containment  Provided anticipatory guidance    Follow up  Palliative Care will continue to follow.   Please reach out to on-call provider via Epic Secure Chat  if questions or concerns arise.  Please do not hesitate to reach our on call provider through our clinic answering service at [phone] should you have acute symptom control concerns.    Upper GI bleed  Presented with suspected GI bleed  GI team consulted  Continue conservative measures; significant aspiration and difficulty managing secretions.   NGT removed 01/28/25.    Dysphagia  Speech therapy continuing to follow, see note.         PDMP Review: I have reviewed the patient's controlled substance dispensing history in the Prescription Drug Monitoring Program in compliance with the [facility] regulations before prescribing any controlled substances.    Subjective   Pt more awake and alert this morning. Able to communicate and answer questions appropriately. Complains of dry mouth. Reviewed GOC discussion and plan and patient expresses understanding. Reviewed comfort care measures. Pt agreeable to continue to assess swallowing and not ready yet for full comfort measures.     Objective :  Temp:  [98.1 °F (36.7 °C)-100 °F (37.8 °C)] 98.1 °F (36.7 °C)  HR:  [70-78] 78  BP: (114-149)/(47-61) 124/61  Resp:  [19-22] 19  SpO2:  [89 %-96 %] 96 %  O2 Device: Nasal cannula  Nasal Cannula O2 Flow Rate (L/min):  [2 L/min] 2 L/min    Physical Exam  Vitals and nursing note reviewed.   Constitutional:       Appearance: He is ill-appearing.   HENT:      Head: Normocephalic.   Cardiovascular:      Rate and Rhythm: Normal rate.   Pulmonary:      Effort: Pulmonary effort is normal.   Neurological:      Mental Status: He is alert.         Lab Results: I have reviewed the following results:  Lab Results   Component Value Date/Time    SODIUM 135 01/29/2025 04:35 AM    SODIUM 126 (L) 09/06/2022 06:45 PM    K 3.6 01/29/2025 04:35 AM    K 4.0 09/06/2022 06:45 PM    BUN 8 01/29/2025 04:35 AM    BUN 24 09/06/2022 06:45 PM    CREATININE 0.71 01/29/2025 04:35 AM    CREATININE 0.80 09/06/2022 06:45 PM    GLUC 105 01/29/2025 04:35 AM    GLUC 124 (H) 09/06/2022 06:45 PM    CALCIUM 7.6 (L) 01/29/2025 04:35 AM    CALCIUM 8.2 (L) 09/06/2022 06:45 PM    AST 42 (H) 01/21/2025 04:41 AM    ALT 21 01/21/2025 04:41 AM    ALB 3.0 (L) 01/21/2025 04:41 AM    TP 6.1 (L) 01/21/2025 04:41 AM    EGFR 76 01/29/2025 04:35 AM    EGFR 80 09/06/2022 06:45 PM     Lab Results   Component Value Date/Time    HGB 10.3 (L) 01/29/2025 04:35 AM     WBC 8.28 01/29/2025 04:35 AM     01/29/2025 04:35 AM    INR 0.94 01/15/2025 04:22 AM    PTT 31 01/15/2025 04:22 AM     Lab Results   Component Value Date/Time    [id number] 3.763 08/18/2022 04:38 PM         Administrative Statements   I have spent a total time of 40 minutes in caring for this patient on the day of the visit/encounter including Prognosis, Risks and benefits of tx options, Instructions for management, Patient and family education, Counseling / Coordination of care, Documenting in the medical record, Reviewing / ordering tests, medicine, procedures  , Obtaining or reviewing history  , and Communicating with other healthcare professionals . Topics discussed with the patient / family include symptom assessment and management, medication review, psychosocial support, goals of care, hospice services, supportive listening, and anticipatory guidance. Case discussed with SLIM, CM, ST, palliative SW, .

## 2025-01-29 NOTE — ASSESSMENT & PLAN NOTE
Decisional apparatus:  Patient does have capacity on exam today.  If lost, patient's substitute decision maker would default to both adult children by [place] 169.  Advance Directive / Living Will / POLST / POA Forms: No    Goals  Level 3 code status.   Disease focused care.  DNR/DNI limits placed.   NGT removed last night. ST assessing this morning. Reviewed GOC discussion yesterday with patient who expressed understanding. Reviewed comfort care and plan to continue to assess swallow and to use pain medication as ordered for discomfort.   Continue disease-focused care and consider transition to full comfort measures if clinical condition worsens.   GOC discussions ongoing.

## 2025-01-29 NOTE — PLAN OF CARE
Problem: PAIN - ADULT  Goal: Verbalizes/displays adequate comfort level or baseline comfort level  Description: Interventions:  - Encourage patient to monitor pain and request assistance  - Assess pain using appropriate pain scale  - Administer analgesics based on type and severity of pain and evaluate response  - Implement non-pharmacological measures as appropriate and evaluate response  - Consider cultural and social influences on pain and pain management  - Notify physician/advanced practitioner if interventions unsuccessful or patient reports new pain  1/29/2025 0722 by Raiza Shrestha RN  Outcome: Not Progressing  1/29/2025 0722 by Raiza Shrestha RN  Outcome: Progressing     Problem: INFECTION - ADULT  Goal: Absence or prevention of progression during hospitalization  Description: INTERVENTIONS:  - Assess and monitor for signs and symptoms of infection  - Monitor lab/diagnostic results  - Monitor all insertion sites, i.e. indwelling lines, tubes, and drains  - Monitor endotracheal if appropriate and nasal secretions for changes in amount and color  - [place] appropriate cooling/warming therapies per order  - Administer medications as ordered  - Instruct and encourage patient and family to use good hand hygiene technique  - Identify and instruct in appropriate isolation precautions for identified infection/condition  1/29/2025 0722 by Raiza Shrestha RN  Outcome: Not Progressing  1/29/2025 0722 by Raiza Shrestha RN  Outcome: Progressing  Goal: Absence of fever/infection during neutropenic period  Description: INTERVENTIONS:  - Monitor WBC    1/29/2025 0722 by Raiza Shrestha RN  Outcome: Not Progressing  1/29/2025 0722 by Raiza Shrestha RN  Outcome: Progressing  Goal: Infection Control Precautions  1/29/2025 0722 by Raiza Shrestha RN  Outcome: Not Progressing  1/29/2025 0722 by Raiza Shrestha RN  Outcome: Progressing     Problem: SAFETY ADULT  Goal: Patient will remain free of falls  Description: INTERVENTIONS:  -  Educate patient/family on patient safety including physical limitations  - Instruct patient to call for assistance with activity   - Consult OT/PT to assist with strengthening/mobility   - Keep Call bell within reach  - Keep bed low and locked with side rails adjusted as appropriate  - Keep care items and personal belongings within reach  - Initiate and maintain comfort rounds  - Make Fall Risk Sign visible to staff  - Offer Toileting every 2 Hours, in advance of need  - Initiate/Maintain bedalarm  - Obtain necessary fall risk management equipment: nonskid socks  - Apply yellow socks and bracelet for high fall risk patients  - Consider moving patient to room near nurses station  1/29/2025 0722 by Raiza Shrestha RN  Outcome: Not Progressing  1/29/2025 0722 by Raiza Shrestha RN  Outcome: Progressing  Goal: Maintain or return to baseline ADL function  Description: INTERVENTIONS:  -  Assess patient's ability to carry out ADLs; assess patient's baseline for ADL function and identify physical deficits which impact ability to perform ADLs (bathing, care of mouth/teeth, toileting, grooming, dressing, etc.)  - Assess/evaluate cause of self-care deficits   - Assess range of motion  - Assess patient's mobility; develop plan if impaired  - Assess patient's need for assistive devices and provide as appropriate  - Encourage maximum independence but intervene and supervise when necessary  - Involve family in performance of ADLs  - Assess for home care needs following discharge   - Consider OT consult to assist with ADL evaluation and planning for discharge  - Provide patient education as appropriate  1/29/2025 0722 by Raiza Shrestha RN  Outcome: Not Progressing  1/29/2025 0722 by Raiza Shrestha RN  Outcome: Progressing  Goal: Maintains/Returns to pre admission functional level  Description: INTERVENTIONS:  - Perform AM-PAC 6 Click Basic Mobility/ Daily Activity assessment daily.  - Set and communicate daily mobility goal to care team  and patient/family/caregiver.   - Collaborate with rehabilitation services on mobility goals if consulted  - Perform Range of Motion 3 times a day.  - Reposition patient every 2 hours.  - Out of bed for toileting  - Record patient progress and toleration of activity level   1/29/2025 0722 by Raiza Shrestha RN  Outcome: Not Progressing  1/29/2025 0722 by Raiza Shrestha RN  Outcome: Progressing     Problem: DISCHARGE PLANNING  Goal: Discharge to home or other facility with appropriate resources  Description: INTERVENTIONS:  - Identify barriers to discharge w/patient and caregiver  - Arrange for needed discharge resources and transportation as appropriate  - Identify discharge learning needs (meds, wound care, etc.)  - Arrange for interpretive services to assist at discharge as needed  - Refer to Case Management Department for coordinating discharge planning if the patient needs post-hospital services based on physician/advanced practitioner order or complex needs related to functional status, cognitive ability, or social support system  1/29/2025 0722 by Raiza Shrestha RN  Outcome: Not Progressing  1/29/2025 0722 by Raiza Shrestha RN  Outcome: Progressing     Problem: Knowledge Deficit  Goal: Patient/family/caregiver demonstrates understanding of disease process, treatment plan, medications, and discharge instructions  Description: Complete learning assessment and assess knowledge base.  Interventions:  - Provide teaching at level of understanding  - Provide teaching via preferred learning methods  1/29/2025 0722 by Raiza Shrestha RN  Outcome: Not Progressing  1/29/2025 0722 by Raiza Shrestha RN  Outcome: Progressing     Problem: Prexisting or High Potential for Compromised Skin Integrity  Goal: Skin integrity is maintained or improved  Description: INTERVENTIONS:  - Identify patients at risk for skin breakdown  - Assess and monitor skin integrity  - Assess and monitor nutrition and hydration status  - Monitor labs   -  Assess for incontinence   - Turn and reposition patient  - Assist with mobility/ambulation  - Relieve pressure over bony prominences  - Avoid friction and shearing  - Provide appropriate hygiene as needed including keeping skin clean and dry  - Evaluate need for skin moisturizer/barrier cream  - Collaborate with interdisciplinary team   - Patient/family teaching  - Consider wound care consult   1/29/2025 0722 by Raiza Shrestha RN  Outcome: Not Progressing  1/29/2025 0722 by Raiza Shrestha RN  Outcome: Progressing     Problem: GASTROINTESTINAL - ADULT  Goal: Minimal or absence of nausea and/or vomiting  Description: INTERVENTIONS:  - Administer IV fluids if ordered to ensure adequate hydration  - Maintain NPO status until nausea and vomiting are resolved  - Nasogastric tube if ordered  - Administer ordered antiemetic medications as needed  - Provide nonpharmacologic comfort measures as appropriate  - Advance diet as tolerated, if ordered  - Consider nutrition services referral to assist patient with adequate nutrition and appropriate food choices  1/29/2025 0722 by Raiza Shrestha RN  Outcome: Not Progressing  1/29/2025 0722 by Raiza Shrestha RN  Outcome: Progressing  Goal: Maintains or returns to baseline bowel function  Description: INTERVENTIONS:  - Assess bowel function  - Encourage oral fluids to ensure adequate hydration  - Administer IV fluids if ordered to ensure adequate hydration  - Administer ordered medications as needed  - Encourage mobilization and activity  - Consider nutritional services referral to assist patient with adequate nutrition and appropriate food choices  1/29/2025 0722 by Raiza Shrestha RN  Outcome: Not Progressing  1/29/2025 0722 by Raiza Shrestha RN  Outcome: Progressing  Goal: Maintains adequate nutritional intake  Description: INTERVENTIONS:  - Monitor percentage of each meal consumed  - Identify factors contributing to decreased intake, treat as appropriate  - Assist with meals as needed  -  Monitor I&O, weight, and lab values if indicated  - Obtain nutrition services referral as needed  1/29/2025 0722 by Raiza Shrestha RN  Outcome: Not Progressing  1/29/2025 0722 by Raiza Shrestha RN  Outcome: Progressing  Goal: Oral mucous membranes remain intact  Description: INTERVENTIONS  - Assess oral mucosa and hygiene practices  - Implement preventative oral hygiene regimen  - Implement oral medicated treatments as ordered  - Initiate Nutrition services referral as needed  1/29/2025 0722 by Raiza Shrestha RN  Outcome: Not Progressing  1/29/2025 0722 by Raiza Shrestha RN  Outcome: Progressing     Problem: RESPIRATORY - ADULT  Goal: Achieves optimal ventilation and oxygenation  Description: INTERVENTIONS:  - Assess for changes in respiratory status  - Assess for changes in mentation and behavior  - Position to facilitate oxygenation and minimize respiratory effort  - Oxygen administered by appropriate delivery if ordered  - Initiate smoking cessation education as indicated  - Encourage broncho-pulmonary hygiene including cough, deep breathe, Incentive Spirometry  - Assess the need for suctioning and aspirate as needed  - Assess and instruct to report SOB or any respiratory difficulty  - Respiratory Therapy support as indicated  1/29/2025 0722 by Raiza Shrestha RN  Outcome: Not Progressing  1/29/2025 0722 by Raiza Shrestha RN  Outcome: Progressing     Problem: Nutrition/Hydration-ADULT  Goal: Nutrient/Hydration intake appropriate for improving, restoring or maintaining nutritional needs  Description: Monitor and assess patient's nutrition/hydration status for malnutrition. Collaborate with interdisciplinary team and initiate plan and interventions as ordered.  Monitor patient's weight and dietary intake as ordered or per policy. Utilize nutrition screening tool and intervene as necessary. Determine patient's food preferences and provide high-protein, high-caloric foods as appropriate.     INTERVENTIONS:  - Monitor oral  intake, urinary output, labs, and treatment plans  - Assess nutrition and hydration status and recommend course of action  - Evaluate amount of meals eaten  - Assist patient with eating if necessary   - Allow adequate time for meals  - Recommend/ encourage appropriate diets, oral nutritional supplements, and vitamin/mineral supplements  - Order, calculate, and assess calorie counts as needed  - Recommend, monitor, and adjust tube feedings and TPN/PPN based on assessed needs  - Assess need for intravenous fluids  - Provide specific nutrition/hydration education as appropriate  - Include patient/family/caregiver in decisions related to nutrition  1/29/2025 0722 by Raiza Shrestha RN  Outcome: Not Progressing  1/29/2025 0722 by Raiza Shrestha RN  Outcome: Progressing     Problem: METABOLIC, FLUID AND ELECTROLYTES - ADULT  Goal: Electrolytes maintained within normal limits  Description: INTERVENTIONS:  - Monitor labs and assess patient for signs and symptoms of electrolyte imbalances  - Administer electrolyte replacement as ordered  - Monitor response to electrolyte replacements, including repeat lab results as appropriate  - Instruct patient on fluid and nutrition as appropriate  1/29/2025 0722 by Raiza Shrestha RN  Outcome: Not Progressing  1/29/2025 0722 by Raiza Shrestha RN  Outcome: Progressing  Goal: Fluid balance maintained  Description: INTERVENTIONS:  - Monitor labs   - Monitor I/O and WT  - Instruct patient on fluid and nutrition as appropriate  - Assess for signs & symptoms of volume excess or deficit  1/29/2025 0722 by Raiza Shrestha RN  Outcome: Not Progressing  1/29/2025 0722 by Raiza Shrestha RN  Outcome: Progressing

## 2025-01-29 NOTE — SPEECH THERAPY NOTE
Speech Language/Pathology    Speech/Language Pathology Progress Note    Patient Name: Prem Mar Sr.  Today's Date: 1/29/2025     Problem List  Principal Problem:    Upper GI bleed  Active Problems:    Essential hypertension    Idiopathic chronic gout of multiple sites without tophus    Mixed hyperlipidemia    Chronic kidney disease, stage 3a (HCC)    Atrial flutter (HCC)    Constipation    Goals of care, counseling/discussion    Acute distention of stomach    COVID-19 virus infection    Congestion of upper airway    Opacity noted on imaging study    Palliative care by specialist    Electrolyte abnormality    Hypernatremia    Dysphagia       Past Medical History  Past Medical History:   Diagnosis Date    Arthritis     Cataract     Coronary artery disease     Coronary atherosclerosis of native coronary artery     Diverticulitis of colon     Essential hypertension, benign     Hyperlipidemia     Hypertension     Peptic ulcer     Renal disorder         Past Surgical History  Past Surgical History:   Procedure Laterality Date    CATARACT EXTRACTION Right     Left eye 5/2021    CORONARY STENT PLACEMENT      SKIN BIOPSY      TONSILLECTOMY           Subjective:  Pt was positioned upright and alert. Son at bedside.  Objective:  Pt was seen for f/u dysphagia therapy for po potential. More alert then day prior and talkative. NGT removed. Oral care completed mod amount of oral secretions in oral cavity this am, utilized two oral care kits. Continues with deep pharyngeal secretions. Pt cued to cough in attempt to remove via deep suction, initially able to remove however mostly unable to remove deep secretions. Trailed small ice chips x5. Cued to manipulate bolus in oral cavity prior to initiating swallow. Bolus control, formation, and transfer appeared WNL. Swallow initiation appeared mildly delayed. Laryngeal rise upon palpation initial swallow appeared adequate, following swallows appeared mildly weak. 2-4 swallows per  trial. Harsh cough following all trials. Pt will attempt to hold in cough verbal cues to cough in attempt to clear airway. ST reviewed recommendations with son, son understood. Spoke with nursing to suction as able.   Assessment:  Continues with mod amount of secretions, benefits from suction. Mod amount of secretions within oral cavity this am. Multiple swallows palpated per trial. Cough following all trials.   Plan/Recommendations:  Recommend continue NPO ensure good oral care  GOC   ST will f/u as indicated.

## 2025-01-29 NOTE — SOCIAL WORK
Palliative LSW saw patient at the bedside today. LSW appreciates the opportunity to provider patient/family with inpatient emotional support and guidance while patient continues to receive medical attention from the medical team.    Topics discussed: SW visited with Pt to provide support. Pt presents as much more awake and alert today. The NGT has been removed. Son reports ST was in to see Pt. Pt reports feeling better and not having any discomfort presently.    Areas that need follow-up: SW to continue to follow and assist as able with needs and concerns as they present.  Resources given: supportive listening  Others present:  Pt sonPrem    I have spent 15 minutes with Patient and family today in which greater than 50% of this time was spent in counseling/coordination of care.       LSW will continue to follow as requested by the medical team, patient, or family.

## 2025-01-29 NOTE — ASSESSMENT & PLAN NOTE
Prehospital takesToprol-XL 25 mg p.o. daily and Lasix 20 mg p.o. daily on Monday Wednesday Friday  Currently NPO. Metoprolol switched to IV.   Received furosemide 40 mg IV x 1 1/19/2025  Blood pressure controlled  Monitor BP per protocol

## 2025-01-29 NOTE — UTILIZATION REVIEW
Continued Stay Review    Date: 1/29/2025                          Current Patient Class: Inpatient  Current Level of Care: med/surg    HPI:89 y.o. male initially admitted on 1/15     Assessment/Plan: Pt more awake and alert this morning. Able to communicate and answer questions appropriately. C/o dry mouth. Reviewed GOC discussion and plan and pt expresses understanding. Reviewed comfort care measures. Pt agreeable to continue to assess swallowing and not ready yet for full comfort measures. NG tube removed yesterday (1/28). ST re-assessed this AM, recommend continue npo. Ensure good oral care. Remains on 2L nc. Palliative care following. Pt agreeable to trial dose of hydromorphone this AM. Continue disease-focused care and consider transition to full comfort measures if clinical condition worsens. GOC discussions ongoing.      Medications:   Scheduled Medications:  [Held by provider] furosemide, 20 mg, Oral, Once per day on Monday Wednesday Friday  heparin (porcine), 5,000 Units, Subcutaneous, Q8H ANKIT  iohexol, 50 mL, Oral, 90 min pre-procedure  metoprolol, 2.5 mg, Intravenous, Q6H  nystatin, , Topical, BID  pantoprazole, 40 mg, Intravenous, Q12H  scopolamine, 1 patch, Transdermal, Q72H    Continuous IV Infusions:  dextrose, 75 mL/hr, Intravenous, Continuous    PRN Meds:  acetaminophen, 1,000 mg, Intravenous, Q8H PRN 1/28 x1  acetaminophen, 650 mg, Oral, Q4H PRN  bisacodyl, 10 mg, Rectal, Daily PRN  glycopyrrolate, 0.1 mg, Intravenous, Q4H PRN 1/28 x3  HYDROmorphone, 0.2 mg, Intravenous, Q4H PRN 1/29 x1  nitroglycerin, 0.4 mg, Sublingual, Q5 Min PRN  ondansetron, 4 mg, Intravenous, Q6H PRN  phenol, 1 spray, Mouth/Throat, Q2H PRN      Discharge Plan: TBD    Vital Signs (last 3 days)       Date/Time Temp Pulse Resp BP MAP (mmHg) SpO2 Calculated FIO2 (%) - Nasal Cannula Nasal Cannula O2 Flow Rate (L/min) O2 Device Pain    01/29/25 0921 -- -- -- -- -- -- -- -- -- 7    01/29/25 07:50:08 99.1 °F (37.3 °C) 67 18 132/50  77 94 % -- -- -- --    01/29/25 0717 -- -- -- -- -- 96 % 28 2 L/min Nasal cannula No Pain    01/29/25 02:08:47 -- 78 -- 124/61 82 92 % 28 2 L/min Nasal cannula No Pain    01/28/25 22:48:48 98.1 °F (36.7 °C) 75 19 114/47 69 95 % -- -- -- --       Weight (last 2 days)       Date/Time Weight    01/28/25 1451 73.1 (161.16)       Pertinent Labs/Diagnostic Results:     Results from last 7 days   Lab Units 01/29/25  0435 01/28/25  0540 01/27/25  0547 01/26/25 0447 01/25/25  0521   WBC Thousand/uL 8.28 8.26 9.51 15.46* 13.30*   HEMOGLOBIN g/dL 10.3* 9.5* 9.5* 10.1* 10.5*   HEMATOCRIT % 32.0* 30.2* 30.0* 33.4* 33.3*   PLATELETS Thousands/uL 274 270 258 313 295   TOTAL NEUT ABS Thousands/µL 6.91 6.92  --   --   --      Results from last 7 days   Lab Units 01/29/25  0435 01/28/25  0540 01/27/25  0547 01/26/25  0447 01/25/25  0521 01/24/25  0425 01/23/25 0447   SODIUM mmol/L 135 137 140 146 149* 149* 148*   POTASSIUM mmol/L 3.6 3.2* 3.2* 3.2* 3.2* 3.2* 3.3*   CHLORIDE mmol/L 103 105 108 112* 115* 114* 113*   CO2 mmol/L 27 27 28 28 29 30 27   ANION GAP mmol/L 5 5 4 6 5 5 8   BUN mg/dL 8 9 12 15 18 22 26*   CREATININE mg/dL 0.71 0.69 0.73 0.85 0.81 0.78 0.76   EGFR ml/min/1.73sq m 76 77 76 71 72 74 74   CALCIUM mg/dL 7.6* 7.5* 7.6* 8.0* 8.2* 8.4 8.5   MAGNESIUM mg/dL 1.8*  --  1.9 2.0 2.2 2.2 2.2   PHOSPHORUS mg/dL  --   --  1.7* 2.2* 1.7* 1.7* 1.2*     Results from last 7 days   Lab Units 01/29/25  0435 01/28/25  0540 01/27/25  0547 01/26/25  0447 01/25/25  0521 01/24/25  0425 01/23/25  0447   GLUCOSE RANDOM mg/dL 105 114 113 114 115 108 114           Network Utilization Review Department  ATTENTION: Please call with any questions or concerns to [phone] and carefully listen to the prompts so that you are directed to the right person. All voicemails are confidential.   For Discharge needs, contact Care Management DC Support Team at [phone] opt. 2  Send all requests for admission clinical reviews, approved or denied  determinations and any other requests to dedicated fax number below belonging to the campus where the patient is receiving treatment. List of dedicated fax numbers for the Facilities:  FACILITY NAME UR FAX NUMBER   ADMISSION DENIALS (Administrative/Medical Necessity) [phone]   DISCHARGE SUPPORT TEAM (NETWORK) [phone]   PARENT CHILD HEALTH (Maternity/NICU/Pediatrics) [phone]   [facility] [phone]   [facility] [phone]   [facility] [phone]   [facility] [phone]   [facility] [phone]   [facility] [phone]   [facility] [phone]   [facility] [phone]   [facility] [phone]   [facility] [phone]   [facility] [phone]   [facility] [phone]

## 2025-01-30 LAB
ANION GAP SERPL CALCULATED.3IONS-SCNC: 5 MMOL/L (ref 4–13)
BASOPHILS # BLD AUTO: 0.02 THOUSANDS/ΜL (ref 0–0.1)
BASOPHILS NFR BLD AUTO: 0 % (ref 0–1)
BUN SERPL-MCNC: 6 MG/DL (ref 5–25)
CALCIUM SERPL-MCNC: 7.8 MG/DL (ref 8.4–10.2)
CHLORIDE SERPL-SCNC: 101 MMOL/L (ref 96–108)
CO2 SERPL-SCNC: 28 MMOL/L (ref 21–32)
CREAT SERPL-MCNC: 0.62 MG/DL (ref 0.6–1.3)
EOSINOPHIL # BLD AUTO: 0.08 THOUSAND/ΜL (ref 0–0.61)
EOSINOPHIL NFR BLD AUTO: 1 % (ref 0–6)
ERYTHROCYTE [DISTWIDTH] IN BLOOD BY AUTOMATED COUNT: 14.9 % (ref 11.6–15.1)
GFR SERPL CREATININE-BSD FRML MDRD: 81 ML/MIN/1.73SQ M
GLUCOSE SERPL-MCNC: 114 MG/DL (ref 65–140)
HCT VFR BLD AUTO: 32.1 % (ref 36.5–49.3)
HGB BLD-MCNC: 10.3 G/DL (ref 12–17)
IMM GRANULOCYTES # BLD AUTO: 0.09 THOUSAND/UL (ref 0–0.2)
IMM GRANULOCYTES NFR BLD AUTO: 1 % (ref 0–2)
LYMPHOCYTES # BLD AUTO: 0.82 THOUSANDS/ΜL (ref 0.6–4.47)
LYMPHOCYTES NFR BLD AUTO: 9 % (ref 14–44)
MCH RBC QN AUTO: 30.9 PG (ref 26.8–34.3)
MCHC RBC AUTO-ENTMCNC: 32.1 G/DL (ref 31.4–37.4)
MCV RBC AUTO: 96 FL (ref 82–98)
MONOCYTES # BLD AUTO: 0.41 THOUSAND/ΜL (ref 0.17–1.22)
MONOCYTES NFR BLD AUTO: 5 % (ref 4–12)
NEUTROPHILS # BLD AUTO: 7.72 THOUSANDS/ΜL (ref 1.85–7.62)
NEUTS SEG NFR BLD AUTO: 84 % (ref 43–75)
NRBC BLD AUTO-RTO: 0 /100 WBCS
PLATELET # BLD AUTO: 289 THOUSANDS/UL (ref 149–390)
PMV BLD AUTO: 10.4 FL (ref 8.9–12.7)
POTASSIUM SERPL-SCNC: 3.4 MMOL/L (ref 3.5–5.3)
RBC # BLD AUTO: 3.33 MILLION/UL (ref 3.88–5.62)
SODIUM SERPL-SCNC: 134 MMOL/L (ref 135–147)
WBC # BLD AUTO: 9.14 THOUSAND/UL (ref 4.31–10.16)

## 2025-01-30 PROCEDURE — 85025 COMPLETE CBC W/AUTO DIFF WBC: CPT | Performed by: NURSE PRACTITIONER

## 2025-01-30 PROCEDURE — 92526 ORAL FUNCTION THERAPY: CPT

## 2025-01-30 PROCEDURE — 80048 BASIC METABOLIC PNL TOTAL CA: CPT | Performed by: NURSE PRACTITIONER

## 2025-01-30 PROCEDURE — 99233 SBSQ HOSP IP/OBS HIGH 50: CPT

## 2025-01-30 RX ADMIN — METOROPROLOL TARTRATE 2.5 MG: 5 INJECTION, SOLUTION INTRAVENOUS at 02:27

## 2025-01-30 RX ADMIN — HEPARIN SODIUM 5000 UNITS: 5000 INJECTION INTRAVENOUS; SUBCUTANEOUS at 05:03

## 2025-01-30 RX ADMIN — METOROPROLOL TARTRATE 2.5 MG: 5 INJECTION, SOLUTION INTRAVENOUS at 17:51

## 2025-01-30 RX ADMIN — METOROPROLOL TARTRATE 2.5 MG: 5 INJECTION, SOLUTION INTRAVENOUS at 10:05

## 2025-01-30 RX ADMIN — HEPARIN SODIUM 5000 UNITS: 5000 INJECTION INTRAVENOUS; SUBCUTANEOUS at 22:15

## 2025-01-30 RX ADMIN — HEPARIN SODIUM 5000 UNITS: 5000 INJECTION INTRAVENOUS; SUBCUTANEOUS at 17:51

## 2025-01-30 RX ADMIN — DEXTROSE 75 ML/HR: 5 SOLUTION INTRAVENOUS at 03:57

## 2025-01-30 RX ADMIN — DEXTROSE 75 ML/HR: 5 SOLUTION INTRAVENOUS at 17:54

## 2025-01-30 RX ADMIN — PANTOPRAZOLE SODIUM 40 MG: 40 INJECTION, POWDER, FOR SOLUTION INTRAVENOUS at 05:03

## 2025-01-30 RX ADMIN — PANTOPRAZOLE SODIUM 40 MG: 40 INJECTION, POWDER, FOR SOLUTION INTRAVENOUS at 17:51

## 2025-01-30 NOTE — SPEECH THERAPY NOTE
Speech Language/Pathology    Speech/Language Pathology Progress Note    Patient Name: Prem Mar Sr.  Today's Date: 1/30/2025     Problem List  Principal Problem:    Upper GI bleed  Active Problems:    Essential hypertension    Idiopathic chronic gout of multiple sites without tophus    Mixed hyperlipidemia    Chronic kidney disease, stage 3a (HCC)    Atrial flutter (HCC)    Constipation    Goals of care, counseling/discussion    Acute distention of stomach    COVID-19 virus infection    Congestion of upper airway    Opacity noted on imaging study    Palliative care by specialist    Electrolyte abnormality    Hypernatremia    Dysphagia       Past Medical History  Past Medical History:   Diagnosis Date    Arthritis     Cataract     Coronary artery disease     Coronary atherosclerosis of native coronary artery     Diverticulitis of colon     Essential hypertension, benign     Hyperlipidemia     Hypertension     Peptic ulcer     Renal disorder         Past Surgical History  Past Surgical History:   Procedure Laterality Date    CATARACT EXTRACTION Right     Left eye 5/2021    CORONARY STENT PLACEMENT      SKIN BIOPSY      TONSILLECTOMY           Subjective:  Pt was alert and positioned upright.   Objective:  Pt was seen for f/u dysphagia therapy for po potential. Son at bedside. Completed oral care with suction removed mild amount of dry secretions. Pt continues with deep wet cough, however less secretions compared to day prior. Currently not requiring as much suctioning as often. Trialed small ice chips x5, tsps sips of thin, and straw sips of thin. Bolus control and formation were WNL. Transfer and Swallow initiation varied from prompt to mildly delayed. Laryngea rise upon palpation appeared WFL. 1-3 swallows palpated per trials. Immediate to delayed cough intermittently with trials. Pt does attempt to hold in cough,  cued to cough to clear. Reviewed recommendations with pt and son at bedside. Son understood,  questioned when pt can have soup. ST reviewed aspiration precautions. Reviewed session with nursing and CRNP.   Assessment:  Less secretions today, requiring less suctioning. Continues with wet deep cough. Pt unable to bring up to be removed via suction.Immediate to delayed cough with trials.   Plan/Recommendations:  Recommend continue NPO ice chips for pleasure with nursing supervision.   Ensure good oral care  Aspiration precautions   ST continue to f/u as indicated

## 2025-01-30 NOTE — ASSESSMENT & PLAN NOTE
Passed NG clamping trial 1/20/2025, later evaluated by SLP but did not pass   Keep NPO   Trickle feeding through NG initiated 1/22/2025 but discontinued 1/23/2025 due to high residuals and rhonchi requiring frequent suctioning  Speech therapy continues to follow-cleared for ice chips only with nursing supervision  General surgery input regarding feeding tube appreciated.  Patient not currently a candidate due to poor prognosis, risk of complications, and limited benefit

## 2025-01-30 NOTE — ASSESSMENT & PLAN NOTE
Presented for suspected GI bleed  CT abdomen and pelvis 1/15/2025 with focal wall thickening of the lower esophagus, gastroesophageal junction, and proximal stomach which may reflect esophagitis/gastritis.  No evidence for bowel obstruction, mesenteric inflammation, appendicitis, obstructive uropathy, free air or free fluid  Chest x-ray 1/15/2025 with gaseous distention of the stomach  KUB 1/16/2025 with worsening distention  NGT was placed, now discontinued  CT abdomen pelvis with contrast 1/17/2024: New small bilateral pleural effusions with extensive bibasilar atelectasis. Wall thickening of the lower esophagus and proximal stomach is again noted without interval change. This may reflect mild focal esophagitis/gastritis. New NG tube with tip terminating in the gastric body region. No evidence of bowel obstruction, mesenteric inflammation, appendicitis, obstructive uropathy, free air, or free fluid. Descending and sigmoid diverticulosis again noted without evidence for acute diverticulitis. Subtle wall thickening of the mid to distal sigmoid colon, rectum, and anal verge region could reflect mild focal colitis/proctitis. Multiple punctate pancreatic calcifications again seen likely sequelae of chronic pancreatitis  Continue PPI  Hemoglobin has remained stable, no signs of bleeding  See also, plan for dysphagia

## 2025-01-30 NOTE — UTILIZATION REVIEW
Continued Stay Review    Date: 1//30/25                           Current Patient Class: Inpatient  Current Level of Care: MS    HPI:89 y.o. male initially admitted on 1/16/25 - DX:  Upper GI bleed - resolved / Essential hypertension / Acute distention of stomach / Dysphagia     Assessment/Plan: Require additional inpatient hospital stay due to continuing goals of care, palliative care following, speech therapy working with patient daily   1/30/25: Exam: hypertensive; cleared for ice chips only with nursing supervision   Discharge Plan:  Patient was from the [facility] prior to arrival.  He has a bed there when he is stable for discharge per case management.  You are having ongoing goals of care discussion daily with patient and his son.  Speech therapy to work with patient-okay to ice chips today with nursing supervision.  Remains n.p.o. with IV fluids.  Repeat labs in the AM.   Plan: cont iv lopressor; cont protonix iv; cont ivf; monitor labs         Medications:   Scheduled Medications:  [Held by provider] furosemide, 20 mg, Oral, Once per day on Monday Wednesday Friday  heparin (porcine), 5,000 Units, Subcutaneous, Q8H ANKIT  iohexol, 50 mL, Oral, 90 min pre-procedure  metoprolol, 2.5 mg, Intravenous, Q6H  nystatin, , Topical, BID  pantoprazole, 40 mg, Intravenous, Q12H  scopolamine, 1 patch, Transdermal, Q72H      Continuous IV Infusions:  dextrose, 75 mL/hr, Intravenous, Continuous      PRN Meds:  acetaminophen, 1,000 mg, Intravenous, Q8H PRN  acetaminophen, 650 mg, Oral, Q4H PRN  bisacodyl, 10 mg, Rectal, Daily PRN  glycopyrrolate, 0.1 mg, Intravenous, Q4H PRN  HYDROmorphone, 0.2 mg, Intravenous, Q4H PRN  nitroglycerin, 0.4 mg, Sublingual, Q5 Min PRN  ondansetron, 4 mg, Intravenous, Q6H PRN  phenol, 1 spray, Mouth/Throat, Q2H PRN      Discharge Plan: TBD    Vital Signs (last 3 days)       Date/Time Temp Pulse Resp BP MAP (mmHg) SpO2 Calculated FIO2 (%) - Nasal Cannula Nasal Cannula O2 Flow Rate (L/min) O2 Device  Patient Position - Orthostatic VS Bria Coma Scale Score Pain    01/30/25 15:20:30 -- 79 16 155/59 91 99 % -- -- -- -- -- --    01/30/25 0930 -- -- -- -- -- -- -- -- -- -- 14 No Pain    01/30/25 07:21:26 99.3 °F (37.4 °C) 75 16 135/47 76 93 % -- -- -- -- -- --    01/30/25 02:28:28 -- 81 -- 155/55 88 97 % -- -- -- -- -- --    01/30/25 0034 -- -- -- -- -- -- -- -- -- -- 14 No Pain    01/29/25 22:38:17 99.1 °F (37.3 °C) 73 20 121/51 74 98 % -- -- -- -- -- --    01/29/25 14:28:19 97.9 °F (36.6 °C) 76 18 137/50 79 96 % 28 2 L/min Nasal cannula Lying -- --    01/29/25 0921 -- -- -- -- -- -- -- -- -- -- -- 7    01/29/25 07:50:08 99.1 °F (37.3 °C) 67 18 132/50 77 94 % -- -- -- -- -- --    01/29/25 0717 -- -- -- -- -- 96 % 28 2 L/min Nasal cannula -- -- No Pain    01/29/25 02:08:47 -- 78 -- 124/61 82 92 % 28 2 L/min Nasal cannula -- -- No Pain    01/28/25 22:48:48 98.1 °F (36.7 °C) 75 19 114/47 69 95 % -- -- -- -- -- --    01/28/25 14:35:58 100 °F (37.8 °C) 70 22 149/55 86 95 % -- -- -- -- -- --    01/28/25 0823 -- -- -- -- -- 90 % 28 2 L/min Nasal cannula -- -- No Pain    01/28/25 0820 -- -- -- -- -- 89 % -- -- None (Room air) -- -- --    01/28/25 02:50:09 -- 73 -- 118/43 68 89 % -- -- -- -- -- --    01/27/25 22:52:10 100.2 °F (37.9 °C) 81 19 140/50 80 90 % -- -- -- -- -- --    01/27/25 21:12:21 -- 84 -- 130/55 80 91 % -- -- -- -- -- --    01/27/25 1958 -- -- -- -- -- -- -- -- -- -- 15 No Pain    01/27/25 15:50:34 98.9 °F (37.2 °C) 78 18 130/46 74 94 % -- -- -- -- -- --    01/27/25 1000 -- -- -- -- -- -- -- -- -- -- 15 No Pain    01/27/25 07:49:29 98.4 °F (36.9 °C) 82 18 121/46 71 96 % -- -- -- -- -- --    01/27/25 0244 -- 76 -- 132/52 79 98 % -- -- -- -- -- --          Weight (last 2 days)       Date/Time Weight    01/28/25 1451 73.1 (161.16)            Pertinent Labs/Diagnostic Results:   Radiology:  XR abdomen 1 view kub   Final Interpretation by Sb Haynes MD (01/23 1613)      Nonobstructive bowel gas  pattern.               Workstation performed: [id number]         VAS upper limb venous duplex scan, complete, bilateral   Final Interpretation by Abigail Alvarado MD (01/21 2109)      XR chest portable   Final Interpretation by Leonor Ingram MD (01/21 0915)      Slight improvement in opacity in the medial left base which may be due to atelectasis and/or pneumonia.            Workstation performed: [id number]         XR abdomen 1 vw portable   Final Interpretation by Heaven Barton MD (01/20 1417)   Nonobstructive bowel gas pattern. Residual oral contrast throughout the colon. No gastric distention. Satisfactory position of the endogastric tube.               Workstation performed: [id number]         XR chest portable   Final Interpretation by Beatris Benton MD (01/20 0904)      Retrocardiac opacity, which may be due to pneumonia and/or atelectasis.            Workstation performed: [id number]         CT abdomen pelvis w contrast   Final Interpretation by Geovanny Taylor MD (01/17 2239)      1.  New small bilateral pleural effusions with extensive bibasilar atelectasis.   2.  Wall thickening of the lower esophagus and proximal stomach is again noted without interval change. This may reflect mild focal esophagitis/gastritis. New NG tube with tip terminating in the gastric body region.   3.  No evidence of bowel obstruction, mesenteric inflammation, appendicitis, obstructive uropathy, free air, or free fluid. Descending and sigmoid diverticulosis again noted without evidence for acute diverticulitis. Subtle wall thickening of the mid to    distal sigmoid colon, rectum, and anal verge region could reflect mild focal colitis/proctitis.   4.  Multiple punctate pancreatic calcifications again seen likely sequelae of chronic pancreatitis.      Workstation performed: [id number]         XR chest portable   Final Interpretation by Jose Johns MD (01/17 1327)      Scattered streaky opacities favored to represent  atelectasis but pneumonia not excluded.            Workstation performed: [id number]         XR abdomen 1 vw portable   Final Interpretation by Jose Johns MD (01/17 1328)      New marked gaseous distention of the stomach.               Workstation performed: [id number]           Results from last 7 days   Lab Units 01/30/25 0443 01/29/25 0435 01/28/25  0540 01/27/25  0547 01/26/25 0447   WBC Thousand/uL 9.14 8.28 8.26 9.51 15.46*   HEMOGLOBIN g/dL 10.3* 10.3* 9.5* 9.5* 10.1*   HEMATOCRIT % 32.1* 32.0* 30.2* 30.0* 33.4*   PLATELETS Thousands/uL 289 274 270 258 313   TOTAL NEUT ABS Thousands/µL 7.72* 6.91 6.92  --   --         Results from last 7 days   Lab Units 01/30/25 0443 01/29/25 0435 01/28/25  0540 01/27/25  0547 01/26/25 0447 01/25/25  0521 01/24/25  0425   SODIUM mmol/L 134* 135 137 140 146 149* 149*   POTASSIUM mmol/L 3.4* 3.6 3.2* 3.2* 3.2* 3.2* 3.2*   CHLORIDE mmol/L 101 103 105 108 112* 115* 114*   CO2 mmol/L 28 27 27 28 28 29 30   ANION GAP mmol/L 5 5 5 4 6 5 5   BUN mg/dL 6 8 9 12 15 18 22   CREATININE mg/dL 0.62 0.71 0.69 0.73 0.85 0.81 0.78   EGFR ml/min/1.73sq m 81 76 77 76 71 72 74   CALCIUM mg/dL 7.8* 7.6* 7.5* 7.6* 8.0* 8.2* 8.4   MAGNESIUM mg/dL  --  1.8*  --  1.9 2.0 2.2 2.2   PHOSPHORUS mg/dL  --   --   --  1.7* 2.2* 1.7* 1.7*        Results from last 7 days   Lab Units 01/30/25 0443 01/29/25 0435 01/28/25  0540 01/27/25  0547 01/26/25  0447 01/25/25  0521 01/24/25  0425   GLUCOSE RANDOM mg/dL 114 105 114 113 114 115 108           Results from last 7 days   Lab Units 01/26/25  0447 01/25/25  0521   PROCALCITONIN ng/ml 0.24 0.24          Network Utilization Review Department  ATTENTION: Please call with any questions or concerns to [phone] and carefully listen to the prompts so that you are directed to the right person. All voicemails are confidential.   For Discharge needs, contact Care Management DC Support Team at [phone] opt. 2  Send all requests for admission  clinical reviews, approved or denied determinations and any other requests to dedicated fax number below belonging to the campus where the patient is receiving treatment. List of dedicated fax numbers for the Facilities:  FACILITY NAME UR FAX NUMBER   ADMISSION DENIALS (Administrative/Medical Necessity) [phone]   DISCHARGE SUPPORT TEAM (NETWORK) [phone]   PARENT CHILD HEALTH (Maternity/NICU/Pediatrics) [phone]   [facility] [phone]   [facility] [phone]   [facility] [phone]   [facility] [phone]   [facility] [phone]   [facility] [phone]   [facility] [phone]   [facility] [phone]   [facility] [phone]   [facility] [phone]   [facility] [phone]   [facility] [phone]

## 2025-01-30 NOTE — PROGRESS NOTES
Progress Note - Hospitalist   Name: Prem Mar Sr. 89 y.o. male I MRN: [id number]  Unit/Bed#: [facility] I Date of Admission: 1/15/2025   Date of Service: 1/30/2025 I Hospital Day: 14    Assessment & Plan  Upper GI bleed  Presented for suspected GI bleed  CT abdomen and pelvis 1/15/2025 with focal wall thickening of the lower esophagus, gastroesophageal junction, and proximal stomach which may reflect esophagitis/gastritis.  No evidence for bowel obstruction, mesenteric inflammation, appendicitis, obstructive uropathy, free air or free fluid  Chest x-ray 1/15/2025 with gaseous distention of the stomach  KUB 1/16/2025 with worsening distention  NGT was placed, now discontinued  CT abdomen pelvis with contrast 1/17/2024: New small bilateral pleural effusions with extensive bibasilar atelectasis. Wall thickening of the lower esophagus and proximal stomach is again noted without interval change. This may reflect mild focal esophagitis/gastritis. New NG tube with tip terminating in the gastric body region. No evidence of bowel obstruction, mesenteric inflammation, appendicitis, obstructive uropathy, free air, or free fluid. Descending and sigmoid diverticulosis again noted without evidence for acute diverticulitis. Subtle wall thickening of the mid to distal sigmoid colon, rectum, and anal verge region could reflect mild focal colitis/proctitis. Multiple punctate pancreatic calcifications again seen likely sequelae of chronic pancreatitis  Continue PPI  Hemoglobin has remained stable, no signs of bleeding  See also, plan for dysphagia  Opacity noted on imaging study  Chest x-ray: Retrocardiac opacity, which may be due to pneumonia and/or atelectasis  Procalcitonin peaked and downtrending, with intermittent fever  Repeat CXR-slight improvement in left base patchy atelectasis versus pneumonia  Received vancomycin 1/21 through 1/22  Received cefepime 1/21 to 1/22  Transitioned to Rocephin 1/22 per recommendations of ID  pharmacy, now discontinued  Monitor labs and vital signs, conduct serial physical assessments    COVID-19 virus infection  Patient with fever and COVID-positive roommate at his skilled facility  Tested positive for COVID 12/17/2025  Continues to require 2 L nasal cannula oxygen  S/p remdesivir x 3 days  Continue supportive care  Monitor labs and vital signs, conduct serial physical assessments  Congestion of upper airway  Upper airway congestion noted, patient has strong cough and able to clear  CT imaging revealed small bilateral pleural effusion and extensive bibasilar atelectasis  Maintain head of bed elevated  Aspiration precautions  Appreciate input of speech therapy who are following  Continue Robinul 0.1 mg IV every 4 hours as needed  Continue scopolamine 1/25  Continue incentive spirometry  Suction as needed  Goals of care, counseling/discussion  Patient with significant comorbid medical conditions would be high risk for treatment options requiring anesthesia.  EGD testing might have risks that outweigh the benefits.  Patient verbalized understanding.  Opened goals of care conversations with son.  Continued on phone on Saturday. Had further conversations with patient's son, daughter, and grandson when they arrived   Per discussion with patient's 1/28/2025 regarding non-candidacy for feeding tube and no good options for nutrition currently   Ultimately they would like to keep him comfortable but for now continue with full treatment   Palliative input appreciated   Idiopathic chronic gout of multiple sites without tophus  Continue prehospital allopurinol 300 mg p.o. daily when tolerating p.o.  Essential hypertension  Prehospital takesToprol-XL 25 mg p.o. daily and Lasix 20 mg p.o. daily on Monday Wednesday Friday  Currently NPO. Metoprolol switched to IV.   Received furosemide 40 mg IV x 1 1/19/2025  Blood pressure controlled  Monitor BP per protocol  Mixed hyperlipidemia  Pre-hospital Tricor   Chronic kidney  disease, stage 3a (HCC)  Lab Results   Component Value Date    EGFR 81 01/30/2025    EGFR 76 01/29/2025    EGFR 77 01/28/2025    CREATININE 0.62 01/30/2025    CREATININE 0.71 01/29/2025    CREATININE 0.69 01/28/2025     Creatinine appears to be around baseline of 0.9-1.1 mg/dL   Creatinine better than baseline  Continue to monitor renal function closely  Avoid nephrotoxins and hypotension  Trend creatinine  Atrial flutter (HCC)  Currently rate controlled with metoprolol   Not currently on anticoagulation as outpatient  Constipation  GI input appreciated  Continue bowel regimen, monitor effectiveness  Palliative care by specialist  Palliative care input appreciated.  They are continuing to follow  Electrolyte abnormality  Patient was initiated on trickle tube feed on 1/22/2025, but this was discontinued due to suspected aspiration  High risk for refeeding syndrome  Continue to monitor electrolytes and replete accordingly  BMP, magnesium, phosphorus a.m.  Hypernatremia  In setting of poor oral intake, hyponatremia now improved  Started D5W infusion yesterday 75 mL/h  Continue IV fluids D5W   Monitor fluid status closely  Continue ongoing goals of care discussions  Daily BMP and trend sodium  Acute distention of stomach  Appreciate input of GI who are following  N.p.o.  Appreciate input of nutrition  1/22 initiated trickle feed tube feed  through NG tube-reported patient having residuals 1/23, rhonchi bilateral lungs.  Placed tube feed on hold 1/23  Speech therapy continues to work with patient-appropriate for ice chips only with nursing supervision  See goals of care discussion  Dysphagia  Passed NG clamping trial 1/20/2025, later evaluated by SLP but did not pass   Keep NPO   Trickle feeding through NG initiated 1/22/2025 but discontinued 1/23/2025 due to high residuals and rhonchi requiring frequent suctioning  Speech therapy continues to follow-cleared for ice chips only with nursing supervision  General surgery  input regarding feeding tube appreciated.  Patient not currently a candidate due to poor prognosis, risk of complications, and limited benefit    VTE Pharmacologic Prophylaxis: VTE Score: 5 High Risk (Score >/= 5) - Pharmacological DVT Prophylaxis Ordered: heparin. Sequential Compression Devices Ordered.    Mobility:   Basic Mobility Inpatient Raw Score: 6  JH-HLM Goal: 2: Bed activities/Dependent transfer  JH-HLM Achieved: 2: Bed activities/Dependent transfer  JH-HLM Goal achieved. Continue to encourage appropriate mobility.    Patient Centered Rounds: I performed bedside rounds with nursing staff today.   Discussions with Specialists or Other Care Team Provider: Palliative care, speech therapy, nursing, case management    Education and Discussions with Family / Patient: Updated  (son) at bedside.    Current Length of Stay: 14 day(s)  Current Patient Status: Inpatient   Certification Statement: The patient will continue to require additional inpatient hospital stay due to continuing goals of care, palliative care following, speech therapy working with patient daily  Discharge Plan:  Patient was from the [place] prior to arrival.  He has a bed there when he is stable for discharge per case management.  You are having ongoing goals of care discussion daily with patient and his son.  Speech therapy to work with patient-okay to ice chips today with nursing supervision.  Remains n.p.o. with IV fluids.  Repeat labs in the AM.    Code Status: Level 3 - DNAR and DNI    Subjective   Denies dizziness, lightness, chest pain or palpitations.  Denies pain or discomfort.  Verbalizing needs.  Son at bedside.    Objective :  Temp:  [99.1 °F (37.3 °C)-99.3 °F (37.4 °C)] 99.3 °F (37.4 °C)  HR:  [73-81] 79  BP: (121-155)/(47-59) 155/59  Resp:  [16-20] 16  SpO2:  [93 %-99 %] 99 %    Body mass index is 26.01 kg/m².     Input and Output Summary (last 24 hours):     Intake/Output Summary (Last 24 hours) at 1/30/2025  1619  Last data filed at 1/30/2025 0357  Gross per 24 hour   Intake 4700 ml   Output 200 ml   Net 4500 ml       Physical Exam  Vitals reviewed.   Constitutional:       General: He is not in acute distress.     Appearance: He is well-developed. He is ill-appearing.   HENT:      Head: Normocephalic and atraumatic.   Cardiovascular:      Rate and Rhythm: Normal rate and regular rhythm.      Pulses: Normal pulses.      Heart sounds: Normal heart sounds. No murmur heard.  Pulmonary:      Effort: Pulmonary effort is normal. No respiratory distress.      Breath sounds: Normal breath sounds. No rales.   Abdominal:      General: Bowel sounds are normal. There is no distension.      Palpations: Abdomen is soft.      Tenderness: There is no abdominal tenderness. There is no guarding.   Musculoskeletal:         General: No swelling.   Skin:     General: Skin is warm and dry.      Capillary Refill: Capillary refill takes less than 2 seconds.   Neurological:      General: No focal deficit present.      Mental Status: He is alert. Mental status is at baseline.   Psychiatric:         Mood and Affect: Mood normal.         Behavior: Behavior normal.           Lines/Drains:              Lab Results: I have reviewed the following results:   Results from last 7 days   Lab Units 01/30/25  0443   WBC Thousand/uL 9.14   HEMOGLOBIN g/dL 10.3*   HEMATOCRIT % 32.1*   PLATELETS Thousands/uL 289   SEGS PCT % 84*   LYMPHO PCT % 9*   MONO PCT % 5   EOS PCT % 1     Results from last 7 days   Lab Units 01/30/25  0443   SODIUM mmol/L 134*   POTASSIUM mmol/L 3.4*   CHLORIDE mmol/L 101   CO2 mmol/L 28   BUN mg/dL 6   CREATININE mg/dL 0.62   ANION GAP mmol/L 5   CALCIUM mg/dL 7.8*   GLUCOSE RANDOM mg/dL 114                 Results from last 7 days   Lab Units 01/26/25  0447 01/25/25  0521   PROCALCITONIN ng/ml 0.24 0.24       Recent Cultures (last 7 days):         Imaging Results Review: I reviewed radiology reports from this admission including: CT  abdomen pelvis, chest x-ray, KUB, repeat CT abdomen pelvis, repeat chest x-ray x 2, repeat KUB.  Other Study Results Review: No additional pertinent studies reviewed.    Last 24 Hours Medication List:     Current Facility-Administered Medications:     acetaminophen (Ofirmev) injection 1,000 mg, Q8H PRN, Last Rate: 1,000 mg (01/28/25 1440)    acetaminophen (TYLENOL) tablet 650 mg, Q4H PRN    bisacodyl (DULCOLAX) rectal suppository 10 mg, Daily PRN    dextrose 5 % infusion, Continuous, Last Rate: 75 mL/hr (01/30/25 0357)    [Held by provider] furosemide (LASIX) tablet 20 mg, Once per day on Monday Wednesday Friday    glycopyrrolate (ROBINUL) injection 0.1 mg, Q4H PRN    heparin (porcine) subcutaneous injection 5,000 Units, Q8H ANKIT    HYDROmorphone HCl (DILAUDID) injection 0.2 mg, Q4H PRN    iohexol (OMNIPAQUE) 240 MG/ML solution 50 mL, 90 min pre-procedure    metoprolol (LOPRESSOR) injection 2.5 mg, Q6H    nitroglycerin (NITROSTAT) SL tablet 0.4 mg, Q5 Min PRN    nystatin (MYCOSTATIN) cream, BID    ondansetron (ZOFRAN) injection 4 mg, Q6H PRN    pantoprazole (PROTONIX) injection 40 mg, Q12H    phenol (CHLORASEPTIC) 1.4 % mucosal liquid 1 spray, Q2H PRN    scopolamine (TRANSDERM-SCOP) 1 mg/3 days TD 72 hr patch 1 patch, Q72H    Administrative Statements   Today, Patient Was Seen By: ANAM Harrison  I have spent a total time of 40 minutes in caring for this patient on the day of the visit/encounter including Prognosis, Counseling / Coordination of care, Documenting in the medical record, Reviewing / ordering tests, medicine, procedures  , Communicating with other healthcare professionals , and goals of care ongoing.    **Please Note: This note may have been constructed using a voice recognition system.**

## 2025-01-30 NOTE — ASSESSMENT & PLAN NOTE
Appreciate input of GI who are following  N.p.o.  Appreciate input of nutrition  1/22 initiated trickle feed tube feed  through NG tube-reported patient having residuals 1/23, rhonchi bilateral lungs.  Placed tube feed on hold 1/23  Speech therapy continues to work with patient-appropriate for ice chips only with nursing supervision  See goals of care discussion

## 2025-01-30 NOTE — PLAN OF CARE
Problem: PAIN - ADULT  Goal: Verbalizes/displays adequate comfort level or baseline comfort level  Description: Interventions:  - Encourage patient to monitor pain and request assistance  - Assess pain using appropriate pain scale  - Administer analgesics based on type and severity of pain and evaluate response  - Implement non-pharmacological measures as appropriate and evaluate response  - Consider cultural and social influences on pain and pain management  - Notify physician/advanced practitioner if interventions unsuccessful or patient reports new pain  Outcome: Progressing     Problem: INFECTION - ADULT  Goal: Absence or prevention of progression during hospitalization  Description: INTERVENTIONS:  - Assess and monitor for signs and symptoms of infection  - Monitor lab/diagnostic results  - Monitor all insertion sites, i.e. indwelling lines, tubes, and drains  - Monitor endotracheal if appropriate and nasal secretions for changes in amount and color  - [place] appropriate cooling/warming therapies per order  - Administer medications as ordered  - Instruct and encourage patient and family to use good hand hygiene technique  - Identify and instruct in appropriate isolation precautions for identified infection/condition  Outcome: Progressing  Goal: Absence of fever/infection during neutropenic period  Description: INTERVENTIONS:  - Monitor WBC    Outcome: Progressing  Goal: Infection Control Precautions  Outcome: Progressing     Problem: SAFETY ADULT  Goal: Patient will remain free of falls  Description: INTERVENTIONS:  - Educate patient/family on patient safety including physical limitations  - Instruct patient to call for assistance with activity   - Consult OT/PT to assist with strengthening/mobility   - Keep Call bell within reach  - Keep bed low and locked with side rails adjusted as appropriate  - Keep care items and personal belongings within reach  - Initiate and maintain comfort rounds  - Make Fall  Risk Sign visible to staff  - Offer Toileting every 2 Hours, in advance of need  - Initiate/Maintain bed alarm  - Obtain necessary fall risk management equipment: non slip socks, bed bound   - Apply yellow socks and bracelet for high fall risk patients  - Consider moving patient to room near nurses station  Outcome: Progressing  Goal: Maintain or return to baseline ADL function  Description: INTERVENTIONS:  -  Assess patient's ability to carry out ADLs; assess patient's baseline for ADL function and identify physical deficits which impact ability to perform ADLs (bathing, care of mouth/teeth, toileting, grooming, dressing, etc.)  - Assess/evaluate cause of self-care deficits   - Assess range of motion  - Assess patient's mobility; develop plan if impaired  - Assess patient's need for assistive devices and provide as appropriate  - Encourage maximum independence but intervene and supervise when necessary  - Involve family in performance of ADLs  - Assess for home care needs following discharge   - Consider OT consult to assist with ADL evaluation and planning for discharge  - Provide patient education as appropriate  Outcome: Progressing  Goal: Maintains/Returns to pre admission functional level  Description: INTERVENTIONS:  - Perform AM-PAC 6 Click Basic Mobility/ Daily Activity assessment daily.  - Set and communicate daily mobility goal to care team and patient/family/caregiver.   - Collaborate with rehabilitation services on mobility goals if consulted  - Perform Range of Motion 3 times a day.  - Reposition patient every 2 hours.  - Dangle patient 3 times a day  - Stand patient 3 times a day  - Ambulate patient 3 times a day  - Out of bed to chair 3 times a day   - Out of bed for meals 3 times a day  - Out of bed for toileting  - Record patient progress and toleration of activity level   Outcome: Progressing     Problem: DISCHARGE PLANNING  Goal: Discharge to home or other facility with appropriate  resources  Description: INTERVENTIONS:  - Identify barriers to discharge w/patient and caregiver  - Arrange for needed discharge resources and transportation as appropriate  - Identify discharge learning needs (meds, wound care, etc.)  - Arrange for interpretive services to assist at discharge as needed  - Refer to Case Management Department for coordinating discharge planning if the patient needs post-hospital services based on physician/advanced practitioner order or complex needs related to functional status, cognitive ability, or social support system  Outcome: Progressing     Problem: Knowledge Deficit  Goal: Patient/family/caregiver demonstrates understanding of disease process, treatment plan, medications, and discharge instructions  Description: Complete learning assessment and assess knowledge base.  Interventions:  - Provide teaching at level of understanding  - Provide teaching via preferred learning methods  Outcome: Progressing     Problem: Prexisting or High Potential for Compromised Skin Integrity  Goal: Skin integrity is maintained or improved  Description: INTERVENTIONS:  - Identify patients at risk for skin breakdown  - Assess and monitor skin integrity  - Assess and monitor nutrition and hydration status  - Monitor labs   - Assess for incontinence   - Turn and reposition patient  - Assist with mobility/ambulation  - Relieve pressure over bony prominences  - Avoid friction and shearing  - Provide appropriate hygiene as needed including keeping skin clean and dry  - Evaluate need for skin moisturizer/barrier cream  - Collaborate with interdisciplinary team   - Patient/family teaching  - Consider wound care consult   Outcome: Progressing     Problem: GASTROINTESTINAL - ADULT  Goal: Minimal or absence of nausea and/or vomiting  Description: INTERVENTIONS:  - Administer IV fluids if ordered to ensure adequate hydration  - Maintain NPO status until nausea and vomiting are resolved  - Nasogastric tube if  ordered  - Administer ordered antiemetic medications as needed  - Provide nonpharmacologic comfort measures as appropriate  - Advance diet as tolerated, if ordered  - Consider nutrition services referral to assist patient with adequate nutrition and appropriate food choices  Outcome: Progressing  Goal: Maintains or returns to baseline bowel function  Description: INTERVENTIONS:  - Assess bowel function  - Encourage oral fluids to ensure adequate hydration  - Administer IV fluids if ordered to ensure adequate hydration  - Administer ordered medications as needed  - Encourage mobilization and activity  - Consider nutritional services referral to assist patient with adequate nutrition and appropriate food choices  Outcome: Progressing  Goal: Maintains adequate nutritional intake  Description: INTERVENTIONS:  - Monitor percentage of each meal consumed  - Identify factors contributing to decreased intake, treat as appropriate  - Assist with meals as needed  - Monitor I&O, weight, and lab values if indicated  - Obtain nutrition services referral as needed  Outcome: Progressing  Goal: Oral mucous membranes remain intact  Description: INTERVENTIONS  - Assess oral mucosa and hygiene practices  - Implement preventative oral hygiene regimen  - Implement oral medicated treatments as ordered  - Initiate Nutrition services referral as needed  Outcome: Progressing     Problem: RESPIRATORY - ADULT  Goal: Achieves optimal ventilation and oxygenation  Description: INTERVENTIONS:  - Assess for changes in respiratory status  - Assess for changes in mentation and behavior  - Position to facilitate oxygenation and minimize respiratory effort  - Oxygen administered by appropriate delivery if ordered  - Initiate smoking cessation education as indicated  - Encourage broncho-pulmonary hygiene including cough, deep breathe, Incentive Spirometry  - Assess the need for suctioning and aspirate as needed  - Assess and instruct to report SOB or  any respiratory difficulty  - Respiratory Therapy support as indicated  Outcome: Progressing     Problem: METABOLIC, FLUID AND ELECTROLYTES - ADULT  Goal: Electrolytes maintained within normal limits  Description: INTERVENTIONS:  - Monitor labs and assess patient for signs and symptoms of electrolyte imbalances  - Administer electrolyte replacement as ordered  - Monitor response to electrolyte replacements, including repeat lab results as appropriate  - Instruct patient on fluid and nutrition as appropriate  Outcome: Progressing  Goal: Fluid balance maintained  Description: INTERVENTIONS:  - Monitor labs   - Monitor I/O and WT  - Instruct patient on fluid and nutrition as appropriate  - Assess for signs & symptoms of volume excess or deficit  Outcome: Progressing     Problem: Nutrition/Hydration-ADULT  Goal: Nutrient/Hydration intake appropriate for improving, restoring or maintaining nutritional needs  Description: Monitor and assess patient's nutrition/hydration status for malnutrition. Collaborate with interdisciplinary team and initiate plan and interventions as ordered.  Monitor patient's weight and dietary intake as ordered or per policy. Utilize nutrition screening tool and intervene as necessary. Determine patient's food preferences and provide high-protein, high-caloric foods as appropriate.     INTERVENTIONS:  - Monitor oral intake, urinary output, labs, and treatment plans  - Assess nutrition and hydration status and recommend course of action  - Evaluate amount of meals eaten  - Assist patient with eating if necessary   - Allow adequate time for meals  - Recommend/ encourage appropriate diets, oral nutritional supplements, and vitamin/mineral supplements  - Order, calculate, and assess calorie counts as needed  - Recommend, monitor, and adjust tube feedings and TPN/PPN based on assessed needs  - Assess need for intravenous fluids  - Provide specific nutrition/hydration education as appropriate  - Include  patient/family/caregiver in decisions related to nutrition  Outcome: Progressing

## 2025-01-30 NOTE — ASSESSMENT & PLAN NOTE
Patient with significant comorbid medical conditions would be high risk for treatment options requiring anesthesia.  EGD testing might have risks that outweigh the benefits.  Patient verbalized understanding.  Opened goals of care conversations with son.  Continued on phone on Saturday. Had further conversations with patient's son, daughter, and grandson when they arrived   Per discussion with patient's 1/28/2025 regarding non-candidacy for feeding tube and no good options for nutrition currently   Ultimately they would like to keep him comfortable but for now continue with full treatment   Palliative input appreciated

## 2025-01-30 NOTE — PLAN OF CARE
Problem: PAIN - ADULT  Goal: Verbalizes/displays adequate comfort level or baseline comfort level  Description: Interventions:  - Encourage patient to monitor pain and request assistance  - Assess pain using appropriate pain scale  - Administer analgesics based on type and severity of pain and evaluate response  - Implement non-pharmacological measures as appropriate and evaluate response  - Consider cultural and social influences on pain and pain management  - Notify physician/advanced practitioner if interventions unsuccessful or patient reports new pain  Outcome: Progressing     Problem: SAFETY ADULT  Goal: Patient will remain free of falls  Description: INTERVENTIONS:  - Educate patient/family on patient safety including physical limitations  - Instruct patient to call for assistance with activity   - Consult OT/PT to assist with strengthening/mobility   - Keep Call bell within reach  - Keep bed low and locked with side rails adjusted as appropriate  - Keep care items and personal belongings within reach  - Initiate and maintain comfort rounds  - Make Fall Risk Sign visible to staff  - Offer Toileting every 2 Hours, in advance of need  - Initiate/Maintain alarms  - Obtain necessary fall risk management equipment:   - Apply yellow socks and bracelet for high fall risk patients  - Consider moving patient to room near nurses station  Outcome: Progressing  Goal: Maintain or return to baseline ADL function  Description: INTERVENTIONS:  -  Assess patient's ability to carry out ADLs; assess patient's baseline for ADL function and identify physical deficits which impact ability to perform ADLs (bathing, care of mouth/teeth, toileting, grooming, dressing, etc.)  - Assess/evaluate cause of self-care deficits   - Assess range of motion  - Assess patient's mobility; develop plan if impaired  - Assess patient's need for assistive devices and provide as appropriate  - Encourage maximum independence but intervene and  supervise when necessary  - Involve family in performance of ADLs  - Assess for home care needs following discharge   - Consider OT consult to assist with ADL evaluation and planning for discharge  - Provide patient education as appropriate  Outcome: Progressing  Goal: Maintains/Returns to pre admission functional level  Description: INTERVENTIONS:  - Perform AM-PAC 6 Click Basic Mobility/ Daily Activity assessment daily.  - Set and communicate daily mobility goal to care team and patient/family/caregiver.   - Collaborate with rehabilitation services on mobility goals if consulted  - Perform Range of Motion 4 times a day.  - Reposition patient every 2 hours.  22- Out of bed for toileting  - Record patient progress and toleration of activity level   Outcome: Progressing

## 2025-01-30 NOTE — ASSESSMENT & PLAN NOTE
Lab Results   Component Value Date    EGFR 81 01/30/2025    EGFR 76 01/29/2025    EGFR 77 01/28/2025    CREATININE 0.62 01/30/2025    CREATININE 0.71 01/29/2025    CREATININE 0.69 01/28/2025     Creatinine appears to be around baseline of 0.9-1.1 mg/dL   Creatinine better than baseline  Continue to monitor renal function closely  Avoid nephrotoxins and hypotension  Trend creatinine

## 2025-01-30 NOTE — CASE MANAGEMENT
Case Management Discharge Planning Note    Patient name Perm Mar Sr.  Location /-01 MRN [id number]  : 10/11/1924 Date 2025       Current Admission Date: 1/15/2025  Current Admission Diagnosis:Upper GI bleed   Patient Active Problem List    Diagnosis Date Noted Date Diagnosed    Dysphagia 2025     Hypernatremia 2025     Electrolyte abnormality 2025     Palliative care by specialist 2025     Opacity noted on imaging study 2025     Congestion of upper airway 2025     Acute distention of stomach 2025     COVID-19 virus infection 2025     Goals of care, counseling/discussion 2025     Upper GI bleed 01/15/2025     Constipation 01/15/2025     Tinea cruris 10/12/2024     Weakness 10/11/2024     Atrial flutter (HCC) 2024     Chronic pain syndrome 2024     Lumbar spondylosis 2024     Chronic midline low back pain without sciatica 2024     Nonexudative age-related macular degeneration of left eye 2023     Chronic kidney disease, stage 3a (HCC) 2023     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     Muscle wasting and atrophy, not elsewhere classified, multiple sites 2022     Difficulty swallowing 2022     Keratoma 2021     Venous insufficiency of both lower extremities 2021     Hypomagnesemia 2020     Mixed hyperlipidemia 2020     Coronary artery disease involving native coronary artery of native heart without angina pectoris 2018     Essential hypertension 2018     Bilateral leg edema 2018     Ventricular bigeminy 2018     Idiopathic chronic gout of multiple sites without tophus 05/10/2017     Edema 2014     Vitamin D deficiency 2013       LOS (days): 13  Geometric Mean LOS (GMLOS) (days): 4.5  Days to GMLOS:-8.9     OBJECTIVE:  Risk of Unplanned Readmission Score: 25.5         Current admission status:  Inpatient   Preferred Pharmacy:   [id number] - [place] - [address] [facility]  [address] [place] [address]  [place] [id number]  Phone: [phone] Fax: [phone]    Primary Care Provider: Leonard Schreiber MD    Primary Insurance: [facility]  Secondary Insurance:     DISCHARGE DETAILS:       Freedom of Choice: Yes  Comments - Freedom of Choice: pt is a bedhold at  the [facility]-  NG tube has been removed -goals odf care discussion- possible transition to comfort care                               Other Referral/Resources/Interventions Provided:  Referral Comments: palliarive, goals of care, speech

## 2025-01-31 LAB
ANION GAP SERPL CALCULATED.3IONS-SCNC: 7 MMOL/L (ref 4–13)
BUN SERPL-MCNC: 4 MG/DL (ref 5–25)
CALCIUM SERPL-MCNC: 7.9 MG/DL (ref 8.4–10.2)
CHLORIDE SERPL-SCNC: 99 MMOL/L (ref 96–108)
CO2 SERPL-SCNC: 26 MMOL/L (ref 21–32)
CREAT SERPL-MCNC: 0.59 MG/DL (ref 0.6–1.3)
ERYTHROCYTE [DISTWIDTH] IN BLOOD BY AUTOMATED COUNT: 14.9 % (ref 11.6–15.1)
GFR SERPL CREATININE-BSD FRML MDRD: 83 ML/MIN/1.73SQ M
GLUCOSE SERPL-MCNC: 130 MG/DL (ref 65–140)
HCT VFR BLD AUTO: 31.9 % (ref 36.5–49.3)
HGB BLD-MCNC: 10.3 G/DL (ref 12–17)
MAGNESIUM SERPL-MCNC: 1.7 MG/DL (ref 1.9–2.7)
MCH RBC QN AUTO: 30.5 PG (ref 26.8–34.3)
MCHC RBC AUTO-ENTMCNC: 32.3 G/DL (ref 31.4–37.4)
MCV RBC AUTO: 94 FL (ref 82–98)
PHOSPHATE SERPL-MCNC: 1.3 MG/DL (ref 2.3–4.1)
PLATELET # BLD AUTO: 344 THOUSANDS/UL (ref 149–390)
PMV BLD AUTO: 9.9 FL (ref 8.9–12.7)
POTASSIUM SERPL-SCNC: 3.4 MMOL/L (ref 3.5–5.3)
RBC # BLD AUTO: 3.38 MILLION/UL (ref 3.88–5.62)
SODIUM SERPL-SCNC: 132 MMOL/L (ref 135–147)
WBC # BLD AUTO: 11.05 THOUSAND/UL (ref 4.31–10.16)

## 2025-01-31 PROCEDURE — 99232 SBSQ HOSP IP/OBS MODERATE 35: CPT | Performed by: NURSE PRACTITIONER

## 2025-01-31 PROCEDURE — 83735 ASSAY OF MAGNESIUM: CPT

## 2025-01-31 PROCEDURE — 84100 ASSAY OF PHOSPHORUS: CPT

## 2025-01-31 PROCEDURE — 85027 COMPLETE CBC AUTOMATED: CPT

## 2025-01-31 PROCEDURE — 80048 BASIC METABOLIC PNL TOTAL CA: CPT

## 2025-01-31 PROCEDURE — 92526 ORAL FUNCTION THERAPY: CPT

## 2025-01-31 RX ORDER — MAGNESIUM SULFATE HEPTAHYDRATE 40 MG/ML
2 INJECTION, SOLUTION INTRAVENOUS ONCE
Status: COMPLETED | OUTPATIENT
Start: 2025-01-31 | End: 2025-01-31

## 2025-01-31 RX ADMIN — METOROPROLOL TARTRATE 2.5 MG: 5 INJECTION, SOLUTION INTRAVENOUS at 22:38

## 2025-01-31 RX ADMIN — PANTOPRAZOLE SODIUM 40 MG: 40 INJECTION, POWDER, FOR SOLUTION INTRAVENOUS at 17:50

## 2025-01-31 RX ADMIN — HEPARIN SODIUM 5000 UNITS: 5000 INJECTION INTRAVENOUS; SUBCUTANEOUS at 14:54

## 2025-01-31 RX ADMIN — NYSTATIN: 100000 CREAM TOPICAL at 14:55

## 2025-01-31 RX ADMIN — SCOPOLAMINE 1 PATCH: 1.5 PATCH, EXTENDED RELEASE TRANSDERMAL at 16:24

## 2025-01-31 RX ADMIN — METOROPROLOL TARTRATE 2.5 MG: 5 INJECTION, SOLUTION INTRAVENOUS at 10:48

## 2025-01-31 RX ADMIN — HEPARIN SODIUM 5000 UNITS: 5000 INJECTION INTRAVENOUS; SUBCUTANEOUS at 05:39

## 2025-01-31 RX ADMIN — METOROPROLOL TARTRATE 2.5 MG: 5 INJECTION, SOLUTION INTRAVENOUS at 05:38

## 2025-01-31 RX ADMIN — METOROPROLOL TARTRATE 2.5 MG: 5 INJECTION, SOLUTION INTRAVENOUS at 00:39

## 2025-01-31 RX ADMIN — MAGNESIUM SULFATE HEPTAHYDRATE 2 G: 40 INJECTION, SOLUTION INTRAVENOUS at 14:53

## 2025-01-31 RX ADMIN — HEPARIN SODIUM 5000 UNITS: 5000 INJECTION INTRAVENOUS; SUBCUTANEOUS at 21:15

## 2025-01-31 RX ADMIN — DEXTROSE 75 ML/HR: 5 SOLUTION INTRAVENOUS at 06:38

## 2025-01-31 RX ADMIN — METOROPROLOL TARTRATE 2.5 MG: 5 INJECTION, SOLUTION INTRAVENOUS at 16:24

## 2025-01-31 RX ADMIN — SODIUM PHOSPHATE, MONOBASIC, MONOHYDRATE AND SODIUM PHOSPHATE, DIBASIC, ANHYDROUS 9 MMOL: 142; 276 INJECTION, SOLUTION INTRAVENOUS at 10:44

## 2025-01-31 RX ADMIN — NYSTATIN: 100000 CREAM TOPICAL at 17:54

## 2025-01-31 RX ADMIN — PANTOPRAZOLE SODIUM 40 MG: 40 INJECTION, POWDER, FOR SOLUTION INTRAVENOUS at 05:39

## 2025-01-31 NOTE — PLAN OF CARE
Problem: PAIN - ADULT  Goal: Verbalizes/displays adequate comfort level or baseline comfort level  Description: Interventions:  - Encourage patient to monitor pain and request assistance  - Assess pain using appropriate pain scale  - Administer analgesics based on type and severity of pain and evaluate response  - Implement non-pharmacological measures as appropriate and evaluate response  - Consider cultural and social influences on pain and pain management  - Notify physician/advanced practitioner if interventions unsuccessful or patient reports new pain  Outcome: Progressing     Problem: INFECTION - ADULT  Goal: Absence or prevention of progression during hospitalization  Description: INTERVENTIONS:  - Assess and monitor for signs and symptoms of infection  - Monitor lab/diagnostic results  - Monitor all insertion sites, i.e. indwelling lines, tubes, and drains  - Monitor endotracheal if appropriate and nasal secretions for changes in amount and color  - Shelby appropriate cooling/warming therapies per order  - Administer medications as ordered  - Instruct and encourage patient and family to use good hand hygiene technique  - Identify and instruct in appropriate isolation precautions for identified infection/condition  Outcome: Progressing  Goal: Absence of fever/infection during neutropenic period  Description: INTERVENTIONS:  - Monitor WBC    Outcome: Progressing  Goal: Infection Control Precautions  Outcome: Progressing     Problem: SAFETY ADULT  Goal: Patient will remain free of falls  Description: INTERVENTIONS:  - Educate patient/family on patient safety including physical limitations  - Instruct patient to call for assistance with activity   - Consult OT/PT to assist with strengthening/mobility   - Keep Call bell within reach  - Keep bed low and locked with side rails adjusted as appropriate  - Keep care items and personal belongings within reach  - Initiate and maintain comfort rounds  - Make Fall  Risk Sign visible to staff  - Offer Toileting every 2 Hours, in advance of need  - Initiate/Maintain bed alarm  - Obtain necessary fall risk management equipment: non slip socks   - Apply yellow socks and bracelet for high fall risk patients  - Consider moving patient to room near nurses station  Outcome: Progressing  Goal: Maintain or return to baseline ADL function  Description: INTERVENTIONS:  -  Assess patient's ability to carry out ADLs; assess patient's baseline for ADL function and identify physical deficits which impact ability to perform ADLs (bathing, care of mouth/teeth, toileting, grooming, dressing, etc.)  - Assess/evaluate cause of self-care deficits   - Assess range of motion  - Assess patient's mobility; develop plan if impaired  - Assess patient's need for assistive devices and provide as appropriate  - Encourage maximum independence but intervene and supervise when necessary  - Involve family in performance of ADLs  - Assess for home care needs following discharge   - Consider OT consult to assist with ADL evaluation and planning for discharge  - Provide patient education as appropriate  Outcome: Progressing  Goal: Maintains/Returns to pre admission functional level  Description: INTERVENTIONS:  - Perform AM-PAC 6 Click Basic Mobility/ Daily Activity assessment daily.  - Set and communicate daily mobility goal to care team and patient/family/caregiver.   - Collaborate with rehabilitation services on mobility goals if consulted  - Perform Range of Motion 3 times a day.  - Reposition patient every 2 hours.  - Dangle patient 3 times a day  - Stand patient 3 times a day  - Ambulate patient 3 times a day  - Out of bed to chair 3 times a day   - Out of bed for meals 3 times a day  - Out of bed for toileting  - Record patient progress and toleration of activity level   Outcome: Progressing     Problem: DISCHARGE PLANNING  Goal: Discharge to home or other facility with appropriate resources  Description:  INTERVENTIONS:  - Identify barriers to discharge w/patient and caregiver  - Arrange for needed discharge resources and transportation as appropriate  - Identify discharge learning needs (meds, wound care, etc.)  - Arrange for interpretive services to assist at discharge as needed  - Refer to Case Management Department for coordinating discharge planning if the patient needs post-hospital services based on physician/advanced practitioner order or complex needs related to functional status, cognitive ability, or social support system  Outcome: Progressing     Problem: Knowledge Deficit  Goal: Patient/family/caregiver demonstrates understanding of disease process, treatment plan, medications, and discharge instructions  Description: Complete learning assessment and assess knowledge base.  Interventions:  - Provide teaching at level of understanding  - Provide teaching via preferred learning methods  Outcome: Progressing     Problem: Prexisting or High Potential for Compromised Skin Integrity  Goal: Skin integrity is maintained or improved  Description: INTERVENTIONS:  - Identify patients at risk for skin breakdown  - Assess and monitor skin integrity  - Assess and monitor nutrition and hydration status  - Monitor labs   - Assess for incontinence   - Turn and reposition patient  - Assist with mobility/ambulation  - Relieve pressure over bony prominences  - Avoid friction and shearing  - Provide appropriate hygiene as needed including keeping skin clean and dry  - Evaluate need for skin moisturizer/barrier cream  - Collaborate with interdisciplinary team   - Patient/family teaching  - Consider wound care consult   Outcome: Progressing     Problem: GASTROINTESTINAL - ADULT  Goal: Minimal or absence of nausea and/or vomiting  Description: INTERVENTIONS:  - Administer IV fluids if ordered to ensure adequate hydration  - Maintain NPO status until nausea and vomiting are resolved  - Nasogastric tube if ordered  - Administer  ordered antiemetic medications as needed  - Provide nonpharmacologic comfort measures as appropriate  - Advance diet as tolerated, if ordered  - Consider nutrition services referral to assist patient with adequate nutrition and appropriate food choices  Outcome: Progressing  Goal: Maintains or returns to baseline bowel function  Description: INTERVENTIONS:  - Assess bowel function  - Encourage oral fluids to ensure adequate hydration  - Administer IV fluids if ordered to ensure adequate hydration  - Administer ordered medications as needed  - Encourage mobilization and activity  - Consider nutritional services referral to assist patient with adequate nutrition and appropriate food choices  Outcome: Progressing  Goal: Maintains adequate nutritional intake  Description: INTERVENTIONS:  - Monitor percentage of each meal consumed  - Identify factors contributing to decreased intake, treat as appropriate  - Assist with meals as needed  - Monitor I&O, weight, and lab values if indicated  - Obtain nutrition services referral as needed  Outcome: Progressing  Goal: Oral mucous membranes remain intact  Description: INTERVENTIONS  - Assess oral mucosa and hygiene practices  - Implement preventative oral hygiene regimen  - Implement oral medicated treatments as ordered  - Initiate Nutrition services referral as needed  Outcome: Progressing     Problem: RESPIRATORY - ADULT  Goal: Achieves optimal ventilation and oxygenation  Description: INTERVENTIONS:  - Assess for changes in respiratory status  - Assess for changes in mentation and behavior  - Position to facilitate oxygenation and minimize respiratory effort  - Oxygen administered by appropriate delivery if ordered  - Initiate smoking cessation education as indicated  - Encourage broncho-pulmonary hygiene including cough, deep breathe, Incentive Spirometry  - Assess the need for suctioning and aspirate as needed  - Assess and instruct to report SOB or any respiratory  difficulty  - Respiratory Therapy support as indicated  Outcome: Progressing     Problem: METABOLIC, FLUID AND ELECTROLYTES - ADULT  Goal: Electrolytes maintained within normal limits  Description: INTERVENTIONS:  - Monitor labs and assess patient for signs and symptoms of electrolyte imbalances  - Administer electrolyte replacement as ordered  - Monitor response to electrolyte replacements, including repeat lab results as appropriate  - Instruct patient on fluid and nutrition as appropriate  Outcome: Progressing  Goal: Fluid balance maintained  Description: INTERVENTIONS:  - Monitor labs   - Monitor I/O and WT  - Instruct patient on fluid and nutrition as appropriate  - Assess for signs & symptoms of volume excess or deficit  Outcome: Progressing     Problem: Nutrition/Hydration-ADULT  Goal: Nutrient/Hydration intake appropriate for improving, restoring or maintaining nutritional needs  Description: Monitor and assess patient's nutrition/hydration status for malnutrition. Collaborate with interdisciplinary team and initiate plan and interventions as ordered.  Monitor patient's weight and dietary intake as ordered or per policy. Utilize nutrition screening tool and intervene as necessary. Determine patient's food preferences and provide high-protein, high-caloric foods as appropriate.     INTERVENTIONS:  - Monitor oral intake, urinary output, labs, and treatment plans  - Assess nutrition and hydration status and recommend course of action  - Evaluate amount of meals eaten  - Assist patient with eating if necessary   - Allow adequate time for meals  - Recommend/ encourage appropriate diets, oral nutritional supplements, and vitamin/mineral supplements  - Order, calculate, and assess calorie counts as needed  - Recommend, monitor, and adjust tube feedings and TPN/PPN based on assessed needs  - Assess need for intravenous fluids  - Provide specific nutrition/hydration education as appropriate  - Include  patient/family/caregiver in decisions related to nutrition  Outcome: Progressing

## 2025-01-31 NOTE — ASSESSMENT & PLAN NOTE
Prehospital takesToprol-XL 25 mg p.o. daily and Lasix 20 mg p.o. daily on Monday Wednesday Friday  Currently NPO. Metoprolol switched to IV.   Received furosemide 40 mg IV x 1 1/19/2025  Blood pressure controlled  Monitor BP

## 2025-01-31 NOTE — ASSESSMENT & PLAN NOTE
Passed NG clamping trial 1/20/2025, later evaluated by SLP but did not pass   Keep NPO   Trickle feeding through NG initiated 1/22/2025 but discontinued 1/23/2025 due to high residuals and rhonchi requiring frequent suctioning  Speech therapy continues to follow, cleared for ice chips only with nursing supervision  General surgery input regarding feeding tube appreciated.  Patient not currently a candidate due to poor prognosis, risk of complications, and limited benefit

## 2025-01-31 NOTE — ASSESSMENT & PLAN NOTE
Patient was initiated on trickle tube feed on 1/22/2025, but this was discontinued due to suspected aspiration  High risk for refeeding syndrome  Continue to monitor electrolytes and replete accordingly  Continue daily BMP, magnesium, phosphorus for now

## 2025-01-31 NOTE — ASSESSMENT & PLAN NOTE
Lab Results   Component Value Date    EGFR 83 01/31/2025    EGFR 81 01/30/2025    EGFR 76 01/29/2025    CREATININE 0.59 (L) 01/31/2025    CREATININE 0.62 01/30/2025    CREATININE 0.71 01/29/2025     Creatinine baseline of 0.9-1.1 mg/dL   Continue to monitor renal function  Avoid nephrotoxins and hypotension  Trend creatinine

## 2025-01-31 NOTE — ASSESSMENT & PLAN NOTE
In setting of poor oral intake,now improved  D5W @ 75 mL/h initiated, continue for now  Monitor fluid status closely  Continue ongoing goals of care discussions  Daily BMP and trend sodium

## 2025-01-31 NOTE — UTILIZATION REVIEW
Continued Stay Review    Date: 1/31/25                          Current Patient Class: Inpatient  Current Level of Care: Med Surg     HPI:89 y.o. male initially admitted on 1/16/25 due to Upper GI Bleed.      Assessment/Plan: Hospitalist: /48.Temp 98.1 F, HR 69, RR18, Spo2 93% on 2 liters oxygen NC. KPhOS replacement today IV x 1, for phos 1.3, K 3.4, MG 1.7.  More lethargic today. Frequent verbal and tactile stimulation. + dry mucous membranes, + rhonchi Rt lung improving. Upper extremity edema Left > right. ST re eval today, cough with ice chips, Recommend NPO, ice chips with supervision for pleasure. Ongoing GOC discussions with family. Not a candidate for feeding tube due to poor prognosis, risk of complications, and limited benefit.  Plan: Continue IVF's D5 at 75 ml/hr, IV Protonix Q 12 , IV metoprolol 2.5 mg Q 6for BP control, ongoing discussions for GOC with family. Repeat CBC,Bmp,MG,Joanie, 2/1, heparin Sq for DVT ppx's.     Certification Statement: The patient will continue to require additional inpatient hospital stay due to congestion of upper airway, poor oral intake, goals of care conversations.  Discharge Plan: Anticipate discharge in 24-48 hrs to prior assisted or independent living facility.  Pending clinical progress, goals of care determinations    Medications:   Scheduled Medications:  [Held by provider] furosemide, 20 mg, Oral, Once per day on Monday Wednesday Friday  heparin (porcine), 5,000 Units, Subcutaneous, Q8H ANKIT  iohexol, 50 mL, Oral, 90 min pre-procedure  magnesium sulfate, 2 g, Intravenous, Once  metoprolol, 2.5 mg, Intravenous, Q6H  nystatin, , Topical, BID  pantoprazole, 40 mg, Intravenous, Q12H  scopolamine, 1 patch, Transdermal, Q72H      Continuous IV Infusions:  dextrose, 75 mL/hr, Intravenous, Continuous      PRN Meds:  acetaminophen, 1,000 mg, Intravenous, Q8H PRN  acetaminophen, 650 mg, Oral, Q4H PRN  bisacodyl, 10 mg, Rectal, Daily PRN  glycopyrrolate, 0.1 mg,  Intravenous, Q4H PRN  HYDROmorphone, 0.2 mg, Intravenous, Q4H PRN  nitroglycerin, 0.4 mg, Sublingual, Q5 Min PRN  ondansetron, 4 mg, Intravenous, Q6H PRN  phenol, 1 spray, Mouth/Throat, Q2H PRN      Discharge Plan: TBD     Vital Signs (last 3 days)       Date/Time Temp Pulse Resp BP MAP (mmHg) SpO2 Calculated FIO2 (%) - Nasal Cannula Nasal Cannula O2 Flow Rate (L/min) O2 Device Patient Position - Orthostatic VS Bria Coma Scale Score Pain    01/31/25 08:02:03 98.1 °F (36.7 °C) 69 18 124/48 73 93 % -- -- Nasal cannula Lying -- --    01/31/25 0354 -- -- -- -- -- -- -- -- -- -- 14 --    01/31/25 0300 -- -- -- -- -- -- -- -- -- -- -- No Pain    01/31/25 00:35:17 -- 85 -- 157/68 98 94 % -- -- -- -- -- --    01/30/25 22:12:39 -- 88 -- 134/64 87 97 % -- -- -- -- -- --    01/30/25 15:20:30 -- 79 16 155/59 91 99 % -- -- -- -- -- --    01/30/25 0930 -- -- -- -- -- -- -- -- -- -- 14 No Pain    01/30/25 07:21:26 99.3 °F (37.4 °C) 75 16 135/47 76 93 % -- -- -- -- -- --    01/30/25 02:28:28 -- 81 -- 155/55 88 97 % -- -- -- -- -- --    01/30/25 0034 -- -- -- -- -- -- -- -- -- -- 14 No Pain    01/29/25 22:38:17 99.1 °F (37.3 °C) 73 20 121/51 74 98 % -- -- -- -- -- --    01/29/25 14:28:19 97.9 °F (36.6 °C) 76 18 137/50 79 96 % 28 2 L/min Nasal cannula Lying -- --    01/29/25 0921 -- -- -- -- -- -- -- -- -- -- -- 7    01/29/25 07:50:08 99.1 °F (37.3 °C) 67 18 132/50 77 94 % -- -- -- -- -- --    01/29/25 0717 -- -- -- -- -- 96 % 28 2 L/min Nasal cannula -- -- No Pain    01/29/25 02:08:47 -- 78 -- 124/61 82 92 % 28 2 L/min Nasal cannula -- -- No Pain    01/28/25 22:48:48 98.1 °F (36.7 °C) 75 19 114/47 69 95 % -- -- -- -- -- --    01/28/25 14:35:58 100 °F (37.8 °C) 70 22 149/55 86 95 % -- -- -- -- -- --    01/28/25 0823 -- -- -- -- -- 90 % 28 2 L/min Nasal cannula -- -- No Pain    01/28/25 0820 -- -- -- -- -- 89 % -- -- None (Room air) -- -- --    01/28/25 02:50:09 -- 73 -- 118/43 68 89 % -- -- -- -- -- --          Weight (last 2  days)       None            Pertinent Labs/Diagnostic Results:   Radiology:  XR abdomen 1 view kub   Final Interpretation by Sb Haynes MD (01/23 1613)      Nonobstructive bowel gas pattern.               Workstation performed: [id number]         VAS upper limb venous duplex scan, complete, bilateral   Final Interpretation by Abigail Alvarado MD (01/21 2109)      XR chest portable   Final Interpretation by Leonor Ingram MD (01/21 0915)      Slight improvement in opacity in the medial left base which may be due to atelectasis and/or pneumonia.            Workstation performed: [id number]         XR abdomen 1 vw portable   Final Interpretation by Heaven Barton MD (01/20 1417)   Nonobstructive bowel gas pattern. Residual oral contrast throughout the colon. No gastric distention. Satisfactory position of the endogastric tube.               Workstation performed: [id number]         XR chest portable   Final Interpretation by Beatris Benton MD (01/20 0904)      Retrocardiac opacity, which may be due to pneumonia and/or atelectasis.            Workstation performed: [id number]         CT abdomen pelvis w contrast   Final Interpretation by Geovanny Taylro MD (01/17 2239)      1.  New small bilateral pleural effusions with extensive bibasilar atelectasis.   2.  Wall thickening of the lower esophagus and proximal stomach is again noted without interval change. This may reflect mild focal esophagitis/gastritis. New NG tube with tip terminating in the gastric body region.   3.  No evidence of bowel obstruction, mesenteric inflammation, appendicitis, obstructive uropathy, free air, or free fluid. Descending and sigmoid diverticulosis again noted without evidence for acute diverticulitis. Subtle wall thickening of the mid to    distal sigmoid colon, rectum, and anal verge region could reflect mild focal colitis/proctitis.   4.  Multiple punctate pancreatic calcifications again seen likely sequelae of  chronic pancreatitis.      Workstation performed: [id number]         XR chest portable   Final Interpretation by Jose Johns MD (01/17 1327)      Scattered streaky opacities favored to represent atelectasis but pneumonia not excluded.            Workstation performed: [id number]         XR abdomen 1 vw portable   Final Interpretation by Jose Johns MD (01/17 1328)      New marked gaseous distention of the stomach.               Workstation performed: [id number]         XR abdomen 1 view kub   Final Interpretation by Diony Boateng MD (01/16 5272)      1.  NG tube projects over the gastric body with decompression of the stomach compared to the x-ray from earlier the same day.      2.  Mildly dilated mid abdominal small bowel loops with gas throughout the colon though the abdomen was only partially imaged.               Workstation performed: [id number]         XR abdomen 1 view kub   Final Interpretation by Diony Boateng MD (01/16 8864)      Worsening gastric distention.      The study was marked in EPIC for immediate notification.               Workstation performed: [id number]         XR chest portable   Final Interpretation by Sina Vitale MD (01/15 4364)      No endogastric tube visible within the field-of-view.      There is some gaseous distention of the stomach.      Minimal atelectasis left lung base.      The study was marked in EPIC for immediate notification.            Workstation performed: [id number]         CT abdomen pelvis with contrast   Final Interpretation by Geovanny Taylor MD (01/15 6946)      Trace left pleural effusion with bibasilar atelectasis.  Focal wall thickening of the lower esophagus, gastroesophageal junction, and proximal stomach noted which may reflect esophagitis/gastritis. No evidence for bowel obstruction, mesenteric    inflammation, appendicitis, obstructive uropathy, free air, or free fluid. Descending and sigmoid colon diverticulosis without evidence  for acute diverticulitis.         Workstation performed: [id number]                   Results from last 7 days   Lab Units 01/31/25  0503 01/30/25  0443 01/29/25  0435 01/28/25  0540 01/27/25  0547   WBC Thousand/uL 11.05* 9.14 8.28 8.26 9.51   HEMOGLOBIN g/dL 10.3* 10.3* 10.3* 9.5* 9.5*   HEMATOCRIT % 31.9* 32.1* 32.0* 30.2* 30.0*   PLATELETS Thousands/uL 344 289 274 270 258   TOTAL NEUT ABS Thousands/µL  --  7.72* 6.91 6.92  --          Results from last 7 days   Lab Units 01/31/25  0503 01/30/25  0443 01/29/25  0435 01/28/25  0540 01/27/25  0547 01/26/25 0447 01/25/25  0521   SODIUM mmol/L 132* 134* 135 137 140 146 149*   POTASSIUM mmol/L 3.4* 3.4* 3.6 3.2* 3.2* 3.2* 3.2*   CHLORIDE mmol/L 99 101 103 105 108 112* 115*   CO2 mmol/L 26 28 27 27 28 28 29   ANION GAP mmol/L 7 5 5 5 4 6 5   BUN mg/dL 4* 6 8 9 12 15 18   CREATININE mg/dL 0.59* 0.62 0.71 0.69 0.73 0.85 0.81   EGFR ml/min/1.73sq m 83 81 76 77 76 71 72   CALCIUM mg/dL 7.9* 7.8* 7.6* 7.5* 7.6* 8.0* 8.2*   MAGNESIUM mg/dL 1.7*  --  1.8*  --  1.9 2.0 2.2   PHOSPHORUS mg/dL 1.3*  --   --   --  1.7* 2.2* 1.7*             Results from last 7 days   Lab Units 01/31/25  0503 01/30/25  0443 01/29/25  0435 01/28/25  0540 01/27/25  0547 01/26/25 0447 01/25/25  0521   GLUCOSE RANDOM mg/dL 130 114 105 114 113 114 115               Results from last 7 days   Lab Units 01/26/25 0447 01/25/25  0521   PROCALCITONIN ng/ml 0.24 0.24                 Network Utilization Review Department  ATTENTION: Please call with any questions or concerns to [phone] and carefully listen to the prompts so that you are directed to the right person. All voicemails are confidential.   For Discharge needs, contact Care Management DC Support Team at [phone] opt. 2  Send all requests for admission clinical reviews, approved or denied determinations and any other requests to dedicated fax number below belonging to the campus where the patient is receiving treatment. List of dedicated  fax numbers for the Facilities:  FACILITY NAME UR FAX NUMBER   ADMISSION DENIALS (Administrative/Medical Necessity) [phone]   DISCHARGE SUPPORT TEAM (NETWORK) [phone]   PARENT CHILD HEALTH (Maternity/NICU/Pediatrics) [phone]   [facility] [phone]   [facility] [phone]   [facility] [phone]   [facility] [phone]   [facility] [phone]   [facility] [phone]   [facility] [phone]   [facility] [phone]   [facility] [phone]   [facility] [phone]   General acute hospital [phone]   [facility] [phone]

## 2025-01-31 NOTE — WOUND OSTOMY CARE
Progress Note - Wound   Prem Mar Sr. 89 y.o. male MRN: [id number]  Unit/Bed#: -01 Encounter: [id number]        Assessment:   Wound care performed follow up assessment using photo in media and communication with primary nurse. Patient has evolving DTI, POA, continues to evolve, Triad paste still appropriate treatment at this time. Wound care will continue to follow patient.         Skin care plans:  1-Cleanse sacro-buttocks with soap and water. Apply Triad Paste to Sacro-buttocks Wound Bed TID and PRN  2-Cleanse B/L Heels with soap and water. Pat dry. Apply Silicone Border Foam (Mepilex) to areas. Ronen with P for Prevention and change every 3 days or PRN soilage/displacement. Peel back and inspect Q-shift.  3-Elevate heels to offload pressure  4-Ehob cushion when out of bed.  5-Turn/repoisiton q2h or when medically stable for pressure re-distribution on skin.  6-Moisturize skin daily with skin nourishing cream   7-Place Patient on ATR Turning and repositioning system  Cleanse B/L Heels with soap and water. Pat dry. Apply Silicone Border Foam (Mepilex) to areas. Ronen with P for Prevention and change every 3 days or PRN soilage/displacement. Peel back and inspect Q-shift.                      Mine Tariq BSN, RN, CWOCN

## 2025-01-31 NOTE — SPEECH THERAPY NOTE
Speech Language/Pathology    Speech/Language Pathology Progress Note    Patient Name: Prem Mar Sr.  Today's Date: 1/31/2025     Problem List  Principal Problem:    Upper GI bleed  Active Problems:    Essential hypertension    Idiopathic chronic gout of multiple sites without tophus    Mixed hyperlipidemia    Chronic kidney disease, stage 3a (HCC)    Atrial flutter (HCC)    Constipation    Goals of care, counseling/discussion    Acute distention of stomach    COVID-19 virus infection    Congestion of upper airway    Opacity noted on imaging study    Palliative care by specialist    Electrolyte abnormality    Hypernatremia    Dysphagia       Past Medical History  Past Medical History:   Diagnosis Date    Arthritis     Cataract     Coronary artery disease     Coronary atherosclerosis of native coronary artery     Diverticulitis of colon     Essential hypertension, benign     Hyperlipidemia     Hypertension     Peptic ulcer     Renal disorder         Past Surgical History  Past Surgical History:   Procedure Laterality Date    CATARACT EXTRACTION Right     Left eye 5/2021    CORONARY STENT PLACEMENT      SKIN BIOPSY      TONSILLECTOMY           Subjective:  Pt positioned upright and alerted to multimodal stim. Son at bedside.   Objective:  Pt was seen for f/u dysphagia therapy for potential. Pt increased lethargy compared to days prior. Required frequent verbal and tactile stimulation to maintain alertness. Mouth appeared dry, oral care completed with suction. Pt kept extended head back towards HOB placed additional pillow for support to keep head at midline. Fed by ST trials of ice chips x5 and x3 tsp of thin liquids. Pt intermittent delayed wet cough with trials of ice chips. Bolus control and formation were WNL. Noted min perseverative manipulation in oral cavity. Transfer and Swallow initiaton appeared delayed. Multiple swallow palpated per trial 2-5. Following third tsp HARSH successive cough in which pt  audibly wet provided suction and cued cough ST removed MOD amount of yellow thick secretions. Discontinued trials. Son questioned pt progress reviewed pt secretions removed and multiple swallows with trials. Son questioned if pt could have lemon ice ST reviewed would not trial lemon ice or items with flavoring as they are not benign if aspirated. Reviewed session with CRNP.   Assessment:  More lethargic today. Frequent verbal and tactile stimulation. Multiple swallows palpated per trial. Cough with ice chips and thin tsp. HARSH cough with MOD amount of thick yellow secretions removed with deep suction.   Plan/Recommendations:  Recommend continue NPO ice chips for pleasure with nursing supervision.   Thorough oral care must be completed prior. Discontinue trials if If increased difficulties with oral control, no initiation of swallow , excessive coughing, increased secretions, or excessive gurgly voice with no success cued cough throat clear  Ongoing GOC discussions  ST will f/u as indicated.

## 2025-01-31 NOTE — ASSESSMENT & PLAN NOTE
Upper airway congestion noted, patient has strong cough and able to clear  CT imaging revealed small bilateral pleural effusion and extensive bibasilar atelectasis  Maintain head of bed elevated  Aspiration precautions  Appreciate input of speech therapy who are following  Continue Robinul 0.1 mg IV every 4 hours as needed  Continue scopolamine   Continue incentive spirometry  Suction as needed

## 2025-01-31 NOTE — PROGRESS NOTES
Progress Note - Hospitalist   Name: Prem Mar Sr. 89 y.o. male I MRN: [id number]  Unit/Bed#: [facility] I Date of Admission: 1/15/2025   Date of Service: 1/31/2025 I Hospital Day: 15    Assessment & Plan  Upper GI bleed  Presented for suspected GI bleed  CT abdomen and pelvis 1/15/2025 with focal wall thickening of the lower esophagus, gastroesophageal junction, and proximal stomach which may reflect esophagitis/gastritis.  No evidence for bowel obstruction, mesenteric inflammation, appendicitis, obstructive uropathy, free air or free fluid  Chest x-ray 1/15/2025 with gaseous distention of the stomach  KUB 1/16/2025 with worsening distention  NGT was placed, now discontinued  CT abdomen pelvis with contrast 1/17/2024: New small bilateral pleural effusions with extensive bibasilar atelectasis. Wall thickening of the lower esophagus and proximal stomach is again noted without interval change. This may reflect mild focal esophagitis/gastritis. New NG tube with tip terminating in the gastric body region. No evidence of bowel obstruction, mesenteric inflammation, appendicitis, obstructive uropathy, free air, or free fluid. Descending and sigmoid diverticulosis again noted without evidence for acute diverticulitis. Subtle wall thickening of the mid to distal sigmoid colon, rectum, and anal verge region could reflect mild focal colitis/proctitis. Multiple punctate pancreatic calcifications again seen likely sequelae of chronic pancreatitis  Continue PPI  Hemoglobin has remained stable, no signs of bleeding  See also, plan for dysphagia  Opacity noted on imaging study  Chest x-ray: Retrocardiac opacity, which may be due to pneumonia and/or atelectasis  Procalcitonin peaked and downtrended  Repeat CXR with slight improvement in left base patchy atelectasis versus pneumonia  Status post treatment with IV cefepime/ceftriaxone and vancomycin, since discontinued  Monitor labs and vital signs, conduct serial physical  assessments    COVID-19 virus infection  Patient with fever and COVID-positive roommate at his skilled facility  Tested positive for COVID 12/17/2025  Continues to require 2 L nasal cannula oxygen  S/p remdesivir x 3 days  Continue supportive care  Monitor labs and vital signs, conduct serial physical assessments  Congestion of upper airway  Upper airway congestion noted, patient has strong cough and able to clear  CT imaging revealed small bilateral pleural effusion and extensive bibasilar atelectasis  Maintain head of bed elevated  Aspiration precautions  Appreciate input of speech therapy who are following  Continue Robinul 0.1 mg IV every 4 hours as needed  Continue scopolamine   Continue incentive spirometry  Suction as needed  Goals of care, counseling/discussion  Patient with significant comorbid medical conditions would be high risk for treatment options requiring anesthesia.  EGD testing might have risks that outweigh the benefits.  Patient verbalized understanding.  Opened goals of care conversations with son.  Continued on phone on Saturday. Had further conversations with patient's son, daughter, and grandson when they arrived   Per discussion with patient's 1/28/2025 regarding non-candidacy for feeding tube and no good options for nutrition currently   Ultimately they would like to keep him comfortable but for now continue with full treatment   Palliative input appreciated   Idiopathic chronic gout of multiple sites without tophus  Pre-hospital allopurinol discontinued  Essential hypertension  Prehospital takesToprol-XL 25 mg p.o. daily and Lasix 20 mg p.o. daily on Monday Wednesday Friday  Currently NPO. Metoprolol switched to IV.   Received furosemide 40 mg IV x 1 1/19/2025  Blood pressure controlled  Monitor BP   Mixed hyperlipidemia  Pre-hospital Tricor discontinued  Chronic kidney disease, stage 3a (HCC)  Lab Results   Component Value Date    EGFR 83 01/31/2025    EGFR 81 01/30/2025    EGFR 76  01/29/2025    CREATININE 0.59 (L) 01/31/2025    CREATININE 0.62 01/30/2025    CREATININE 0.71 01/29/2025     Creatinine baseline of 0.9-1.1 mg/dL   Continue to monitor renal function  Avoid nephrotoxins and hypotension  Trend creatinine  Atrial flutter (HCC)  Currently rate controlled with metoprolol   Not currently on anticoagulation as outpatient  Constipation  GI input appreciated  Continue bowel regimen PRN  Palliative care by specialist  Palliative care input appreciated  Electrolyte abnormality  Patient was initiated on trickle tube feed on 1/22/2025, but this was discontinued due to suspected aspiration  High risk for refeeding syndrome  Continue to monitor electrolytes and replete accordingly  Continue daily BMP, magnesium, phosphorus for now  Hypernatremia  In setting of poor oral intake,now improved  D5W @ 75 mL/h initiated, continue for now  Monitor fluid status closely  Continue ongoing goals of care discussions  Daily BMP and trend sodium  Acute distention of stomach  Appreciate input of GI who are following  N.p.o.  Appreciate input of nutrition  1/22 initiated trickle feed tube feed  through NG tube-reported patient having residuals 1/23, rhonchi bilateral lungs.  Placed tube feed on hold 1/23  Speech therapy continues to work with patient-appropriate for ice chips only with nursing supervision  See goals of care discussion  Dysphagia  Passed NG clamping trial 1/20/2025, later evaluated by SLP but did not pass   Keep NPO   Trickle feeding through NG initiated 1/22/2025 but discontinued 1/23/2025 due to high residuals and rhonchi requiring frequent suctioning  Speech therapy continues to follow, cleared for ice chips only with nursing supervision  General surgery input regarding feeding tube appreciated.  Patient not currently a candidate due to poor prognosis, risk of complications, and limited benefit    VTE Pharmacologic Prophylaxis: VTE Score: 5 High Risk (Score >/= 5) - Pharmacological DVT  Prophylaxis Ordered: heparin. Sequential Compression Devices Ordered.    Mobility:   Basic Mobility Inpatient Raw Score: 6  JH-HLM Goal: 2: Bed activities/Dependent transfer  JH-HLM Achieved: 2: Bed activities/Dependent transfer  JH-HLM Goal achieved. Continue to encourage appropriate mobility.    Patient Centered Rounds: I performed bedside rounds with nursing staff today.   Discussions with Specialists or Other Care Team Provider: Case management, speech therapy    Education and Discussions with Family / Patient: Updated  (son) at bedside.    Current Length of Stay: 15 day(s)  Current Patient Status: Inpatient   Certification Statement: The patient will continue to require additional inpatient hospital stay due to congestion of upper airway, poor oral intake, goals of care conversations.  Discharge Plan: Anticipate discharge in 24-48 hrs to prior assisted or independent living facility.  Pending clinical progress, goals of care determinations    Code Status: Level 3 - DNAR and DNI    Subjective   Unable to obtain review of systems from patient.  No significant overnight events.    Objective :  Temp:  [98.1 °F (36.7 °C)] 98.1 °F (36.7 °C)  HR:  [69-88] 69  BP: (124-157)/(48-68) 124/48  Resp:  [16-18] 18  SpO2:  [93 %-99 %] 93 %  O2 Device: Nasal cannula    Body mass index is 26.01 kg/m².     Input and Output Summary (last 24 hours):     Intake/Output Summary (Last 24 hours) at 1/31/2025 0853  Last data filed at 1/31/2025 0638  Gross per 24 hour   Intake 2001.25 ml   Output --   Net 2001.25 ml       Physical Exam  Vitals and nursing note reviewed.   HENT:      Head: Normocephalic and atraumatic.      Nose: Nose normal.      Mouth/Throat:      Mouth: Mucous membranes are dry.      Pharynx: Oropharynx is clear.   Eyes:      Pupils: Pupils are equal, round, and reactive to light.   Cardiovascular:      Rate and Rhythm: Normal rate and regular rhythm.      Pulses: Normal pulses.      Heart sounds: Normal  heart sounds.   Pulmonary:      Effort: Pulmonary effort is normal. No respiratory distress.      Breath sounds: Rhonchi present.      Comments: Rhonchi right lung, improving  Abdominal:      General: Bowel sounds are normal.      Palpations: Abdomen is soft.      Tenderness: There is no abdominal tenderness.   Musculoskeletal:      Cervical back: Neck supple.      Right lower leg: No edema.      Left lower leg: No edema.      Comments: Upper extremity edema left greater than right   Skin:     General: Skin is warm and dry.      Capillary Refill: Capillary refill takes less than 2 seconds.   Neurological:      General: No focal deficit present.      Mental Status: He is alert. Mental status is at baseline.           Lines/Drains:              Lab Results: I have reviewed the following results:   Results from last 7 days   Lab Units 01/31/25  0503 01/30/25  0443   WBC Thousand/uL 11.05* 9.14   HEMOGLOBIN g/dL 10.3* 10.3*   HEMATOCRIT % 31.9* 32.1*   PLATELETS Thousands/uL 344 289   SEGS PCT %  --  84*   LYMPHO PCT %  --  9*   MONO PCT %  --  5   EOS PCT %  --  1     Results from last 7 days   Lab Units 01/31/25  0503   SODIUM mmol/L 132*   POTASSIUM mmol/L 3.4*   CHLORIDE mmol/L 99   CO2 mmol/L 26   BUN mg/dL 4*   CREATININE mg/dL 0.59*   ANION GAP mmol/L 7   CALCIUM mg/dL 7.9*   GLUCOSE RANDOM mg/dL 130                 Results from last 7 days   Lab Units 01/26/25  0447 01/25/25  0521   PROCALCITONIN ng/ml 0.24 0.24       Recent Cultures (last 7 days):               Last 24 Hours Medication List:     Current Facility-Administered Medications:     acetaminophen (Ofirmev) injection 1,000 mg, Q8H PRN, Last Rate: 1,000 mg (01/28/25 1440)    acetaminophen (TYLENOL) tablet 650 mg, Q4H PRN    bisacodyl (DULCOLAX) rectal suppository 10 mg, Daily PRN    dextrose 5 % infusion, Continuous, Last Rate: 75 mL/hr (01/31/25 0638)    [Held by provider] furosemide (LASIX) tablet 20 mg, Once per day on Monday Wednesday Friday     glycopyrrolate (ROBINUL) injection 0.1 mg, Q4H PRN    heparin (porcine) subcutaneous injection 5,000 Units, Q8H ANKIT    HYDROmorphone HCl (DILAUDID) injection 0.2 mg, Q4H PRN    iohexol (OMNIPAQUE) 240 MG/ML solution 50 mL, 90 min pre-procedure    magnesium sulfate 2 g/50 mL IVPB (premix) 2 g, Once    metoprolol (LOPRESSOR) injection 2.5 mg, Q6H    nitroglycerin (NITROSTAT) SL tablet 0.4 mg, Q5 Min PRN    nystatin (MYCOSTATIN) cream, BID    ondansetron (ZOFRAN) injection 4 mg, Q6H PRN    pantoprazole (PROTONIX) injection 40 mg, Q12H    phenol (CHLORASEPTIC) 1.4 % mucosal liquid 1 spray, Q2H PRN    scopolamine (TRANSDERM-SCOP) 1 mg/3 days TD 72 hr patch 1 patch, Q72H    sodium phosphate 9 mmol in sodium chloride 0.9 % 100 mL Infusion, Once    Administrative Statements   Today, Patient Was Seen By: ANAM Rose      **Please Note: This note may have been constructed using a voice recognition system.**

## 2025-01-31 NOTE — ASSESSMENT & PLAN NOTE
Chest x-ray: Retrocardiac opacity, which may be due to pneumonia and/or atelectasis  Procalcitonin peaked and downtrended  Repeat CXR with slight improvement in left base patchy atelectasis versus pneumonia  Status post treatment with IV cefepime/ceftriaxone and vancomycin, since discontinued  Monitor labs and vital signs, conduct serial physical assessments

## 2025-02-01 LAB
ANION GAP SERPL CALCULATED.3IONS-SCNC: 7 MMOL/L (ref 4–13)
BASOPHILS # BLD AUTO: 0.02 THOUSANDS/ΜL (ref 0–0.1)
BASOPHILS NFR BLD AUTO: 0 % (ref 0–1)
BUN SERPL-MCNC: 5 MG/DL (ref 5–25)
CALCIUM SERPL-MCNC: 7.8 MG/DL (ref 8.4–10.2)
CHLORIDE SERPL-SCNC: 101 MMOL/L (ref 96–108)
CO2 SERPL-SCNC: 26 MMOL/L (ref 21–32)
CREAT SERPL-MCNC: 0.61 MG/DL (ref 0.6–1.3)
EOSINOPHIL # BLD AUTO: 0.13 THOUSAND/ΜL (ref 0–0.61)
EOSINOPHIL NFR BLD AUTO: 2 % (ref 0–6)
ERYTHROCYTE [DISTWIDTH] IN BLOOD BY AUTOMATED COUNT: 15 % (ref 11.6–15.1)
GFR SERPL CREATININE-BSD FRML MDRD: 81 ML/MIN/1.73SQ M
GLUCOSE SERPL-MCNC: 116 MG/DL (ref 65–140)
HCT VFR BLD AUTO: 30.9 % (ref 36.5–49.3)
HGB BLD-MCNC: 9.9 G/DL (ref 12–17)
IMM GRANULOCYTES # BLD AUTO: 0.13 THOUSAND/UL (ref 0–0.2)
IMM GRANULOCYTES NFR BLD AUTO: 2 % (ref 0–2)
LYMPHOCYTES # BLD AUTO: 0.75 THOUSANDS/ΜL (ref 0.6–4.47)
LYMPHOCYTES NFR BLD AUTO: 10 % (ref 14–44)
MAGNESIUM SERPL-MCNC: 2.2 MG/DL (ref 1.9–2.7)
MCH RBC QN AUTO: 30.9 PG (ref 26.8–34.3)
MCHC RBC AUTO-ENTMCNC: 32 G/DL (ref 31.4–37.4)
MCV RBC AUTO: 97 FL (ref 82–98)
MONOCYTES # BLD AUTO: 0.42 THOUSAND/ΜL (ref 0.17–1.22)
MONOCYTES NFR BLD AUTO: 6 % (ref 4–12)
NEUTROPHILS # BLD AUTO: 6.17 THOUSANDS/ΜL (ref 1.85–7.62)
NEUTS SEG NFR BLD AUTO: 80 % (ref 43–75)
NRBC BLD AUTO-RTO: 0 /100 WBCS
PHOSPHATE SERPL-MCNC: 2 MG/DL (ref 2.3–4.1)
PLATELET # BLD AUTO: 340 THOUSANDS/UL (ref 149–390)
PMV BLD AUTO: 9.9 FL (ref 8.9–12.7)
POTASSIUM SERPL-SCNC: 3.3 MMOL/L (ref 3.5–5.3)
RBC # BLD AUTO: 3.2 MILLION/UL (ref 3.88–5.62)
SODIUM SERPL-SCNC: 134 MMOL/L (ref 135–147)
WBC # BLD AUTO: 7.62 THOUSAND/UL (ref 4.31–10.16)

## 2025-02-01 PROCEDURE — 99232 SBSQ HOSP IP/OBS MODERATE 35: CPT | Performed by: NURSE PRACTITIONER

## 2025-02-01 PROCEDURE — 80048 BASIC METABOLIC PNL TOTAL CA: CPT | Performed by: NURSE PRACTITIONER

## 2025-02-01 PROCEDURE — 83735 ASSAY OF MAGNESIUM: CPT | Performed by: NURSE PRACTITIONER

## 2025-02-01 PROCEDURE — 85025 COMPLETE CBC W/AUTO DIFF WBC: CPT | Performed by: NURSE PRACTITIONER

## 2025-02-01 PROCEDURE — 84100 ASSAY OF PHOSPHORUS: CPT | Performed by: NURSE PRACTITIONER

## 2025-02-01 RX ADMIN — DEXTROSE 75 ML/HR: 5 SOLUTION INTRAVENOUS at 13:39

## 2025-02-01 RX ADMIN — NYSTATIN: 100000 CREAM TOPICAL at 18:33

## 2025-02-01 RX ADMIN — HEPARIN SODIUM 5000 UNITS: 5000 INJECTION INTRAVENOUS; SUBCUTANEOUS at 22:17

## 2025-02-01 RX ADMIN — METOROPROLOL TARTRATE 2.5 MG: 5 INJECTION, SOLUTION INTRAVENOUS at 11:03

## 2025-02-01 RX ADMIN — METOROPROLOL TARTRATE 2.5 MG: 5 INJECTION, SOLUTION INTRAVENOUS at 06:00

## 2025-02-01 RX ADMIN — PANTOPRAZOLE SODIUM 40 MG: 40 INJECTION, POWDER, FOR SOLUTION INTRAVENOUS at 06:00

## 2025-02-01 RX ADMIN — HEPARIN SODIUM 5000 UNITS: 5000 INJECTION INTRAVENOUS; SUBCUTANEOUS at 15:09

## 2025-02-01 RX ADMIN — NYSTATIN: 100000 CREAM TOPICAL at 11:03

## 2025-02-01 RX ADMIN — DEXTROSE 75 ML/HR: 5 SOLUTION INTRAVENOUS at 00:03

## 2025-02-01 RX ADMIN — HEPARIN SODIUM 5000 UNITS: 5000 INJECTION INTRAVENOUS; SUBCUTANEOUS at 06:00

## 2025-02-01 RX ADMIN — METOROPROLOL TARTRATE 2.5 MG: 5 INJECTION, SOLUTION INTRAVENOUS at 17:47

## 2025-02-01 RX ADMIN — PANTOPRAZOLE SODIUM 40 MG: 40 INJECTION, POWDER, FOR SOLUTION INTRAVENOUS at 17:45

## 2025-02-01 RX ADMIN — METOROPROLOL TARTRATE 2.5 MG: 5 INJECTION, SOLUTION INTRAVENOUS at 23:37

## 2025-02-01 NOTE — ASSESSMENT & PLAN NOTE
Patient with significant comorbid medical conditions would be high risk for treatment options requiring anesthesia.  EGD testing might have risks that outweigh the benefits.  Patient verbalized understanding.  Opened goals of care conversations with son.  Continued on phone on Saturday. Had further conversations with patient's son, daughter, and grandson when they arrived   Per discussion with patient's 1/28/2025 regarding non-candidacy for feeding tube and no good options for nutrition currently   Ultimately they would like to keep him comfortable but for now continue with full treatment   Palliative input appreciated   2/1/2024.  Had further discussion regarding goals of care.  Patient's son would like to see speech continuing to work with patient.  I explained to patient's son today that ongoing IV fluids are not sustainable.  I suggested transitioning to pleasure feeds, comfort care.  He would then be able to return to the [facility].  I let him know that the [facility] would not be able to accept patient back at this level of function unless level of care is deemed comfort care.  He acknowledged understanding and asked me to call his sister and grandson

## 2025-02-01 NOTE — PROGRESS NOTES
Progress Note - Hospitalist   Name: Prem Mar Sr. 89 y.o. male I MRN: [id number]  Unit/Bed#: [facility] I Date of Admission: 1/15/2025   Date of Service: 2/1/2025 I Hospital Day: 16    Assessment & Plan  Upper GI bleed  Presented for suspected GI bleed  CT abdomen and pelvis 1/15/2025 with focal wall thickening of the lower esophagus, gastroesophageal junction, and proximal stomach which may reflect esophagitis/gastritis.  No evidence for bowel obstruction, mesenteric inflammation, appendicitis, obstructive uropathy, free air or free fluid  Chest x-ray 1/15/2025 with gaseous distention of the stomach  KUB 1/16/2025 with worsening distention  NGT was placed, since discontinued  CT abdomen pelvis with contrast 1/17/2024: New small bilateral pleural effusions with extensive bibasilar atelectasis. Wall thickening of the lower esophagus and proximal stomach is again noted without interval change. This may reflect mild focal esophagitis/gastritis. New NG tube with tip terminating in the gastric body region. No evidence of bowel obstruction, mesenteric inflammation, appendicitis, obstructive uropathy, free air, or free fluid. Descending and sigmoid diverticulosis again noted without evidence for acute diverticulitis. Subtle wall thickening of the mid to distal sigmoid colon, rectum, and anal verge region could reflect mild focal colitis/proctitis. Multiple punctate pancreatic calcifications again seen likely sequelae of chronic pancreatitis  Continue PPI  Hemoglobin has remained stable, no signs of bleeding  See also, plan for dysphagia  Opacity noted on imaging study  Chest x-ray: Retrocardiac opacity, which may be due to pneumonia and/or atelectasis  Procalcitonin peaked and downtrended  Repeat CXR with slight improvement in left base patchy atelectasis versus pneumonia  Status post treatment with IV cefepime/ceftriaxone and vancomycin, since discontinued  Monitor labs and vital signs, conduct serial physical  assessments    COVID-19 virus infection  Patient with fever and COVID-positive roommate at his skilled facility  Tested positive for COVID 12/17/2025  Continues to require 2 L nasal cannula oxygen  S/p remdesivir x 3 days  Continue supportive care  Monitor labs and vital signs, conduct serial physical assessments  Congestion of upper airway  Upper airway congestion noted, patient has strong cough and able to clear  CT imaging revealed small bilateral pleural effusion and extensive bibasilar atelectasis  Maintain head of bed elevated  Aspiration precautions  Appreciate input of speech therapy who are following  Continue Robinul 0.1 mg IV every 4 hours as needed  Continue scopolamine   Continue incentive spirometry  Suction as needed  Goals of care, counseling/discussion  Patient with significant comorbid medical conditions would be high risk for treatment options requiring anesthesia.  EGD testing might have risks that outweigh the benefits.  Patient verbalized understanding.  Opened goals of care conversations with son.  Continued on phone on Saturday. Had further conversations with patient's son, daughter, and grandson when they arrived   Per discussion with patient's 1/28/2025 regarding non-candidacy for feeding tube and no good options for nutrition currently   Ultimately they would like to keep him comfortable but for now continue with full treatment   Palliative input appreciated   2/1/2024.  Had further discussion regarding goals of care.  Patient's son would like to see speech continuing to work with patient.  I explained to patient's son today that ongoing IV fluids are not sustainable.  I suggested transitioning to pleasure feeds, comfort care.  He would then be able to return to [facility].  I let him know that the [facility] would not be able to accept patient back at this level of function unless level of care is deemed comfort care.  He acknowledged understanding and asked me to call his sister and  raissa  Idiopathic chronic gout of multiple sites without tophus  Pre-hospital allopurinol discontinued  Essential hypertension  Prehospital takesToprol-XL 25 mg p.o. daily and Lasix 20 mg p.o. daily on Monday Wednesday Friday  Currently NPO. Metoprolol switched to IV.   Received furosemide 40 mg IV x 1 1/19/2025  Blood pressure controlled  Monitor BP   Mixed hyperlipidemia  Pre-hospital Tricor discontinued  Chronic kidney disease, stage 3a (HCC)  Lab Results   Component Value Date    EGFR 81 02/01/2025    EGFR 83 01/31/2025    EGFR 81 01/30/2025    CREATININE 0.61 02/01/2025    CREATININE 0.59 (L) 01/31/2025    CREATININE 0.62 01/30/2025     Creatinine baseline of 0.9-1.1 mg/dL   Continue to monitor renal function  Avoid nephrotoxins and hypotension  Trend creatinine  Atrial flutter (HCC)  Currently rate controlled with metoprolol   Not currently on anticoagulation as outpatient  Constipation  GI input appreciated  Continue bowel regimen PRN  Palliative care by specialist  Palliative care input appreciated  Electrolyte abnormality  Patient was initiated on trickle tube feed on 1/22/2025, but this was discontinued due to suspected aspiration  High risk for refeeding syndrome  Continue to monitor electrolytes and replete accordingly  Continue daily BMP and trend electrolytes  Hypernatremia  In setting of poor oral intake, now resolved  D5W @ 75 mL/h initiated, continue for now for hydration  Monitor fluid status closely  Continue ongoing goals of care discussions  Daily BMP and trend sodium  Acute distention of stomach  Appreciate input of GI who are following  N.p.o.  Appreciate input of nutrition  1/22 initiated trickle feed tube feed  through NG tube-reported patient having residuals 1/23, rhonchi bilateral lungs.  Placed tube feed on hold 1/23  Speech therapy continues to work with patient-appropriate for ice chips only with nursing supervision  See goals of care discussion  Dysphagia  Passed NG clamping trial  1/20/2025, later evaluated by SLP but did not pass   Keep NPO   Trickle feeding through NG initiated 1/22/2025 but discontinued 1/23/2025 due to high residuals and rhonchi requiring frequent suctioning  Speech therapy continues to follow, cleared for ice chips only with nursing supervision  General surgery input regarding feeding tube appreciated.  Patient not currently a candidate due to poor prognosis, risk of complications, and limited benefit    VTE Pharmacologic Prophylaxis: VTE Score: 5 High Risk (Score >/= 5) - Pharmacological DVT Prophylaxis Ordered: heparin. Sequential Compression Devices Ordered.    Mobility:   Basic Mobility Inpatient Raw Score: 6  JH-HLM Goal: 2: Bed activities/Dependent transfer  JH-HLM Achieved: 1: Laying in bed  JH-HLM Goal NOT achieved. Continue with multidisciplinary rounding and encourage appropriate mobility to improve upon -HLM goals.    Patient Centered Rounds: I performed bedside rounds with nursing staff today.   Discussions with Specialists or Other Care Team Provider: Case management    Education and Discussions with Family / Patient: Updated  (son) at bedside.called grandson. Will call patient's daughter later.     Current Length of Stay: 16 day(s)  Current Patient Status: Inpatient   Certification Statement: The patient will continue to require additional inpatient hospital stay due to goals of care discussions, care coordination  Discharge Plan: Anticipate discharge in 48 hrs to rehab facility.    Code Status: Level 3 - DNAR and DNI    Subjective   Evaluated at bedside    Objective :  Temp:  [97.4 °F (36.3 °C)-98.1 °F (36.7 °C)] 97.4 °F (36.3 °C)  HR:  [61-69] 69  BP: (119-173)/(48-68) 119/48  Resp:  [16-19] 16  SpO2:  [97 %-100 %] 99 %  O2 Device: Nasal cannula  Nasal Cannula O2 Flow Rate (L/min):  [2 L/min] 2 L/min    Body mass index is 26.01 kg/m².     Input and Output Summary (last 24 hours):     Intake/Output Summary (Last 24 hours) at 2/1/2025  1224  Last data filed at 2/1/2025 0900  Gross per 24 hour   Intake 1306.25 ml   Output 1764 ml   Net -457.75 ml       Physical Exam  Vitals and nursing note reviewed.   Constitutional:       Appearance: He is ill-appearing.   HENT:      Head: Normocephalic and atraumatic.      Nose: Nose normal.      Mouth/Throat:      Mouth: Mucous membranes are dry.      Pharynx: Oropharynx is clear.   Eyes:      Pupils: Pupils are equal, round, and reactive to light.   Cardiovascular:      Rate and Rhythm: Normal rate and regular rhythm.      Pulses: Normal pulses.   Pulmonary:      Effort: Pulmonary effort is normal. No respiratory distress.      Breath sounds: Normal breath sounds.   Abdominal:      General: Bowel sounds are normal.      Palpations: Abdomen is soft.      Tenderness: There is no abdominal tenderness.   Musculoskeletal:         General: Swelling present.      Cervical back: Neck supple.      Comments: Upper extremity swelling L > R persists, although somewhat improved   Skin:     General: Skin is warm and dry.      Capillary Refill: Capillary refill takes less than 2 seconds.   Neurological:      General: No focal deficit present.      Mental Status: He is alert.      Comments: Slow to answer and mildly confused today           Lines/Drains:  Lines/Drains/Airways       Active Status       Name Placement date Placement time Site Days    External Urinary Catheter 01/31/25  1400  -- less than 1                            Lab Results: I have reviewed the following results:   Results from last 7 days   Lab Units 02/01/25  0501   WBC Thousand/uL 7.62   HEMOGLOBIN g/dL 9.9*   HEMATOCRIT % 30.9*   PLATELETS Thousands/uL 340   SEGS PCT % 80*   LYMPHO PCT % 10*   MONO PCT % 6   EOS PCT % 2     Results from last 7 days   Lab Units 02/01/25  0501   SODIUM mmol/L 134*   POTASSIUM mmol/L 3.3*   CHLORIDE mmol/L 101   CO2 mmol/L 26   BUN mg/dL 5   CREATININE mg/dL 0.61   ANION GAP mmol/L 7   CALCIUM mg/dL 7.8*   GLUCOSE RANDOM  mg/dL 116                 Results from last 7 days   Lab Units 01/26/25  0447   PROCALCITONIN ng/ml 0.24       Recent Cultures (last 7 days):               Last 24 Hours Medication List:     Current Facility-Administered Medications:     acetaminophen (Ofirmev) injection 1,000 mg, Q8H PRN, Last Rate: 1,000 mg (01/28/25 1440)    acetaminophen (TYLENOL) tablet 650 mg, Q4H PRN    bisacodyl (DULCOLAX) rectal suppository 10 mg, Daily PRN    dextrose 5 % infusion, Continuous, Last Rate: 75 mL/hr (02/01/25 0003)    [Held by provider] furosemide (LASIX) tablet 20 mg, Once per day on Monday Wednesday Friday    glycopyrrolate (ROBINUL) injection 0.1 mg, Q4H PRN    heparin (porcine) subcutaneous injection 5,000 Units, Q8H ANKIT    HYDROmorphone HCl (DILAUDID) injection 0.2 mg, Q4H PRN    iohexol (OMNIPAQUE) 240 MG/ML solution 50 mL, 90 min pre-procedure    metoprolol (LOPRESSOR) injection 2.5 mg, Q6H    nitroglycerin (NITROSTAT) SL tablet 0.4 mg, Q5 Min PRN    nystatin (MYCOSTATIN) cream, BID    ondansetron (ZOFRAN) injection 4 mg, Q6H PRN    pantoprazole (PROTONIX) injection 40 mg, Q12H    phenol (CHLORASEPTIC) 1.4 % mucosal liquid 1 spray, Q2H PRN    scopolamine (TRANSDERM-SCOP) 1 mg/3 days TD 72 hr patch 1 patch, Q72H    Administrative Statements   Today, Patient Was Seen By: ANAM Rose      **Please Note: This note may have been constructed using a voice recognition system.**

## 2025-02-01 NOTE — ASSESSMENT & PLAN NOTE
Patient was initiated on trickle tube feed on 1/22/2025, but this was discontinued due to suspected aspiration  High risk for refeeding syndrome  Continue to monitor electrolytes and replete accordingly  Continue daily BMP and trend electrolytes

## 2025-02-01 NOTE — ASSESSMENT & PLAN NOTE
In setting of poor oral intake, now resolved  D5W @ 75 mL/h initiated, continue for now for hydration  Monitor fluid status closely  Continue ongoing goals of care discussions  Daily BMP and trend sodium

## 2025-02-01 NOTE — ASSESSMENT & PLAN NOTE
Lab Results   Component Value Date    EGFR 81 02/01/2025    EGFR 83 01/31/2025    EGFR 81 01/30/2025    CREATININE 0.61 02/01/2025    CREATININE 0.59 (L) 01/31/2025    CREATININE 0.62 01/30/2025     Creatinine baseline of 0.9-1.1 mg/dL   Continue to monitor renal function  Avoid nephrotoxins and hypotension  Trend creatinine

## 2025-02-01 NOTE — PLAN OF CARE
Problem: PAIN - ADULT  Goal: Verbalizes/displays adequate comfort level or baseline comfort level  Description: Interventions:  - Encourage patient to monitor pain and request assistance  - Assess pain using appropriate pain scale  - Administer analgesics based on type and severity of pain and evaluate response  - Implement non-pharmacological measures as appropriate and evaluate response  - Consider cultural and social influences on pain and pain management  - Notify physician/advanced practitioner if interventions unsuccessful or patient reports new pain  Outcome: Progressing     Problem: INFECTION - ADULT  Goal: Absence or prevention of progression during hospitalization  Description: INTERVENTIONS:  - Assess and monitor for signs and symptoms of infection  - Monitor lab/diagnostic results  - Monitor all insertion sites, i.e. indwelling lines, tubes, and drains  - Monitor endotracheal if appropriate and nasal secretions for changes in amount and color  - [place] appropriate cooling/warming therapies per order  - Administer medications as ordered  - Instruct and encourage patient and family to use good hand hygiene technique  - Identify and instruct in appropriate isolation precautions for identified infection/condition  Outcome: Progressing     Problem: SAFETY ADULT  Goal: Patient will remain free of falls  Description: INTERVENTIONS:  - Educate patient/family on patient safety including physical limitations  - Instruct patient to call for assistance with activity   - Consult OT/PT to assist with strengthening/mobility   - Keep Call bell within reach  - Keep bed low and locked with side rails adjusted as appropriate  - Keep care items and personal belongings within reach  - Initiate and maintain comfort rounds  - Make Fall Risk Sign visible to staff  - Offer Toileting every 2 Hours, in advance of need  - Initiate/Maintain bed alarm  - Obtain necessary fall risk management equipment: bed/chair alarm  - Apply  yellow socks and bracelet for high fall risk patients  - Consider moving patient to room near nurses station  Outcome: Progressing  Goal: Maintains/Returns to pre admission functional level  Description: INTERVENTIONS:  - Perform AM-PAC 6 Click Basic Mobility/ Daily Activity assessment daily.  - Set and communicate daily mobility goal to care team and patient/family/caregiver.   - Collaborate with rehabilitation services on mobility goals if consulted  - Perform Range of Motion 3 times a day.  - Reposition patient every 2 hours.  - Record patient progress and toleration of activity level   Outcome: Progressing     Problem: DISCHARGE PLANNING  Goal: Discharge to home or other facility with appropriate resources  Description: INTERVENTIONS:  - Identify barriers to discharge w/patient and caregiver  - Arrange for needed discharge resources and transportation as appropriate  - Identify discharge learning needs (meds, wound care, etc.)  - Arrange for interpretive services to assist at discharge as needed  - Refer to Case Management Department for coordinating discharge planning if the patient needs post-hospital services based on physician/advanced practitioner order or complex needs related to functional status, cognitive ability, or social support system  Outcome: Progressing     Problem: Knowledge Deficit  Goal: Patient/family/caregiver demonstrates understanding of disease process, treatment plan, medications, and discharge instructions  Description: Complete learning assessment and assess knowledge base.  Interventions:  - Provide teaching at level of understanding  - Provide teaching via preferred learning methods  Outcome: Progressing     Problem: Prexisting or High Potential for Compromised Skin Integrity  Goal: Skin integrity is maintained or improved  Description: INTERVENTIONS:  - Identify patients at risk for skin breakdown  - Assess and monitor skin integrity  - Assess and monitor nutrition and hydration  status  - Monitor labs   - Assess for incontinence   - Turn and reposition patient  - Assist with mobility/ambulation  - Relieve pressure over bony prominences  - Avoid friction and shearing  - Provide appropriate hygiene as needed including keeping skin clean and dry  - Evaluate need for skin moisturizer/barrier cream  - Collaborate with interdisciplinary team   - Patient/family teaching  - Consider wound care consult   Outcome: Progressing     Problem: GASTROINTESTINAL - ADULT  Goal: Oral mucous membranes remain intact  Description: INTERVENTIONS  - Assess oral mucosa and hygiene practices  - Implement preventative oral hygiene regimen  - Implement oral medicated treatments as ordered  - Initiate Nutrition services referral as needed  Outcome: Progressing     Problem: RESPIRATORY - ADULT  Goal: Achieves optimal ventilation and oxygenation  Description: INTERVENTIONS:  - Assess for changes in respiratory status  - Assess for changes in mentation and behavior  - Position to facilitate oxygenation and minimize respiratory effort  - Oxygen administered by appropriate delivery if ordered  - Initiate smoking cessation education as indicated  - Encourage broncho-pulmonary hygiene including cough, deep breathe, Incentive Spirometry  - Assess the need for suctioning and aspirate as needed  - Assess and instruct to report SOB or any respiratory difficulty  - Respiratory Therapy support as indicated  Outcome: Progressing     Problem: METABOLIC, FLUID AND ELECTROLYTES - ADULT  Goal: Electrolytes maintained within normal limits  Description: INTERVENTIONS:  - Monitor labs and assess patient for signs and symptoms of electrolyte imbalances  - Administer electrolyte replacement as ordered  - Monitor response to electrolyte replacements, including repeat lab results as appropriate  - Instruct patient on fluid and nutrition as appropriate  Outcome: Progressing

## 2025-02-01 NOTE — UTILIZATION REVIEW
Continued Stay Review    DATE: 2/1    Current Patient Class: Inpatient  Current Level of Care: MS    HPI:89 y.o. male initially admitted on 1/16     Assessment/Plan:   Upper GI Bleed, covid, upper airway congestion - remains on PPI IV q 12 hrs, H/H stable.  Goals of care discussion continuing w/ family and they would like ST to continue working with the pt.  He remains on IV fluids, and Tube feeds are not a good option for pt.  Will continue working w/ family on dispo. PO Metoprolol now in IV form. Pt is NPO, BP controlled. On exam pt is ill-appearing, has swelling , UE L>R persists w/ some improvement.  Mucous membranes dry. Slow to answer w/ mild confusion.     Medications:   Scheduled Medications:  [Held by provider] furosemide, 20 mg, Oral, Once per day on Monday Wednesday Friday  heparin (porcine), 5,000 Units, Subcutaneous, Q8H ANKIT  iohexol, 50 mL, Oral, 90 min pre-procedure  metoprolol, 2.5 mg, Intravenous, Q6H  nystatin, , Topical, BID  pantoprazole, 40 mg, Intravenous, Q12H  scopolamine, 1 patch, Transdermal, Q72H      Continuous IV Infusions:  dextrose, 75 mL/hr, Intravenous, Continuous      PRN Meds:  acetaminophen, 1,000 mg, Intravenous, Q8H PRN  acetaminophen, 650 mg, Oral, Q4H PRN  bisacodyl, 10 mg, Rectal, Daily PRN  glycopyrrolate, 0.1 mg, Intravenous, Q4H PRN  HYDROmorphone, 0.2 mg, Intravenous, Q4H PRN  nitroglycerin, 0.4 mg, Sublingual, Q5 Min PRN  ondansetron, 4 mg, Intravenous, Q6H PRN  phenol, 1 spray, Mouth/Throat, Q2H PRN      Discharge Plan: TBD    Vital Signs (last 3 days)       Date/Time Temp Pulse Resp BP MAP (mmHg) SpO2 Calculated FIO2 (%) - Nasal Cannula Nasal Cannula O2 Flow Rate (L/min) O2 Device Patient Position - Orthostatic VS Bria Coma Scale Score Pain    02/01/25 11:04:35 -- 69 -- 119/48 72 99 % 28 2 L/min Nasal cannula -- 15 No Pain    02/01/25 07:43:32 97.4 °F (36.3 °C) 69 16 122/49 73 100 % -- -- None (Room air) Lying -- --    01/31/25 0991 -- -- -- -- -- -- 28 2 L/min  Nasal cannula -- 14 No Pain    01/31/25 22:35:45 -- 61 -- 134/55 81 97 % -- -- -- -- -- --    01/31/25 14:44:04 98.1 °F (36.7 °C) 64 19 173/68 103 98 % -- -- Nasal cannula Lying -- --    01/31/25 08:02:03 98.1 °F (36.7 °C) 69 18 124/48 73 93 % -- -- Nasal cannula Lying -- --    01/31/25 0730 -- -- -- -- -- -- 28 2 L/min Nasal cannula -- 14 No Pain    01/31/25 0354 -- -- -- -- -- -- -- -- -- -- 14 --    01/31/25 0300 -- -- -- -- -- -- -- -- -- -- -- No Pain    01/31/25 00:35:17 -- 85 -- 157/68 98 94 % -- -- -- -- -- --    01/30/25 22:12:39 -- 88 -- 134/64 87 97 % -- -- -- -- -- --    01/30/25 15:20:30 -- 79 16 155/59 91 99 % -- -- -- -- -- --    01/30/25 0930 -- -- -- -- -- -- -- -- -- -- 14 No Pain    01/30/25 07:21:26 99.3 °F (37.4 °C) 75 16 135/47 76 93 % -- -- -- -- -- --    01/30/25 02:28:28 -- 81 -- 155/55 88 97 % -- -- -- -- -- --    01/30/25 0034 -- -- -- -- -- -- -- -- -- -- 14 No Pain    01/29/25 22:38:17 99.1 °F (37.3 °C) 73 20 121/51 74 98 % -- -- -- -- -- --    01/29/25 14:28:19 97.9 °F (36.6 °C) 76 18 137/50 79 96 % 28 2 L/min Nasal cannula Lying -- --    01/29/25 0921 -- -- -- -- -- -- -- -- -- -- -- 7    01/29/25 07:50:08 99.1 °F (37.3 °C) 67 18 132/50 77 94 % -- -- -- -- -- --    01/29/25 0717 -- -- -- -- -- 96 % 28 2 L/min Nasal cannula -- -- No Pain    01/29/25 02:08:47 -- 78 -- 124/61 82 92 % 28 2 L/min Nasal cannula -- -- No Pain          Weight (last 2 days)       None            Pertinent Labs/Diagnostic Results:   Radiology:  XR abdomen 1 view kub   Final Interpretation by Sb Haynes MD (01/23 1613)      Nonobstructive bowel gas pattern.               Workstation performed: [id number]         VAS upper limb venous duplex scan, complete, bilateral   Final Interpretation by Abigail Alvarado MD (01/21 2109)      XR chest portable   Final Interpretation by Leonor Ingram MD (01/21 0915)      Slight improvement in opacity in the medial left base which may be due to atelectasis  and/or pneumonia.            Workstation performed: [id number]         XR abdomen 1 vw portable   Final Interpretation by Heaven Barton MD (01/20 1417)   Nonobstructive bowel gas pattern. Residual oral contrast throughout the colon. No gastric distention. Satisfactory position of the endogastric tube.               Workstation performed: [id number]         XR chest portable   Final Interpretation by Beatris Benton MD (01/20 0904)      Retrocardiac opacity, which may be due to pneumonia and/or atelectasis.            Workstation performed: [id number]         CT abdomen pelvis w contrast   Final Interpretation by Geovanny Taylor MD (01/17 2239)      1.  New small bilateral pleural effusions with extensive bibasilar atelectasis.   2.  Wall thickening of the lower esophagus and proximal stomach is again noted without interval change. This may reflect mild focal esophagitis/gastritis. New NG tube with tip terminating in the gastric body region.   3.  No evidence of bowel obstruction, mesenteric inflammation, appendicitis, obstructive uropathy, free air, or free fluid. Descending and sigmoid diverticulosis again noted without evidence for acute diverticulitis. Subtle wall thickening of the mid to    distal sigmoid colon, rectum, and anal verge region could reflect mild focal colitis/proctitis.   4.  Multiple punctate pancreatic calcifications again seen likely sequelae of chronic pancreatitis.      Workstation performed: [id number]         XR chest portable   Final Interpretation by Jose Johns MD (01/17 1327)      Scattered streaky opacities favored to represent atelectasis but pneumonia not excluded.            Workstation performed: [id number]         XR abdomen 1 vw portable   Final Interpretation by Jose Johns MD (01/17 1328)      New marked gaseous distention of the stomach.               Workstation performed: [id number]         XR abdomen 1 view kub   Final Interpretation by Diony Boateng MD  (01/16 1254)      1.  NG tube projects over the gastric body with decompression of the stomach compared to the x-ray from earlier the same day.      2.  Mildly dilated mid abdominal small bowel loops with gas throughout the colon though the abdomen was only partially imaged.               Workstation performed: [id number]         XR abdomen 1 view kub   Final Interpretation by Diony Boateng MD (01/16 0866)      Worsening gastric distention.      The study was marked in EPIC for immediate notification.               Workstation performed: [id number]         XR chest portable   Final Interpretation by Sina Vitale MD (01/15 7930)      No endogastric tube visible within the field-of-view.      There is some gaseous distention of the stomach.      Minimal atelectasis left lung base.      The study was marked in EPIC for immediate notification.            Workstation performed: [id number]         CT abdomen pelvis with contrast   Final Interpretation by Geovanny Taylor MD (01/15 0314)      Trace left pleural effusion with bibasilar atelectasis.  Focal wall thickening of the lower esophagus, gastroesophageal junction, and proximal stomach noted which may reflect esophagitis/gastritis. No evidence for bowel obstruction, mesenteric    inflammation, appendicitis, obstructive uropathy, free air, or free fluid. Descending and sigmoid colon diverticulosis without evidence for acute diverticulitis.         Workstation performed: [id number]           Cardiology:  No orders to display     GI:  No orders to display           Results from last 7 days   Lab Units 02/01/25  0501 01/31/25  0503 01/30/25  0443 01/29/25  0435 01/28/25  0540   WBC Thousand/uL 7.62 11.05* 9.14 8.28 8.26   HEMOGLOBIN g/dL 9.9* 10.3* 10.3* 10.3* 9.5*   HEMATOCRIT % 30.9* 31.9* 32.1* 32.0* 30.2*   PLATELETS Thousands/uL 340 344 289 274 270   TOTAL NEUT ABS Thousands/µL 6.17  --  7.72* 6.91 6.92         Results from last 7 days   Lab Units  02/01/25  0501 01/31/25  0503 01/30/25  0443 01/29/25  0435 01/28/25  0540 01/27/25  0547 01/26/25  0447   SODIUM mmol/L 134* 132* 134* 135 137 140 146   POTASSIUM mmol/L 3.3* 3.4* 3.4* 3.6 3.2* 3.2* 3.2*   CHLORIDE mmol/L 101 99 101 103 105 108 112*   CO2 mmol/L 26 26 28 27 27 28 28   ANION GAP mmol/L 7 7 5 5 5 4 6   BUN mg/dL 5 4* 6 8 9 12 15   CREATININE mg/dL 0.61 0.59* 0.62 0.71 0.69 0.73 0.85   EGFR ml/min/1.73sq m 81 83 81 76 77 76 71   CALCIUM mg/dL 7.8* 7.9* 7.8* 7.6* 7.5* 7.6* 8.0*   MAGNESIUM mg/dL 2.2 1.7*  --  1.8*  --  1.9 2.0   PHOSPHORUS mg/dL 2.0* 1.3*  --   --   --  1.7* 2.2*             Results from last 7 days   Lab Units 02/01/25  0501 01/31/25  0503 01/30/25  0443 01/29/25  0435 01/28/25  0540 01/27/25  0547 01/26/25  0447   GLUCOSE RANDOM mg/dL 116 130 114 105 114 113 114       Results from last 7 days   Lab Units 01/26/25  0447   PROCALCITONIN ng/ml 0.24             Network Utilization Review Department  ATTENTION: Please call with any questions or concerns to [phone] and carefully listen to the prompts so that you are directed to the right person. All voicemails are confidential.   For Discharge needs, contact Care Management DC Support Team at [phone] opt. 2  Send all requests for admission clinical reviews, approved or denied determinations and any other requests to dedicated fax number below belonging to the campus where the patient is receiving treatment. List of dedicated fax numbers for the Facilities:  FACILITY NAME UR FAX NUMBER   ADMISSION DENIALS (Administrative/Medical Necessity) [phone]   DISCHARGE SUPPORT TEAM (NETWORK) [phone]   PARENT CHILD HEALTH (Maternity/NICU/Pediatrics) [phone]   [facility] [phone]   [facility] [phone]   [facility] [phone]   [facility] [phone]   [facility] [phone]   [facility]  [facility] [phone]   [facility] [phone]   [facility] [phone]   [facility] [phone]   [facility] [phone]   [facility] [phone]   [facility] [phone]

## 2025-02-01 NOTE — ASSESSMENT & PLAN NOTE
Presented for suspected GI bleed  CT abdomen and pelvis 1/15/2025 with focal wall thickening of the lower esophagus, gastroesophageal junction, and proximal stomach which may reflect esophagitis/gastritis.  No evidence for bowel obstruction, mesenteric inflammation, appendicitis, obstructive uropathy, free air or free fluid  Chest x-ray 1/15/2025 with gaseous distention of the stomach  KUB 1/16/2025 with worsening distention  NGT was placed, since discontinued  CT abdomen pelvis with contrast 1/17/2024: New small bilateral pleural effusions with extensive bibasilar atelectasis. Wall thickening of the lower esophagus and proximal stomach is again noted without interval change. This may reflect mild focal esophagitis/gastritis. New NG tube with tip terminating in the gastric body region. No evidence of bowel obstruction, mesenteric inflammation, appendicitis, obstructive uropathy, free air, or free fluid. Descending and sigmoid diverticulosis again noted without evidence for acute diverticulitis. Subtle wall thickening of the mid to distal sigmoid colon, rectum, and anal verge region could reflect mild focal colitis/proctitis. Multiple punctate pancreatic calcifications again seen likely sequelae of chronic pancreatitis  Continue PPI  Hemoglobin has remained stable, no signs of bleeding  See also, plan for dysphagia

## 2025-02-01 NOTE — CASE MANAGEMENT
Case Management Discharge Planning Note    Patient name Prem Mar Sr.  Location /-01 MRN [id number]  : 10/11/1924 Date 2025       Current Admission Date: 1/15/2025  Current Admission Diagnosis:Upper GI bleed   Patient Active Problem List    Diagnosis Date Noted Date Diagnosed    Dysphagia 2025     Hypernatremia 2025     Electrolyte abnormality 2025     Palliative care by specialist 2025     Opacity noted on imaging study 2025     Congestion of upper airway 2025     Acute distention of stomach 2025     COVID-19 virus infection 2025     Goals of care, counseling/discussion 2025     Upper GI bleed 01/15/2025     Constipation 01/15/2025     Tinea cruris 10/12/2024     Weakness 10/11/2024     Atrial flutter (HCC) 2024     Chronic pain syndrome 2024     Lumbar spondylosis 2024     Chronic midline low back pain without sciatica 2024     Nonexudative age-related macular degeneration of left eye 2023     Chronic kidney disease, stage 3a (HCC) 2023     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     Muscle wasting and atrophy, not elsewhere classified, multiple sites 2022     Difficulty swallowing 2022     Keratoma 2021     Venous insufficiency of both lower extremities 2021     Hypomagnesemia 2020     Mixed hyperlipidemia 2020     Coronary artery disease involving native coronary artery of native heart without angina pectoris 2018     Essential hypertension 2018     Bilateral leg edema 2018     Ventricular bigeminy 2018     Idiopathic chronic gout of multiple sites without tophus 05/10/2017     Edema 2014     Vitamin D deficiency 2013       LOS (days): 16  Geometric Mean LOS (GMLOS) (days): 4.5  Days to GMLOS:-11.4     OBJECTIVE:  Risk of Unplanned Readmission Score: 26.58         Current admission status:  Inpatient   Preferred Pharmacy:   [id number] - [place] - [address]  [address] [place] [address]  [place] [address]  Phone: [phone] Fax: [phone]    Primary Care Provider: Leonard Schreiber MD    Primary Insurance: [facility]  Secondary Insurance:     DISCHARGE DETAILS:    Discharge planning discussed with:: son at the bedside  Freedom of Choice: Yes  Comments - Freedom of Choice: pt is a bedhold at the [facility]- cm made the son aware of jose eduardo information that I received yesterday stating the can only accept the pt back on Comfort care or hospice care  CM contacted family/caregiver?: Yes             Contacts  Patient Contacts: Prem Mar jr  Relationship to Patient:: Family  Contact Method: In Person  Reason/Outcome: Discharge Planning    Requested Home Health Care         Is the patient interested in HHC at discharge?: No    DME Referral Provided  Referral made for DME?: No    Other Referral/Resources/Interventions Provided:  Referral Comments: palliative, speech, IV d5, IV protonix    Would you like to participate in our [facility] service program?  : No - Declined    Treatment Team Recommendation: SNF ([facility]- can accept back if on comfort care or hospice- BLS)

## 2025-02-01 NOTE — CASE MANAGEMENT
Case Management Discharge Planning Note    Patient name Prem Mar Sr.  Location /-01 MRN [id number]  : 10/11/1924 Date 2025       Current Admission Date: 1/15/2025  Current Admission Diagnosis:Upper GI bleed   Patient Active Problem List    Diagnosis Date Noted Date Diagnosed    Dysphagia 2025     Hypernatremia 2025     Electrolyte abnormality 2025     Palliative care by specialist 2025     Opacity noted on imaging study 2025     Congestion of upper airway 2025     Acute distention of stomach 2025     COVID-19 virus infection 2025     Goals of care, counseling/discussion 2025     Upper GI bleed 01/15/2025     Constipation 01/15/2025     Tinea cruris 10/12/2024     Weakness 10/11/2024     Atrial flutter (HCC) 2024     Chronic pain syndrome 2024     Lumbar spondylosis 2024     Chronic midline low back pain without sciatica 2024     Nonexudative age-related macular degeneration of left eye 2023     Chronic kidney disease, stage 3a (HCC) 2023     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     Muscle wasting and atrophy, not elsewhere classified, multiple sites 2022     Difficulty swallowing 2022     Keratoma 2021     Venous insufficiency of both lower extremities 2021     Hypomagnesemia 2020     Mixed hyperlipidemia 2020     Coronary artery disease involving native coronary artery of native heart without angina pectoris 2018     Essential hypertension 2018     Bilateral leg edema 2018     Ventricular bigeminy 2018     Idiopathic chronic gout of multiple sites without tophus 05/10/2017     Edema 2014     Vitamin D deficiency 2013       LOS (days): 15  Geometric Mean LOS (GMLOS) (days): 4.5  Days to GMLOS:-10.8     OBJECTIVE:  Risk of Unplanned Readmission Score: 26.41         Current admission  status: Inpatient   Preferred Pharmacy:   [id number] - [place] - [address]  [address]  [place] [address]  Phone: [phone] Fax: [phone]    Primary Care Provider: Leonard Schreiber MD    Primary Insurance: [facility]  Secondary Insurance:     DISCHARGE DETAILS:       Freedom of Choice: Yes  Comments - Freedom of Choice: pt is a bedhold at the [facility]- cm received a call from the - they are not able to accept the pt back unless hs is on comfort care- pt is not  able to receive a feeding tube and he is not able to receive a peg tube and pt remains  npo                               Other Referral/Resources/Interventions Provided:  Referral Comments: palliative, speech, IV D5, IV protonix    Would you like to participate in our [facility] service program?  : No - Declined    Treatment Team Recommendation: SNF ([facility] bedhold  - bls)

## 2025-02-01 NOTE — PLAN OF CARE
Problem: PAIN - ADULT  Goal: Verbalizes/displays adequate comfort level or baseline comfort level  Description: Interventions:  - Encourage patient to monitor pain and request assistance  - Assess pain using appropriate pain scale  - Administer analgesics based on type and severity of pain and evaluate response  - Implement non-pharmacological measures as appropriate and evaluate response  - Consider cultural and social influences on pain and pain management  - Notify physician/advanced practitioner if interventions unsuccessful or patient reports new pain  Outcome: Progressing     Problem: INFECTION - ADULT  Goal: Absence or prevention of progression during hospitalization  Description: INTERVENTIONS:  - Assess and monitor for signs and symptoms of infection  - Monitor lab/diagnostic results  - Monitor all insertion sites, i.e. indwelling lines, tubes, and drains  - Monitor endotracheal if appropriate and nasal secretions for changes in amount and color  - [place] appropriate cooling/warming therapies per order  - Administer medications as ordered  - Instruct and encourage patient and family to use good hand hygiene technique  - Identify and instruct in appropriate isolation precautions for identified infection/condition  Outcome: Progressing  Goal: Absence of fever/infection during neutropenic period  Description: INTERVENTIONS:  - Monitor WBC    Outcome: Progressing  Goal: Infection Control Precautions  Outcome: Progressing     Problem: SAFETY ADULT  Goal: Patient will remain free of falls  Description: INTERVENTIONS:  - Educate patient/family on patient safety including physical limitations  - Instruct patient to call for assistance with activity   - Consult OT/PT to assist with strengthening/mobility   - Keep Call bell within reach  - Keep bed low and locked with side rails adjusted as appropriate  - Keep care items and personal belongings within reach  - Initiate and maintain comfort rounds  - Make Fall  Risk Sign visible to staff  - Offer Toileting every 2 Hours, in advance of need  - Initiate/Maintain bed alarm  - Obtain necessary fall risk management equipment: non slip socks   - Apply yellow socks and bracelet for high fall risk patients  - Consider moving patient to room near nurses station  Outcome: Progressing  Goal: Maintain or return to baseline ADL function  Description: INTERVENTIONS:  -  Assess patient's ability to carry out ADLs; assess patient's baseline for ADL function and identify physical deficits which impact ability to perform ADLs (bathing, care of mouth/teeth, toileting, grooming, dressing, etc.)  - Assess/evaluate cause of self-care deficits   - Assess range of motion  - Assess patient's mobility; develop plan if impaired  - Assess patient's need for assistive devices and provide as appropriate  - Encourage maximum independence but intervene and supervise when necessary  - Involve family in performance of ADLs  - Assess for home care needs following discharge   - Consider OT consult to assist with ADL evaluation and planning for discharge  - Provide patient education as appropriate  Outcome: Progressing  Goal: Maintains/Returns to pre admission functional level  Description: INTERVENTIONS:  - Perform AM-PAC 6 Click Basic Mobility/ Daily Activity assessment daily.  - Set and communicate daily mobility goal to care team and patient/family/caregiver.   - Collaborate with rehabilitation services on mobility goals if consulted  - Perform Range of Motion 3 times a day.  - Reposition patient every 2 hours.  - Dangle patient 3 times a day  - Stand patient 3 times a day  - Ambulate patient 3 times a day  - Out of bed to chair 3 times a day   - Out of bed for meals 3 times a day  - Out of bed for toileting  - Record patient progress and toleration of activity level   Outcome: Progressing     Problem: DISCHARGE PLANNING  Goal: Discharge to home or other facility with appropriate resources  Description:  INTERVENTIONS:  - Identify barriers to discharge w/patient and caregiver  - Arrange for needed discharge resources and transportation as appropriate  - Identify discharge learning needs (meds, wound care, etc.)  - Arrange for interpretive services to assist at discharge as needed  - Refer to Case Management Department for coordinating discharge planning if the patient needs post-hospital services based on physician/advanced practitioner order or complex needs related to functional status, cognitive ability, or social support system  Outcome: Progressing     Problem: Knowledge Deficit  Goal: Patient/family/caregiver demonstrates understanding of disease process, treatment plan, medications, and discharge instructions  Description: Complete learning assessment and assess knowledge base.  Interventions:  - Provide teaching at level of understanding  - Provide teaching via preferred learning methods  Outcome: Progressing     Problem: Prexisting or High Potential for Compromised Skin Integrity  Goal: Skin integrity is maintained or improved  Description: INTERVENTIONS:  - Identify patients at risk for skin breakdown  - Assess and monitor skin integrity  - Assess and monitor nutrition and hydration status  - Monitor labs   - Assess for incontinence   - Turn and reposition patient  - Assist with mobility/ambulation  - Relieve pressure over bony prominences  - Avoid friction and shearing  - Provide appropriate hygiene as needed including keeping skin clean and dry  - Evaluate need for skin moisturizer/barrier cream  - Collaborate with interdisciplinary team   - Patient/family teaching  - Consider wound care consult   Outcome: Progressing     Problem: GASTROINTESTINAL - ADULT  Goal: Minimal or absence of nausea and/or vomiting  Description: INTERVENTIONS:  - Administer IV fluids if ordered to ensure adequate hydration  - Maintain NPO status until nausea and vomiting are resolved  - Nasogastric tube if ordered  - Administer  ordered antiemetic medications as needed  - Provide nonpharmacologic comfort measures as appropriate  - Advance diet as tolerated, if ordered  - Consider nutrition services referral to assist patient with adequate nutrition and appropriate food choices  Outcome: Progressing  Goal: Maintains or returns to baseline bowel function  Description: INTERVENTIONS:  - Assess bowel function  - Encourage oral fluids to ensure adequate hydration  - Administer IV fluids if ordered to ensure adequate hydration  - Administer ordered medications as needed  - Encourage mobilization and activity  - Consider nutritional services referral to assist patient with adequate nutrition and appropriate food choices  Outcome: Progressing  Goal: Maintains adequate nutritional intake  Description: INTERVENTIONS:  - Monitor percentage of each meal consumed  - Identify factors contributing to decreased intake, treat as appropriate  - Assist with meals as needed  - Monitor I&O, weight, and lab values if indicated  - Obtain nutrition services referral as needed  Outcome: Progressing  Goal: Oral mucous membranes remain intact  Description: INTERVENTIONS  - Assess oral mucosa and hygiene practices  - Implement preventative oral hygiene regimen  - Implement oral medicated treatments as ordered  - Initiate Nutrition services referral as needed  Outcome: Progressing     Problem: METABOLIC, FLUID AND ELECTROLYTES - ADULT  Goal: Electrolytes maintained within normal limits  Description: INTERVENTIONS:  - Monitor labs and assess patient for signs and symptoms of electrolyte imbalances  - Administer electrolyte replacement as ordered  - Monitor response to electrolyte replacements, including repeat lab results as appropriate  - Instruct patient on fluid and nutrition as appropriate  Outcome: Progressing  Goal: Fluid balance maintained  Description: INTERVENTIONS:  - Monitor labs   - Monitor I/O and WT  - Instruct patient on fluid and nutrition as  appropriate  - Assess for signs & symptoms of volume excess or deficit  Outcome: Progressing     Problem: Nutrition/Hydration-ADULT  Goal: Nutrient/Hydration intake appropriate for improving, restoring or maintaining nutritional needs  Description: Monitor and assess patient's nutrition/hydration status for malnutrition. Collaborate with interdisciplinary team and initiate plan and interventions as ordered.  Monitor patient's weight and dietary intake as ordered or per policy. Utilize nutrition screening tool and intervene as necessary. Determine patient's food preferences and provide high-protein, high-caloric foods as appropriate.     INTERVENTIONS:  - Monitor oral intake, urinary output, labs, and treatment plans  - Assess nutrition and hydration status and recommend course of action  - Evaluate amount of meals eaten  - Assist patient with eating if necessary   - Allow adequate time for meals  - Recommend/ encourage appropriate diets, oral nutritional supplements, and vitamin/mineral supplements  - Order, calculate, and assess calorie counts as needed  - Recommend, monitor, and adjust tube feedings and TPN/PPN based on assessed needs  - Assess need for intravenous fluids  - Provide specific nutrition/hydration education as appropriate  - Include patient/family/caregiver in decisions related to nutrition  Outcome: Progressing

## 2025-02-02 LAB
ANION GAP SERPL CALCULATED.3IONS-SCNC: 6 MMOL/L (ref 4–13)
BASOPHILS # BLD AUTO: 0.02 THOUSANDS/ΜL (ref 0–0.1)
BASOPHILS NFR BLD AUTO: 0 % (ref 0–1)
BUN SERPL-MCNC: 6 MG/DL (ref 5–25)
CALCIUM SERPL-MCNC: 7.7 MG/DL (ref 8.4–10.2)
CHLORIDE SERPL-SCNC: 98 MMOL/L (ref 96–108)
CO2 SERPL-SCNC: 26 MMOL/L (ref 21–32)
CREAT SERPL-MCNC: 0.7 MG/DL (ref 0.6–1.3)
EOSINOPHIL # BLD AUTO: 0.17 THOUSAND/ΜL (ref 0–0.61)
EOSINOPHIL NFR BLD AUTO: 2 % (ref 0–6)
ERYTHROCYTE [DISTWIDTH] IN BLOOD BY AUTOMATED COUNT: 14.9 % (ref 11.6–15.1)
GFR SERPL CREATININE-BSD FRML MDRD: 77 ML/MIN/1.73SQ M
GLUCOSE SERPL-MCNC: 117 MG/DL (ref 65–140)
HCT VFR BLD AUTO: 31.7 % (ref 36.5–49.3)
HGB BLD-MCNC: 10.3 G/DL (ref 12–17)
IMM GRANULOCYTES # BLD AUTO: 0.17 THOUSAND/UL (ref 0–0.2)
IMM GRANULOCYTES NFR BLD AUTO: 2 % (ref 0–2)
LYMPHOCYTES # BLD AUTO: 0.7 THOUSANDS/ΜL (ref 0.6–4.47)
LYMPHOCYTES NFR BLD AUTO: 8 % (ref 14–44)
MCH RBC QN AUTO: 30.9 PG (ref 26.8–34.3)
MCHC RBC AUTO-ENTMCNC: 32.5 G/DL (ref 31.4–37.4)
MCV RBC AUTO: 95 FL (ref 82–98)
MONOCYTES # BLD AUTO: 0.36 THOUSAND/ΜL (ref 0.17–1.22)
MONOCYTES NFR BLD AUTO: 4 % (ref 4–12)
NEUTROPHILS # BLD AUTO: 7.2 THOUSANDS/ΜL (ref 1.85–7.62)
NEUTS SEG NFR BLD AUTO: 84 % (ref 43–75)
NRBC BLD AUTO-RTO: 0 /100 WBCS
PLATELET # BLD AUTO: 386 THOUSANDS/UL (ref 149–390)
PMV BLD AUTO: 10.5 FL (ref 8.9–12.7)
POTASSIUM SERPL-SCNC: 3.4 MMOL/L (ref 3.5–5.3)
RBC # BLD AUTO: 3.33 MILLION/UL (ref 3.88–5.62)
SODIUM SERPL-SCNC: 130 MMOL/L (ref 135–147)
WBC # BLD AUTO: 8.62 THOUSAND/UL (ref 4.31–10.16)

## 2025-02-02 PROCEDURE — 85025 COMPLETE CBC W/AUTO DIFF WBC: CPT | Performed by: NURSE PRACTITIONER

## 2025-02-02 PROCEDURE — 99232 SBSQ HOSP IP/OBS MODERATE 35: CPT | Performed by: NURSE PRACTITIONER

## 2025-02-02 PROCEDURE — 80048 BASIC METABOLIC PNL TOTAL CA: CPT | Performed by: NURSE PRACTITIONER

## 2025-02-02 RX ORDER — FUROSEMIDE 10 MG/ML
40 INJECTION INTRAMUSCULAR; INTRAVENOUS ONCE
Status: COMPLETED | OUTPATIENT
Start: 2025-02-02 | End: 2025-02-02

## 2025-02-02 RX ADMIN — HEPARIN SODIUM 5000 UNITS: 5000 INJECTION INTRAVENOUS; SUBCUTANEOUS at 06:01

## 2025-02-02 RX ADMIN — DEXTROSE 75 ML/HR: 5 SOLUTION INTRAVENOUS at 01:31

## 2025-02-02 RX ADMIN — HEPARIN SODIUM 5000 UNITS: 5000 INJECTION INTRAVENOUS; SUBCUTANEOUS at 14:08

## 2025-02-02 RX ADMIN — NYSTATIN: 100000 CREAM TOPICAL at 09:17

## 2025-02-02 RX ADMIN — PANTOPRAZOLE SODIUM 40 MG: 40 INJECTION, POWDER, FOR SOLUTION INTRAVENOUS at 18:17

## 2025-02-02 RX ADMIN — METOROPROLOL TARTRATE 2.5 MG: 5 INJECTION, SOLUTION INTRAVENOUS at 06:02

## 2025-02-02 RX ADMIN — METOROPROLOL TARTRATE 2.5 MG: 5 INJECTION, SOLUTION INTRAVENOUS at 23:08

## 2025-02-02 RX ADMIN — NYSTATIN: 100000 CREAM TOPICAL at 18:17

## 2025-02-02 RX ADMIN — METOROPROLOL TARTRATE 2.5 MG: 5 INJECTION, SOLUTION INTRAVENOUS at 18:17

## 2025-02-02 RX ADMIN — PANTOPRAZOLE SODIUM 40 MG: 40 INJECTION, POWDER, FOR SOLUTION INTRAVENOUS at 06:01

## 2025-02-02 RX ADMIN — METOROPROLOL TARTRATE 2.5 MG: 5 INJECTION, SOLUTION INTRAVENOUS at 14:08

## 2025-02-02 RX ADMIN — FUROSEMIDE 40 MG: 10 INJECTION, SOLUTION INTRAMUSCULAR; INTRAVENOUS at 14:08

## 2025-02-02 RX ADMIN — HEPARIN SODIUM 5000 UNITS: 5000 INJECTION INTRAVENOUS; SUBCUTANEOUS at 23:08

## 2025-02-02 NOTE — PROGRESS NOTES
Progress Note - Hospitalist   Name: Prem Mar Sr. 89 y.o. male I MRN: [id number]  Unit/Bed#: [facility] I Date of Admission: 1/15/2025   Date of Service: 2/2/2025 I Hospital Day: 17    Assessment & Plan  Upper GI bleed  Presented for suspected GI bleed  CT abdomen and pelvis 1/15/2025 with focal wall thickening of the lower esophagus, gastroesophageal junction, and proximal stomach which may reflect esophagitis/gastritis.  No evidence for bowel obstruction, mesenteric inflammation, appendicitis, obstructive uropathy, free air or free fluid  Chest x-ray 1/15/2025 with gaseous distention of the stomach  KUB 1/16/2025 with worsening distention  NGT was placed, since discontinued  CT abdomen pelvis with contrast 1/17/2024: New small bilateral pleural effusions with extensive bibasilar atelectasis. Wall thickening of the lower esophagus and proximal stomach is again noted without interval change. This may reflect mild focal esophagitis/gastritis. New NG tube with tip terminating in the gastric body region. No evidence of bowel obstruction, mesenteric inflammation, appendicitis, obstructive uropathy, free air, or free fluid. Descending and sigmoid diverticulosis again noted without evidence for acute diverticulitis. Subtle wall thickening of the mid to distal sigmoid colon, rectum, and anal verge region could reflect mild focal colitis/proctitis. Multiple punctate pancreatic calcifications again seen likely sequelae of chronic pancreatitis  Continue PPI  Hemoglobin has remained stable, no signs of bleeding  See also, plan for dysphagia  Opacity noted on imaging study  Chest x-ray: Retrocardiac opacity, which may be due to pneumonia and/or atelectasis  Procalcitonin peaked and downtrended  Repeat CXR with slight improvement in left base patchy atelectasis versus pneumonia  Status post treatment with IV cefepime/ceftriaxone and vancomycin, since discontinued  Monitor labs and vital signs, conduct serial physical  assessments    COVID-19 virus infection  Patient with fever and COVID-positive roommate at his skilled facility  Tested positive for COVID 12/17/2025  Continues to require 2 L nasal cannula oxygen  S/p remdesivir x 3 days  Continue supportive care  Monitor labs and vital signs, conduct serial physical assessments  Congestion of upper airway  Upper airway congestion noted, patient has strong cough and able to clear  CT imaging revealed small bilateral pleural effusion and extensive bibasilar atelectasis  Maintain head of bed elevated  Aspiration precautions  Appreciate input of speech therapy who are following  Continue Robinul 0.1 mg IV every 4 hours as needed  Continue scopolamine   Continue incentive spirometry  Suction as needed  Goals of care, counseling/discussion  Patient with significant comorbid medical conditions would be high risk for treatment options requiring anesthesia.  EGD testing might have risks that outweigh the benefits.  Patient verbalized understanding.  Opened goals of care conversations with son.  Continued on phone on Saturday. Had further conversations with patient's son, daughter, and grandson when they arrived   Per discussion with patient's son, daughter, and grandson 1/28/2025 regarding non-candidacy for feeding tube and no good options for nutrition currently. Ultimately they would like to keep him comfortable but for now continue with full treatment   Palliative input appreciated   2/1/2024.  Had further discussion regarding goals of care.  Patient's son would like to see speech continuing to work with patient.  I explained to patient's son today that ongoing IV fluids are not sustainable.  I suggested transitioning to pleasure feeds, comfort care.  He would then be able to return to the [facility].  I let him know that the [facility] would not be able to accept patient back at this level of function unless level of care is deemed comfort care.  He acknowledged understanding and asked me to  call his sister and grandson  2/2/2025.  Continued discussion regarding goals of care.  As we discussed yesterday IV fluids are not a long-term option and with hyponatremia and evaluation for possible elevated volume status and planned diuretic, discontinue D5W.  Explained that patient would possibly be evaluated one more time by speech, and then we would have to come together to form a definitive disposition plan  Idiopathic chronic gout of multiple sites without tophus  Pre-hospital allopurinol discontinued  Essential hypertension  Prehospital takesToprol-XL 25 mg p.o. daily and Lasix 20 mg p.o. daily on Monday Wednesday Friday  Currently NPO. Metoprolol switched to IV  Received furosemide 40 mg IV x 1 1/19/2025.  Will give another dose of furosemide 40 mg IV x 1 today 2/2/2025  Blood pressure controlled  Monitor BP   Mixed hyperlipidemia  Pre-hospital Tricor discontinued  Chronic kidney disease, stage 3a (HCC)  Lab Results   Component Value Date    EGFR 77 02/02/2025    EGFR 81 02/01/2025    EGFR 83 01/31/2025    CREATININE 0.70 02/02/2025    CREATININE 0.61 02/01/2025    CREATININE 0.59 (L) 01/31/2025     Creatinine baseline of 0.9-1.1 mg/dL   Continue to monitor renal function  Avoid nephrotoxins and hypotension  Trend creatinine  Atrial flutter (HCC)  Currently rate controlled with metoprolol   Not currently on anticoagulation as outpatient  Constipation  GI input appreciated  Continue bowel regimen PRN  Palliative care by specialist  Palliative care input appreciated  Electrolyte abnormality  Patient was initiated on trickle tube feed on 1/22/2025, but this was discontinued due to suspected aspiration  High risk for refeeding syndrome  Continue to monitor electrolytes and replete accordingly  Continue daily BMP and trend electrolytes  Hypernatremia  In setting of poor oral intake, now resolved  Discontinue D5W   Monitor fluid status closely  Continue ongoing goals of care discussions  Daily BMP and trend  sodium  Acute distention of stomach  Had acute distention of stomach, since resolved  Continue serial assessments  GI input appreciated  Dysphagia  Passed NG clamping trial 1/20/2025, later evaluated by SLP but did not pass   Keep NPO   Trickle feeding through NG initiated 1/22/2025 but discontinued 1/23/2025 due to high residuals and rhonchi requiring frequent suctioning  Speech therapy continues to follow, cleared for ice chips only with nursing supervision  General surgery input regarding feeding tube appreciated.  Patient not currently a candidate due to poor prognosis, risk of complications, and limited benefit  Plan to discuss with speech therapy tomorrow for one more evaluation, then we will have to move toward definitive disposition plan.  Please see also, goals of care    VTE Pharmacologic Prophylaxis: VTE Score: 5 High Risk (Score >/= 5) - Pharmacological DVT Prophylaxis Ordered: heparin. Sequential Compression Devices Ordered.    Mobility:   Basic Mobility Inpatient Raw Score: 6  JH-HLM Goal: 2: Bed activities/Dependent transfer  JH-HLM Achieved: 2: Bed activities/Dependent transfer  JH-HLM Goal achieved. Continue to encourage appropriate mobility.    Patient Centered Rounds: I performed bedside rounds with nursing staff today.     Education and Discussions with Family / Patient: Updated  (son and grandson) at bedside.    Current Length of Stay: 17 day(s)  Current Patient Status: Inpatient   Certification Statement: The patient will continue to require additional inpatient hospital stay due to IV diuretic, speech therapy reevaluation, care coordination, was of care discussions.  Discharge Plan: Anticipate discharge in 24-48 hrs to prior assisted or independent living facility.    Code Status: Level 3 - DNAR and DNI    Subjective   Evaluated at bedside.  Patient denies headache, shortness of breath, chest pain, abdominal pain, arthralgia, myalgia.     Objective :  Temp:  [97.3 °F (36.3  °C)-98.9 °F (37.2 °C)] 98.9 °F (37.2 °C)  HR:  [63-71] 71  BP: (115-141)/(48-55) 116/48  Resp:  [15] 15  SpO2:  [97 %-99 %] 97 %  O2 Device: Nasal cannula  Nasal Cannula O2 Flow Rate (L/min):  [2 L/min] 2 L/min    Body mass index is 26.01 kg/m².     Input and Output Summary (last 24 hours):     Intake/Output Summary (Last 24 hours) at 2/2/2025 1303  Last data filed at 2/2/2025 0612  Gross per 24 hour   Intake 1910 ml   Output 1100 ml   Net 810 ml       Physical Exam  Vitals and nursing note reviewed.   Constitutional:       Appearance: He is ill-appearing.   HENT:      Head: Normocephalic and atraumatic.      Nose: Nose normal.      Mouth/Throat:      Mouth: Mucous membranes are dry.      Pharynx: Oropharynx is clear.   Eyes:      Pupils: Pupils are equal, round, and reactive to light.   Cardiovascular:      Rate and Rhythm: Normal rate and regular rhythm.      Pulses: Normal pulses.   Pulmonary:      Effort: Pulmonary effort is normal. No respiratory distress.      Breath sounds: Wheezing and rhonchi present.      Comments: Today, has inspiratory and expiratory wheezing/rhonchi  Abdominal:      General: Bowel sounds are normal. There is distension.      Palpations: Abdomen is soft.      Tenderness: There is no abdominal tenderness.      Comments: Abdomen distended but greatly improved   Musculoskeletal:         General: Swelling present.      Cervical back: Neck supple.      Right lower leg: No edema.      Left lower leg: No edema.      Comments: Upper extremity swelling present but improved   Skin:     General: Skin is warm and dry.      Capillary Refill: Capillary refill takes less than 2 seconds.   Neurological:      General: No focal deficit present.      Mental Status: He is alert and oriented to person, place, and time.           Lines/Drains:              Lab Results: I have reviewed the following results:   Results from last 7 days   Lab Units 02/02/25  0411   WBC Thousand/uL 8.62   HEMOGLOBIN g/dL 10.3*    HEMATOCRIT % 31.7*   PLATELETS Thousands/uL 386   SEGS PCT % 84*   LYMPHO PCT % 8*   MONO PCT % 4   EOS PCT % 2     Results from last 7 days   Lab Units 02/02/25  0411   SODIUM mmol/L 130*   POTASSIUM mmol/L 3.4*   CHLORIDE mmol/L 98   CO2 mmol/L 26   BUN mg/dL 6   CREATININE mg/dL 0.70   ANION GAP mmol/L 6   CALCIUM mg/dL 7.7*   GLUCOSE RANDOM mg/dL 117                       Recent Cultures (last 7 days):               Last 24 Hours Medication List:     Current Facility-Administered Medications:     acetaminophen (Ofirmev) injection 1,000 mg, Q8H PRN, Last Rate: 1,000 mg (01/28/25 1440)    acetaminophen (TYLENOL) tablet 650 mg, Q4H PRN    bisacodyl (DULCOLAX) rectal suppository 10 mg, Daily PRN    furosemide (LASIX) injection 40 mg, Once    [Held by provider] furosemide (LASIX) tablet 20 mg, Once per day on Monday Wednesday Friday    glycopyrrolate (ROBINUL) injection 0.1 mg, Q4H PRN    heparin (porcine) subcutaneous injection 5,000 Units, Q8H ANKIT    HYDROmorphone HCl (DILAUDID) injection 0.2 mg, Q4H PRN    iohexol (OMNIPAQUE) 240 MG/ML solution 50 mL, 90 min pre-procedure    metoprolol (LOPRESSOR) injection 2.5 mg, Q6H    nitroglycerin (NITROSTAT) SL tablet 0.4 mg, Q5 Min PRN    nystatin (MYCOSTATIN) cream, BID    ondansetron (ZOFRAN) injection 4 mg, Q6H PRN    pantoprazole (PROTONIX) injection 40 mg, Q12H    phenol (CHLORASEPTIC) 1.4 % mucosal liquid 1 spray, Q2H PRN    scopolamine (TRANSDERM-SCOP) 1 mg/3 days TD 72 hr patch 1 patch, Q72H    Administrative Statements   Today, Patient Was Seen By: ANAM Rose      **Please Note: This note may have been constructed using a voice recognition system.**

## 2025-02-02 NOTE — ASSESSMENT & PLAN NOTE
Patient with significant comorbid medical conditions would be high risk for treatment options requiring anesthesia.  EGD testing might have risks that outweigh the benefits.  Patient verbalized understanding.  Opened goals of care conversations with son.  Continued on phone on Saturday. Had further conversations with patient's son, daughter, and grandson when they arrived   Per discussion with patient's son, daughter, and grandson 1/28/2025 regarding non-candidacy for feeding tube and no good options for nutrition currently. Ultimately they would like to keep him comfortable but for now continue with full treatment   Palliative input appreciated   2/1/2024.  Had further discussion regarding goals of care.  Patient's son would like to see speech continuing to work with patient.  I explained to patient's son today that ongoing IV fluids are not sustainable.  I suggested transitioning to pleasure feeds, comfort care.  He would then be able to return to the [facility].  I let him know that the [facility] would not be able to accept patient back at this level of function unless level of care is deemed comfort care.  He acknowledged understanding and asked me to call his sister and grandson  2/2/2025.  Continued discussion regarding goals of care.  As we discussed yesterday IV fluids are not a long-term option and with hyponatremia and evaluation for possible elevated volume status and planned diuretic, discontinue D5W.  Explained that patient would possibly be evaluated one more time by speech, and then we would have to come together to form a definitive disposition plan

## 2025-02-02 NOTE — ASSESSMENT & PLAN NOTE
Lab Results   Component Value Date    EGFR 77 02/02/2025    EGFR 81 02/01/2025    EGFR 83 01/31/2025    CREATININE 0.70 02/02/2025    CREATININE 0.61 02/01/2025    CREATININE 0.59 (L) 01/31/2025     Creatinine baseline of 0.9-1.1 mg/dL   Continue to monitor renal function  Avoid nephrotoxins and hypotension  Trend creatinine

## 2025-02-02 NOTE — ASSESSMENT & PLAN NOTE
In setting of poor oral intake, now resolved  Discontinue D5W   Monitor fluid status closely  Continue ongoing goals of care discussions  Daily BMP and trend sodium

## 2025-02-02 NOTE — ASSESSMENT & PLAN NOTE
Passed NG clamping trial 1/20/2025, later evaluated by SLP but did not pass   Keep NPO   Trickle feeding through NG initiated 1/22/2025 but discontinued 1/23/2025 due to high residuals and rhonchi requiring frequent suctioning  Speech therapy continues to follow, cleared for ice chips only with nursing supervision  General surgery input regarding feeding tube appreciated.  Patient not currently a candidate due to poor prognosis, risk of complications, and limited benefit  Plan to discuss with speech therapy tomorrow for one more evaluation, then we will have to move toward definitive disposition plan.  Please see also, goals of care

## 2025-02-02 NOTE — ASSESSMENT & PLAN NOTE
Prehospital takesToprol-XL 25 mg p.o. daily and Lasix 20 mg p.o. daily on Monday Wednesday Friday  Currently NPO. Metoprolol switched to IV  Received furosemide 40 mg IV x 1 1/19/2025.  Will give another dose of furosemide 40 mg IV x 1 today 2/2/2025  Blood pressure controlled  Monitor BP

## 2025-02-02 NOTE — ASSESSMENT & PLAN NOTE
Had acute distention of stomach, since resolved  Continue serial assessments  GI input appreciated

## 2025-02-03 ENCOUNTER — HOME CARE VISIT (OUTPATIENT)
Dept: HOME HEALTH SERVICES | Facility: HOME HEALTHCARE | Age: OVER 89
End: 2025-02-03

## 2025-02-03 LAB
ANION GAP SERPL CALCULATED.3IONS-SCNC: 7 MMOL/L (ref 4–13)
BASOPHILS # BLD AUTO: 0.03 THOUSANDS/ΜL (ref 0–0.1)
BASOPHILS NFR BLD AUTO: 0 % (ref 0–1)
BUN SERPL-MCNC: 9 MG/DL (ref 5–25)
CALCIUM SERPL-MCNC: 7.9 MG/DL (ref 8.4–10.2)
CHLORIDE SERPL-SCNC: 99 MMOL/L (ref 96–108)
CO2 SERPL-SCNC: 26 MMOL/L (ref 21–32)
CREAT SERPL-MCNC: 0.93 MG/DL (ref 0.6–1.3)
EOSINOPHIL # BLD AUTO: 0.2 THOUSAND/ΜL (ref 0–0.61)
EOSINOPHIL NFR BLD AUTO: 2 % (ref 0–6)
ERYTHROCYTE [DISTWIDTH] IN BLOOD BY AUTOMATED COUNT: 14.8 % (ref 11.6–15.1)
GFR SERPL CREATININE-BSD FRML MDRD: 67 ML/MIN/1.73SQ M
GLUCOSE SERPL-MCNC: 105 MG/DL (ref 65–140)
HCT VFR BLD AUTO: 34.3 % (ref 36.5–49.3)
HGB BLD-MCNC: 11.2 G/DL (ref 12–17)
IMM GRANULOCYTES # BLD AUTO: 0.19 THOUSAND/UL (ref 0–0.2)
IMM GRANULOCYTES NFR BLD AUTO: 2 % (ref 0–2)
LYMPHOCYTES # BLD AUTO: 0.6 THOUSANDS/ΜL (ref 0.6–4.47)
LYMPHOCYTES NFR BLD AUTO: 6 % (ref 14–44)
MCH RBC QN AUTO: 30.9 PG (ref 26.8–34.3)
MCHC RBC AUTO-ENTMCNC: 32.7 G/DL (ref 31.4–37.4)
MCV RBC AUTO: 95 FL (ref 82–98)
MONOCYTES # BLD AUTO: 0.34 THOUSAND/ΜL (ref 0.17–1.22)
MONOCYTES NFR BLD AUTO: 3 % (ref 4–12)
NEUTROPHILS # BLD AUTO: 8.97 THOUSANDS/ΜL (ref 1.85–7.62)
NEUTS SEG NFR BLD AUTO: 87 % (ref 43–75)
NRBC BLD AUTO-RTO: 0 /100 WBCS
PLATELET # BLD AUTO: 388 THOUSANDS/UL (ref 149–390)
PMV BLD AUTO: 9.8 FL (ref 8.9–12.7)
POTASSIUM SERPL-SCNC: 3.6 MMOL/L (ref 3.5–5.3)
RBC # BLD AUTO: 3.62 MILLION/UL (ref 3.88–5.62)
SODIUM SERPL-SCNC: 132 MMOL/L (ref 135–147)
WBC # BLD AUTO: 10.33 THOUSAND/UL (ref 4.31–10.16)

## 2025-02-03 PROCEDURE — 80048 BASIC METABOLIC PNL TOTAL CA: CPT | Performed by: NURSE PRACTITIONER

## 2025-02-03 PROCEDURE — 92526 ORAL FUNCTION THERAPY: CPT

## 2025-02-03 PROCEDURE — 99232 SBSQ HOSP IP/OBS MODERATE 35: CPT | Performed by: NURSE PRACTITIONER

## 2025-02-03 PROCEDURE — 85025 COMPLETE CBC W/AUTO DIFF WBC: CPT | Performed by: NURSE PRACTITIONER

## 2025-02-03 RX ADMIN — HEPARIN SODIUM 5000 UNITS: 5000 INJECTION INTRAVENOUS; SUBCUTANEOUS at 05:06

## 2025-02-03 RX ADMIN — NYSTATIN: 100000 CREAM TOPICAL at 17:18

## 2025-02-03 RX ADMIN — PANTOPRAZOLE SODIUM 40 MG: 40 INJECTION, POWDER, FOR SOLUTION INTRAVENOUS at 17:10

## 2025-02-03 RX ADMIN — METOROPROLOL TARTRATE 2.5 MG: 5 INJECTION, SOLUTION INTRAVENOUS at 22:23

## 2025-02-03 RX ADMIN — METOROPROLOL TARTRATE 2.5 MG: 5 INJECTION, SOLUTION INTRAVENOUS at 17:10

## 2025-02-03 RX ADMIN — PANTOPRAZOLE SODIUM 40 MG: 40 INJECTION, POWDER, FOR SOLUTION INTRAVENOUS at 05:06

## 2025-02-03 RX ADMIN — HEPARIN SODIUM 5000 UNITS: 5000 INJECTION INTRAVENOUS; SUBCUTANEOUS at 15:22

## 2025-02-03 RX ADMIN — METOROPROLOL TARTRATE 2.5 MG: 5 INJECTION, SOLUTION INTRAVENOUS at 12:00

## 2025-02-03 RX ADMIN — METOROPROLOL TARTRATE 2.5 MG: 5 INJECTION, SOLUTION INTRAVENOUS at 04:44

## 2025-02-03 RX ADMIN — HEPARIN SODIUM 5000 UNITS: 5000 INJECTION INTRAVENOUS; SUBCUTANEOUS at 22:23

## 2025-02-03 RX ADMIN — NYSTATIN: 100000 CREAM TOPICAL at 09:48

## 2025-02-03 RX ADMIN — SCOPOLAMINE 1 PATCH: 1.5 PATCH, EXTENDED RELEASE TRANSDERMAL at 15:23

## 2025-02-03 NOTE — QUICK NOTE
Was called to bedside for additional questions from patient's son and grandson.  Patient's son had questions about hospice, answered as best as able based on prior experiences.  Clarified information until he verbalized understanding.  Plan for in person meeting with hospice tomorrow afternoon.

## 2025-02-03 NOTE — ASSESSMENT & PLAN NOTE
Lab Results   Component Value Date    EGFR 67 02/03/2025    EGFR 77 02/02/2025    EGFR 81 02/01/2025    CREATININE 0.93 02/03/2025    CREATININE 0.70 02/02/2025    CREATININE 0.61 02/01/2025     Creatinine baseline of 0.9-1.1 mg/dL   Continue to monitor renal function  Avoid nephrotoxins and hypotension  Trend creatinine

## 2025-02-03 NOTE — ASSESSMENT & PLAN NOTE
Passed NG clamping trial 1/20/2025, later evaluated by SLP but did not pass   Keep NPO   Trickle feeding through NG initiated 1/22/2025 but discontinued 1/23/2025 due to high residuals and rhonchi requiring frequent suctioning  Speech therapy continues to follow, cleared for ice chips only with nursing supervision  General surgery input regarding feeding tube appreciated.  Patient not currently a candidate due to poor prognosis, risk of complications, and limited benefit  Reevaluated by speech 2/3/2025, input appreciated: Recommend to continue NPO ice chips for pleasure   Please see also, goals of care

## 2025-02-03 NOTE — PROGRESS NOTES
Patient:    MRN:  [id number]    Aidin Request ID:  [id number]    Level of care reserved:  Hospice    Partner Reserved:  [facility], [place] [address] [phone]    Clinical needs requested:    Geography searched:  [phone]    Start of Service:    Request sent:  3:19pm EST on 2/3/2025 by Dedra Fletcher    Partner reserved:  5:00pm EST on 2/3/2025 by Dedra Fletcher    Choice list shared:  4:59pm EST on 2/3/2025 by Dedra Fletcher

## 2025-02-03 NOTE — HOSPICE NOTE
Received referral, patient approved for [facility] hospice (at home or SNF). TPC to sterling Amaro and dgt Kelsi, they requested to meet in person tomorrow at 230. I updated THANG Colmenares

## 2025-02-03 NOTE — SOCIAL WORK
Palliative LSW saw patient and Pt son at the bedside today. LSW appreciates the opportunity to provider patient/family with inpatient emotional support and guidance while patient continues to receive medical attention from the medical team.     Topics discussed: Social work spoke with son about patient history, living and work history.  Work also spoke with son in regards to patient discharge possibly back to the [facility].  Son reports that swallowing is still an issue.  Son reports that his father reports not being in pain and is comfortable.  Patient sleeping through most of the visit and only woke up at the end, social work said . Patient presented as pleasant.    Areas that need follow-up: Social work to continue to follow and assist as able  Resources given: Supportive listening, relationship building, support and encouragement.  Others present: Son    I have spent 35 minutes with Patient and family today in which greater than 50% of this time was spent in counseling/coordination of care.       LSW will continue to follow as requested by the medical team, patient, or family

## 2025-02-03 NOTE — UTILIZATION REVIEW
Continued Stay Review    Date: 2/2/25 and 2/3/25                           Current Patient Class: Inpatient  Current Level of Care: Med Surg     HPI:89 y.o. male initially admitted on 1/16/25 due to Upper GI Bleed.      2/2/25 Assessment/Plan: Hospitalist: Furosemide 40 mg IV x 1 dose today. DC D5W IVF's. Ongoing GOC discussion with family. Pt remains NPO due to dysphagia.Did not tolerate TF due to high residuals and rhonchi requiring frequent suctioning. Pt not a candidate for PEG tube.  Continues with IV metoprolol for BP control.Plan to discuss with speech therapy tomorrow for one more evaluation, then we will have to move toward definitive disposition plan.     2/3/25 Assessment/ plan: Hospitalist: ST janel gotti today remains NPO. ice chips for pleasure.  ml, /55, HR 86, Temp 98.4 F, RR 18 Spo2 96% on 2 liters oxygen NC. + rhonchi present in lungs, + dry mucous membranes. Mental status at baseline. A&O to person, place and time. Pt's Son consented to Hospice evaluation after GOC discussion today.Await Hospice evaluation. Continue IV lopressor Q6 for BP control , Protonix 40 mg IV q 12, scopolamine patch for secretions.      Certification Statement: The patient will continue to require additional inpatient hospital stay due to care coordination.  Discharge Plan: Anticipate discharge in 24-48 hrs to prior assisted or independent living facility.    Medications:   Scheduled Medications:  [Held by provider] furosemide, 20 mg, Oral, Once per day on Monday Wednesday Friday  heparin (porcine), 5,000 Units, Subcutaneous, Q8H ANKIT  iohexol, 50 mL, Oral, 90 min pre-procedure  metoprolol, 2.5 mg, Intravenous, Q6H  nystatin, , Topical, BID  pantoprazole, 40 mg, Intravenous, Q12H  scopolamine, 1 patch, Transdermal, Q72H      Continuous IV Infusions: D5w discontinued.      PRN Meds:  acetaminophen, 1,000 mg, Intravenous, Q8H PRN  acetaminophen, 650 mg, Oral, Q4H PRN  bisacodyl, 10 mg, Rectal, Daily  PRN  glycopyrrolate, 0.1 mg, Intravenous, Q4H PRN  HYDROmorphone, 0.2 mg, Intravenous, Q4H PRN  nitroglycerin, 0.4 mg, Sublingual, Q5 Min PRN  ondansetron, 4 mg, Intravenous, Q6H PRN  phenol, 1 spray, Mouth/Throat, Q2H PRN      Discharge Plan: TBD     Vital Signs (last 3 days)       Date/Time Temp Pulse Resp BP MAP (mmHg) SpO2 Calculated FIO2 (%) - Nasal Cannula Nasal Cannula O2 Flow Rate (L/min) O2 Device Patient Position - Orthostatic VS Bria Coma Scale Score Pain    02/03/25 1300 98.4 °F (36.9 °C) -- -- 120/55 72 -- -- -- -- -- -- --    02/03/25 1201 -- 86 -- 118/43 -- -- -- -- -- -- -- --    02/03/25 0900 -- -- -- -- -- -- -- -- -- -- 15 No Pain    02/03/25 07:31:23 97.9 °F (36.6 °C) 74 18 133/50 78 96 % -- -- -- -- -- --    02/03/25 04:38:38 98.5 °F (36.9 °C) 76 -- 107/44 65 97 % -- -- -- -- -- --    02/02/25 23:16:46 98.3 °F (36.8 °C) 65 -- -- -- 97 % -- -- -- -- -- --    02/02/25 23:09:39 -- 80 -- 130/56 81 95 % -- -- -- -- -- --    02/02/25 2100 -- -- -- -- -- 97 % 28 2 L/min Nasal cannula -- 15 No Pain    02/02/25 18:16:34 -- 86 -- 133/53 80 98 % -- -- -- -- -- --    02/02/25 14:31:19 96.8 °F (36 °C) 72 -- -- -- 99 % -- -- -- -- -- --    02/02/25 1430 96.8 °F (36 °C) 74 -- -- -- 99 % -- -- -- -- -- --    02/02/25 1410 -- 67 -- 123/48 73 100 % -- -- -- -- -- --    02/02/25 1000 -- -- -- -- -- -- -- -- -- -- 15 No Pain    02/02/25 07:58:12 98.9 °F (37.2 °C) 71 15 116/48 71 97 % -- -- -- -- -- --    02/02/25 06:00:54 -- 65 -- 141/55 84 98 % -- -- -- -- -- --    02/01/25 23:35:27 -- 69 -- 139/54 82 99 % -- -- -- -- -- --    02/01/25 21:47:51 98.5 °F (36.9 °C) 68 15 136/55 82 97 % 28 2 L/min Nasal cannula Lying -- --    02/01/25 2100 -- -- -- -- -- -- -- -- -- -- 15 No Pain    02/01/25 17:44:49 -- 63 -- 126/52 77 99 % -- -- -- -- -- --    02/01/25 14:50:31 97.3 °F (36.3 °C) 67 -- 115/50 72 97 % -- -- -- -- -- --    02/01/25 11:04:35 -- 69 -- 119/48 72 99 % 28 2 L/min Nasal cannula -- 15 No Pain    02/01/25  07:43:32 97.4 °F (36.3 °C) 69 16 122/49 73 100 % -- -- None (Room air) Lying -- --    01/31/25 2331 -- -- -- -- -- -- 28 2 L/min Nasal cannula -- 14 No Pain    01/31/25 22:35:45 -- 61 -- 134/55 81 97 % -- -- -- -- -- --    01/31/25 14:44:04 98.1 °F (36.7 °C) 64 19 173/68 103 98 % -- -- Nasal cannula Lying -- --    01/31/25 08:02:03 98.1 °F (36.7 °C) 69 18 124/48 73 93 % -- -- Nasal cannula Lying -- --    01/31/25 0730 -- -- -- -- -- -- 28 2 L/min Nasal cannula -- 14 No Pain    01/31/25 0354 -- -- -- -- -- -- -- -- -- -- 14 --    01/31/25 0300 -- -- -- -- -- -- -- -- -- -- -- No Pain    01/31/25 00:35:17 -- 85 -- 157/68 98 94 % -- -- -- -- -- --          Weight (last 2 days)       None            Pertinent Labs/Diagnostic Results:   Radiology:  XR abdomen 1 view kub   Final Interpretation by Sb Haynes MD (01/23 1613)      Nonobstructive bowel gas pattern.               Workstation performed: [id number]         VAS upper limb venous duplex scan, complete, bilateral   Final Interpretation by Abigail Alvarado MD (01/21 2109)      XR chest portable   Final Interpretation by Leonor Ingram MD (01/21 0915)      Slight improvement in opacity in the medial left base which may be due to atelectasis and/or pneumonia.            Workstation performed: [id number]         XR abdomen 1 vw portable   Final Interpretation by Heaven Barton MD (01/20 1417)   Nonobstructive bowel gas pattern. Residual oral contrast throughout the colon. No gastric distention. Satisfactory position of the endogastric tube.               Workstation performed: [id number]         XR chest portable   Final Interpretation by Beatris Benton MD (01/20 0904)      Retrocardiac opacity, which may be due to pneumonia and/or atelectasis.            Workstation performed: [id number]         CT abdomen pelvis w contrast   Final Interpretation by Geovanny Taylor MD (01/17 2239)      1.  New small bilateral pleural effusions with extensive  bibasilar atelectasis.   2.  Wall thickening of the lower esophagus and proximal stomach is again noted without interval change. This may reflect mild focal esophagitis/gastritis. New NG tube with tip terminating in the gastric body region.   3.  No evidence of bowel obstruction, mesenteric inflammation, appendicitis, obstructive uropathy, free air, or free fluid. Descending and sigmoid diverticulosis again noted without evidence for acute diverticulitis. Subtle wall thickening of the mid to    distal sigmoid colon, rectum, and anal verge region could reflect mild focal colitis/proctitis.   4.  Multiple punctate pancreatic calcifications again seen likely sequelae of chronic pancreatitis.      Workstation performed: [id number]         XR chest portable   Final Interpretation by Jose Johns MD (01/17 1327)      Scattered streaky opacities favored to represent atelectasis but pneumonia not excluded.            Workstation performed: [id number]         XR abdomen 1 vw portable   Final Interpretation by Jose Johns MD (01/17 1328)      New marked gaseous distention of the stomach.               Workstation performed: [id number]         XR abdomen 1 view kub   Final Interpretation by Diony Boateng MD (01/16 2677)      1.  NG tube projects over the gastric body with decompression of the stomach compared to the x-ray from earlier the same day.      2.  Mildly dilated mid abdominal small bowel loops with gas throughout the colon though the abdomen was only partially imaged.               Workstation performed: [id number]         XR abdomen 1 view kub   Final Interpretation by Diony Boateng MD (01/16 9465)      Worsening gastric distention.      The study was marked in EPIC for immediate notification.               Workstation performed: [id number]         XR chest portable   Final Interpretation by Sina Vitale MD (01/15 2495)      No endogastric tube visible within the field-of-view.      There  is some gaseous distention of the stomach.      Minimal atelectasis left lung base.      The study was marked in EPIC for immediate notification.            Workstation performed: [id number]         CT abdomen pelvis with contrast   Final Interpretation by Geovanny Taylor MD (01/15 0545)      Trace left pleural effusion with bibasilar atelectasis.  Focal wall thickening of the lower esophagus, gastroesophageal junction, and proximal stomach noted which may reflect esophagitis/gastritis. No evidence for bowel obstruction, mesenteric    inflammation, appendicitis, obstructive uropathy, free air, or free fluid. Descending and sigmoid colon diverticulosis without evidence for acute diverticulitis.         Workstation performed: [id number]           Cardiology:  No orders to display     GI:  No orders to display           Results from last 7 days   Lab Units 02/03/25 0438 02/02/25 0411 02/01/25  0501 01/31/25  0503 01/30/25  0443   WBC Thousand/uL 10.33* 8.62 7.62 11.05* 9.14   HEMOGLOBIN g/dL 11.2* 10.3* 9.9* 10.3* 10.3*   HEMATOCRIT % 34.3* 31.7* 30.9* 31.9* 32.1*   PLATELETS Thousands/uL 388 386 340 344 289   TOTAL NEUT ABS Thousands/µL 8.97* 7.20 6.17  --  7.72*         Results from last 7 days   Lab Units 02/03/25 0438 02/02/25 0411 02/01/25  0501 01/31/25  0503 01/30/25  0443 01/29/25  0435   SODIUM mmol/L 132* 130* 134* 132* 134* 135   POTASSIUM mmol/L 3.6 3.4* 3.3* 3.4* 3.4* 3.6   CHLORIDE mmol/L 99 98 101 99 101 103   CO2 mmol/L 26 26 26 26 28 27   ANION GAP mmol/L 7 6 7 7 5 5   BUN mg/dL 9 6 5 4* 6 8   CREATININE mg/dL 0.93 0.70 0.61 0.59* 0.62 0.71   EGFR ml/min/1.73sq m 67 77 81 83 81 76   CALCIUM mg/dL 7.9* 7.7* 7.8* 7.9* 7.8* 7.6*   MAGNESIUM mg/dL  --   --  2.2 1.7*  --  1.8*   PHOSPHORUS mg/dL  --   --  2.0* 1.3*  --   --              Results from last 7 days   Lab Units 02/03/25  0438 02/02/25  0411 02/01/25  0501 01/31/25  0503 01/30/25  0443 01/29/25  0435 01/28/25  0540   GLUCOSE RANDOM mg/dL 105 117  "116 130 114 105 114             No results found for: \"BETA-HYDROXYBUTYRATE\"                                                                                                                                         Network Utilization Review Department  ATTENTION: Please call with any questions or concerns to [phone] and carefully listen to the prompts so that you are directed to the right person. All voicemails are confidential.   For Discharge needs, contact Care Management DC Support Team at [phone] opt. 2  Send all requests for admission clinical reviews, approved or denied determinations and any other requests to dedicated fax number below belonging to the [facility] where the patient is receiving treatment. List of dedicated fax numbers for the Facilities:  FACILITY NAME UR FAX NUMBER   ADMISSION DENIALS (Administrative/Medical Necessity) [phone]   DISCHARGE SUPPORT TEAM (NETWORK) [phone]   PARENT CHILD HEALTH (Maternity/NICU/Pediatrics) [phone]   [facility] [phone]   [facility] [phone]   [facility] [phone]   [facility] [phone]   [facility] [phone]   [facility] [phone]   [facility] [phone]   [facility] [phone]   [facility] [phone]   Critical access hospital [phone]   [facility] [phone]   [facility] [phone]     "

## 2025-02-03 NOTE — UTILIZATION REVIEW
Geovanna Harkins RN   Utilization Review  Specialty: Utilization Review     Utilization Review      Signed     Date of Service: 1/31/2025  1:48 PM     Signed         Continued Stay Review     Date: 1/31/25                           Current Patient Class: Inpatient                   Current Level of Care: Med Surg      HPI:89 y.o. male initially admitted on 1/16/25 due to Upper GI Bleed.       Assessment/Plan: Hospitalist: /48.Temp 98.1 F, HR 69, RR18, Spo2 93% on 2 liters oxygen NC. KPhOS replacement today IV x 1, for phos 1.3, K 3.4, MG 1.7.  More lethargic today. Frequent verbal and tactile stimulation. + dry mucous membranes, + rhonchi Rt lung improving. Upper extremity edema Left > right. ST re eval today, cough with ice chips, Recommend NPO, ice chips with supervision for pleasure. Ongoing GOC discussions with family. Not a candidate for feeding tube due to poor prognosis, risk of complications, and limited benefit.  Plan: Continue IVF's D5 at 75 ml/hr, IV Protonix Q 12 , IV metoprolol 2.5 mg Q 6for BP control, ongoing discussions for GOC with family. Repeat CBC,Bmp,MG,Joanie, 2/1, heparin Sq for DVT ppx's.      Certification Statement: The patient will continue to require additional inpatient hospital stay due to congestion of upper airway, poor oral intake, goals of care conversations.  Discharge Plan: Anticipate discharge in 24-48 hrs to prior assisted or independent living facility.  Pending clinical progress, goals of care determinations     Medications:   Scheduled Medications:  [Held by provider] furosemide, 20 mg, Oral, Once per day on Monday Wednesday Friday  heparin (porcine), 5,000 Units, Subcutaneous, Q8H ANKIT  iohexol, 50 mL, Oral, 90 min pre-procedure  magnesium sulfate, 2 g, Intravenous, Once  metoprolol, 2.5 mg, Intravenous, Q6H  nystatin, , Topical, BID  pantoprazole, 40 mg, Intravenous, Q12H  scopolamine, 1 patch, Transdermal, Q72H        Continuous IV Infusions:  dextrose, 75 mL/hr,  Intravenous, Continuous        PRN Meds:  acetaminophen, 1,000 mg, Intravenous, Q8H PRN  acetaminophen, 650 mg, Oral, Q4H PRN  bisacodyl, 10 mg, Rectal, Daily PRN  glycopyrrolate, 0.1 mg, Intravenous, Q4H PRN  HYDROmorphone, 0.2 mg, Intravenous, Q4H PRN  nitroglycerin, 0.4 mg, Sublingual, Q5 Min PRN  ondansetron, 4 mg, Intravenous, Q6H PRN  phenol, 1 spray, Mouth/Throat, Q2H PRN        Discharge Plan: TBD      Vital Signs (last 3 days)         Date/Time Temp Pulse Resp BP MAP (mmHg) SpO2 Calculated FIO2 (%) - Nasal Cannula Nasal Cannula O2 Flow Rate (L/min) O2 Device Patient Position - Orthostatic VS Bria Coma Scale Score Pain     01/31/25 08:02:03 98.1 °F (36.7 °C) 69 18 124/48 73 93 % -- -- Nasal cannula Lying -- --     01/31/25 0354 -- -- -- -- -- -- -- -- -- -- 14 --     01/31/25 0300 -- -- -- -- -- -- -- -- -- -- -- No Pain     01/31/25 00:35:17 -- 85 -- 157/68 98 94 % -- -- -- -- -- --     01/30/25 22:12:39 -- 88 -- 134/64 87 97 % -- -- -- -- -- --     01/30/25 15:20:30 -- 79 16 155/59 91 99 % -- -- -- -- -- --     01/30/25 0930 -- -- -- -- -- -- -- -- -- -- 14 No Pain     01/30/25 07:21:26 99.3 °F (37.4 °C) 75 16 135/47 76 93 % -- -- -- -- -- --     01/30/25 02:28:28 -- 81 -- 155/55 88 97 % -- -- -- -- -- --     01/30/25 0034 -- -- -- -- -- -- -- -- -- -- 14 No Pain     01/29/25 22:38:17 99.1 °F (37.3 °C) 73 20 121/51 74 98 % -- -- -- -- -- --     01/29/25 14:28:19 97.9 °F (36.6 °C) 76 18 137/50 79 96 % 28 2 L/min Nasal cannula Lying -- --     01/29/25 0921 -- -- -- -- -- -- -- -- -- -- -- 7     01/29/25 07:50:08 99.1 °F (37.3 °C) 67 18 132/50 77 94 % -- -- -- -- -- --     01/29/25 0717 -- -- -- -- -- 96 % 28 2 L/min Nasal cannula -- -- No Pain     01/29/25 02:08:47 -- 78 -- 124/61 82 92 % 28 2 L/min Nasal cannula -- -- No Pain     01/28/25 22:48:48 98.1 °F (36.7 °C) 75 19 114/47 69 95 % -- -- -- -- -- --     01/28/25 14:35:58 100 °F (37.8 °C) 70 22 149/55 86 95 % -- -- -- -- -- --     01/28/25 0823 -- --  -- -- -- 90 % 28 2 L/min Nasal cannula -- -- No Pain     01/28/25 0820 -- -- -- -- -- 89 % -- -- None (Room air) -- -- --     01/28/25 02:50:09 -- 73 -- 118/43 68 89 % -- -- -- -- -- --             Weight (last 2 days)         None                Pertinent Labs/Diagnostic Results:   Radiology:  XR abdomen 1 view kub   Final Interpretation by Sb Haynes MD (01/23 1613)       Nonobstructive bowel gas pattern.                   Workstation performed: [id number]           VAS upper limb venous duplex scan, complete, bilateral   Final Interpretation by Abigail Alvarado MD (01/21 2109)       XR chest portable   Final Interpretation by Leonor Ingram MD (01/21 0915)       Slight improvement in opacity in the medial left base which may be due to atelectasis and/or pneumonia.               Workstation performed: [id number]           XR abdomen 1 vw portable   Final Interpretation by Heaven Barton MD (01/20 1417)   Nonobstructive bowel gas pattern. Residual oral contrast throughout the colon. No gastric distention. Satisfactory position of the endogastric tube.                   Workstation performed: [id number]           XR chest portable   Final Interpretation by Beatris Benton MD (01/20 0904)       Retrocardiac opacity, which may be due to pneumonia and/or atelectasis.               Workstation performed: [id number]           CT abdomen pelvis w contrast   Final Interpretation by Geovanny Taylor MD (01/17 2239)       1.  New small bilateral pleural effusions with extensive bibasilar atelectasis.   2.  Wall thickening of the lower esophagus and proximal stomach is again noted without interval change. This may reflect mild focal esophagitis/gastritis. New NG tube with tip terminating in the gastric body region.   3.  No evidence of bowel obstruction, mesenteric inflammation, appendicitis, obstructive uropathy, free air, or free fluid. Descending and sigmoid diverticulosis again noted without  evidence for acute diverticulitis. Subtle wall thickening of the mid to    distal sigmoid colon, rectum, and anal verge region could reflect mild focal colitis/proctitis.   4.  Multiple punctate pancreatic calcifications again seen likely sequelae of chronic pancreatitis.       Workstation performed: [id number]           XR chest portable   Final Interpretation by Jose Johns MD (01/17 1327)       Scattered streaky opacities favored to represent atelectasis but pneumonia not excluded.               Workstation performed: [id number]           XR abdomen 1 vw portable   Final Interpretation by Jose Johns MD (01/17 1328)       New marked gaseous distention of the stomach.                   Workstation performed: [id number]           XR abdomen 1 view kub   Final Interpretation by Diony Boateng MD (01/16 1503)       1.  NG tube projects over the gastric body with decompression of the stomach compared to the x-ray from earlier the same day.       2.  Mildly dilated mid abdominal small bowel loops with gas throughout the colon though the abdomen was only partially imaged.                   Workstation performed: [id number]           XR abdomen 1 view kub   Final Interpretation by Diony Boateng MD (01/16 3162)       Worsening gastric distention.       The study was marked in EPIC for immediate notification.                   Workstation performed: [id number]           XR chest portable   Final Interpretation by Sina Vitale MD (01/15 8121)       No endogastric tube visible within the field-of-view.       There is some gaseous distention of the stomach.       Minimal atelectasis left lung base.       The study was marked in EPIC for immediate notification.               Workstation performed: [id number]           CT abdomen pelvis with contrast   Final Interpretation by Geovanny Taylor MD (01/15 8034)       Trace left pleural effusion with bibasilar atelectasis.  Focal wall thickening of the  lower esophagus, gastroesophageal junction, and proximal stomach noted which may reflect esophagitis/gastritis. No evidence for bowel obstruction, mesenteric    inflammation, appendicitis, obstructive uropathy, free air, or free fluid. Descending and sigmoid colon diverticulosis without evidence for acute diverticulitis.           Workstation performed: [id number]                                 Results from last 7 days   Lab Units 01/31/25  0503 01/30/25  0443 01/29/25  0435 01/28/25  0540 01/27/25  0547   WBC Thousand/uL 11.05* 9.14 8.28 8.26 9.51   HEMOGLOBIN g/dL 10.3* 10.3* 10.3* 9.5* 9.5*   HEMATOCRIT % 31.9* 32.1* 32.0* 30.2* 30.0*   PLATELETS Thousands/uL 344 289 274 270 258   TOTAL NEUT ABS Thousands/µL  --  7.72* 6.91 6.92  --                      Results from last 7 days   Lab Units 01/31/25 0503 01/30/25 0443 01/29/25 0435 01/28/25  0540 01/27/25  0547 01/26/25  0447 01/25/25  0521   SODIUM mmol/L 132* 134* 135 137 140 146 149*   POTASSIUM mmol/L 3.4* 3.4* 3.6 3.2* 3.2* 3.2* 3.2*   CHLORIDE mmol/L 99 101 103 105 108 112* 115*   CO2 mmol/L 26 28 27 27 28 28 29   ANION GAP mmol/L 7 5 5 5 4 6 5   BUN mg/dL 4* 6 8 9 12 15 18   CREATININE mg/dL 0.59* 0.62 0.71 0.69 0.73 0.85 0.81   EGFR ml/min/1.73sq m 83 81 76 77 76 71 72   CALCIUM mg/dL 7.9* 7.8* 7.6* 7.5* 7.6* 8.0* 8.2*   MAGNESIUM mg/dL 1.7*  --  1.8*  --  1.9 2.0 2.2   PHOSPHORUS mg/dL 1.3*  --   --   --  1.7* 2.2* 1.7*                         Results from last 7 days   Lab Units 01/31/25  0503 01/30/25  0443 01/29/25  0435 01/28/25  0540 01/27/25  0547 01/26/25  0447 01/25/25  0521   GLUCOSE RANDOM mg/dL 130 114 105 114 113 114 115                       Results from last 7 days   Lab Units 01/26/25  0447 01/25/25  0521   PROCALCITONIN ng/ml 0.24 0.24                    Network Utilization Review Department  ATTENTION: Please call with any questions or concerns to [phone] and carefully listen to the prompts so that you are directed to the right  person. All voicemails are confidential.   For Discharge needs, contact Care Management DC Support Team at [phone] opt. 2  Send all requests for admission clinical reviews, approved or denied determinations and any other requests to dedicated fax number below belonging to the campus where the patient is receiving treatment. List of dedicated fax numbers for the Facilities:  FACILITY NAME UR FAX NUMBER   ADMISSION DENIALS (Administrative/Medical Necessity) [phone]   DISCHARGE SUPPORT TEAM (NETWORK) [phone]   PARENT CHILD HEALTH (Maternity/NICU/Pediatrics) [phone]   [facility] [phone]   [facility] [phone]   [facility] [phone]   [facility] [phone]   [facility] [phone]   [facility] [phone]   [facility] [phone]   [facility] [phone]   [facility] [phone]   [facility] [phone]   [facility] [phone]   [facility] [phone]                         Sondra Meeks, RN   Registered Nurse  Specialty: Utilization Review     Utilization Review      Signed     Date of Service: 2/1/2025  2:41 PM     Signed       Expand All Collapse All       Continued Stay Review     DATE: 2/1     Current Patient Class: Inpatient                   Current Level of Care: MS     HPI:89 y.o. male initially admitted on 1/16      Assessment/Plan:   Upper GI Bleed, covid, upper airway congestion - remains on PPI IV q 12 hrs, H/H stable.  Goals of care discussion continuing w/ family and they would like ST to continue working with the pt.  He remains on IV fluids, and Tube feeds are not a good option for pt.  Will continue working w/ family on  dispo. PO Metoprolol now in IV form. Pt is NPO, BP controlled. On exam pt is ill-appearing, has swelling , UE L>R persists w/ some improvement.  Mucous membranes dry. Slow to answer w/ mild confusion.      Medications:   Scheduled Medications:    Scheduled Medications ONLY (does not pull in infusions nor PRN medications order   [Held by provider] furosemide, 20 mg, Oral, Once per day on Monday Wednesday Friday  heparin (porcine), 5,000 Units, Subcutaneous, Q8H ANKIT  iohexol, 50 mL, Oral, 90 min pre-procedure  metoprolol, 2.5 mg, Intravenous, Q6H  nystatin, , Topical, BID  pantoprazole, 40 mg, Intravenous, Q12H  scopolamine, 1 patch, Transdermal, Q72H         Continuous IV Infusions:    Infusions Meds - Displays dose, route, & frequency only   dextrose, 75 mL/hr, Intravenous, Continuous         PRN Meds:  acetaminophen, 1,000 mg, Intravenous, Q8H PRN  acetaminophen, 650 mg, Oral, Q4H PRN  bisacodyl, 10 mg, Rectal, Daily PRN  glycopyrrolate, 0.1 mg, Intravenous, Q4H PRN  HYDROmorphone, 0.2 mg, Intravenous, Q4H PRN  nitroglycerin, 0.4 mg, Sublingual, Q5 Min PRN  ondansetron, 4 mg, Intravenous, Q6H PRN  phenol, 1 spray, Mouth/Throat, Q2H PRN        Discharge Plan: TBD     Vital Signs (last 3 days)         Date/Time Temp Pulse Resp BP MAP (mmHg) SpO2 Calculated FIO2 (%) - Nasal Cannula Nasal Cannula O2 Flow Rate (L/min) O2 Device Patient Position - Orthostatic VS Bria Coma Scale Score Pain     02/01/25 11:04:35 -- 69 -- 119/48 72 99 % 28 2 L/min Nasal cannula -- 15 No Pain     02/01/25 07:43:32 97.4 °F (36.3 °C) 69 16 122/49 73 100 % -- -- None (Room air) Lying -- --     01/31/25 2331 -- -- -- -- -- -- 28 2 L/min Nasal cannula -- 14 No Pain     01/31/25 22:35:45 -- 61 -- 134/55 81 97 % -- -- -- -- -- --     01/31/25 14:44:04 98.1 °F (36.7 °C) 64 19 173/68 103 98 % -- -- Nasal cannula Lying -- --     01/31/25 08:02:03 98.1 °F (36.7 °C) 69 18 124/48 73 93 % -- -- Nasal cannula Lying -- --     01/31/25 0730 -- -- -- -- --  -- 28 2 L/min Nasal cannula -- 14 No Pain     01/31/25 0354 -- -- -- -- -- -- -- -- -- -- 14 --     01/31/25 0300 -- -- -- -- -- -- -- -- -- -- -- No Pain     01/31/25 00:35:17 -- 85 -- 157/68 98 94 % -- -- -- -- -- --     01/30/25 22:12:39 -- 88 -- 134/64 87 97 % -- -- -- -- -- --     01/30/25 15:20:30 -- 79 16 155/59 91 99 % -- -- -- -- -- --     01/30/25 0930 -- -- -- -- -- -- -- -- -- -- 14 No Pain     01/30/25 07:21:26 99.3 °F (37.4 °C) 75 16 135/47 76 93 % -- -- -- -- -- --     01/30/25 02:28:28 -- 81 -- 155/55 88 97 % -- -- -- -- -- --     01/30/25 0034 -- -- -- -- -- -- -- -- -- -- 14 No Pain     01/29/25 22:38:17 99.1 °F (37.3 °C) 73 20 121/51 74 98 % -- -- -- -- -- --     01/29/25 14:28:19 97.9 °F (36.6 °C) 76 18 137/50 79 96 % 28 2 L/min Nasal cannula Lying -- --     01/29/25 0921 -- -- -- -- -- -- -- -- -- -- -- 7     01/29/25 07:50:08 99.1 °F (37.3 °C) 67 18 132/50 77 94 % -- -- -- -- -- --     01/29/25 0717 -- -- -- -- -- 96 % 28 2 L/min Nasal cannula -- -- No Pain     01/29/25 02:08:47 -- 78 -- 124/61 82 92 % 28 2 L/min Nasal cannula -- -- No Pain             Weight (last 2 days)         None                Pertinent Labs/Diagnostic Results:   Radiology:  XR abdomen 1 view kub   Final Interpretation by Sb Haynes MD (01/23 1613)       Nonobstructive bowel gas pattern.                   Workstation performed: [id number]           VAS upper limb venous duplex scan, complete, bilateral   Final Interpretation by Abigail Alvarado MD (01/21 2109)       XR chest portable   Final Interpretation by Leonor Ingram MD (01/21 0915)       Slight improvement in opacity in the medial left base which may be due to atelectasis and/or pneumonia.               Workstation performed: [id number]           XR abdomen 1 vw portable   Final Interpretation by Heaven Barton MD (01/20 1417)   Nonobstructive bowel gas pattern. Residual oral contrast throughout the colon. No gastric distention. Satisfactory  position of the endogastric tube.                   Workstation performed: [id number]           XR chest portable   Final Interpretation by Beatris Benton MD (01/20 0904)       Retrocardiac opacity, which may be due to pneumonia and/or atelectasis.               Workstation performed: [id number]           CT abdomen pelvis w contrast   Final Interpretation by Geovanny Taylor MD (01/17 8979)       1.  New small bilateral pleural effusions with extensive bibasilar atelectasis.   2.  Wall thickening of the lower esophagus and proximal stomach is again noted without interval change. This may reflect mild focal esophagitis/gastritis. New NG tube with tip terminating in the gastric body region.   3.  No evidence of bowel obstruction, mesenteric inflammation, appendicitis, obstructive uropathy, free air, or free fluid. Descending and sigmoid diverticulosis again noted without evidence for acute diverticulitis. Subtle wall thickening of the mid to    distal sigmoid colon, rectum, and anal verge region could reflect mild focal colitis/proctitis.   4.  Multiple punctate pancreatic calcifications again seen likely sequelae of chronic pancreatitis.       Workstation performed: [id number]           XR chest portable   Final Interpretation by Jose Johns MD (01/17 1327)       Scattered streaky opacities favored to represent atelectasis but pneumonia not excluded.               Workstation performed: [id number]           XR abdomen 1 vw portable   Final Interpretation by Jose Johns MD (01/17 1328)       New marked gaseous distention of the stomach.                   Workstation performed: [id number]           XR abdomen 1 view kub   Final Interpretation by Diony Boateng MD (01/16 1250)       1.  NG tube projects over the gastric body with decompression of the stomach compared to the x-ray from earlier the same day.       2.  Mildly dilated mid abdominal small bowel loops with gas throughout the colon though the  abdomen was only partially imaged.                   Workstation performed: [id number]           XR abdomen 1 view kub   Final Interpretation by Diony Boateng MD (01/16 0836)       Worsening gastric distention.       The study was marked in EPIC for immediate notification.                   Workstation performed: [id number]           XR chest portable   Final Interpretation by Sina Vitale MD (01/15 4989)       No endogastric tube visible within the field-of-view.       There is some gaseous distention of the stomach.       Minimal atelectasis left lung base.       The study was marked in EPIC for immediate notification.               Workstation performed: [id number]           CT abdomen pelvis with contrast   Final Interpretation by Geovanny Taylor MD (01/15 1379)       Trace left pleural effusion with bibasilar atelectasis.  Focal wall thickening of the lower esophagus, gastroesophageal junction, and proximal stomach noted which may reflect esophagitis/gastritis. No evidence for bowel obstruction, mesenteric    inflammation, appendicitis, obstructive uropathy, free air, or free fluid. Descending and sigmoid colon diverticulosis without evidence for acute diverticulitis.           Workstation performed: [id number]              Cardiology:  No orders to display      GI:  No orders to display                      Results from last 7 days   Lab Units 02/01/25  0501 01/31/25  0503 01/30/25  0443 01/29/25  0435 01/28/25  0540   WBC Thousand/uL 7.62 11.05* 9.14 8.28 8.26   HEMOGLOBIN g/dL 9.9* 10.3* 10.3* 10.3* 9.5*   HEMATOCRIT % 30.9* 31.9* 32.1* 32.0* 30.2*   PLATELETS Thousands/uL 340 344 289 274 270   TOTAL NEUT ABS Thousands/µL 6.17  --  7.72* 6.91 6.92                     Results from last 7 days   Lab Units 02/01/25  0501 01/31/25  0503 01/30/25  0443 01/29/25  0435 01/28/25  0540 01/27/25  0547 01/26/25  0447   SODIUM mmol/L 134* 132* 134* 135 137 140 146   POTASSIUM mmol/L 3.3* 3.4* 3.4* 3.6 3.2*  3.2* 3.2*   CHLORIDE mmol/L 101 99 101 103 105 108 112*   CO2 mmol/L 26 26 28 27 27 28 28   ANION GAP mmol/L 7 7 5 5 5 4 6   BUN mg/dL 5 4* 6 8 9 12 15   CREATININE mg/dL 0.61 0.59* 0.62 0.71 0.69 0.73 0.85   EGFR ml/min/1.73sq m 81 83 81 76 77 76 71   CALCIUM mg/dL 7.8* 7.9* 7.8* 7.6* 7.5* 7.6* 8.0*   MAGNESIUM mg/dL 2.2 1.7*  --  1.8*  --  1.9 2.0   PHOSPHORUS mg/dL 2.0* 1.3*  --   --   --  1.7* 2.2*                         Results from last 7 days   Lab Units 02/01/25  0501 01/31/25  0503 01/30/25  0443 01/29/25  0435 01/28/25  0540 01/27/25  0547 01/26/25  0447   GLUCOSE RANDOM mg/dL 116 130 114 105 114 113 114            Results from last 7 days   Lab Units 01/26/25  0447   PROCALCITONIN ng/ml 0.24               Network Utilization Review Department  ATTENTION: Please call with any questions or concerns to [phone] and carefully listen to the prompts so that you are directed to the right person. All voicemails are confidential.   For Discharge needs, contact Care Management DC Support Team at [phone] opt. 2  Send all requests for admission clinical reviews, approved or denied determinations and any other requests to dedicated fax number below belonging to the [place] where the patient is receiving treatment. List of dedicated fax numbers for the Facilities:  FACILITY NAME UR FAX NUMBER   ADMISSION DENIALS (Administrative/Medical Necessity) [phone]   DISCHARGE SUPPORT TEAM (NETWORK) [phone]   PARENT CHILD HEALTH (Maternity/NICU/Pediatrics) [phone]   [facility] [phone]   [facility] [phone]   [facility] [phone]   [facility] [phone]   [facility] [phone]   [facility] [phone]   [facility] [phone]   [facility] [phone]   [facility]  [facility] [phone]   [facility] [phone]   [facility] [phone]   [facility] [phone]

## 2025-02-03 NOTE — ASSESSMENT & PLAN NOTE
Prehospital takesToprol-XL 25 mg p.o. daily and Lasix 20 mg p.o. daily on Monday Wednesday Friday  Currently NPO. Metoprolol switched to IV  Received furosemide 40 mg IV x 1 1/19/2025.  Received dose of furosemide 40 mg IV x 1 2/2/2025  Blood pressure controlled  Monitor BP

## 2025-02-03 NOTE — SPEECH THERAPY NOTE
Speech Language/Pathology    Speech/Language Pathology Progress Note    Patient Name: Prem Mar Sr.  Today's Date: 2/3/2025     Problem List  Principal Problem:    Upper GI bleed  Active Problems:    Essential hypertension    Idiopathic chronic gout of multiple sites without tophus    Mixed hyperlipidemia    Chronic kidney disease, stage 3a (HCC)    Atrial flutter (HCC)    Constipation    Goals of care, counseling/discussion    Acute distention of stomach    COVID-19 virus infection    Congestion of upper airway    Opacity noted on imaging study    Palliative care by specialist    Electrolyte abnormality    Hypernatremia    Dysphagia       Past Medical History  Past Medical History:   Diagnosis Date    Arthritis     Cataract     Coronary artery disease     Coronary atherosclerosis of native coronary artery     Diverticulitis of colon     Essential hypertension, benign     Hyperlipidemia     Hypertension     Peptic ulcer     Renal disorder         Past Surgical History  Past Surgical History:   Procedure Laterality Date    CATARACT EXTRACTION Right     Left eye 5/2021    CORONARY STENT PLACEMENT      SKIN BIOPSY      TONSILLECTOMY           Subjective:  Pt was positioned upright and alerted to stim. Son at bedside.   Objective:  Pt was seen for f/u dysphagia therapy for po potential. Positioned upright. More alert compared to Friday. However pt mental status appears to wax and wane. Mouth appeared dry, oral care completer with suction, pt tolerated well. Pt does have intermittent wet cough min improved since prior however unable to bring up secretions to be suctioned. Cough appears weak compared to prior sessions. Trialed ice chips and tsp of thin liquids. Demonstrated functional bolus control and formation. Transfer and swallow initiation appeared prolonged. Laryngeal rise  palpated per trial 2-3 swallows.  Following trials immediate cough and/or weak throat clear. Pt provided verbal cue to cough prn to clear  air way. Following tsp of thin harsh cough removed mild amount of secretions. Discontinued trials following. Son questioned about secretions, encouraged for pt to remain upright as able to help clear airway. Son also questioned if pt swallow improved since week prior ST reviewed current swallow status as pt continues with deep secretions and coughing with trials indicates high risk of aspiration. Son appeared to understand. ST reviewed session with nursing and CRNP.  Assessment:  More alert today then last therapy session. Continues with multiple swallows with ice chips and tsp thin. Harsh cough removed mild amount of secretions. Weak cough and throat clear with trials of ice chips and tsp of water.   Plan/Recommendations:  Recommend continue NPO ice chips for pleasure   Ensure good oral care  Aspiration precautions   Ongoing GOC   ST will f/u as indicated.

## 2025-02-03 NOTE — PLAN OF CARE
Problem: SAFETY ADULT  Goal: Patient will remain free of falls  Description: INTERVENTIONS:  - Educate patient/family on patient safety including physical limitations  - Instruct patient to call for assistance with activity   - Consult OT/PT to assist with strengthening/mobility   - Keep Call bell within reach  - Keep bed low and locked with side rails adjusted as appropriate  - Keep care items and personal belongings within reach  - Initiate and maintain comfort rounds  - Make Fall Risk Sign visible to staff  - Offer Toileting every 2 Hours, in advance of need  - Initiate/Maintain bed alarm  - Obtain necessary fall risk management equipment: bed/chair alarm  - Apply yellow socks and bracelet for high fall risk patients  - Consider moving patient to room near nurses station  Outcome: Progressing  Goal: Maintain or return to baseline ADL function  Description: INTERVENTIONS:  -  Assess patient's ability to carry out ADLs; assess patient's baseline for ADL function and identify physical deficits which impact ability to perform ADLs (bathing, care of mouth/teeth, toileting, grooming, dressing, etc.)  - Assess/evaluate cause of self-care deficits   - Assess range of motion  - Assess patient's mobility; develop plan if impaired  - Assess patient's need for assistive devices and provide as appropriate  - Encourage maximum independence but intervene and supervise when necessary  - Involve family in performance of ADLs  - Assess for home care needs following discharge   - Consider OT consult to assist with ADL evaluation and planning for discharge  - Provide patient education as appropriate  Outcome: Progressing  Goal: Maintains/Returns to pre admission functional level  Description: INTERVENTIONS:  - Perform AM-PAC 6 Click Basic Mobility/ Daily Activity assessment daily.  - Set and communicate daily mobility goal to care team and patient/family/caregiver.   - Collaborate with rehabilitation services on mobility goals if  consulted  - Perform Range of Motion 3 times a day.  - Reposition patient every 2 hours.  - Record patient progress and toleration of activity level   Outcome: Progressing

## 2025-02-03 NOTE — PROGRESS NOTES
Progress Note - Hospitalist   Name: Prem Mar Sr. 89 y.o. male I MRN: [id number]  Unit/Bed#: [facility] I Date of Admission: 1/15/2025   Date of Service: 2/3/2025 I Hospital Day: 18    Assessment & Plan  Upper GI bleed  Presented for suspected GI bleed  CT abdomen and pelvis 1/15/2025 with focal wall thickening of the lower esophagus, gastroesophageal junction, and proximal stomach which may reflect esophagitis/gastritis.  No evidence for bowel obstruction, mesenteric inflammation, appendicitis, obstructive uropathy, free air or free fluid  Chest x-ray 1/15/2025 with gaseous distention of the stomach  KUB 1/16/2025 with worsening distention  NGT was placed, since discontinued  CT abdomen pelvis with contrast 1/17/2024: New small bilateral pleural effusions with extensive bibasilar atelectasis. Wall thickening of the lower esophagus and proximal stomach is again noted without interval change. This may reflect mild focal esophagitis/gastritis. New NG tube with tip terminating in the gastric body region. No evidence of bowel obstruction, mesenteric inflammation, appendicitis, obstructive uropathy, free air, or free fluid. Descending and sigmoid diverticulosis again noted without evidence for acute diverticulitis. Subtle wall thickening of the mid to distal sigmoid colon, rectum, and anal verge region could reflect mild focal colitis/proctitis. Multiple punctate pancreatic calcifications again seen likely sequelae of chronic pancreatitis  Continue PPI  Hemoglobin has remained stable, no signs of bleeding  See also, plan for dysphagia  Opacity noted on imaging study  Chest x-ray: Retrocardiac opacity, which may be due to pneumonia and/or atelectasis  Procalcitonin peaked and downtrended  Repeat CXR with slight improvement in left base patchy atelectasis versus pneumonia  Status post treatment with IV cefepime/ceftriaxone and vancomycin, since discontinued  Monitor labs and vital signs, conduct serial physical  assessments    COVID-19 virus infection  Patient with fever and COVID-positive roommate at his skilled facility  Tested positive for COVID 12/17/2025  Continues to require 2 L nasal cannula oxygen  S/p remdesivir x 3 days  Continue supportive care  Monitor labs and vital signs, conduct serial physical assessments  Congestion of upper airway  Upper airway congestion noted, patient has strong cough and able to clear  CT imaging revealed small bilateral pleural effusion and extensive bibasilar atelectasis  Maintain head of bed elevated  Aspiration precautions  Appreciate input of speech therapy who are following  Continue Robinul 0.1 mg IV every 4 hours as needed  Continue scopolamine   Continue incentive spirometry  Suction as needed  Goals of care, counseling/discussion  Patient with significant comorbid medical conditions would be high risk for treatment options requiring anesthesia.  EGD testing might have risks that outweigh the benefits.  Patient verbalized understanding.  Opened goals of care conversations with son.  Continued on phone on Saturday. Had further conversations with patient's son, daughter, and grandson when they arrived   Per discussion with patient's son, daughter, and grandson 1/28/2025 regarding non-candidacy for feeding tube and no good options for nutrition currently. Ultimately they would like to keep him comfortable but for now continue with full treatment   Palliative input appreciated   2/1/2025.  Had further discussion regarding goals of care.  Patient's son would like to see speech continuing to work with patient.  I explained to patient's son today that ongoing IV fluids are not sustainable.  I suggested transitioning to pleasure feeds, comfort care.  He would then be able to return to the [facility].  I let him know that the [facility] would not be able to accept patient back at this level of function unless level of care is deemed comfort care.  He acknowledged understanding and asked me to  call his sister and grandson  2/2/2025.  Continued discussion regarding goals of care.  As we discussed yesterday IV fluids are not a long-term option and with hyponatremia and evaluation for possible elevated volume status and planned diuretic, discontinue D5W.  Explained that patient would possibly be evaluated one more time by speech, and then we would have to come together to form a definitive disposition plan  2/3/2025: Patient's son consented to a hospice evaluation  Idiopathic chronic gout of multiple sites without tophus  Pre-hospital allopurinol discontinued  Essential hypertension  Prehospital takesToprol-XL 25 mg p.o. daily and Lasix 20 mg p.o. daily on Monday Wednesday Friday  Currently NPO. Metoprolol switched to IV  Received furosemide 40 mg IV x 1 1/19/2025.  Received dose of furosemide 40 mg IV x 1 2/2/2025  Blood pressure controlled  Monitor BP   Mixed hyperlipidemia  Pre-hospital Tricor discontinued  Chronic kidney disease, stage 3a (HCC)  Lab Results   Component Value Date    EGFR 67 02/03/2025    EGFR 77 02/02/2025    EGFR 81 02/01/2025    CREATININE 0.93 02/03/2025    CREATININE 0.70 02/02/2025    CREATININE 0.61 02/01/2025     Creatinine baseline of 0.9-1.1 mg/dL   Continue to monitor renal function  Avoid nephrotoxins and hypotension  Trend creatinine  Atrial flutter (HCC)  Currently rate controlled with metoprolol   Not currently on anticoagulation as outpatient  Constipation  GI input appreciated  Continue bowel regimen PRN  Palliative care by specialist  Palliative care input appreciated  Electrolyte abnormality  Patient was initiated on trickle tube feed on 1/22/2025, but this was discontinued due to suspected aspiration  High risk for refeeding syndrome  Continue to monitor electrolytes and replete accordingly  Continue daily BMP and trend electrolytes  Hypernatremia  In setting of poor oral intake, now resolved  Discontinue D5W   Monitor fluid status closely  Continue ongoing goals of  care discussions  Trend sodium  Acute distention of stomach  Had acute distention of stomach, since resolved  Continue serial assessments  GI input appreciated  Dysphagia  Passed NG clamping trial 1/20/2025, later evaluated by SLP but did not pass   Keep NPO   Trickle feeding through NG initiated 1/22/2025 but discontinued 1/23/2025 due to high residuals and rhonchi requiring frequent suctioning  Speech therapy continues to follow, cleared for ice chips only with nursing supervision  General surgery input regarding feeding tube appreciated.  Patient not currently a candidate due to poor prognosis, risk of complications, and limited benefit  Reevaluated by speech 2/3/2025, input appreciated: Recommend to continue NPO ice chips for pleasure   Please see also, goals of care    VTE Pharmacologic Prophylaxis: VTE Score: 5 High Risk (Score >/= 5) - Pharmacological DVT Prophylaxis Ordered: heparin. Sequential Compression Devices Ordered.    Mobility:   Basic Mobility Inpatient Raw Score: 6  JH-HLM Goal: 2: Bed activities/Dependent transfer  JH-HLM Achieved: 2: Bed activities/Dependent transfer  JH-HLM Goal achieved. Continue to encourage appropriate mobility.    Patient Centered Rounds: I performed bedside rounds with nursing staff today.   Discussions with Specialists or Other Care Team Provider:     Education and Discussions with Family / Patient: Updated  (son) at bedside.  Also left voicemail for grandson and spoke with daughter on phone.    Current Length of Stay: 18 day(s)  Current Patient Status: Inpatient   Certification Statement: The patient will continue to require additional inpatient hospital stay due to care coordination.  Discharge Plan: Anticipate discharge in 24-48 hrs to prior assisted or independent living facility.    Code Status: Level 3 - DNAR and DNI    Subjective   Evaluated at bedside.  Denies pain or discomfort.  No overnight events.    Objective :  Temp:  [97.9 °F (36.6 °C)-98.5 °F  (36.9 °C)] 98.4 °F (36.9 °C)  HR:  [65-86] 86  BP: (107-133)/(43-56) 120/55  Resp:  [18] 18  SpO2:  [95 %-98 %] 96 %  O2 Device: Nasal cannula  Nasal Cannula O2 Flow Rate (L/min):  [2 L/min] 2 L/min    Body mass index is 26.01 kg/m².     Input and Output Summary (last 24 hours):     Intake/Output Summary (Last 24 hours) at 2/3/2025 1609  Last data filed at 2/2/2025 2315  Gross per 24 hour   Intake --   Output 600 ml   Net -600 ml       Physical Exam  Vitals and nursing note reviewed.   Constitutional:       General: He is not in acute distress.  HENT:      Head: Normocephalic and atraumatic.      Nose: Nose normal.      Mouth/Throat:      Mouth: Mucous membranes are dry.      Pharynx: Oropharynx is clear.   Eyes:      Pupils: Pupils are equal, round, and reactive to light.   Cardiovascular:      Rate and Rhythm: Normal rate and regular rhythm.      Pulses: Normal pulses.   Pulmonary:      Effort: Pulmonary effort is normal. No respiratory distress.      Breath sounds: Rhonchi present.   Abdominal:      General: Bowel sounds are normal.      Palpations: Abdomen is soft.      Tenderness: There is no abdominal tenderness.   Musculoskeletal:         General: Swelling present.      Cervical back: Neck supple.      Right lower leg: No edema.      Left lower leg: No edema.   Skin:     General: Skin is warm and dry.      Capillary Refill: Capillary refill takes less than 2 seconds.   Neurological:      General: No focal deficit present.      Mental Status: He is alert and oriented to person, place, and time. Mental status is at baseline.           Lines/Drains:              Lab Results: I have reviewed the following results:   Results from last 7 days   Lab Units 02/03/25  0438   WBC Thousand/uL 10.33*   HEMOGLOBIN g/dL 11.2*   HEMATOCRIT % 34.3*   PLATELETS Thousands/uL 388   SEGS PCT % 87*   LYMPHO PCT % 6*   MONO PCT % 3*   EOS PCT % 2     Results from last 7 days   Lab Units 02/03/25  0438   SODIUM mmol/L 132*    POTASSIUM mmol/L 3.6   CHLORIDE mmol/L 99   CO2 mmol/L 26   BUN mg/dL 9   CREATININE mg/dL 0.93   ANION GAP mmol/L 7   CALCIUM mg/dL 7.9*   GLUCOSE RANDOM mg/dL 105                       Recent Cultures (last 7 days):               Last 24 Hours Medication List:     Current Facility-Administered Medications:     acetaminophen (Ofirmev) injection 1,000 mg, Q8H PRN, Last Rate: 1,000 mg (01/28/25 1440)    acetaminophen (TYLENOL) tablet 650 mg, Q4H PRN    bisacodyl (DULCOLAX) rectal suppository 10 mg, Daily PRN    [Held by provider] furosemide (LASIX) tablet 20 mg, Once per day on Monday Wednesday Friday    glycopyrrolate (ROBINUL) injection 0.1 mg, Q4H PRN    heparin (porcine) subcutaneous injection 5,000 Units, Q8H ANKIT    HYDROmorphone HCl (DILAUDID) injection 0.2 mg, Q4H PRN    iohexol (OMNIPAQUE) 240 MG/ML solution 50 mL, 90 min pre-procedure    metoprolol (LOPRESSOR) injection 2.5 mg, Q6H    nitroglycerin (NITROSTAT) SL tablet 0.4 mg, Q5 Min PRN    nystatin (MYCOSTATIN) cream, BID    ondansetron (ZOFRAN) injection 4 mg, Q6H PRN    pantoprazole (PROTONIX) injection 40 mg, Q12H    phenol (CHLORASEPTIC) 1.4 % mucosal liquid 1 spray, Q2H PRN    scopolamine (TRANSDERM-SCOP) 1 mg/3 days TD 72 hr patch 1 patch, Q72H    Administrative Statements   Today, Patient Was Seen By: ANAM Rose      **Please Note: This note may have been constructed using a voice recognition system.**

## 2025-02-03 NOTE — ASSESSMENT & PLAN NOTE
In setting of poor oral intake, now resolved  Discontinue D5W   Monitor fluid status closely  Continue ongoing goals of care discussions  Trend sodium

## 2025-02-03 NOTE — ASSESSMENT & PLAN NOTE
Patient with significant comorbid medical conditions would be high risk for treatment options requiring anesthesia.  EGD testing might have risks that outweigh the benefits.  Patient verbalized understanding.  Opened goals of care conversations with son.  Continued on phone on Saturday. Had further conversations with patient's son, daughter, and grandson when they arrived   Per discussion with patient's son, daughter, and grandson 1/28/2025 regarding non-candidacy for feeding tube and no good options for nutrition currently. Ultimately they would like to keep him comfortable but for now continue with full treatment   Palliative input appreciated   2/1/2025.  Had further discussion regarding goals of care.  Patient's son would like to see speech continuing to work with patient.  I explained to patient's son today that ongoing IV fluids are not sustainable.  I suggested transitioning to pleasure feeds, comfort care.  He would then be able to return to the [facility].  I let him know that the [facility] would not be able to accept patient back at this level of function unless level of care is deemed comfort care.  He acknowledged understanding and asked me to call his sister and grandson  2/2/2025.  Continued discussion regarding goals of care.  As we discussed yesterday IV fluids are not a long-term option and with hyponatremia and evaluation for possible elevated volume status and planned diuretic, discontinue D5W.  Explained that patient would possibly be evaluated one more time by speech, and then we would have to come together to form a definitive disposition plan  2/3/2025: Patient's son consented to a hospice evaluation

## 2025-02-04 PROBLEM — E43 SEVERE PROTEIN-CALORIE MALNUTRITION (HCC): Status: ACTIVE | Noted: 2025-02-04

## 2025-02-04 LAB
ANION GAP SERPL CALCULATED.3IONS-SCNC: 10 MMOL/L (ref 4–13)
BASOPHILS # BLD AUTO: 0.03 THOUSANDS/ΜL (ref 0–0.1)
BASOPHILS NFR BLD AUTO: 0 % (ref 0–1)
BUN SERPL-MCNC: 16 MG/DL (ref 5–25)
CALCIUM SERPL-MCNC: 8.1 MG/DL (ref 8.4–10.2)
CHLORIDE SERPL-SCNC: 100 MMOL/L (ref 96–108)
CO2 SERPL-SCNC: 23 MMOL/L (ref 21–32)
CREAT SERPL-MCNC: 1.13 MG/DL (ref 0.6–1.3)
EOSINOPHIL # BLD AUTO: 0.27 THOUSAND/ΜL (ref 0–0.61)
EOSINOPHIL NFR BLD AUTO: 2 % (ref 0–6)
ERYTHROCYTE [DISTWIDTH] IN BLOOD BY AUTOMATED COUNT: 15 % (ref 11.6–15.1)
GFR SERPL CREATININE-BSD FRML MDRD: 53 ML/MIN/1.73SQ M
GLUCOSE SERPL-MCNC: 94 MG/DL (ref 65–140)
HCT VFR BLD AUTO: 35.7 % (ref 36.5–49.3)
HGB BLD-MCNC: 11.2 G/DL (ref 12–17)
IMM GRANULOCYTES # BLD AUTO: 0.18 THOUSAND/UL (ref 0–0.2)
IMM GRANULOCYTES NFR BLD AUTO: 2 % (ref 0–2)
LYMPHOCYTES # BLD AUTO: 0.77 THOUSANDS/ΜL (ref 0.6–4.47)
LYMPHOCYTES NFR BLD AUTO: 7 % (ref 14–44)
MCH RBC QN AUTO: 31.1 PG (ref 26.8–34.3)
MCHC RBC AUTO-ENTMCNC: 31.4 G/DL (ref 31.4–37.4)
MCV RBC AUTO: 99 FL (ref 82–98)
MONOCYTES # BLD AUTO: 0.32 THOUSAND/ΜL (ref 0.17–1.22)
MONOCYTES NFR BLD AUTO: 3 % (ref 4–12)
NEUTROPHILS # BLD AUTO: 10.21 THOUSANDS/ΜL (ref 1.85–7.62)
NEUTS SEG NFR BLD AUTO: 86 % (ref 43–75)
NRBC BLD AUTO-RTO: 0 /100 WBCS
PLATELET # BLD AUTO: 301 THOUSANDS/UL (ref 149–390)
PMV BLD AUTO: 10 FL (ref 8.9–12.7)
POTASSIUM SERPL-SCNC: 3.9 MMOL/L (ref 3.5–5.3)
RBC # BLD AUTO: 3.6 MILLION/UL (ref 3.88–5.62)
SODIUM SERPL-SCNC: 133 MMOL/L (ref 135–147)
WBC # BLD AUTO: 11.78 THOUSAND/UL (ref 4.31–10.16)

## 2025-02-04 PROCEDURE — 80048 BASIC METABOLIC PNL TOTAL CA: CPT | Performed by: NURSE PRACTITIONER

## 2025-02-04 PROCEDURE — 85025 COMPLETE CBC W/AUTO DIFF WBC: CPT | Performed by: NURSE PRACTITIONER

## 2025-02-04 PROCEDURE — 92526 ORAL FUNCTION THERAPY: CPT

## 2025-02-04 PROCEDURE — 99232 SBSQ HOSP IP/OBS MODERATE 35: CPT | Performed by: NURSE PRACTITIONER

## 2025-02-04 RX ORDER — HYDROMORPHONE HCL IN WATER/PF 6 MG/30 ML
0.2 PATIENT CONTROLLED ANALGESIA SYRINGE INTRAVENOUS EVERY 4 HOURS PRN
Status: DISCONTINUED | OUTPATIENT
Start: 2025-02-04 | End: 2025-02-05 | Stop reason: HOSPADM

## 2025-02-04 RX ORDER — LORAZEPAM 2 MG/ML
0.5 INJECTION INTRAMUSCULAR EVERY 4 HOURS PRN
Status: DISCONTINUED | OUTPATIENT
Start: 2025-02-04 | End: 2025-02-05 | Stop reason: HOSPADM

## 2025-02-04 RX ADMIN — PANTOPRAZOLE SODIUM 40 MG: 40 INJECTION, POWDER, FOR SOLUTION INTRAVENOUS at 17:22

## 2025-02-04 RX ADMIN — METOROPROLOL TARTRATE 2.5 MG: 5 INJECTION, SOLUTION INTRAVENOUS at 05:17

## 2025-02-04 RX ADMIN — HEPARIN SODIUM 5000 UNITS: 5000 INJECTION INTRAVENOUS; SUBCUTANEOUS at 15:33

## 2025-02-04 RX ADMIN — METOROPROLOL TARTRATE 2.5 MG: 5 INJECTION, SOLUTION INTRAVENOUS at 17:22

## 2025-02-04 RX ADMIN — METOROPROLOL TARTRATE 2.5 MG: 5 INJECTION, SOLUTION INTRAVENOUS at 11:20

## 2025-02-04 RX ADMIN — NYSTATIN: 100000 CREAM TOPICAL at 17:22

## 2025-02-04 RX ADMIN — METOROPROLOL TARTRATE 2.5 MG: 5 INJECTION, SOLUTION INTRAVENOUS at 22:56

## 2025-02-04 RX ADMIN — PANTOPRAZOLE SODIUM 40 MG: 40 INJECTION, POWDER, FOR SOLUTION INTRAVENOUS at 05:17

## 2025-02-04 RX ADMIN — HEPARIN SODIUM 5000 UNITS: 5000 INJECTION INTRAVENOUS; SUBCUTANEOUS at 05:17

## 2025-02-04 RX ADMIN — LORAZEPAM 0.5 MG: 2 INJECTION INTRAMUSCULAR; INTRAVENOUS at 22:56

## 2025-02-04 RX ADMIN — NYSTATIN: 100000 CREAM TOPICAL at 08:08

## 2025-02-04 NOTE — ASSESSMENT & PLAN NOTE
Lab Results   Component Value Date    EGFR 53 02/04/2025    EGFR 67 02/03/2025    EGFR 77 02/02/2025    CREATININE 1.13 02/04/2025    CREATININE 0.93 02/03/2025    CREATININE 0.70 02/02/2025     Creatinine baseline of 0.9-1.1 mg/dL   Continue to monitor renal function  Avoid nephrotoxins and hypotension  Trend creatinine

## 2025-02-04 NOTE — HOSPICE NOTE
I met with son Prem, Autumn Alarcon and I called grandson Prem via phone. Hospice services and Medicare Guidelines were reviewed and all their questions were answered. They would like to talk as a family before making a decision. I will reach out tomorrow to see if they have reached a decision. THANG Colmenares updated

## 2025-02-04 NOTE — ASSESSMENT & PLAN NOTE
Patient with significant comorbid medical conditions would be high risk for treatment options requiring anesthesia.  EGD testing might have risks that outweigh the benefits.  Patient verbalized understanding.  Opened goals of care conversations with son.  Continued on phone on Saturday. Had further conversations with patient's son, daughter, and grandson when they arrived   Per discussion with patient's son, daughter, and grandson 1/28/2025 regarding non-candidacy for feeding tube and no good options for nutrition currently. Ultimately they would like to keep him comfortable but for now continue with full treatment   Palliative input appreciated   2/1/2025.  Had further discussion regarding goals of care.  Patient's son would like to see speech continuing to work with patient.  I explained to patient's son today that ongoing IV fluids are not sustainable.  I suggested transitioning to pleasure feeds, comfort care.  He would then be able to return to the [facility].  I let him know that the [facility] would not be able to accept patient back at this level of function unless level of care is deemed comfort care.  He acknowledged understanding and asked me to call his sister and grandson  2/2/2025.  Continued discussion regarding goals of care.  As we discussed yesterday IV fluids are not a long-term option and with hyponatremia and evaluation for possible elevated volume status and planned diuretic, discontinue D5W.  Explained that patient would possibly be evaluated one more time by speech, and then we would have to come together to form a definitive disposition plan  2/3/2025: Patient's son consented to a hospice evaluation  2/4/2025: Patient's family met with hospice today, they would like to discuss before making a decision

## 2025-02-04 NOTE — MALNUTRITION/BMI
This medical record reflects one or more clinical indicators suggestive of malnutrition and/or morbid obesity.    Malnutrition Findings:   Adult Malnutrition type: Acute illness  Adult Degree of Malnutrition: Other severe protein calorie malnutrition  Malnutrition Characteristics: Inadequate energy, Fluid accumulation                360 Statement: Malnutrition related to acute illness as evidenced by <50% energy intake compared to estimated energy requirements for >5 days, documented fluid accumulation.  To treat with nutrition within goals of care.    BMI Findings:           Body mass index is 26.01 kg/m².     See Nutrition note dated 2/4/2025 for additional details.  Completed nutrition assessment is viewable in the nutrition documentation.

## 2025-02-04 NOTE — CASE MANAGEMENT
Case Management Discharge Planning Note    Patient name Prem Mar Sr.  Location /-01 MRN [id number]  : 10/11/1924 Date 2025       Current Admission Date: 1/15/2025  Current Admission Diagnosis:Upper GI bleed   Patient Active Problem List    Diagnosis Date Noted Date Diagnosed    Severe protein-calorie malnutrition (HCC) 2025     Dysphagia 2025     Hypernatremia 2025     Electrolyte abnormality 2025     Palliative care by specialist 2025     Opacity noted on imaging study 2025     Congestion of upper airway 2025     Acute distention of stomach 2025     COVID-19 virus infection 2025     Goals of care, counseling/discussion 2025     Upper GI bleed 01/15/2025     Constipation 01/15/2025     Tinea cruris 10/12/2024     Weakness 10/11/2024     Atrial flutter (HCC) 2024     Chronic pain syndrome 2024     Lumbar spondylosis 2024     Chronic midline low back pain without sciatica 2024     Nonexudative age-related macular degeneration of left eye 2023     Chronic kidney disease, stage 3a (HCC) 2023     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     Muscle wasting and atrophy, not elsewhere classified, multiple sites 2022     Difficulty swallowing 2022     Keratoma 2021     Venous insufficiency of both lower extremities 2021     Hypomagnesemia 2020     Mixed hyperlipidemia 2020     Coronary artery disease involving native coronary artery of native heart without angina pectoris 2018     Essential hypertension 2018     Bilateral leg edema 2018     Ventricular bigeminy 2018     Idiopathic chronic gout of multiple sites without tophus 05/10/2017     Edema 2014     Vitamin D deficiency 2013       LOS (days): 19  Geometric Mean LOS (GMLOS) (days): 4.5  Days to GMLOS:-14.8     OBJECTIVE:  Risk of Unplanned  Readmission Score: 24.91         Current admission status: Inpatient   Preferred Pharmacy:   [id number] - [place] - [address]  [address] [address]  [place] [address]  Phone: [phone] Fax: [phone]    Primary Care Provider: Leonard Schreiber MD    Primary Insurance: [facility]  Secondary Insurance:     DISCHARGE DETAILS:       Freedom of Choice: Yes  Comments - Freedom of Choice: pt is a bedhold at the [facility] - family is meeting with Karyna bella hospice RN today to discuss hopsice  care-  family wants to talk together tonight and they will make a decission tomorrow                               Other Referral/Resources/Interventions Provided:  Referral Comments: hospice eval was completed today - family wants to talk together tonight and then they will make a decission-  IV protonix  IV lopressor    Would you like to participate in our [facility] service program?  : No - Declined    Treatment Team Recommendation:  ([facility] bedhold- BLS)

## 2025-02-04 NOTE — PLAN OF CARE
Problem: PAIN - ADULT  Goal: Verbalizes/displays adequate comfort level or baseline comfort level  Description: Interventions:  - Encourage patient to monitor pain and request assistance  - Assess pain using appropriate pain scale  - Administer analgesics based on type and severity of pain and evaluate response  - Implement non-pharmacological measures as appropriate and evaluate response  - Consider cultural and social influences on pain and pain management  - Notify physician/advanced practitioner if interventions unsuccessful or patient reports new pain  Outcome: Progressing     Problem: GASTROINTESTINAL - ADULT  Goal: Minimal or absence of nausea and/or vomiting  Description: INTERVENTIONS:  - Administer IV fluids if ordered to ensure adequate hydration  - Maintain NPO status until nausea and vomiting are resolved  - Nasogastric tube if ordered  - Administer ordered antiemetic medications as needed  - Provide nonpharmacologic comfort measures as appropriate  - Advance diet as tolerated, if ordered  - Consider nutrition services referral to assist patient with adequate nutrition and appropriate food choices  Outcome: Progressing     Problem: RESPIRATORY - ADULT  Goal: Achieves optimal ventilation and oxygenation  Description: INTERVENTIONS:  - Assess for changes in respiratory status  - Assess for changes in mentation and behavior  - Position to facilitate oxygenation and minimize respiratory effort  - Oxygen administered by appropriate delivery if ordered  - Initiate smoking cessation education as indicated  - Encourage broncho-pulmonary hygiene including cough, deep breathe, Incentive Spirometry  - Assess the need for suctioning and aspirate as needed  - Assess and instruct to report SOB or any respiratory difficulty  - Respiratory Therapy support as indicated  Outcome: Progressing

## 2025-02-04 NOTE — CASE MANAGEMENT
Case Management Discharge Planning Note    Patient name Prem Mar Sr.  Location /-01 MRN [id number]  : 10/11/1924 Date 2/3/2025       Current Admission Date: 1/15/2025  Current Admission Diagnosis:Upper GI bleed   Patient Active Problem List    Diagnosis Date Noted Date Diagnosed    Dysphagia 2025     Hypernatremia 2025     Electrolyte abnormality 2025     Palliative care by specialist 2025     Opacity noted on imaging study 2025     Congestion of upper airway 2025     Acute distention of stomach 2025     COVID-19 virus infection 2025     Goals of care, counseling/discussion 2025     Upper GI bleed 01/15/2025     Constipation 01/15/2025     Tinea cruris 10/12/2024     Weakness 10/11/2024     Atrial flutter (HCC) 2024     Chronic pain syndrome 2024     Lumbar spondylosis 2024     Chronic midline low back pain without sciatica 2024     Nonexudative age-related macular degeneration of left eye 2023     Chronic kidney disease, stage 3a (HCC) 2023     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     Muscle wasting and atrophy, not elsewhere classified, multiple sites 2022     Difficulty swallowing 2022     Keratoma 2021     Venous insufficiency of both lower extremities 2021     Hypomagnesemia 2020     Mixed hyperlipidemia 2020     Coronary artery disease involving native coronary artery of native heart without angina pectoris 2018     Essential hypertension 2018     Bilateral leg edema 2018     Ventricular bigeminy 2018     Idiopathic chronic gout of multiple sites without tophus 05/10/2017     Edema 2014     Vitamin D deficiency 2013       LOS (days): 18  Geometric Mean LOS (GMLOS) (days): 4.5  Days to GMLOS:-13.8     OBJECTIVE:  Risk of Unplanned Readmission Score: 26.7         Current admission status:  Inpatient   Preferred Pharmacy:   [id number] - [place] - [address] [facility]  [address] [place], [place] [address] [address]  Phone: [phone] Fax: [phone]    Primary Care Provider: Leonard Schreiber MD    Primary Insurance: [facility]  Secondary Insurance:     DISCHARGE DETAILS:    Discharge planning discussed with:: Prem Dolan at the bedside  Freedom of Choice: Yes  Comments - Freedom of Choice: pt is a bedhold at [facility]-   cm received a hospice consult=permission was given permission to send a referral to [facility] hospice-  family will meet with  hospice rn tomorrow at 14:30pm  CM contacted family/caregiver?: Yes             Contacts  Patient Contacts: Prem Mar jr & Kelsi  Relationship to Patient:: Family  Contact Method: In Person  Reason/Outcome: Discharge Planning    Requested Home Health Care         Is the patient interested in HHC at discharge?: No    DME Referral Provided  Referral made for DME?: No    Other Referral/Resources/Interventions Provided:  Interventions: SNF  Referral Comments: hospice consult- pt  a has a bedhold at the  [facility]-  Iv protonix, IV lopressor         Treatment Team Recommendation: SNF ([facility] bedhold - BLS)

## 2025-02-04 NOTE — ASSESSMENT & PLAN NOTE
Malnutrition Findings:   Adult Malnutrition type: Acute illness  Adult Degree of Malnutrition: Other severe protein calorie malnutrition  Malnutrition Characteristics: Inadequate energy, Fluid accumulation                  360 Statement: Malnutrition related to acute illness as evidenced by <50% energy intake compared to estimated energy requirements for >5 days, documented fluid accumulation.  To treat with nutrition within goals of care.    BMI Findings:           Body mass index is 26.01 kg/m².

## 2025-02-04 NOTE — PROGRESS NOTES
Progress Note - Hospitalist   Name: Prem Mar Sr. 89 y.o. male I MRN: [id number]  Unit/Bed#: [facility] I Date of Admission: 1/15/2025   Date of Service: 2/4/2025 I Hospital Day: 19    Assessment & Plan  Upper GI bleed  Presented for suspected GI bleed  CT abdomen and pelvis 1/15/2025 with focal wall thickening of the lower esophagus, gastroesophageal junction, and proximal stomach which may reflect esophagitis/gastritis.  No evidence for bowel obstruction, mesenteric inflammation, appendicitis, obstructive uropathy, free air or free fluid  Chest x-ray 1/15/2025 with gaseous distention of the stomach  KUB 1/16/2025 with worsening distention  NGT was placed, since discontinued  CT abdomen pelvis with contrast 1/17/2024: New small bilateral pleural effusions with extensive bibasilar atelectasis. Wall thickening of the lower esophagus and proximal stomach is again noted without interval change. This may reflect mild focal esophagitis/gastritis. New NG tube with tip terminating in the gastric body region. No evidence of bowel obstruction, mesenteric inflammation, appendicitis, obstructive uropathy, free air, or free fluid. Descending and sigmoid diverticulosis again noted without evidence for acute diverticulitis. Subtle wall thickening of the mid to distal sigmoid colon, rectum, and anal verge region could reflect mild focal colitis/proctitis. Multiple punctate pancreatic calcifications again seen likely sequelae of chronic pancreatitis  Continue PPI  Hemoglobin has remained stable, no signs of bleeding  See also, plan for dysphagia  Opacity noted on imaging study  Chest x-ray: Retrocardiac opacity, which may be due to pneumonia and/or atelectasis  Procalcitonin peaked and downtrended  Repeat CXR with slight improvement in left base patchy atelectasis versus pneumonia  Status post treatment with IV cefepime/ceftriaxone and vancomycin, since discontinued  Monitor labs and vital signs, conduct serial physical  assessments    COVID-19 virus infection  Patient with fever and COVID-positive roommate at his skilled facility  Tested positive for COVID 12/17/2025  Continues to require 2 L nasal cannula oxygen  S/p remdesivir x 3 days  Continue supportive care  Monitor labs and vital signs, conduct serial physical assessments  Congestion of upper airway  Upper airway congestion noted, patient has strong cough and able to clear  CT imaging revealed small bilateral pleural effusion and extensive bibasilar atelectasis  Maintain head of bed elevated  Aspiration precautions  Appreciate input of speech therapy who are following  Continue Robinul 0.1 mg IV every 4 hours as needed  Continue scopolamine   Continue incentive spirometry  Suction as needed  Goals of care, counseling/discussion  Patient with significant comorbid medical conditions would be high risk for treatment options requiring anesthesia.  EGD testing might have risks that outweigh the benefits.  Patient verbalized understanding.  Opened goals of care conversations with son.  Continued on phone on Saturday. Had further conversations with patient's son, daughter, and grandson when they arrived   Per discussion with patient's son, daughter, and grandson 1/28/2025 regarding non-candidacy for feeding tube and no good options for nutrition currently. Ultimately they would like to keep him comfortable but for now continue with full treatment   Palliative input appreciated   2/1/2025.  Had further discussion regarding goals of care.  Patient's son would like to see speech continuing to work with patient.  I explained to patient's son today that ongoing IV fluids are not sustainable.  I suggested transitioning to pleasure feeds, comfort care.  He would then be able to return to the [facility].  I let him know that the [facility] would not be able to accept patient back at this level of function unless level of care is deemed comfort care.  He acknowledged understanding and asked me to  call his sister and grandson  2/2/2025.  Continued discussion regarding goals of care.  As we discussed yesterday IV fluids are not a long-term option and with hyponatremia and evaluation for possible elevated volume status and planned diuretic, discontinue D5W.  Explained that patient would possibly be evaluated one more time by speech, and then we would have to come together to form a definitive disposition plan  2/3/2025: Patient's son consented to a hospice evaluation  2/4/2025: Patient's family met with hospice today, they would like to discuss before making a decision  Idiopathic chronic gout of multiple sites without tophus  Pre-hospital allopurinol discontinued  Essential hypertension  Prehospital takesToprol-XL 25 mg p.o. daily and Lasix 20 mg p.o. daily on Monday Wednesday Friday  Currently NPO. Metoprolol switched to IV  Received furosemide 40 mg IV x 1 1/19/2025.  Received dose of furosemide 40 mg IV x 1 2/2/2025  Blood pressure controlled  Monitor BP   Mixed hyperlipidemia  Pre-hospital Tricor discontinued  Chronic kidney disease, stage 3a (HCC)  Lab Results   Component Value Date    EGFR 53 02/04/2025    EGFR 67 02/03/2025    EGFR 77 02/02/2025    CREATININE 1.13 02/04/2025    CREATININE 0.93 02/03/2025    CREATININE 0.70 02/02/2025     Creatinine baseline of 0.9-1.1 mg/dL   Continue to monitor renal function  Avoid nephrotoxins and hypotension  Trend creatinine  Atrial flutter (HCC)  Currently rate controlled with metoprolol   Not currently on anticoagulation as outpatient  Constipation  GI input appreciated  Continue bowel regimen PRN  Palliative care by specialist  Palliative care input appreciated  Electrolyte abnormality  Patient was initiated on trickle tube feed on 1/22/2025, but this was discontinued due to suspected aspiration  High risk for refeeding syndrome  Continue to monitor electrolytes and replete accordingly  Continue daily BMP and trend electrolytes  Hypernatremia  In setting of poor  oral intake, now resolved  Discontinue D5W   Monitor fluid status closely  Continue ongoing goals of care discussions  Trend sodium  Acute distention of stomach  Had acute distention of stomach, since resolved  Continue serial assessments  GI input appreciated  Dysphagia  Passed NG clamping trial 1/20/2025, later evaluated by SLP but did not pass   Keep NPO   Trickle feeding through NG initiated 1/22/2025 but discontinued 1/23/2025 due to high residuals and rhonchi requiring frequent suctioning  Speech therapy continues to follow, cleared for ice chips only with nursing supervision  General surgery input regarding feeding tube appreciated.  Patient not currently a candidate due to poor prognosis, risk of complications, and limited benefit  Reevaluated by speech 2/3/2025, input appreciated: Recommend to continue NPO ice chips for pleasure   Please see also, goals of care  Severe protein-calorie malnutrition (HCC)  Malnutrition Findings:   Adult Malnutrition type: Acute illness  Adult Degree of Malnutrition: Other severe protein calorie malnutrition  Malnutrition Characteristics: Inadequate energy, Fluid accumulation                  360 Statement: Malnutrition related to acute illness as evidenced by <50% energy intake compared to estimated energy requirements for >5 days, documented fluid accumulation.  To treat with nutrition within goals of care.    BMI Findings:           Body mass index is 26.01 kg/m².       VTE Pharmacologic Prophylaxis: VTE Score: 5 High Risk (Score >/= 5) - Pharmacological DVT Prophylaxis Ordered: heparin. Sequential Compression Devices Ordered.    Mobility:   Basic Mobility Inpatient Raw Score: 6  JH-HLM Goal: 2: Bed activities/Dependent transfer  JH-HLM Achieved: 2: Bed activities/Dependent transfer  JH-HLM Goal achieved. Continue to encourage appropriate mobility.    Patient Centered Rounds: I performed bedside rounds with nursing staff today.   Discussions with Specialists or Other Care  "Team Provider: Case management, hospice    Education and Discussions with Family / Patient: Updated  (son) at bedside.    Current Length of Stay: 19 day(s)  Current Patient Status: Inpatient   Certification Statement: The patient will continue to require additional inpatient hospital stay due to care coordination  Discharge Plan:  TBD    Code Status: Level 3 - DNAR and DNI    Subjective   Evaluated at bedside.  Denies pain or discomfort.  Offers, \"I do not want morphine.\"    Objective :  Temp:  [98 °F (36.7 °C)-98.8 °F (37.1 °C)] 98 °F (36.7 °C)  HR:  [71-89] 84  BP: (115-120)/(44-48) 119/48  Resp:  [16-20] 20  SpO2:  [91 %-97 %] 97 %  O2 Device: None (Room air)    Body mass index is 26.01 kg/m².     Input and Output Summary (last 24 hours):   No intake or output data in the 24 hours ending 02/04/25 5565    Physical Exam  Vitals and nursing note reviewed.   Constitutional:       Appearance: He is ill-appearing.   HENT:      Head: Normocephalic and atraumatic.      Nose: Nose normal.      Mouth/Throat:      Mouth: Mucous membranes are dry.      Pharynx: Oropharynx is clear.   Eyes:      Pupils: Pupils are equal, round, and reactive to light.   Cardiovascular:      Rate and Rhythm: Normal rate and regular rhythm.      Pulses: Normal pulses.   Pulmonary:      Effort: Pulmonary effort is normal. No respiratory distress.      Breath sounds: Normal breath sounds.   Abdominal:      General: Bowel sounds are normal.      Palpations: Abdomen is soft.      Tenderness: There is no abdominal tenderness.   Musculoskeletal:      Cervical back: Neck supple.      Right lower leg: No edema.      Left lower leg: No edema.   Skin:     General: Skin is warm and dry.      Capillary Refill: Capillary refill takes less than 2 seconds.   Neurological:      General: No focal deficit present.      Mental Status: He is alert and oriented to person, place, and time. Mental status is at baseline.      Comments: Oriented x 3, able " to calculate short series of serial sevens           Lines/Drains:              Lab Results: I have reviewed the following results:   Results from last 7 days   Lab Units 02/04/25  0524   WBC Thousand/uL 11.78*   HEMOGLOBIN g/dL 11.2*   HEMATOCRIT % 35.7*   PLATELETS Thousands/uL 301   SEGS PCT % 86*   LYMPHO PCT % 7*   MONO PCT % 3*   EOS PCT % 2     Results from last 7 days   Lab Units 02/04/25  0524   SODIUM mmol/L 133*   POTASSIUM mmol/L 3.9   CHLORIDE mmol/L 100   CO2 mmol/L 23   BUN mg/dL 16   CREATININE mg/dL 1.13   ANION GAP mmol/L 10   CALCIUM mg/dL 8.1*   GLUCOSE RANDOM mg/dL 94                       Recent Cultures (last 7 days):               Last 24 Hours Medication List:     Current Facility-Administered Medications:     acetaminophen (Ofirmev) injection 1,000 mg, Q8H PRN, Last Rate: 1,000 mg (01/28/25 1440)    acetaminophen (TYLENOL) tablet 650 mg, Q4H PRN    bisacodyl (DULCOLAX) rectal suppository 10 mg, Daily PRN    [Held by provider] furosemide (LASIX) tablet 20 mg, Once per day on Monday Wednesday Friday    glycopyrrolate (ROBINUL) injection 0.1 mg, Q4H PRN    heparin (porcine) subcutaneous injection 5,000 Units, Q8H ANKIT    HYDROmorphone HCl (DILAUDID) injection 0.2 mg, Q4H PRN    iohexol (OMNIPAQUE) 240 MG/ML solution 50 mL, 90 min pre-procedure    metoprolol (LOPRESSOR) injection 2.5 mg, Q6H    nitroglycerin (NITROSTAT) SL tablet 0.4 mg, Q5 Min PRN    nystatin (MYCOSTATIN) cream, BID    ondansetron (ZOFRAN) injection 4 mg, Q6H PRN    pantoprazole (PROTONIX) injection 40 mg, Q12H    phenol (CHLORASEPTIC) 1.4 % mucosal liquid 1 spray, Q2H PRN    scopolamine (TRANSDERM-SCOP) 1 mg/3 days TD 72 hr patch 1 patch, Q72H    Administrative Statements   Today, Patient Was Seen By: ANAM Rose      **Please Note: This note may have been constructed using a voice recognition system.**

## 2025-02-04 NOTE — PLAN OF CARE
Problem: PAIN - ADULT  Goal: Verbalizes/displays adequate comfort level or baseline comfort level  Description: Interventions:  - Encourage patient to monitor pain and request assistance  - Assess pain using appropriate pain scale  - Administer analgesics based on type and severity of pain and evaluate response  - Implement non-pharmacological measures as appropriate and evaluate response  - Consider cultural and social influences on pain and pain management  - Notify physician/advanced practitioner if interventions unsuccessful or patient reports new pain  Outcome: Progressing     Problem: INFECTION - ADULT  Goal: Absence or prevention of progression during hospitalization  Description: INTERVENTIONS:  - Assess and monitor for signs and symptoms of infection  - Monitor lab/diagnostic results  - Monitor all insertion sites, i.e. indwelling lines, tubes, and drains  - Monitor endotracheal if appropriate and nasal secretions for changes in amount and color  - [place] appropriate cooling/warming therapies per order  - Administer medications as ordered  - Instruct and encourage patient and family to use good hand hygiene technique  - Identify and instruct in appropriate isolation precautions for identified infection/condition  Outcome: Progressing     Problem: SAFETY ADULT  Goal: Patient will remain free of falls  Description: INTERVENTIONS:  - Educate patient/family on patient safety including physical limitations  - Instruct patient to call for assistance with activity   - Consult OT/PT to assist with strengthening/mobility   - Keep Call bell within reach  - Keep bed low and locked with side rails adjusted as appropriate  - Keep care items and personal belongings within reach  - Initiate and maintain comfort rounds  - Make Fall Risk Sign visible to staff  - Offer Toileting every 2 Hours, in advance of need  - Initiate/Maintain bed alarm  - Obtain necessary fall risk management equipment: bed/chair alarm  - Apply  yellow socks and bracelet for high fall risk patients  - Consider moving patient to room near nurses station  Outcome: Progressing  Goal: Maintain or return to baseline ADL function  Description: INTERVENTIONS:  -  Assess patient's ability to carry out ADLs; assess patient's baseline for ADL function and identify physical deficits which impact ability to perform ADLs (bathing, care of mouth/teeth, toileting, grooming, dressing, etc.)  - Assess/evaluate cause of self-care deficits   - Assess range of motion  - Assess patient's mobility; develop plan if impaired  - Assess patient's need for assistive devices and provide as appropriate  - Encourage maximum independence but intervene and supervise when necessary  - Involve family in performance of ADLs  - Assess for home care needs following discharge   - Consider OT consult to assist with ADL evaluation and planning for discharge  - Provide patient education as appropriate  Outcome: Progressing  Goal: Maintains/Returns to pre admission functional level  Description: INTERVENTIONS:  - Perform AM-PAC 6 Click Basic Mobility/ Daily Activity assessment daily.  - Set and communicate daily mobility goal to care team and patient/family/caregiver.   - Collaborate with rehabilitation services on mobility goals if consulted  - Perform Range of Motion 3 times a day.  - Reposition patient every 2 hours.  - Record patient progress and toleration of activity level   Outcome: Progressing     Problem: DISCHARGE PLANNING  Goal: Discharge to home or other facility with appropriate resources  Description: INTERVENTIONS:  - Identify barriers to discharge w/patient and caregiver  - Arrange for needed discharge resources and transportation as appropriate  - Identify discharge learning needs (meds, wound care, etc.)  - Arrange for interpretive services to assist at discharge as needed  - Refer to Case Management Department for coordinating discharge planning if the patient needs post-hospital  services based on physician/advanced practitioner order or complex needs related to functional status, cognitive ability, or social support system  Outcome: Progressing

## 2025-02-04 NOTE — UTILIZATION REVIEW
Continued Stay Review    Date: 2/4/25                           Current Patient Class: Inpatient  Current Level of Care: Med Surg     HPI:89 y.o. male initially admitted on 1/16/25 due to upper GI bleed.      Assessment/Plan: ST janel nelson remains NPO with ice chips for pleasure.oral suctioning required for secretions. Wearing scopolamine patch. Appears more dry today. Very weak cough and throat clear with all trials.  Family met with Hospice liaison, discussed hospice at home VS SNF. Family would like to discuss and let team know their decision tomorrow. Pt remains NPO, relies on IV metoprolol Q6 for BP control, IV Protonix. IVF's dc on 2/2. HGB has remained stable, no signs of bleeding.         Medications:   Scheduled Medications:  [Held by provider] furosemide, 20 mg, Oral, Once per day on Monday Wednesday Friday  heparin (porcine), 5,000 Units, Subcutaneous, Q8H ANKIT  iohexol, 50 mL, Oral, 90 min pre-procedure  metoprolol, 2.5 mg, Intravenous, Q6H  nystatin, , Topical, BID  pantoprazole, 40 mg, Intravenous, Q12H  scopolamine, 1 patch, Transdermal, Q72H      Continuous IV Infusions:none      PRN Meds:  acetaminophen, 1,000 mg, Intravenous, Q8H PRN  acetaminophen, 650 mg, Oral, Q4H PRN  bisacodyl, 10 mg, Rectal, Daily PRN  glycopyrrolate, 0.1 mg, Intravenous, Q4H PRN  HYDROmorphone, 0.2 mg, Intravenous, Q4H PRN  nitroglycerin, 0.4 mg, Sublingual, Q5 Min PRN  ondansetron, 4 mg, Intravenous, Q6H PRN  phenol, 1 spray, Mouth/Throat, Q2H PRN      Discharge Plan: TBD     Vital Signs (last 3 days)       Date/Time Temp Pulse Resp BP MAP (mmHg) SpO2 Calculated FIO2 (%) - Nasal Cannula Nasal Cannula O2 Flow Rate (L/min) O2 Device Patient Position - Orthostatic VS [place] Coma Scale Score Pain    02/04/25 14:20:08 -- 84 20 119/48 72 97 % -- -- -- -- -- --    02/04/25 0808 -- -- -- -- -- 95 % -- -- None (Room air) -- -- No Pain    02/04/25 07:20:49 -- 71 16 120/44 69 91 % -- -- -- -- -- --    02/04/25 0700 98 °F (36.7  °C) -- -- -- -- -- -- -- -- -- -- --    02/03/25 23:08:48 98.8 °F (37.1 °C) 74 16 118/44 69 95 % -- -- None (Room air) -- -- --    02/03/25 2215 -- 89 -- 115/46 69 96 % -- -- -- -- -- No Pain    02/03/25 17:10:12 -- 84 -- 133/54 80 99 % -- -- -- -- -- --    02/03/25 1300 98.4 °F (36.9 °C) -- -- 120/55 72 -- -- -- -- -- -- --    02/03/25 1201 -- 86 -- 118/43 -- -- -- -- -- -- -- --    02/03/25 0900 -- -- -- -- -- -- -- -- -- -- 15 No Pain    02/03/25 07:31:23 97.9 °F (36.6 °C) 74 18 133/50 78 96 % -- -- -- -- -- --    02/03/25 04:38:38 98.5 °F (36.9 °C) 76 -- 107/44 65 97 % -- -- -- -- -- --    02/02/25 23:16:46 98.3 °F (36.8 °C) 65 -- -- -- 97 % -- -- -- -- -- --    02/02/25 23:09:39 -- 80 -- 130/56 81 95 % -- -- -- -- -- --    02/02/25 2100 -- -- -- -- -- 97 % 28 2 L/min Nasal cannula -- 15 No Pain    02/02/25 18:16:34 -- 86 -- 133/53 80 98 % -- -- -- -- -- --    02/02/25 14:31:19 96.8 °F (36 °C) 72 -- -- -- 99 % -- -- -- -- -- --    02/02/25 1430 96.8 °F (36 °C) 74 -- -- -- 99 % -- -- -- -- -- --    02/02/25 1410 -- 67 -- 123/48 73 100 % -- -- -- -- -- --    02/02/25 1000 -- -- -- -- -- -- -- -- -- -- 15 No Pain    02/02/25 07:58:12 98.9 °F (37.2 °C) 71 15 116/48 71 97 % -- -- -- -- -- --    02/02/25 06:00:54 -- 65 -- 141/55 84 98 % -- -- -- -- -- --    02/01/25 23:35:27 -- 69 -- 139/54 82 99 % -- -- -- -- -- --    02/01/25 21:47:51 98.5 °F (36.9 °C) 68 15 136/55 82 97 % 28 2 L/min Nasal cannula Lying -- --    02/01/25 2100 -- -- -- -- -- -- -- -- -- -- 15 No Pain    02/01/25 17:44:49 -- 63 -- 126/52 77 99 % -- -- -- -- -- --    02/01/25 14:50:31 97.3 °F (36.3 °C) 67 -- 115/50 72 97 % -- -- -- -- -- --    02/01/25 11:04:35 -- 69 -- 119/48 72 99 % 28 2 L/min Nasal cannula -- 15 No Pain    02/01/25 07:43:32 97.4 °F (36.3 °C) 69 16 122/49 73 100 % -- -- None (Room air) Lying -- --          Weight (last 2 days)       None            Pertinent Labs/Diagnostic Results:   Radiology:  XR abdomen 1 view kub   Final  Interpretation by Sb Haynes MD (01/23 1613)      Nonobstructive bowel gas pattern.               Workstation performed: [id number]         VAS upper limb venous duplex scan, complete, bilateral   Final Interpretation by Abigail Alvarado MD (01/21 2109)      XR chest portable   Final Interpretation by Leonor Ingram MD (01/21 0915)      Slight improvement in opacity in the medial left base which may be due to atelectasis and/or pneumonia.            Workstation performed: [id number]         XR abdomen 1 vw portable   Final Interpretation by Heaven Barton MD (01/20 1417)   Nonobstructive bowel gas pattern. Residual oral contrast throughout the colon. No gastric distention. Satisfactory position of the endogastric tube.               Workstation performed: [id number]         XR chest portable   Final Interpretation by Beatris Benton MD (01/20 0904)      Retrocardiac opacity, which may be due to pneumonia and/or atelectasis.            Workstation performed: [id number]         CT abdomen pelvis w contrast   Final Interpretation by Geovanny Taylor MD (01/17 2239)      1.  New small bilateral pleural effusions with extensive bibasilar atelectasis.   2.  Wall thickening of the lower esophagus and proximal stomach is again noted without interval change. This may reflect mild focal esophagitis/gastritis. New NG tube with tip terminating in the gastric body region.   3.  No evidence of bowel obstruction, mesenteric inflammation, appendicitis, obstructive uropathy, free air, or free fluid. Descending and sigmoid diverticulosis again noted without evidence for acute diverticulitis. Subtle wall thickening of the mid to    distal sigmoid colon, rectum, and anal verge region could reflect mild focal colitis/proctitis.   4.  Multiple punctate pancreatic calcifications again seen likely sequelae of chronic pancreatitis.      Workstation performed: [id number]         XR chest portable   Final Interpretation  by Jose Johns MD (01/17 1327)      Scattered streaky opacities favored to represent atelectasis but pneumonia not excluded.            Workstation performed: [id number]         XR abdomen 1 vw portable   Final Interpretation by Jose Johns MD (01/17 1328)      New marked gaseous distention of the stomach.               Workstation performed: [id number]         XR abdomen 1 view kub   Final Interpretation by Diony Boateng MD (01/16 1254)      1.  NG tube projects over the gastric body with decompression of the stomach compared to the x-ray from earlier the same day.      2.  Mildly dilated mid abdominal small bowel loops with gas throughout the colon though the abdomen was only partially imaged.               Workstation performed: [id number]         XR abdomen 1 view kub   Final Interpretation by Diony Boateng MD (01/16 0860)      Worsening gastric distention.      The study was marked in EPIC for immediate notification.               Workstation performed: [id number]         XR chest portable   Final Interpretation by Sina Vitale MD (01/15 9164)      No endogastric tube visible within the field-of-view.      There is some gaseous distention of the stomach.      Minimal atelectasis left lung base.      The study was marked in EPIC for immediate notification.            Workstation performed: [id number]         CT abdomen pelvis with contrast   Final Interpretation by Geovanny Taylor MD (01/15 7922)      Trace left pleural effusion with bibasilar atelectasis.  Focal wall thickening of the lower esophagus, gastroesophageal junction, and proximal stomach noted which may reflect esophagitis/gastritis. No evidence for bowel obstruction, mesenteric    inflammation, appendicitis, obstructive uropathy, free air, or free fluid. Descending and sigmoid colon diverticulosis without evidence for acute diverticulitis.         Workstation performed: [id number]                 Results from last 7 days    Lab Units 02/04/25 0524 02/03/25 0438 02/02/25 0411 02/01/25 0501 01/31/25  0503   WBC Thousand/uL 11.78* 10.33* 8.62 7.62 11.05*   HEMOGLOBIN g/dL 11.2* 11.2* 10.3* 9.9* 10.3*   HEMATOCRIT % 35.7* 34.3* 31.7* 30.9* 31.9*   PLATELETS Thousands/uL 301 388 386 340 344   TOTAL NEUT ABS Thousands/µL 10.21* 8.97* 7.20 6.17  --          Results from last 7 days   Lab Units 02/04/25 0524 02/03/25 0438 02/02/25 0411 02/01/25 0501 01/31/25 0503 01/30/25 0443 01/29/25  0435   SODIUM mmol/L 133* 132* 130* 134* 132*   < > 135   POTASSIUM mmol/L 3.9 3.6 3.4* 3.3* 3.4*   < > 3.6   CHLORIDE mmol/L 100 99 98 101 99   < > 103   CO2 mmol/L 23 26 26 26 26   < > 27   ANION GAP mmol/L 10 7 6 7 7   < > 5   BUN mg/dL 16 9 6 5 4*   < > 8   CREATININE mg/dL 1.13 0.93 0.70 0.61 0.59*   < > 0.71   EGFR ml/min/1.73sq m 53 67 77 81 83   < > 76   CALCIUM mg/dL 8.1* 7.9* 7.7* 7.8* 7.9*   < > 7.6*   MAGNESIUM mg/dL  --   --   --  2.2 1.7*  --  1.8*   PHOSPHORUS mg/dL  --   --   --  2.0* 1.3*  --   --     < > = values in this interval not displayed.             Results from last 7 days   Lab Units 02/04/25 0524 02/03/25 0438 02/02/25 0411 02/01/25 0501 01/31/25 0503 01/30/25 0443 01/29/25  0435   GLUCOSE RANDOM mg/dL 94 105 117 116 130 114 105                 Network Utilization Review Department  ATTENTION: Please call with any questions or concerns to [phone] and carefully listen to the prompts so that you are directed to the right person. All voicemails are confidential.   For Discharge needs, contact Care Management DC Support Team at [phone] opt. 2  Send all requests for admission clinical reviews, approved or denied determinations and any other requests to dedicated fax number below belonging to the campus where the patient is receiving treatment. List of dedicated fax numbers for the Facilities:  FACILITY NAME UR FAX NUMBER   ADMISSION DENIALS (Administrative/Medical Necessity) [phone]   DISCHARGE SUPPORT  TEAM (NETWORK) [phone]   PARENT CHILD HEALTH (Maternity/NICU/Pediatrics) [phone]   [facility] [phone]   [facility] [phone]   [facility] [phone]   [facility] [phone]   [facility] [phone]   [facility] [phone]   [facility] [phone]   [facility] [phone]   [facility] [phone]   [facility] [phone]   [facility] [phone]   [facility] [phone]

## 2025-02-04 NOTE — SPEECH THERAPY NOTE
"Speech Language/Pathology    Speech/Language Pathology Progress Note    Patient Name: Prem Mar Sr.  Today's Date: 2/4/2025     Problem List  Principal Problem:    Upper GI bleed  Active Problems:    Essential hypertension    Idiopathic chronic gout of multiple sites without tophus    Mixed hyperlipidemia    Chronic kidney disease, stage 3a (HCC)    Atrial flutter (HCC)    Constipation    Goals of care, counseling/discussion    Acute distention of stomach    COVID-19 virus infection    Congestion of upper airway    Opacity noted on imaging study    Palliative care by specialist    Electrolyte abnormality    Hypernatremia    Dysphagia       Past Medical History  Past Medical History:   Diagnosis Date    Arthritis     Cataract     Coronary artery disease     Coronary atherosclerosis of native coronary artery     Diverticulitis of colon     Essential hypertension, benign     Hyperlipidemia     Hypertension     Peptic ulcer     Renal disorder         Past Surgical History  Past Surgical History:   Procedure Laterality Date    CATARACT EXTRACTION Right     Left eye 5/2021    CORONARY STENT PLACEMENT      SKIN BIOPSY      TONSILLECTOMY           Subjective:  Pt was alert and positioned upright. Son at bedside.  Objective:  Pt was seen for f/u dysphagia therapy for po potential. Positioned upright does sound more dry then day prior.  Pt fed by ST trails of ice chips and thin water via tsp. Bolus control and formation were WNL.  Very weak throat clear with initial trial of ice chip, pt provided verbal cue to cough prn to clear air way. Transfer and swallow initiation appeared delayed 2-3 swallows per trial. Pt continued with weak throat clear with all trials. Following x1 trial in which pt began to initiate cough however appeared to hold in. ST cued pt to cough pt stated \" I can't \". Pt then did intiate weak successive cough. Trial x2 tsp sips of thin immediate weak cough following. ST questioned pt if he would like " "more water pt stated \"no\". Son at bedside questioned about secretions, ST agreed secretions did appear improved today. Son questioned about swallow status, ST reviewed pt multiple swallows per trials and weak cough/throat clear with all trials indicate high risk of aspiration. Son questioned about swallow and cough exercises,ST informed son best exercise for swallowing is swallowing and given level of fatigue additional exercises are not appropriate at this time.  ST reviewed session with nursing.   Assessment:  Appears more dry today. Very weak cough and throat clear with all trials.   Plan/Recommendations:  Recommend continue NPO ice chips for pleasure  Ensure good oral care  Aspiration precautions   ST continue f/u as indicated          "

## 2025-02-05 VITALS
WEIGHT: 161.16 LBS | HEIGHT: 66 IN | BODY MASS INDEX: 25.9 KG/M2 | HEART RATE: 76 BPM | RESPIRATION RATE: 17 BRPM | SYSTOLIC BLOOD PRESSURE: 111 MMHG | OXYGEN SATURATION: 96 % | TEMPERATURE: 99.1 F | DIASTOLIC BLOOD PRESSURE: 60 MMHG

## 2025-02-05 PROCEDURE — 99239 HOSP IP/OBS DSCHRG MGMT >30: CPT | Performed by: NURSE PRACTITIONER

## 2025-02-05 RX ORDER — SCOPOLAMINE 1 MG/3D
1 PATCH, EXTENDED RELEASE TRANSDERMAL
Start: 2025-02-06

## 2025-02-05 RX ORDER — TRIAMCINOLONE ACETONIDE 1 MG/G
CREAM TOPICAL 2 TIMES DAILY
Start: 2025-02-05

## 2025-02-05 RX ORDER — LORAZEPAM 0.5 MG/1
0.5 TABLET ORAL EVERY 6 HOURS PRN
Qty: 5 TABLET | Refills: 0 | Status: SHIPPED | OUTPATIENT
Start: 2025-02-05 | End: 2025-02-15

## 2025-02-05 RX ORDER — TRIAMCINOLONE ACETONIDE 1 MG/G
CREAM TOPICAL 2 TIMES DAILY
Status: DISCONTINUED | OUTPATIENT
Start: 2025-02-05 | End: 2025-02-05 | Stop reason: HOSPADM

## 2025-02-05 RX ADMIN — NYSTATIN: 100000 CREAM TOPICAL at 09:58

## 2025-02-05 RX ADMIN — METOROPROLOL TARTRATE 2.5 MG: 5 INJECTION, SOLUTION INTRAVENOUS at 05:21

## 2025-02-05 NOTE — NURSING NOTE
[facility] on [facility]. All belongings packed and sent with pt. Report called in to [facility] at [facility].

## 2025-02-05 NOTE — PLAN OF CARE
Problem: PAIN - ADULT  Goal: Verbalizes/displays adequate comfort level or baseline comfort level  Description: Interventions:  - Encourage patient to monitor pain and request assistance  - Assess pain using appropriate pain scale  - Administer analgesics based on type and severity of pain and evaluate response  - Implement non-pharmacological measures as appropriate and evaluate response  - Consider cultural and social influences on pain and pain management  - Notify physician/advanced practitioner if interventions unsuccessful or patient reports new pain  Outcome: Progressing     Problem: INFECTION - ADULT  Goal: Absence or prevention of progression during hospitalization  Description: INTERVENTIONS:  - Assess and monitor for signs and symptoms of infection  - Monitor lab/diagnostic results  - Monitor all insertion sites, i.e. indwelling lines, tubes, and drains  - Monitor endotracheal if appropriate and nasal secretions for changes in amount and color  - Martin appropriate cooling/warming therapies per order  - Administer medications as ordered  - Instruct and encourage patient and family to use good hand hygiene technique  - Identify and instruct in appropriate isolation precautions for identified infection/condition  Outcome: Progressing     Problem: SAFETY ADULT  Goal: Patient will remain free of falls  Description: INTERVENTIONS:  - Educate patient/family on patient safety including physical limitations  - Instruct patient to call for assistance with activity   - Consult OT/PT to assist with strengthening/mobility   - Keep Call bell within reach  - Keep bed low and locked with side rails adjusted as appropriate  - Keep care items and personal belongings within reach  - Initiate and maintain comfort rounds  - Make Fall Risk Sign visible to staff  - Offer Toileting every 2 Hours, in advance of need  - Initiate/Maintain bed alarm  - Obtain necessary fall risk management equipment: bed/chair alarm  - Apply  yellow socks and bracelet for high fall risk patients  - Consider moving patient to room near nurses station  Outcome: Progressing  Goal: Maintain or return to baseline ADL function  Description: INTERVENTIONS:  -  Assess patient's ability to carry out ADLs; assess patient's baseline for ADL function and identify physical deficits which impact ability to perform ADLs (bathing, care of mouth/teeth, toileting, grooming, dressing, etc.)  - Assess/evaluate cause of self-care deficits   - Assess range of motion  - Assess patient's mobility; develop plan if impaired  - Assess patient's need for assistive devices and provide as appropriate  - Encourage maximum independence but intervene and supervise when necessary  - Involve family in performance of ADLs  - Assess for home care needs following discharge   - Consider OT consult to assist with ADL evaluation and planning for discharge  - Provide patient education as appropriate  Outcome: Progressing  Goal: Maintains/Returns to pre admission functional level  Description: INTERVENTIONS:  - Perform AM-PAC 6 Click Basic Mobility/ Daily Activity assessment daily.  - Set and communicate daily mobility goal to care team and patient/family/caregiver.   - Collaborate with rehabilitation services on mobility goals if consulted  - Perform Range of Motion 3 times a day.  - Reposition patient every 2 hours.  - Record patient progress and toleration of activity level   Outcome: Progressing     Problem: DISCHARGE PLANNING  Goal: Discharge to home or other facility with appropriate resources  Description: INTERVENTIONS:  - Identify barriers to discharge w/patient and caregiver  - Arrange for needed discharge resources and transportation as appropriate  - Identify discharge learning needs (meds, wound care, etc.)  - Arrange for interpretive services to assist at discharge as needed  - Refer to Case Management Department for coordinating discharge planning if the patient needs post-hospital  services based on physician/advanced practitioner order or complex needs related to functional status, cognitive ability, or social support system  Outcome: Progressing     Problem: Knowledge Deficit  Goal: Patient/family/caregiver demonstrates understanding of disease process, treatment plan, medications, and discharge instructions  Description: Complete learning assessment and assess knowledge base.  Interventions:  - Provide teaching at level of understanding  - Provide teaching via preferred learning methods  Outcome: Progressing     Problem: Prexisting or High Potential for Compromised Skin Integrity  Goal: Skin integrity is maintained or improved  Description: INTERVENTIONS:  - Identify patients at risk for skin breakdown  - Assess and monitor skin integrity  - Assess and monitor nutrition and hydration status  - Monitor labs   - Assess for incontinence   - Turn and reposition patient  - Assist with mobility/ambulation  - Relieve pressure over bony prominences  - Avoid friction and shearing  - Provide appropriate hygiene as needed including keeping skin clean and dry  - Evaluate need for skin moisturizer/barrier cream  - Collaborate with interdisciplinary team   - Patient/family teaching  - Consider wound care consult   Outcome: Progressing     Problem: GASTROINTESTINAL - ADULT  Goal: Minimal or absence of nausea and/or vomiting  Description: INTERVENTIONS:  - Administer IV fluids if ordered to ensure adequate hydration  - Maintain NPO status until nausea and vomiting are resolved  - Nasogastric tube if ordered  - Administer ordered antiemetic medications as needed  - Provide nonpharmacologic comfort measures as appropriate  - Advance diet as tolerated, if ordered  - Consider nutrition services referral to assist patient with adequate nutrition and appropriate food choices  Outcome: Progressing  Goal: Maintains or returns to baseline bowel function  Description: INTERVENTIONS:  - Assess bowel function  -  Encourage oral fluids to ensure adequate hydration  - Administer IV fluids if ordered to ensure adequate hydration  - Administer ordered medications as needed  - Encourage mobilization and activity  - Consider nutritional services referral to assist patient with adequate nutrition and appropriate food choices  Outcome: Progressing  Goal: Maintains adequate nutritional intake  Description: INTERVENTIONS:  - Monitor percentage of each meal consumed  - Identify factors contributing to decreased intake, treat as appropriate  - Assist with meals as needed  - Monitor I&O, weight, and lab values if indicated  - Obtain nutrition services referral as needed  Outcome: Progressing  Goal: Oral mucous membranes remain intact  Description: INTERVENTIONS  - Assess oral mucosa and hygiene practices  - Implement preventative oral hygiene regimen  - Implement oral medicated treatments as ordered  - Initiate Nutrition services referral as needed  Outcome: Progressing     Problem: RESPIRATORY - ADULT  Goal: Achieves optimal ventilation and oxygenation  Description: INTERVENTIONS:  - Assess for changes in respiratory status  - Assess for changes in mentation and behavior  - Position to facilitate oxygenation and minimize respiratory effort  - Oxygen administered by appropriate delivery if ordered  - Initiate smoking cessation education as indicated  - Encourage broncho-pulmonary hygiene including cough, deep breathe, Incentive Spirometry  - Assess the need for suctioning and aspirate as needed  - Assess and instruct to report SOB or any respiratory difficulty  - Respiratory Therapy support as indicated  Outcome: Progressing     Problem: METABOLIC, FLUID AND ELECTROLYTES - ADULT  Goal: Electrolytes maintained within normal limits  Description: INTERVENTIONS:  - Monitor labs and assess patient for signs and symptoms of electrolyte imbalances  - Administer electrolyte replacement as ordered  - Monitor response to electrolyte replacements,  including repeat lab results as appropriate  - Instruct patient on fluid and nutrition as appropriate  Outcome: Progressing  Goal: Fluid balance maintained  Description: INTERVENTIONS:  - Monitor labs   - Monitor I/O and WT  - Instruct patient on fluid and nutrition as appropriate  - Assess for signs & symptoms of volume excess or deficit  Outcome: Progressing     Problem: Nutrition/Hydration-ADULT  Goal: Nutrient/Hydration intake appropriate for improving, restoring or maintaining nutritional needs  Description: Monitor and assess patient's nutrition/hydration status for malnutrition. Collaborate with interdisciplinary team and initiate plan and interventions as ordered.  Monitor patient's weight and dietary intake as ordered or per policy. Utilize nutrition screening tool and intervene as necessary. Determine patient's food preferences and provide high-protein, high-caloric foods as appropriate.     INTERVENTIONS:  - Monitor oral intake, urinary output, labs, and treatment plans  - Assess nutrition and hydration status and recommend course of action  - Evaluate amount of meals eaten  - Assist patient with eating if necessary   - Allow adequate time for meals  - Recommend/ encourage appropriate diets, oral nutritional supplements, and vitamin/mineral supplements  - Order, calculate, and assess calorie counts as needed  - Recommend, monitor, and adjust tube feedings and TPN/PPN based on assessed needs  - Assess need for intravenous fluids  - Provide specific nutrition/hydration education as appropriate  - Include patient/family/caregiver in decisions related to nutrition  Outcome: Progressing

## 2025-02-05 NOTE — ASSESSMENT & PLAN NOTE
In setting of poor oral intake, now resolved  Transitioned to comfort measures only and being discharged to the [facility].

## 2025-02-05 NOTE — ASSESSMENT & PLAN NOTE
Was previously on metoprolol   Not currently on anticoagulation as outpatient   Transitioned to comfort measures only and being discharged to the [facility].

## 2025-02-05 NOTE — CASE MANAGEMENT
Case Management Discharge Planning Note    Patient name Prem Mar Sr.  Location /-01 MRN [id number]  : 10/11/1924 Date 2025       Current Admission Date: 1/15/2025  Current Admission Diagnosis:Upper GI bleed   Patient Active Problem List    Diagnosis Date Noted Date Diagnosed    Severe protein-calorie malnutrition (HCC) 2025     Dysphagia 2025     Hypernatremia 2025     Electrolyte abnormality 2025     Palliative care by specialist 2025     Opacity noted on imaging study 2025     Congestion of upper airway 2025     Acute distention of stomach 2025     COVID-19 virus infection 2025     Goals of care, counseling/discussion 2025     Upper GI bleed 01/15/2025     Constipation 01/15/2025     Tinea cruris 10/12/2024     Weakness 10/11/2024     Atrial flutter (HCC) 2024     Chronic pain syndrome 2024     Lumbar spondylosis 2024     Chronic midline low back pain without sciatica 2024     Nonexudative age-related macular degeneration of left eye 2023     Chronic kidney disease, stage 3a (HCC) 2023     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     Muscle wasting and atrophy, not elsewhere classified, multiple sites 2022     Difficulty swallowing 2022     Keratoma 2021     Venous insufficiency of both lower extremities 2021     Hypomagnesemia 2020     Mixed hyperlipidemia 2020     Coronary artery disease involving native coronary artery of native heart without angina pectoris 2018     Essential hypertension 2018     Bilateral leg edema 2018     Ventricular bigeminy 2018     Idiopathic chronic gout of multiple sites without tophus 05/10/2017     Edema 2014     Vitamin D deficiency 2013       LOS (days): 20  Geometric Mean LOS (GMLOS) (days): 4.5  Days to GMLOS:-15.6     OBJECTIVE:  Risk of Unplanned  Readmission Score: 24.71         Current admission status: Inpatient   Preferred Pharmacy:   [id number] - [place] - [address]  [address] [address]  Phone: [phone] Fax: [phone]    Primary Care Provider: Leonard Schreiber MD    Primary Insurance: [facility]  Secondary Insurance:     DISCHARGE DETAILS:    Discharge planning discussed with:: patient & son at the bedside  Freedom of Choice: Yes  Comments - Freedom of Choice: pt is a bedhold  at the [facility]-  son has declined hospice care at this time - he did agre to comfort care- pt will return the [facility] on comfort care- pt & son are in agreement with d/c & d/c plan  CM contacted family/caregiver?: Yes  Were Treatment Team discharge recommendations reviewed with patient/caregiver?: Yes  Did patient/caregiver verbalize understanding of patient care needs?: Yes  Were patient/caregiver advised of the risks associated with not following Treatment Team discharge recommendations?: Yes    Contacts  Patient Contacts: Prem Mar jr  Relationship to Patient:: Family (son)  Contact Method: In Person  Reason/Outcome: Discharge Planning    Requested Home Health Care         Is the patient interested in HHC at discharge?: No         Other Referral/Resources/Interventions Provided:  Interventions: SNF  Referral Comments: pt to return to [facility] on comfort care- rn, provider, snf,pt & son aware of 13:30pm transport, no covid needed- rn was given the report  & fax # son was made aware that the [facility] wants to meet with him today and she stated  he will go over  after his dad is d/c    Would you like to participate in our [facility] service program?  : No - Declined    Treatment Team Recommendation: SNF ([facility] on comfort care- [facility] 13:30pm [place])  Discharge Destination Plan:: SNF ([facility]  on comfort care - 13:30pm [facility])  Transport at Discharge : [facility]     Number/Name of Dispatcher:  [phone]  Transported by (Company and Unit #): [place]  ETA of Transport (Date): 02/05/25  ETA of Transport (Time): 1330                    Family notified:: son at the bedside       Accepting Facility Name, City & State : [address] [address]  Receiving Facility/Agency Phone Number: [phone]  Facility/Agency Fax Number: [phone]

## 2025-02-05 NOTE — ASSESSMENT & PLAN NOTE
Patient with significant comorbid medical conditions would be high risk for treatment options requiring anesthesia.  EGD testing might have risks that outweigh the benefits.  Patient verbalized understanding.  Opened goals of care conversations with son.  Continued on phone on Saturday. Had further conversations with patient's son, daughter, and grandson when they arrived   Per discussion with patient's son, daughter, and grandson 1/28/2025 regarding non-candidacy for feeding tube and no good options for nutrition currently. Ultimately they would like to keep him comfortable but for now continue with full treatment   Palliative input appreciated   2/1/2025.  Had further discussion regarding goals of care.  Patient's son would like to see speech continuing to work with patient.  I explained to patient's son today that ongoing IV fluids are not sustainable.  I suggested transitioning to pleasure feeds, comfort care.  He would then be able to return to the [facility].  I let him know that the [facility] would not be able to accept patient back at this level of function unless level of care is deemed comfort care.  He acknowledged understanding and asked me to call his sister and grandson  2/2/2025.  Continued discussion regarding goals of care.  As we discussed yesterday IV fluids are not a long-term option and with hyponatremia and evaluation for possible elevated volume status and planned diuretic, discontinue D5W.  Explained that patient would possibly be evaluated one more time by speech, and then we would have to come together to form a definitive disposition plan  2/3/2025: Patient's son consented to a hospice evaluation  2/4/2025: Patient's family met with hospice, they would like to discuss before making a decision  2/5/2025: his family would like to pursue comfort measures only status at the skilled nursing facility. Transitioned to comfort measures only and being discharged to the [facility].

## 2025-02-05 NOTE — ASSESSMENT & PLAN NOTE
Lab Results   Component Value Date    EGFR 53 02/04/2025    EGFR 67 02/03/2025    EGFR 77 02/02/2025    CREATININE 1.13 02/04/2025    CREATININE 0.93 02/03/2025    CREATININE 0.70 02/02/2025     Creatinine baseline of 0.9-1.1 mg/dL   Transitioned to comfort measures only and being discharged to the [facility].

## 2025-02-05 NOTE — QUICK NOTE
Was called to bedside by patient's son.  Patient's daughter is there as well.  They said they would like to transition to comfort care.  We again reviewed what that would look like including risk for aspiration that could lead to his passing however would provide comfort and pleasure with clear liquids to start.     Patient's son asked if there was a time limit to be in the hospital; he has expressed on multiple occasions that he likes the care that his father receives here.  I had told him previously that if someone was not receiving acute inpatient care, there is no longer a need for acute inpatient hospitalization.  I said there was no specific time limit but we did need to focus efforts on getting his father out of the hospital and into his home like environment.    Patient's son said he would prefer if the nurse fed the fluids, so she could stand by with suctioning.  He gave him education on how to gently spoon small amounts of fluid into his father's mouth and then wait and observe.  I told him that possibly we could advance the fluids tomorrow try some Ensure.  He asked if the speech therapist would be coming back tomorrow.  I explained that he has been assessed on multiple occasions and his swallow is not improving and in fact is getting weaker.  I explained that at this point, further therapy is not likely to significantly change the outcome.     He asked me if I could contact hospice and tell her that they want to wait a little bit.  I told him that they should keep the scheduled appointment, as sometimes things take a few days to arrange.     Status changed to level for comfort care.  Advised family that monitor will be turned off.  Will enter comfort care order set.

## 2025-02-05 NOTE — ASSESSMENT & PLAN NOTE
"Presented for suspected GI bleed however found to have severe dysphagia  CT abdomen and pelvis 1/15/2025 with focal wall thickening of the lower esophagus, gastroesophageal junction, and proximal stomach which may reflect esophagitis/gastritis.  No evidence for bowel obstruction, mesenteric inflammation, appendicitis, obstructive uropathy, free air or free fluid  Chest x-ray 1/15/2025 with gaseous distention of the stomach  KUB 1/16/2025 with worsening distention  NGT was placed, since discontinued  CT abdomen pelvis with contrast 1/17/2024: \"New small bilateral pleural effusions with extensive bibasilar atelectasis. Wall thickening of the lower esophagus and proximal stomach is again noted without interval change. This may reflect mild focal esophagitis/gastritis. New NG tube with tip terminating in the gastric body region. No evidence of bowel obstruction, mesenteric inflammation, appendicitis, obstructive uropathy, free air, or free fluid. Descending and sigmoid diverticulosis again noted without evidence for acute diverticulitis. Subtle wall thickening of the mid to distal sigmoid colon, rectum, and anal verge region could reflect mild focal colitis/proctitis. Multiple punctate pancreatic calcifications again seen likely sequelae of chronic pancreatitis\"  Hemoglobin has remained stable, no signs of bleeding  See also, plan for dysphagia  "

## 2025-02-05 NOTE — WOUND OSTOMY CARE
Progress Note - Wound   Prem Mar Sr. 89 y.o. male MRN: [id number]  Unit/Bed#: -[facility] Encounter: [id number]      Assessment :   Wound care weekly follow up. Patient in bed, offloading wedges in place. Patient's son present during assessment of wound. Primary RN present. Patient with evolving deep tissue injury, present on admission. Patient is incontinent of urine and stool. Patient is awake and alert, he is an assist to turn on his side, is dependent for his care.     1- Bilateral heels intact and blanchable  2- Evolving deep tissue injury to bilateral buttocks. Wound beds non-blanchable and beefy red, small amount of bloody drainage when cleansed. Wounds to the right and left buttock measured as one wound.     Skin and Wound Care Plan:   1- Cleanse sacro-buttocks with soap and water. Apply Triad Paste to Sacro-buttocks Wound Bed TID and PRN, may apply Mepilex bordered foam dressing, discontinue Mepliex if patient frequently incontinent of stool.  2-Cleanse B/L Heels with soap and water. Pat dry. Apply Silicone Border Foam (Mepilex) to areas. Ronen with P for Prevention and change every 3 days or PRN soilage/displacement. Peel back and inspect Q-shift.  3-Elevate heels to offload pressure  4-Ehob cushion when out of bed.  5-Turn/repoisiton q2h or when medically stable for pressure re-distribution on skin.  6-Moisturize skin daily with skin nourishing cream   7-Place Patient on AFFiRiS Turning and repositioning system    Wound:  Wound 01/22/25 Pressure Injury Buttocks Left;Right (Active)   Wound Image   02/03/25 1411   Wound Description Beefy red;Bleeding;Fragile;Rolled edges 02/04/25 1300   Pressure Injury Stage 2 02/04/25 1300   Alla-wound Assessment Hyperpigmented;Fragile;Purple 02/04/25 1300   Wound Length (cm) 5 cm 02/03/25 1411   Wound Width (cm) 6.5 cm 02/03/25 1411   Wound Depth (cm) 0.2 cm 02/03/25 1411   Wound Surface Area (cm^2) 32.5 cm^2 02/03/25 1411   Wound Volume (cm^3) 6.5 cm^3 02/03/25 4341    Calculated Wound Volume (cm^3) 6.5 cm^3 02/03/25 1411   Drainage Amount Small 02/03/25 1411   Drainage Description Bloody 02/04/25 1300   Treatments Cleansed;Site care 02/04/25 1300   Dressing Other (Comment);Foam, Silicon (eg. Allevyn, etc) 02/04/25 1300   Wound packed? No 02/04/25 1300   Dressing Changed Changed 02/04/25 1300   Patient Tolerance Tolerated well 02/04/25 1300   Dressing Status Clean;Dry;Intact 02/04/25 1300     Reviewed plan of care with primary RN   Recommendations written as orders  Wound care team to follow weekly while admitted  Questions or concerns Secure Chat Wound Care Nurse    Snehal Us BSN, RN, CWON

## 2025-02-05 NOTE — SPEECH THERAPY NOTE
Speech Language/Pathology  Chart Reviewed. Pt transitioned to comfort care with pleasure feeds. ST will d/c order. Refer to change in current status.

## 2025-02-05 NOTE — PROGRESS NOTES
Pastoral Care Progress Note  CH and CM provided support together. Pt received them reclined in bed, son of pt present.  CM shared about discharge planning and what the pt's insurance will pay for. Family expressed understanding.   CM shared that hospice services are still an option, family expressed understanding and declined at this time.  CH provided support and encouragement.          Chaplaincy Interventions Utilized:   Empowerment: Encouraged self-care and Normalized experience of patient/family    Exploration: Explored relational needs & resources     02/05/25 1200   Clinical Encounter Type   Visited With Patient and family together

## 2025-02-05 NOTE — ASSESSMENT & PLAN NOTE
Prehospital takesToprol-XL 25 mg p.o. daily and Lasix 20 mg p.o. daily on Monday Wednesday Friday  Currently NPO.  Received IV metoprolol and furosemide.   Transitioned to comfort measures only and being discharged to the [facility].

## 2025-02-05 NOTE — DISCHARGE SUMMARY
"Discharge Summary - Hospitalist   Name: Prem Mar Sr. 89 y.o. male I MRN: [id number]  Unit/Bed#: [facility] I Date of Admission: 1/15/2025   Date of Service: 2/5/2025 I Hospital Day: 20     Assessment & Plan  Upper GI bleed  Presented for suspected GI bleed however found to have severe dysphagia  CT abdomen and pelvis 1/15/2025 with focal wall thickening of the lower esophagus, gastroesophageal junction, and proximal stomach which may reflect esophagitis/gastritis.  No evidence for bowel obstruction, mesenteric inflammation, appendicitis, obstructive uropathy, free air or free fluid  Chest x-ray 1/15/2025 with gaseous distention of the stomach  KUB 1/16/2025 with worsening distention  NGT was placed, since discontinued  CT abdomen pelvis with contrast 1/17/2024: \"New small bilateral pleural effusions with extensive bibasilar atelectasis. Wall thickening of the lower esophagus and proximal stomach is again noted without interval change. This may reflect mild focal esophagitis/gastritis. New NG tube with tip terminating in the gastric body region. No evidence of bowel obstruction, mesenteric inflammation, appendicitis, obstructive uropathy, free air, or free fluid. Descending and sigmoid diverticulosis again noted without evidence for acute diverticulitis. Subtle wall thickening of the mid to distal sigmoid colon, rectum, and anal verge region could reflect mild focal colitis/proctitis. Multiple punctate pancreatic calcifications again seen likely sequelae of chronic pancreatitis\"  Hemoglobin has remained stable, no signs of bleeding  See also, plan for dysphagia  COVID-19 virus infection  Patient with fever and COVID-positive roommate at his skilled facility   Tested positive for COVID 12/17/2025  Continues to require 2 L nasal cannula oxygen  S/p remdesivir x 3 days  Continue supportive care  Monitor labs and vital signs, conduct serial physical assessments  Goals of care, counseling/discussion  Patient with " significant comorbid medical conditions would be high risk for treatment options requiring anesthesia.  EGD testing might have risks that outweigh the benefits.  Patient verbalized understanding.  Opened goals of care conversations with son.  Continued on phone on Saturday. Had further conversations with patient's son, daughter, and grandson when they arrived   Per discussion with patient's son, daughter, and grandson 1/28/2025 regarding non-candidacy for feeding tube and no good options for nutrition currently. Ultimately they would like to keep him comfortable but for now continue with full treatment   Palliative input appreciated   2/1/2025.  Had further discussion regarding goals of care.  Patient's son would like to see speech continuing to work with patient.  I explained to patient's son today that ongoing IV fluids are not sustainable.  I suggested transitioning to pleasure feeds, comfort care.  He would then be able to return to the [facility].  I let him know that the [facility] would not be able to accept patient back at this level of function unless level of care is deemed comfort care.  He acknowledged understanding and asked me to call his sister and grandson  2/2/2025.  Continued discussion regarding goals of care.  As we discussed yesterday IV fluids are not a long-term option and with hyponatremia and evaluation for possible elevated volume status and planned diuretic, discontinue D5W.  Explained that patient would possibly be evaluated one more time by speech, and then we would have to come together to form a definitive disposition plan  2/3/2025: Patient's son consented to a hospice evaluation  2/4/2025: Patient's family met with hospice, they would like to discuss before making a decision  2/5/2025: his family would like to pursue comfort measures only status at the skilled nursing facility. Transitioned to comfort measures only and being discharged to the [facility].   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   Essential hypertension  Prehospital takesToprol-XL 25 mg p.o. daily and Lasix 20 mg p.o. daily on Monday Wednesday Friday  Currently NPO.  Received IV metoprolol and furosemide.   Transitioned to comfort measures only and being discharged to the [facility].   Mixed hyperlipidemia  Transitioned to comfort measures only and being discharged to the [facility].   Chronic kidney disease, stage 3a (HCC)  Lab Results   Component Value Date    EGFR 53 02/04/2025    EGFR 67 02/03/2025    EGFR 77 02/02/2025    CREATININE 1.13 02/04/2025    CREATININE 0.93 02/03/2025    CREATININE 0.70 02/02/2025     Creatinine baseline of 0.9-1.1 mg/dL   Transitioned to comfort measures only and being discharged to the [facility].   Atrial flutter (HCC)  Was previously on metoprolol   Not currently on anticoagulation as outpatient   Transitioned to comfort measures only and being discharged to the [facility].   Constipation  GI input appreciated  Transitioned to comfort measures only and being discharged to the [facility].   Hypernatremia  In setting of poor oral intake, now resolved  Transitioned to comfort measures only and being discharged to the [facility].   Dysphagia  Passed NG clamping trial 1/20/2025, later evaluated by SLP but did not pass   Keep NPO   Trickle feeding through NG initiated 1/22/2025 but discontinued 1/23/2025 due to high residuals and rhonchi requiring frequent suctioning  Speech therapy continues to follow, cleared for ice chips only with nursing supervision  General surgery input regarding feeding tube appreciated.  Patient not currently a candidate due to poor prognosis, risk of complications, and limited benefit  Reevaluated by speech 2/3/2025, input appreciated: Recommend to continue NPO ice chips for pleasure   Please see also, goals of care    Severe protein-calorie malnutrition (HCC)  Malnutrition Findings:   Adult Malnutrition type:  Acute illness  Adult Degree of Malnutrition: Other severe protein calorie malnutrition  Malnutrition Characteristics: Inadequate energy, Fluid accumulation                  360 Statement: Malnutrition related to acute illness as evidenced by <50% energy intake compared to estimated energy requirements for >5 days, documented fluid accumulation.  To treat with nutrition within goals of care.    BMI Findings:           Body mass index is 26.01 kg/m².   Transitioned to comfort measures only and being discharged to the [facility].      Discharging Physician / Practitioner: ANAM Salinas  PCP: Leonard Schreiber MD  Admission Date:   Admission Orders (From admission, onward)       Ordered        01/16/25 1200  INPATIENT ADMISSION  Once            01/15/25 0615  Place in Observation  Once                          Discharge Date: 02/05/25    Reason for Admission: Possible upper GI bleeding    Discharge Diagnoses:     Principal Problem:    Upper GI bleed  Active Problems:    Essential hypertension    Idiopathic chronic gout of multiple sites without tophus    Mixed hyperlipidemia    Chronic kidney disease, stage 3a (HCC)    Atrial flutter (HCC)    Constipation    Goals of care, counseling/discussion    Acute distention of stomach    COVID-19 virus infection    Congestion of upper airway    Opacity noted on imaging study    Palliative care by specialist    Electrolyte abnormality    Hypernatremia    Dysphagia    Severe protein-calorie malnutrition (HCC)  Resolved Problems:    Leukocytosis      Consultations During Hospital Stay:  IP CONSULT TO GASTROENTEROLOGY  IP CONSULT TO NUTRITION SERVICES  IP CONSULT TO PALLIATIVE CARE  IP CONSULT TO PHARMACY  INPATIENT CONSULT TO IR  IP CONSULT TO HOSPICE    Procedures Performed:     None    Significant Findings / Test Results:     XR abdomen 1 view kub  Result Date: 1/16/2025  1.  NG tube projects over the gastric body with decompression of the stomach compared to the x-ray from  "earlier the same day. 2.  Mildly dilated mid abdominal small bowel loops with gas throughout the colon though the abdomen was only partially imaged.     XR abdomen 1 view kub  Result Date: 1/16/2025  Worsening gastric distention.     XR chest portable  Result Date: 1/15/2025  No endogastric tube visible within the field-of-view. There is some gaseous distention of the stomach. Minimal atelectasis left lung base.     CT abdomen pelvis with contrast  Result Date: 1/15/2025  Trace left pleural effusion with bibasilar atelectasis.  Focal wall thickening of the lower esophagus, gastroesophageal junction, and proximal stomach noted which may reflect esophagitis/gastritis. No evidence for bowel obstruction, mesenteric inflammation, appendicitis, obstructive uropathy, free air, or free fluid. Descending and sigmoid colon diverticulosis without evidence for acute diverticulitis.     Incidental Findings:   None      Test Results Pending at Discharge (will require follow up):   None      Outpatient Tests Requested:  None     Complications:  none     Hospital Course:     Prem Mar Sr. is a 89 y.o. male patient who originally presented to the hospital on 1/15/2025 due to concerning for possible GI bleeding at the skilled nursing facility.  The patient with history of hypertension, chronic kidney disease, and a flutter.  He was brought into the hospital after the staff at the [facility] noticed 2 episodes of \"coffee ground emesis\".  Send with history of peptic ulcers disease and he subsequently was evaluated by GI.  GI suspected that the patient likely had an acute infectious illness possible irritable bowel/gastroparesis causing nausea and vomiting with distended stomach noted on CT imaging.  The patient subsequently had an NG tube placement.  His hemoglobin remained stable and GI favors conservative management with high-dose PPI and antiemetics.  GI did not recommend any endoscopic evaluation.  Additionally there was no " "indication to hold his low-dose aspirin.  The patient's hospital course was prolonged as he has not been able to swallow with failure to thrive, weight loss, poor nutrition.  The patient was evaluated by IR for possible PEG tube.  The patient was evaluated by palliative care and multiple goals of care discussions done in depth with the patient and the patient's family.  The patient's son decided that the patient should pedro pablo on comfort measures only and declined hospice services at this time.    Condition at Discharge: stable     Discharge Day Visit / Exam:     Subjective: Patient was seen and examined.  He denies any pain currently.  His son was at bedside.  The patient is only been able to tolerate some sips of fluids.   Vitals: Blood Pressure: 111/60 (02/04/25 2200)  Pulse: 76 (02/05/25 0800)  Temperature: 99.1 °F (37.3 °C) (02/04/25 2200)  Temp Source: Temporal (02/04/25 0700)  Respirations: 17 (02/04/25 2243)  Height: 5' 6\" (167.6 cm) (01/15/25 0755)  Weight - Scale: 73.1 kg (161 lb 2.5 oz) (01/28/25 1451)  SpO2: 96 % (02/05/25 0800)  Exam:   Physical Exam  Vitals and nursing note reviewed.   Constitutional:       General: He is not in acute distress.     Appearance: Normal appearance.      Comments: Frail and elderly      HENT:      Head: Normocephalic and atraumatic.      Right Ear: External ear normal.      Left Ear: External ear normal.      Nose: Nose normal.      Mouth/Throat:      Mouth: Mucous membranes are dry.      Pharynx: Oropharynx is clear.   Eyes:      General:         Right eye: No discharge.         Left eye: No discharge.      Extraocular Movements: Extraocular movements intact.      Pupils: Pupils are equal, round, and reactive to light.   Cardiovascular:      Rate and Rhythm: Normal rate and regular rhythm.      Pulses: Normal pulses.      Heart sounds: Normal heart sounds. No murmur heard.  Pulmonary:      Effort: Pulmonary effort is normal. No respiratory distress.      Breath sounds: No " wheezing.      Comments: Decreased in bases    Abdominal:      General: Bowel sounds are normal. There is no distension.      Palpations: Abdomen is soft. There is no mass.      Tenderness: There is no abdominal tenderness.   Musculoskeletal:         General: Swelling (trace BLEs) present. No tenderness or deformity. Normal range of motion.      Cervical back: Normal range of motion and neck supple. No rigidity.   Skin:     General: Skin is warm and dry.      Capillary Refill: Capillary refill takes less than 2 seconds.      Coloration: Skin is not pale.      Findings: No erythema.   Neurological:      General: No focal deficit present.      Mental Status: He is alert. Mental status is at baseline.      Comments: With periods of confusion and forgetfulness     Psychiatric:         Mood and Affect: Mood normal.         Behavior: Behavior normal.         Thought Content: Thought content normal.       Discussion with Family: son    Discharge instructions/Information to patient and family:   See after visit summary for information provided to patient and family.      Provisions for Follow-Up Care:  See after visit summary for information related to follow-up care and any pertinent home health orders.      Disposition:     Other Skilled Nursing Facility at the [place]    Planned Readmission: no      Discharge Statement:  I spent 38 minutes discharging the patient. This time was spent on the day of discharge. I had direct contact with the patient on the day of discharge. Greater than 50% of the total time was spent examining patient, answering all patient questions, arranging and discussing plan of care with patient as well as directly providing post-discharge instructions.  Additional time then spent on discharge activities.    Discharge Medications:  See after visit summary for reconciled discharge medications provided to patient and family.      ** Please Note: This note has been constructed using a voice recognition  system **

## 2025-02-05 NOTE — ASSESSMENT & PLAN NOTE
Malnutrition Findings:   Adult Malnutrition type: Acute illness  Adult Degree of Malnutrition: Other severe protein calorie malnutrition  Malnutrition Characteristics: Inadequate energy, Fluid accumulation                  360 Statement: Malnutrition related to acute illness as evidenced by <50% energy intake compared to estimated energy requirements for >5 days, documented fluid accumulation.  To treat with nutrition within goals of care.    BMI Findings:           Body mass index is 26.01 kg/m².   Transitioned to comfort measures only and being discharged to the [facility].

## 2025-02-05 NOTE — ASSESSMENT & PLAN NOTE
GI input appreciated  Transitioned to comfort measures only and being discharged to the [facility].

## 2025-02-06 NOTE — UTILIZATION REVIEW
NOTIFICATION OF ADMISSION DISCHARGE   This is a Notification of Discharge from [facility]. Please be advised that this patient has been discharge from our facility. Below you will find the admission and discharge date and time including the patient’s disposition.   UTILIZATION REVIEW CONTACT:  Yara Ponce  Utilization   Network Utilization Review Department  Phone: [phone] x carefully listen to the prompts. All voicemails are confidential.  Email: [email or web address]     ADMISSION INFORMATION  PRESENTATION DATE: 1/15/2025  4:05 AM  OBERVATION ADMISSION DATE: 01/15/2025 0615  INPATIENT ADMISSION DATE: 1/16/25 12:00 PM   DISCHARGE DATE: 2/5/2025  1:46 PM   DISPOSITION:Released to SNF/TCU/SNU Facility    Network Utilization Review Department  ATTENTION: Please call with any questions or concerns to [phone] and carefully listen to the prompts so that you are directed to the right person. All voicemails are confidential.   For Discharge needs, contact Care Management DC Support Team at [phone] opt. 2  Send all requests for admission clinical reviews, approved or denied determinations and any other requests to dedicated fax number below belonging to the campus where the patient is receiving treatment. List of dedicated fax numbers for the Facilities:  FACILITY NAME UR FAX NUMBER   ADMISSION DENIALS (Administrative/Medical Necessity) [phone]   DISCHARGE SUPPORT TEAM ([facility]) [phone]   PARENT CHILD HEALTH (Maternity/NICU/Pediatrics) [phone]   [facility] [phone]   [facility] [phone]   [facility] [phone]   [facility] [phone]   [facility] [phone]   [facility] [phone]   [facility] [phone]   [facility]  [facility] [phone]   [facility] [phone]   [facility] [phone]   [facility] [phone]   [facility] [phone]

## 2025-07-03 ENCOUNTER — HOSPITAL ENCOUNTER (OUTPATIENT)
Dept: RADIOLOGY | Facility: HOSPITAL | Age: OVER 89
Discharge: HOME | End: 2025-07-03
Payer: COMMERCIAL

## 2025-07-03 DIAGNOSIS — Z11.1 SCREENING-PULMONARY TB: ICD-10-CM

## 2025-07-03 PROCEDURE — 71045 X-RAY EXAM CHEST 1 VIEW: CPT
